# Patient Record
Sex: MALE | Race: WHITE | URBAN - METROPOLITAN AREA
[De-identification: names, ages, dates, MRNs, and addresses within clinical notes are randomized per-mention and may not be internally consistent; named-entity substitution may affect disease eponyms.]

---

## 2017-01-01 ENCOUNTER — RESULTS ONLY (OUTPATIENT)
Dept: OTHER | Facility: CLINIC | Age: 77
End: 2017-01-01

## 2017-01-01 ENCOUNTER — APPOINTMENT (OUTPATIENT)
Dept: GENERAL RADIOLOGY | Facility: CLINIC | Age: 77
DRG: 270 | End: 2017-01-01
Attending: INTERNAL MEDICINE
Payer: COMMERCIAL

## 2017-01-01 ENCOUNTER — ALLIED HEALTH/NURSE VISIT (OUTPATIENT)
Dept: CARDIOLOGY | Facility: CLINIC | Age: 77
End: 2017-01-01
Payer: COMMERCIAL

## 2017-01-01 ENCOUNTER — TELEPHONE (OUTPATIENT)
Dept: CARDIOLOGY | Facility: CLINIC | Age: 77
End: 2017-01-01

## 2017-01-01 ENCOUNTER — MEDICAL CORRESPONDENCE (OUTPATIENT)
Dept: HEALTH INFORMATION MANAGEMENT | Facility: CLINIC | Age: 77
End: 2017-01-01

## 2017-01-01 ENCOUNTER — APPOINTMENT (OUTPATIENT)
Dept: GENERAL RADIOLOGY | Facility: CLINIC | Age: 77
DRG: 286 | End: 2017-01-01
Attending: INTERNAL MEDICINE
Payer: COMMERCIAL

## 2017-01-01 ENCOUNTER — DOCUMENTATION ONLY (OUTPATIENT)
Dept: CARDIOLOGY | Facility: CLINIC | Age: 77
End: 2017-01-01

## 2017-01-01 ENCOUNTER — APPOINTMENT (OUTPATIENT)
Dept: OCCUPATIONAL THERAPY | Facility: CLINIC | Age: 77
DRG: 286 | End: 2017-01-01
Attending: INTERNAL MEDICINE
Payer: COMMERCIAL

## 2017-01-01 ENCOUNTER — APPOINTMENT (OUTPATIENT)
Dept: CT IMAGING | Facility: CLINIC | Age: 77
DRG: 286 | End: 2017-01-01
Payer: COMMERCIAL

## 2017-01-01 ENCOUNTER — HOSPITAL ENCOUNTER (OUTPATIENT)
Dept: CARDIAC REHAB | Facility: CLINIC | Age: 77
End: 2017-01-09
Attending: NURSE PRACTITIONER
Payer: COMMERCIAL

## 2017-01-01 ENCOUNTER — PRE VISIT (OUTPATIENT)
Dept: CARDIOLOGY | Facility: CLINIC | Age: 77
End: 2017-01-01

## 2017-01-01 ENCOUNTER — OFFICE VISIT (OUTPATIENT)
Dept: CARDIOLOGY | Facility: CLINIC | Age: 77
End: 2017-01-01
Attending: NURSE PRACTITIONER
Payer: COMMERCIAL

## 2017-01-01 ENCOUNTER — OFFICE VISIT (OUTPATIENT)
Dept: SLEEP MEDICINE | Facility: CLINIC | Age: 77
End: 2017-01-01
Payer: COMMERCIAL

## 2017-01-01 ENCOUNTER — CARE COORDINATION (OUTPATIENT)
Dept: CARDIOLOGY | Facility: CLINIC | Age: 77
End: 2017-01-01

## 2017-01-01 ENCOUNTER — HOSPITAL ENCOUNTER (OUTPATIENT)
Facility: CLINIC | Age: 77
Discharge: HOME | End: 2017-04-03
Attending: INTERNAL MEDICINE | Admitting: INTERNAL MEDICINE
Payer: MEDICARE

## 2017-01-01 ENCOUNTER — HOSPITAL ENCOUNTER (OUTPATIENT)
Dept: CARDIAC REHAB | Facility: CLINIC | Age: 77
End: 2017-03-08
Attending: NURSE PRACTITIONER
Payer: COMMERCIAL

## 2017-01-01 ENCOUNTER — HOSPITAL ENCOUNTER (INPATIENT)
Facility: CLINIC | Age: 77
LOS: 6 days | Discharge: HOME | DRG: 291 | End: 2017-06-24
Attending: EMERGENCY MEDICINE | Admitting: INTERNAL MEDICINE
Payer: COMMERCIAL

## 2017-01-01 ENCOUNTER — ANESTHESIA (OUTPATIENT)
Dept: INTENSIVE CARE | Facility: CLINIC | Age: 77
DRG: 270 | End: 2017-01-01
Payer: COMMERCIAL

## 2017-01-01 ENCOUNTER — APPOINTMENT (OUTPATIENT)
Dept: GENERAL RADIOLOGY | Facility: CLINIC | Age: 77
DRG: 286 | End: 2017-01-01
Payer: COMMERCIAL

## 2017-01-01 ENCOUNTER — HOSPITAL ENCOUNTER (EMERGENCY)
Facility: CLINIC | Age: 77
Discharge: HOME | End: 2017-03-04
Attending: EMERGENCY MEDICINE | Admitting: EMERGENCY MEDICINE
Payer: COMMERCIAL

## 2017-01-01 ENCOUNTER — APPOINTMENT (OUTPATIENT)
Dept: CARDIOLOGY | Facility: CLINIC | Age: 77
DRG: 286 | End: 2017-01-01
Attending: INTERNAL MEDICINE
Payer: COMMERCIAL

## 2017-01-01 ENCOUNTER — TRANSFERRED RECORDS (OUTPATIENT)
Dept: CARDIOLOGY | Facility: CLINIC | Age: 77
End: 2017-01-01

## 2017-01-01 ENCOUNTER — HOSPITAL ENCOUNTER (OUTPATIENT)
Dept: CARDIAC REHAB | Facility: CLINIC | Age: 77
End: 2017-01-23
Attending: NURSE PRACTITIONER
Payer: COMMERCIAL

## 2017-01-01 ENCOUNTER — CARE COORDINATION (OUTPATIENT)
Dept: CARE COORDINATION | Facility: CLINIC | Age: 77
End: 2017-01-01

## 2017-01-01 ENCOUNTER — OFFICE VISIT (OUTPATIENT)
Dept: CARDIOLOGY | Facility: CLINIC | Age: 77
End: 2017-01-01
Payer: COMMERCIAL

## 2017-01-01 ENCOUNTER — HOSPITAL ENCOUNTER (INPATIENT)
Facility: CLINIC | Age: 77
LOS: 9 days | DRG: 270 | End: 2017-07-09
Attending: INTERNAL MEDICINE | Admitting: INTERNAL MEDICINE
Payer: COMMERCIAL

## 2017-01-01 ENCOUNTER — HOSPITAL ENCOUNTER (OUTPATIENT)
Facility: CLINIC | Age: 77
End: 2017-01-01
Admitting: INTERNAL MEDICINE

## 2017-01-01 ENCOUNTER — TELEPHONE (OUTPATIENT)
Dept: PHARMACY | Facility: OTHER | Age: 77
End: 2017-01-01

## 2017-01-01 ENCOUNTER — APPOINTMENT (OUTPATIENT)
Dept: CARDIOLOGY | Facility: CLINIC | Age: 77
DRG: 270 | End: 2017-01-01
Attending: INTERNAL MEDICINE
Payer: COMMERCIAL

## 2017-01-01 ENCOUNTER — HOME INFUSION (PRE-WILLOW HOME INFUSION) (OUTPATIENT)
Dept: PHARMACY | Facility: CLINIC | Age: 77
End: 2017-01-01

## 2017-01-01 ENCOUNTER — RADIANT APPOINTMENT (OUTPATIENT)
Dept: CARDIOLOGY | Facility: CLINIC | Age: 77
End: 2017-01-01
Attending: INTERNAL MEDICINE

## 2017-01-01 ENCOUNTER — HOSPITAL ENCOUNTER (OUTPATIENT)
Dept: CARDIAC REHAB | Facility: CLINIC | Age: 77
End: 2017-01-05
Attending: NURSE PRACTITIONER
Payer: COMMERCIAL

## 2017-01-01 ENCOUNTER — INFUSION THERAPY VISIT (OUTPATIENT)
Dept: INFUSION THERAPY | Facility: CLINIC | Age: 77
End: 2017-01-01
Attending: INTERNAL MEDICINE
Payer: COMMERCIAL

## 2017-01-01 ENCOUNTER — DOCUMENTATION ONLY (OUTPATIENT)
Dept: SLEEP MEDICINE | Facility: CLINIC | Age: 77
End: 2017-01-01

## 2017-01-01 ENCOUNTER — OFFICE VISIT (OUTPATIENT)
Dept: CARDIOLOGY | Facility: CLINIC | Age: 77
End: 2017-01-01
Attending: INTERNAL MEDICINE
Payer: COMMERCIAL

## 2017-01-01 ENCOUNTER — HOSPITAL ENCOUNTER (OUTPATIENT)
Facility: CLINIC | Age: 77
Setting detail: OBSERVATION
Discharge: HOME | End: 2017-03-30
Attending: EMERGENCY MEDICINE | Admitting: INTERNAL MEDICINE
Payer: COMMERCIAL

## 2017-01-01 ENCOUNTER — APPOINTMENT (OUTPATIENT)
Dept: OCCUPATIONAL THERAPY | Facility: CLINIC | Age: 77
DRG: 270 | End: 2017-01-01
Attending: INTERNAL MEDICINE
Payer: COMMERCIAL

## 2017-01-01 ENCOUNTER — HOSPITAL ENCOUNTER (OUTPATIENT)
Dept: CARDIAC REHAB | Facility: CLINIC | Age: 77
End: 2017-03-16
Attending: NURSE PRACTITIONER
Payer: COMMERCIAL

## 2017-01-01 ENCOUNTER — HOSPITAL ENCOUNTER (OUTPATIENT)
Dept: CARDIAC REHAB | Facility: CLINIC | Age: 77
End: 2017-03-23
Attending: NURSE PRACTITIONER
Payer: COMMERCIAL

## 2017-01-01 ENCOUNTER — APPOINTMENT (OUTPATIENT)
Dept: CT IMAGING | Facility: CLINIC | Age: 77
DRG: 286 | End: 2017-01-01
Attending: INTERNAL MEDICINE
Payer: COMMERCIAL

## 2017-01-01 ENCOUNTER — APPOINTMENT (OUTPATIENT)
Dept: ULTRASOUND IMAGING | Facility: CLINIC | Age: 77
DRG: 286 | End: 2017-01-01
Attending: STUDENT IN AN ORGANIZED HEALTH CARE EDUCATION/TRAINING PROGRAM
Payer: COMMERCIAL

## 2017-01-01 ENCOUNTER — APPOINTMENT (OUTPATIENT)
Dept: GENERAL RADIOLOGY | Facility: CLINIC | Age: 77
DRG: 291 | End: 2017-01-01
Payer: COMMERCIAL

## 2017-01-01 ENCOUNTER — APPOINTMENT (OUTPATIENT)
Dept: CARDIOLOGY | Facility: CLINIC | Age: 77
End: 2017-01-01
Attending: NURSE PRACTITIONER
Payer: MEDICARE

## 2017-01-01 ENCOUNTER — HOSPITAL ENCOUNTER (OUTPATIENT)
Dept: CARDIAC REHAB | Facility: CLINIC | Age: 77
End: 2017-03-27
Attending: NURSE PRACTITIONER
Payer: COMMERCIAL

## 2017-01-01 ENCOUNTER — INFUSION THERAPY VISIT (OUTPATIENT)
Dept: INFUSION THERAPY | Facility: CLINIC | Age: 77
End: 2017-01-01
Payer: COMMERCIAL

## 2017-01-01 ENCOUNTER — APPOINTMENT (OUTPATIENT)
Dept: ULTRASOUND IMAGING | Facility: CLINIC | Age: 77
DRG: 286 | End: 2017-01-01
Payer: COMMERCIAL

## 2017-01-01 ENCOUNTER — APPOINTMENT (OUTPATIENT)
Dept: GENERAL RADIOLOGY | Facility: CLINIC | Age: 77
DRG: 291 | End: 2017-01-01
Attending: EMERGENCY MEDICINE
Payer: COMMERCIAL

## 2017-01-01 ENCOUNTER — APPOINTMENT (OUTPATIENT)
Dept: OCCUPATIONAL THERAPY | Facility: CLINIC | Age: 77
DRG: 291 | End: 2017-01-01
Payer: COMMERCIAL

## 2017-01-01 ENCOUNTER — HOSPITAL ENCOUNTER (INPATIENT)
Facility: CLINIC | Age: 77
LOS: 9 days | Discharge: HOME HEALTH | DRG: 286 | End: 2017-04-15
Attending: INTERNAL MEDICINE | Admitting: INTERNAL MEDICINE
Payer: COMMERCIAL

## 2017-01-01 ENCOUNTER — APPOINTMENT (OUTPATIENT)
Dept: GENERAL RADIOLOGY | Facility: CLINIC | Age: 77
DRG: 270 | End: 2017-01-01
Attending: STUDENT IN AN ORGANIZED HEALTH CARE EDUCATION/TRAINING PROGRAM
Payer: COMMERCIAL

## 2017-01-01 ENCOUNTER — HOSPITAL ENCOUNTER (OUTPATIENT)
Dept: CARDIAC REHAB | Facility: CLINIC | Age: 77
End: 2017-01-12
Attending: NURSE PRACTITIONER
Payer: COMMERCIAL

## 2017-01-01 ENCOUNTER — HOSPITAL ENCOUNTER (INPATIENT)
Facility: CLINIC | Age: 77
LOS: 15 days | Discharge: HOME HEALTH | DRG: 286 | End: 2017-06-16
Attending: INTERNAL MEDICINE | Admitting: INTERNAL MEDICINE
Payer: COMMERCIAL

## 2017-01-01 ENCOUNTER — HOSPITAL ENCOUNTER (OUTPATIENT)
Dept: CARDIAC REHAB | Facility: CLINIC | Age: 77
End: 2017-03-13
Attending: NURSE PRACTITIONER
Payer: COMMERCIAL

## 2017-01-01 ENCOUNTER — HOSPITAL ENCOUNTER (OUTPATIENT)
Facility: CLINIC | Age: 77
End: 2017-01-01
Attending: INTERNAL MEDICINE | Admitting: INTERNAL MEDICINE

## 2017-01-01 ENCOUNTER — OFFICE VISIT (OUTPATIENT)
Dept: CARDIOLOGY | Facility: CLINIC | Age: 77
DRG: 270 | End: 2017-01-01
Attending: NURSE PRACTITIONER
Payer: COMMERCIAL

## 2017-01-01 ENCOUNTER — HOSPITAL ENCOUNTER (INPATIENT)
Facility: CLINIC | Age: 77
LOS: 4 days | Discharge: HOME | DRG: 641 | End: 2017-01-20
Attending: EMERGENCY MEDICINE | Admitting: INTERNAL MEDICINE
Payer: COMMERCIAL

## 2017-01-01 ENCOUNTER — HOSPITAL ENCOUNTER (OUTPATIENT)
Dept: CARDIAC REHAB | Facility: CLINIC | Age: 77
End: 2017-03-10
Attending: NURSE PRACTITIONER
Payer: COMMERCIAL

## 2017-01-01 ENCOUNTER — APPOINTMENT (OUTPATIENT)
Dept: CT IMAGING | Facility: CLINIC | Age: 77
DRG: 270 | End: 2017-01-01
Attending: INTERNAL MEDICINE
Payer: COMMERCIAL

## 2017-01-01 ENCOUNTER — APPOINTMENT (OUTPATIENT)
Dept: GENERAL RADIOLOGY | Facility: CLINIC | Age: 77
End: 2017-01-01
Attending: EMERGENCY MEDICINE
Payer: COMMERCIAL

## 2017-01-01 ENCOUNTER — APPOINTMENT (OUTPATIENT)
Dept: OCCUPATIONAL THERAPY | Facility: CLINIC | Age: 77
End: 2017-01-01
Attending: INTERNAL MEDICINE
Payer: COMMERCIAL

## 2017-01-01 ENCOUNTER — APPOINTMENT (OUTPATIENT)
Dept: ULTRASOUND IMAGING | Facility: CLINIC | Age: 77
DRG: 270 | End: 2017-01-01
Attending: INTERNAL MEDICINE
Payer: COMMERCIAL

## 2017-01-01 ENCOUNTER — HOSPITAL ENCOUNTER (OUTPATIENT)
Dept: CARDIAC REHAB | Facility: CLINIC | Age: 77
End: 2017-01-20
Attending: NURSE PRACTITIONER
Payer: COMMERCIAL

## 2017-01-01 ENCOUNTER — INFUSION THERAPY VISIT (OUTPATIENT)
Dept: INFUSION THERAPY | Facility: CLINIC | Age: 77
End: 2017-01-01
Attending: NURSE PRACTITIONER
Payer: COMMERCIAL

## 2017-01-01 ENCOUNTER — HOSPITAL ENCOUNTER (OUTPATIENT)
Dept: CARDIAC REHAB | Facility: CLINIC | Age: 77
End: 2017-01-11
Attending: NURSE PRACTITIONER
Payer: COMMERCIAL

## 2017-01-01 ENCOUNTER — HOSPITAL ENCOUNTER (INPATIENT)
Facility: CLINIC | Age: 77
LOS: 7 days | Discharge: HOME HEALTH | DRG: 291 | End: 2017-05-01
Attending: EMERGENCY MEDICINE | Admitting: INTERNAL MEDICINE
Payer: COMMERCIAL

## 2017-01-01 ENCOUNTER — TELEPHONE (OUTPATIENT)
Dept: PHARMACY | Facility: CLINIC | Age: 77
End: 2017-01-01

## 2017-01-01 ENCOUNTER — ALLIED HEALTH/NURSE VISIT (OUTPATIENT)
Dept: PHARMACY | Facility: OTHER | Age: 77
End: 2017-01-01
Payer: COMMERCIAL

## 2017-01-01 ENCOUNTER — APPOINTMENT (OUTPATIENT)
Dept: ULTRASOUND IMAGING | Facility: CLINIC | Age: 77
DRG: 286 | End: 2017-01-01
Attending: INTERNAL MEDICINE
Payer: COMMERCIAL

## 2017-01-01 ENCOUNTER — ANESTHESIA EVENT (OUTPATIENT)
Dept: INTENSIVE CARE | Facility: CLINIC | Age: 77
DRG: 270 | End: 2017-01-01
Payer: COMMERCIAL

## 2017-01-01 ENCOUNTER — HOSPITAL ENCOUNTER (OUTPATIENT)
Dept: LAB | Facility: CLINIC | Age: 77
Discharge: HOME | End: 2017-04-24
Attending: NURSE PRACTITIONER
Payer: COMMERCIAL

## 2017-01-01 ENCOUNTER — APPOINTMENT (OUTPATIENT)
Dept: CT IMAGING | Facility: CLINIC | Age: 77
End: 2017-01-01
Attending: EMERGENCY MEDICINE
Payer: COMMERCIAL

## 2017-01-01 VITALS
TEMPERATURE: 97.9 F | WEIGHT: 152.78 LBS | OXYGEN SATURATION: 98 % | HEIGHT: 68 IN | BODY MASS INDEX: 23.15 KG/M2 | DIASTOLIC BLOOD PRESSURE: 81 MMHG | SYSTOLIC BLOOD PRESSURE: 139 MMHG | RESPIRATION RATE: 18 BRPM | HEART RATE: 88 BPM

## 2017-01-01 VITALS
HEART RATE: 60 BPM | SYSTOLIC BLOOD PRESSURE: 136 MMHG | RESPIRATION RATE: 16 BRPM | DIASTOLIC BLOOD PRESSURE: 68 MMHG | TEMPERATURE: 95.7 F

## 2017-01-01 VITALS
OXYGEN SATURATION: 98 % | HEART RATE: 77 BPM | WEIGHT: 145 LBS | DIASTOLIC BLOOD PRESSURE: 56 MMHG | HEIGHT: 68 IN | SYSTOLIC BLOOD PRESSURE: 114 MMHG | BODY MASS INDEX: 21.98 KG/M2

## 2017-01-01 VITALS
BODY MASS INDEX: 21.82 KG/M2 | DIASTOLIC BLOOD PRESSURE: 63 MMHG | HEART RATE: 106 BPM | WEIGHT: 144 LBS | OXYGEN SATURATION: 96 % | SYSTOLIC BLOOD PRESSURE: 120 MMHG | HEIGHT: 68 IN

## 2017-01-01 VITALS
RESPIRATION RATE: 16 BRPM | SYSTOLIC BLOOD PRESSURE: 132 MMHG | DIASTOLIC BLOOD PRESSURE: 76 MMHG | TEMPERATURE: 97.7 F | HEART RATE: 74 BPM | OXYGEN SATURATION: 96 %

## 2017-01-01 VITALS — HEIGHT: 69 IN | BODY MASS INDEX: 21.92 KG/M2 | WEIGHT: 148 LBS

## 2017-01-01 VITALS
SYSTOLIC BLOOD PRESSURE: 110 MMHG | TEMPERATURE: 97.6 F | RESPIRATION RATE: 18 BRPM | WEIGHT: 144.62 LBS | DIASTOLIC BLOOD PRESSURE: 61 MMHG | OXYGEN SATURATION: 99 % | BODY MASS INDEX: 21.67 KG/M2

## 2017-01-01 VITALS
OXYGEN SATURATION: 94 % | HEART RATE: 89 BPM | WEIGHT: 147.8 LBS | SYSTOLIC BLOOD PRESSURE: 118 MMHG | RESPIRATION RATE: 18 BRPM | TEMPERATURE: 98.1 F | BODY MASS INDEX: 22.4 KG/M2 | DIASTOLIC BLOOD PRESSURE: 65 MMHG | HEIGHT: 68 IN

## 2017-01-01 VITALS
HEART RATE: 67 BPM | DIASTOLIC BLOOD PRESSURE: 66 MMHG | WEIGHT: 146 LBS | RESPIRATION RATE: 12 BRPM | HEIGHT: 69 IN | BODY MASS INDEX: 21.62 KG/M2 | OXYGEN SATURATION: 99 % | SYSTOLIC BLOOD PRESSURE: 107 MMHG

## 2017-01-01 VITALS
DIASTOLIC BLOOD PRESSURE: 68 MMHG | SYSTOLIC BLOOD PRESSURE: 136 MMHG | WEIGHT: 154.32 LBS | BODY MASS INDEX: 23.39 KG/M2 | HEIGHT: 68 IN | HEART RATE: 68 BPM | OXYGEN SATURATION: 99 %

## 2017-01-01 VITALS
HEART RATE: 86 BPM | OXYGEN SATURATION: 99 % | DIASTOLIC BLOOD PRESSURE: 71 MMHG | HEIGHT: 68 IN | BODY MASS INDEX: 21.75 KG/M2 | WEIGHT: 143.5 LBS | SYSTOLIC BLOOD PRESSURE: 128 MMHG

## 2017-01-01 VITALS
SYSTOLIC BLOOD PRESSURE: 118 MMHG | WEIGHT: 151 LBS | HEART RATE: 72 BPM | BODY MASS INDEX: 22.36 KG/M2 | DIASTOLIC BLOOD PRESSURE: 62 MMHG | HEIGHT: 69 IN

## 2017-01-01 VITALS
BODY MASS INDEX: 23.95 KG/M2 | OXYGEN SATURATION: 98 % | HEART RATE: 68 BPM | HEIGHT: 68 IN | WEIGHT: 158 LBS | SYSTOLIC BLOOD PRESSURE: 126 MMHG | DIASTOLIC BLOOD PRESSURE: 60 MMHG | RESPIRATION RATE: 14 BRPM

## 2017-01-01 VITALS
TEMPERATURE: 97.9 F | HEIGHT: 68 IN | OXYGEN SATURATION: 97 % | BODY MASS INDEX: 22.61 KG/M2 | RESPIRATION RATE: 18 BRPM | DIASTOLIC BLOOD PRESSURE: 65 MMHG | SYSTOLIC BLOOD PRESSURE: 117 MMHG | HEART RATE: 64 BPM | WEIGHT: 149.2 LBS

## 2017-01-01 VITALS
SYSTOLIC BLOOD PRESSURE: 117 MMHG | HEIGHT: 68 IN | BODY MASS INDEX: 22.32 KG/M2 | HEART RATE: 78 BPM | WEIGHT: 147.3 LBS | DIASTOLIC BLOOD PRESSURE: 67 MMHG | OXYGEN SATURATION: 99 %

## 2017-01-01 VITALS
DIASTOLIC BLOOD PRESSURE: 71 MMHG | HEIGHT: 69 IN | BODY MASS INDEX: 22.87 KG/M2 | SYSTOLIC BLOOD PRESSURE: 130 MMHG | SYSTOLIC BLOOD PRESSURE: 128 MMHG | WEIGHT: 154.4 LBS | OXYGEN SATURATION: 100 % | HEART RATE: 65 BPM | DIASTOLIC BLOOD PRESSURE: 70 MMHG | HEART RATE: 65 BPM

## 2017-01-01 VITALS
WEIGHT: 157.3 LBS | HEART RATE: 66 BPM | OXYGEN SATURATION: 100 % | SYSTOLIC BLOOD PRESSURE: 122 MMHG | BODY MASS INDEX: 23.3 KG/M2 | DIASTOLIC BLOOD PRESSURE: 60 MMHG | HEIGHT: 69 IN

## 2017-01-01 VITALS
TEMPERATURE: 99.3 F | HEIGHT: 68 IN | SYSTOLIC BLOOD PRESSURE: 95 MMHG | RESPIRATION RATE: 17 BRPM | OXYGEN SATURATION: 100 % | WEIGHT: 126.76 LBS | DIASTOLIC BLOOD PRESSURE: 69 MMHG | BODY MASS INDEX: 19.21 KG/M2

## 2017-01-01 VITALS
HEART RATE: 66 BPM | SYSTOLIC BLOOD PRESSURE: 128 MMHG | WEIGHT: 148.8 LBS | OXYGEN SATURATION: 99 % | DIASTOLIC BLOOD PRESSURE: 70 MMHG | BODY MASS INDEX: 22.04 KG/M2 | HEIGHT: 69 IN

## 2017-01-01 VITALS
OXYGEN SATURATION: 98 % | SYSTOLIC BLOOD PRESSURE: 130 MMHG | TEMPERATURE: 97.3 F | DIASTOLIC BLOOD PRESSURE: 71 MMHG | HEIGHT: 68 IN | WEIGHT: 154.4 LBS | RESPIRATION RATE: 16 BRPM | BODY MASS INDEX: 23.4 KG/M2 | HEART RATE: 64 BPM

## 2017-01-01 VITALS
HEART RATE: 64 BPM | DIASTOLIC BLOOD PRESSURE: 60 MMHG | WEIGHT: 153.4 LBS | SYSTOLIC BLOOD PRESSURE: 112 MMHG | BODY MASS INDEX: 22.72 KG/M2 | HEIGHT: 69 IN

## 2017-01-01 VITALS
WEIGHT: 143.2 LBS | HEIGHT: 68 IN | OXYGEN SATURATION: 96 % | DIASTOLIC BLOOD PRESSURE: 76 MMHG | SYSTOLIC BLOOD PRESSURE: 128 MMHG | RESPIRATION RATE: 18 BRPM | HEART RATE: 86 BPM | BODY MASS INDEX: 21.7 KG/M2 | TEMPERATURE: 97.9 F

## 2017-01-01 VITALS — BODY MASS INDEX: 23.19 KG/M2 | HEIGHT: 69 IN | WEIGHT: 156.6 LBS

## 2017-01-01 VITALS
OXYGEN SATURATION: 96 % | WEIGHT: 143.2 LBS | DIASTOLIC BLOOD PRESSURE: 72 MMHG | TEMPERATURE: 97.8 F | SYSTOLIC BLOOD PRESSURE: 125 MMHG | BODY MASS INDEX: 21.7 KG/M2 | HEIGHT: 68 IN | RESPIRATION RATE: 18 BRPM | HEART RATE: 88 BPM

## 2017-01-01 VITALS
BODY MASS INDEX: 22.63 KG/M2 | HEART RATE: 62 BPM | OXYGEN SATURATION: 100 % | TEMPERATURE: 97.4 F | DIASTOLIC BLOOD PRESSURE: 70 MMHG | HEIGHT: 69 IN | SYSTOLIC BLOOD PRESSURE: 128 MMHG | WEIGHT: 152.8 LBS

## 2017-01-01 VITALS — WEIGHT: 151.1 LBS | HEIGHT: 69 IN | BODY MASS INDEX: 22.38 KG/M2

## 2017-01-01 VITALS — HEIGHT: 67 IN | BODY MASS INDEX: 25.39 KG/M2 | WEIGHT: 161.8 LBS

## 2017-01-01 VITALS
SYSTOLIC BLOOD PRESSURE: 134 MMHG | OXYGEN SATURATION: 98 % | HEIGHT: 68 IN | DIASTOLIC BLOOD PRESSURE: 76 MMHG | HEART RATE: 83 BPM | BODY MASS INDEX: 21.95 KG/M2 | WEIGHT: 144.8 LBS

## 2017-01-01 VITALS
BODY MASS INDEX: 23.49 KG/M2 | HEIGHT: 68 IN | SYSTOLIC BLOOD PRESSURE: 128 MMHG | OXYGEN SATURATION: 98 % | WEIGHT: 155 LBS | DIASTOLIC BLOOD PRESSURE: 62 MMHG | HEART RATE: 74 BPM

## 2017-01-01 VITALS
BODY MASS INDEX: 23.76 KG/M2 | WEIGHT: 158.6 LBS | SYSTOLIC BLOOD PRESSURE: 120 MMHG | HEART RATE: 72 BPM | RESPIRATION RATE: 16 BRPM | DIASTOLIC BLOOD PRESSURE: 54 MMHG

## 2017-01-01 VITALS
HEART RATE: 77 BPM | SYSTOLIC BLOOD PRESSURE: 129 MMHG | WEIGHT: 146.6 LBS | HEIGHT: 68 IN | DIASTOLIC BLOOD PRESSURE: 73 MMHG | OXYGEN SATURATION: 99 % | BODY MASS INDEX: 22.22 KG/M2

## 2017-01-01 DIAGNOSIS — I50.23 ACUTE ON CHRONIC SYSTOLIC CONGESTIVE HEART FAILURE (H): ICD-10-CM

## 2017-01-01 DIAGNOSIS — I50.23 ACUTE ON CHRONIC SYSTOLIC CONGESTIVE HEART FAILURE (H): Primary | ICD-10-CM

## 2017-01-01 DIAGNOSIS — I50.22 CHRONIC SYSTOLIC CONGESTIVE HEART FAILURE (H): ICD-10-CM

## 2017-01-01 DIAGNOSIS — I50.9 CHF (CONGESTIVE HEART FAILURE) (H): Primary | ICD-10-CM

## 2017-01-01 DIAGNOSIS — I50.22 CHRONIC SYSTOLIC HEART FAILURE (H): ICD-10-CM

## 2017-01-01 DIAGNOSIS — I25.5 ISCHEMIC CARDIOMYOPATHY: Primary | ICD-10-CM

## 2017-01-01 DIAGNOSIS — N18.30 CKD (CHRONIC KIDNEY DISEASE) STAGE 3, GFR 30-59 ML/MIN (H): ICD-10-CM

## 2017-01-01 DIAGNOSIS — R09.02 HYPOXEMIA: Primary | ICD-10-CM

## 2017-01-01 DIAGNOSIS — N18.9 ANEMIA OF CHRONIC RENAL FAILURE: ICD-10-CM

## 2017-01-01 DIAGNOSIS — Z95.810 ICD (IMPLANTABLE CARDIOVERTER-DEFIBRILLATOR) IN PLACE: Primary | ICD-10-CM

## 2017-01-01 DIAGNOSIS — I24.9 ACS (ACUTE CORONARY SYNDROME) (H): Primary | ICD-10-CM

## 2017-01-01 DIAGNOSIS — I50.42 CHRONIC COMBINED SYSTOLIC AND DIASTOLIC CONGESTIVE HEART FAILURE (H): ICD-10-CM

## 2017-01-01 DIAGNOSIS — I25.5 ISCHEMIC CARDIOMYOPATHY: ICD-10-CM

## 2017-01-01 DIAGNOSIS — I50.22 CHRONIC SYSTOLIC HEART FAILURE (H): Primary | ICD-10-CM

## 2017-01-01 DIAGNOSIS — D63.1 ANEMIA OF CHRONIC RENAL FAILURE: ICD-10-CM

## 2017-01-01 DIAGNOSIS — I50.22 CHRONIC SYSTOLIC CONGESTIVE HEART FAILURE (H): Primary | ICD-10-CM

## 2017-01-01 DIAGNOSIS — G47.33 OSA (OBSTRUCTIVE SLEEP APNEA): ICD-10-CM

## 2017-01-01 DIAGNOSIS — R06.00 DYSPNEA, UNSPECIFIED TYPE: ICD-10-CM

## 2017-01-01 DIAGNOSIS — S50.01XA CONTUSION OF RIGHT ELBOW, INITIAL ENCOUNTER: ICD-10-CM

## 2017-01-01 DIAGNOSIS — I50.9 CHRONIC CONGESTIVE HEART FAILURE, UNSPECIFIED CONGESTIVE HEART FAILURE TYPE: ICD-10-CM

## 2017-01-01 DIAGNOSIS — D50.9 IRON DEFICIENCY ANEMIA, UNSPECIFIED: ICD-10-CM

## 2017-01-01 DIAGNOSIS — E03.9 HYPOTHYROIDISM: ICD-10-CM

## 2017-01-01 DIAGNOSIS — I50.23 ACUTE ON CHRONIC SYSTOLIC HEART FAILURE (H): ICD-10-CM

## 2017-01-01 DIAGNOSIS — E03.9 SEVERE HYPOTHYROIDISM: ICD-10-CM

## 2017-01-01 DIAGNOSIS — I10 ESSENTIAL HYPERTENSION WITH GOAL BLOOD PRESSURE LESS THAN 130/80: ICD-10-CM

## 2017-01-01 DIAGNOSIS — D50.9 IRON DEFICIENCY ANEMIA, UNSPECIFIED: Primary | ICD-10-CM

## 2017-01-01 DIAGNOSIS — D64.9 ANEMIA: ICD-10-CM

## 2017-01-01 DIAGNOSIS — R35.0 BENIGN PROSTATIC HYPERPLASIA WITH URINARY FREQUENCY: ICD-10-CM

## 2017-01-01 DIAGNOSIS — I21.4 NSTEMI (NON-ST ELEVATED MYOCARDIAL INFARCTION) (H): ICD-10-CM

## 2017-01-01 DIAGNOSIS — E87.6 HYPOKALEMIA: ICD-10-CM

## 2017-01-01 DIAGNOSIS — H25.9 AGE-RELATED CATARACT, UNSPECIFIED AGE-RELATED CATARACT TYPE, UNSPECIFIED LATERALITY: ICD-10-CM

## 2017-01-01 DIAGNOSIS — N18.30 CHRONIC KIDNEY DISEASE, STAGE III (MODERATE) (H): ICD-10-CM

## 2017-01-01 DIAGNOSIS — E87.1 HYPONATREMIA: ICD-10-CM

## 2017-01-01 DIAGNOSIS — T14.8XXA PUNCTURE WOUND: ICD-10-CM

## 2017-01-01 DIAGNOSIS — I25.10 CORONARY ARTERY DISEASE INVOLVING NATIVE CORONARY ARTERY OF NATIVE HEART WITHOUT ANGINA PECTORIS: ICD-10-CM

## 2017-01-01 DIAGNOSIS — G47.39 COMPLEX SLEEP APNEA SYNDROME: Primary | ICD-10-CM

## 2017-01-01 DIAGNOSIS — T14.8XXA ABRASION: ICD-10-CM

## 2017-01-01 DIAGNOSIS — N19 RENAL FAILURE: ICD-10-CM

## 2017-01-01 DIAGNOSIS — E53.8 VITAMIN B12 DEFICIENCY WITHOUT ANEMIA: ICD-10-CM

## 2017-01-01 DIAGNOSIS — N17.9 ACUTE RENAL FAILURE, UNSPECIFIED ACUTE RENAL FAILURE TYPE (H): ICD-10-CM

## 2017-01-01 DIAGNOSIS — N18.30 CKD (CHRONIC KIDNEY DISEASE), STAGE III (H): Primary | ICD-10-CM

## 2017-01-01 DIAGNOSIS — N18.30 CKD (CHRONIC KIDNEY DISEASE), STAGE III (H): ICD-10-CM

## 2017-01-01 DIAGNOSIS — I10 BENIGN ESSENTIAL HTN: ICD-10-CM

## 2017-01-01 DIAGNOSIS — G47.33 OBSTRUCTIVE SLEEP APNEA (ADULT) (PEDIATRIC): Primary | ICD-10-CM

## 2017-01-01 DIAGNOSIS — I50.82 BIVENTRICULAR HEART FAILURE (H): ICD-10-CM

## 2017-01-01 DIAGNOSIS — I50.23 ACUTE ON CHRONIC SYSTOLIC (CONGESTIVE) HEART FAILURE (H): Primary | ICD-10-CM

## 2017-01-01 DIAGNOSIS — N17.9 ACUTE KIDNEY INJURY (H): ICD-10-CM

## 2017-01-01 DIAGNOSIS — E87.70 HYPERVOLEMIA, UNSPECIFIED HYPERVOLEMIA TYPE: ICD-10-CM

## 2017-01-01 DIAGNOSIS — R57.0 CARDIOGENIC SHOCK (H): Primary | ICD-10-CM

## 2017-01-01 DIAGNOSIS — I13.0 CARDIORENAL SYNDROME, STAGE 1-4 OR UNSPECIFIED CHRONIC KIDNEY DISEASE, WITH HEART FAILURE (H): ICD-10-CM

## 2017-01-01 DIAGNOSIS — E87.6 HYPOKALEMIA: Primary | ICD-10-CM

## 2017-01-01 DIAGNOSIS — E87.5 HYPERKALEMIA: ICD-10-CM

## 2017-01-01 DIAGNOSIS — I50.23 ACUTE ON CHRONIC SYSTOLIC HEART FAILURE (H): Primary | ICD-10-CM

## 2017-01-01 DIAGNOSIS — N40.1 BENIGN PROSTATIC HYPERPLASIA WITH LOWER URINARY TRACT SYMPTOMS, UNSPECIFIED MORPHOLOGY: ICD-10-CM

## 2017-01-01 DIAGNOSIS — N40.1 BENIGN PROSTATIC HYPERPLASIA WITH URINARY FREQUENCY: ICD-10-CM

## 2017-01-01 DIAGNOSIS — N18.30 CKD (CHRONIC KIDNEY DISEASE) STAGE 3, GFR 30-59 ML/MIN (H): Primary | ICD-10-CM

## 2017-01-01 DIAGNOSIS — G47.00 INSOMNIA, UNSPECIFIED TYPE: ICD-10-CM

## 2017-01-01 DIAGNOSIS — R06.02 SOB (SHORTNESS OF BREATH): ICD-10-CM

## 2017-01-01 DIAGNOSIS — R57.0 CARDIOGENIC SHOCK (H): ICD-10-CM

## 2017-01-01 DIAGNOSIS — I50.9 LOW OUTPUT HEART FAILURE (H): ICD-10-CM

## 2017-01-01 LAB
% SATURATION - QUEST: 28
A* LOCUS: NORMAL
A*: NORMAL
ABO + RH BLD: NORMAL
ABTEST METHOD: NORMAL
ALBUMIN SERPL-MCNC: 2 G/DL (ref 3.4–5)
ALBUMIN SERPL-MCNC: 2 G/DL (ref 3.4–5)
ALBUMIN SERPL-MCNC: 2.2 G/DL (ref 3.4–5)
ALBUMIN SERPL-MCNC: 3 G/DL (ref 3.4–5)
ALBUMIN SERPL-MCNC: 3.1 G/DL (ref 3.4–5)
ALBUMIN SERPL-MCNC: 3.2 G/DL (ref 3.4–5)
ALBUMIN SERPL-MCNC: 3.3 G/DL (ref 3.4–5)
ALBUMIN SERPL-MCNC: 3.4 G/DL (ref 3.4–5)
ALBUMIN SERPL-MCNC: 3.5 G/DL (ref 3.4–5)
ALBUMIN SERPL-MCNC: 3.7 G/DL (ref 3.4–5)
ALBUMIN UR-MCNC: 10 MG/DL
ALBUMIN UR-MCNC: NEGATIVE MG/DL
ALP SERPL-CCNC: 102 U/L (ref 40–150)
ALP SERPL-CCNC: 102 U/L (ref 40–150)
ALP SERPL-CCNC: 106 U/L (ref 40–150)
ALP SERPL-CCNC: 108 U/L (ref 40–150)
ALP SERPL-CCNC: 115 U/L (ref 40–150)
ALP SERPL-CCNC: 115 U/L (ref 40–150)
ALP SERPL-CCNC: 120 U/L (ref 40–150)
ALP SERPL-CCNC: 124 U/L (ref 40–150)
ALP SERPL-CCNC: 125 U/L (ref 40–150)
ALP SERPL-CCNC: 126 U/L (ref 40–150)
ALP SERPL-CCNC: 128 U/L (ref 40–150)
ALP SERPL-CCNC: 140 U/L (ref 40–150)
ALP SERPL-CCNC: 143 U/L (ref 40–150)
ALP SERPL-CCNC: 72 U/L (ref 40–150)
ALP SERPL-CCNC: 80 U/L (ref 40–150)
ALP SERPL-CCNC: 80 U/L (ref 40–150)
ALP SERPL-CCNC: 81 U/L (ref 40–150)
ALP SERPL-CCNC: 82 U/L (ref 40–150)
ALP SERPL-CCNC: 84 U/L (ref 40–150)
ALP SERPL-CCNC: 85 U/L (ref 40–150)
ALP SERPL-CCNC: 86 U/L (ref 40–150)
ALP SERPL-CCNC: 87 U/L (ref 40–150)
ALP SERPL-CCNC: 90 U/L (ref 40–150)
ALP SERPL-CCNC: 90 U/L (ref 40–150)
ALP SERPL-CCNC: 95 U/L (ref 40–150)
ALP SERPL-CCNC: 96 U/L (ref 40–150)
ALP SERPL-CCNC: 97 U/L (ref 40–150)
ALT SERPL W P-5'-P-CCNC: 106 U/L (ref 0–70)
ALT SERPL W P-5'-P-CCNC: 123 U/L (ref 0–70)
ALT SERPL W P-5'-P-CCNC: 13 U/L (ref 0–70)
ALT SERPL W P-5'-P-CCNC: 13 U/L (ref 0–70)
ALT SERPL W P-5'-P-CCNC: 14 U/L (ref 0–70)
ALT SERPL W P-5'-P-CCNC: 144 U/L (ref 0–70)
ALT SERPL W P-5'-P-CCNC: 15 U/L (ref 0–70)
ALT SERPL W P-5'-P-CCNC: 166 U/L (ref 0–70)
ALT SERPL W P-5'-P-CCNC: 168 U/L (ref 0–70)
ALT SERPL W P-5'-P-CCNC: 19 U/L (ref 0–70)
ALT SERPL W P-5'-P-CCNC: 211 U/L (ref 0–70)
ALT SERPL W P-5'-P-CCNC: 22 U/L (ref 0–70)
ALT SERPL W P-5'-P-CCNC: 221 U/L (ref 0–70)
ALT SERPL W P-5'-P-CCNC: 235 U/L (ref 0–70)
ALT SERPL W P-5'-P-CCNC: 241 U/L (ref 0–70)
ALT SERPL W P-5'-P-CCNC: 244 U/L (ref 0–70)
ALT SERPL W P-5'-P-CCNC: 25 U/L (ref 0–70)
ALT SERPL W P-5'-P-CCNC: 34 U/L (ref 0–70)
ALT SERPL W P-5'-P-CCNC: 35 U/L (ref 0–70)
ALT SERPL W P-5'-P-CCNC: 35 U/L (ref 0–70)
ALT SERPL W P-5'-P-CCNC: 49 U/L (ref 0–70)
ALT SERPL W P-5'-P-CCNC: 54 U/L (ref 0–70)
ALT SERPL W P-5'-P-CCNC: 63 U/L (ref 0–70)
ALT SERPL W P-5'-P-CCNC: 67 U/L (ref 0–70)
ALT SERPL W P-5'-P-CCNC: 77 U/L (ref 0–70)
ALT SERPL W P-5'-P-CCNC: 87 U/L (ref 0–70)
ALT SERPL W P-5'-P-CCNC: 96 U/L (ref 0–70)
AMORPH CRY #/AREA URNS HPF: ABNORMAL /HPF
ANION GAP SERPL CALCULATED.3IONS-SCNC: 10 MMOL/L (ref 3–14)
ANION GAP SERPL CALCULATED.3IONS-SCNC: 11 MMOL/L (ref 3–14)
ANION GAP SERPL CALCULATED.3IONS-SCNC: 12 MMOL/L (ref 3–14)
ANION GAP SERPL CALCULATED.3IONS-SCNC: 12 MMOL/L (ref 6–17)
ANION GAP SERPL CALCULATED.3IONS-SCNC: 13 MMOL/L (ref 3–14)
ANION GAP SERPL CALCULATED.3IONS-SCNC: 14 MMOL/L (ref 3–14)
ANION GAP SERPL CALCULATED.3IONS-SCNC: 14.6 MMOL/L (ref 6–17)
ANION GAP SERPL CALCULATED.3IONS-SCNC: 15 MMOL/L (ref 3–14)
ANION GAP SERPL CALCULATED.3IONS-SCNC: 15 MMOL/L (ref 3–14)
ANION GAP SERPL CALCULATED.3IONS-SCNC: 17 MMOL/L (ref 3–14)
ANION GAP SERPL CALCULATED.3IONS-SCNC: 17 MMOL/L (ref 3–14)
ANION GAP SERPL CALCULATED.3IONS-SCNC: 3 MMOL/L (ref 3–14)
ANION GAP SERPL CALCULATED.3IONS-SCNC: 4 MMOL/L (ref 3–14)
ANION GAP SERPL CALCULATED.3IONS-SCNC: 5 MMOL/L (ref 3–14)
ANION GAP SERPL CALCULATED.3IONS-SCNC: 6 MMOL/L (ref 3–14)
ANION GAP SERPL CALCULATED.3IONS-SCNC: 7 MMOL/L (ref 3–14)
ANION GAP SERPL CALCULATED.3IONS-SCNC: 8 MMOL/L (ref 3–14)
ANION GAP SERPL CALCULATED.3IONS-SCNC: 9 MMOL/L (ref 3–14)
ANION GAP SERPL CALCULATED.3IONS-SCNC: ABNORMAL MMOL/L
ANION GAP SERPL CALCULATED.3IONS-SCNC: ABNORMAL MMOL/L (ref 6–17)
ANION GAP SERPL CALCULATED.3IONS-SCNC: NORMAL MMOL/L (ref 6–17)
ANISOCYTOSIS BLD QL SMEAR: SLIGHT
APPEARANCE UR: CLEAR
APTT PPP: 27 SEC (ref 22–37)
APTT PPP: 27 SEC (ref 22–37)
APTT PPP: 33 SEC (ref 22–37)
AST SERPL W P-5'-P-CCNC: 131 U/L (ref 0–45)
AST SERPL W P-5'-P-CCNC: 15 U/L (ref 0–45)
AST SERPL W P-5'-P-CCNC: 17 U/L (ref 0–45)
AST SERPL W P-5'-P-CCNC: 189 U/L (ref 0–45)
AST SERPL W P-5'-P-CCNC: 19 U/L (ref 0–45)
AST SERPL W P-5'-P-CCNC: 20 U/L (ref 0–45)
AST SERPL W P-5'-P-CCNC: 23 U/L (ref 0–45)
AST SERPL W P-5'-P-CCNC: 235 U/L (ref 0–45)
AST SERPL W P-5'-P-CCNC: 24 U/L (ref 0–45)
AST SERPL W P-5'-P-CCNC: 25 U/L (ref 0–45)
AST SERPL W P-5'-P-CCNC: 25 U/L (ref 0–45)
AST SERPL W P-5'-P-CCNC: 27 U/L (ref 0–45)
AST SERPL W P-5'-P-CCNC: 278 U/L (ref 0–45)
AST SERPL W P-5'-P-CCNC: 294 U/L (ref 0–45)
AST SERPL W P-5'-P-CCNC: 30 U/L (ref 0–45)
AST SERPL W P-5'-P-CCNC: 30 U/L (ref 0–45)
AST SERPL W P-5'-P-CCNC: 33 U/L (ref 0–45)
AST SERPL W P-5'-P-CCNC: 359 U/L (ref 0–45)
AST SERPL W P-5'-P-CCNC: 37 U/L (ref 0–45)
AST SERPL W P-5'-P-CCNC: 397 U/L (ref 0–45)
AST SERPL W P-5'-P-CCNC: 42 U/L (ref 0–45)
AST SERPL W P-5'-P-CCNC: 44 U/L (ref 0–45)
AST SERPL W P-5'-P-CCNC: 45 U/L (ref 0–45)
AST SERPL W P-5'-P-CCNC: 51 U/L (ref 0–45)
AST SERPL W P-5'-P-CCNC: 60 U/L (ref 0–45)
AST SERPL W P-5'-P-CCNC: 70 U/L (ref 0–45)
AST SERPL W P-5'-P-CCNC: 94 U/L (ref 0–45)
B* LOCUS: NORMAL
B*: NORMAL
BACTERIA #/AREA URNS HPF: ABNORMAL /HPF
BACTERIA SPEC CULT: ABNORMAL
BASE DEFICIT BLDV-SCNC: 0.1 MMOL/L
BASE DEFICIT BLDV-SCNC: 0.6 MMOL/L
BASE DEFICIT BLDV-SCNC: 0.7 MMOL/L
BASE DEFICIT BLDV-SCNC: 1.5 MMOL/L
BASE DEFICIT BLDV-SCNC: NORMAL MMOL/L
BASE EXCESS BLDA CALC-SCNC: 11.4 MMOL/L
BASE EXCESS BLDA CALC-SCNC: 12.8 MMOL/L
BASE EXCESS BLDA CALC-SCNC: 4.7 MMOL/L
BASE EXCESS BLDA CALC-SCNC: 8.8 MMOL/L
BASE EXCESS BLDV CALC-SCNC: 0.1 MMOL/L
BASE EXCESS BLDV CALC-SCNC: 0.2 MMOL/L
BASE EXCESS BLDV CALC-SCNC: 1.1 MMOL/L
BASE EXCESS BLDV CALC-SCNC: 1.3 MMOL/L
BASE EXCESS BLDV CALC-SCNC: 1.4 MMOL/L
BASE EXCESS BLDV CALC-SCNC: 1.4 MMOL/L
BASE EXCESS BLDV CALC-SCNC: 1.6 MMOL/L
BASE EXCESS BLDV CALC-SCNC: 1.8 MMOL/L
BASE EXCESS BLDV CALC-SCNC: 10.1 MMOL/L
BASE EXCESS BLDV CALC-SCNC: 10.1 MMOL/L
BASE EXCESS BLDV CALC-SCNC: 10.2 MMOL/L
BASE EXCESS BLDV CALC-SCNC: 10.4 MMOL/L
BASE EXCESS BLDV CALC-SCNC: 10.6 MMOL/L
BASE EXCESS BLDV CALC-SCNC: 10.6 MMOL/L
BASE EXCESS BLDV CALC-SCNC: 10.7 MMOL/L
BASE EXCESS BLDV CALC-SCNC: 10.7 MMOL/L
BASE EXCESS BLDV CALC-SCNC: 11.3 MMOL/L
BASE EXCESS BLDV CALC-SCNC: 11.7 MMOL/L
BASE EXCESS BLDV CALC-SCNC: 11.7 MMOL/L
BASE EXCESS BLDV CALC-SCNC: 11.8 MMOL/L
BASE EXCESS BLDV CALC-SCNC: 12.2 MMOL/L
BASE EXCESS BLDV CALC-SCNC: 12.4 MMOL/L
BASE EXCESS BLDV CALC-SCNC: 12.8 MMOL/L
BASE EXCESS BLDV CALC-SCNC: 12.9 MMOL/L
BASE EXCESS BLDV CALC-SCNC: 13 MMOL/L
BASE EXCESS BLDV CALC-SCNC: 13.4 MMOL/L
BASE EXCESS BLDV CALC-SCNC: 13.6 MMOL/L
BASE EXCESS BLDV CALC-SCNC: 13.7 MMOL/L
BASE EXCESS BLDV CALC-SCNC: 13.7 MMOL/L
BASE EXCESS BLDV CALC-SCNC: 13.9 MMOL/L
BASE EXCESS BLDV CALC-SCNC: 13.9 MMOL/L
BASE EXCESS BLDV CALC-SCNC: 14.4 MMOL/L
BASE EXCESS BLDV CALC-SCNC: 14.9 MMOL/L
BASE EXCESS BLDV CALC-SCNC: 15.2 MMOL/L
BASE EXCESS BLDV CALC-SCNC: 15.3 MMOL/L
BASE EXCESS BLDV CALC-SCNC: 15.5 MMOL/L
BASE EXCESS BLDV CALC-SCNC: 15.7 MMOL/L
BASE EXCESS BLDV CALC-SCNC: 15.9 MMOL/L
BASE EXCESS BLDV CALC-SCNC: 16.2 MMOL/L
BASE EXCESS BLDV CALC-SCNC: 16.2 MMOL/L
BASE EXCESS BLDV CALC-SCNC: 2.2 MMOL/L
BASE EXCESS BLDV CALC-SCNC: 3.4 MMOL/L
BASE EXCESS BLDV CALC-SCNC: 3.7 MMOL/L
BASE EXCESS BLDV CALC-SCNC: 4.1 MMOL/L
BASE EXCESS BLDV CALC-SCNC: 4.5 MMOL/L
BASE EXCESS BLDV CALC-SCNC: 4.6 MMOL/L
BASE EXCESS BLDV CALC-SCNC: 4.9 MMOL/L
BASE EXCESS BLDV CALC-SCNC: 4.9 MMOL/L
BASE EXCESS BLDV CALC-SCNC: 5.1 MMOL/L
BASE EXCESS BLDV CALC-SCNC: 5.2 MMOL/L
BASE EXCESS BLDV CALC-SCNC: 5.3 MMOL/L
BASE EXCESS BLDV CALC-SCNC: 5.5 MMOL/L
BASE EXCESS BLDV CALC-SCNC: 5.5 MMOL/L
BASE EXCESS BLDV CALC-SCNC: 5.6 MMOL/L
BASE EXCESS BLDV CALC-SCNC: 5.9 MMOL/L
BASE EXCESS BLDV CALC-SCNC: 6 MMOL/L
BASE EXCESS BLDV CALC-SCNC: 6.1 MMOL/L
BASE EXCESS BLDV CALC-SCNC: 6.2 MMOL/L
BASE EXCESS BLDV CALC-SCNC: 6.2 MMOL/L
BASE EXCESS BLDV CALC-SCNC: 6.3 MMOL/L
BASE EXCESS BLDV CALC-SCNC: 6.4 MMOL/L
BASE EXCESS BLDV CALC-SCNC: 6.5 MMOL/L
BASE EXCESS BLDV CALC-SCNC: 6.6 MMOL/L
BASE EXCESS BLDV CALC-SCNC: 6.6 MMOL/L
BASE EXCESS BLDV CALC-SCNC: 6.7 MMOL/L
BASE EXCESS BLDV CALC-SCNC: 6.9 MMOL/L
BASE EXCESS BLDV CALC-SCNC: 7.1 MMOL/L
BASE EXCESS BLDV CALC-SCNC: 7.1 MMOL/L
BASE EXCESS BLDV CALC-SCNC: 7.2 MMOL/L
BASE EXCESS BLDV CALC-SCNC: 7.4 MMOL/L
BASE EXCESS BLDV CALC-SCNC: 7.5 MMOL/L
BASE EXCESS BLDV CALC-SCNC: 7.5 MMOL/L
BASE EXCESS BLDV CALC-SCNC: 7.6 MMOL/L
BASE EXCESS BLDV CALC-SCNC: 7.7 MMOL/L
BASE EXCESS BLDV CALC-SCNC: 7.7 MMOL/L
BASE EXCESS BLDV CALC-SCNC: 7.8 MMOL/L
BASE EXCESS BLDV CALC-SCNC: 7.9 MMOL/L
BASE EXCESS BLDV CALC-SCNC: 8.1 MMOL/L
BASE EXCESS BLDV CALC-SCNC: 8.3 MMOL/L
BASE EXCESS BLDV CALC-SCNC: 8.4 MMOL/L
BASE EXCESS BLDV CALC-SCNC: 8.4 MMOL/L
BASE EXCESS BLDV CALC-SCNC: 8.6 MMOL/L
BASE EXCESS BLDV CALC-SCNC: 8.6 MMOL/L
BASE EXCESS BLDV CALC-SCNC: 8.7 MMOL/L
BASE EXCESS BLDV CALC-SCNC: 8.7 MMOL/L
BASE EXCESS BLDV CALC-SCNC: 8.9 MMOL/L
BASE EXCESS BLDV CALC-SCNC: 9 MMOL/L
BASE EXCESS BLDV CALC-SCNC: 9 MMOL/L
BASE EXCESS BLDV CALC-SCNC: 9.1 MMOL/L
BASE EXCESS BLDV CALC-SCNC: 9.1 MMOL/L
BASE EXCESS BLDV CALC-SCNC: 9.2 MMOL/L
BASE EXCESS BLDV CALC-SCNC: 9.2 MMOL/L
BASE EXCESS BLDV CALC-SCNC: 9.3 MMOL/L
BASE EXCESS BLDV CALC-SCNC: 9.3 MMOL/L
BASE EXCESS BLDV CALC-SCNC: 9.4 MMOL/L
BASE EXCESS BLDV CALC-SCNC: 9.4 MMOL/L
BASE EXCESS BLDV CALC-SCNC: 9.5 MMOL/L
BASE EXCESS BLDV CALC-SCNC: 9.7 MMOL/L
BASE EXCESS BLDV CALC-SCNC: 9.9 MMOL/L
BASE EXCESS BLDV CALC-SCNC: 9.9 MMOL/L
BASE EXCESS BLDV CALC-SCNC: NORMAL MMOL/L
BASOPHILS # BLD AUTO: 0 10E9/L (ref 0–0.2)
BASOPHILS # BLD AUTO: 0.1 10E9/L (ref 0–0.2)
BASOPHILS NFR BLD AUTO: 0.1 %
BASOPHILS NFR BLD AUTO: 0.2 %
BASOPHILS NFR BLD AUTO: 0.3 %
BASOPHILS NFR BLD AUTO: 0.4 %
BASOPHILS NFR BLD AUTO: 0.4 %
BASOPHILS NFR BLD AUTO: 0.5 %
BASOPHILS NFR BLD AUTO: 0.8 %
BASOPHILS NFR BLD AUTO: 1 %
BASOPHILS NFR BLD AUTO: 1 %
BASOPHILS NFR BLD AUTO: 1.2 %
BILIRUB DIRECT SERPL-MCNC: 1.2 MG/DL (ref 0–0.2)
BILIRUB SERPL-MCNC: 1 MG/DL (ref 0.2–1.3)
BILIRUB SERPL-MCNC: 1.1 MG/DL (ref 0.2–1.3)
BILIRUB SERPL-MCNC: 1.1 MG/DL (ref 0.2–1.3)
BILIRUB SERPL-MCNC: 1.2 MG/DL (ref 0.2–1.3)
BILIRUB SERPL-MCNC: 1.2 MG/DL (ref 0.2–1.3)
BILIRUB SERPL-MCNC: 1.3 MG/DL (ref 0.2–1.3)
BILIRUB SERPL-MCNC: 1.5 MG/DL (ref 0.2–1.3)
BILIRUB SERPL-MCNC: 1.6 MG/DL (ref 0.2–1.3)
BILIRUB SERPL-MCNC: 1.7 MG/DL (ref 0.2–1.3)
BILIRUB SERPL-MCNC: 1.8 MG/DL (ref 0.2–1.3)
BILIRUB SERPL-MCNC: 1.9 MG/DL (ref 0.2–1.3)
BILIRUB SERPL-MCNC: 2 MG/DL (ref 0.2–1.3)
BILIRUB SERPL-MCNC: 2 MG/DL (ref 0.2–1.3)
BILIRUB SERPL-MCNC: 2.1 MG/DL (ref 0.2–1.3)
BILIRUB SERPL-MCNC: 2.2 MG/DL (ref 0.2–1.3)
BILIRUB SERPL-MCNC: 2.2 MG/DL (ref 0.2–1.3)
BILIRUB UR QL STRIP: NEGATIVE
BLD GP AB SCN SERPL QL: NORMAL
BLOOD BANK CMNT PATIENT-IMP: NORMAL
BUN SERPL-MCNC: 100 MG/DL (ref 7–30)
BUN SERPL-MCNC: 101 MG/DL (ref 7–30)
BUN SERPL-MCNC: 102 MG/DL (ref 7–30)
BUN SERPL-MCNC: 103 MG/DL (ref 7–30)
BUN SERPL-MCNC: 103 MG/DL (ref 7–30)
BUN SERPL-MCNC: 104 MG/DL (ref 7–30)
BUN SERPL-MCNC: 105 MG/DL (ref 7–30)
BUN SERPL-MCNC: 106 MG/DL (ref 7–30)
BUN SERPL-MCNC: 106 MG/DL (ref 7–30)
BUN SERPL-MCNC: 107 MG/DL (ref 7–30)
BUN SERPL-MCNC: 110 MG/DL (ref 7–30)
BUN SERPL-MCNC: 112 MG/DL (ref 7–30)
BUN SERPL-MCNC: 112 MG/DL (ref 7–30)
BUN SERPL-MCNC: 113 MG/DL (ref 7–30)
BUN SERPL-MCNC: 41 MG/DL (ref 7–30)
BUN SERPL-MCNC: 42 MG/DL (ref 7–30)
BUN SERPL-MCNC: 47 MG/DL (ref 7–30)
BUN SERPL-MCNC: 49 MG/DL (ref 7–30)
BUN SERPL-MCNC: 50 MG/DL (ref 7–30)
BUN SERPL-MCNC: 51 MG/DL (ref 7–30)
BUN SERPL-MCNC: 52 MG/DL (ref 7–30)
BUN SERPL-MCNC: 54 MG/DL
BUN SERPL-MCNC: 55 MG/DL (ref 7–30)
BUN SERPL-MCNC: 56 MG/DL (ref 7–30)
BUN SERPL-MCNC: 58 MG/DL (ref 7–30)
BUN SERPL-MCNC: 58 MG/DL (ref 7–30)
BUN SERPL-MCNC: 59 MG/DL (ref 7–30)
BUN SERPL-MCNC: 59 MG/DL (ref 7–30)
BUN SERPL-MCNC: 60 MG/DL (ref 7–30)
BUN SERPL-MCNC: 60 MG/DL (ref 7–30)
BUN SERPL-MCNC: 61 MG/DL (ref 7–30)
BUN SERPL-MCNC: 62 MG/DL (ref 7–30)
BUN SERPL-MCNC: 63 MG/DL (ref 7–30)
BUN SERPL-MCNC: 64 MG/DL (ref 7–30)
BUN SERPL-MCNC: 65 MG/DL (ref 7–30)
BUN SERPL-MCNC: 66 MG/DL (ref 7–30)
BUN SERPL-MCNC: 67 MG/DL (ref 7–30)
BUN SERPL-MCNC: 68 MG/DL (ref 7–30)
BUN SERPL-MCNC: 69 MG/DL (ref 7–30)
BUN SERPL-MCNC: 70 MG/DL (ref 7–30)
BUN SERPL-MCNC: 70 MG/DL (ref 7–30)
BUN SERPL-MCNC: 71 MG/DL (ref 7–30)
BUN SERPL-MCNC: 72 MG/DL (ref 7–30)
BUN SERPL-MCNC: 73 MG/DL (ref 7–30)
BUN SERPL-MCNC: 74 MG/DL (ref 7–30)
BUN SERPL-MCNC: 75 MG/DL (ref 7–30)
BUN SERPL-MCNC: 76 MG/DL (ref 7–30)
BUN SERPL-MCNC: 78 MG/DL (ref 7–30)
BUN SERPL-MCNC: 78 MG/DL (ref 7–30)
BUN SERPL-MCNC: 79 MG/DL (ref 7–30)
BUN SERPL-MCNC: 79 MG/DL (ref 7–30)
BUN SERPL-MCNC: 80 MG/DL (ref 7–30)
BUN SERPL-MCNC: 81 MG/DL (ref 7–30)
BUN SERPL-MCNC: 81 MG/DL (ref 7–30)
BUN SERPL-MCNC: 82 MG/DL (ref 7–30)
BUN SERPL-MCNC: 82 MG/DL (ref 7–30)
BUN SERPL-MCNC: 83 MG/DL (ref 7–30)
BUN SERPL-MCNC: 84 MG/DL (ref 7–30)
BUN SERPL-MCNC: 85 MG/DL (ref 7–30)
BUN SERPL-MCNC: 86 MG/DL (ref 7–30)
BUN SERPL-MCNC: 86 MG/DL (ref 7–30)
BUN SERPL-MCNC: 87 MG/DL (ref 7–30)
BUN SERPL-MCNC: 88 MG/DL (ref 7–30)
BUN SERPL-MCNC: 89 MG/DL (ref 7–30)
BUN SERPL-MCNC: 89 MG/DL (ref 7–30)
BUN SERPL-MCNC: 90 MG/DL (ref 7–30)
BUN SERPL-MCNC: 91 MG/DL (ref 7–30)
BUN SERPL-MCNC: 92 MG/DL (ref 7–30)
BUN SERPL-MCNC: 92 MG/DL (ref 7–30)
BUN SERPL-MCNC: 93 MG/DL (ref 7–30)
BUN SERPL-MCNC: 93 MG/DL (ref 7–30)
BUN SERPL-MCNC: 94 MG/DL (ref 7–30)
BUN SERPL-MCNC: 96 MG/DL (ref 7–30)
BUN SERPL-MCNC: 97 MG/DL (ref 7–30)
BUN SERPL-MCNC: 97 MG/DL (ref 7–30)
BUN SERPL-MCNC: 98 MG/DL (ref 7–30)
BUN SERPL-MCNC: 99 MG/DL (ref 7–30)
BUN SERPL-MCNC: NORMAL MG/DL (ref 7–30)
BW-1: NORMAL
BW-2: NORMAL
C* LOCUS: NORMAL
C*: NORMAL
CALCIUM SERPL-MCNC: 5.9 MG/DL (ref 8.5–10.1)
CALCIUM SERPL-MCNC: 7.7 MG/DL (ref 8.5–10.1)
CALCIUM SERPL-MCNC: 8.1 MG/DL (ref 8.5–10.1)
CALCIUM SERPL-MCNC: 8.2 MG/DL (ref 8.5–10.1)
CALCIUM SERPL-MCNC: 8.3 MG/DL (ref 8.5–10.1)
CALCIUM SERPL-MCNC: 8.4 MG/DL (ref 8.5–10.1)
CALCIUM SERPL-MCNC: 8.5 MG/DL (ref 8.5–10.1)
CALCIUM SERPL-MCNC: 8.6 MG/DL (ref 8.5–10.1)
CALCIUM SERPL-MCNC: 8.7 MG/DL (ref 8.5–10.1)
CALCIUM SERPL-MCNC: 8.8 MG/DL (ref 8.5–10.1)
CALCIUM SERPL-MCNC: 8.9 MG/DL (ref 8.5–10.1)
CALCIUM SERPL-MCNC: 9 MG/DL (ref 8.5–10.1)
CALCIUM SERPL-MCNC: 9.1 MG/DL
CALCIUM SERPL-MCNC: 9.1 MG/DL (ref 8.5–10.1)
CALCIUM SERPL-MCNC: 9.2 MG/DL (ref 8.5–10.1)
CALCIUM SERPL-MCNC: 9.2 MG/DL (ref 8.5–10.5)
CALCIUM SERPL-MCNC: 9.3 MG/DL (ref 8.5–10.1)
CALCIUM SERPL-MCNC: 9.3 MG/DL (ref 8.5–10.1)
CALCIUM SERPL-MCNC: 9.4 MG/DL (ref 8.5–10.1)
CALCIUM SERPL-MCNC: 9.5 MG/DL (ref 8.5–10.1)
CALCIUM SERPL-MCNC: 9.6 MG/DL (ref 8.5–10.5)
CALCIUM SERPL-MCNC: 9.6 MG/DL (ref 8.5–10.5)
CALCIUM SERPL-MCNC: NORMAL MG/DL (ref 8.5–10.1)
CARDIOLIPIN ANTIBODY IGG: NORMAL GPL-U/ML (ref 0–19.9)
CARDIOLIPIN ANTIBODY IGM: NORMAL MPL-U/ML (ref 0–19.9)
CHLORIDE SERPL-SCNC: 101 MMOL/L (ref 94–109)
CHLORIDE SERPL-SCNC: 101 MMOL/L (ref 94–109)
CHLORIDE SERPL-SCNC: 102 MMOL/L (ref 94–109)
CHLORIDE SERPL-SCNC: 102 MMOL/L (ref 94–109)
CHLORIDE SERPL-SCNC: 105 MMOL/L (ref 94–109)
CHLORIDE SERPL-SCNC: 71 MMOL/L (ref 94–109)
CHLORIDE SERPL-SCNC: 72 MMOL/L (ref 94–109)
CHLORIDE SERPL-SCNC: 77 MMOL/L (ref 94–109)
CHLORIDE SERPL-SCNC: 78 MMOL/L (ref 94–109)
CHLORIDE SERPL-SCNC: 79 MMOL/L (ref 98–107)
CHLORIDE SERPL-SCNC: 81 MMOL/L (ref 94–109)
CHLORIDE SERPL-SCNC: 82 MMOL/L (ref 94–109)
CHLORIDE SERPL-SCNC: 83 MMOL/L (ref 94–109)
CHLORIDE SERPL-SCNC: 83 MMOL/L (ref 94–109)
CHLORIDE SERPL-SCNC: 84 MMOL/L (ref 94–109)
CHLORIDE SERPL-SCNC: 85 MMOL/L (ref 94–109)
CHLORIDE SERPL-SCNC: 85 MMOL/L (ref 94–109)
CHLORIDE SERPL-SCNC: 86 MMOL/L (ref 94–109)
CHLORIDE SERPL-SCNC: 87 MMOL/L (ref 94–109)
CHLORIDE SERPL-SCNC: 87 MMOL/L (ref 98–107)
CHLORIDE SERPL-SCNC: 88 MMOL/L (ref 94–109)
CHLORIDE SERPL-SCNC: 89 MMOL/L (ref 94–109)
CHLORIDE SERPL-SCNC: 90 MMOL/L (ref 94–109)
CHLORIDE SERPL-SCNC: 91 MMOL/L (ref 94–109)
CHLORIDE SERPL-SCNC: 92 MMOL/L (ref 94–109)
CHLORIDE SERPL-SCNC: 93 MMOL/L (ref 94–109)
CHLORIDE SERPL-SCNC: 93 MMOL/L (ref 94–109)
CHLORIDE SERPL-SCNC: 94 MMOL/L (ref 94–109)
CHLORIDE SERPL-SCNC: 95 MMOL/L (ref 94–109)
CHLORIDE SERPL-SCNC: 95 MMOL/L (ref 98–107)
CHLORIDE SERPL-SCNC: 96 MMOL/L (ref 94–109)
CHLORIDE SERPL-SCNC: 97 MMOL/L (ref 94–109)
CHLORIDE SERPL-SCNC: 98 MMOL/L (ref 94–109)
CHLORIDE SERPL-SCNC: 99 MMOL/L (ref 94–109)
CHLORIDE SERPL-SCNC: NORMAL MMOL/L (ref 94–109)
CHLORIDE SERPLBLD-SCNC: 90 MMOL/L
CO2 SERPL-SCNC: 23 MMOL/L (ref 20–32)
CO2 SERPL-SCNC: 24 MMOL/L (ref 20–32)
CO2 SERPL-SCNC: 25 MMOL/L (ref 20–32)
CO2 SERPL-SCNC: 26 MMOL/L (ref 20–32)
CO2 SERPL-SCNC: 27 MMOL/L (ref 20–32)
CO2 SERPL-SCNC: 28 MMOL/L (ref 20–32)
CO2 SERPL-SCNC: 29 MMOL/L (ref 20–32)
CO2 SERPL-SCNC: 30 MMOL/L (ref 20–32)
CO2 SERPL-SCNC: 30 MMOL/L (ref 23–29)
CO2 SERPL-SCNC: 31 MMOL/L
CO2 SERPL-SCNC: 31 MMOL/L (ref 20–32)
CO2 SERPL-SCNC: 32 MMOL/L (ref 20–32)
CO2 SERPL-SCNC: 33 MMOL/L (ref 20–32)
CO2 SERPL-SCNC: 33 MMOL/L (ref 23–29)
CO2 SERPL-SCNC: 34 MMOL/L (ref 20–32)
CO2 SERPL-SCNC: 35 MMOL/L (ref 20–32)
CO2 SERPL-SCNC: 36 MMOL/L (ref 20–32)
CO2 SERPL-SCNC: 37 MMOL/L (ref 20–32)
CO2 SERPL-SCNC: 37 MMOL/L (ref 23–29)
CO2 SERPL-SCNC: 38 MMOL/L (ref 20–32)
CO2 SERPL-SCNC: NORMAL MMOL/L (ref 20–32)
COLOR UR AUTO: ABNORMAL
COLOR UR AUTO: YELLOW
CORTIS SERPL-MCNC: 37.8 UG/DL (ref 4–22)
CREAT SERPL-MCNC: 1.32 MG/DL (ref 0.66–1.25)
CREAT SERPL-MCNC: 1.34 MG/DL (ref 0.66–1.25)
CREAT SERPL-MCNC: 1.38 MG/DL (ref 0.66–1.25)
CREAT SERPL-MCNC: 1.39 MG/DL (ref 0.66–1.25)
CREAT SERPL-MCNC: 1.41 MG/DL (ref 0.66–1.25)
CREAT SERPL-MCNC: 1.41 MG/DL (ref 0.66–1.25)
CREAT SERPL-MCNC: 1.44 MG/DL (ref 0.66–1.25)
CREAT SERPL-MCNC: 1.47 MG/DL (ref 0.66–1.25)
CREAT SERPL-MCNC: 1.48 MG/DL (ref 0.66–1.25)
CREAT SERPL-MCNC: 1.48 MG/DL (ref 0.66–1.25)
CREAT SERPL-MCNC: 1.51 MG/DL (ref 0.66–1.25)
CREAT SERPL-MCNC: 1.53 MG/DL (ref 0.66–1.25)
CREAT SERPL-MCNC: 1.55 MG/DL (ref 0.66–1.25)
CREAT SERPL-MCNC: 1.56 MG/DL (ref 0.66–1.25)
CREAT SERPL-MCNC: 1.58 MG/DL (ref 0.66–1.25)
CREAT SERPL-MCNC: 1.6 MG/DL (ref 0.66–1.25)
CREAT SERPL-MCNC: 1.62 MG/DL (ref 0.66–1.25)
CREAT SERPL-MCNC: 1.63 MG/DL (ref 0.66–1.25)
CREAT SERPL-MCNC: 1.64 MG/DL (ref 0.66–1.25)
CREAT SERPL-MCNC: 1.64 MG/DL (ref 0.66–1.25)
CREAT SERPL-MCNC: 1.65 MG/DL (ref 0.66–1.25)
CREAT SERPL-MCNC: 1.66 MG/DL (ref 0.66–1.25)
CREAT SERPL-MCNC: 1.68 MG/DL (ref 0.66–1.25)
CREAT SERPL-MCNC: 1.69 MG/DL (ref 0.66–1.25)
CREAT SERPL-MCNC: 1.69 MG/DL (ref 0.66–1.25)
CREAT SERPL-MCNC: 1.71 MG/DL (ref 0.66–1.25)
CREAT SERPL-MCNC: 1.71 MG/DL (ref 0.66–1.25)
CREAT SERPL-MCNC: 1.73 MG/DL (ref 0.66–1.25)
CREAT SERPL-MCNC: 1.75 MG/DL (ref 0.66–1.25)
CREAT SERPL-MCNC: 1.76 MG/DL (ref 0.66–1.25)
CREAT SERPL-MCNC: 1.76 MG/DL (ref 0.66–1.25)
CREAT SERPL-MCNC: 1.78 MG/DL (ref 0.66–1.25)
CREAT SERPL-MCNC: 1.79 MG/DL (ref 0.66–1.25)
CREAT SERPL-MCNC: 1.8 MG/DL (ref 0.66–1.25)
CREAT SERPL-MCNC: 1.82 MG/DL (ref 0.66–1.25)
CREAT SERPL-MCNC: 1.84 MG/DL (ref 0.66–1.25)
CREAT SERPL-MCNC: 1.86 MG/DL (ref 0.66–1.25)
CREAT SERPL-MCNC: 1.87 MG/DL (ref 0.66–1.25)
CREAT SERPL-MCNC: 1.89 MG/DL (ref 0.66–1.25)
CREAT SERPL-MCNC: 1.9 MG/DL (ref 0.66–1.25)
CREAT SERPL-MCNC: 1.9 MG/DL (ref 0.66–1.25)
CREAT SERPL-MCNC: 1.91 MG/DL (ref 0.7–1.3)
CREAT SERPL-MCNC: 1.92 MG/DL (ref 0.66–1.25)
CREAT SERPL-MCNC: 1.93 MG/DL (ref 0.66–1.25)
CREAT SERPL-MCNC: 1.93 MG/DL (ref 0.66–1.25)
CREAT SERPL-MCNC: 1.94 MG/DL (ref 0.66–1.25)
CREAT SERPL-MCNC: 1.95 MG/DL (ref 0.66–1.25)
CREAT SERPL-MCNC: 1.95 MG/DL (ref 0.66–1.25)
CREAT SERPL-MCNC: 1.96 MG/DL (ref 0.66–1.25)
CREAT SERPL-MCNC: 1.97 MG/DL (ref 0.66–1.25)
CREAT SERPL-MCNC: 1.97 MG/DL (ref 0.66–1.25)
CREAT SERPL-MCNC: 1.98 MG/DL (ref 0.66–1.25)
CREAT SERPL-MCNC: 1.99 MG/DL
CREAT SERPL-MCNC: 1.99 MG/DL (ref 0.66–1.25)
CREAT SERPL-MCNC: 2.01 MG/DL (ref 0.66–1.25)
CREAT SERPL-MCNC: 2.03 MG/DL (ref 0.66–1.25)
CREAT SERPL-MCNC: 2.04 MG/DL (ref 0.66–1.25)
CREAT SERPL-MCNC: 2.06 MG/DL (ref 0.66–1.25)
CREAT SERPL-MCNC: 2.08 MG/DL (ref 0.66–1.25)
CREAT SERPL-MCNC: 2.08 MG/DL (ref 0.66–1.25)
CREAT SERPL-MCNC: 2.09 MG/DL (ref 0.66–1.25)
CREAT SERPL-MCNC: 2.1 MG/DL (ref 0.66–1.25)
CREAT SERPL-MCNC: 2.1 MG/DL (ref 0.66–1.25)
CREAT SERPL-MCNC: 2.13 MG/DL (ref 0.66–1.25)
CREAT SERPL-MCNC: 2.14 MG/DL (ref 0.66–1.25)
CREAT SERPL-MCNC: 2.14 MG/DL (ref 0.66–1.25)
CREAT SERPL-MCNC: 2.18 MG/DL (ref 0.66–1.25)
CREAT SERPL-MCNC: 2.23 MG/DL (ref 0.66–1.25)
CREAT SERPL-MCNC: 2.23 MG/DL (ref 0.66–1.25)
CREAT SERPL-MCNC: 2.24 MG/DL (ref 0.7–1.3)
CREAT SERPL-MCNC: 2.25 MG/DL (ref 0.66–1.25)
CREAT SERPL-MCNC: 2.25 MG/DL (ref 0.66–1.25)
CREAT SERPL-MCNC: 2.28 MG/DL (ref 0.66–1.25)
CREAT SERPL-MCNC: 2.29 MG/DL (ref 0.66–1.25)
CREAT SERPL-MCNC: 2.3 MG/DL (ref 0.66–1.25)
CREAT SERPL-MCNC: 2.31 MG/DL (ref 0.66–1.25)
CREAT SERPL-MCNC: 2.32 MG/DL (ref 0.66–1.25)
CREAT SERPL-MCNC: 2.32 MG/DL (ref 0.66–1.25)
CREAT SERPL-MCNC: 2.33 MG/DL (ref 0.66–1.25)
CREAT SERPL-MCNC: 2.34 MG/DL (ref 0.66–1.25)
CREAT SERPL-MCNC: 2.38 MG/DL (ref 0.66–1.25)
CREAT SERPL-MCNC: 2.38 MG/DL (ref 0.66–1.25)
CREAT SERPL-MCNC: 2.39 MG/DL (ref 0.66–1.25)
CREAT SERPL-MCNC: 2.41 MG/DL (ref 0.66–1.25)
CREAT SERPL-MCNC: 2.42 MG/DL (ref 0.66–1.25)
CREAT SERPL-MCNC: 2.45 MG/DL (ref 0.7–1.3)
CREAT SERPL-MCNC: 2.46 MG/DL (ref 0.66–1.25)
CREAT SERPL-MCNC: 2.48 MG/DL (ref 0.66–1.25)
CREAT SERPL-MCNC: 2.53 MG/DL (ref 0.66–1.25)
CREAT SERPL-MCNC: 2.55 MG/DL (ref 0.66–1.25)
CREAT SERPL-MCNC: 2.57 MG/DL (ref 0.66–1.25)
CREAT SERPL-MCNC: 2.58 MG/DL (ref 0.66–1.25)
CREAT SERPL-MCNC: 2.62 MG/DL (ref 0.66–1.25)
CREAT SERPL-MCNC: 2.62 MG/DL (ref 0.66–1.25)
CREAT SERPL-MCNC: 2.68 MG/DL (ref 0.66–1.25)
CREAT SERPL-MCNC: 2.69 MG/DL (ref 0.66–1.25)
CREAT SERPL-MCNC: 2.77 MG/DL (ref 0.66–1.25)
CREAT SERPL-MCNC: 2.77 MG/DL (ref 0.66–1.25)
CREAT SERPL-MCNC: 2.8 MG/DL (ref 0.66–1.25)
CREAT SERPL-MCNC: 2.89 MG/DL (ref 0.66–1.25)
CREAT SERPL-MCNC: 2.92 MG/DL (ref 0.66–1.25)
CREAT SERPL-MCNC: 3.06 MG/DL (ref 0.66–1.25)
CREAT SERPL-MCNC: 3.07 MG/DL (ref 0.66–1.25)
CREAT SERPL-MCNC: 3.09 MG/DL (ref 0.66–1.25)
CREAT SERPL-MCNC: 3.13 MG/DL (ref 0.66–1.25)
CREAT SERPL-MCNC: 3.14 MG/DL (ref 0.66–1.25)
CREAT SERPL-MCNC: 3.23 MG/DL (ref 0.66–1.25)
CREAT SERPL-MCNC: 3.25 MG/DL (ref 0.66–1.25)
CREAT SERPL-MCNC: 3.3 MG/DL (ref 0.66–1.25)
CREAT SERPL-MCNC: 3.33 MG/DL (ref 0.66–1.25)
CREAT SERPL-MCNC: 3.38 MG/DL (ref 0.66–1.25)
CREAT SERPL-MCNC: 3.39 MG/DL (ref 0.66–1.25)
CREAT SERPL-MCNC: 3.4 MG/DL (ref 0.66–1.25)
CREAT SERPL-MCNC: 3.4 MG/DL (ref 0.66–1.25)
CREAT SERPL-MCNC: 3.43 MG/DL (ref 0.66–1.25)
CREAT SERPL-MCNC: 3.45 MG/DL (ref 0.66–1.25)
CREAT SERPL-MCNC: 3.5 MG/DL (ref 0.66–1.25)
CREAT SERPL-MCNC: 3.51 MG/DL (ref 0.66–1.25)
CREAT SERPL-MCNC: ABNORMAL MG/DL (ref 0.66–1.25)
CREAT SERPL-MCNC: ABNORMAL MG/DL (ref 0.66–1.25)
CREAT SERPL-MCNC: NORMAL MG/DL (ref 0.66–1.25)
CREAT UR-MCNC: 34 MG/DL
CRP SERPL-MCNC: 20 MG/L (ref 0–8)
CRP SERPL-MCNC: 86 MG/L (ref 0–8)
DIFFERENTIAL METHOD BLD: ABNORMAL
DPA1*: NORMAL
DPB1*: NORMAL
DPB1*LOCUS: NORMAL
DQA1*: NORMAL
DQA1*LOCUS: NORMAL
DQB1* LOCUS: NORMAL
DQB1*: NORMAL
DRB1* LOCUS: NORMAL
DRB1*: NORMAL
DRB4* LOCUS: NORMAL
DRSSO TEST METHOD: NORMAL
EOSINOPHIL # BLD AUTO: 0 10E9/L (ref 0–0.7)
EOSINOPHIL # BLD AUTO: 0.1 10E9/L (ref 0–0.7)
EOSINOPHIL # BLD AUTO: 0.2 10E9/L (ref 0–0.7)
EOSINOPHIL NFR BLD AUTO: 0.1 %
EOSINOPHIL NFR BLD AUTO: 0.1 %
EOSINOPHIL NFR BLD AUTO: 0.5 %
EOSINOPHIL NFR BLD AUTO: 0.6 %
EOSINOPHIL NFR BLD AUTO: 0.7 %
EOSINOPHIL NFR BLD AUTO: 1.1 %
EOSINOPHIL NFR BLD AUTO: 1.2 %
EOSINOPHIL NFR BLD AUTO: 1.3 %
EOSINOPHIL NFR BLD AUTO: 2.1 %
EOSINOPHIL NFR BLD AUTO: 2.2 %
EOSINOPHIL NFR BLD AUTO: 2.9 %
EOSINOPHIL NFR BLD AUTO: 3 %
ERYTHROCYTE [DISTWIDTH] IN BLOOD BY AUTOMATED COUNT: 14.8 % (ref 10–15)
ERYTHROCYTE [DISTWIDTH] IN BLOOD BY AUTOMATED COUNT: 15.1 % (ref 10–15)
ERYTHROCYTE [DISTWIDTH] IN BLOOD BY AUTOMATED COUNT: 15.1 % (ref 10–15)
ERYTHROCYTE [DISTWIDTH] IN BLOOD BY AUTOMATED COUNT: 15.5 % (ref 10–15)
ERYTHROCYTE [DISTWIDTH] IN BLOOD BY AUTOMATED COUNT: 15.5 % (ref 10–15)
ERYTHROCYTE [DISTWIDTH] IN BLOOD BY AUTOMATED COUNT: 15.6 % (ref 10–15)
ERYTHROCYTE [DISTWIDTH] IN BLOOD BY AUTOMATED COUNT: 15.6 % (ref 10–15)
ERYTHROCYTE [DISTWIDTH] IN BLOOD BY AUTOMATED COUNT: 15.7 % (ref 10–15)
ERYTHROCYTE [DISTWIDTH] IN BLOOD BY AUTOMATED COUNT: 15.8 % (ref 10–15)
ERYTHROCYTE [DISTWIDTH] IN BLOOD BY AUTOMATED COUNT: 15.9 % (ref 10–15)
ERYTHROCYTE [DISTWIDTH] IN BLOOD BY AUTOMATED COUNT: 16 % (ref 10–15)
ERYTHROCYTE [DISTWIDTH] IN BLOOD BY AUTOMATED COUNT: 16.1 % (ref 10–15)
ERYTHROCYTE [DISTWIDTH] IN BLOOD BY AUTOMATED COUNT: 16.1 % (ref 10–15)
ERYTHROCYTE [DISTWIDTH] IN BLOOD BY AUTOMATED COUNT: 16.2 % (ref 10–15)
ERYTHROCYTE [DISTWIDTH] IN BLOOD BY AUTOMATED COUNT: 16.2 % (ref 10–15)
ERYTHROCYTE [DISTWIDTH] IN BLOOD BY AUTOMATED COUNT: 16.3 % (ref 10–15)
ERYTHROCYTE [DISTWIDTH] IN BLOOD BY AUTOMATED COUNT: 16.3 % (ref 10–15)
ERYTHROCYTE [DISTWIDTH] IN BLOOD BY AUTOMATED COUNT: 16.5 % (ref 10–15)
ERYTHROCYTE [DISTWIDTH] IN BLOOD BY AUTOMATED COUNT: 16.9 % (ref 10–15)
ERYTHROCYTE [DISTWIDTH] IN BLOOD BY AUTOMATED COUNT: 17 %
ERYTHROCYTE [DISTWIDTH] IN BLOOD BY AUTOMATED COUNT: 17.4 % (ref 10–15)
ERYTHROCYTE [DISTWIDTH] IN BLOOD BY AUTOMATED COUNT: 17.5 % (ref 10–15)
ERYTHROCYTE [DISTWIDTH] IN BLOOD BY AUTOMATED COUNT: 17.5 % (ref 10–15)
ERYTHROCYTE [DISTWIDTH] IN BLOOD BY AUTOMATED COUNT: 17.7 % (ref 10–15)
ERYTHROCYTE [DISTWIDTH] IN BLOOD BY AUTOMATED COUNT: 18 % (ref 10–15)
ERYTHROCYTE [DISTWIDTH] IN BLOOD BY AUTOMATED COUNT: 19.3 %
ERYTHROCYTE [DISTWIDTH] IN BLOOD BY AUTOMATED COUNT: NORMAL %
ETHANOL SERPL-MCNC: <0.01 G/DL
ETHYL GLUCURONIDE UR QL: NORMAL
ETHYL GLUCURONIDE UR QL: NORMAL
FERRITIN SERPL-MCNC: 149 NG/ML (ref 26–388)
FERRITIN SERPL-MCNC: 206 NG/ML
FERRITIN SERPL-MCNC: 93 NG/ML (ref 26–388)
GFR SERPL CREATININE-BSD FRML MDRD: 17 ML/MIN/1.7M2
GFR SERPL CREATININE-BSD FRML MDRD: 18 ML/MIN/1.7M2
GFR SERPL CREATININE-BSD FRML MDRD: 19 ML/MIN/1.7M2
GFR SERPL CREATININE-BSD FRML MDRD: 20 ML/MIN/1.7M2
GFR SERPL CREATININE-BSD FRML MDRD: 21 ML/MIN/1.7M2
GFR SERPL CREATININE-BSD FRML MDRD: 21 ML/MIN/1.7M2
GFR SERPL CREATININE-BSD FRML MDRD: 22 ML/MIN/1.7M2
GFR SERPL CREATININE-BSD FRML MDRD: 23 ML/MIN/1.7M2
GFR SERPL CREATININE-BSD FRML MDRD: 23 ML/MIN/1.7M2
GFR SERPL CREATININE-BSD FRML MDRD: 24 ML/MIN/1.7M2
GFR SERPL CREATININE-BSD FRML MDRD: 25 ML/MIN/1.7M2
GFR SERPL CREATININE-BSD FRML MDRD: 26 ML/MIN/1.7M2
GFR SERPL CREATININE-BSD FRML MDRD: 27 ML/MIN/1.7M2
GFR SERPL CREATININE-BSD FRML MDRD: 28 ML/MIN/1.7M2
GFR SERPL CREATININE-BSD FRML MDRD: 29 ML/MIN/1.7M2
GFR SERPL CREATININE-BSD FRML MDRD: 30 ML/MIN/1.7M2
GFR SERPL CREATININE-BSD FRML MDRD: 31 ML/MIN/1.73M2
GFR SERPL CREATININE-BSD FRML MDRD: 31 ML/MIN/1.7M2
GFR SERPL CREATININE-BSD FRML MDRD: 32 ML/MIN/1.7M2
GFR SERPL CREATININE-BSD FRML MDRD: 33 ML/MIN/1.7M2
GFR SERPL CREATININE-BSD FRML MDRD: 34 ML/MIN/1.7M2
GFR SERPL CREATININE-BSD FRML MDRD: 35 ML/MIN/1.7M2
GFR SERPL CREATININE-BSD FRML MDRD: 36 ML/MIN/1.7M2
GFR SERPL CREATININE-BSD FRML MDRD: 37 ML/MIN/1.7M2
GFR SERPL CREATININE-BSD FRML MDRD: 38 ML/MIN/1.7M2
GFR SERPL CREATININE-BSD FRML MDRD: 39 ML/MIN/1.7M2
GFR SERPL CREATININE-BSD FRML MDRD: 40 ML/MIN/1.7M2
GFR SERPL CREATININE-BSD FRML MDRD: 41 ML/MIN/1.7M2
GFR SERPL CREATININE-BSD FRML MDRD: 42 ML/MIN/1.7M2
GFR SERPL CREATININE-BSD FRML MDRD: 43 ML/MIN/1.7M2
GFR SERPL CREATININE-BSD FRML MDRD: 43 ML/MIN/1.7M2
GFR SERPL CREATININE-BSD FRML MDRD: 44 ML/MIN/1.7M2
GFR SERPL CREATININE-BSD FRML MDRD: 44 ML/MIN/1.7M2
GFR SERPL CREATININE-BSD FRML MDRD: 45 ML/MIN/1.7M2
GFR SERPL CREATININE-BSD FRML MDRD: 46 ML/MIN/1.7M2
GFR SERPL CREATININE-BSD FRML MDRD: 48 ML/MIN/1.7M2
GFR SERPL CREATININE-BSD FRML MDRD: 49 ML/MIN/1.7M2
GFR SERPL CREATININE-BSD FRML MDRD: 49 ML/MIN/1.7M2
GFR SERPL CREATININE-BSD FRML MDRD: 50 ML/MIN/1.7M2
GFR SERPL CREATININE-BSD FRML MDRD: 52 ML/MIN/1.7M2
GFR SERPL CREATININE-BSD FRML MDRD: 53 ML/MIN/1.7M2
GFR SERPL CREATININE-BSD FRML MDRD: ABNORMAL ML/MIN/1.7M2
GFR SERPL CREATININE-BSD FRML MDRD: ABNORMAL ML/MIN/1.7M2
GFR SERPL CREATININE-BSD FRML MDRD: NORMAL ML/MIN/1.7M2
GLUCOSE BLDC GLUCOMTR-MCNC: 104 MG/DL (ref 70–99)
GLUCOSE BLDC GLUCOMTR-MCNC: 105 MG/DL (ref 70–99)
GLUCOSE BLDC GLUCOMTR-MCNC: 114 MG/DL (ref 70–99)
GLUCOSE BLDC GLUCOMTR-MCNC: 114 MG/DL (ref 70–99)
GLUCOSE BLDC GLUCOMTR-MCNC: 119 MG/DL (ref 70–99)
GLUCOSE BLDC GLUCOMTR-MCNC: 122 MG/DL (ref 70–99)
GLUCOSE BLDC GLUCOMTR-MCNC: 123 MG/DL (ref 70–99)
GLUCOSE BLDC GLUCOMTR-MCNC: 123 MG/DL (ref 70–99)
GLUCOSE BLDC GLUCOMTR-MCNC: 127 MG/DL (ref 70–99)
GLUCOSE BLDC GLUCOMTR-MCNC: 131 MG/DL (ref 70–99)
GLUCOSE BLDC GLUCOMTR-MCNC: 177 MG/DL (ref 70–99)
GLUCOSE BLDC GLUCOMTR-MCNC: 62 MG/DL (ref 70–99)
GLUCOSE BLDC GLUCOMTR-MCNC: 69 MG/DL (ref 70–99)
GLUCOSE SERPL-MCNC: 100 MG/DL (ref 70–99)
GLUCOSE SERPL-MCNC: 101 MG/DL (ref 70–99)
GLUCOSE SERPL-MCNC: 102 MG/DL (ref 70–99)
GLUCOSE SERPL-MCNC: 103 MG/DL (ref 70–99)
GLUCOSE SERPL-MCNC: 104 MG/DL (ref 70–99)
GLUCOSE SERPL-MCNC: 105 MG/DL (ref 70–99)
GLUCOSE SERPL-MCNC: 106 MG/DL (ref 70–99)
GLUCOSE SERPL-MCNC: 107 MG/DL (ref 70–99)
GLUCOSE SERPL-MCNC: 108 MG/DL (ref 70–99)
GLUCOSE SERPL-MCNC: 108 MG/DL (ref 70–99)
GLUCOSE SERPL-MCNC: 109 MG/DL (ref 70–99)
GLUCOSE SERPL-MCNC: 110 MG/DL (ref 70–99)
GLUCOSE SERPL-MCNC: 110 MG/DL (ref 70–99)
GLUCOSE SERPL-MCNC: 111 MG/DL (ref 70–99)
GLUCOSE SERPL-MCNC: 111 MG/DL (ref 70–99)
GLUCOSE SERPL-MCNC: 112 MG/DL (ref 70–99)
GLUCOSE SERPL-MCNC: 113 MG/DL (ref 70–99)
GLUCOSE SERPL-MCNC: 114 MG/DL (ref 70–99)
GLUCOSE SERPL-MCNC: 115 MG/DL (ref 70–99)
GLUCOSE SERPL-MCNC: 116 MG/DL (ref 70–99)
GLUCOSE SERPL-MCNC: 116 MG/DL (ref 70–99)
GLUCOSE SERPL-MCNC: 117 MG/DL (ref 70–99)
GLUCOSE SERPL-MCNC: 118 MG/DL (ref 70–99)
GLUCOSE SERPL-MCNC: 119 MG/DL (ref 70–99)
GLUCOSE SERPL-MCNC: 119 MG/DL (ref 70–99)
GLUCOSE SERPL-MCNC: 120 MG/DL (ref 70–99)
GLUCOSE SERPL-MCNC: 121 MG/DL (ref 70–99)
GLUCOSE SERPL-MCNC: 121 MG/DL (ref 70–99)
GLUCOSE SERPL-MCNC: 122 MG/DL (ref 70–99)
GLUCOSE SERPL-MCNC: 123 MG/DL (ref 70–99)
GLUCOSE SERPL-MCNC: 123 MG/DL (ref 70–99)
GLUCOSE SERPL-MCNC: 124 MG/DL (ref 70–99)
GLUCOSE SERPL-MCNC: 125 MG/DL (ref 70–99)
GLUCOSE SERPL-MCNC: 126 MG/DL (ref 70–99)
GLUCOSE SERPL-MCNC: 126 MG/DL (ref 70–99)
GLUCOSE SERPL-MCNC: 127 MG/DL (ref 70–99)
GLUCOSE SERPL-MCNC: 127 MG/DL (ref 70–99)
GLUCOSE SERPL-MCNC: 128 MG/DL (ref 70–99)
GLUCOSE SERPL-MCNC: 129 MG/DL (ref 70–99)
GLUCOSE SERPL-MCNC: 129 MG/DL (ref 70–99)
GLUCOSE SERPL-MCNC: 130 MG/DL (ref 70–99)
GLUCOSE SERPL-MCNC: 130 MG/DL (ref 70–99)
GLUCOSE SERPL-MCNC: 132 MG/DL (ref 70–99)
GLUCOSE SERPL-MCNC: 133 MG/DL (ref 70–99)
GLUCOSE SERPL-MCNC: 133 MG/DL (ref 70–99)
GLUCOSE SERPL-MCNC: 134 MG/DL (ref 70–105)
GLUCOSE SERPL-MCNC: 134 MG/DL (ref 70–99)
GLUCOSE SERPL-MCNC: 135 MG/DL (ref 70–99)
GLUCOSE SERPL-MCNC: 136 MG/DL (ref 70–99)
GLUCOSE SERPL-MCNC: 139 MG/DL (ref 70–99)
GLUCOSE SERPL-MCNC: 139 MG/DL (ref 70–99)
GLUCOSE SERPL-MCNC: 140 MG/DL (ref 70–99)
GLUCOSE SERPL-MCNC: 141 MG/DL (ref 70–99)
GLUCOSE SERPL-MCNC: 141 MG/DL (ref 70–99)
GLUCOSE SERPL-MCNC: 142 MG/DL (ref 70–99)
GLUCOSE SERPL-MCNC: 144 MG/DL (ref 70–99)
GLUCOSE SERPL-MCNC: 145 MG/DL (ref 70–99)
GLUCOSE SERPL-MCNC: 146 MG/DL (ref 70–99)
GLUCOSE SERPL-MCNC: 146 MG/DL (ref 70–99)
GLUCOSE SERPL-MCNC: 148 MG/DL (ref 70–99)
GLUCOSE SERPL-MCNC: 154 MG/DL (ref 70–99)
GLUCOSE SERPL-MCNC: 156 MG/DL (ref 70–99)
GLUCOSE SERPL-MCNC: 158 MG/DL (ref 70–99)
GLUCOSE SERPL-MCNC: 159 MG/DL (ref 70–99)
GLUCOSE SERPL-MCNC: 160 MG/DL (ref 70–99)
GLUCOSE SERPL-MCNC: 162 MG/DL (ref 70–105)
GLUCOSE SERPL-MCNC: 162 MG/DL (ref 70–99)
GLUCOSE SERPL-MCNC: 163 MG/DL (ref 70–105)
GLUCOSE SERPL-MCNC: 163 MG/DL (ref 70–99)
GLUCOSE SERPL-MCNC: 167 MG/DL (ref 70–99)
GLUCOSE SERPL-MCNC: 169 MG/DL (ref 70–99)
GLUCOSE SERPL-MCNC: 170 MG/DL (ref 70–99)
GLUCOSE SERPL-MCNC: 176 MG/DL (ref 70–99)
GLUCOSE SERPL-MCNC: 181 MG/DL (ref 70–99)
GLUCOSE SERPL-MCNC: 185 MG/DL (ref 70–99)
GLUCOSE SERPL-MCNC: 185 MG/DL (ref 70–99)
GLUCOSE SERPL-MCNC: 190 MG/DL (ref 70–99)
GLUCOSE SERPL-MCNC: 192 MG/DL (ref 70–99)
GLUCOSE SERPL-MCNC: 196 MG/DL (ref 70–99)
GLUCOSE SERPL-MCNC: 202 MG/DL (ref 70–99)
GLUCOSE SERPL-MCNC: 228 MG/DL (ref 70–99)
GLUCOSE SERPL-MCNC: 55 MG/DL (ref 70–99)
GLUCOSE SERPL-MCNC: 87 MG/DL (ref 70–99)
GLUCOSE SERPL-MCNC: 88 MG/DL (ref 70–99)
GLUCOSE SERPL-MCNC: 89 MG/DL (ref 70–99)
GLUCOSE SERPL-MCNC: 90 MG/DL (ref 70–99)
GLUCOSE SERPL-MCNC: 91 MG/DL (ref 70–99)
GLUCOSE SERPL-MCNC: 94 MG/DL (ref 70–99)
GLUCOSE SERPL-MCNC: 95 MG/DL (ref 70–99)
GLUCOSE SERPL-MCNC: 96 MG/DL (ref 70–99)
GLUCOSE SERPL-MCNC: 96 MG/DL (ref 70–99)
GLUCOSE SERPL-MCNC: 98 MG/DL (ref 70–99)
GLUCOSE SERPL-MCNC: 99 MG/DL (ref 70–99)
GLUCOSE SERPL-MCNC: 99 MG/DL (ref 70–99)
GLUCOSE SERPL-MCNC: NORMAL MG/DL (ref 70–99)
GLUCOSE UR STRIP-MCNC: NEGATIVE MG/DL
GRAM STN SPEC: NORMAL
HBA1C MFR BLD: 5.7 % (ref 4.3–6)
HCO3 BLD-SCNC: 28 MMOL/L (ref 21–28)
HCO3 BLD-SCNC: 32 MMOL/L (ref 21–28)
HCO3 BLD-SCNC: 35 MMOL/L (ref 21–28)
HCO3 BLD-SCNC: 36 MMOL/L (ref 21–28)
HCO3 BLDV-SCNC: 23 MMOL/L (ref 21–28)
HCO3 BLDV-SCNC: 23 MMOL/L (ref 21–28)
HCO3 BLDV-SCNC: 24 MMOL/L (ref 21–28)
HCO3 BLDV-SCNC: 24 MMOL/L (ref 21–28)
HCO3 BLDV-SCNC: 25 MMOL/L (ref 21–28)
HCO3 BLDV-SCNC: 26 MMOL/L (ref 21–28)
HCO3 BLDV-SCNC: 26 MMOL/L (ref 21–28)
HCO3 BLDV-SCNC: 27 MMOL/L (ref 21–28)
HCO3 BLDV-SCNC: 28 MMOL/L (ref 21–28)
HCO3 BLDV-SCNC: 29 MMOL/L (ref 21–28)
HCO3 BLDV-SCNC: 30 MMOL/L (ref 21–28)
HCO3 BLDV-SCNC: 31 MMOL/L (ref 21–28)
HCO3 BLDV-SCNC: 32 MMOL/L (ref 21–28)
HCO3 BLDV-SCNC: 33 MMOL/L (ref 21–28)
HCO3 BLDV-SCNC: 34 MMOL/L (ref 21–28)
HCO3 BLDV-SCNC: 35 MMOL/L (ref 21–28)
HCO3 BLDV-SCNC: 36 MMOL/L (ref 21–28)
HCO3 BLDV-SCNC: 37 MMOL/L (ref 21–28)
HCO3 BLDV-SCNC: 38 MMOL/L (ref 21–28)
HCO3 BLDV-SCNC: 39 MMOL/L (ref 21–28)
HCO3 BLDV-SCNC: 40 MMOL/L (ref 21–28)
HCO3 BLDV-SCNC: 41 MMOL/L (ref 21–28)
HCO3 BLDV-SCNC: NORMAL MMOL/L (ref 21–28)
HCT VFR BLD AUTO: 25.9 % (ref 40–53)
HCT VFR BLD AUTO: 27.2 % (ref 40–53)
HCT VFR BLD AUTO: 27.9 % (ref 40–53)
HCT VFR BLD AUTO: 27.9 % (ref 40–53)
HCT VFR BLD AUTO: 28 % (ref 40–53)
HCT VFR BLD AUTO: 28.2 % (ref 40–53)
HCT VFR BLD AUTO: 28.2 % (ref 40–53)
HCT VFR BLD AUTO: 28.3 % (ref 40–53)
HCT VFR BLD AUTO: 28.6 % (ref 40–53)
HCT VFR BLD AUTO: 28.7 % (ref 40–53)
HCT VFR BLD AUTO: 29 % (ref 40–53)
HCT VFR BLD AUTO: 29.1 % (ref 40–53)
HCT VFR BLD AUTO: 29.2 % (ref 40–53)
HCT VFR BLD AUTO: 29.2 % (ref 40–53)
HCT VFR BLD AUTO: 29.4 % (ref 40–53)
HCT VFR BLD AUTO: 29.6 % (ref 40–53)
HCT VFR BLD AUTO: 29.7 % (ref 40–53)
HCT VFR BLD AUTO: 29.7 % (ref 40–53)
HCT VFR BLD AUTO: 29.9 % (ref 40–53)
HCT VFR BLD AUTO: 29.9 % (ref 40–53)
HCT VFR BLD AUTO: 30 % (ref 40–53)
HCT VFR BLD AUTO: 30.5 % (ref 40–53)
HCT VFR BLD AUTO: 31 % (ref 40–53)
HCT VFR BLD AUTO: 31.6 % (ref 40–53)
HCT VFR BLD AUTO: 31.8 % (ref 40–53)
HCT VFR BLD AUTO: 31.9 % (ref 40–53)
HCT VFR BLD AUTO: 32.3 % (ref 40–53)
HCT VFR BLD AUTO: 32.5 % (ref 40–53)
HCT VFR BLD AUTO: 32.7 % (ref 40–53)
HCT VFR BLD AUTO: 32.8 % (ref 40–53)
HCT VFR BLD AUTO: 33.7 % (ref 40–53)
HCT VFR BLD AUTO: 33.7 % (ref 40–53)
HCT VFR BLD AUTO: 33.9 % (ref 40–53)
HCT VFR BLD AUTO: 34.5 % (ref 40–53)
HCT VFR BLD AUTO: 34.5 % (ref 40–53)
HCT VFR BLD AUTO: 34.9 % (ref 40–53)
HCT VFR BLD AUTO: 35 %
HCT VFR BLD AUTO: 36.3 % (ref 40–53)
HCT VFR BLD AUTO: 37 %
HCT VFR BLD AUTO: 37 %
HCT VFR BLD AUTO: 37.4 % (ref 40–53)
HCT VFR BLD AUTO: 37.6 % (ref 40–53)
HCT VFR BLD AUTO: 38.2 % (ref 40–53)
HCT VFR BLD AUTO: 39.5 % (ref 40–53)
HEMOGLOBIN: 11.4 G/DL (ref 13.3–17.7)
HEMOGLOBIN: 12.5 G/DL
HEMOGLOBIN: 12.5 G/DL
HGB BLD-MCNC: 10.1 G/DL (ref 13.3–17.7)
HGB BLD-MCNC: 10.2 G/DL (ref 13.3–17.7)
HGB BLD-MCNC: 10.2 G/DL (ref 13.3–17.7)
HGB BLD-MCNC: 10.4 G/DL (ref 13.3–17.7)
HGB BLD-MCNC: 10.6 G/DL (ref 13.3–17.7)
HGB BLD-MCNC: 10.8 G/DL (ref 13.3–17.7)
HGB BLD-MCNC: 10.9 G/DL (ref 13.3–17.7)
HGB BLD-MCNC: 11 G/DL (ref 13.3–17.7)
HGB BLD-MCNC: 11 G/DL (ref 13.3–17.7)
HGB BLD-MCNC: 11.3 G/DL (ref 13.3–17.7)
HGB BLD-MCNC: 11.6 G/DL (ref 13.3–17.7)
HGB BLD-MCNC: 11.9 G/DL (ref 13.3–17.7)
HGB BLD-MCNC: 11.9 G/DL (ref 13.3–17.7)
HGB BLD-MCNC: 12.3 G/DL (ref 13.3–17.7)
HGB BLD-MCNC: 12.8 G/DL (ref 13.3–17.7)
HGB BLD-MCNC: 12.9 G/DL (ref 13.3–17.7)
HGB BLD-MCNC: 13.1 G/DL (ref 13.3–17.7)
HGB BLD-MCNC: 8.9 G/DL (ref 13.3–17.7)
HGB BLD-MCNC: 9 G/DL (ref 13.3–17.7)
HGB BLD-MCNC: 9.2 G/DL (ref 13.3–17.7)
HGB BLD-MCNC: 9.3 G/DL (ref 13.3–17.7)
HGB BLD-MCNC: 9.3 G/DL (ref 13.3–17.7)
HGB BLD-MCNC: 9.4 G/DL (ref 13.3–17.7)
HGB BLD-MCNC: 9.5 G/DL (ref 13.3–17.7)
HGB BLD-MCNC: 9.6 G/DL (ref 13.3–17.7)
HGB BLD-MCNC: 9.6 G/DL (ref 13.3–17.7)
HGB BLD-MCNC: 9.7 G/DL (ref 13.3–17.7)
HGB BLD-MCNC: 9.7 G/DL (ref 13.3–17.7)
HGB BLD-MCNC: 9.8 G/DL (ref 13.3–17.7)
HGB BLD-MCNC: 9.8 G/DL (ref 13.3–17.7)
HGB BLD-MCNC: 9.9 G/DL (ref 13.3–17.7)
HGB FREE PLAS-MCNC: 50 MG/DL
HGB UR QL STRIP: NEGATIVE
HLA TYPING COMPLETE SOT RECIPIENT: NORMAL
HYALINE CASTS #/AREA URNS LPF: 5 /LPF (ref 0–2)
HYALINE CASTS #/AREA URNS LPF: 7 /LPF (ref 0–2)
IMM GRANULOCYTES # BLD: 0 10E9/L (ref 0–0.4)
IMM GRANULOCYTES # BLD: 0.1 10E9/L (ref 0–0.4)
IMM GRANULOCYTES NFR BLD: 0 %
IMM GRANULOCYTES NFR BLD: 0.1 %
IMM GRANULOCYTES NFR BLD: 0.2 %
IMM GRANULOCYTES NFR BLD: 0.3 %
IMM GRANULOCYTES NFR BLD: 0.3 %
IMM GRANULOCYTES NFR BLD: 0.4 %
IMM GRANULOCYTES NFR BLD: 0.6 %
IMM GRANULOCYTES NFR BLD: 1.4 %
INR PPP: 0.97 (ref 0.86–1.14)
INR PPP: 1.03 (ref 0.86–1.14)
INR PPP: 1.06 (ref 0.86–1.14)
INR PPP: 1.06 (ref 0.86–1.14)
INR PPP: 1.07 (ref 0.86–1.14)
INR PPP: 1.08 (ref 0.86–1.14)
INR PPP: 1.09 (ref 0.86–1.14)
INR PPP: 1.1 (ref 0.86–1.14)
INR PPP: 1.1 (ref 0.86–1.14)
INR PPP: 1.11 (ref 0.86–1.14)
INR PPP: 1.11 (ref 0.86–1.14)
INR PPP: 1.12 (ref 0.86–1.14)
INR PPP: 1.13 (ref 0.86–1.14)
INR PPP: 1.14 (ref 0.86–1.14)
INR PPP: 1.14 (ref 0.86–1.14)
INR PPP: 1.16 (ref 0.86–1.14)
INR PPP: 1.16 (ref 0.86–1.14)
INR PPP: 1.17 (ref 0.86–1.14)
INR PPP: 1.18 (ref 0.86–1.14)
INR PPP: 1.19 (ref 0.86–1.14)
INR PPP: 1.19 (ref 0.86–1.14)
INR PPP: 1.38 (ref 0.86–1.14)
INR PPP: 1.39 (ref 0.86–1.14)
INR PPP: 1.43 (ref 0.86–1.14)
INR PPP: 1.52 (ref 0.86–1.14)
INTERPRETATION ECG - MUSE: NORMAL
IRON BINDING CAP: 373
IRON SATN MFR SERPL: 11 % (ref 15–46)
IRON SATN MFR SERPL: 12 % (ref 15–46)
IRON SERPL-MCNC: 41 UG/DL (ref 35–180)
IRON SERPL-MCNC: 41 UG/DL (ref 35–180)
IRON: 103 UG/DL
KETONES UR STRIP-MCNC: NEGATIVE MG/DL
LA PPP-IMP: NORMAL
LAB SCANNED RESULT: NORMAL
LACTATE BLD-SCNC: 0.8 MMOL/L (ref 0.7–2.1)
LACTATE BLD-SCNC: 0.8 MMOL/L (ref 0.7–2.1)
LACTATE BLD-SCNC: 1.1 MMOL/L (ref 0.7–2.1)
LACTATE BLD-SCNC: 1.8 MMOL/L (ref 0.7–2.1)
LACTATE BLD-SCNC: 1.8 MMOL/L (ref 0.7–2.1)
LACTATE BLD-SCNC: 2.2 MMOL/L (ref 0.7–2.1)
LACTATE BLD-SCNC: 3 MMOL/L (ref 0.7–2.1)
LACTATE BLD-SCNC: 6.3 MMOL/L (ref 0.7–2.1)
LACTATE BLD-SCNC: 6.8 MMOL/L (ref 0.7–2.1)
LACTATE BLD-SCNC: 7.2 MMOL/L (ref 0.7–2.1)
LACTATE BLD-SCNC: 7.4 MMOL/L (ref 0.7–2.1)
LDH SERPL L TO P-CCNC: 242 U/L (ref 85–227)
LEUKOCYTE ESTERASE UR QL STRIP: ABNORMAL
LEUKOCYTE ESTERASE UR QL STRIP: NEGATIVE
LMWH PPP CHRO-ACNC: 0.17 IU/ML
LMWH PPP CHRO-ACNC: 0.22 IU/ML
LMWH PPP CHRO-ACNC: 0.23 IU/ML
LMWH PPP CHRO-ACNC: 0.29 IU/ML
LMWH PPP CHRO-ACNC: 0.32 IU/ML
LMWH PPP CHRO-ACNC: NORMAL IU/ML
LYMPHOCYTES # BLD AUTO: 0.4 10E9/L (ref 0.8–5.3)
LYMPHOCYTES # BLD AUTO: 0.5 10E9/L (ref 0.8–5.3)
LYMPHOCYTES # BLD AUTO: 0.5 10E9/L (ref 0.8–5.3)
LYMPHOCYTES # BLD AUTO: 0.6 10E9/L (ref 0.8–5.3)
LYMPHOCYTES # BLD AUTO: 0.6 10E9/L (ref 0.8–5.3)
LYMPHOCYTES # BLD AUTO: 0.8 10E9/L (ref 0.8–5.3)
LYMPHOCYTES # BLD AUTO: 0.9 10E9/L (ref 0.8–5.3)
LYMPHOCYTES # BLD AUTO: 0.9 10E9/L (ref 0.8–5.3)
LYMPHOCYTES # BLD AUTO: 1 10E9/L (ref 0.8–5.3)
LYMPHOCYTES # BLD AUTO: 1 10E9/L (ref 0.8–5.3)
LYMPHOCYTES # BLD AUTO: 1.2 10E9/L (ref 0.8–5.3)
LYMPHOCYTES # BLD AUTO: 1.4 10E9/L (ref 0.8–5.3)
LYMPHOCYTES NFR BLD AUTO: 13.7 %
LYMPHOCYTES NFR BLD AUTO: 13.9 %
LYMPHOCYTES NFR BLD AUTO: 14.5 %
LYMPHOCYTES NFR BLD AUTO: 14.6 %
LYMPHOCYTES NFR BLD AUTO: 15.3 %
LYMPHOCYTES NFR BLD AUTO: 16.4 %
LYMPHOCYTES NFR BLD AUTO: 18.3 %
LYMPHOCYTES NFR BLD AUTO: 21.1 %
LYMPHOCYTES NFR BLD AUTO: 3.5 %
LYMPHOCYTES NFR BLD AUTO: 6.5 %
LYMPHOCYTES NFR BLD AUTO: 7.7 %
LYMPHOCYTES NFR BLD AUTO: 8.4 %
LYMPHOCYTES NFR BLD AUTO: 8.5 %
LYMPHOCYTES NFR BLD AUTO: 9.7 %
Lab: ABNORMAL
Lab: NORMAL
Lab: NORMAL
MAGNESIUM SERPL-MCNC: 2 MG/DL (ref 1.6–2.3)
MAGNESIUM SERPL-MCNC: 2.1 MG/DL (ref 1.6–2.3)
MAGNESIUM SERPL-MCNC: 2.1 MG/DL (ref 1.6–2.3)
MAGNESIUM SERPL-MCNC: 2.2 MG/DL (ref 1.6–2.3)
MAGNESIUM SERPL-MCNC: 2.3 MG/DL (ref 1.6–2.3)
MAGNESIUM SERPL-MCNC: 2.4 MG/DL (ref 1.6–2.3)
MAGNESIUM SERPL-MCNC: 2.5 MG/DL (ref 1.6–2.3)
MAGNESIUM SERPL-MCNC: 2.6 MG/DL (ref 1.6–2.3)
MAGNESIUM SERPL-MCNC: 2.7 MG/DL (ref 1.6–2.3)
MAGNESIUM SERPL-MCNC: 2.8 MG/DL (ref 1.6–2.3)
MAGNESIUM SERPL-MCNC: 4.9 MG/DL (ref 1.6–2.3)
MAGNESIUM SERPL-MCNC: 6.9 MG/DL (ref 1.6–2.3)
MAGNESIUM SERPL-MCNC: 7 MG/DL (ref 1.6–2.3)
MAGNESIUM SERPL-MCNC: 7.2 MG/DL (ref 1.6–2.3)
MAGNESIUM SERPL-MCNC: 7.5 MG/DL (ref 1.6–2.3)
MAGNESIUM SERPL-MCNC: NORMAL MG/DL (ref 1.6–2.3)
MCH RBC QN AUTO: 28.6 PG (ref 26.5–33)
MCH RBC QN AUTO: 28.6 PG (ref 26.5–33)
MCH RBC QN AUTO: 28.7 PG (ref 26.5–33)
MCH RBC QN AUTO: 28.9 PG (ref 26.5–33)
MCH RBC QN AUTO: 29.1 PG (ref 26.5–33)
MCH RBC QN AUTO: 29.1 PG (ref 26.5–33)
MCH RBC QN AUTO: 29.2 PG (ref 26.5–33)
MCH RBC QN AUTO: 29.4 PG (ref 26.5–33)
MCH RBC QN AUTO: 29.7 PG (ref 26.5–33)
MCH RBC QN AUTO: 29.7 PG (ref 26.5–33)
MCH RBC QN AUTO: 29.8 PG (ref 26.5–33)
MCH RBC QN AUTO: 29.9 PG (ref 26.5–33)
MCH RBC QN AUTO: 30.2 PG (ref 26.5–33)
MCH RBC QN AUTO: 30.4 PG (ref 26.5–33)
MCH RBC QN AUTO: 30.9 PG (ref 26.5–33)
MCH RBC QN AUTO: 31.2 PG (ref 26.5–33)
MCH RBC QN AUTO: 31.2 PG (ref 26.5–33)
MCH RBC QN AUTO: 31.4 PG (ref 26.5–33)
MCH RBC QN AUTO: 31.5 PG (ref 26.5–33)
MCH RBC QN AUTO: 31.5 PG (ref 26.5–33)
MCH RBC QN AUTO: 31.6 PG (ref 26.5–33)
MCH RBC QN AUTO: 31.7 PG (ref 26.5–33)
MCH RBC QN AUTO: 31.8 PG (ref 26.5–33)
MCH RBC QN AUTO: 32 PG (ref 26.5–33)
MCH RBC QN AUTO: 32.2 PG (ref 26.5–33)
MCH RBC QN AUTO: 32.3 PG
MCH RBC QN AUTO: 32.5 PG (ref 26.5–33)
MCH RBC QN AUTO: 32.8 PG (ref 26.5–33)
MCH RBC QN AUTO: 32.8 PG (ref 26.5–33)
MCH RBC QN AUTO: 32.9 PG (ref 26.5–33)
MCH RBC QN AUTO: 32.9 PG (ref 26.5–33)
MCH RBC QN AUTO: 33 PG
MCH RBC QN AUTO: 33 PG
MCH RBC QN AUTO: 33 PG (ref 26.5–33)
MCH RBC QN AUTO: 33.1 PG (ref 26.5–33)
MCH RBC QN AUTO: 33.2 PG (ref 26.5–33)
MCH RBC QN AUTO: 33.5 PG (ref 26.5–33)
MCH RBC QN AUTO: 33.5 PG (ref 26.5–33)
MCHC RBC AUTO-ENTMCNC: 30.7 G/DL (ref 31.5–36.5)
MCHC RBC AUTO-ENTMCNC: 30.9 G/DL (ref 31.5–36.5)
MCHC RBC AUTO-ENTMCNC: 31.4 G/DL (ref 31.5–36.5)
MCHC RBC AUTO-ENTMCNC: 31.4 G/DL (ref 31.5–36.5)
MCHC RBC AUTO-ENTMCNC: 31.5 G/DL (ref 31.5–36.5)
MCHC RBC AUTO-ENTMCNC: 31.5 G/DL (ref 31.5–36.5)
MCHC RBC AUTO-ENTMCNC: 32 G/DL (ref 31.5–36.5)
MCHC RBC AUTO-ENTMCNC: 32.3 G/DL (ref 31.5–36.5)
MCHC RBC AUTO-ENTMCNC: 32.3 G/DL (ref 31.5–36.5)
MCHC RBC AUTO-ENTMCNC: 32.4 G/DL (ref 31.5–36.5)
MCHC RBC AUTO-ENTMCNC: 32.4 G/DL (ref 31.5–36.5)
MCHC RBC AUTO-ENTMCNC: 32.6 G/DL
MCHC RBC AUTO-ENTMCNC: 32.6 G/DL (ref 31.5–36.5)
MCHC RBC AUTO-ENTMCNC: 32.8 G/DL (ref 31.5–36.5)
MCHC RBC AUTO-ENTMCNC: 32.9 G/DL (ref 31.5–36.5)
MCHC RBC AUTO-ENTMCNC: 33 G/DL (ref 31.5–36.5)
MCHC RBC AUTO-ENTMCNC: 33.1 G/DL (ref 31.5–36.5)
MCHC RBC AUTO-ENTMCNC: 33.1 G/DL (ref 31.5–36.5)
MCHC RBC AUTO-ENTMCNC: 33.2 G/DL (ref 31.5–36.5)
MCHC RBC AUTO-ENTMCNC: 33.3 G/DL (ref 31.5–36.5)
MCHC RBC AUTO-ENTMCNC: 33.3 G/DL (ref 31.5–36.5)
MCHC RBC AUTO-ENTMCNC: 33.4 G/DL (ref 31.5–36.5)
MCHC RBC AUTO-ENTMCNC: 33.4 G/DL (ref 31.5–36.5)
MCHC RBC AUTO-ENTMCNC: 33.6 G/DL (ref 31.5–36.5)
MCHC RBC AUTO-ENTMCNC: 33.7 G/DL
MCHC RBC AUTO-ENTMCNC: 33.7 G/DL
MCHC RBC AUTO-ENTMCNC: 33.7 G/DL (ref 31.5–36.5)
MCHC RBC AUTO-ENTMCNC: 33.8 G/DL (ref 31.5–36.5)
MCHC RBC AUTO-ENTMCNC: 34 G/DL (ref 31.5–36.5)
MCHC RBC AUTO-ENTMCNC: 34 G/DL (ref 31.5–36.5)
MCHC RBC AUTO-ENTMCNC: 34.1 G/DL (ref 31.5–36.5)
MCHC RBC AUTO-ENTMCNC: 34.1 G/DL (ref 31.5–36.5)
MCHC RBC AUTO-ENTMCNC: 34.5 G/DL (ref 31.5–36.5)
MCHC RBC AUTO-ENTMCNC: 34.7 G/DL (ref 31.5–36.5)
MCV RBC AUTO: 100 FL (ref 78–100)
MCV RBC AUTO: 101 FL (ref 78–100)
MCV RBC AUTO: 102 FL (ref 78–100)
MCV RBC AUTO: 102 FL (ref 78–100)
MCV RBC AUTO: 89 FL (ref 78–100)
MCV RBC AUTO: 90 FL (ref 78–100)
MCV RBC AUTO: 91 FL (ref 78–100)
MCV RBC AUTO: 92 FL (ref 78–100)
MCV RBC AUTO: 93 FL (ref 78–100)
MCV RBC AUTO: 94 FL (ref 78–100)
MCV RBC AUTO: 96 FL (ref 78–100)
MCV RBC AUTO: 97 FL (ref 78–100)
MCV RBC AUTO: 97 FL (ref 78–100)
MCV RBC AUTO: 97.9 FL
MCV RBC AUTO: 97.9 FL
MCV RBC AUTO: 98 FL (ref 78–100)
MCV RBC AUTO: 99 FL (ref 78–100)
MCV RBC AUTO: 99.2 FL
MICRO REPORT STATUS: ABNORMAL
MICRO REPORT STATUS: NORMAL
MICROORGANISM SPEC CULT: ABNORMAL
MONOCYTES # BLD AUTO: 0.1 10E9/L (ref 0–1.3)
MONOCYTES # BLD AUTO: 0.2 10E9/L (ref 0–1.3)
MONOCYTES # BLD AUTO: 0.5 10E9/L (ref 0–1.3)
MONOCYTES # BLD AUTO: 0.6 10E9/L (ref 0–1.3)
MONOCYTES # BLD AUTO: 0.6 10E9/L (ref 0–1.3)
MONOCYTES # BLD AUTO: 0.7 10E9/L (ref 0–1.3)
MONOCYTES # BLD AUTO: 0.8 10E9/L (ref 0–1.3)
MONOCYTES # BLD AUTO: 0.9 10E9/L (ref 0–1.3)
MONOCYTES # BLD AUTO: 0.9 10E9/L (ref 0–1.3)
MONOCYTES # BLD AUTO: 1 10E9/L (ref 0–1.3)
MONOCYTES # BLD AUTO: 1.1 10E9/L (ref 0–1.3)
MONOCYTES # BLD AUTO: 1.2 10E9/L (ref 0–1.3)
MONOCYTES NFR BLD AUTO: 1.3 %
MONOCYTES NFR BLD AUTO: 12.1 %
MONOCYTES NFR BLD AUTO: 13 %
MONOCYTES NFR BLD AUTO: 14.6 %
MONOCYTES NFR BLD AUTO: 15.1 %
MONOCYTES NFR BLD AUTO: 16.4 %
MONOCYTES NFR BLD AUTO: 17.6 %
MONOCYTES NFR BLD AUTO: 17.6 %
MONOCYTES NFR BLD AUTO: 3.2 %
MONOCYTES NFR BLD AUTO: 5.3 %
MONOCYTES NFR BLD AUTO: 6.6 %
MONOCYTES NFR BLD AUTO: 6.7 %
MONOCYTES NFR BLD AUTO: 7 %
MONOCYTES NFR BLD AUTO: 8.4 %
MRSA DNA SPEC QL NAA+PROBE: NORMAL
MRSA DNA SPEC QL NAA+PROBE: NORMAL
MUCOUS THREADS #/AREA URNS LPF: PRESENT /LPF
NEUTROPHILS # BLD AUTO: 3.1 10E9/L (ref 1.6–8.3)
NEUTROPHILS # BLD AUTO: 3.5 10E9/L (ref 1.6–8.3)
NEUTROPHILS # BLD AUTO: 3.7 10E9/L (ref 1.6–8.3)
NEUTROPHILS # BLD AUTO: 4 10E9/L (ref 1.6–8.3)
NEUTROPHILS # BLD AUTO: 4.4 10E9/L (ref 1.6–8.3)
NEUTROPHILS # BLD AUTO: 4.6 10E9/L (ref 1.6–8.3)
NEUTROPHILS # BLD AUTO: 4.7 10E9/L (ref 1.6–8.3)
NEUTROPHILS # BLD AUTO: 4.9 10E9/L (ref 1.6–8.3)
NEUTROPHILS # BLD AUTO: 5.1 10E9/L (ref 1.6–8.3)
NEUTROPHILS # BLD AUTO: 5.6 10E9/L (ref 1.6–8.3)
NEUTROPHILS # BLD AUTO: 6.6 10E9/L (ref 1.6–8.3)
NEUTROPHILS # BLD AUTO: 7.4 10E9/L (ref 1.6–8.3)
NEUTROPHILS # BLD AUTO: 8.1 10E9/L (ref 1.6–8.3)
NEUTROPHILS # BLD AUTO: 8.8 10E9/L (ref 1.6–8.3)
NEUTROPHILS NFR BLD AUTO: 64 %
NEUTROPHILS NFR BLD AUTO: 66.4 %
NEUTROPHILS NFR BLD AUTO: 66.6 %
NEUTROPHILS NFR BLD AUTO: 70.4 %
NEUTROPHILS NFR BLD AUTO: 70.6 %
NEUTROPHILS NFR BLD AUTO: 70.7 %
NEUTROPHILS NFR BLD AUTO: 72.8 %
NEUTROPHILS NFR BLD AUTO: 73.6 %
NEUTROPHILS NFR BLD AUTO: 77.8 %
NEUTROPHILS NFR BLD AUTO: 78.4 %
NEUTROPHILS NFR BLD AUTO: 81 %
NEUTROPHILS NFR BLD AUTO: 85.4 %
NEUTROPHILS NFR BLD AUTO: 87.2 %
NEUTROPHILS NFR BLD AUTO: 88.1 %
NITRATE UR QL: NEGATIVE
NRBC # BLD AUTO: 0 10*3/UL
NRBC BLD AUTO-RTO: 0 /100
NT-PROBNP SERPL-MCNC: ABNORMAL PG/ML (ref 0–1800)
NT-PROBNP SERPL-MCNC: ABNORMAL PG/ML (ref 0–450)
O2/TOTAL GAS SETTING VFR VENT: 21 %
O2/TOTAL GAS SETTING VFR VENT: 40 %
O2/TOTAL GAS SETTING VFR VENT: 60 %
O2/TOTAL GAS SETTING VFR VENT: ABNORMAL %
O2/TOTAL GAS SETTING VFR VENT: NORMAL %
OSMOLALITY SERPL: 272 MMOL/KG (ref 280–301)
OSMOLALITY SERPL: 303 MMOL/KG (ref 280–301)
OSMOLALITY UR: 326 MMOL/KG (ref 100–1200)
OXYHGB MFR BLD: 92 % (ref 92–100)
OXYHGB MFR BLD: 95 % (ref 92–100)
OXYHGB MFR BLDV: 28 %
OXYHGB MFR BLDV: 29 %
OXYHGB MFR BLDV: 29 %
OXYHGB MFR BLDV: 31 %
OXYHGB MFR BLDV: 36 %
OXYHGB MFR BLDV: 39 %
OXYHGB MFR BLDV: 40 %
OXYHGB MFR BLDV: 42 %
OXYHGB MFR BLDV: 43 %
OXYHGB MFR BLDV: 44 %
OXYHGB MFR BLDV: 45 %
OXYHGB MFR BLDV: 46 %
OXYHGB MFR BLDV: 47 %
OXYHGB MFR BLDV: 47 %
OXYHGB MFR BLDV: 48 %
OXYHGB MFR BLDV: 49 %
OXYHGB MFR BLDV: 50 %
OXYHGB MFR BLDV: 51 %
OXYHGB MFR BLDV: 52 %
OXYHGB MFR BLDV: 53 %
OXYHGB MFR BLDV: 54 %
OXYHGB MFR BLDV: 55 %
OXYHGB MFR BLDV: 55 %
OXYHGB MFR BLDV: 56 %
OXYHGB MFR BLDV: 57 %
OXYHGB MFR BLDV: 58 %
OXYHGB MFR BLDV: 59 %
OXYHGB MFR BLDV: 60 %
OXYHGB MFR BLDV: 61 %
OXYHGB MFR BLDV: 62 %
OXYHGB MFR BLDV: 63 %
OXYHGB MFR BLDV: 64 %
OXYHGB MFR BLDV: 65 %
OXYHGB MFR BLDV: 66 %
OXYHGB MFR BLDV: 67 %
OXYHGB MFR BLDV: 68 %
OXYHGB MFR BLDV: 69 %
OXYHGB MFR BLDV: 69 %
OXYHGB MFR BLDV: 70 %
OXYHGB MFR BLDV: 72 %
PCO2 BLD: 35 MM HG (ref 35–45)
PCO2 BLD: 37 MM HG (ref 35–45)
PCO2 BLD: 39 MM HG (ref 35–45)
PCO2 BLD: 43 MM HG (ref 35–45)
PCO2 BLDV: 33 MM HG (ref 40–50)
PCO2 BLDV: 34 MM HG (ref 40–50)
PCO2 BLDV: 36 MM HG (ref 40–50)
PCO2 BLDV: 36 MM HG (ref 40–50)
PCO2 BLDV: 37 MM HG (ref 40–50)
PCO2 BLDV: 39 MM HG (ref 40–50)
PCO2 BLDV: 40 MM HG (ref 40–50)
PCO2 BLDV: 41 MM HG (ref 40–50)
PCO2 BLDV: 42 MM HG (ref 40–50)
PCO2 BLDV: 43 MM HG (ref 40–50)
PCO2 BLDV: 44 MM HG (ref 40–50)
PCO2 BLDV: 45 MM HG (ref 40–50)
PCO2 BLDV: 46 MM HG (ref 40–50)
PCO2 BLDV: 47 MM HG (ref 40–50)
PCO2 BLDV: 48 MM HG (ref 40–50)
PCO2 BLDV: 49 MM HG (ref 40–50)
PCO2 BLDV: 50 MM HG (ref 40–50)
PCO2 BLDV: 51 MM HG (ref 40–50)
PCO2 BLDV: 54 MM HG (ref 40–50)
PCO2 BLDV: 54 MM HG (ref 40–50)
PCO2 BLDV: NORMAL MM HG (ref 40–50)
PH BLD: 7.5 PH (ref 7.35–7.45)
PH BLD: 7.52 PH (ref 7.35–7.45)
PH BLD: 7.56 PH (ref 7.35–7.45)
PH BLD: 7.57 PH (ref 7.35–7.45)
PH BLDV: 7.38 PH (ref 7.32–7.43)
PH BLDV: 7.4 PH (ref 7.32–7.43)
PH BLDV: 7.41 PH (ref 7.32–7.43)
PH BLDV: 7.41 PH (ref 7.32–7.43)
PH BLDV: 7.42 PH (ref 7.32–7.43)
PH BLDV: 7.43 PH (ref 7.32–7.43)
PH BLDV: 7.44 PH (ref 7.32–7.43)
PH BLDV: 7.45 PH (ref 7.32–7.43)
PH BLDV: 7.46 PH (ref 7.32–7.43)
PH BLDV: 7.47 PH (ref 7.32–7.43)
PH BLDV: 7.48 PH (ref 7.32–7.43)
PH BLDV: 7.49 PH (ref 7.32–7.43)
PH BLDV: 7.5 PH (ref 7.32–7.43)
PH BLDV: 7.51 PH (ref 7.32–7.43)
PH BLDV: 7.52 PH (ref 7.32–7.43)
PH BLDV: 7.53 PH (ref 7.32–7.43)
PH BLDV: 7.54 PH (ref 7.32–7.43)
PH BLDV: NORMAL PH (ref 7.32–7.43)
PH UR STRIP: 5 PH (ref 5–7)
PH UR STRIP: 6.5 PH (ref 5–7)
PH UR STRIP: 7 PH (ref 5–7)
PH UR STRIP: 7 PH (ref 5–7)
PH UR STRIP: 7.5 PH (ref 5–7)
PHOSPHATE SERPL-MCNC: 2.4 MG/DL (ref 2.5–4.5)
PHOSPHATE SERPL-MCNC: 2.7 MG/DL (ref 2.5–4.5)
PHOSPHATE SERPL-MCNC: 2.7 MG/DL (ref 2.5–4.5)
PHOSPHATE SERPL-MCNC: 3.1 MG/DL (ref 2.5–4.5)
PHOSPHATE SERPL-MCNC: 3.3 MG/DL (ref 2.5–4.5)
PHOSPHATE SERPL-MCNC: 3.4 MG/DL (ref 2.5–4.5)
PHOSPHATE SERPL-MCNC: 3.5 MG/DL (ref 2.5–4.5)
PHOSPHATE SERPL-MCNC: 3.6 MG/DL (ref 2.5–4.5)
PHOSPHATE SERPL-MCNC: 5.4 MG/DL (ref 2.5–4.5)
PHOSPHATE SERPL-MCNC: 5.4 MG/DL (ref 2.5–4.5)
PLATELET # BLD AUTO: 106 10E9/L (ref 150–450)
PLATELET # BLD AUTO: 108 10E9/L (ref 150–450)
PLATELET # BLD AUTO: 125 10E9/L (ref 150–450)
PLATELET # BLD AUTO: 126 10E9/L (ref 150–450)
PLATELET # BLD AUTO: 132 10E9/L (ref 150–450)
PLATELET # BLD AUTO: 135 10E9/L (ref 150–450)
PLATELET # BLD AUTO: 136 10E9/L (ref 150–450)
PLATELET # BLD AUTO: 140 10E9/L (ref 150–450)
PLATELET # BLD AUTO: 141 10E9/L (ref 150–450)
PLATELET # BLD AUTO: 145 10E9/L (ref 150–450)
PLATELET # BLD AUTO: 146 10E9/L (ref 150–450)
PLATELET # BLD AUTO: 149 10E9/L (ref 150–450)
PLATELET # BLD AUTO: 149 10E9/L (ref 150–450)
PLATELET # BLD AUTO: 150 10E9/L (ref 150–450)
PLATELET # BLD AUTO: 152 10E9/L (ref 150–450)
PLATELET # BLD AUTO: 153 10E9/L (ref 150–450)
PLATELET # BLD AUTO: 155 10E9/L (ref 150–450)
PLATELET # BLD AUTO: 158 10E9/L (ref 150–450)
PLATELET # BLD AUTO: 159 10E9/L (ref 150–450)
PLATELET # BLD AUTO: 160 10E9/L (ref 150–450)
PLATELET # BLD AUTO: 165 10E9/L (ref 150–450)
PLATELET # BLD AUTO: 169 10E9/L (ref 150–450)
PLATELET # BLD AUTO: 175 10E9/L (ref 150–450)
PLATELET # BLD AUTO: 177 10E9/L (ref 150–450)
PLATELET # BLD AUTO: 178 10E9/L (ref 150–450)
PLATELET # BLD AUTO: 181 10E9/L (ref 150–450)
PLATELET # BLD AUTO: 184 10E9/L (ref 150–450)
PLATELET # BLD AUTO: 187 10E9/L (ref 150–450)
PLATELET # BLD AUTO: 188 10E9/L (ref 150–450)
PLATELET # BLD AUTO: 188 10E9/L (ref 150–450)
PLATELET # BLD AUTO: 190 10^9/L
PLATELET # BLD AUTO: 190 10^9/L
PLATELET # BLD AUTO: 191 10E9/L (ref 150–450)
PLATELET # BLD AUTO: 196 10E9/L (ref 150–450)
PLATELET # BLD AUTO: 198 10E9/L (ref 150–450)
PLATELET # BLD AUTO: 201 10E9/L (ref 150–450)
PLATELET # BLD AUTO: 202 10E9/L (ref 150–450)
PLATELET # BLD AUTO: 212 10E9/L (ref 150–450)
PLATELET # BLD AUTO: 218 10E9/L (ref 150–450)
PLATELET # BLD AUTO: 218 10E9/L (ref 150–450)
PLATELET # BLD AUTO: 219 10E9/L (ref 150–450)
PLATELET # BLD AUTO: 228 10E9/L (ref 150–450)
PLATELET # BLD AUTO: 229 10^9/L
PLATELET # BLD AUTO: 230 10E9/L (ref 150–450)
PLATELET # BLD AUTO: 232 10E9/L (ref 150–450)
PLATELET # BLD AUTO: 234 10E9/L (ref 150–450)
PLATELET # BLD AUTO: 234 10E9/L (ref 150–450)
PLATELET # BLD AUTO: 247 10E9/L (ref 150–450)
PLATELET # BLD EST: ABNORMAL 10*3/UL
PO2 BLD: 150 MM HG (ref 80–105)
PO2 BLD: 64 MM HG (ref 80–105)
PO2 BLD: 67 MM HG (ref 80–105)
PO2 BLD: 91 MM HG (ref 80–105)
PO2 BLDV: 21 MM HG (ref 25–47)
PO2 BLDV: 21 MM HG (ref 25–47)
PO2 BLDV: 22 MM HG (ref 25–47)
PO2 BLDV: 23 MM HG (ref 25–47)
PO2 BLDV: 24 MM HG (ref 25–47)
PO2 BLDV: 24 MM HG (ref 25–47)
PO2 BLDV: 25 MM HG (ref 25–47)
PO2 BLDV: 26 MM HG (ref 25–47)
PO2 BLDV: 27 MM HG (ref 25–47)
PO2 BLDV: 28 MM HG (ref 25–47)
PO2 BLDV: 29 MM HG (ref 25–47)
PO2 BLDV: 30 MM HG (ref 25–47)
PO2 BLDV: 31 MM HG (ref 25–47)
PO2 BLDV: 32 MM HG (ref 25–47)
PO2 BLDV: 33 MM HG (ref 25–47)
PO2 BLDV: 34 MM HG (ref 25–47)
PO2 BLDV: 35 MM HG (ref 25–47)
PO2 BLDV: 36 MM HG (ref 25–47)
PO2 BLDV: 37 MM HG (ref 25–47)
PO2 BLDV: 37 MM HG (ref 25–47)
PO2 BLDV: 38 MM HG (ref 25–47)
PO2 BLDV: 39 MM HG (ref 25–47)
PO2 BLDV: 40 MM HG (ref 25–47)
PO2 BLDV: 43 MM HG (ref 25–47)
PO2 BLDV: NORMAL MM HG (ref 25–47)
POTASSIUM BLD-SCNC: 2.9 MMOL/L (ref 3.4–5.3)
POTASSIUM BLD-SCNC: 3 MMOL/L (ref 3.4–5.3)
POTASSIUM BLD-SCNC: 3.1 MMOL/L (ref 3.4–5.3)
POTASSIUM BLD-SCNC: 3.3 MMOL/L (ref 3.4–5.3)
POTASSIUM BLD-SCNC: 3.3 MMOL/L (ref 3.4–5.3)
POTASSIUM BLD-SCNC: 3.6 MMOL/L (ref 3.4–5.3)
POTASSIUM BLD-SCNC: 3.7 MMOL/L (ref 3.4–5.3)
POTASSIUM BLD-SCNC: 4 MMOL/L (ref 3.4–5.3)
POTASSIUM BLD-SCNC: 4 MMOL/L (ref 3.4–5.3)
POTASSIUM BLD-SCNC: 4.3 MMOL/L (ref 3.4–5.3)
POTASSIUM BLD-SCNC: 4.3 MMOL/L (ref 3.4–5.3)
POTASSIUM BLD-SCNC: 6.1 MMOL/L (ref 3.4–5.3)
POTASSIUM SERPL-SCNC: 10 MMOL/L (ref 3.4–5.3)
POTASSIUM SERPL-SCNC: 2.2 MMOL/L (ref 3.4–5.3)
POTASSIUM SERPL-SCNC: 2.6 MMOL/L (ref 3.4–5.3)
POTASSIUM SERPL-SCNC: 2.6 MMOL/L (ref 3.4–5.3)
POTASSIUM SERPL-SCNC: 2.7 MMOL/L (ref 3.4–5.3)
POTASSIUM SERPL-SCNC: 2.7 MMOL/L (ref 3.4–5.3)
POTASSIUM SERPL-SCNC: 2.8 MMOL/L (ref 3.4–5.3)
POTASSIUM SERPL-SCNC: 2.8 MMOL/L (ref 3.4–5.3)
POTASSIUM SERPL-SCNC: 2.9 MMOL/L (ref 3.4–5.3)
POTASSIUM SERPL-SCNC: 3 MMOL/L (ref 3.4–5.3)
POTASSIUM SERPL-SCNC: 3.1 MMOL/L (ref 3.4–5.3)
POTASSIUM SERPL-SCNC: 3.1 MMOL/L (ref 3.4–5.3)
POTASSIUM SERPL-SCNC: 3.2 MMOL/L (ref 3.4–5.3)
POTASSIUM SERPL-SCNC: 3.3 MMOL/L (ref 3.4–5.3)
POTASSIUM SERPL-SCNC: 3.4 MMOL/L (ref 3.4–5.3)
POTASSIUM SERPL-SCNC: 3.5 MMOL/L (ref 3.4–5.3)
POTASSIUM SERPL-SCNC: 3.6 MMOL/L (ref 3.4–5.3)
POTASSIUM SERPL-SCNC: 3.7 MMOL/L (ref 3.4–5.3)
POTASSIUM SERPL-SCNC: 3.8 MMOL/L (ref 3.4–5.3)
POTASSIUM SERPL-SCNC: 3.9 MMOL/L (ref 3.4–5.3)
POTASSIUM SERPL-SCNC: 4 MMOL/L
POTASSIUM SERPL-SCNC: 4 MMOL/L (ref 3.4–5.3)
POTASSIUM SERPL-SCNC: 4 MMOL/L (ref 3.5–5.1)
POTASSIUM SERPL-SCNC: 4.1 MMOL/L (ref 3.4–5.3)
POTASSIUM SERPL-SCNC: 4.2 MMOL/L (ref 3.4–5.3)
POTASSIUM SERPL-SCNC: 4.2 MMOL/L (ref 3.4–5.3)
POTASSIUM SERPL-SCNC: 4.2 MMOL/L (ref 3.5–5.1)
POTASSIUM SERPL-SCNC: 4.3 MMOL/L (ref 3.4–5.3)
POTASSIUM SERPL-SCNC: 4.4 MMOL/L (ref 3.4–5.3)
POTASSIUM SERPL-SCNC: 4.5 MMOL/L (ref 3.4–5.3)
POTASSIUM SERPL-SCNC: 4.6 MMOL/L (ref 3.4–5.3)
POTASSIUM SERPL-SCNC: 4.6 MMOL/L (ref 3.4–5.3)
POTASSIUM SERPL-SCNC: 4.7 MMOL/L (ref 3.4–5.3)
POTASSIUM SERPL-SCNC: 4.8 MMOL/L (ref 3.4–5.3)
POTASSIUM SERPL-SCNC: 4.9 MMOL/L (ref 3.4–5.3)
POTASSIUM SERPL-SCNC: 5 MMOL/L (ref 3.4–5.3)
POTASSIUM SERPL-SCNC: 5.1 MMOL/L (ref 3.4–5.3)
POTASSIUM SERPL-SCNC: 5.1 MMOL/L (ref 3.4–5.3)
POTASSIUM SERPL-SCNC: 5.5 MMOL/L (ref 3.4–5.3)
POTASSIUM SERPL-SCNC: 5.6 MMOL/L (ref 3.5–5.1)
POTASSIUM SERPL-SCNC: 5.7 MMOL/L (ref 3.4–5.3)
POTASSIUM SERPL-SCNC: 5.7 MMOL/L (ref 3.4–5.3)
POTASSIUM SERPL-SCNC: 5.9 MMOL/L (ref 3.4–5.3)
POTASSIUM SERPL-SCNC: ABNORMAL MMOL/L (ref 3.4–5.3)
POTASSIUM SERPL-SCNC: NORMAL MMOL/L (ref 3.4–5.3)
PRA SINGLE ANTIGEN IGG ANTIBODY: NORMAL
PREALB SERPL IA-MCNC: 20 MG/DL (ref 15–45)
PREALB SERPL IA-MCNC: 22 MG/DL (ref 15–45)
PREALB SERPL IA-MCNC: 23 MG/DL (ref 15–45)
PROCALCITONIN SERPL-MCNC: 0.57 NG/ML
PROCALCITONIN SERPL-MCNC: 0.65 NG/ML
PROCALCITONIN SERPL-MCNC: NORMAL NG/ML
PROT SERPL-MCNC: 5.8 G/DL (ref 6.8–8.8)
PROT SERPL-MCNC: 6.1 G/DL (ref 6.8–8.8)
PROT SERPL-MCNC: 6.1 G/DL (ref 6.8–8.8)
PROT SERPL-MCNC: 6.5 G/DL (ref 6.8–8.8)
PROT SERPL-MCNC: 6.6 G/DL (ref 6.8–8.8)
PROT SERPL-MCNC: 6.7 G/DL (ref 6.8–8.8)
PROT SERPL-MCNC: 6.8 G/DL (ref 6.8–8.8)
PROT SERPL-MCNC: 6.8 G/DL (ref 6.8–8.8)
PROT SERPL-MCNC: 6.9 G/DL (ref 6.8–8.8)
PROT SERPL-MCNC: 7 G/DL (ref 6.8–8.8)
PROT SERPL-MCNC: 7.1 G/DL (ref 6.8–8.8)
PROT SERPL-MCNC: 7.2 G/DL (ref 6.8–8.8)
PROT SERPL-MCNC: 7.3 G/DL (ref 6.8–8.8)
PROT SERPL-MCNC: 7.4 G/DL (ref 6.8–8.8)
PROT SERPL-MCNC: 7.5 G/DL (ref 6.8–8.8)
PROT SERPL-MCNC: 7.6 G/DL (ref 6.8–8.8)
PROT SERPL-MCNC: 7.6 G/DL (ref 6.8–8.8)
PROT SERPL-MCNC: 7.8 G/DL (ref 6.8–8.8)
PROT SERPL-MCNC: 8 G/DL (ref 6.8–8.8)
PROTOCOL CUTOFF: NORMAL
PSA SERPL-MCNC: 1.4 UG/L (ref 0–4)
PTH-INTACT SERPL-MCNC: 90 PG/ML (ref 12–72)
RADIOLOGIST FLAGS: ABNORMAL
RADIOLOGIST FLAGS: NORMAL
RBC # BLD AUTO: 2.83 10E12/L (ref 4.4–5.9)
RBC # BLD AUTO: 3.01 10E12/L (ref 4.4–5.9)
RBC # BLD AUTO: 3.03 10E12/L (ref 4.4–5.9)
RBC # BLD AUTO: 3.06 10E12/L (ref 4.4–5.9)
RBC # BLD AUTO: 3.08 10E12/L (ref 4.4–5.9)
RBC # BLD AUTO: 3.11 10E12/L (ref 4.4–5.9)
RBC # BLD AUTO: 3.12 10E12/L (ref 4.4–5.9)
RBC # BLD AUTO: 3.13 10E12/L (ref 4.4–5.9)
RBC # BLD AUTO: 3.13 10E12/L (ref 4.4–5.9)
RBC # BLD AUTO: 3.14 10E12/L (ref 4.4–5.9)
RBC # BLD AUTO: 3.15 10E12/L (ref 4.4–5.9)
RBC # BLD AUTO: 3.15 10E12/L (ref 4.4–5.9)
RBC # BLD AUTO: 3.16 10E12/L (ref 4.4–5.9)
RBC # BLD AUTO: 3.16 10E12/L (ref 4.4–5.9)
RBC # BLD AUTO: 3.17 10E12/L (ref 4.4–5.9)
RBC # BLD AUTO: 3.18 10E12/L (ref 4.4–5.9)
RBC # BLD AUTO: 3.22 10E12/L (ref 4.4–5.9)
RBC # BLD AUTO: 3.25 10E12/L (ref 4.4–5.9)
RBC # BLD AUTO: 3.25 10E12/L (ref 4.4–5.9)
RBC # BLD AUTO: 3.26 10E12/L (ref 4.4–5.9)
RBC # BLD AUTO: 3.28 10E12/L (ref 4.4–5.9)
RBC # BLD AUTO: 3.29 10E12/L (ref 4.4–5.9)
RBC # BLD AUTO: 3.3 10E12/L (ref 4.4–5.9)
RBC # BLD AUTO: 3.32 10E12/L (ref 4.4–5.9)
RBC # BLD AUTO: 3.35 10E12/L (ref 4.4–5.9)
RBC # BLD AUTO: 3.4 10E12/L (ref 4.4–5.9)
RBC # BLD AUTO: 3.47 10E12/L (ref 4.4–5.9)
RBC # BLD AUTO: 3.52 10E12/L (ref 4.4–5.9)
RBC # BLD AUTO: 3.53 10^12/L
RBC # BLD AUTO: 3.54 10E12/L (ref 4.4–5.9)
RBC # BLD AUTO: 3.55 10E12/L (ref 4.4–5.9)
RBC # BLD AUTO: 3.63 10E12/L (ref 4.4–5.9)
RBC # BLD AUTO: 3.69 10E12/L (ref 4.4–5.9)
RBC # BLD AUTO: 3.7 10E12/L (ref 4.4–5.9)
RBC # BLD AUTO: 3.72 10E12/L (ref 4.4–5.9)
RBC # BLD AUTO: 3.78 10^12/L
RBC # BLD AUTO: 3.78 10^12/L
RBC # BLD AUTO: 3.89 10E12/L (ref 4.4–5.9)
RBC # BLD AUTO: 3.91 10E12/L (ref 4.4–5.9)
RBC # BLD AUTO: 3.91 10E12/L (ref 4.4–5.9)
RBC #/AREA URNS AUTO: 0 /HPF (ref 0–2)
RBC #/AREA URNS AUTO: <1 /HPF (ref 0–2)
SA1 CELL: NORMAL
SA1 COMMENTS: NORMAL
SA1 HI RISK ABY: NORMAL
SA1 MOD RISK ABY: NORMAL
SA1 TEST METHOD: NORMAL
SA2 CELL: NORMAL
SA2 COMMENTS: NORMAL
SA2 HI RISK ABY UA: NORMAL
SA2 MOD RISK ABY: NORMAL
SA2 TEST METHOD: NORMAL
SODIUM SERPL-SCNC: 118 MMOL/L (ref 133–144)
SODIUM SERPL-SCNC: 118 MMOL/L (ref 133–144)
SODIUM SERPL-SCNC: 120 MMOL/L (ref 133–144)
SODIUM SERPL-SCNC: 120 MMOL/L (ref 133–144)
SODIUM SERPL-SCNC: 121 MMOL/L (ref 133–144)
SODIUM SERPL-SCNC: 121 MMOL/L (ref 133–144)
SODIUM SERPL-SCNC: 122 MMOL/L (ref 133–144)
SODIUM SERPL-SCNC: 123 MMOL/L (ref 133–144)
SODIUM SERPL-SCNC: 123 MMOL/L (ref 133–144)
SODIUM SERPL-SCNC: 123 MMOL/L (ref 136–145)
SODIUM SERPL-SCNC: 124 MMOL/L (ref 133–144)
SODIUM SERPL-SCNC: 125 MMOL/L (ref 133–144)
SODIUM SERPL-SCNC: 126 MMOL/L (ref 133–144)
SODIUM SERPL-SCNC: 127 MMOL/L (ref 133–144)
SODIUM SERPL-SCNC: 128 MMOL/L (ref 133–144)
SODIUM SERPL-SCNC: 128 MMOL/L (ref 136–145)
SODIUM SERPL-SCNC: 129 MMOL/L (ref 133–144)
SODIUM SERPL-SCNC: 130 MMOL/L (ref 133–144)
SODIUM SERPL-SCNC: 131 MMOL/L (ref 133–144)
SODIUM SERPL-SCNC: 132 MMOL/L (ref 133–144)
SODIUM SERPL-SCNC: 133 MMOL/L
SODIUM SERPL-SCNC: 133 MMOL/L (ref 133–144)
SODIUM SERPL-SCNC: 134 MMOL/L (ref 133–144)
SODIUM SERPL-SCNC: 134 MMOL/L (ref 136–145)
SODIUM SERPL-SCNC: 135 MMOL/L (ref 133–144)
SODIUM SERPL-SCNC: 136 MMOL/L (ref 133–144)
SODIUM SERPL-SCNC: 137 MMOL/L (ref 133–144)
SODIUM SERPL-SCNC: 138 MMOL/L (ref 133–144)
SODIUM SERPL-SCNC: 139 MMOL/L (ref 133–144)
SODIUM SERPL-SCNC: 141 MMOL/L (ref 133–144)
SODIUM SERPL-SCNC: 142 MMOL/L (ref 133–144)
SODIUM SERPL-SCNC: 142 MMOL/L (ref 133–144)
SODIUM SERPL-SCNC: 143 MMOL/L (ref 133–144)
SODIUM SERPL-SCNC: 144 MMOL/L (ref 133–144)
SODIUM SERPL-SCNC: 144 MMOL/L (ref 133–144)
SODIUM SERPL-SCNC: NORMAL MMOL/L (ref 133–144)
SODIUM UR-SCNC: 25 MMOL/L
SP GR UR STRIP: 1.01 (ref 1–1.03)
SPECIMEN EXP DATE BLD: NORMAL
SPECIMEN SOURCE: ABNORMAL
SPECIMEN SOURCE: NORMAL
SQUAMOUS #/AREA URNS AUTO: <1 /HPF (ref 0–1)
T4 FREE SERPL-MCNC: 1.38 NG/DL (ref 0.76–1.46)
TIBC SERPL-MCNC: 353 UG/DL (ref 240–430)
TIBC SERPL-MCNC: 367 UG/DL (ref 240–430)
TRANSFERRIN SERPL-MCNC: 293 MG/DL (ref 210–360)
TROPONIN I SERPL-MCNC: 0.01 UG/L (ref 0–0.04)
TROPONIN I SERPL-MCNC: 0.02 UG/L (ref 0–0.04)
TSH SERPL DL<=0.005 MIU/L-ACNC: 0.12 MU/L (ref 0.4–4)
TSH SERPL DL<=0.005 MIU/L-ACNC: 2.15 MU/L (ref 0.4–4)
TSH SERPL DL<=0.005 MIU/L-ACNC: 3.93 MU/L (ref 0.4–4)
TSH SERPL DL<=0.05 MIU/L-ACNC: 1.99 MU/L (ref 0.4–4)
UNACCEPTABLE ANTIGEN: NORMAL
UNOS CPRA: 0
URATE SERPL-MCNC: 10.2 MG/DL (ref 3.5–7.2)
URATE SERPL-MCNC: 11 MG/DL (ref 3.5–7.2)
URATE SERPL-MCNC: 11.8 MG/DL (ref 3.5–7.2)
URATE SERPL-MCNC: 7.9 MG/DL (ref 3.5–7.2)
URN SPEC COLLECT METH UR: ABNORMAL
URN SPEC COLLECT METH UR: NORMAL
UROBILINOGEN UR STRIP-MCNC: NORMAL MG/DL (ref 0–2)
UUN UR-MCNC: 315 MG/DL
UUN/CREAT 24H UR: 9 G/G CR
VANCOMYCIN SERPL-MCNC: 16 MG/L
VIT B12 SERPL-MCNC: 1630 PG/ML (ref 193–986)
WBC # BLD AUTO: 10.1 10E9/L (ref 4–11)
WBC # BLD AUTO: 4.4 10E9/L (ref 4–11)
WBC # BLD AUTO: 4.8 10E9/L (ref 4–11)
WBC # BLD AUTO: 5 10E9/L (ref 4–11)
WBC # BLD AUTO: 5.2 10E9/L (ref 4–11)
WBC # BLD AUTO: 5.3 10E9/L (ref 4–11)
WBC # BLD AUTO: 5.3 10E9/L (ref 4–11)
WBC # BLD AUTO: 5.4 10E9/L (ref 4–11)
WBC # BLD AUTO: 5.6 10E9/L (ref 4–11)
WBC # BLD AUTO: 5.9 10E9/L (ref 4–11)
WBC # BLD AUTO: 6 10E9/L (ref 4–11)
WBC # BLD AUTO: 6.1 10E9/L (ref 4–11)
WBC # BLD AUTO: 6.2 10E9/L (ref 4–11)
WBC # BLD AUTO: 6.2 10^9/L
WBC # BLD AUTO: 6.2 10^9/L
WBC # BLD AUTO: 6.3 10E9/L (ref 4–11)
WBC # BLD AUTO: 6.3 10E9/L (ref 4–11)
WBC # BLD AUTO: 6.4 10E9/L (ref 4–11)
WBC # BLD AUTO: 6.5 10E9/L (ref 4–11)
WBC # BLD AUTO: 6.7 10E9/L (ref 4–11)
WBC # BLD AUTO: 6.7 10E9/L (ref 4–11)
WBC # BLD AUTO: 6.9 10E9/L (ref 4–11)
WBC # BLD AUTO: 7.1 10E9/L (ref 4–11)
WBC # BLD AUTO: 7.1 10E9/L (ref 4–11)
WBC # BLD AUTO: 7.2 10E9/L (ref 4–11)
WBC # BLD AUTO: 7.2 10E9/L (ref 4–11)
WBC # BLD AUTO: 7.3 10E9/L (ref 4–11)
WBC # BLD AUTO: 7.4 10E9/L (ref 4–11)
WBC # BLD AUTO: 7.6 10E9/L (ref 4–11)
WBC # BLD AUTO: 7.7 10E9/L (ref 4–11)
WBC # BLD AUTO: 7.7 10^9/L
WBC # BLD AUTO: 7.8 10E9/L (ref 4–11)
WBC # BLD AUTO: 7.8 10E9/L (ref 4–11)
WBC # BLD AUTO: 8.1 10E9/L (ref 4–11)
WBC # BLD AUTO: 8.1 10E9/L (ref 4–11)
WBC # BLD AUTO: 8.5 10E9/L (ref 4–11)
WBC # BLD AUTO: 8.6 10E9/L (ref 4–11)
WBC # BLD AUTO: 8.6 10E9/L (ref 4–11)
WBC # BLD AUTO: 8.7 10E9/L (ref 4–11)
WBC # BLD AUTO: 9.2 10E9/L (ref 4–11)
WBC # BLD AUTO: 9.3 10E9/L (ref 4–11)
WBC #/AREA URNS AUTO: 0 /HPF (ref 0–2)
WBC #/AREA URNS AUTO: 1 /HPF (ref 0–2)
WBC #/AREA URNS AUTO: 5 /HPF (ref 0–2)
WBC #/AREA URNS AUTO: <1 /HPF (ref 0–2)

## 2017-01-01 PROCEDURE — 25000132 ZZH RX MED GY IP 250 OP 250 PS 637: Performed by: INTERNAL MEDICINE

## 2017-01-01 PROCEDURE — 80048 BASIC METABOLIC PNL TOTAL CA: CPT | Performed by: INTERNAL MEDICINE

## 2017-01-01 PROCEDURE — 82805 BLOOD GASES W/O2 SATURATION: CPT | Performed by: INTERNAL MEDICINE

## 2017-01-01 PROCEDURE — 21400006 ZZH R&B CCU INTERMEDIATE UMMC

## 2017-01-01 PROCEDURE — 99207 ZZC DROP WITH A PROCEDURE: CPT

## 2017-01-01 PROCEDURE — 40000196 ZZH STATISTIC RAPCV CVP MONITORING

## 2017-01-01 PROCEDURE — 97140 MANUAL THERAPY 1/> REGIONS: CPT | Mod: GO

## 2017-01-01 PROCEDURE — 40000133 ZZH STATISTIC OT WARD VISIT: Performed by: OCCUPATIONAL THERAPIST

## 2017-01-01 PROCEDURE — S0169 CALCITROL: HCPCS | Performed by: STUDENT IN AN ORGANIZED HEALTH CARE EDUCATION/TRAINING PROGRAM

## 2017-01-01 PROCEDURE — 87186 SC STD MICRODIL/AGAR DIL: CPT | Performed by: STUDENT IN AN ORGANIZED HEALTH CARE EDUCATION/TRAINING PROGRAM

## 2017-01-01 PROCEDURE — 83935 ASSAY OF URINE OSMOLALITY: CPT | Performed by: INTERNAL MEDICINE

## 2017-01-01 PROCEDURE — 71010 XR CHEST PORT 1 VW: CPT

## 2017-01-01 PROCEDURE — 84132 ASSAY OF SERUM POTASSIUM: CPT | Performed by: INTERNAL MEDICINE

## 2017-01-01 PROCEDURE — 20000004 ZZH R&B ICU UMMC

## 2017-01-01 PROCEDURE — 25000128 H RX IP 250 OP 636: Performed by: INTERNAL MEDICINE

## 2017-01-01 PROCEDURE — 25000125 ZZHC RX 250: Performed by: STUDENT IN AN ORGANIZED HEALTH CARE EDUCATION/TRAINING PROGRAM

## 2017-01-01 PROCEDURE — 82810 BLOOD GASES O2 SAT ONLY: CPT | Performed by: INTERNAL MEDICINE

## 2017-01-01 PROCEDURE — 83735 ASSAY OF MAGNESIUM: CPT | Performed by: INTERNAL MEDICINE

## 2017-01-01 PROCEDURE — 25000132 ZZH RX MED GY IP 250 OP 250 PS 637: Performed by: STUDENT IN AN ORGANIZED HEALTH CARE EDUCATION/TRAINING PROGRAM

## 2017-01-01 PROCEDURE — 25000128 H RX IP 250 OP 636: Performed by: STUDENT IN AN ORGANIZED HEALTH CARE EDUCATION/TRAINING PROGRAM

## 2017-01-01 PROCEDURE — 40000048 ZZH STATISTIC DAILY SWAN MONITORING

## 2017-01-01 PROCEDURE — 80053 COMPREHEN METABOLIC PANEL: CPT | Performed by: STUDENT IN AN ORGANIZED HEALTH CARE EDUCATION/TRAINING PROGRAM

## 2017-01-01 PROCEDURE — 87641 MR-STAPH DNA AMP PROBE: CPT | Performed by: INTERNAL MEDICINE

## 2017-01-01 PROCEDURE — 40000940 XR CHEST PORT 1 VW

## 2017-01-01 PROCEDURE — 86832 HLA CLASS I HIGH DEFIN QUAL: CPT | Performed by: INTERNAL MEDICINE

## 2017-01-01 PROCEDURE — 25000125 ZZHC RX 250: Performed by: EMERGENCY MEDICINE

## 2017-01-01 PROCEDURE — 93568 NJX CAR CTH NSLC P-ART ANGRP: CPT | Mod: GC | Performed by: INTERNAL MEDICINE

## 2017-01-01 PROCEDURE — 99238 HOSP IP/OBS DSCHRG MGMT 30/<: CPT | Mod: GC | Performed by: INTERNAL MEDICINE

## 2017-01-01 PROCEDURE — 36415 COLL VENOUS BLD VENIPUNCTURE: CPT | Performed by: INTERNAL MEDICINE

## 2017-01-01 PROCEDURE — 93451 RIGHT HEART CATH: CPT | Mod: 26 | Performed by: INTERNAL MEDICINE

## 2017-01-01 PROCEDURE — 93295 DEV INTERROG REMOTE 1/2/MLT: CPT | Performed by: INTERNAL MEDICINE

## 2017-01-01 PROCEDURE — 99214 OFFICE O/P EST MOD 30 MIN: CPT | Performed by: NURSE PRACTITIONER

## 2017-01-01 PROCEDURE — 40000275 ZZH STATISTIC RCP TIME EA 10 MIN

## 2017-01-01 PROCEDURE — 83735 ASSAY OF MAGNESIUM: CPT | Performed by: STUDENT IN AN ORGANIZED HEALTH CARE EDUCATION/TRAINING PROGRAM

## 2017-01-01 PROCEDURE — 83605 ASSAY OF LACTIC ACID: CPT | Performed by: INTERNAL MEDICINE

## 2017-01-01 PROCEDURE — 85610 PROTHROMBIN TIME: CPT | Performed by: INTERNAL MEDICINE

## 2017-01-01 PROCEDURE — 85520 HEPARIN ASSAY: CPT | Performed by: INTERNAL MEDICINE

## 2017-01-01 PROCEDURE — 99212 OFFICE O/P EST SF 10 MIN: CPT | Mod: ZF

## 2017-01-01 PROCEDURE — 99291 CRITICAL CARE FIRST HOUR: CPT | Mod: 25 | Performed by: INTERNAL MEDICINE

## 2017-01-01 PROCEDURE — 81001 URINALYSIS AUTO W/SCOPE: CPT | Performed by: STUDENT IN AN ORGANIZED HEALTH CARE EDUCATION/TRAINING PROGRAM

## 2017-01-01 PROCEDURE — 25500064 ZZH RX 255 OP 636: Performed by: INTERNAL MEDICINE

## 2017-01-01 PROCEDURE — 40000116 ZZH STATISTIC OP CR VISIT

## 2017-01-01 PROCEDURE — 27210195 ZZH KIT POWER PICC DOUBLE LUMEN

## 2017-01-01 PROCEDURE — 25000132 ZZH RX MED GY IP 250 OP 250 PS 637: Performed by: EMERGENCY MEDICINE

## 2017-01-01 PROCEDURE — 99214 OFFICE O/P EST MOD 30 MIN: CPT | Mod: 24 | Performed by: NURSE PRACTITIONER

## 2017-01-01 PROCEDURE — 93005 ELECTROCARDIOGRAM TRACING: CPT

## 2017-01-01 PROCEDURE — 25000125 ZZHC RX 250: Performed by: INTERNAL MEDICINE

## 2017-01-01 PROCEDURE — 27210795 ZZH PAD DEFIB QUICK CR4

## 2017-01-01 PROCEDURE — 85027 COMPLETE CBC AUTOMATED: CPT | Performed by: INTERNAL MEDICINE

## 2017-01-01 PROCEDURE — 40000116 ZZH STATISTIC OP CR VISIT: Performed by: OCCUPATIONAL THERAPIST

## 2017-01-01 PROCEDURE — 80048 BASIC METABOLIC PNL TOTAL CA: CPT | Performed by: STUDENT IN AN ORGANIZED HEALTH CARE EDUCATION/TRAINING PROGRAM

## 2017-01-01 PROCEDURE — 40000802 ZZH SITE CHECK

## 2017-01-01 PROCEDURE — C1894 INTRO/SHEATH, NON-LASER: HCPCS

## 2017-01-01 PROCEDURE — 85610 PROTHROMBIN TIME: CPT | Performed by: STUDENT IN AN ORGANIZED HEALTH CARE EDUCATION/TRAINING PROGRAM

## 2017-01-01 PROCEDURE — 93010 ELECTROCARDIOGRAM REPORT: CPT | Performed by: INTERNAL MEDICINE

## 2017-01-01 PROCEDURE — 97530 THERAPEUTIC ACTIVITIES: CPT | Mod: GO

## 2017-01-01 PROCEDURE — 40000269 ZZH STATISTIC NO CHARGE FACILITY FEE

## 2017-01-01 PROCEDURE — 84100 ASSAY OF PHOSPHORUS: CPT | Performed by: INTERNAL MEDICINE

## 2017-01-01 PROCEDURE — 97530 THERAPEUTIC ACTIVITIES: CPT | Mod: GO | Performed by: OCCUPATIONAL THERAPIST

## 2017-01-01 PROCEDURE — 84100 ASSAY OF PHOSPHORUS: CPT | Performed by: STUDENT IN AN ORGANIZED HEALTH CARE EDUCATION/TRAINING PROGRAM

## 2017-01-01 PROCEDURE — 97110 THERAPEUTIC EXERCISES: CPT | Mod: GO

## 2017-01-01 PROCEDURE — 40000141 ZZH STATISTIC PERIPHERAL IV START W/O US GUIDANCE

## 2017-01-01 PROCEDURE — S0169 CALCITROL: HCPCS | Performed by: INTERNAL MEDICINE

## 2017-01-01 PROCEDURE — 97165 OT EVAL LOW COMPLEX 30 MIN: CPT | Mod: GO | Performed by: OCCUPATIONAL THERAPIST

## 2017-01-01 PROCEDURE — 85025 COMPLETE CBC W/AUTO DIFF WBC: CPT | Performed by: INTERNAL MEDICINE

## 2017-01-01 PROCEDURE — 36415 COLL VENOUS BLD VENIPUNCTURE: CPT | Performed by: NURSE PRACTITIONER

## 2017-01-01 PROCEDURE — 99152 MOD SED SAME PHYS/QHP 5/>YRS: CPT | Mod: GC | Performed by: INTERNAL MEDICINE

## 2017-01-01 PROCEDURE — 12000007 ZZH R&B INTERMEDIATE

## 2017-01-01 PROCEDURE — 93451 RIGHT HEART CATH: CPT

## 2017-01-01 PROCEDURE — 82805 BLOOD GASES W/O2 SATURATION: CPT | Performed by: STUDENT IN AN ORGANIZED HEALTH CARE EDUCATION/TRAINING PROGRAM

## 2017-01-01 PROCEDURE — 81001 URINALYSIS AUTO W/SCOPE: CPT | Performed by: EMERGENCY MEDICINE

## 2017-01-01 PROCEDURE — 82610 CYSTATIN C: CPT

## 2017-01-01 PROCEDURE — 99204 OFFICE O/P NEW MOD 45 MIN: CPT | Performed by: INTERNAL MEDICINE

## 2017-01-01 PROCEDURE — 97110 THERAPEUTIC EXERCISES: CPT | Mod: GO | Performed by: OCCUPATIONAL THERAPIST

## 2017-01-01 PROCEDURE — 96360 HYDRATION IV INFUSION INIT: CPT

## 2017-01-01 PROCEDURE — 80048 BASIC METABOLIC PNL TOTAL CA: CPT | Performed by: NURSE PRACTITIONER

## 2017-01-01 PROCEDURE — 99233 SBSQ HOSP IP/OBS HIGH 50: CPT | Performed by: INTERNAL MEDICINE

## 2017-01-01 PROCEDURE — 36600 WITHDRAWAL OF ARTERIAL BLOOD: CPT

## 2017-01-01 PROCEDURE — 99233 SBSQ HOSP IP/OBS HIGH 50: CPT | Mod: GC | Performed by: INTERNAL MEDICINE

## 2017-01-01 PROCEDURE — 81370 HLA I & II TYPING LR: CPT | Performed by: INTERNAL MEDICINE

## 2017-01-01 PROCEDURE — 40000076 ZZH STATISTIC IABP MONITORING

## 2017-01-01 PROCEDURE — 99214 OFFICE O/P EST MOD 30 MIN: CPT | Mod: ZP | Performed by: NURSE PRACTITIONER

## 2017-01-01 PROCEDURE — 93325 DOPPLER ECHO COLOR FLOW MAPG: CPT | Mod: 26 | Performed by: INTERNAL MEDICINE

## 2017-01-01 PROCEDURE — 4A023N6 MEASUREMENT OF CARDIAC SAMPLING AND PRESSURE, RIGHT HEART, PERCUTANEOUS APPROACH: ICD-10-PCS | Performed by: INTERNAL MEDICINE

## 2017-01-01 PROCEDURE — 25000128 H RX IP 250 OP 636

## 2017-01-01 PROCEDURE — 83735 ASSAY OF MAGNESIUM: CPT

## 2017-01-01 PROCEDURE — 87070 CULTURE OTHR SPECIMN AEROBIC: CPT | Performed by: STUDENT IN AN ORGANIZED HEALTH CARE EDUCATION/TRAINING PROGRAM

## 2017-01-01 PROCEDURE — 83550 IRON BINDING TEST: CPT

## 2017-01-01 PROCEDURE — 27210982 ZZH KIT RT HC TOTES DISP CR7

## 2017-01-01 PROCEDURE — S0139 MINOXIDIL, 10 MG: HCPCS | Performed by: STUDENT IN AN ORGANIZED HEALTH CARE EDUCATION/TRAINING PROGRAM

## 2017-01-01 PROCEDURE — 93798 PHYS/QHP OP CAR RHAB W/ECG: CPT | Performed by: OCCUPATIONAL THERAPIST

## 2017-01-01 PROCEDURE — 83735 ASSAY OF MAGNESIUM: CPT | Performed by: EMERGENCY MEDICINE

## 2017-01-01 PROCEDURE — 93798 PHYS/QHP OP CAR RHAB W/ECG: CPT

## 2017-01-01 PROCEDURE — 85730 THROMBOPLASTIN TIME PARTIAL: CPT | Performed by: INTERNAL MEDICINE

## 2017-01-01 PROCEDURE — 25000128 H RX IP 250 OP 636: Performed by: EMERGENCY MEDICINE

## 2017-01-01 PROCEDURE — 93010 ELECTROCARDIOGRAM REPORT: CPT | Mod: Z6 | Performed by: EMERGENCY MEDICINE

## 2017-01-01 PROCEDURE — 71020 XR CHEST 2 VW: CPT

## 2017-01-01 PROCEDURE — 00000146 ZZHCL STATISTIC GLUCOSE BY METER IP

## 2017-01-01 PROCEDURE — 83880 ASSAY OF NATRIURETIC PEPTIDE: CPT

## 2017-01-01 PROCEDURE — 99211 OFF/OP EST MAY X REQ PHY/QHP: CPT

## 2017-01-01 PROCEDURE — 25000125 ZZHC RX 250: Performed by: NURSE PRACTITIONER

## 2017-01-01 PROCEDURE — 86850 RBC ANTIBODY SCREEN: CPT | Performed by: INTERNAL MEDICINE

## 2017-01-01 PROCEDURE — 40000575 ZZH STATISTIC OP CARDIAC VISIT #2: Performed by: OCCUPATIONAL THERAPIST

## 2017-01-01 PROCEDURE — 86901 BLOOD TYPING SEROLOGIC RH(D): CPT | Performed by: INTERNAL MEDICINE

## 2017-01-01 PROCEDURE — 99291 CRITICAL CARE FIRST HOUR: CPT | Mod: GC | Performed by: INTERNAL MEDICINE

## 2017-01-01 PROCEDURE — 80053 COMPREHEN METABOLIC PANEL: CPT | Performed by: INTERNAL MEDICINE

## 2017-01-01 PROCEDURE — 99285 EMERGENCY DEPT VISIT HI MDM: CPT | Mod: Z6 | Performed by: EMERGENCY MEDICINE

## 2017-01-01 PROCEDURE — 85049 AUTOMATED PLATELET COUNT: CPT | Performed by: INTERNAL MEDICINE

## 2017-01-01 PROCEDURE — 71010 XR CHEST 1 VW: CPT

## 2017-01-01 PROCEDURE — 96374 THER/PROPH/DIAG INJ IV PUSH: CPT

## 2017-01-01 PROCEDURE — 80320 DRUG SCREEN QUANTALCOHOLS: CPT | Performed by: NURSE PRACTITIONER

## 2017-01-01 PROCEDURE — 99232 SBSQ HOSP IP/OBS MODERATE 35: CPT | Mod: GC | Performed by: INTERNAL MEDICINE

## 2017-01-01 PROCEDURE — 40000065 ZZH STATISTIC EKG NON-CHARGEABLE

## 2017-01-01 PROCEDURE — 25000125 ZZHC RX 250: Performed by: HOSPITALIST

## 2017-01-01 PROCEDURE — 40000116 ZZH STATISTIC OP CR VISIT: Performed by: REHABILITATION PRACTITIONER

## 2017-01-01 PROCEDURE — 85025 COMPLETE CBC W/AUTO DIFF WBC: CPT

## 2017-01-01 PROCEDURE — 99285 EMERGENCY DEPT VISIT HI MDM: CPT | Mod: 25

## 2017-01-01 PROCEDURE — C9741 IMPL PRESSURE SENSOR W/ANGIO: HCPCS

## 2017-01-01 PROCEDURE — 81001 URINALYSIS AUTO W/SCOPE: CPT

## 2017-01-01 PROCEDURE — 25900017 H RX MED GY IP 259 OP 259 PS 637: Performed by: INTERNAL MEDICINE

## 2017-01-01 PROCEDURE — 40000133 ZZH STATISTIC OT WARD VISIT

## 2017-01-01 PROCEDURE — 76700 US EXAM ABDOM COMPLETE: CPT

## 2017-01-01 PROCEDURE — 40000558 ZZH STATISTIC CVC DRESSING CHANGE

## 2017-01-01 PROCEDURE — 83540 ASSAY OF IRON: CPT

## 2017-01-01 PROCEDURE — 99213 OFFICE O/P EST LOW 20 MIN: CPT | Mod: ZF

## 2017-01-01 PROCEDURE — 27211315 ZZ H KIT, BLADDER PRESSURE

## 2017-01-01 PROCEDURE — 40000852 ZZH STATISTIC HEART CATH LAB OR EP LAB

## 2017-01-01 PROCEDURE — 83880 ASSAY OF NATRIURETIC PEPTIDE: CPT | Performed by: NURSE PRACTITIONER

## 2017-01-01 PROCEDURE — 09CN8ZZ EXTIRPATION OF MATTER FROM NASOPHARYNX, VIA NATURAL OR ARTIFICIAL OPENING ENDOSCOPIC: ICD-10-PCS | Performed by: OTOLARYNGOLOGY

## 2017-01-01 PROCEDURE — 97535 SELF CARE MNGMENT TRAINING: CPT | Mod: GO

## 2017-01-01 PROCEDURE — 99605 MTMS BY PHARM NP 15 MIN: CPT | Performed by: PHARMACIST

## 2017-01-01 PROCEDURE — 85027 COMPLETE CBC AUTOMATED: CPT | Performed by: STUDENT IN AN ORGANIZED HEALTH CARE EDUCATION/TRAINING PROGRAM

## 2017-01-01 PROCEDURE — 93308 TTE F-UP OR LMTD: CPT | Mod: 26 | Performed by: INTERNAL MEDICINE

## 2017-01-01 PROCEDURE — 93308 TTE F-UP OR LMTD: CPT

## 2017-01-01 PROCEDURE — 93503 INSERT/PLACE HEART CATHETER: CPT

## 2017-01-01 PROCEDURE — 93320 DOPPLER ECHO COMPLETE: CPT | Mod: 26 | Performed by: INTERNAL MEDICINE

## 2017-01-01 PROCEDURE — 74000 XR ABDOMEN PORT F1 VW: CPT

## 2017-01-01 PROCEDURE — 76705 ECHO EXAM OF ABDOMEN: CPT

## 2017-01-01 PROCEDURE — 85025 COMPLETE CBC W/AUTO DIFF WBC: CPT | Performed by: EMERGENCY MEDICINE

## 2017-01-01 PROCEDURE — 93922 UPR/L XTREMITY ART 2 LEVELS: CPT

## 2017-01-01 PROCEDURE — 86900 BLOOD TYPING SEROLOGIC ABO: CPT | Performed by: INTERNAL MEDICINE

## 2017-01-01 PROCEDURE — 82533 TOTAL CORTISOL: CPT | Performed by: INTERNAL MEDICINE

## 2017-01-01 PROCEDURE — 00000401 ZZHCL STATISTIC THROMBIN TIME NC

## 2017-01-01 PROCEDURE — S0171 BUMETANIDE 0.5 MG: HCPCS | Performed by: STUDENT IN AN ORGANIZED HEALTH CARE EDUCATION/TRAINING PROGRAM

## 2017-01-01 PROCEDURE — 84439 ASSAY OF FREE THYROXINE: CPT | Performed by: INTERNAL MEDICINE

## 2017-01-01 PROCEDURE — 85018 HEMOGLOBIN: CPT | Performed by: STUDENT IN AN ORGANIZED HEALTH CARE EDUCATION/TRAINING PROGRAM

## 2017-01-01 PROCEDURE — 27211181 ZZH BALLOON TIP PRESSURE CR5

## 2017-01-01 PROCEDURE — 82728 ASSAY OF FERRITIN: CPT | Performed by: INTERNAL MEDICINE

## 2017-01-01 PROCEDURE — 94002 VENT MGMT INPAT INIT DAY: CPT

## 2017-01-01 PROCEDURE — 84134 ASSAY OF PREALBUMIN: CPT | Performed by: STUDENT IN AN ORGANIZED HEALTH CARE EDUCATION/TRAINING PROGRAM

## 2017-01-01 PROCEDURE — 82803 BLOOD GASES ANY COMBINATION: CPT | Performed by: INTERNAL MEDICINE

## 2017-01-01 PROCEDURE — 84466 ASSAY OF TRANSFERRIN: CPT

## 2017-01-01 PROCEDURE — 86850 RBC ANTIBODY SCREEN: CPT

## 2017-01-01 PROCEDURE — 99284 EMERGENCY DEPT VISIT MOD MDM: CPT | Mod: 25

## 2017-01-01 PROCEDURE — 87077 CULTURE AEROBIC IDENTIFY: CPT | Performed by: STUDENT IN AN ORGANIZED HEALTH CARE EDUCATION/TRAINING PROGRAM

## 2017-01-01 PROCEDURE — 40000986 XR CHEST PORT 1 VW

## 2017-01-01 PROCEDURE — 25000132 ZZH RX MED GY IP 250 OP 250 PS 637: Performed by: HOSPITALIST

## 2017-01-01 PROCEDURE — 99215 OFFICE O/P EST HI 40 MIN: CPT | Mod: 24 | Performed by: NURSE PRACTITIONER

## 2017-01-01 PROCEDURE — 84132 ASSAY OF SERUM POTASSIUM: CPT | Performed by: STUDENT IN AN ORGANIZED HEALTH CARE EDUCATION/TRAINING PROGRAM

## 2017-01-01 PROCEDURE — 71250 CT THORAX DX C-: CPT

## 2017-01-01 PROCEDURE — 87040 BLOOD CULTURE FOR BACTERIA: CPT | Performed by: INTERNAL MEDICINE

## 2017-01-01 PROCEDURE — 27210140 ZZH KIT CATH SWAN VIP SUPPLY

## 2017-01-01 PROCEDURE — 85610 PROTHROMBIN TIME: CPT | Performed by: NURSE PRACTITIONER

## 2017-01-01 PROCEDURE — 80202 ASSAY OF VANCOMYCIN: CPT | Performed by: INTERNAL MEDICINE

## 2017-01-01 PROCEDURE — 33967 INSERT I-AORT PERCUT DEVICE: CPT

## 2017-01-01 PROCEDURE — 87088 URINE BACTERIA CULTURE: CPT | Performed by: INTERNAL MEDICINE

## 2017-01-01 PROCEDURE — 36569 INSJ PICC 5 YR+ W/O IMAGING: CPT

## 2017-01-01 PROCEDURE — 27210788 ZZH MANIFOLD CR3

## 2017-01-01 PROCEDURE — C1769 GUIDE WIRE: HCPCS

## 2017-01-01 PROCEDURE — 27210305 ZZH CATH BALLOON IABP

## 2017-01-01 PROCEDURE — 84540 ASSAY OF URINE/UREA-N: CPT | Performed by: STUDENT IN AN ORGANIZED HEALTH CARE EDUCATION/TRAINING PROGRAM

## 2017-01-01 PROCEDURE — 85027 COMPLETE CBC AUTOMATED: CPT | Performed by: EMERGENCY MEDICINE

## 2017-01-01 PROCEDURE — 27210437 ZZH NUTRITION PRODUCT SEMIELEM INTERMED LITER

## 2017-01-01 PROCEDURE — 40000281 ZZH STATISTIC TRANSPORT TIME EA 15 MIN

## 2017-01-01 PROCEDURE — S0139 MINOXIDIL, 10 MG: HCPCS | Performed by: INTERNAL MEDICINE

## 2017-01-01 PROCEDURE — 84153 ASSAY OF PSA TOTAL: CPT | Performed by: STUDENT IN AN ORGANIZED HEALTH CARE EDUCATION/TRAINING PROGRAM

## 2017-01-01 PROCEDURE — 99232 SBSQ HOSP IP/OBS MODERATE 35: CPT | Performed by: INTERNAL MEDICINE

## 2017-01-01 PROCEDURE — 84484 ASSAY OF TROPONIN QUANT: CPT | Performed by: EMERGENCY MEDICINE

## 2017-01-01 PROCEDURE — 80053 COMPREHEN METABOLIC PANEL: CPT | Performed by: EMERGENCY MEDICINE

## 2017-01-01 PROCEDURE — 82607 VITAMIN B-12: CPT

## 2017-01-01 PROCEDURE — 84145 PROCALCITONIN (PCT): CPT | Performed by: INTERNAL MEDICINE

## 2017-01-01 PROCEDURE — 36415 COLL VENOUS BLD VENIPUNCTURE: CPT | Performed by: STUDENT IN AN ORGANIZED HEALTH CARE EDUCATION/TRAINING PROGRAM

## 2017-01-01 PROCEDURE — 96374 THER/PROPH/DIAG INJ IV PUSH: CPT | Performed by: EMERGENCY MEDICINE

## 2017-01-01 PROCEDURE — 99207 ZZC MOONLIGHTING INDICATOR: CPT | Performed by: INTERNAL MEDICINE

## 2017-01-01 PROCEDURE — 27210946 ZZH KIT HC TOTES DISP CR8

## 2017-01-01 PROCEDURE — 36592 COLLECT BLOOD FROM PICC: CPT

## 2017-01-01 PROCEDURE — 99221 1ST HOSP IP/OBS SF/LOW 40: CPT | Mod: GC | Performed by: INTERNAL MEDICINE

## 2017-01-01 PROCEDURE — 97166 OT EVAL MOD COMPLEX 45 MIN: CPT | Mod: GO | Performed by: OCCUPATIONAL THERAPIST

## 2017-01-01 PROCEDURE — 85025 COMPLETE CBC W/AUTO DIFF WBC: CPT | Performed by: STUDENT IN AN ORGANIZED HEALTH CARE EDUCATION/TRAINING PROGRAM

## 2017-01-01 PROCEDURE — 80076 HEPATIC FUNCTION PANEL: CPT | Performed by: INTERNAL MEDICINE

## 2017-01-01 PROCEDURE — 99153 MOD SED SAME PHYS/QHP EA: CPT | Mod: GC | Performed by: INTERNAL MEDICINE

## 2017-01-01 PROCEDURE — 85613 RUSSELL VIPER VENOM DILUTED: CPT

## 2017-01-01 PROCEDURE — 40000264 ECHO LIMITED WITH OPTISON

## 2017-01-01 PROCEDURE — 80320 DRUG SCREEN QUANTALCOHOLS: CPT | Performed by: INTERNAL MEDICINE

## 2017-01-01 PROCEDURE — 76882 US LMTD JT/FCL EVL NVASC XTR: CPT | Mod: RT

## 2017-01-01 PROCEDURE — 83605 ASSAY OF LACTIC ACID: CPT | Performed by: STUDENT IN AN ORGANIZED HEALTH CARE EDUCATION/TRAINING PROGRAM

## 2017-01-01 PROCEDURE — S0171 BUMETANIDE 0.5 MG: HCPCS | Performed by: INTERNAL MEDICINE

## 2017-01-01 PROCEDURE — 40000986 XR ABDOMEN PORT F1 VW

## 2017-01-01 PROCEDURE — 83970 ASSAY OF PARATHORMONE: CPT | Performed by: INTERNAL MEDICINE

## 2017-01-01 PROCEDURE — 99153 MOD SED SAME PHYS/QHP EA: CPT

## 2017-01-01 PROCEDURE — 85025 COMPLETE CBC W/AUTO DIFF WBC: CPT | Performed by: NURSE PRACTITIONER

## 2017-01-01 PROCEDURE — 87186 SC STD MICRODIL/AGAR DIL: CPT | Performed by: INTERNAL MEDICINE

## 2017-01-01 PROCEDURE — 99214 OFFICE O/P EST MOD 30 MIN: CPT | Mod: 25 | Performed by: INTERNAL MEDICINE

## 2017-01-01 PROCEDURE — 40000671 ZZH STATISTIC ANESTHESIA CASE

## 2017-01-01 PROCEDURE — 99223 1ST HOSP IP/OBS HIGH 75: CPT | Mod: AI | Performed by: INTERNAL MEDICINE

## 2017-01-01 PROCEDURE — 84295 ASSAY OF SERUM SODIUM: CPT | Performed by: INTERNAL MEDICINE

## 2017-01-01 PROCEDURE — 97535 SELF CARE MNGMENT TRAINING: CPT | Mod: GO | Performed by: OCCUPATIONAL THERAPIST

## 2017-01-01 PROCEDURE — 81001 URINALYSIS AUTO W/SCOPE: CPT | Performed by: INTERNAL MEDICINE

## 2017-01-01 PROCEDURE — 99607 MTMS BY PHARM ADDL 15 MIN: CPT | Performed by: PHARMACIST

## 2017-01-01 PROCEDURE — 99214 OFFICE O/P EST MOD 30 MIN: CPT | Performed by: INTERNAL MEDICINE

## 2017-01-01 PROCEDURE — 87205 SMEAR GRAM STAIN: CPT | Performed by: STUDENT IN AN ORGANIZED HEALTH CARE EDUCATION/TRAINING PROGRAM

## 2017-01-01 PROCEDURE — 86147 CARDIOLIPIN ANTIBODY EA IG: CPT

## 2017-01-01 PROCEDURE — 85049 AUTOMATED PLATELET COUNT: CPT | Performed by: STUDENT IN AN ORGANIZED HEALTH CARE EDUCATION/TRAINING PROGRAM

## 2017-01-01 PROCEDURE — 97166 OT EVAL MOD COMPLEX 45 MIN: CPT | Mod: GO

## 2017-01-01 PROCEDURE — 87086 URINE CULTURE/COLONY COUNT: CPT | Performed by: INTERNAL MEDICINE

## 2017-01-01 PROCEDURE — 84443 ASSAY THYROID STIM HORMONE: CPT | Performed by: EMERGENCY MEDICINE

## 2017-01-01 PROCEDURE — 84443 ASSAY THYROID STIM HORMONE: CPT

## 2017-01-01 PROCEDURE — 71010 XR CHEST PORT 1 VW: CPT | Mod: 77

## 2017-01-01 PROCEDURE — 80048 BASIC METABOLIC PNL TOTAL CA: CPT | Performed by: EMERGENCY MEDICINE

## 2017-01-01 PROCEDURE — 83036 HEMOGLOBIN GLYCOSYLATED A1C: CPT

## 2017-01-01 PROCEDURE — 12001 RPR S/N/AX/GEN/TRNK 2.5CM/<: CPT

## 2017-01-01 PROCEDURE — 85730 THROMBOPLASTIN TIME PARTIAL: CPT

## 2017-01-01 PROCEDURE — 93005 ELECTROCARDIOGRAM TRACING: CPT | Performed by: EMERGENCY MEDICINE

## 2017-01-01 PROCEDURE — 44500 INTRO GASTROINTESTINAL TUBE: CPT | Performed by: DIETITIAN, REGISTERED

## 2017-01-01 PROCEDURE — 83051 HEMOGLOBIN PLASMA: CPT | Performed by: STUDENT IN AN ORGANIZED HEALTH CARE EDUCATION/TRAINING PROGRAM

## 2017-01-01 PROCEDURE — 02HR30Z INSERTION OF PRESSURE SENSOR MONITORING DEVICE INTO LEFT PULMONARY ARTERY, PERCUTANEOUS APPROACH: ICD-10-PCS | Performed by: INTERNAL MEDICINE

## 2017-01-01 PROCEDURE — 27210787 ZZH MANIFOLD CR2

## 2017-01-01 PROCEDURE — 25000128 H RX IP 250 OP 636: Performed by: NURSE PRACTITIONER

## 2017-01-01 PROCEDURE — 99205 OFFICE O/P NEW HI 60 MIN: CPT | Mod: GC | Performed by: INTERNAL MEDICINE

## 2017-01-01 PROCEDURE — 86140 C-REACTIVE PROTEIN: CPT | Performed by: INTERNAL MEDICINE

## 2017-01-01 PROCEDURE — 83930 ASSAY OF BLOOD OSMOLALITY: CPT | Performed by: EMERGENCY MEDICINE

## 2017-01-01 PROCEDURE — 83880 ASSAY OF NATRIURETIC PEPTIDE: CPT | Performed by: STUDENT IN AN ORGANIZED HEALTH CARE EDUCATION/TRAINING PROGRAM

## 2017-01-01 PROCEDURE — G0378 HOSPITAL OBSERVATION PER HR: HCPCS

## 2017-01-01 PROCEDURE — 40000081 ZZH STATISTIC INSERT IABP

## 2017-01-01 PROCEDURE — 99222 1ST HOSP IP/OBS MODERATE 55: CPT | Performed by: CLINICAL NURSE SPECIALIST

## 2017-01-01 PROCEDURE — 93798 PHYS/QHP OP CAR RHAB W/ECG: CPT | Performed by: REHABILITATION PRACTITIONER

## 2017-01-01 PROCEDURE — 25000128 H RX IP 250 OP 636: Performed by: HOSPITALIST

## 2017-01-01 PROCEDURE — 81376 HLA II TYPING 1 LOCUS LR: CPT | Performed by: INTERNAL MEDICINE

## 2017-01-01 PROCEDURE — 12000008 ZZH R&B INTERMEDIATE UMMC

## 2017-01-01 PROCEDURE — 93799 UNLISTED CV SVC/PROCEDURE: CPT | Performed by: INTERNAL MEDICINE

## 2017-01-01 PROCEDURE — 02HP32Z INSERTION OF MONITORING DEVICE INTO PULMONARY TRUNK, PERCUTANEOUS APPROACH: ICD-10-PCS | Performed by: INTERNAL MEDICINE

## 2017-01-01 PROCEDURE — 70450 CT HEAD/BRAIN W/O DYE: CPT

## 2017-01-01 PROCEDURE — 99239 HOSP IP/OBS DSCHRG MGMT >30: CPT | Mod: GC | Performed by: INTERNAL MEDICINE

## 2017-01-01 PROCEDURE — 99232 SBSQ HOSP IP/OBS MODERATE 35: CPT | Mod: 25 | Performed by: INTERNAL MEDICINE

## 2017-01-01 PROCEDURE — 27210805 ZZH SHEATH CR4

## 2017-01-01 PROCEDURE — 84100 ASSAY OF PHOSPHORUS: CPT

## 2017-01-01 PROCEDURE — 84550 ASSAY OF BLOOD/URIC ACID: CPT | Performed by: INTERNAL MEDICINE

## 2017-01-01 PROCEDURE — 99217 ZZC OBSERVATION CARE DISCHARGE: CPT | Performed by: INTERNAL MEDICINE

## 2017-01-01 PROCEDURE — 87077 CULTURE AEROBIC IDENTIFY: CPT | Performed by: INTERNAL MEDICINE

## 2017-01-01 PROCEDURE — 3E033XZ INTRODUCTION OF VASOPRESSOR INTO PERIPHERAL VEIN, PERCUTANEOUS APPROACH: ICD-10-PCS | Performed by: INTERNAL MEDICINE

## 2017-01-01 PROCEDURE — 93306 TTE W/DOPPLER COMPLETE: CPT | Mod: 26 | Performed by: INTERNAL MEDICINE

## 2017-01-01 PROCEDURE — 40000264 ECHO COMPLETE WITH LUMASON

## 2017-01-01 PROCEDURE — 99285 EMERGENCY DEPT VISIT HI MDM: CPT | Mod: 25 | Performed by: EMERGENCY MEDICINE

## 2017-01-01 PROCEDURE — 27210577 ZZ H INTRODUCER MICRO SET

## 2017-01-01 PROCEDURE — 80069 RENAL FUNCTION PANEL: CPT | Performed by: INTERNAL MEDICINE

## 2017-01-01 PROCEDURE — 84550 ASSAY OF BLOOD/URIC ACID: CPT

## 2017-01-01 PROCEDURE — 85014 HEMATOCRIT: CPT | Performed by: INTERNAL MEDICINE

## 2017-01-01 PROCEDURE — 87640 STAPH A DNA AMP PROBE: CPT | Performed by: INTERNAL MEDICINE

## 2017-01-01 PROCEDURE — 87205 SMEAR GRAM STAIN: CPT | Performed by: INTERNAL MEDICINE

## 2017-01-01 PROCEDURE — C2624 WIRELESS PRESSURE SENSOR: HCPCS

## 2017-01-01 PROCEDURE — 40000978 ZZH STATISTIC COMPARTMENT STUDY

## 2017-01-01 PROCEDURE — 93797 PHYS/QHP OP CAR RHAB WO ECG: CPT | Performed by: OCCUPATIONAL THERAPIST

## 2017-01-01 PROCEDURE — 86900 BLOOD TYPING SEROLOGIC ABO: CPT

## 2017-01-01 PROCEDURE — 93320 DOPPLER ECHO COMPLETE: CPT

## 2017-01-01 PROCEDURE — 94660 CPAP INITIATION&MGMT: CPT

## 2017-01-01 PROCEDURE — 85610 PROTHROMBIN TIME: CPT | Performed by: EMERGENCY MEDICINE

## 2017-01-01 PROCEDURE — 82040 ASSAY OF SERUM ALBUMIN: CPT | Performed by: INTERNAL MEDICINE

## 2017-01-01 PROCEDURE — 83880 ASSAY OF NATRIURETIC PEPTIDE: CPT | Performed by: EMERGENCY MEDICINE

## 2017-01-01 PROCEDURE — 99222 1ST HOSP IP/OBS MODERATE 55: CPT | Mod: 25 | Performed by: INTERNAL MEDICINE

## 2017-01-01 PROCEDURE — 96375 TX/PRO/DX INJ NEW DRUG ADDON: CPT

## 2017-01-01 PROCEDURE — 76000 FLUOROSCOPY <1 HR PHYS/QHP: CPT | Mod: TC

## 2017-01-01 PROCEDURE — 96523 IRRIG DRUG DELIVERY DEVICE: CPT

## 2017-01-01 PROCEDURE — 85610 PROTHROMBIN TIME: CPT

## 2017-01-01 PROCEDURE — 71010 XR CHEST PORT 1 VW: CPT | Mod: 76

## 2017-01-01 PROCEDURE — 93321 DOPPLER ECHO F-UP/LMTD STD: CPT | Mod: 26 | Performed by: INTERNAL MEDICINE

## 2017-01-01 PROCEDURE — 82728 ASSAY OF FERRITIN: CPT

## 2017-01-01 PROCEDURE — 99152 MOD SED SAME PHYS/QHP 5/>YRS: CPT

## 2017-01-01 PROCEDURE — 21000001 ZZH R&B HEART CARE

## 2017-01-01 PROCEDURE — 99207 ZZC CDG-CODE CATEGORY CHANGED: CPT | Performed by: INTERNAL MEDICINE

## 2017-01-01 PROCEDURE — 83550 IRON BINDING TEST: CPT | Performed by: INTERNAL MEDICINE

## 2017-01-01 PROCEDURE — 36592 COLLECT BLOOD FROM PICC: CPT | Performed by: STUDENT IN AN ORGANIZED HEALTH CARE EDUCATION/TRAINING PROGRAM

## 2017-01-01 PROCEDURE — 36592 COLLECT BLOOD FROM PICC: CPT | Performed by: NURSE PRACTITIONER

## 2017-01-01 PROCEDURE — 86833 HLA CLASS II HIGH DEFIN QUAL: CPT | Performed by: INTERNAL MEDICINE

## 2017-01-01 PROCEDURE — 93312 ECHO TRANSESOPHAGEAL: CPT | Mod: 26 | Performed by: INTERNAL MEDICINE

## 2017-01-01 PROCEDURE — 25000132 ZZH RX MED GY IP 250 OP 250 PS 637

## 2017-01-01 PROCEDURE — 93925 LOWER EXTREMITY STUDY: CPT

## 2017-01-01 PROCEDURE — 97168 OT RE-EVAL EST PLAN CARE: CPT | Mod: GO

## 2017-01-01 PROCEDURE — 3E043XZ INTRODUCTION OF VASOPRESSOR INTO CENTRAL VEIN, PERCUTANEOUS APPROACH: ICD-10-PCS | Performed by: INTERNAL MEDICINE

## 2017-01-01 PROCEDURE — 96365 THER/PROPH/DIAG IV INF INIT: CPT

## 2017-01-01 PROCEDURE — 0DHA7UZ INSERTION OF FEEDING DEVICE INTO JEJUNUM, VIA NATURAL OR ARTIFICIAL OPENING: ICD-10-PCS | Performed by: INTERNAL MEDICINE

## 2017-01-01 PROCEDURE — 99215 OFFICE O/P EST HI 40 MIN: CPT | Performed by: INTERNAL MEDICINE

## 2017-01-01 PROCEDURE — 33967 INSERT I-AORT PERCUT DEVICE: CPT | Mod: GC | Performed by: INTERNAL MEDICINE

## 2017-01-01 PROCEDURE — 94003 VENT MGMT INPAT SUBQ DAY: CPT

## 2017-01-01 PROCEDURE — 83540 ASSAY OF IRON: CPT | Performed by: INTERNAL MEDICINE

## 2017-01-01 PROCEDURE — 40000115 ZZH STATISTIC NURSE TLC VISIT: Performed by: CLINICAL NURSE SPECIALIST

## 2017-01-01 PROCEDURE — 85018 HEMOGLOBIN: CPT | Performed by: INTERNAL MEDICINE

## 2017-01-01 PROCEDURE — 5A02210 ASSISTANCE WITH CARDIAC OUTPUT USING BALLOON PUMP, CONTINUOUS: ICD-10-PCS | Performed by: INTERNAL MEDICINE

## 2017-01-01 PROCEDURE — 73080 X-RAY EXAM OF ELBOW: CPT | Mod: RT

## 2017-01-01 PROCEDURE — 93971 EXTREMITY STUDY: CPT | Mod: RT

## 2017-01-01 PROCEDURE — 76770 US EXAM ABDO BACK WALL COMP: CPT

## 2017-01-01 PROCEDURE — 12000001 ZZH R&B MED SURG/OB UMMC

## 2017-01-01 PROCEDURE — 83615 LACTATE (LD) (LDH) ENZYME: CPT

## 2017-01-01 PROCEDURE — 99207 ZZC CDG-CODE CATEGORY CHANGED: CPT | Performed by: CLINICAL NURSE SPECIALIST

## 2017-01-01 PROCEDURE — 40000904 XR CHEST PORT 1 VW

## 2017-01-01 PROCEDURE — 25000125 ZZHC RX 250

## 2017-01-01 PROCEDURE — 36592 COLLECT BLOOD FROM PICC: CPT | Performed by: INTERNAL MEDICINE

## 2017-01-01 PROCEDURE — 84550 ASSAY OF BLOOD/URIC ACID: CPT | Performed by: STUDENT IN AN ORGANIZED HEALTH CARE EDUCATION/TRAINING PROGRAM

## 2017-01-01 PROCEDURE — 94762 N-INVAS EAR/PLS OXIMTRY CONT: CPT | Performed by: INTERNAL MEDICINE

## 2017-01-01 PROCEDURE — 74176 CT ABD & PELVIS W/O CONTRAST: CPT

## 2017-01-01 PROCEDURE — 84300 ASSAY OF URINE SODIUM: CPT | Performed by: INTERNAL MEDICINE

## 2017-01-01 PROCEDURE — 87070 CULTURE OTHR SPECIMN AEROBIC: CPT | Performed by: INTERNAL MEDICINE

## 2017-01-01 PROCEDURE — 99215 OFFICE O/P EST HI 40 MIN: CPT | Mod: ZP | Performed by: INTERNAL MEDICINE

## 2017-01-01 PROCEDURE — 93296 REM INTERROG EVL PM/IDS: CPT | Performed by: INTERNAL MEDICINE

## 2017-01-01 PROCEDURE — 80321 ALCOHOLS BIOMARKERS 1OR 2: CPT | Performed by: STUDENT IN AN ORGANIZED HEALTH CARE EDUCATION/TRAINING PROGRAM

## 2017-01-01 PROCEDURE — C1751 CATH, INF, PER/CENT/MIDLINE: HCPCS

## 2017-01-01 PROCEDURE — 76376 3D RENDER W/INTRP POSTPROCES: CPT

## 2017-01-01 PROCEDURE — 85730 THROMBOPLASTIN TIME PARTIAL: CPT | Performed by: NURSE PRACTITIONER

## 2017-01-01 PROCEDURE — 99239 HOSP IP/OBS DSCHRG MGMT >30: CPT | Performed by: INTERNAL MEDICINE

## 2017-01-01 PROCEDURE — 99223 1ST HOSP IP/OBS HIGH 75: CPT | Mod: 25 | Performed by: INTERNAL MEDICINE

## 2017-01-01 PROCEDURE — 27211089 ZZH KIT ACIST INJECTOR CR3

## 2017-01-01 PROCEDURE — 86901 BLOOD TYPING SEROLOGIC RH(D): CPT

## 2017-01-01 PROCEDURE — 25800025 ZZH RX 258: Performed by: INTERNAL MEDICINE

## 2017-01-01 PROCEDURE — 83930 ASSAY OF BLOOD OSMOLALITY: CPT | Performed by: INTERNAL MEDICINE

## 2017-01-01 PROCEDURE — 80053 COMPREHEN METABOLIC PANEL: CPT

## 2017-01-01 PROCEDURE — 83880 ASSAY OF NATRIURETIC PEPTIDE: CPT | Performed by: INTERNAL MEDICINE

## 2017-01-01 PROCEDURE — 86140 C-REACTIVE PROTEIN: CPT

## 2017-01-01 PROCEDURE — 84443 ASSAY THYROID STIM HORMONE: CPT | Performed by: INTERNAL MEDICINE

## 2017-01-01 PROCEDURE — 82805 BLOOD GASES W/O2 SATURATION: CPT

## 2017-01-01 RX ORDER — DEXTROSE MONOHYDRATE 25 G/50ML
50 INJECTION, SOLUTION INTRAVENOUS ONCE
Status: COMPLETED | OUTPATIENT
Start: 2017-01-01 | End: 2017-01-01

## 2017-01-01 RX ORDER — ISOSORBIDE DINITRATE 20 MG/1
20 TABLET ORAL 3 TIMES DAILY
Status: DISCONTINUED | OUTPATIENT
Start: 2017-01-01 | End: 2017-01-01

## 2017-01-01 RX ORDER — LORAZEPAM 2 MG/ML
.5-2 INJECTION INTRAMUSCULAR EVERY 4 HOURS PRN
Status: DISCONTINUED | OUTPATIENT
Start: 2017-01-01 | End: 2017-01-01 | Stop reason: HOSPADM

## 2017-01-01 RX ORDER — FENTANYL CITRATE 50 UG/ML
50-100 INJECTION, SOLUTION INTRAMUSCULAR; INTRAVENOUS EVERY 30 MIN PRN
Status: DISCONTINUED | OUTPATIENT
Start: 2017-01-01 | End: 2017-01-01 | Stop reason: HOSPADM

## 2017-01-01 RX ORDER — NITROGLYCERIN 5 MG/ML
100-200 VIAL (ML) INTRAVENOUS
Status: DISCONTINUED | OUTPATIENT
Start: 2017-01-01 | End: 2017-01-01 | Stop reason: HOSPADM

## 2017-01-01 RX ORDER — BENZONATATE 100 MG/1
100 CAPSULE ORAL 3 TIMES DAILY PRN
Status: DISCONTINUED | OUTPATIENT
Start: 2017-01-01 | End: 2017-01-01 | Stop reason: HOSPADM

## 2017-01-01 RX ORDER — DOPAMINE HYDROCHLORIDE 160 MG/100ML
2-20 INJECTION, SOLUTION INTRAVENOUS CONTINUOUS PRN
Status: DISCONTINUED | OUTPATIENT
Start: 2017-01-01 | End: 2017-01-01 | Stop reason: HOSPADM

## 2017-01-01 RX ORDER — EPTIFIBATIDE 2 MG/ML
180 INJECTION, SOLUTION INTRAVENOUS EVERY 10 MIN PRN
Status: DISCONTINUED | OUTPATIENT
Start: 2017-01-01 | End: 2017-01-01 | Stop reason: HOSPADM

## 2017-01-01 RX ORDER — HEPARIN SODIUM 1000 [USP'U]/ML
1000-10000 INJECTION, SOLUTION INTRAVENOUS; SUBCUTANEOUS EVERY 5 MIN PRN
Status: DISCONTINUED | OUTPATIENT
Start: 2017-01-01 | End: 2017-01-01 | Stop reason: HOSPADM

## 2017-01-01 RX ORDER — ISOSORBIDE DINITRATE 20 MG/1
20 TABLET ORAL ONCE
Status: COMPLETED | OUTPATIENT
Start: 2017-01-01 | End: 2017-01-01

## 2017-01-01 RX ORDER — SODIUM CHLORIDE 9 MG/ML
INJECTION, SOLUTION INTRAVENOUS
Status: DISPENSED
Start: 2017-01-01 | End: 2017-01-01

## 2017-01-01 RX ORDER — MORPHINE SULFATE 4 MG/ML
5-10 INJECTION, SOLUTION INTRAMUSCULAR; INTRAVENOUS EVERY 10 MIN PRN
Status: DISCONTINUED | OUTPATIENT
Start: 2017-01-01 | End: 2017-01-01 | Stop reason: HOSPADM

## 2017-01-01 RX ORDER — ISOSORBIDE MONONITRATE 60 MG/1
60 TABLET, EXTENDED RELEASE ORAL AT BEDTIME
Status: DISCONTINUED | OUTPATIENT
Start: 2017-01-01 | End: 2017-01-01 | Stop reason: HOSPADM

## 2017-01-01 RX ORDER — NICARDIPINE HYDROCHLORIDE 2.5 MG/ML
100 INJECTION INTRAVENOUS
Status: DISCONTINUED | OUTPATIENT
Start: 2017-01-01 | End: 2017-01-01 | Stop reason: HOSPADM

## 2017-01-01 RX ORDER — PIPERACILLIN SODIUM, TAZOBACTAM SODIUM 3; .375 G/15ML; G/15ML
3.38 INJECTION, POWDER, LYOPHILIZED, FOR SOLUTION INTRAVENOUS EVERY 6 HOURS
Status: DISCONTINUED | OUTPATIENT
Start: 2017-01-01 | End: 2017-01-01 | Stop reason: HOSPADM

## 2017-01-01 RX ORDER — NITROGLYCERIN 0.4 MG/1
0.4 TABLET SUBLINGUAL EVERY 5 MIN PRN
Status: DISCONTINUED | OUTPATIENT
Start: 2017-01-01 | End: 2017-01-01

## 2017-01-01 RX ORDER — SODIUM CHLORIDE 9 MG/ML
INJECTION, SOLUTION INTRAVENOUS
Status: COMPLETED
Start: 2017-01-01 | End: 2017-01-01

## 2017-01-01 RX ORDER — POTASSIUM CHLORIDE 29.8 MG/ML
20 INJECTION INTRAVENOUS ONCE
Status: COMPLETED | OUTPATIENT
Start: 2017-01-01 | End: 2017-01-01

## 2017-01-01 RX ORDER — MORPHINE SULFATE 2 MG/ML
1-2 INJECTION, SOLUTION INTRAMUSCULAR; INTRAVENOUS EVERY 5 MIN PRN
Status: DISCONTINUED | OUTPATIENT
Start: 2017-01-01 | End: 2017-01-01 | Stop reason: HOSPADM

## 2017-01-01 RX ORDER — LEVOTHYROXINE SODIUM 75 UG/1
150 TABLET ORAL EVERY MORNING
Status: DISCONTINUED | OUTPATIENT
Start: 2017-01-01 | End: 2017-01-01 | Stop reason: HOSPADM

## 2017-01-01 RX ORDER — ASPIRIN 81 MG/1
81 TABLET ORAL DAILY
Status: DISCONTINUED | OUTPATIENT
Start: 2017-01-01 | End: 2017-01-01 | Stop reason: HOSPADM

## 2017-01-01 RX ORDER — POTASSIUM CHLORIDE 1.5 G/1.58G
40 POWDER, FOR SOLUTION ORAL ONCE
Status: COMPLETED | OUTPATIENT
Start: 2017-01-01 | End: 2017-01-01

## 2017-01-01 RX ORDER — HEPARIN SODIUM,PORCINE 10 UNIT/ML
2-5 VIAL (ML) INTRAVENOUS
Status: DISCONTINUED | OUTPATIENT
Start: 2017-01-01 | End: 2017-01-01 | Stop reason: HOSPADM

## 2017-01-01 RX ORDER — CLOPIDOGREL BISULFATE 75 MG/1
75 TABLET ORAL DAILY
Status: DISCONTINUED | OUTPATIENT
Start: 2017-01-01 | End: 2017-01-01

## 2017-01-01 RX ORDER — CALCITRIOL 0.25 UG/1
0.25 CAPSULE, LIQUID FILLED ORAL DAILY
Status: DISCONTINUED | OUTPATIENT
Start: 2017-01-01 | End: 2017-01-01 | Stop reason: HOSPADM

## 2017-01-01 RX ORDER — METHYLPREDNISOLONE SODIUM SUCCINATE 125 MG/2ML
125 INJECTION, POWDER, LYOPHILIZED, FOR SOLUTION INTRAMUSCULAR; INTRAVENOUS
Status: DISCONTINUED | OUTPATIENT
Start: 2017-01-01 | End: 2017-01-01 | Stop reason: HOSPADM

## 2017-01-01 RX ORDER — CLOPIDOGREL BISULFATE 75 MG/1
75 TABLET ORAL DAILY
Status: DISCONTINUED | OUTPATIENT
Start: 2017-01-01 | End: 2017-01-01 | Stop reason: HOSPADM

## 2017-01-01 RX ORDER — LIDOCAINE 40 MG/G
CREAM TOPICAL
Status: DISCONTINUED | OUTPATIENT
Start: 2017-01-01 | End: 2017-01-01 | Stop reason: HOSPADM

## 2017-01-01 RX ORDER — MINOXIDIL 2.5 MG/1
5 TABLET ORAL 2 TIMES DAILY
Status: DISCONTINUED | OUTPATIENT
Start: 2017-01-01 | End: 2017-01-01

## 2017-01-01 RX ORDER — MAGNESIUM HYDROXIDE 1200 MG/15ML
1000 LIQUID ORAL CONTINUOUS PRN
Status: DISCONTINUED | OUTPATIENT
Start: 2017-01-01 | End: 2017-01-01 | Stop reason: HOSPADM

## 2017-01-01 RX ORDER — POTASSIUM CHLORIDE 7.45 MG/ML
10 INJECTION INTRAVENOUS
Status: DISCONTINUED | OUTPATIENT
Start: 2017-01-01 | End: 2017-01-01 | Stop reason: HOSPADM

## 2017-01-01 RX ORDER — POTASSIUM CHLORIDE 750 MG/1
40 TABLET, EXTENDED RELEASE ORAL ONCE
Status: COMPLETED | OUTPATIENT
Start: 2017-01-01 | End: 2017-01-01

## 2017-01-01 RX ORDER — POTASSIUM CHLORIDE 1.5 G/1.58G
60 POWDER, FOR SOLUTION ORAL ONCE
Status: COMPLETED | OUTPATIENT
Start: 2017-01-01 | End: 2017-01-01

## 2017-01-01 RX ORDER — HEPARIN SODIUM (PORCINE) LOCK FLUSH IV SOLN 100 UNIT/ML 100 UNIT/ML
SOLUTION INTRAVENOUS
Status: DISCONTINUED
Start: 2017-01-01 | End: 2017-01-01 | Stop reason: HOSPADM

## 2017-01-01 RX ORDER — ADENOSINE 3 MG/ML
12-12000 INJECTION, SOLUTION INTRAVENOUS
Status: DISCONTINUED | OUTPATIENT
Start: 2017-01-01 | End: 2017-01-01 | Stop reason: HOSPADM

## 2017-01-01 RX ORDER — CHLOROTHIAZIDE SODIUM 500 MG/1
250 INJECTION INTRAVENOUS ONCE
Status: COMPLETED | OUTPATIENT
Start: 2017-01-01 | End: 2017-01-01

## 2017-01-01 RX ORDER — TOLTERODINE 4 MG/1
4 CAPSULE, EXTENDED RELEASE ORAL DAILY
Status: DISCONTINUED | OUTPATIENT
Start: 2017-01-01 | End: 2017-01-01 | Stop reason: HOSPADM

## 2017-01-01 RX ORDER — BUMETANIDE 1 MG/1
TABLET ORAL
Qty: 90 TABLET | Refills: 3 | Status: SHIPPED | OUTPATIENT
Start: 2017-01-01 | End: 2017-01-01

## 2017-01-01 RX ORDER — LEVOTHYROXINE SODIUM 75 UG/1
150 TABLET ORAL DAILY
Status: DISCONTINUED | OUTPATIENT
Start: 2017-01-01 | End: 2017-01-01 | Stop reason: HOSPADM

## 2017-01-01 RX ORDER — MAGNESIUM CARB/ALUMINUM HYDROX 105-160MG
296 TABLET,CHEWABLE ORAL ONCE
Status: COMPLETED | OUTPATIENT
Start: 2017-01-01 | End: 2017-01-01

## 2017-01-01 RX ORDER — ONDANSETRON 4 MG/1
4 TABLET, ORALLY DISINTEGRATING ORAL EVERY 6 HOURS PRN
Status: DISCONTINUED | OUTPATIENT
Start: 2017-01-01 | End: 2017-01-01 | Stop reason: HOSPADM

## 2017-01-01 RX ORDER — FUROSEMIDE 10 MG/ML
40 INJECTION INTRAMUSCULAR; INTRAVENOUS ONCE
Status: DISCONTINUED | OUTPATIENT
Start: 2017-01-01 | End: 2017-01-01

## 2017-01-01 RX ORDER — POTASSIUM CHLORIDE 1.5 G/1.58G
20 POWDER, FOR SOLUTION ORAL DAILY
Qty: 540 PACKET | Refills: 3 | COMMUNITY
Start: 2017-01-01 | End: 2017-01-01

## 2017-01-01 RX ORDER — MULTIPLE VITAMINS W/ MINERALS TAB 9MG-400MCG
1 TAB ORAL DAILY
Status: DISCONTINUED | OUTPATIENT
Start: 2017-01-01 | End: 2017-01-01 | Stop reason: HOSPADM

## 2017-01-01 RX ORDER — POTASSIUM CHLORIDE 1.5 G/1.58G
80 POWDER, FOR SOLUTION ORAL ONCE
Status: COMPLETED | OUTPATIENT
Start: 2017-01-01 | End: 2017-01-01

## 2017-01-01 RX ORDER — ACETAMINOPHEN 650 MG/1
650 SUPPOSITORY RECTAL EVERY 4 HOURS PRN
Status: DISCONTINUED | OUTPATIENT
Start: 2017-01-01 | End: 2017-01-01 | Stop reason: HOSPADM

## 2017-01-01 RX ORDER — HYDRALAZINE HYDROCHLORIDE 25 MG/1
25 TABLET, FILM COATED ORAL ONCE
Status: COMPLETED | OUTPATIENT
Start: 2017-01-01 | End: 2017-01-01

## 2017-01-01 RX ORDER — HYDRALAZINE HYDROCHLORIDE 25 MG/1
75 TABLET, FILM COATED ORAL 4 TIMES DAILY
Qty: 1080 TABLET | Refills: 3 | Status: ON HOLD | OUTPATIENT
Start: 2017-01-01 | End: 2017-01-01

## 2017-01-01 RX ORDER — METOLAZONE 2.5 MG/1
5 TABLET ORAL ONCE
Status: COMPLETED | OUTPATIENT
Start: 2017-01-01 | End: 2017-01-01

## 2017-01-01 RX ORDER — FUROSEMIDE 10 MG/ML
80 INJECTION INTRAMUSCULAR; INTRAVENOUS ONCE
Status: CANCELLED
Start: 2017-01-01 | End: 2017-01-01

## 2017-01-01 RX ORDER — POTASSIUM CHLORIDE 1.5 G/1.58G
20 POWDER, FOR SOLUTION ORAL ONCE
Status: COMPLETED | OUTPATIENT
Start: 2017-01-01 | End: 2017-01-01

## 2017-01-01 RX ORDER — BUMETANIDE 1 MG/1
TABLET ORAL
Qty: 540 TABLET | Refills: 3 | Status: ON HOLD | OUTPATIENT
Start: 2017-01-01 | End: 2017-01-01

## 2017-01-01 RX ORDER — CALCITRIOL 0.25 UG/1
0.25 CAPSULE, LIQUID FILLED ORAL DAILY
COMMUNITY

## 2017-01-01 RX ORDER — FUROSEMIDE 10 MG/ML
80 INJECTION INTRAMUSCULAR; INTRAVENOUS ONCE
Status: COMPLETED | OUTPATIENT
Start: 2017-01-01 | End: 2017-01-01

## 2017-01-01 RX ORDER — MILRINONE LACTATE 0.2 MG/ML
0.38 INJECTION, SOLUTION INTRAVENOUS CONTINUOUS
Qty: 500 ML | Refills: 1 | Status: ON HOLD | OUTPATIENT
Start: 2017-01-01 | End: 2017-01-01

## 2017-01-01 RX ORDER — PROCHLORPERAZINE 25 MG
12.5 SUPPOSITORY, RECTAL RECTAL EVERY 12 HOURS PRN
Status: DISCONTINUED | OUTPATIENT
Start: 2017-01-01 | End: 2017-01-01 | Stop reason: HOSPADM

## 2017-01-01 RX ORDER — POTASSIUM CHLORIDE 1.5 G/1.58G
40 POWDER, FOR SOLUTION ORAL ONCE
Status: DISCONTINUED | OUTPATIENT
Start: 2017-01-01 | End: 2017-01-01

## 2017-01-01 RX ORDER — FENTANYL CITRATE 50 UG/ML
25-50 INJECTION, SOLUTION INTRAMUSCULAR; INTRAVENOUS
Status: DISCONTINUED | OUTPATIENT
Start: 2017-01-01 | End: 2017-01-01 | Stop reason: HOSPADM

## 2017-01-01 RX ORDER — HEPARIN SODIUM,PORCINE 10 UNIT/ML
3 VIAL (ML) INTRAVENOUS
Status: DISCONTINUED | OUTPATIENT
Start: 2017-01-01 | End: 2017-01-01 | Stop reason: HOSPADM

## 2017-01-01 RX ORDER — MINOXIDIL 2.5 MG/1
2.5 TABLET ORAL 2 TIMES DAILY
Status: DISCONTINUED | OUTPATIENT
Start: 2017-01-01 | End: 2017-01-01

## 2017-01-01 RX ORDER — HYDRALAZINE HYDROCHLORIDE 20 MG/ML
10-20 INJECTION INTRAMUSCULAR; INTRAVENOUS
Status: DISCONTINUED | OUTPATIENT
Start: 2017-01-01 | End: 2017-01-01 | Stop reason: HOSPADM

## 2017-01-01 RX ORDER — AMOXICILLIN 250 MG
1 CAPSULE ORAL AT BEDTIME
Status: DISCONTINUED | OUTPATIENT
Start: 2017-01-01 | End: 2017-01-01 | Stop reason: HOSPADM

## 2017-01-01 RX ORDER — LANOLIN ALCOHOL/MO/W.PET/CERES
1000 CREAM (GRAM) TOPICAL DAILY
Status: DISCONTINUED | OUTPATIENT
Start: 2017-01-01 | End: 2017-01-01 | Stop reason: HOSPADM

## 2017-01-01 RX ORDER — ONDANSETRON 2 MG/ML
4 INJECTION INTRAMUSCULAR; INTRAVENOUS ONCE
Status: COMPLETED | OUTPATIENT
Start: 2017-01-01 | End: 2017-01-01

## 2017-01-01 RX ORDER — ACETAMINOPHEN 325 MG/1
325-650 TABLET ORAL EVERY 4 HOURS PRN
Status: DISCONTINUED | OUTPATIENT
Start: 2017-01-01 | End: 2017-01-01 | Stop reason: HOSPADM

## 2017-01-01 RX ORDER — HYDRALAZINE HYDROCHLORIDE 25 MG/1
75 TABLET, FILM COATED ORAL 3 TIMES DAILY
Status: DISCONTINUED | OUTPATIENT
Start: 2017-01-01 | End: 2017-01-01

## 2017-01-01 RX ORDER — FUROSEMIDE 20 MG
80 TABLET ORAL 2 TIMES DAILY
Status: DISCONTINUED | OUTPATIENT
Start: 2017-01-01 | End: 2017-01-01 | Stop reason: HOSPADM

## 2017-01-01 RX ORDER — POTASSIUM CHLORIDE 29.8 MG/ML
20 INJECTION INTRAVENOUS
Status: DISCONTINUED | OUTPATIENT
Start: 2017-01-01 | End: 2017-01-01 | Stop reason: HOSPADM

## 2017-01-01 RX ORDER — POTASSIUM CHLORIDE 1.5 G/1.58G
40 POWDER, FOR SOLUTION ORAL 2 TIMES DAILY
Qty: 540 PACKET | Refills: 3 | Status: ON HOLD | OUTPATIENT
Start: 2017-01-01 | End: 2017-01-01

## 2017-01-01 RX ORDER — FLUMAZENIL 0.1 MG/ML
0.2 INJECTION, SOLUTION INTRAVENOUS
Status: DISCONTINUED | OUTPATIENT
Start: 2017-01-01 | End: 2017-01-01 | Stop reason: HOSPADM

## 2017-01-01 RX ORDER — POTASSIUM CHLORIDE 1500 MG/1
20 TABLET, EXTENDED RELEASE ORAL 2 TIMES DAILY
Qty: 60 TABLET | Refills: 3 | Status: SHIPPED | OUTPATIENT
Start: 2017-01-01 | End: 2017-01-01

## 2017-01-01 RX ORDER — FUROSEMIDE 10 MG/ML
60 INJECTION INTRAMUSCULAR; INTRAVENOUS ONCE
Status: COMPLETED | OUTPATIENT
Start: 2017-01-01 | End: 2017-01-01

## 2017-01-01 RX ORDER — POTASSIUM CHLORIDE 1.5 G/1.58G
20 POWDER, FOR SOLUTION ORAL 2 TIMES DAILY
Status: DISCONTINUED | OUTPATIENT
Start: 2017-01-01 | End: 2017-01-01 | Stop reason: HOSPADM

## 2017-01-01 RX ORDER — DOBUTAMINE HCL IN DEXTROSE 5 % 500MG/250
5 INTRAVENOUS SOLUTION INTRAVENOUS CONTINUOUS
Qty: 1000 ML | Refills: 0 | Status: SHIPPED | OUTPATIENT
Start: 2017-01-01 | End: 2017-01-01

## 2017-01-01 RX ORDER — SODIUM NITROPRUSSIDE 25 MG/ML
100-200 INJECTION INTRAVENOUS
Status: DISCONTINUED | OUTPATIENT
Start: 2017-01-01 | End: 2017-01-01 | Stop reason: HOSPADM

## 2017-01-01 RX ORDER — LANOLIN ALCOHOL/MO/W.PET/CERES
3 CREAM (GRAM) TOPICAL
Status: DISCONTINUED | OUTPATIENT
Start: 2017-01-01 | End: 2017-01-01 | Stop reason: HOSPADM

## 2017-01-01 RX ORDER — CLINDAMYCIN PHOSPHATE 900 MG/50ML
900 INJECTION, SOLUTION INTRAVENOUS
Status: CANCELLED | OUTPATIENT
Start: 2017-01-01

## 2017-01-01 RX ORDER — TORSEMIDE 20 MG/1
80 TABLET ORAL 2 TIMES DAILY
Status: DISCONTINUED | OUTPATIENT
Start: 2017-01-01 | End: 2017-01-01 | Stop reason: HOSPADM

## 2017-01-01 RX ORDER — HYDRALAZINE HYDROCHLORIDE 25 MG/1
100 TABLET, FILM COATED ORAL 3 TIMES DAILY
Status: DISCONTINUED | OUTPATIENT
Start: 2017-01-01 | End: 2017-01-01

## 2017-01-01 RX ORDER — ISOSORBIDE DINITRATE 20 MG/1
60 TABLET ORAL 3 TIMES DAILY
Status: DISCONTINUED | OUTPATIENT
Start: 2017-01-01 | End: 2017-01-01 | Stop reason: HOSPADM

## 2017-01-01 RX ORDER — FUROSEMIDE 10 MG/ML
40 INJECTION INTRAMUSCULAR; INTRAVENOUS ONCE
Status: COMPLETED | OUTPATIENT
Start: 2017-01-01 | End: 2017-01-01

## 2017-01-01 RX ORDER — HYDRALAZINE HYDROCHLORIDE 100 MG/1
100 TABLET, FILM COATED ORAL 4 TIMES DAILY
Qty: 60 TABLET | Refills: 3 | Status: SHIPPED | OUTPATIENT
Start: 2017-01-01

## 2017-01-01 RX ORDER — ACETAMINOPHEN 325 MG/1
650 TABLET ORAL EVERY 4 HOURS PRN
Status: DISCONTINUED | OUTPATIENT
Start: 2017-01-01 | End: 2017-01-01 | Stop reason: HOSPADM

## 2017-01-01 RX ORDER — OXYMETAZOLINE HYDROCHLORIDE 0.05 G/100ML
2 SPRAY NASAL 2 TIMES DAILY
Status: DISPENSED | OUTPATIENT
Start: 2017-01-01 | End: 2017-01-01

## 2017-01-01 RX ORDER — FERROUS SULFATE 325(65) MG
325 TABLET ORAL DAILY
Status: DISCONTINUED | OUTPATIENT
Start: 2017-01-01 | End: 2017-01-01

## 2017-01-01 RX ORDER — BUPIVACAINE HYDROCHLORIDE 2.5 MG/ML
1-10 INJECTION, SOLUTION EPIDURAL; INFILTRATION; INTRACAUDAL
Status: DISCONTINUED | OUTPATIENT
Start: 2017-01-01 | End: 2017-01-01 | Stop reason: HOSPADM

## 2017-01-01 RX ORDER — POTASSIUM CHLORIDE 1.5 G/1.58G
40 POWDER, FOR SOLUTION ORAL 2 TIMES DAILY
Status: DISCONTINUED | OUTPATIENT
Start: 2017-01-01 | End: 2017-01-01

## 2017-01-01 RX ORDER — IOPAMIDOL 755 MG/ML
2 INJECTION, SOLUTION INTRAVASCULAR ONCE
Status: COMPLETED | OUTPATIENT
Start: 2017-01-01 | End: 2017-01-01

## 2017-01-01 RX ORDER — FUROSEMIDE 10 MG/ML
20 INJECTION INTRAMUSCULAR; INTRAVENOUS ONCE
Status: DISCONTINUED | OUTPATIENT
Start: 2017-01-01 | End: 2017-01-01

## 2017-01-01 RX ORDER — HYDRALAZINE HYDROCHLORIDE 50 MG/1
50 TABLET, FILM COATED ORAL 4 TIMES DAILY
Status: DISCONTINUED | OUTPATIENT
Start: 2017-01-01 | End: 2017-01-01

## 2017-01-01 RX ORDER — POLYETHYLENE GLYCOL 3350 17 G/17G
17 POWDER, FOR SOLUTION ORAL 2 TIMES DAILY PRN
Status: DISCONTINUED | OUTPATIENT
Start: 2017-01-01 | End: 2017-01-01 | Stop reason: HOSPADM

## 2017-01-01 RX ORDER — PHYTONADIONE 1 MG/.5ML
5 INJECTION, EMULSION INTRAMUSCULAR; INTRAVENOUS; SUBCUTANEOUS ONCE
Status: DISCONTINUED | OUTPATIENT
Start: 2017-01-01 | End: 2017-01-01

## 2017-01-01 RX ORDER — POTASSIUM CL/LIDO/0.9 % NACL 10MEQ/0.1L
10 INTRAVENOUS SOLUTION, PIGGYBACK (ML) INTRAVENOUS
Status: DISCONTINUED | OUTPATIENT
Start: 2017-01-01 | End: 2017-01-01 | Stop reason: HOSPADM

## 2017-01-01 RX ORDER — PROTAMINE SULFATE 10 MG/ML
25-100 INJECTION, SOLUTION INTRAVENOUS EVERY 5 MIN PRN
Status: DISCONTINUED | OUTPATIENT
Start: 2017-01-01 | End: 2017-01-01 | Stop reason: HOSPADM

## 2017-01-01 RX ORDER — DOBUTAMINE HYDROCHLORIDE 200 MG/100ML
2-20 INJECTION INTRAVENOUS CONTINUOUS PRN
Status: DISCONTINUED | OUTPATIENT
Start: 2017-01-01 | End: 2017-01-01 | Stop reason: HOSPADM

## 2017-01-01 RX ORDER — POTASSIUM CHLORIDE 1.5 G/1.58G
20-40 POWDER, FOR SOLUTION ORAL
Status: DISCONTINUED | OUTPATIENT
Start: 2017-01-01 | End: 2017-01-01 | Stop reason: HOSPADM

## 2017-01-01 RX ORDER — POTASSIUM CHLORIDE 29.8 MG/ML
20 INJECTION INTRAVENOUS
Status: DISCONTINUED | OUTPATIENT
Start: 2017-01-01 | End: 2017-01-01

## 2017-01-01 RX ORDER — HEPARIN SODIUM 5000 [USP'U]/.5ML
5000 INJECTION, SOLUTION INTRAVENOUS; SUBCUTANEOUS EVERY 8 HOURS
Status: DISCONTINUED | OUTPATIENT
Start: 2017-01-01 | End: 2017-01-01 | Stop reason: ALTCHOICE

## 2017-01-01 RX ORDER — DOBUTAMINE HCL IN DEXTROSE 5 % 500MG/250
7.5 INTRAVENOUS SOLUTION INTRAVENOUS CONTINUOUS
Status: DISCONTINUED | OUTPATIENT
Start: 2017-01-01 | End: 2017-01-01 | Stop reason: HOSPADM

## 2017-01-01 RX ORDER — POTASSIUM CHLORIDE 750 MG/1
20-40 TABLET, EXTENDED RELEASE ORAL
Status: DISCONTINUED | OUTPATIENT
Start: 2017-01-01 | End: 2017-01-01 | Stop reason: HOSPADM

## 2017-01-01 RX ORDER — POTASSIUM CHLORIDE 750 MG/1
40 CAPSULE, EXTENDED RELEASE ORAL
Status: DISCONTINUED | OUTPATIENT
Start: 2017-01-01 | End: 2017-01-01

## 2017-01-01 RX ORDER — PROMETHAZINE HYDROCHLORIDE 25 MG/ML
6.25-25 INJECTION, SOLUTION INTRAMUSCULAR; INTRAVENOUS EVERY 4 HOURS PRN
Status: DISCONTINUED | OUTPATIENT
Start: 2017-01-01 | End: 2017-01-01 | Stop reason: HOSPADM

## 2017-01-01 RX ORDER — BUMETANIDE 1 MG/1
1 TABLET ORAL EVERY EVENING
Status: DISCONTINUED | OUTPATIENT
Start: 2017-01-01 | End: 2017-01-01 | Stop reason: HOSPADM

## 2017-01-01 RX ORDER — ACETAMINOPHEN 325 MG/1
325-650 TABLET ORAL EVERY 6 HOURS PRN
Status: DISCONTINUED | OUTPATIENT
Start: 2017-01-01 | End: 2017-01-01 | Stop reason: HOSPADM

## 2017-01-01 RX ORDER — POTASSIUM CHLORIDE 1500 MG/1
40 TABLET, EXTENDED RELEASE ORAL ONCE
Status: COMPLETED | OUTPATIENT
Start: 2017-01-01 | End: 2017-01-01

## 2017-01-01 RX ORDER — SODIUM CHLORIDE 9 MG/ML
INJECTION, SOLUTION INTRAVENOUS CONTINUOUS
Status: DISCONTINUED | OUTPATIENT
Start: 2017-01-01 | End: 2017-01-01 | Stop reason: HOSPADM

## 2017-01-01 RX ORDER — POTASSIUM CHLORIDE 1500 MG/1
20-40 TABLET, EXTENDED RELEASE ORAL
Status: DISCONTINUED | OUTPATIENT
Start: 2017-01-01 | End: 2017-01-01 | Stop reason: HOSPADM

## 2017-01-01 RX ORDER — LANOLIN ALCOHOL/MO/W.PET/CERES
3 CREAM (GRAM) TOPICAL ONCE
Status: COMPLETED | OUTPATIENT
Start: 2017-01-01 | End: 2017-01-01

## 2017-01-01 RX ORDER — EPTIFIBATIDE 2 MG/ML
1 INJECTION, SOLUTION INTRAVENOUS CONTINUOUS PRN
Status: DISCONTINUED | OUTPATIENT
Start: 2017-01-01 | End: 2017-01-01 | Stop reason: HOSPADM

## 2017-01-01 RX ORDER — NITROGLYCERIN 5 MG/ML
100-500 VIAL (ML) INTRAVENOUS
Status: DISCONTINUED | OUTPATIENT
Start: 2017-01-01 | End: 2017-01-01 | Stop reason: HOSPADM

## 2017-01-01 RX ORDER — DOBUTAMINE HCL IN DEXTROSE 5 % 500MG/250
5 INTRAVENOUS SOLUTION INTRAVENOUS CONTINUOUS
Status: DISCONTINUED | OUTPATIENT
Start: 2017-01-01 | End: 2017-01-01 | Stop reason: HOSPADM

## 2017-01-01 RX ORDER — FUROSEMIDE 10 MG/ML
60 INJECTION INTRAMUSCULAR; INTRAVENOUS
Status: DISCONTINUED | OUTPATIENT
Start: 2017-01-01 | End: 2017-01-01

## 2017-01-01 RX ORDER — DEXTROSE MONOHYDRATE 25 G/50ML
25-50 INJECTION, SOLUTION INTRAVENOUS
Status: DISCONTINUED | OUTPATIENT
Start: 2017-01-01 | End: 2017-01-01 | Stop reason: HOSPADM

## 2017-01-01 RX ORDER — NALOXONE HYDROCHLORIDE 0.4 MG/ML
.1-.4 INJECTION, SOLUTION INTRAMUSCULAR; INTRAVENOUS; SUBCUTANEOUS
Status: DISCONTINUED | OUTPATIENT
Start: 2017-01-01 | End: 2017-01-01 | Stop reason: HOSPADM

## 2017-01-01 RX ORDER — POTASSIUM CHLORIDE 29.8 MG/ML
20 INJECTION INTRAVENOUS
Status: COMPLETED | OUTPATIENT
Start: 2017-01-01 | End: 2017-01-01

## 2017-01-01 RX ORDER — MILRINONE LACTATE 0.2 MG/ML
0.25 INJECTION, SOLUTION INTRAVENOUS CONTINUOUS
Status: DISCONTINUED | OUTPATIENT
Start: 2017-01-01 | End: 2017-01-01

## 2017-01-01 RX ORDER — ALBUTEROL SULFATE 0.83 MG/ML
2.5 SOLUTION RESPIRATORY (INHALATION)
Status: DISCONTINUED | OUTPATIENT
Start: 2017-01-01 | End: 2017-01-01 | Stop reason: HOSPADM

## 2017-01-01 RX ORDER — MILRINONE LACTATE 0.2 MG/ML
0.38 INJECTION, SOLUTION INTRAVENOUS CONTINUOUS
Status: DISCONTINUED | OUTPATIENT
Start: 2017-01-01 | End: 2017-01-01 | Stop reason: HOSPADM

## 2017-01-01 RX ORDER — ISOSORBIDE DINITRATE 20 MG/1
60 TABLET ORAL 3 TIMES DAILY
Status: DISCONTINUED | OUTPATIENT
Start: 2017-01-01 | End: 2017-01-01

## 2017-01-01 RX ORDER — LIDOCAINE HYDROCHLORIDE 10 MG/ML
30 INJECTION, SOLUTION EPIDURAL; INFILTRATION; INTRACAUDAL; PERINEURAL
Status: COMPLETED | OUTPATIENT
Start: 2017-01-01 | End: 2017-01-01

## 2017-01-01 RX ORDER — CARVEDILOL 6.25 MG/1
6.25 TABLET ORAL 2 TIMES DAILY WITH MEALS
Qty: 240 TABLET | Refills: 3 | Status: ON HOLD | OUTPATIENT
Start: 2017-01-01 | End: 2017-01-01

## 2017-01-01 RX ORDER — POTASSIUM CHLORIDE 1500 MG/1
60 TABLET, EXTENDED RELEASE ORAL ONCE
Status: DISCONTINUED | OUTPATIENT
Start: 2017-01-01 | End: 2017-01-01

## 2017-01-01 RX ORDER — POTASSIUM CHLORIDE 750 MG/1
40 TABLET, EXTENDED RELEASE ORAL ONCE
Status: DISCONTINUED | OUTPATIENT
Start: 2017-01-01 | End: 2017-01-01

## 2017-01-01 RX ORDER — CALCITRIOL 0.25 UG/1
0.25 CAPSULE, LIQUID FILLED ORAL
Status: DISCONTINUED | OUTPATIENT
Start: 2017-01-01 | End: 2017-01-01 | Stop reason: HOSPADM

## 2017-01-01 RX ORDER — PRASUGREL 10 MG/1
10-60 TABLET, FILM COATED ORAL
Status: DISCONTINUED | OUTPATIENT
Start: 2017-01-01 | End: 2017-01-01 | Stop reason: HOSPADM

## 2017-01-01 RX ORDER — CLOPIDOGREL BISULFATE 75 MG/1
75 TABLET ORAL
Status: DISCONTINUED | OUTPATIENT
Start: 2017-01-01 | End: 2017-01-01 | Stop reason: HOSPADM

## 2017-01-01 RX ORDER — MIDAZOLAM (PF) 1 MG/ML IN 0.9 % SODIUM CHLORIDE INTRAVENOUS SOLUTION
1-8 CONTINUOUS
Status: DISCONTINUED | OUTPATIENT
Start: 2017-01-01 | End: 2017-01-01 | Stop reason: HOSPADM

## 2017-01-01 RX ORDER — POTASSIUM CHLORIDE 1500 MG/1
20 TABLET, EXTENDED RELEASE ORAL
Status: CANCELLED | OUTPATIENT
Start: 2017-01-01

## 2017-01-01 RX ORDER — DOBUTAMINE HCL IN DEXTROSE 5 % 500MG/250
5 INTRAVENOUS SOLUTION INTRAVENOUS CONTINUOUS
Qty: 1000 ML | Refills: 0 | Status: SHIPPED | OUTPATIENT
Start: 2017-01-01

## 2017-01-01 RX ORDER — TORSEMIDE 20 MG/1
80 TABLET ORAL ONCE
Status: COMPLETED | OUTPATIENT
Start: 2017-01-01 | End: 2017-01-01

## 2017-01-01 RX ORDER — POTASSIUM CHLORIDE 7.45 MG/ML
10 INJECTION INTRAVENOUS
Status: DISCONTINUED | OUTPATIENT
Start: 2017-01-01 | End: 2017-01-01

## 2017-01-01 RX ORDER — HYDROXYZINE HYDROCHLORIDE 10 MG/1
10 TABLET, FILM COATED ORAL 3 TIMES DAILY PRN
Status: DISCONTINUED | OUTPATIENT
Start: 2017-01-01 | End: 2017-01-01 | Stop reason: HOSPADM

## 2017-01-01 RX ORDER — MINERAL OIL 100 G/100G
1 OIL RECTAL ONCE
Status: COMPLETED | OUTPATIENT
Start: 2017-01-01 | End: 2017-01-01

## 2017-01-01 RX ORDER — POTASSIUM CHLORIDE 750 MG/1
20 TABLET, EXTENDED RELEASE ORAL ONCE
Status: COMPLETED | OUTPATIENT
Start: 2017-01-01 | End: 2017-01-01

## 2017-01-01 RX ORDER — AMOXICILLIN 250 MG
1-2 CAPSULE ORAL 2 TIMES DAILY PRN
Status: DISCONTINUED | OUTPATIENT
Start: 2017-01-01 | End: 2017-01-01 | Stop reason: HOSPADM

## 2017-01-01 RX ORDER — HYDRALAZINE HYDROCHLORIDE 100 MG/1
100 TABLET, FILM COATED ORAL 3 TIMES DAILY
Status: DISCONTINUED | OUTPATIENT
Start: 2017-01-01 | End: 2017-01-01

## 2017-01-01 RX ORDER — NITROGLYCERIN 20 MG/100ML
.07-2 INJECTION INTRAVENOUS CONTINUOUS PRN
Status: DISCONTINUED | OUTPATIENT
Start: 2017-01-01 | End: 2017-01-01 | Stop reason: HOSPADM

## 2017-01-01 RX ORDER — POTASSIUM CHLORIDE 1.5 G/1.58G
20 POWDER, FOR SOLUTION ORAL 2 TIMES DAILY
Status: DISCONTINUED | OUTPATIENT
Start: 2017-01-01 | End: 2017-01-01

## 2017-01-01 RX ORDER — PHENYLEPHRINE HCL IN 0.9% NACL 1 MG/10 ML
20-100 SYRINGE (ML) INTRAVENOUS
Status: DISCONTINUED | OUTPATIENT
Start: 2017-01-01 | End: 2017-01-01 | Stop reason: HOSPADM

## 2017-01-01 RX ORDER — POTASSIUM CHLORIDE 1.5 G/1.58G
60 POWDER, FOR SOLUTION ORAL ONCE
Status: DISCONTINUED | OUTPATIENT
Start: 2017-01-01 | End: 2017-01-01

## 2017-01-01 RX ORDER — BUMETANIDE 0.5 MG/1
1 TABLET ORAL 2 TIMES DAILY
Status: DISCONTINUED | OUTPATIENT
Start: 2017-01-01 | End: 2017-01-01

## 2017-01-01 RX ORDER — POLYETHYLENE GLYCOL 3350 17 G/17G
17 POWDER, FOR SOLUTION ORAL DAILY PRN
Status: DISCONTINUED | OUTPATIENT
Start: 2017-01-01 | End: 2017-01-01 | Stop reason: HOSPADM

## 2017-01-01 RX ORDER — ASPIRIN 81 MG/1
81-324 TABLET, CHEWABLE ORAL
Status: DISCONTINUED | OUTPATIENT
Start: 2017-01-01 | End: 2017-01-01 | Stop reason: HOSPADM

## 2017-01-01 RX ORDER — MINOXIDIL 2.5 MG/1
2.5 TABLET ORAL DAILY
Status: DISCONTINUED | OUTPATIENT
Start: 2017-01-01 | End: 2017-01-01

## 2017-01-01 RX ORDER — BUMETANIDE 0.25 MG/ML
2 INJECTION INTRAMUSCULAR; INTRAVENOUS ONCE
Status: COMPLETED | OUTPATIENT
Start: 2017-01-01 | End: 2017-01-01

## 2017-01-01 RX ORDER — DOBUTAMINE HYDROCHLORIDE 200 MG/100ML
7.5 INJECTION INTRAVENOUS CONTINUOUS
Status: DISCONTINUED | OUTPATIENT
Start: 2017-01-01 | End: 2017-01-01

## 2017-01-01 RX ORDER — METOLAZONE 2.5 MG/1
2.5 TABLET ORAL DAILY PRN
Qty: 30 TABLET | Refills: 1 | COMMUNITY
Start: 2017-01-01 | End: 2017-01-01

## 2017-01-01 RX ORDER — DOBUTAMINE HCL IN DEXTROSE 5 % 500MG/250
5 INTRAVENOUS SOLUTION INTRAVENOUS CONTINUOUS
Qty: 1000 ML | Refills: 3 | Status: ON HOLD | OUTPATIENT
Start: 2017-01-01 | End: 2017-01-01

## 2017-01-01 RX ORDER — HYDROMORPHONE HYDROCHLORIDE 1 MG/ML
.3-.5 INJECTION, SOLUTION INTRAMUSCULAR; INTRAVENOUS; SUBCUTANEOUS EVERY 4 HOURS PRN
Status: DISCONTINUED | OUTPATIENT
Start: 2017-01-01 | End: 2017-01-01 | Stop reason: HOSPADM

## 2017-01-01 RX ORDER — HYDRALAZINE HYDROCHLORIDE 25 MG/1
25 TABLET, FILM COATED ORAL 3 TIMES DAILY
Status: DISCONTINUED | OUTPATIENT
Start: 2017-01-01 | End: 2017-01-01

## 2017-01-01 RX ORDER — ISOSORBIDE DINITRATE 20 MG/1
40 TABLET ORAL 3 TIMES DAILY
Status: DISCONTINUED | OUTPATIENT
Start: 2017-01-01 | End: 2017-01-01

## 2017-01-01 RX ORDER — MORPHINE SULFATE 4 MG/ML
5-10 INJECTION, SOLUTION INTRAMUSCULAR; INTRAVENOUS EVERY 30 MIN PRN
Status: DISCONTINUED | OUTPATIENT
Start: 2017-01-01 | End: 2017-01-01 | Stop reason: HOSPADM

## 2017-01-01 RX ORDER — LIDOCAINE HYDROCHLORIDE 10 MG/ML
1-10 INJECTION, SOLUTION EPIDURAL; INFILTRATION; INTRACAUDAL; PERINEURAL
Status: DISCONTINUED | OUTPATIENT
Start: 2017-01-01 | End: 2017-01-01 | Stop reason: HOSPADM

## 2017-01-01 RX ORDER — HYDRALAZINE HYDROCHLORIDE 20 MG/ML
20 INJECTION INTRAMUSCULAR; INTRAVENOUS ONCE
Status: COMPLETED | OUTPATIENT
Start: 2017-01-01 | End: 2017-01-01

## 2017-01-01 RX ORDER — HEPARIN SODIUM (PORCINE) LOCK FLUSH IV SOLN 100 UNIT/ML 100 UNIT/ML
500 SOLUTION INTRAVENOUS ONCE
Status: COMPLETED | OUTPATIENT
Start: 2017-01-01 | End: 2017-01-01

## 2017-01-01 RX ORDER — POTASSIUM CHLORIDE 1.5 G/1.58G
40 POWDER, FOR SOLUTION ORAL
Status: DISCONTINUED | OUTPATIENT
Start: 2017-01-01 | End: 2017-01-01

## 2017-01-01 RX ORDER — NIFEDIPINE 10 MG/1
10 CAPSULE ORAL
Status: DISCONTINUED | OUTPATIENT
Start: 2017-01-01 | End: 2017-01-01 | Stop reason: HOSPADM

## 2017-01-01 RX ORDER — DEXTROSE MONOHYDRATE 25 G/50ML
12.5-5 INJECTION, SOLUTION INTRAVENOUS EVERY 30 MIN PRN
Status: DISCONTINUED | OUTPATIENT
Start: 2017-01-01 | End: 2017-01-01 | Stop reason: HOSPADM

## 2017-01-01 RX ORDER — HEPARIN SODIUM 10000 [USP'U]/100ML
0-3500 INJECTION, SOLUTION INTRAVENOUS CONTINUOUS
Status: DISCONTINUED | OUTPATIENT
Start: 2017-01-01 | End: 2017-01-01

## 2017-01-01 RX ORDER — ISOSORBIDE MONONITRATE 60 MG/1
60 TABLET, EXTENDED RELEASE ORAL AT BEDTIME
Qty: 30 TABLET | Refills: 11 | Status: ON HOLD | OUTPATIENT
Start: 2017-01-01 | End: 2017-01-01

## 2017-01-01 RX ORDER — DIPHENHYDRAMINE HYDROCHLORIDE 50 MG/ML
25-50 INJECTION INTRAMUSCULAR; INTRAVENOUS
Status: DISCONTINUED | OUTPATIENT
Start: 2017-01-01 | End: 2017-01-01 | Stop reason: HOSPADM

## 2017-01-01 RX ORDER — DEXTROSE MONOHYDRATE 25 G/50ML
INJECTION, SOLUTION INTRAVENOUS
Status: DISCONTINUED
Start: 2017-01-01 | End: 2017-01-01 | Stop reason: HOSPADM

## 2017-01-01 RX ORDER — HYDRALAZINE HYDROCHLORIDE 25 MG/1
75 TABLET, FILM COATED ORAL 4 TIMES DAILY
Qty: 60 TABLET | Refills: 1 | Status: SHIPPED | OUTPATIENT
Start: 2017-01-01 | End: 2017-01-01

## 2017-01-01 RX ORDER — BUMETANIDE 2 MG/1
4 TABLET ORAL DAILY
Status: DISCONTINUED | OUTPATIENT
Start: 2017-01-01 | End: 2017-01-01

## 2017-01-01 RX ORDER — HEPARIN SODIUM (PORCINE) LOCK FLUSH IV SOLN 100 UNIT/ML 100 UNIT/ML
SOLUTION INTRAVENOUS
Status: COMPLETED
Start: 2017-01-01 | End: 2017-01-01

## 2017-01-01 RX ORDER — VERAPAMIL HYDROCHLORIDE 2.5 MG/ML
1-2.5 INJECTION, SOLUTION INTRAVENOUS
Status: DISCONTINUED | OUTPATIENT
Start: 2017-01-01 | End: 2017-01-01 | Stop reason: HOSPADM

## 2017-01-01 RX ORDER — HYDRALAZINE HYDROCHLORIDE 50 MG/1
50 TABLET, FILM COATED ORAL 3 TIMES DAILY
Status: DISCONTINUED | OUTPATIENT
Start: 2017-01-01 | End: 2017-01-01 | Stop reason: HOSPADM

## 2017-01-01 RX ORDER — ISOSORBIDE MONONITRATE 30 MG/1
120 TABLET, EXTENDED RELEASE ORAL DAILY
Status: DISCONTINUED | OUTPATIENT
Start: 2017-01-01 | End: 2017-01-01

## 2017-01-01 RX ORDER — HEPARIN SODIUM,PORCINE 10 UNIT/ML
5-10 VIAL (ML) INTRAVENOUS
Status: DISCONTINUED | OUTPATIENT
Start: 2017-01-01 | End: 2017-01-01 | Stop reason: HOSPADM

## 2017-01-01 RX ORDER — CARVEDILOL 6.25 MG/1
6.25 TABLET ORAL 2 TIMES DAILY WITH MEALS
Status: DISCONTINUED | OUTPATIENT
Start: 2017-01-01 | End: 2017-01-01

## 2017-01-01 RX ORDER — HYDRALAZINE HYDROCHLORIDE 50 MG/1
TABLET, FILM COATED ORAL
Qty: 360 TABLET | Refills: 3 | Status: ON HOLD | COMMUNITY
Start: 2017-01-01 | End: 2017-01-01

## 2017-01-01 RX ORDER — POTASSIUM CHLORIDE 1.5 G/1.58G
20 POWDER, FOR SOLUTION ORAL 2 TIMES DAILY
Qty: 180 PACKET | Refills: 3 | Status: ON HOLD | OUTPATIENT
Start: 2017-01-01 | End: 2017-01-01

## 2017-01-01 RX ORDER — SIMETHICONE 80 MG
160 TABLET,CHEWABLE ORAL 2 TIMES DAILY
Status: DISCONTINUED | OUTPATIENT
Start: 2017-01-01 | End: 2017-01-01 | Stop reason: HOSPADM

## 2017-01-01 RX ORDER — ENALAPRILAT 1.25 MG/ML
1.25-2.5 INJECTION INTRAVENOUS
Status: DISCONTINUED | OUTPATIENT
Start: 2017-01-01 | End: 2017-01-01 | Stop reason: HOSPADM

## 2017-01-01 RX ORDER — SODIUM CHLORIDE 9 MG/ML
INJECTION, SOLUTION INTRAVENOUS
Status: DISCONTINUED
Start: 2017-01-01 | End: 2017-01-01 | Stop reason: HOSPADM

## 2017-01-01 RX ORDER — VERAPAMIL HYDROCHLORIDE 2.5 MG/ML
1-5 INJECTION, SOLUTION INTRAVENOUS
Status: DISCONTINUED | OUTPATIENT
Start: 2017-01-01 | End: 2017-01-01 | Stop reason: HOSPADM

## 2017-01-01 RX ORDER — MINOXIDIL 2.5 MG/1
2.5 TABLET ORAL ONCE
Status: COMPLETED | OUTPATIENT
Start: 2017-01-01 | End: 2017-01-01

## 2017-01-01 RX ORDER — DOBUTAMINE HYDROCHLORIDE 200 MG/100ML
2.5 INJECTION INTRAVENOUS CONTINUOUS
Status: DISCONTINUED | OUTPATIENT
Start: 2017-01-01 | End: 2017-01-01

## 2017-01-01 RX ORDER — HYDRALAZINE HYDROCHLORIDE 50 MG/1
100 TABLET, FILM COATED ORAL
Status: DISCONTINUED | OUTPATIENT
Start: 2017-01-01 | End: 2017-01-01

## 2017-01-01 RX ORDER — POTASSIUM CHLORIDE 750 MG/1
40 TABLET, EXTENDED RELEASE ORAL DAILY
Status: DISCONTINUED | OUTPATIENT
Start: 2017-01-01 | End: 2017-01-01

## 2017-01-01 RX ORDER — FUROSEMIDE 10 MG/ML
INJECTION INTRAMUSCULAR; INTRAVENOUS
Status: DISPENSED
Start: 2017-01-01 | End: 2017-01-01

## 2017-01-01 RX ORDER — PROPOFOL 10 MG/ML
INJECTION, EMULSION INTRAVENOUS
Status: COMPLETED
Start: 2017-01-01 | End: 2017-01-01

## 2017-01-01 RX ORDER — POTASSIUM CHLORIDE 1.5 G/1.58G
20 POWDER, FOR SOLUTION ORAL ONCE
Status: DISCONTINUED | OUTPATIENT
Start: 2017-01-01 | End: 2017-01-01

## 2017-01-01 RX ORDER — BUMETANIDE 2 MG/1
2 TABLET ORAL
Status: DISCONTINUED | OUTPATIENT
Start: 2017-01-01 | End: 2017-01-01 | Stop reason: HOSPADM

## 2017-01-01 RX ORDER — POTASSIUM CHLORIDE 1.5 G/1.58G
20 POWDER, FOR SOLUTION ORAL DAILY
Qty: 30 PACKET | Refills: 1 | Status: SHIPPED | OUTPATIENT
Start: 2017-01-01 | End: 2017-01-01

## 2017-01-01 RX ORDER — NITROGLYCERIN 0.4 MG/1
0.4 TABLET SUBLINGUAL EVERY 5 MIN PRN
Status: DISCONTINUED | OUTPATIENT
Start: 2017-01-01 | End: 2017-01-01 | Stop reason: HOSPADM

## 2017-01-01 RX ORDER — HEPARIN SODIUM 5000 [USP'U]/.5ML
5000 INJECTION, SOLUTION INTRAVENOUS; SUBCUTANEOUS EVERY 12 HOURS
Status: DISCONTINUED | OUTPATIENT
Start: 2017-01-01 | End: 2017-01-01

## 2017-01-01 RX ORDER — FLUTICASONE PROPIONATE 50 MCG
1 SPRAY, SUSPENSION (ML) NASAL DAILY
Status: DISCONTINUED | OUTPATIENT
Start: 2017-01-01 | End: 2017-01-01 | Stop reason: HOSPADM

## 2017-01-01 RX ORDER — PROTAMINE SULFATE 10 MG/ML
1-5 INJECTION, SOLUTION INTRAVENOUS
Status: DISCONTINUED | OUTPATIENT
Start: 2017-01-01 | End: 2017-01-01 | Stop reason: HOSPADM

## 2017-01-01 RX ORDER — ISOSORBIDE DINITRATE 30 MG/1
60 TABLET ORAL 3 TIMES DAILY
Qty: 90 TABLET | Refills: 3 | Status: SHIPPED | OUTPATIENT
Start: 2017-01-01

## 2017-01-01 RX ORDER — BUMETANIDE 1 MG/1
2 TABLET ORAL EVERY MORNING
Status: ON HOLD | COMMUNITY
Start: 2017-01-01 | End: 2017-01-01

## 2017-01-01 RX ORDER — POTASSIUM CHLORIDE 1500 MG/1
80 TABLET, EXTENDED RELEASE ORAL ONCE
Status: COMPLETED | OUTPATIENT
Start: 2017-01-01 | End: 2017-01-01

## 2017-01-01 RX ORDER — POTASSIUM CHLORIDE 1.5 G/1.58G
40 POWDER, FOR SOLUTION ORAL 2 TIMES DAILY
Qty: 180 PACKET | Refills: 3 | Status: SHIPPED | OUTPATIENT
Start: 2017-01-01 | End: 2017-01-01

## 2017-01-01 RX ORDER — ISOSORBIDE MONONITRATE 30 MG/1
60 TABLET, EXTENDED RELEASE ORAL AT BEDTIME
Status: DISCONTINUED | OUTPATIENT
Start: 2017-01-01 | End: 2017-01-01 | Stop reason: HOSPADM

## 2017-01-01 RX ORDER — BUMETANIDE 1 MG/1
2 TABLET ORAL
COMMUNITY
Start: 2017-01-01 | End: 2017-01-01

## 2017-01-01 RX ORDER — METOLAZONE 2.5 MG/1
2.5 TABLET ORAL ONCE
Status: COMPLETED | OUTPATIENT
Start: 2017-01-01 | End: 2017-01-01

## 2017-01-01 RX ORDER — POTASSIUM CHLORIDE 1.5 G/1.58G
40 POWDER, FOR SOLUTION ORAL 2 TIMES DAILY
Status: DISCONTINUED | OUTPATIENT
Start: 2017-01-01 | End: 2017-01-01 | Stop reason: HOSPADM

## 2017-01-01 RX ORDER — METOLAZONE 5 MG/1
5 TABLET ORAL ONCE
Status: DISCONTINUED | OUTPATIENT
Start: 2017-01-01 | End: 2017-01-01

## 2017-01-01 RX ORDER — POTASSIUM CHLORIDE 1500 MG/1
20 TABLET, EXTENDED RELEASE ORAL
Status: DISCONTINUED | OUTPATIENT
Start: 2017-01-01 | End: 2017-01-01 | Stop reason: HOSPADM

## 2017-01-01 RX ORDER — METHYLPREDNISOLONE SODIUM SUCCINATE 125 MG/2ML
125 INJECTION, POWDER, LYOPHILIZED, FOR SOLUTION INTRAMUSCULAR; INTRAVENOUS
Status: DISCONTINUED | OUTPATIENT
Start: 2017-01-01 | End: 2017-01-01 | Stop reason: CLARIF

## 2017-01-01 RX ORDER — NALOXONE HYDROCHLORIDE 0.4 MG/ML
.1-.4 INJECTION, SOLUTION INTRAMUSCULAR; INTRAVENOUS; SUBCUTANEOUS
Status: DISCONTINUED | OUTPATIENT
Start: 2017-01-01 | End: 2017-01-01

## 2017-01-01 RX ORDER — ONDANSETRON 2 MG/ML
4 INJECTION INTRAMUSCULAR; INTRAVENOUS EVERY 4 HOURS PRN
Status: DISCONTINUED | OUTPATIENT
Start: 2017-01-01 | End: 2017-01-01 | Stop reason: HOSPADM

## 2017-01-01 RX ORDER — OXYMETAZOLINE HYDROCHLORIDE 0.05 G/100ML
2 SPRAY NASAL 2 TIMES DAILY PRN
Status: DISPENSED | OUTPATIENT
Start: 2017-01-01 | End: 2017-01-01

## 2017-01-01 RX ORDER — LACTULOSE 10 G/15ML
20 SOLUTION ORAL
Status: DISCONTINUED | OUTPATIENT
Start: 2017-01-01 | End: 2017-01-01

## 2017-01-01 RX ORDER — HYDRALAZINE HYDROCHLORIDE 50 MG/1
50 TABLET, FILM COATED ORAL DAILY
Status: DISCONTINUED | OUTPATIENT
Start: 2017-01-01 | End: 2017-01-01 | Stop reason: HOSPADM

## 2017-01-01 RX ORDER — SODIUM CHLORIDE 9 MG/ML
INJECTION, SOLUTION INTRAVENOUS CONTINUOUS
Status: DISCONTINUED | OUTPATIENT
Start: 2017-01-01 | End: 2017-01-01

## 2017-01-01 RX ORDER — MAGNESIUM SULFATE HEPTAHYDRATE 40 MG/ML
4 INJECTION, SOLUTION INTRAVENOUS EVERY 4 HOURS PRN
Status: DISCONTINUED | OUTPATIENT
Start: 2017-01-01 | End: 2017-01-01 | Stop reason: HOSPADM

## 2017-01-01 RX ORDER — POTASSIUM CHLORIDE 1.5 G/1.58G
25 POWDER, FOR SOLUTION ORAL ONCE
Status: COMPLETED | OUTPATIENT
Start: 2017-01-01 | End: 2017-01-01

## 2017-01-01 RX ORDER — POTASSIUM CL/LIDO/0.9 % NACL 10MEQ/0.1L
10 INTRAVENOUS SOLUTION, PIGGYBACK (ML) INTRAVENOUS
Status: DISCONTINUED | OUTPATIENT
Start: 2017-01-01 | End: 2017-01-01

## 2017-01-01 RX ORDER — NICOTINE POLACRILEX 4 MG
15-30 LOZENGE BUCCAL
Status: DISCONTINUED | OUTPATIENT
Start: 2017-01-01 | End: 2017-01-01 | Stop reason: HOSPADM

## 2017-01-01 RX ORDER — NALOXONE HYDROCHLORIDE 0.4 MG/ML
0.4 INJECTION, SOLUTION INTRAMUSCULAR; INTRAVENOUS; SUBCUTANEOUS EVERY 5 MIN PRN
Status: DISCONTINUED | OUTPATIENT
Start: 2017-01-01 | End: 2017-01-01 | Stop reason: HOSPADM

## 2017-01-01 RX ORDER — LEVOTHYROXINE SODIUM 150 UG/1
150 TABLET ORAL DAILY
Status: DISCONTINUED | OUTPATIENT
Start: 2017-01-01 | End: 2017-01-01 | Stop reason: HOSPADM

## 2017-01-01 RX ORDER — FENTANYL CITRATE 50 UG/ML
50-100 INJECTION, SOLUTION INTRAMUSCULAR; INTRAVENOUS EVERY 10 MIN PRN
Status: DISCONTINUED | OUTPATIENT
Start: 2017-01-01 | End: 2017-01-01 | Stop reason: HOSPADM

## 2017-01-01 RX ORDER — BISACODYL 10 MG
10 SUPPOSITORY, RECTAL RECTAL DAILY PRN
Status: DISCONTINUED | OUTPATIENT
Start: 2017-01-01 | End: 2017-01-01 | Stop reason: HOSPADM

## 2017-01-01 RX ORDER — PROPOFOL 10 MG/ML
10-20 INJECTION, EMULSION INTRAVENOUS EVERY 30 MIN PRN
Status: DISCONTINUED | OUTPATIENT
Start: 2017-01-01 | End: 2017-01-01

## 2017-01-01 RX ORDER — HYDRALAZINE HYDROCHLORIDE 100 MG/1
100 TABLET, FILM COATED ORAL 4 TIMES DAILY
Status: DISCONTINUED | OUTPATIENT
Start: 2017-01-01 | End: 2017-01-01

## 2017-01-01 RX ORDER — SODIUM CHLORIDE 9 MG/ML
INJECTION, SOLUTION INTRAVENOUS CONTINUOUS
Status: CANCELLED | OUTPATIENT
Start: 2017-01-01

## 2017-01-01 RX ORDER — DIPHENHYDRAMINE HYDROCHLORIDE 50 MG/ML
50 INJECTION INTRAMUSCULAR; INTRAVENOUS
Status: DISCONTINUED | OUTPATIENT
Start: 2017-01-01 | End: 2017-01-01 | Stop reason: HOSPADM

## 2017-01-01 RX ORDER — CARVEDILOL 3.12 MG/1
6.25 TABLET ORAL 2 TIMES DAILY WITH MEALS
Qty: 540 TABLET | Refills: 3 | Status: SHIPPED | OUTPATIENT
Start: 2017-01-01 | End: 2017-01-01

## 2017-01-01 RX ORDER — LACTULOSE 10 G/15ML
200 SOLUTION ORAL EVERY 4 HOURS
Status: DISCONTINUED | OUTPATIENT
Start: 2017-01-01 | End: 2017-01-01

## 2017-01-01 RX ORDER — HEPARIN SODIUM (PORCINE) LOCK FLUSH IV SOLN 100 UNIT/ML 100 UNIT/ML
5 SOLUTION INTRAVENOUS
Status: DISCONTINUED | OUTPATIENT
Start: 2017-01-01 | End: 2017-01-01

## 2017-01-01 RX ORDER — ONDANSETRON 2 MG/ML
4 INJECTION INTRAMUSCULAR; INTRAVENOUS EVERY 6 HOURS PRN
Status: DISCONTINUED | OUTPATIENT
Start: 2017-01-01 | End: 2017-01-01 | Stop reason: HOSPADM

## 2017-01-01 RX ORDER — BUMETANIDE 1 MG/1
TABLET ORAL
Qty: 360 TABLET | Refills: 3 | COMMUNITY
Start: 2017-01-01 | End: 2017-01-01

## 2017-01-01 RX ORDER — TORSEMIDE 20 MG/1
80 TABLET ORAL 2 TIMES DAILY
Qty: 240 TABLET | Refills: 0 | Status: SHIPPED | OUTPATIENT
Start: 2017-01-01

## 2017-01-01 RX ORDER — POTASSIUM CHLORIDE 1.5 G/1.58G
40 POWDER, FOR SOLUTION ORAL EVERY 4 HOURS
Status: DISCONTINUED | OUTPATIENT
Start: 2017-01-01 | End: 2017-01-01

## 2017-01-01 RX ORDER — SODIUM POLYSTYRENE SULFONATE 15 G/60ML
15 SUSPENSION ORAL; RECTAL ONCE
Status: COMPLETED | OUTPATIENT
Start: 2017-01-01 | End: 2017-01-01

## 2017-01-01 RX ORDER — OXYCODONE HYDROCHLORIDE 5 MG/1
5 TABLET ORAL EVERY 4 HOURS PRN
Status: DISCONTINUED | OUTPATIENT
Start: 2017-01-01 | End: 2017-01-01

## 2017-01-01 RX ORDER — DOPAMINE HYDROCHLORIDE 160 MG/100ML
2-20 INJECTION, SOLUTION INTRAVENOUS CONTINUOUS
Status: DISCONTINUED | OUTPATIENT
Start: 2017-01-01 | End: 2017-01-01

## 2017-01-01 RX ORDER — ARGATROBAN 1 MG/ML
150 INJECTION, SOLUTION INTRAVENOUS
Status: DISCONTINUED | OUTPATIENT
Start: 2017-01-01 | End: 2017-01-01 | Stop reason: HOSPADM

## 2017-01-01 RX ORDER — POTASSIUM CHLORIDE 750 MG/1
20-40 TABLET, EXTENDED RELEASE ORAL
Status: DISCONTINUED | OUTPATIENT
Start: 2017-01-01 | End: 2017-01-01

## 2017-01-01 RX ORDER — ASPIRIN 325 MG
325 TABLET ORAL
Status: DISCONTINUED | OUTPATIENT
Start: 2017-01-01 | End: 2017-01-01 | Stop reason: HOSPADM

## 2017-01-01 RX ORDER — POTASSIUM CHLORIDE 1.5 G/1.58G
20 POWDER, FOR SOLUTION ORAL DAILY
Status: DISCONTINUED | OUTPATIENT
Start: 2017-01-01 | End: 2017-01-01

## 2017-01-01 RX ORDER — PROPOFOL 10 MG/ML
5-75 INJECTION, EMULSION INTRAVENOUS CONTINUOUS
Status: DISCONTINUED | OUTPATIENT
Start: 2017-01-01 | End: 2017-01-01

## 2017-01-01 RX ORDER — NALOXONE HYDROCHLORIDE 0.4 MG/ML
.2-.4 INJECTION, SOLUTION INTRAMUSCULAR; INTRAVENOUS; SUBCUTANEOUS
Status: DISCONTINUED | OUTPATIENT
Start: 2017-01-01 | End: 2017-01-01 | Stop reason: HOSPADM

## 2017-01-01 RX ORDER — HEPARIN SODIUM,PORCINE 10 UNIT/ML
5-10 VIAL (ML) INTRAVENOUS EVERY 24 HOURS
Status: DISCONTINUED | OUTPATIENT
Start: 2017-01-01 | End: 2017-01-01 | Stop reason: HOSPADM

## 2017-01-01 RX ORDER — LANOLIN ALCOHOL/MO/W.PET/CERES
100 CREAM (GRAM) TOPICAL DAILY
Status: DISCONTINUED | OUTPATIENT
Start: 2017-01-01 | End: 2017-01-01 | Stop reason: HOSPADM

## 2017-01-01 RX ORDER — DOBUTAMINE HYDROCHLORIDE 200 MG/100ML
5 INJECTION INTRAVENOUS CONTINUOUS
Status: DISCONTINUED | OUTPATIENT
Start: 2017-01-01 | End: 2017-01-01

## 2017-01-01 RX ORDER — POTASSIUM CHLORIDE 1.5 G/1.58G
40 POWDER, FOR SOLUTION ORAL 3 TIMES DAILY
Status: DISCONTINUED | OUTPATIENT
Start: 2017-01-01 | End: 2017-01-01

## 2017-01-01 RX ORDER — BUMETANIDE 0.25 MG/ML
1 INJECTION INTRAMUSCULAR; INTRAVENOUS ONCE
Status: COMPLETED | OUTPATIENT
Start: 2017-01-01 | End: 2017-01-01

## 2017-01-01 RX ORDER — TOLTERODINE 4 MG/1
4 CAPSULE, EXTENDED RELEASE ORAL DAILY
Status: DISCONTINUED | OUTPATIENT
Start: 2017-01-01 | End: 2017-01-01

## 2017-01-01 RX ORDER — POTASSIUM CHLORIDE 1500 MG/1
20 TABLET, EXTENDED RELEASE ORAL ONCE
Status: COMPLETED | OUTPATIENT
Start: 2017-01-01 | End: 2017-01-01

## 2017-01-01 RX ORDER — ASPIRIN 81 MG/1
81 TABLET ORAL DAILY
COMMUNITY

## 2017-01-01 RX ORDER — POTASSIUM CHLORIDE 1.5 G/1.58G
20-40 POWDER, FOR SOLUTION ORAL
Status: DISCONTINUED | OUTPATIENT
Start: 2017-01-01 | End: 2017-01-01

## 2017-01-01 RX ORDER — LIDOCAINE HYDROCHLORIDE 10 MG/ML
30 INJECTION, SOLUTION EPIDURAL; INFILTRATION; INTRACAUDAL; PERINEURAL
Status: DISCONTINUED | OUTPATIENT
Start: 2017-01-01 | End: 2017-01-01 | Stop reason: HOSPADM

## 2017-01-01 RX ORDER — BUMETANIDE 1 MG/1
2 TABLET ORAL 2 TIMES DAILY
Qty: 360 TABLET | Refills: 3 | Status: SHIPPED | OUTPATIENT
Start: 2017-01-01 | End: 2017-01-01

## 2017-01-01 RX ORDER — POTASSIUM CHLORIDE 1.5 G/1.58G
40 POWDER, FOR SOLUTION ORAL 3 TIMES DAILY
Qty: 540 PACKET | Refills: 3 | Status: ON HOLD | OUTPATIENT
Start: 2017-01-01 | End: 2017-01-01

## 2017-01-01 RX ORDER — OXYCODONE HYDROCHLORIDE 5 MG/1
5 TABLET ORAL EVERY 4 HOURS PRN
Status: DISCONTINUED | OUTPATIENT
Start: 2017-01-01 | End: 2017-01-01 | Stop reason: HOSPADM

## 2017-01-01 RX ORDER — FUROSEMIDE 10 MG/ML
20-100 INJECTION INTRAMUSCULAR; INTRAVENOUS
Status: DISCONTINUED | OUTPATIENT
Start: 2017-01-01 | End: 2017-01-01 | Stop reason: HOSPADM

## 2017-01-01 RX ORDER — CLOPIDOGREL BISULFATE 75 MG/1
300-600 TABLET ORAL
Status: DISCONTINUED | OUTPATIENT
Start: 2017-01-01 | End: 2017-01-01 | Stop reason: HOSPADM

## 2017-01-01 RX ORDER — HYDRALAZINE HYDROCHLORIDE 50 MG/1
100 TABLET, FILM COATED ORAL 4 TIMES DAILY
Status: DISCONTINUED | OUTPATIENT
Start: 2017-01-01 | End: 2017-01-01

## 2017-01-01 RX ORDER — HYDROMORPHONE HYDROCHLORIDE 1 MG/ML
.3-.5 INJECTION, SOLUTION INTRAMUSCULAR; INTRAVENOUS; SUBCUTANEOUS EVERY 30 MIN PRN
Status: DISCONTINUED | OUTPATIENT
Start: 2017-01-01 | End: 2017-01-01 | Stop reason: HOSPADM

## 2017-01-01 RX ORDER — ISOSORBIDE DINITRATE 20 MG/1
60 TABLET ORAL ONCE
Status: COMPLETED | OUTPATIENT
Start: 2017-01-01 | End: 2017-01-01

## 2017-01-01 RX ORDER — HYDRALAZINE HYDROCHLORIDE 20 MG/ML
10 INJECTION INTRAMUSCULAR; INTRAVENOUS ONCE
Status: COMPLETED | OUTPATIENT
Start: 2017-01-01 | End: 2017-01-01

## 2017-01-01 RX ORDER — HYDROCODONE BITARTRATE AND ACETAMINOPHEN 5; 325 MG/1; MG/1
1-2 TABLET ORAL EVERY 4 HOURS PRN
Status: DISCONTINUED | OUTPATIENT
Start: 2017-01-01 | End: 2017-01-01 | Stop reason: HOSPADM

## 2017-01-01 RX ORDER — ISOSORBIDE DINITRATE 30 MG/1
60 TABLET ORAL
Status: DISCONTINUED | OUTPATIENT
Start: 2017-01-01 | End: 2017-01-01 | Stop reason: HOSPADM

## 2017-01-01 RX ORDER — HEPARIN SODIUM 5000 [USP'U]/.5ML
5000 INJECTION, SOLUTION INTRAVENOUS; SUBCUTANEOUS EVERY 12 HOURS
Status: DISCONTINUED | OUTPATIENT
Start: 2017-01-01 | End: 2017-01-01 | Stop reason: HOSPADM

## 2017-01-01 RX ORDER — BUMETANIDE 1 MG/1
1 TABLET ORAL 2 TIMES DAILY
Qty: 30 TABLET | Refills: 1 | Status: SHIPPED | OUTPATIENT
Start: 2017-01-01 | End: 2017-01-01

## 2017-01-01 RX ORDER — CARVEDILOL 3.12 MG/1
3.12 TABLET ORAL 2 TIMES DAILY WITH MEALS
Status: DISCONTINUED | OUTPATIENT
Start: 2017-01-01 | End: 2017-01-01 | Stop reason: HOSPADM

## 2017-01-01 RX ORDER — MAGNESIUM SULFATE HEPTAHYDRATE 40 MG/ML
4 INJECTION, SOLUTION INTRAVENOUS EVERY 4 HOURS PRN
Status: DISCONTINUED | OUTPATIENT
Start: 2017-01-01 | End: 2017-01-01

## 2017-01-01 RX ORDER — LEVOTHYROXINE SODIUM 150 UG/1
150 TABLET ORAL DAILY
COMMUNITY

## 2017-01-01 RX ORDER — LIDOCAINE 40 MG/G
CREAM TOPICAL
Status: CANCELLED | OUTPATIENT
Start: 2017-01-01

## 2017-01-01 RX ORDER — METOLAZONE 2.5 MG/1
TABLET ORAL
Status: ON HOLD | COMMUNITY
Start: 2017-01-01 | End: 2017-01-01

## 2017-01-01 RX ORDER — POTASSIUM CHLORIDE 1500 MG/1
TABLET, EXTENDED RELEASE ORAL
Status: ON HOLD | COMMUNITY
Start: 2017-01-01 | End: 2017-01-01

## 2017-01-01 RX ORDER — POTASSIUM CHLORIDE 1500 MG/1
60 TABLET, EXTENDED RELEASE ORAL ONCE
Status: COMPLETED | OUTPATIENT
Start: 2017-01-01 | End: 2017-01-01

## 2017-01-01 RX ORDER — FUROSEMIDE 10 MG/ML
20 INJECTION INTRAMUSCULAR; INTRAVENOUS ONCE
Status: COMPLETED | OUTPATIENT
Start: 2017-01-01 | End: 2017-01-01

## 2017-01-01 RX ORDER — EPTIFIBATIDE 2 MG/ML
2 INJECTION, SOLUTION INTRAVENOUS CONTINUOUS PRN
Status: DISCONTINUED | OUTPATIENT
Start: 2017-01-01 | End: 2017-01-01 | Stop reason: HOSPADM

## 2017-01-01 RX ORDER — HYDRALAZINE HYDROCHLORIDE 25 MG/1
50 TABLET, FILM COATED ORAL 3 TIMES DAILY
Status: DISCONTINUED | OUTPATIENT
Start: 2017-01-01 | End: 2017-01-01

## 2017-01-01 RX ORDER — ACETAMINOPHEN 650 MG/1
650 SUPPOSITORY RECTAL EVERY 4 HOURS PRN
Status: DISCONTINUED | OUTPATIENT
Start: 2017-01-01 | End: 2017-01-01

## 2017-01-01 RX ORDER — SIMETHICONE 80 MG
160 TABLET,CHEWABLE ORAL 4 TIMES DAILY PRN
Status: DISCONTINUED | OUTPATIENT
Start: 2017-01-01 | End: 2017-01-01

## 2017-01-01 RX ORDER — PROCHLORPERAZINE MALEATE 5 MG
5 TABLET ORAL EVERY 6 HOURS PRN
Status: DISCONTINUED | OUTPATIENT
Start: 2017-01-01 | End: 2017-01-01 | Stop reason: HOSPADM

## 2017-01-01 RX ORDER — BUMETANIDE 1 MG/1
3 TABLET ORAL 2 TIMES DAILY
Qty: 540 TABLET | Refills: 3 | Status: SHIPPED | OUTPATIENT
Start: 2017-01-01 | End: 2017-01-01

## 2017-01-01 RX ORDER — BUMETANIDE 1 MG/1
2 TABLET ORAL 2 TIMES DAILY
Qty: 360 TABLET | Refills: 3 | Status: ON HOLD | OUTPATIENT
Start: 2017-01-01 | End: 2017-01-01

## 2017-01-01 RX ORDER — PROPOFOL 10 MG/ML
INJECTION, EMULSION INTRAVENOUS PRN
Status: DISCONTINUED | OUTPATIENT
Start: 2017-01-01 | End: 2017-01-01

## 2017-01-01 RX ORDER — CARVEDILOL 6.25 MG/1
6.25 TABLET ORAL 2 TIMES DAILY WITH MEALS
Status: DISCONTINUED | OUTPATIENT
Start: 2017-01-01 | End: 2017-01-01 | Stop reason: HOSPADM

## 2017-01-01 RX ORDER — ATROPINE SULFATE 0.1 MG/ML
.5-1 INJECTION INTRAVENOUS
Status: DISCONTINUED | OUTPATIENT
Start: 2017-01-01 | End: 2017-01-01 | Stop reason: HOSPADM

## 2017-01-01 RX ORDER — ASPIRIN 81 MG/1
81 TABLET, CHEWABLE ORAL DAILY
Status: DISCONTINUED | OUTPATIENT
Start: 2017-01-01 | End: 2017-01-01 | Stop reason: HOSPADM

## 2017-01-01 RX ORDER — HYDRALAZINE HYDROCHLORIDE 50 MG/1
50 TABLET, FILM COATED ORAL ONCE
Status: COMPLETED | OUTPATIENT
Start: 2017-01-01 | End: 2017-01-01

## 2017-01-01 RX ORDER — HYDRALAZINE HYDROCHLORIDE 100 MG/1
100 TABLET, FILM COATED ORAL 4 TIMES DAILY
Status: DISCONTINUED | OUTPATIENT
Start: 2017-01-01 | End: 2017-01-01 | Stop reason: HOSPADM

## 2017-01-01 RX ORDER — TORSEMIDE 20 MG/1
80 TABLET ORAL DAILY
Status: DISCONTINUED | OUTPATIENT
Start: 2017-01-01 | End: 2017-01-01

## 2017-01-01 RX ORDER — HYDRALAZINE HYDROCHLORIDE 25 MG/1
100 TABLET, FILM COATED ORAL 4 TIMES DAILY
Status: DISCONTINUED | OUTPATIENT
Start: 2017-01-01 | End: 2017-01-01 | Stop reason: HOSPADM

## 2017-01-01 RX ORDER — FENTANYL CITRATE 50 UG/ML
INJECTION, SOLUTION INTRAMUSCULAR; INTRAVENOUS
Status: DISCONTINUED
Start: 2017-01-01 | End: 2017-01-01 | Stop reason: HOSPADM

## 2017-01-01 RX ORDER — ACETAMINOPHEN 325 MG/1
325-650 TABLET ORAL EVERY 6 HOURS PRN
COMMUNITY

## 2017-01-01 RX ORDER — HEPARIN SODIUM 5000 [USP'U]/.5ML
5000 INJECTION, SOLUTION INTRAVENOUS; SUBCUTANEOUS EVERY 8 HOURS
Status: DISCONTINUED | OUTPATIENT
Start: 2017-01-01 | End: 2017-01-01 | Stop reason: HOSPADM

## 2017-01-01 RX ORDER — BUMETANIDE 2 MG/1
2 TABLET ORAL EVERY MORNING
Status: DISCONTINUED | OUTPATIENT
Start: 2017-01-01 | End: 2017-01-01 | Stop reason: HOSPADM

## 2017-01-01 RX ORDER — CODEINE PHOSPHATE AND GUAIFENESIN 10; 100 MG/5ML; MG/5ML
5 SOLUTION ORAL EVERY 6 HOURS PRN
Status: DISCONTINUED | OUTPATIENT
Start: 2017-01-01 | End: 2017-01-01 | Stop reason: HOSPADM

## 2017-01-01 RX ORDER — FUROSEMIDE 80 MG
80 TABLET ORAL 2 TIMES DAILY
Qty: 30 TABLET | Refills: 3 | Status: ON HOLD | OUTPATIENT
Start: 2017-01-01 | End: 2017-01-01

## 2017-01-01 RX ORDER — HYDRALAZINE HYDROCHLORIDE 25 MG/1
25 TABLET, FILM COATED ORAL
Status: DISCONTINUED | OUTPATIENT
Start: 2017-01-01 | End: 2017-01-01

## 2017-01-01 RX ORDER — FENTANYL CITRATE 50 UG/ML
100 INJECTION, SOLUTION INTRAMUSCULAR; INTRAVENOUS
Status: COMPLETED | OUTPATIENT
Start: 2017-01-01 | End: 2017-01-01

## 2017-01-01 RX ORDER — FERROUS SULFATE 325(65) MG
325 TABLET ORAL DAILY
Qty: 100 TABLET | Refills: 0 | Status: SHIPPED | OUTPATIENT
Start: 2017-01-01

## 2017-01-01 RX ORDER — POTASSIUM CHLORIDE 1500 MG/1
20 TABLET, EXTENDED RELEASE ORAL EVERY OTHER DAY
COMMUNITY
End: 2017-01-01

## 2017-01-01 RX ORDER — ALUMINA, MAGNESIA, AND SIMETHICONE 2400; 2400; 240 MG/30ML; MG/30ML; MG/30ML
15-30 SUSPENSION ORAL EVERY 4 HOURS PRN
Status: DISCONTINUED | OUTPATIENT
Start: 2017-01-01 | End: 2017-01-01 | Stop reason: HOSPADM

## 2017-01-01 RX ORDER — MAGNESIUM HYDROXIDE 1200 MG/15ML
LIQUID ORAL
Status: DISPENSED
Start: 2017-01-01 | End: 2017-01-01

## 2017-01-01 RX ORDER — POTASSIUM CHLORIDE 1.5 G/1.58G
POWDER, FOR SOLUTION ORAL
Qty: 270 PACKET | Refills: 3 | Status: SHIPPED | OUTPATIENT
Start: 2017-01-01

## 2017-01-01 RX ORDER — HYDRALAZINE HYDROCHLORIDE 50 MG/1
50 TABLET, FILM COATED ORAL
Status: DISCONTINUED | OUTPATIENT
Start: 2017-01-01 | End: 2017-01-01

## 2017-01-01 RX ORDER — FERROUS SULFATE 325(65) MG
325 TABLET ORAL DAILY
Status: DISCONTINUED | OUTPATIENT
Start: 2017-01-01 | End: 2017-01-01 | Stop reason: HOSPADM

## 2017-01-01 RX ORDER — ACETAMINOPHEN 325 MG/1
975 TABLET ORAL ONCE
Status: COMPLETED | OUTPATIENT
Start: 2017-01-01 | End: 2017-01-01

## 2017-01-01 RX ORDER — NALOXONE HYDROCHLORIDE 1 MG/ML
0.4 INJECTION INTRAMUSCULAR; INTRAVENOUS; SUBCUTANEOUS
Status: DISCONTINUED | OUTPATIENT
Start: 2017-01-01 | End: 2017-01-01

## 2017-01-01 RX ORDER — ARGATROBAN 1 MG/ML
350 INJECTION, SOLUTION INTRAVENOUS
Status: DISCONTINUED | OUTPATIENT
Start: 2017-01-01 | End: 2017-01-01 | Stop reason: HOSPADM

## 2017-01-01 RX ORDER — TOLTERODINE 4 MG/1
4 CAPSULE, EXTENDED RELEASE ORAL DAILY
COMMUNITY

## 2017-01-01 RX ORDER — BUMETANIDE 0.5 MG/1
1 TABLET ORAL DAILY
Status: DISCONTINUED | OUTPATIENT
Start: 2017-01-01 | End: 2017-01-01 | Stop reason: HOSPADM

## 2017-01-01 RX ORDER — METOPROLOL TARTRATE 1 MG/ML
5 INJECTION, SOLUTION INTRAVENOUS EVERY 5 MIN PRN
Status: DISCONTINUED | OUTPATIENT
Start: 2017-01-01 | End: 2017-01-01 | Stop reason: HOSPADM

## 2017-01-01 RX ORDER — HYDROMORPHONE HYDROCHLORIDE 1 MG/ML
.3-.5 INJECTION, SOLUTION INTRAMUSCULAR; INTRAVENOUS; SUBCUTANEOUS EVERY 10 MIN PRN
Status: DISCONTINUED | OUTPATIENT
Start: 2017-01-01 | End: 2017-01-01 | Stop reason: HOSPADM

## 2017-01-01 RX ADMIN — POTASSIUM CHLORIDE 20 MEQ: 29.8 INJECTION, SOLUTION INTRAVENOUS at 05:07

## 2017-01-01 RX ADMIN — POTASSIUM CHLORIDE 10 MEQ: 14.9 INJECTION, SOLUTION, CONCENTRATE PARENTERAL at 09:23

## 2017-01-01 RX ADMIN — CHLOROTHIAZIDE SODIUM 250 MG: 500 INJECTION, POWDER, LYOPHILIZED, FOR SOLUTION INTRAVENOUS at 17:59

## 2017-01-01 RX ADMIN — FUROSEMIDE 10 MG/HR: 10 INJECTION, SOLUTION INTRAVENOUS at 18:09

## 2017-01-01 RX ADMIN — ISOSORBIDE DINITRATE 60 MG: 20 TABLET ORAL at 20:55

## 2017-01-01 RX ADMIN — ISOSORBIDE DINITRATE 30 MG: 10 TABLET ORAL at 20:12

## 2017-01-01 RX ADMIN — MILRINONE LACTATE 0.3 MCG/KG/MIN: 200 INJECTION, SOLUTION INTRAVENOUS at 20:10

## 2017-01-01 RX ADMIN — Medication 20 G: at 20:11

## 2017-01-01 RX ADMIN — BUMETANIDE 2 MG: 0.25 INJECTION, SOLUTION INTRAMUSCULAR; INTRAVENOUS at 04:47

## 2017-01-01 RX ADMIN — MINOXIDIL 2.5 MG: 2.5 TABLET ORAL at 08:00

## 2017-01-01 RX ADMIN — ASPIRIN 81 MG: 81 TABLET, COATED ORAL at 08:52

## 2017-01-01 RX ADMIN — MINOXIDIL 2.5 MG: 2.5 TABLET ORAL at 07:58

## 2017-01-01 RX ADMIN — HYDRALAZINE HYDROCHLORIDE 100 MG: 25 TABLET ORAL at 21:07

## 2017-01-01 RX ADMIN — POTASSIUM CHLORIDE 20 MEQ: 29.8 INJECTION, SOLUTION INTRAVENOUS at 07:39

## 2017-01-01 RX ADMIN — HYDRALAZINE HYDROCHLORIDE 50 MG: 50 TABLET ORAL at 09:19

## 2017-01-01 RX ADMIN — MELATONIN TAB 3 MG 3 MG: 3 TAB at 22:44

## 2017-01-01 RX ADMIN — FUROSEMIDE 10 MG/HR: 10 INJECTION, SOLUTION INTRAMUSCULAR; INTRAVENOUS at 22:57

## 2017-01-01 RX ADMIN — MILRINONE LACTATE 0.38 MCG/KG/MIN: 200 INJECTION, SOLUTION INTRAVENOUS at 10:03

## 2017-01-01 RX ADMIN — ASPIRIN 81 MG: 81 TABLET, COATED ORAL at 08:56

## 2017-01-01 RX ADMIN — CYANOCOBALAMIN TAB 1000 MCG 1000 MCG: 1000 TAB at 09:19

## 2017-01-01 RX ADMIN — POTASSIUM CHLORIDE 20 MEQ: 29.8 INJECTION, SOLUTION INTRAVENOUS at 22:29

## 2017-01-01 RX ADMIN — ASPIRIN 81 MG: 81 TABLET, COATED ORAL at 07:58

## 2017-01-01 RX ADMIN — CALCITRIOL 0.25 MCG: 0.25 CAPSULE, LIQUID FILLED ORAL at 08:56

## 2017-01-01 RX ADMIN — LEVOTHYROXINE SODIUM 150 MCG: 75 TABLET ORAL at 06:19

## 2017-01-01 RX ADMIN — LIDOCAINE HYDROCHLORIDE 3 ML: 10 INJECTION, SOLUTION INFILTRATION; PERINEURAL at 15:08

## 2017-01-01 RX ADMIN — HYDRALAZINE HYDROCHLORIDE 25 MG: 25 TABLET ORAL at 00:07

## 2017-01-01 RX ADMIN — CLOPIDOGREL 75 MG: 75 TABLET, FILM COATED ORAL at 09:04

## 2017-01-01 RX ADMIN — FUROSEMIDE 80 MG: 10 INJECTION, SOLUTION INTRAVENOUS at 00:41

## 2017-01-01 RX ADMIN — HYDRALAZINE HYDROCHLORIDE 100 MG: 100 TABLET ORAL at 12:47

## 2017-01-01 RX ADMIN — FUROSEMIDE 10 MG/HR: 10 INJECTION, SOLUTION INTRAVENOUS at 12:01

## 2017-01-01 RX ADMIN — CHLOROTHIAZIDE SODIUM 250 MG: 500 INJECTION, POWDER, LYOPHILIZED, FOR SOLUTION INTRAVENOUS at 18:17

## 2017-01-01 RX ADMIN — SODIUM CHLORIDE 500 ML: 9 INJECTION, SOLUTION INTRAVENOUS at 09:49

## 2017-01-01 RX ADMIN — ISOSORBIDE DINITRATE 60 MG: 20 TABLET ORAL at 12:22

## 2017-01-01 RX ADMIN — FUROSEMIDE 5 MG/HR: 10 INJECTION, SOLUTION INTRAMUSCULAR; INTRAVENOUS at 17:24

## 2017-01-01 RX ADMIN — CHLOROTHIAZIDE SODIUM 500 MG: 500 INJECTION, POWDER, LYOPHILIZED, FOR SOLUTION INTRAVENOUS at 14:23

## 2017-01-01 RX ADMIN — MILRINONE LACTATE 0.38 MCG/KG/MIN: 200 INJECTION, SOLUTION INTRAVENOUS at 08:50

## 2017-01-01 RX ADMIN — HYDRALAZINE HYDROCHLORIDE 100 MG: 100 TABLET ORAL at 11:59

## 2017-01-01 RX ADMIN — CLOPIDOGREL 75 MG: 75 TABLET, FILM COATED ORAL at 08:51

## 2017-01-01 RX ADMIN — CALCIUM GLUCONATE 1 G: 94 INJECTION, SOLUTION INTRAVENOUS at 02:20

## 2017-01-01 RX ADMIN — Medication 8 MG/HR: at 09:49

## 2017-01-01 RX ADMIN — HYDRALAZINE HYDROCHLORIDE 50 MG: 50 TABLET ORAL at 20:08

## 2017-01-01 RX ADMIN — POTASSIUM CHLORIDE 20 MEQ: 1.5 POWDER, FOR SOLUTION ORAL at 00:29

## 2017-01-01 RX ADMIN — POTASSIUM CHLORIDE 20 MEQ: 29.8 INJECTION, SOLUTION INTRAVENOUS at 15:50

## 2017-01-01 RX ADMIN — SODIUM CHLORIDE 1000 ML: 9 INJECTION, SOLUTION INTRAVENOUS at 02:11

## 2017-01-01 RX ADMIN — DOBUTAMINE 7.5 MCG/KG/MIN: 12.5 INJECTION, SOLUTION, CONCENTRATE INTRAVENOUS at 04:10

## 2017-01-01 RX ADMIN — HYDRALAZINE HYDROCHLORIDE 100 MG: 100 TABLET ORAL at 19:50

## 2017-01-01 RX ADMIN — CALCITRIOL 0.25 MCG: 0.25 CAPSULE, LIQUID FILLED ORAL at 09:17

## 2017-01-01 RX ADMIN — POTASSIUM CHLORIDE 20 MEQ: 29.8 INJECTION, SOLUTION INTRAVENOUS at 00:27

## 2017-01-01 RX ADMIN — POTASSIUM CHLORIDE 40 MEQ: 1.5 POWDER, FOR SOLUTION ORAL at 08:50

## 2017-01-01 RX ADMIN — DOPAMINE HYDROCHLORIDE 5 MCG/KG/MIN: 40 INJECTION, SOLUTION, CONCENTRATE INTRAVENOUS at 00:43

## 2017-01-01 RX ADMIN — HYDRALAZINE HYDROCHLORIDE 100 MG: 100 TABLET ORAL at 08:19

## 2017-01-01 RX ADMIN — CALCITRIOL 0.25 MCG: 0.25 CAPSULE, LIQUID FILLED ORAL at 07:40

## 2017-01-01 RX ADMIN — CARVEDILOL 6.25 MG: 6.25 TABLET, FILM COATED ORAL at 18:36

## 2017-01-01 RX ADMIN — ISOSORBIDE MONONITRATE 60 MG: 30 TABLET, EXTENDED RELEASE ORAL at 20:38

## 2017-01-01 RX ADMIN — HYDRALAZINE HYDROCHLORIDE 100 MG: 25 TABLET ORAL at 12:50

## 2017-01-01 RX ADMIN — LEVOTHYROXINE SODIUM 150 MCG: 75 TABLET ORAL at 06:46

## 2017-01-01 RX ADMIN — IRON 325 MG: 65 TABLET ORAL at 07:45

## 2017-01-01 RX ADMIN — CARVEDILOL 9.38 MG: 6.25 TABLET, FILM COATED ORAL at 18:40

## 2017-01-01 RX ADMIN — POTASSIUM CHLORIDE 20 MEQ: 1.5 POWDER, FOR SOLUTION ORAL at 20:50

## 2017-01-01 RX ADMIN — METHYLNALTREXONE BROMIDE 12 MG: 12 INJECTION, SOLUTION SUBCUTANEOUS at 15:59

## 2017-01-01 RX ADMIN — POTASSIUM CHLORIDE 20 MEQ: 29.8 INJECTION, SOLUTION INTRAVENOUS at 22:23

## 2017-01-01 RX ADMIN — POTASSIUM CHLORIDE 40 MEQ: 1.5 POWDER, FOR SOLUTION ORAL at 08:30

## 2017-01-01 RX ADMIN — CLOPIDOGREL BISULFATE 75 MG: 75 TABLET, FILM COATED ORAL at 08:16

## 2017-01-01 RX ADMIN — OXYCODONE HYDROCHLORIDE 5 MG: 5 TABLET ORAL at 22:39

## 2017-01-01 RX ADMIN — POTASSIUM CHLORIDE 20 MEQ: 29.8 INJECTION, SOLUTION INTRAVENOUS at 18:10

## 2017-01-01 RX ADMIN — PIPERACILLIN AND TAZOBACTAM 3.38 G: 3; .375 INJECTION, POWDER, LYOPHILIZED, FOR SOLUTION INTRAVENOUS; PARENTERAL at 00:12

## 2017-01-01 RX ADMIN — LEVOTHYROXINE SODIUM 150 MCG: 75 TABLET ORAL at 06:30

## 2017-01-01 RX ADMIN — POTASSIUM CHLORIDE 40 MEQ: 1.5 POWDER, FOR SOLUTION ORAL at 19:52

## 2017-01-01 RX ADMIN — CALCITRIOL 0.25 MCG: 0.25 CAPSULE, LIQUID FILLED ORAL at 07:36

## 2017-01-01 RX ADMIN — CALCITRIOL 0.25 MCG: 0.25 CAPSULE, LIQUID FILLED ORAL at 08:16

## 2017-01-01 RX ADMIN — ACETAMINOPHEN 650 MG: 325 TABLET, FILM COATED ORAL at 04:00

## 2017-01-01 RX ADMIN — LEVOTHYROXINE SODIUM 150 MCG: 75 TABLET ORAL at 07:33

## 2017-01-01 RX ADMIN — HYDRALAZINE HYDROCHLORIDE 50 MG: 50 TABLET ORAL at 10:25

## 2017-01-01 RX ADMIN — MIDAZOLAM HYDROCHLORIDE 4 MG: 1 INJECTION, SOLUTION INTRAMUSCULAR; INTRAVENOUS at 17:23

## 2017-01-01 RX ADMIN — METOLAZONE 5 MG: 2.5 TABLET ORAL at 16:43

## 2017-01-01 RX ADMIN — FUROSEMIDE 20 MG/HR: 10 INJECTION, SOLUTION INTRAVENOUS at 23:40

## 2017-01-01 RX ADMIN — CALCITRIOL 0.25 MCG: 0.25 CAPSULE, LIQUID FILLED ORAL at 08:01

## 2017-01-01 RX ADMIN — TOLTERODINE TARTRATE 4 MG: 4 CAPSULE, EXTENDED RELEASE ORAL at 08:56

## 2017-01-01 RX ADMIN — POTASSIUM CHLORIDE 40 MEQ: 1.5 POWDER, FOR SOLUTION ORAL at 20:55

## 2017-01-01 RX ADMIN — POTASSIUM CHLORIDE 40 MEQ: 1500 TABLET, EXTENDED RELEASE ORAL at 11:10

## 2017-01-01 RX ADMIN — HYDRALAZINE HYDROCHLORIDE 100 MG: 50 TABLET ORAL at 11:57

## 2017-01-01 RX ADMIN — MILRINONE LACTATE 0.38 MCG/KG/MIN: 200 INJECTION, SOLUTION INTRAVENOUS at 21:57

## 2017-01-01 RX ADMIN — POTASSIUM CHLORIDE 20 MEQ: 1500 TABLET, EXTENDED RELEASE ORAL at 13:37

## 2017-01-01 RX ADMIN — FUROSEMIDE 40 MG: 10 INJECTION, SOLUTION INTRAVENOUS at 19:52

## 2017-01-01 RX ADMIN — POTASSIUM CHLORIDE 20 MEQ: 1.5 POWDER, FOR SOLUTION ORAL at 08:26

## 2017-01-01 RX ADMIN — FUROSEMIDE 15 MG/HR: 10 INJECTION, SOLUTION INTRAVENOUS at 01:11

## 2017-01-01 RX ADMIN — POTASSIUM CHLORIDE 20 MEQ: 29.8 INJECTION, SOLUTION INTRAVENOUS at 14:57

## 2017-01-01 RX ADMIN — CARVEDILOL 6.25 MG: 6.25 TABLET, FILM COATED ORAL at 08:00

## 2017-01-01 RX ADMIN — POTASSIUM CHLORIDE 20 MEQ: 1.5 POWDER, FOR SOLUTION ORAL at 09:23

## 2017-01-01 RX ADMIN — LEVOTHYROXINE SODIUM 150 MCG: 75 TABLET ORAL at 07:02

## 2017-01-01 RX ADMIN — POTASSIUM CHLORIDE 20 MEQ: 750 TABLET, EXTENDED RELEASE ORAL at 20:13

## 2017-01-01 RX ADMIN — MILRINONE LACTATE 0.38 MCG/KG/MIN: 200 INJECTION, SOLUTION INTRAVENOUS at 18:46

## 2017-01-01 RX ADMIN — HYDRALAZINE HYDROCHLORIDE 100 MG: 100 TABLET ORAL at 14:36

## 2017-01-01 RX ADMIN — MILRINONE LACTATE 0.38 MCG/KG/MIN: 200 INJECTION, SOLUTION INTRAVENOUS at 08:42

## 2017-01-01 RX ADMIN — Medication 1 MG: at 01:40

## 2017-01-01 RX ADMIN — MINOXIDIL 5 MG: 2.5 TABLET ORAL at 20:08

## 2017-01-01 RX ADMIN — ALTEPLASE 2 MG: 2.2 INJECTION, POWDER, LYOPHILIZED, FOR SOLUTION INTRAVENOUS at 16:31

## 2017-01-01 RX ADMIN — ISOSORBIDE DINITRATE 60 MG: 30 TABLET ORAL at 09:06

## 2017-01-01 RX ADMIN — MULTIPLE VITAMINS W/ MINERALS TAB 1 TABLET: TAB at 08:00

## 2017-01-01 RX ADMIN — ASPIRIN 81 MG: 81 TABLET, COATED ORAL at 09:04

## 2017-01-01 RX ADMIN — HEPARIN SODIUM 5000 UNITS: 5000 INJECTION, SOLUTION INTRAVENOUS; SUBCUTANEOUS at 18:37

## 2017-01-01 RX ADMIN — MELATONIN TAB 3 MG 3 MG: 3 TAB at 21:56

## 2017-01-01 RX ADMIN — MILRINONE LACTATE 0.38 MCG/KG/MIN: 200 INJECTION, SOLUTION INTRAVENOUS at 20:38

## 2017-01-01 RX ADMIN — HYDRALAZINE HYDROCHLORIDE 75 MG: 50 TABLET ORAL at 07:37

## 2017-01-01 RX ADMIN — ACETAMINOPHEN 650 MG: 325 TABLET, FILM COATED ORAL at 19:42

## 2017-01-01 RX ADMIN — BENZONATATE 100 MG: 100 CAPSULE, LIQUID FILLED ORAL at 06:21

## 2017-01-01 RX ADMIN — HEPARIN SODIUM 5000 UNITS: 5000 INJECTION, SOLUTION INTRAVENOUS; SUBCUTANEOUS at 22:12

## 2017-01-01 RX ADMIN — HYDRALAZINE HYDROCHLORIDE 100 MG: 100 TABLET ORAL at 18:38

## 2017-01-01 RX ADMIN — POTASSIUM CHLORIDE 40 MEQ: 1.5 POWDER, FOR SOLUTION ORAL at 09:00

## 2017-01-01 RX ADMIN — POTASSIUM CHLORIDE 60 MEQ: 1500 TABLET, EXTENDED RELEASE ORAL at 18:41

## 2017-01-01 RX ADMIN — LEVOTHYROXINE SODIUM 150 MCG: 75 TABLET ORAL at 06:21

## 2017-01-01 RX ADMIN — CLOPIDOGREL BISULFATE 75 MG: 75 TABLET, FILM COATED ORAL at 10:03

## 2017-01-01 RX ADMIN — Medication 15 MG/HR: at 10:27

## 2017-01-01 RX ADMIN — ASPIRIN 81 MG: 81 TABLET, COATED ORAL at 09:17

## 2017-01-01 RX ADMIN — LEVOTHYROXINE SODIUM 150 MCG: 75 TABLET ORAL at 08:52

## 2017-01-01 RX ADMIN — LEVOTHYROXINE SODIUM 150 MCG: 75 TABLET ORAL at 07:47

## 2017-01-01 RX ADMIN — LEVOTHYROXINE SODIUM 150 MCG: 75 TABLET ORAL at 13:34

## 2017-01-01 RX ADMIN — ASPIRIN 81 MG: 81 TABLET, COATED ORAL at 07:51

## 2017-01-01 RX ADMIN — HEPARIN SODIUM 5000 UNITS: 5000 INJECTION, SOLUTION INTRAVENOUS; SUBCUTANEOUS at 05:56

## 2017-01-01 RX ADMIN — Medication 100 MG: at 09:00

## 2017-01-01 RX ADMIN — HYDRALAZINE HYDROCHLORIDE 50 MG: 50 TABLET ORAL at 10:34

## 2017-01-01 RX ADMIN — SULFUR HEXAFLUORIDE 5 ML: KIT at 14:57

## 2017-01-01 RX ADMIN — CHLOROTHIAZIDE SODIUM 500 MG: 500 INJECTION, POWDER, LYOPHILIZED, FOR SOLUTION INTRAVENOUS at 19:55

## 2017-01-01 RX ADMIN — CYANOCOBALAMIN TAB 1000 MCG 1000 MCG: 1000 TAB at 12:22

## 2017-01-01 RX ADMIN — LIDOCAINE HYDROCHLORIDE 5 ML: 20 SOLUTION ORAL; TOPICAL at 11:03

## 2017-01-01 RX ADMIN — LEVOTHYROXINE SODIUM 150 MCG: 75 TABLET ORAL at 05:29

## 2017-01-01 RX ADMIN — LEVOTHYROXINE SODIUM 150 MCG: 75 TABLET ORAL at 07:57

## 2017-01-01 RX ADMIN — POTASSIUM CHLORIDE 60 MEQ: 1.5 POWDER, FOR SOLUTION ORAL at 07:34

## 2017-01-01 RX ADMIN — POTASSIUM CHLORIDE 20 MEQ: 29.8 INJECTION, SOLUTION INTRAVENOUS at 05:37

## 2017-01-01 RX ADMIN — CYANOCOBALAMIN TAB 1000 MCG 1000 MCG: 1000 TAB at 08:45

## 2017-01-01 RX ADMIN — TOLTERODINE TARTRATE 4 MG: 4 CAPSULE, EXTENDED RELEASE ORAL at 08:45

## 2017-01-01 RX ADMIN — SODIUM CHLORIDE, PRESERVATIVE FREE 5 ML: 5 INJECTION INTRAVENOUS at 08:45

## 2017-01-01 RX ADMIN — MILRINONE LACTATE 0.38 MCG/KG/MIN: 200 INJECTION, SOLUTION INTRAVENOUS at 15:06

## 2017-01-01 RX ADMIN — TOLTERODINE 4 MG: 4 CAPSULE, EXTENDED RELEASE ORAL at 10:05

## 2017-01-01 RX ADMIN — POTASSIUM CHLORIDE 20 MEQ: 1.5 POWDER, FOR SOLUTION ORAL at 01:02

## 2017-01-01 RX ADMIN — Medication 62.5 MG: at 11:53

## 2017-01-01 RX ADMIN — ALUMINUM HYDROXIDE, MAGNESIUM HYDROXIDE, AND DIMETHICONE 30 ML: 400; 400; 40 SUSPENSION ORAL at 14:41

## 2017-01-01 RX ADMIN — HYDRALAZINE HYDROCHLORIDE 75 MG: 50 TABLET ORAL at 20:09

## 2017-01-01 RX ADMIN — HYDRALAZINE HYDROCHLORIDE 100 MG: 25 TABLET ORAL at 11:52

## 2017-01-01 RX ADMIN — ACETAMINOPHEN 650 MG: 325 TABLET, FILM COATED ORAL at 02:09

## 2017-01-01 RX ADMIN — FUROSEMIDE 15 MG/HR: 10 INJECTION, SOLUTION INTRAVENOUS at 22:38

## 2017-01-01 RX ADMIN — HYDRALAZINE HYDROCHLORIDE 50 MG: 50 TABLET ORAL at 14:02

## 2017-01-01 RX ADMIN — HYDRALAZINE HYDROCHLORIDE 100 MG: 25 TABLET ORAL at 12:43

## 2017-01-01 RX ADMIN — HYDRALAZINE HYDROCHLORIDE 100 MG: 25 TABLET ORAL at 05:17

## 2017-01-01 RX ADMIN — SODIUM CHLORIDE 500 ML: 9 INJECTION, SOLUTION INTRAVENOUS at 08:29

## 2017-01-01 RX ADMIN — ISOSORBIDE MONONITRATE 60 MG: 60 TABLET, EXTENDED RELEASE ORAL at 22:40

## 2017-01-01 RX ADMIN — MILRINONE LACTATE 0.38 MCG/KG/MIN: 200 INJECTION, SOLUTION INTRAVENOUS at 11:46

## 2017-01-01 RX ADMIN — POTASSIUM CHLORIDE 20 MEQ: 29.8 INJECTION, SOLUTION INTRAVENOUS at 07:20

## 2017-01-01 RX ADMIN — POTASSIUM CHLORIDE 20 MEQ: 29.8 INJECTION, SOLUTION INTRAVENOUS at 11:02

## 2017-01-01 RX ADMIN — POTASSIUM CHLORIDE 20 MEQ: 1.5 POWDER, FOR SOLUTION ORAL at 20:10

## 2017-01-01 RX ADMIN — CHLOROTHIAZIDE SODIUM 500 MG: 500 INJECTION, POWDER, LYOPHILIZED, FOR SOLUTION INTRAVENOUS at 09:24

## 2017-01-01 RX ADMIN — FUROSEMIDE 80 MG: 10 INJECTION, SOLUTION INTRAVENOUS at 09:52

## 2017-01-01 RX ADMIN — LEVOTHYROXINE SODIUM 150 MCG: 150 TABLET ORAL at 08:29

## 2017-01-01 RX ADMIN — MILRINONE LACTATE 0.38 MCG/KG/MIN: 200 INJECTION, SOLUTION INTRAVENOUS at 09:25

## 2017-01-01 RX ADMIN — CARVEDILOL 6.25 MG: 6.25 TABLET, FILM COATED ORAL at 18:34

## 2017-01-01 RX ADMIN — Medication 1 MG/HR: at 17:15

## 2017-01-01 RX ADMIN — POTASSIUM CHLORIDE 20 MEQ: 1.5 POWDER, FOR SOLUTION ORAL at 23:21

## 2017-01-01 RX ADMIN — OXYMETAZOLINE HYDROCHLORIDE 2 SPRAY: 5 SPRAY NASAL at 22:38

## 2017-01-01 RX ADMIN — HYDRALAZINE HYDROCHLORIDE 75 MG: 50 TABLET ORAL at 20:30

## 2017-01-01 RX ADMIN — CARVEDILOL 3.12 MG: 3.12 TABLET, FILM COATED ORAL at 08:02

## 2017-01-01 RX ADMIN — HYDRALAZINE HYDROCHLORIDE 50 MG: 50 TABLET ORAL at 12:22

## 2017-01-01 RX ADMIN — FUROSEMIDE 40 MG: 10 INJECTION, SOLUTION INTRAVENOUS at 13:45

## 2017-01-01 RX ADMIN — CARVEDILOL 6.25 MG: 6.25 TABLET, FILM COATED ORAL at 07:37

## 2017-01-01 RX ADMIN — SODIUM CHLORIDE 1000 ML: 9 INJECTION, SOLUTION INTRAVENOUS at 13:10

## 2017-01-01 RX ADMIN — BUMETANIDE 1 MG: 0.25 INJECTION INTRAMUSCULAR; INTRAVENOUS at 16:43

## 2017-01-01 RX ADMIN — HYDRALAZINE HYDROCHLORIDE 100 MG: 50 TABLET ORAL at 11:23

## 2017-01-01 RX ADMIN — LEVOTHYROXINE SODIUM 150 MCG: 150 TABLET ORAL at 09:01

## 2017-01-01 RX ADMIN — Medication 15 MG/HR: at 03:19

## 2017-01-01 RX ADMIN — ASPIRIN 81 MG: 81 TABLET, COATED ORAL at 08:45

## 2017-01-01 RX ADMIN — SODIUM CHLORIDE 1000 ML: 9 INJECTION, SOLUTION INTRAVENOUS at 11:05

## 2017-01-01 RX ADMIN — FUROSEMIDE 5 MG/HR: 10 INJECTION, SOLUTION INTRAVENOUS at 06:19

## 2017-01-01 RX ADMIN — MULTIPLE VITAMINS W/ MINERALS TAB 1 TABLET: TAB at 08:09

## 2017-01-01 RX ADMIN — POTASSIUM CHLORIDE 40 MEQ: 1.5 POWDER, FOR SOLUTION ORAL at 10:29

## 2017-01-01 RX ADMIN — CYANOCOBALAMIN TAB 1000 MCG 1000 MCG: 1000 TAB at 08:55

## 2017-01-01 RX ADMIN — HYDRALAZINE HYDROCHLORIDE 100 MG: 25 TABLET ORAL at 09:08

## 2017-01-01 RX ADMIN — LEVOTHYROXINE SODIUM 150 MCG: 75 TABLET ORAL at 06:36

## 2017-01-01 RX ADMIN — LEVOTHYROXINE SODIUM 150 MCG: 75 TABLET ORAL at 08:19

## 2017-01-01 RX ADMIN — TOLTERODINE 4 MG: 4 CAPSULE, EXTENDED RELEASE ORAL at 08:10

## 2017-01-01 RX ADMIN — CLOPIDOGREL 75 MG: 75 TABLET, FILM COATED ORAL at 10:26

## 2017-01-01 RX ADMIN — POTASSIUM CHLORIDE 20 MEQ: 29.8 INJECTION, SOLUTION INTRAVENOUS at 22:58

## 2017-01-01 RX ADMIN — CYANOCOBALAMIN TAB 1000 MCG 1000 MCG: 1000 TAB at 09:17

## 2017-01-01 RX ADMIN — SODIUM CHLORIDE, PRESERVATIVE FREE 5 ML: 5 INJECTION INTRAVENOUS at 09:03

## 2017-01-01 RX ADMIN — POTASSIUM CHLORIDE 20 MEQ: 29.8 INJECTION, SOLUTION INTRAVENOUS at 08:53

## 2017-01-01 RX ADMIN — FERUMOXYTOL 510 MG: 510 INJECTION INTRAVENOUS at 14:48

## 2017-01-01 RX ADMIN — CALCITRIOL 0.25 MCG: 0.25 CAPSULE, LIQUID FILLED ORAL at 13:34

## 2017-01-01 RX ADMIN — MINOXIDIL 2.5 MG: 2.5 TABLET ORAL at 08:09

## 2017-01-01 RX ADMIN — FUROSEMIDE 15 MG/HR: 10 INJECTION, SOLUTION INTRAVENOUS at 18:37

## 2017-01-01 RX ADMIN — HYDRALAZINE HYDROCHLORIDE 25 MG: 25 TABLET ORAL at 14:45

## 2017-01-01 RX ADMIN — MAGNESIUM CITRATE 296 ML: 1.75 LIQUID ORAL at 12:29

## 2017-01-01 RX ADMIN — CLOPIDOGREL BISULFATE 75 MG: 75 TABLET, FILM COATED ORAL at 08:53

## 2017-01-01 RX ADMIN — POTASSIUM CHLORIDE 40 MEQ: 1.5 POWDER, FOR SOLUTION ORAL at 15:14

## 2017-01-01 RX ADMIN — MINOXIDIL 5 MG: 2.5 TABLET ORAL at 11:24

## 2017-01-01 RX ADMIN — CYANOCOBALAMIN TAB 1000 MCG 1000 MCG: 1000 TAB at 10:02

## 2017-01-01 RX ADMIN — FUROSEMIDE 40 MG: 10 INJECTION, SOLUTION INTRAVENOUS at 09:07

## 2017-01-01 RX ADMIN — CARVEDILOL 3.12 MG: 3.12 TABLET, FILM COATED ORAL at 07:56

## 2017-01-01 RX ADMIN — Medication 62.5 MG: at 11:56

## 2017-01-01 RX ADMIN — TOLTERODINE 4 MG: 4 CAPSULE, EXTENDED RELEASE ORAL at 08:24

## 2017-01-01 RX ADMIN — CYANOCOBALAMIN TAB 1000 MCG 1000 MCG: 1000 TAB at 09:07

## 2017-01-01 RX ADMIN — VANCOMYCIN HYDROCHLORIDE 1250 MG: 10 INJECTION, POWDER, LYOPHILIZED, FOR SOLUTION INTRAVENOUS at 20:04

## 2017-01-01 RX ADMIN — ISOSORBIDE DINITRATE 60 MG: 20 TABLET ORAL at 19:52

## 2017-01-01 RX ADMIN — IRON 325 MG: 65 TABLET ORAL at 08:50

## 2017-01-01 RX ADMIN — CLOPIDOGREL BISULFATE 75 MG: 75 TABLET, FILM COATED ORAL at 08:51

## 2017-01-01 RX ADMIN — FENTANYL CITRATE 25 MCG/HR: 50 INJECTION, SOLUTION INTRAMUSCULAR; INTRAVENOUS at 14:39

## 2017-01-01 RX ADMIN — POTASSIUM CHLORIDE 20 MEQ: 1.5 POWDER, FOR SOLUTION ORAL at 20:08

## 2017-01-01 RX ADMIN — POTASSIUM CHLORIDE 40 MEQ: 1.5 POWDER, FOR SOLUTION ORAL at 08:31

## 2017-01-01 RX ADMIN — MULTIPLE VITAMINS W/ MINERALS TAB 1 TABLET: TAB at 07:56

## 2017-01-01 RX ADMIN — POTASSIUM CHLORIDE 20 MEQ: 1.5 POWDER, FOR SOLUTION ORAL at 09:48

## 2017-01-01 RX ADMIN — CLOPIDOGREL BISULFATE 75 MG: 75 TABLET, FILM COATED ORAL at 08:45

## 2017-01-01 RX ADMIN — DOBUTAMINE 5 MCG/KG/MIN: 12.5 INJECTION, SOLUTION INTRAVENOUS at 15:27

## 2017-01-01 RX ADMIN — CALCITRIOL 0.25 MCG: 0.25 CAPSULE, LIQUID FILLED ORAL at 10:25

## 2017-01-01 RX ADMIN — POTASSIUM CHLORIDE 20 MEQ: 29.8 INJECTION, SOLUTION INTRAVENOUS at 14:30

## 2017-01-01 RX ADMIN — HYDRALAZINE HYDROCHLORIDE 100 MG: 25 TABLET ORAL at 12:30

## 2017-01-01 RX ADMIN — TOLTERODINE 4 MG: 4 CAPSULE, EXTENDED RELEASE ORAL at 08:28

## 2017-01-01 RX ADMIN — POTASSIUM CHLORIDE 20 MEQ: 29.8 INJECTION, SOLUTION INTRAVENOUS at 18:01

## 2017-01-01 RX ADMIN — POTASSIUM CHLORIDE 20 MEQ: 1.5 POWDER, FOR SOLUTION ORAL at 10:36

## 2017-01-01 RX ADMIN — ISOSORBIDE DINITRATE 30 MG: 10 TABLET ORAL at 08:19

## 2017-01-01 RX ADMIN — HYDRALAZINE HYDROCHLORIDE 50 MG: 50 TABLET ORAL at 18:37

## 2017-01-01 RX ADMIN — CALCITRIOL 0.25 MCG: 0.25 CAPSULE, LIQUID FILLED ORAL at 09:06

## 2017-01-01 RX ADMIN — CLOPIDOGREL 75 MG: 75 TABLET, FILM COATED ORAL at 08:18

## 2017-01-01 RX ADMIN — METOLAZONE 5 MG: 2.5 TABLET ORAL at 11:58

## 2017-01-01 RX ADMIN — Medication 160 MG: at 02:48

## 2017-01-01 RX ADMIN — POLYETHYLENE GLYCOL 3350, SODIUM SULFATE ANHYDROUS, SODIUM BICARBONATE, SODIUM CHLORIDE, POTASSIUM CHLORIDE 4000 ML: 236; 22.74; 6.74; 5.86; 2.97 POWDER, FOR SOLUTION ORAL at 12:23

## 2017-01-01 RX ADMIN — ACETAMINOPHEN 650 MG: 325 TABLET, FILM COATED ORAL at 20:44

## 2017-01-01 RX ADMIN — CALCITRIOL 0.25 MCG: 0.25 CAPSULE, LIQUID FILLED ORAL at 08:25

## 2017-01-01 RX ADMIN — CYANOCOBALAMIN TAB 1000 MCG 1000 MCG: 1000 TAB at 08:00

## 2017-01-01 RX ADMIN — ISOSORBIDE MONONITRATE 120 MG: 30 TABLET, EXTENDED RELEASE ORAL at 07:57

## 2017-01-01 RX ADMIN — Medication 1000 ML: at 16:01

## 2017-01-01 RX ADMIN — Medication 62.5 MG: at 08:17

## 2017-01-01 RX ADMIN — HEPARIN SODIUM 5000 UNITS: 5000 INJECTION, SOLUTION INTRAVENOUS; SUBCUTANEOUS at 07:02

## 2017-01-01 RX ADMIN — Medication 3 MG: at 16:11

## 2017-01-01 RX ADMIN — HYDRALAZINE HYDROCHLORIDE 100 MG: 100 TABLET ORAL at 07:51

## 2017-01-01 RX ADMIN — POTASSIUM CHLORIDE 40 MEQ: 1.5 POWDER, FOR SOLUTION ORAL at 14:37

## 2017-01-01 RX ADMIN — HYDRALAZINE HYDROCHLORIDE 75 MG: 50 TABLET ORAL at 09:04

## 2017-01-01 RX ADMIN — CALCITRIOL 0.25 MCG: 0.25 CAPSULE, LIQUID FILLED ORAL at 07:51

## 2017-01-01 RX ADMIN — HYDRALAZINE HYDROCHLORIDE 75 MG: 50 TABLET ORAL at 07:55

## 2017-01-01 RX ADMIN — ASPIRIN 81 MG: 81 TABLET, COATED ORAL at 08:01

## 2017-01-01 RX ADMIN — Medication 15 MG/HR: at 19:21

## 2017-01-01 RX ADMIN — MULTIPLE VITAMINS W/ MINERALS TAB 1 TABLET: TAB at 10:06

## 2017-01-01 RX ADMIN — FUROSEMIDE 80 MG: 10 INJECTION, SOLUTION INTRAVENOUS at 16:55

## 2017-01-01 RX ADMIN — CLOPIDOGREL 75 MG: 75 TABLET, FILM COATED ORAL at 07:51

## 2017-01-01 RX ADMIN — POTASSIUM CHLORIDE 20 MEQ: 29.8 INJECTION, SOLUTION INTRAVENOUS at 23:07

## 2017-01-01 RX ADMIN — HYDRALAZINE HYDROCHLORIDE 50 MG: 50 TABLET ORAL at 20:38

## 2017-01-01 RX ADMIN — IRON 325 MG: 65 TABLET ORAL at 12:22

## 2017-01-01 RX ADMIN — Medication 100 MG: at 10:06

## 2017-01-01 RX ADMIN — POTASSIUM CHLORIDE 40 MEQ: 1.5 POWDER, FOR SOLUTION ORAL at 06:58

## 2017-01-01 RX ADMIN — CALCITRIOL 0.25 MCG: 0.25 CAPSULE, LIQUID FILLED ORAL at 09:00

## 2017-01-01 RX ADMIN — Medication 15 MG/HR: at 09:20

## 2017-01-01 RX ADMIN — HYDRALAZINE HYDROCHLORIDE 100 MG: 25 TABLET ORAL at 22:14

## 2017-01-01 RX ADMIN — Medication 62.5 MG: at 08:03

## 2017-01-01 RX ADMIN — POTASSIUM CHLORIDE 40 MEQ: 1.5 POWDER, FOR SOLUTION ORAL at 08:16

## 2017-01-01 RX ADMIN — HYDRALAZINE HYDROCHLORIDE 75 MG: 50 TABLET ORAL at 08:39

## 2017-01-01 RX ADMIN — HEPARIN SODIUM 5000 UNITS: 10000 INJECTION, SOLUTION INTRAVENOUS; SUBCUTANEOUS at 08:14

## 2017-01-01 RX ADMIN — CYANOCOBALAMIN TAB 1000 MCG 1000 MCG: 1000 TAB at 07:35

## 2017-01-01 RX ADMIN — Medication 1 LOZENGE: at 10:19

## 2017-01-01 RX ADMIN — MULTIPLE VITAMINS W/ MINERALS TAB 1 TABLET: TAB at 08:15

## 2017-01-01 RX ADMIN — MINERAL OIL 1 ENEMA: 118 ENEMA RECTAL at 20:18

## 2017-01-01 RX ADMIN — LEVOTHYROXINE SODIUM 150 MCG: 75 TABLET ORAL at 08:47

## 2017-01-01 RX ADMIN — CARVEDILOL 9.38 MG: 6.25 TABLET, FILM COATED ORAL at 18:08

## 2017-01-01 RX ADMIN — CYANOCOBALAMIN TAB 1000 MCG 1000 MCG: 1000 TAB at 08:50

## 2017-01-01 RX ADMIN — MELATONIN TAB 3 MG 3 MG: 3 TAB at 02:24

## 2017-01-01 RX ADMIN — POTASSIUM CHLORIDE 20 MEQ: 29.8 INJECTION, SOLUTION INTRAVENOUS at 06:21

## 2017-01-01 RX ADMIN — CARVEDILOL 3.12 MG: 3.12 TABLET, FILM COATED ORAL at 20:42

## 2017-01-01 RX ADMIN — Medication 62.5 MG: at 20:06

## 2017-01-01 RX ADMIN — LEVOTHYROXINE SODIUM 150 MCG: 75 TABLET ORAL at 08:44

## 2017-01-01 RX ADMIN — MILRINONE LACTATE 0.38 MCG/KG/MIN: 200 INJECTION, SOLUTION INTRAVENOUS at 15:46

## 2017-01-01 RX ADMIN — ISOSORBIDE DINITRATE 60 MG: 30 TABLET ORAL at 08:55

## 2017-01-01 RX ADMIN — MULTIPLE VITAMINS W/ MINERALS TAB 1 TABLET: TAB at 08:52

## 2017-01-01 RX ADMIN — HYDRALAZINE HYDROCHLORIDE 50 MG: 50 TABLET ORAL at 15:56

## 2017-01-01 RX ADMIN — POTASSIUM CHLORIDE 40 MEQ: 1.5 POWDER, FOR SOLUTION ORAL at 10:52

## 2017-01-01 RX ADMIN — MILRINONE LACTATE 0.38 MCG/KG/MIN: 200 INJECTION, SOLUTION INTRAVENOUS at 01:47

## 2017-01-01 RX ADMIN — LEVOTHYROXINE SODIUM 150 MCG: 75 TABLET ORAL at 06:12

## 2017-01-01 RX ADMIN — HYDRALAZINE HYDROCHLORIDE 25 MG: 25 TABLET ORAL at 16:16

## 2017-01-01 RX ADMIN — FUROSEMIDE 80 MG: 20 TABLET ORAL at 08:53

## 2017-01-01 RX ADMIN — CALCITRIOL 0.25 MCG: 0.25 CAPSULE, LIQUID FILLED ORAL at 08:26

## 2017-01-01 RX ADMIN — HYDRALAZINE HYDROCHLORIDE 100 MG: 100 TABLET ORAL at 15:59

## 2017-01-01 RX ADMIN — POTASSIUM CHLORIDE 40 MEQ: 1.5 POWDER, FOR SOLUTION ORAL at 19:55

## 2017-01-01 RX ADMIN — CLOPIDOGREL BISULFATE 75 MG: 75 TABLET, FILM COATED ORAL at 08:01

## 2017-01-01 RX ADMIN — CARVEDILOL 6.25 MG: 6.25 TABLET, FILM COATED ORAL at 07:40

## 2017-01-01 RX ADMIN — ASPIRIN 81 MG: 81 TABLET, COATED ORAL at 07:55

## 2017-01-01 RX ADMIN — HYDRALAZINE HYDROCHLORIDE 100 MG: 100 TABLET ORAL at 20:20

## 2017-01-01 RX ADMIN — DOPAMINE HYDROCHLORIDE 2.5 MCG/KG/MIN: 160 INJECTION, SOLUTION INTRAVENOUS at 07:54

## 2017-01-01 RX ADMIN — POTASSIUM CHLORIDE 40 MEQ: 1.5 POWDER, FOR SOLUTION ORAL at 14:02

## 2017-01-01 RX ADMIN — TOLTERODINE 4 MG: 4 CAPSULE, EXTENDED RELEASE ORAL at 08:00

## 2017-01-01 RX ADMIN — HYDRALAZINE HYDROCHLORIDE 75 MG: 50 TABLET ORAL at 08:18

## 2017-01-01 RX ADMIN — ASPIRIN 81 MG: 81 TABLET, COATED ORAL at 08:14

## 2017-01-01 RX ADMIN — FUROSEMIDE 10 MG/HR: 10 INJECTION, SOLUTION INTRAVENOUS at 22:25

## 2017-01-01 RX ADMIN — MELATONIN TAB 3 MG 3 MG: 3 TAB at 23:40

## 2017-01-01 RX ADMIN — ISOSORBIDE DINITRATE 60 MG: 30 TABLET ORAL at 13:15

## 2017-01-01 RX ADMIN — POTASSIUM CHLORIDE 20 MEQ: 1.5 POWDER, FOR SOLUTION ORAL at 07:38

## 2017-01-01 RX ADMIN — FUROSEMIDE 40 MG: 10 INJECTION, SOLUTION INTRAVENOUS at 23:28

## 2017-01-01 RX ADMIN — HYDRALAZINE HYDROCHLORIDE 100 MG: 100 TABLET ORAL at 17:04

## 2017-01-01 RX ADMIN — MORPHINE SULFATE 10 MG: 4 INJECTION, SOLUTION INTRAMUSCULAR; INTRAVENOUS at 16:55

## 2017-01-01 RX ADMIN — HYDRALAZINE HYDROCHLORIDE 100 MG: 100 TABLET ORAL at 20:55

## 2017-01-01 RX ADMIN — OXYCODONE HYDROCHLORIDE 2.5 MG: 5 TABLET ORAL at 19:46

## 2017-01-01 RX ADMIN — ISOSORBIDE MONONITRATE 60 MG: 30 TABLET, EXTENDED RELEASE ORAL at 20:06

## 2017-01-01 RX ADMIN — OXYCODONE HYDROCHLORIDE 5 MG: 5 TABLET ORAL at 02:41

## 2017-01-01 RX ADMIN — CALCITRIOL 0.25 MCG: 0.25 CAPSULE, LIQUID FILLED ORAL at 08:45

## 2017-01-01 RX ADMIN — FUROSEMIDE 10 MG/HR: 10 INJECTION, SOLUTION INTRAVENOUS at 09:30

## 2017-01-01 RX ADMIN — POTASSIUM CHLORIDE 20 MEQ: 29.8 INJECTION, SOLUTION INTRAVENOUS at 22:04

## 2017-01-01 RX ADMIN — TOLTERODINE 4 MG: 4 CAPSULE, EXTENDED RELEASE ORAL at 08:49

## 2017-01-01 RX ADMIN — MILRINONE LACTATE 0.38 MCG/KG/MIN: 200 INJECTION, SOLUTION INTRAVENOUS at 20:50

## 2017-01-01 RX ADMIN — CARBAMIDE PEROXIDE 6.5% OTIC SOLN 5 DROP: 6.5 SOLUTION at 10:58

## 2017-01-01 RX ADMIN — SENNOSIDES AND DOCUSATE SODIUM 1 TABLET: 8.6; 5 TABLET ORAL at 22:29

## 2017-01-01 RX ADMIN — HYDRALAZINE HYDROCHLORIDE 75 MG: 25 TABLET ORAL at 21:27

## 2017-01-01 RX ADMIN — CARVEDILOL 3.12 MG: 3.12 TABLET, FILM COATED ORAL at 08:16

## 2017-01-01 RX ADMIN — TOLTERODINE 4 MG: 4 CAPSULE, EXTENDED RELEASE ORAL at 08:53

## 2017-01-01 RX ADMIN — Medication 12.5 MG: at 16:21

## 2017-01-01 RX ADMIN — FUROSEMIDE 40 MG: 10 INJECTION, SOLUTION INTRAVENOUS at 18:02

## 2017-01-01 RX ADMIN — Medication 1 MG/HR: at 16:43

## 2017-01-01 RX ADMIN — FUROSEMIDE 40 MG: 10 INJECTION, SOLUTION INTRAVENOUS at 06:53

## 2017-01-01 RX ADMIN — POTASSIUM CHLORIDE 40 MEQ: 1.5 POWDER, FOR SOLUTION ORAL at 06:24

## 2017-01-01 RX ADMIN — ISOSORBIDE MONONITRATE 60 MG: 30 TABLET, EXTENDED RELEASE ORAL at 20:48

## 2017-01-01 RX ADMIN — CYANOCOBALAMIN TAB 1000 MCG 1000 MCG: 1000 TAB at 08:41

## 2017-01-01 RX ADMIN — BUMETANIDE 2 MG: 2 TABLET ORAL at 13:43

## 2017-01-01 RX ADMIN — DOBUTAMINE 7.5 MCG/KG/MIN: 12.5 INJECTION, SOLUTION, CONCENTRATE INTRAVENOUS at 02:09

## 2017-01-01 RX ADMIN — POTASSIUM CHLORIDE 40 MEQ: 1.5 POWDER, FOR SOLUTION ORAL at 20:41

## 2017-01-01 RX ADMIN — CARVEDILOL 9.38 MG: 6.25 TABLET, FILM COATED ORAL at 09:19

## 2017-01-01 RX ADMIN — CALCITRIOL 0.25 MCG: 0.25 CAPSULE, LIQUID FILLED ORAL at 08:09

## 2017-01-01 RX ADMIN — ASPIRIN 81 MG: 81 TABLET, COATED ORAL at 08:50

## 2017-01-01 RX ADMIN — ONDANSETRON 4 MG: 2 INJECTION INTRAMUSCULAR; INTRAVENOUS at 04:28

## 2017-01-01 RX ADMIN — LEVOTHYROXINE SODIUM 150 MCG: 75 TABLET ORAL at 06:54

## 2017-01-01 RX ADMIN — VANCOMYCIN HYDROCHLORIDE 1250 MG: 10 INJECTION, POWDER, LYOPHILIZED, FOR SOLUTION INTRAVENOUS at 13:30

## 2017-01-01 RX ADMIN — POTASSIUM CHLORIDE 20 MEQ: 1.5 POWDER, FOR SOLUTION ORAL at 09:02

## 2017-01-01 RX ADMIN — HYDRALAZINE HYDROCHLORIDE 100 MG: 100 TABLET ORAL at 20:25

## 2017-01-01 RX ADMIN — POTASSIUM CHLORIDE 40 MEQ: 1.5 POWDER, FOR SOLUTION ORAL at 10:14

## 2017-01-01 RX ADMIN — TOLTERODINE 4 MG: 4 CAPSULE, EXTENDED RELEASE ORAL at 08:20

## 2017-01-01 RX ADMIN — LEVOTHYROXINE SODIUM 150 MCG: 75 TABLET ORAL at 06:27

## 2017-01-01 RX ADMIN — HYDRALAZINE HYDROCHLORIDE 50 MG: 50 TABLET ORAL at 15:20

## 2017-01-01 RX ADMIN — PIPERACILLIN AND TAZOBACTAM 3.38 G: 3; .375 INJECTION, POWDER, LYOPHILIZED, FOR SOLUTION INTRAVENOUS; PARENTERAL at 12:22

## 2017-01-01 RX ADMIN — TOLTERODINE TARTRATE 4 MG: 4 CAPSULE, EXTENDED RELEASE ORAL at 08:28

## 2017-01-01 RX ADMIN — FUROSEMIDE 10 MG/HR: 10 INJECTION, SOLUTION INTRAVENOUS at 09:17

## 2017-01-01 RX ADMIN — IRON 325 MG: 65 TABLET ORAL at 08:25

## 2017-01-01 RX ADMIN — BENZONATATE 100 MG: 100 CAPSULE, LIQUID FILLED ORAL at 12:02

## 2017-01-01 RX ADMIN — HYDRALAZINE HYDROCHLORIDE 50 MG: 50 TABLET ORAL at 16:11

## 2017-01-01 RX ADMIN — MULTIPLE VITAMINS W/ MINERALS TAB 1 TABLET: TAB at 08:48

## 2017-01-01 RX ADMIN — HYDRALAZINE HYDROCHLORIDE 100 MG: 50 TABLET ORAL at 15:17

## 2017-01-01 RX ADMIN — POTASSIUM CHLORIDE 40 MEQ: 750 TABLET, EXTENDED RELEASE ORAL at 06:08

## 2017-01-01 RX ADMIN — Medication 62.5 MG: at 15:55

## 2017-01-01 RX ADMIN — CARVEDILOL 6.25 MG: 6.25 TABLET, FILM COATED ORAL at 08:52

## 2017-01-01 RX ADMIN — FUROSEMIDE 40 MG: 10 INJECTION, SOLUTION INTRAVENOUS at 16:55

## 2017-01-01 RX ADMIN — Medication 100 MG: at 08:15

## 2017-01-01 RX ADMIN — IRON SUCROSE 300 MG: 20 INJECTION, SOLUTION INTRAVENOUS at 20:13

## 2017-01-01 RX ADMIN — CARVEDILOL 6.25 MG: 6.25 TABLET, FILM COATED ORAL at 17:22

## 2017-01-01 RX ADMIN — CYANOCOBALAMIN TAB 1000 MCG 1000 MCG: 1000 TAB at 08:51

## 2017-01-01 RX ADMIN — CLOPIDOGREL BISULFATE 75 MG: 75 TABLET, FILM COATED ORAL at 08:33

## 2017-01-01 RX ADMIN — POTASSIUM CHLORIDE 20 MEQ: 29.8 INJECTION, SOLUTION INTRAVENOUS at 04:42

## 2017-01-01 RX ADMIN — ASPIRIN 81 MG: 81 TABLET, COATED ORAL at 08:25

## 2017-01-01 RX ADMIN — FUROSEMIDE 60 MG: 10 INJECTION, SOLUTION INTRAVENOUS at 20:44

## 2017-01-01 RX ADMIN — ASPIRIN 81 MG: 81 TABLET, COATED ORAL at 08:51

## 2017-01-01 RX ADMIN — TOLTERODINE 4 MG: 4 CAPSULE, EXTENDED RELEASE ORAL at 07:57

## 2017-01-01 RX ADMIN — NALOXONE HYDROCHLORIDE 0.4 MG: 0.4 INJECTION, SOLUTION INTRAMUSCULAR; INTRAVENOUS; SUBCUTANEOUS at 08:53

## 2017-01-01 RX ADMIN — ASPIRIN 81 MG: 81 TABLET, COATED ORAL at 08:28

## 2017-01-01 RX ADMIN — CARVEDILOL 6.25 MG: 6.25 TABLET, FILM COATED ORAL at 17:54

## 2017-01-01 RX ADMIN — BENZONATATE 100 MG: 100 CAPSULE, LIQUID FILLED ORAL at 16:16

## 2017-01-01 RX ADMIN — DOBUTAMINE HYDROCHLORIDE 5 MCG/KG/MIN: 200 INJECTION INTRAVENOUS at 14:23

## 2017-01-01 RX ADMIN — MULTIPLE VITAMINS W/ MINERALS TAB 1 TABLET: TAB at 09:16

## 2017-01-01 RX ADMIN — HYDRALAZINE HYDROCHLORIDE 50 MG: 50 TABLET ORAL at 14:34

## 2017-01-01 RX ADMIN — MINOXIDIL 2.5 MG: 2.5 TABLET ORAL at 11:31

## 2017-01-01 RX ADMIN — Medication 1 LOZENGE: at 19:03

## 2017-01-01 RX ADMIN — POTASSIUM CHLORIDE 40 MEQ: 750 TABLET, EXTENDED RELEASE ORAL at 21:46

## 2017-01-01 RX ADMIN — ASPIRIN 81 MG: 81 TABLET, COATED ORAL at 08:00

## 2017-01-01 RX ADMIN — MELATONIN TAB 3 MG 3 MG: 3 TAB at 22:15

## 2017-01-01 RX ADMIN — Medication 20 G: at 16:00

## 2017-01-01 RX ADMIN — MIDAZOLAM HYDROCHLORIDE 1 MG: 1 INJECTION, SOLUTION INTRAMUSCULAR; INTRAVENOUS at 11:37

## 2017-01-01 RX ADMIN — ALTEPLASE 2 MG: 2.2 INJECTION, POWDER, LYOPHILIZED, FOR SOLUTION INTRAVENOUS at 18:39

## 2017-01-01 RX ADMIN — MILRINONE LACTATE 0.38 MCG/KG/MIN: 200 INJECTION, SOLUTION INTRAVENOUS at 23:13

## 2017-01-01 RX ADMIN — ASPIRIN 81 MG: 81 TABLET, COATED ORAL at 09:00

## 2017-01-01 RX ADMIN — DOBUTAMINE 5 MCG/KG/MIN: 12.5 INJECTION, SOLUTION, CONCENTRATE INTRAVENOUS at 09:55

## 2017-01-01 RX ADMIN — HYDRALAZINE HYDROCHLORIDE 75 MG: 50 TABLET ORAL at 07:36

## 2017-01-01 RX ADMIN — Medication 1 MG: at 22:52

## 2017-01-01 RX ADMIN — HYDRALAZINE HYDROCHLORIDE 75 MG: 50 TABLET ORAL at 22:25

## 2017-01-01 RX ADMIN — MILRINONE LACTATE 0.38 MCG/KG/MIN: 200 INJECTION, SOLUTION INTRAVENOUS at 23:01

## 2017-01-01 RX ADMIN — HYDRALAZINE HYDROCHLORIDE 100 MG: 50 TABLET ORAL at 07:40

## 2017-01-01 RX ADMIN — MULTIPLE VITAMINS W/ MINERALS TAB 1 TABLET: TAB at 08:45

## 2017-01-01 RX ADMIN — MULTIPLE VITAMINS W/ MINERALS TAB 1 TABLET: TAB at 08:26

## 2017-01-01 RX ADMIN — TORSEMIDE 80 MG: 20 TABLET ORAL at 08:55

## 2017-01-01 RX ADMIN — PIPERACILLIN AND TAZOBACTAM 3.38 G: 3; .375 INJECTION, POWDER, LYOPHILIZED, FOR SOLUTION INTRAVENOUS; PARENTERAL at 17:49

## 2017-01-01 RX ADMIN — MELATONIN TAB 3 MG 3 MG: 3 TAB at 20:44

## 2017-01-01 RX ADMIN — SODIUM CHLORIDE, PRESERVATIVE FREE 5 ML: 5 INJECTION INTRAVENOUS at 08:59

## 2017-01-01 RX ADMIN — HYDRALAZINE HYDROCHLORIDE 100 MG: 100 TABLET ORAL at 12:21

## 2017-01-01 RX ADMIN — ACETAMINOPHEN 650 MG: 325 TABLET, FILM COATED ORAL at 04:30

## 2017-01-01 RX ADMIN — IRON 325 MG: 65 TABLET ORAL at 08:19

## 2017-01-01 RX ADMIN — PIPERACILLIN AND TAZOBACTAM 3.38 G: 3; .375 INJECTION, POWDER, LYOPHILIZED, FOR SOLUTION INTRAVENOUS; PARENTERAL at 06:11

## 2017-01-01 RX ADMIN — IRON 325 MG: 65 TABLET ORAL at 08:56

## 2017-01-01 RX ADMIN — FUROSEMIDE 80 MG: 10 INJECTION, SOLUTION INTRAVENOUS at 14:22

## 2017-01-01 RX ADMIN — CLOPIDOGREL 75 MG: 75 TABLET, FILM COATED ORAL at 07:35

## 2017-01-01 RX ADMIN — HEPARIN SODIUM 5000 UNITS: 5000 INJECTION, SOLUTION INTRAVENOUS; SUBCUTANEOUS at 20:57

## 2017-01-01 RX ADMIN — POTASSIUM CHLORIDE 40 MEQ: 1.5 POWDER, FOR SOLUTION ORAL at 07:59

## 2017-01-01 RX ADMIN — LEVOTHYROXINE SODIUM 150 MCG: 150 TABLET ORAL at 08:16

## 2017-01-01 RX ADMIN — HYDRALAZINE HYDROCHLORIDE 100 MG: 100 TABLET ORAL at 20:39

## 2017-01-01 RX ADMIN — MELATONIN TAB 3 MG 3 MG: 3 TAB at 21:30

## 2017-01-01 RX ADMIN — TOLTERODINE 4 MG: 4 CAPSULE, EXTENDED RELEASE ORAL at 07:38

## 2017-01-01 RX ADMIN — POTASSIUM CHLORIDE 20 MEQ: 29.8 INJECTION, SOLUTION INTRAVENOUS at 02:15

## 2017-01-01 RX ADMIN — ASPIRIN 81 MG: 81 TABLET, COATED ORAL at 10:34

## 2017-01-01 RX ADMIN — CYANOCOBALAMIN TAB 1000 MCG 1000 MCG: 1000 TAB at 08:01

## 2017-01-01 RX ADMIN — MELATONIN TAB 3 MG 3 MG: 3 TAB at 23:58

## 2017-01-01 RX ADMIN — TOLTERODINE 4 MG: 4 CAPSULE, EXTENDED RELEASE ORAL at 08:04

## 2017-01-01 RX ADMIN — ISOSORBIDE DINITRATE 30 MG: 10 TABLET ORAL at 22:25

## 2017-01-01 RX ADMIN — SODIUM CHLORIDE, PRESERVATIVE FREE 5 ML: 5 INJECTION INTRAVENOUS at 14:14

## 2017-01-01 RX ADMIN — IRON 325 MG: 65 TABLET ORAL at 07:51

## 2017-01-01 RX ADMIN — DOBUTAMINE 5 MCG/KG/MIN: 12.5 INJECTION, SOLUTION INTRAVENOUS at 06:32

## 2017-01-01 RX ADMIN — LACTULOSE 100 G: 10 SOLUTION ORAL; RECTAL at 22:00

## 2017-01-01 RX ADMIN — HYDRALAZINE HYDROCHLORIDE 100 MG: 100 TABLET ORAL at 12:22

## 2017-01-01 RX ADMIN — FUROSEMIDE 5 MG/HR: 10 INJECTION, SOLUTION INTRAVENOUS at 14:11

## 2017-01-01 RX ADMIN — SODIUM CHLORIDE 250 ML: 900 INJECTION, SOLUTION INTRAVENOUS at 20:30

## 2017-01-01 RX ADMIN — MELATONIN TAB 3 MG 3 MG: 3 TAB at 22:07

## 2017-01-01 RX ADMIN — HYDRALAZINE HYDROCHLORIDE 100 MG: 100 TABLET ORAL at 19:40

## 2017-01-01 RX ADMIN — HYDRALAZINE HYDROCHLORIDE 50 MG: 50 TABLET ORAL at 14:45

## 2017-01-01 RX ADMIN — MULTIPLE VITAMINS W/ MINERALS TAB 1 TABLET: TAB at 09:19

## 2017-01-01 RX ADMIN — FUROSEMIDE 40 MG: 10 INJECTION, SOLUTION INTRAVENOUS at 21:36

## 2017-01-01 RX ADMIN — ISOSORBIDE DINITRATE 60 MG: 20 TABLET ORAL at 08:16

## 2017-01-01 RX ADMIN — FUROSEMIDE 5 MG/HR: 10 INJECTION, SOLUTION INTRAVENOUS at 14:38

## 2017-01-01 RX ADMIN — POTASSIUM CHLORIDE 40 MEQ: 1.5 POWDER, FOR SOLUTION ORAL at 01:55

## 2017-01-01 RX ADMIN — CARVEDILOL 6.25 MG: 6.25 TABLET, FILM COATED ORAL at 09:24

## 2017-01-01 RX ADMIN — CYANOCOBALAMIN TAB 1000 MCG 1000 MCG: 1000 TAB at 09:00

## 2017-01-01 RX ADMIN — Medication 62.5 MG: at 12:01

## 2017-01-01 RX ADMIN — MILRINONE LACTATE 0.38 MCG/KG/MIN: 200 INJECTION, SOLUTION INTRAVENOUS at 11:41

## 2017-01-01 RX ADMIN — HYDRALAZINE HYDROCHLORIDE 100 MG: 50 TABLET ORAL at 21:31

## 2017-01-01 RX ADMIN — MULTIPLE VITAMINS W/ MINERALS TAB 1 TABLET: TAB at 08:25

## 2017-01-01 RX ADMIN — HYDRALAZINE HYDROCHLORIDE 100 MG: 100 TABLET ORAL at 21:02

## 2017-01-01 RX ADMIN — MULTIPLE VITAMINS W/ MINERALS TAB 1 TABLET: TAB at 09:00

## 2017-01-01 RX ADMIN — HEPARIN SODIUM 5000 UNITS: 5000 INJECTION, SOLUTION INTRAVENOUS; SUBCUTANEOUS at 19:41

## 2017-01-01 RX ADMIN — CYANOCOBALAMIN TAB 1000 MCG 1000 MCG: 1000 TAB at 07:58

## 2017-01-01 RX ADMIN — ISOSORBIDE DINITRATE 60 MG: 20 TABLET ORAL at 19:40

## 2017-01-01 RX ADMIN — FUROSEMIDE 15 MG/HR: 10 INJECTION, SOLUTION INTRAVENOUS at 12:28

## 2017-01-01 RX ADMIN — IRON 325 MG: 65 TABLET ORAL at 09:04

## 2017-01-01 RX ADMIN — POTASSIUM CHLORIDE 10 MEQ: 14.9 INJECTION, SOLUTION, CONCENTRATE PARENTERAL at 07:48

## 2017-01-01 RX ADMIN — CLOPIDOGREL BISULFATE 75 MG: 75 TABLET, FILM COATED ORAL at 08:25

## 2017-01-01 RX ADMIN — HYDRALAZINE HYDROCHLORIDE 50 MG: 50 TABLET ORAL at 21:26

## 2017-01-01 RX ADMIN — Medication 3 MG: at 12:23

## 2017-01-01 RX ADMIN — ACETAMINOPHEN 650 MG: 325 TABLET, FILM COATED ORAL at 01:54

## 2017-01-01 RX ADMIN — BISACODYL 10 MG: 10 SUPPOSITORY RECTAL at 03:57

## 2017-01-01 RX ADMIN — MELATONIN TAB 3 MG 3 MG: 3 TAB at 01:58

## 2017-01-01 RX ADMIN — MILRINONE LACTATE 0.38 MCG/KG/MIN: 200 INJECTION, SOLUTION INTRAVENOUS at 06:21

## 2017-01-01 RX ADMIN — CALCITRIOL 0.25 MCG: 0.25 CAPSULE, LIQUID FILLED ORAL at 09:04

## 2017-01-01 RX ADMIN — TOLTERODINE 4 MG: 4 CAPSULE, EXTENDED RELEASE ORAL at 08:16

## 2017-01-01 RX ADMIN — TOLTERODINE 4 MG: 4 CAPSULE, EXTENDED RELEASE ORAL at 07:56

## 2017-01-01 RX ADMIN — CARVEDILOL 3.12 MG: 3.12 TABLET, FILM COATED ORAL at 08:03

## 2017-01-01 RX ADMIN — POTASSIUM CHLORIDE 40 MEQ: 1.5 POWDER, FOR SOLUTION ORAL at 09:06

## 2017-01-01 RX ADMIN — POTASSIUM CHLORIDE 40 MEQ: 750 TABLET, EXTENDED RELEASE ORAL at 14:08

## 2017-01-01 RX ADMIN — FUROSEMIDE 5 MG/HR: 10 INJECTION, SOLUTION INTRAMUSCULAR; INTRAVENOUS at 02:11

## 2017-01-01 RX ADMIN — ACETAMINOPHEN 650 MG: 325 TABLET, FILM COATED ORAL at 23:36

## 2017-01-01 RX ADMIN — LEVOTHYROXINE SODIUM 150 MCG: 75 TABLET ORAL at 07:51

## 2017-01-01 RX ADMIN — OXYCODONE HYDROCHLORIDE 5 MG: 5 TABLET ORAL at 04:56

## 2017-01-01 RX ADMIN — BENZONATATE 100 MG: 100 CAPSULE, LIQUID FILLED ORAL at 20:38

## 2017-01-01 RX ADMIN — ISOSORBIDE MONONITRATE 60 MG: 60 TABLET, EXTENDED RELEASE ORAL at 22:19

## 2017-01-01 RX ADMIN — SODIUM CHLORIDE 250 ML: 9 INJECTION, SOLUTION INTRAVENOUS at 14:51

## 2017-01-01 RX ADMIN — HEPARIN SODIUM 5000 UNITS: 10000 INJECTION, SOLUTION INTRAVENOUS; SUBCUTANEOUS at 20:39

## 2017-01-01 RX ADMIN — Medication 100 MG: at 07:57

## 2017-01-01 RX ADMIN — HYDRALAZINE HYDROCHLORIDE 100 MG: 100 TABLET ORAL at 13:15

## 2017-01-01 RX ADMIN — ISOSORBIDE DINITRATE 60 MG: 20 TABLET ORAL at 07:45

## 2017-01-01 RX ADMIN — ISOSORBIDE DINITRATE 60 MG: 20 TABLET ORAL at 19:46

## 2017-01-01 RX ADMIN — Medication 2 ML: at 12:00

## 2017-01-01 RX ADMIN — Medication 12.5 MG: at 21:24

## 2017-01-01 RX ADMIN — POTASSIUM CHLORIDE 40 MEQ: 1.5 POWDER, FOR SOLUTION ORAL at 16:35

## 2017-01-01 RX ADMIN — HYDRALAZINE HYDROCHLORIDE 100 MG: 100 TABLET ORAL at 14:38

## 2017-01-01 RX ADMIN — HYDRALAZINE HYDROCHLORIDE 100 MG: 100 TABLET ORAL at 16:41

## 2017-01-01 RX ADMIN — SODIUM CHLORIDE 250 ML: 9 INJECTION, SOLUTION INTRAVENOUS at 00:35

## 2017-01-01 RX ADMIN — TOLTERODINE 4 MG: 4 CAPSULE, EXTENDED RELEASE ORAL at 09:00

## 2017-01-01 RX ADMIN — ASPIRIN 81 MG: 81 TABLET, COATED ORAL at 08:16

## 2017-01-01 RX ADMIN — ASPIRIN 81 MG: 81 TABLET, COATED ORAL at 08:44

## 2017-01-01 RX ADMIN — POTASSIUM CHLORIDE 20 MEQ: 29.8 INJECTION, SOLUTION INTRAVENOUS at 05:29

## 2017-01-01 RX ADMIN — POTASSIUM CHLORIDE 20 MEQ: 1.5 POWDER, FOR SOLUTION ORAL at 12:25

## 2017-01-01 RX ADMIN — HYDRALAZINE HYDROCHLORIDE 75 MG: 50 TABLET ORAL at 02:17

## 2017-01-01 RX ADMIN — LIDOCAINE HYDROCHLORIDE 40 MG: 10 INJECTION, SOLUTION EPIDURAL; INFILTRATION; INTRACAUDAL; PERINEURAL at 11:40

## 2017-01-01 RX ADMIN — HEPARIN SODIUM 5000 UNITS: 5000 INJECTION, SOLUTION INTRAVENOUS; SUBCUTANEOUS at 19:50

## 2017-01-01 RX ADMIN — ISOSORBIDE MONONITRATE 60 MG: 60 TABLET, EXTENDED RELEASE ORAL at 21:54

## 2017-01-01 RX ADMIN — LEVOTHYROXINE SODIUM 150 MCG: 75 TABLET ORAL at 07:53

## 2017-01-01 RX ADMIN — HYDRALAZINE HYDROCHLORIDE 75 MG: 50 TABLET ORAL at 20:10

## 2017-01-01 RX ADMIN — CHLOROTHIAZIDE SODIUM 250 MG: 500 INJECTION, POWDER, LYOPHILIZED, FOR SOLUTION INTRAVENOUS at 08:53

## 2017-01-01 RX ADMIN — POTASSIUM CHLORIDE 20 MEQ: 29.8 INJECTION, SOLUTION INTRAVENOUS at 05:56

## 2017-01-01 RX ADMIN — CLOPIDOGREL BISULFATE 75 MG: 75 TABLET, FILM COATED ORAL at 09:00

## 2017-01-01 RX ADMIN — POTASSIUM CHLORIDE 40 MEQ: 750 TABLET, EXTENDED RELEASE ORAL at 06:22

## 2017-01-01 RX ADMIN — CLOPIDOGREL 75 MG: 75 TABLET, FILM COATED ORAL at 08:19

## 2017-01-01 RX ADMIN — HYDRALAZINE HYDROCHLORIDE 100 MG: 50 TABLET ORAL at 08:01

## 2017-01-01 RX ADMIN — CARVEDILOL 6.25 MG: 6.25 TABLET, FILM COATED ORAL at 08:27

## 2017-01-01 RX ADMIN — BUMETANIDE 2 MG: 2 TABLET ORAL at 08:34

## 2017-01-01 RX ADMIN — ASPIRIN 81 MG: 81 TABLET, COATED ORAL at 10:25

## 2017-01-01 RX ADMIN — ASPIRIN 81 MG: 81 TABLET, COATED ORAL at 10:03

## 2017-01-01 RX ADMIN — POTASSIUM CHLORIDE 40 MEQ: 1.5 POWDER, FOR SOLUTION ORAL at 20:05

## 2017-01-01 RX ADMIN — HYDRALAZINE HYDROCHLORIDE 100 MG: 100 TABLET ORAL at 08:51

## 2017-01-01 RX ADMIN — MILRINONE LACTATE 0.38 MCG/KG/MIN: 200 INJECTION, SOLUTION INTRAVENOUS at 08:07

## 2017-01-01 RX ADMIN — HYDRALAZINE HYDROCHLORIDE 100 MG: 100 TABLET ORAL at 19:52

## 2017-01-01 RX ADMIN — HYDRALAZINE HYDROCHLORIDE 100 MG: 25 TABLET ORAL at 22:56

## 2017-01-01 RX ADMIN — FUROSEMIDE 40 MG: 10 INJECTION, SOLUTION INTRAVENOUS at 18:37

## 2017-01-01 RX ADMIN — CLOPIDOGREL BISULFATE 75 MG: 75 TABLET, FILM COATED ORAL at 08:44

## 2017-01-01 RX ADMIN — ISOSORBIDE MONONITRATE 60 MG: 60 TABLET, EXTENDED RELEASE ORAL at 21:53

## 2017-01-01 RX ADMIN — CARVEDILOL 6.25 MG: 6.25 TABLET, FILM COATED ORAL at 20:09

## 2017-01-01 RX ADMIN — ISOSORBIDE DINITRATE 60 MG: 30 TABLET ORAL at 08:45

## 2017-01-01 RX ADMIN — HYDRALAZINE HYDROCHLORIDE 75 MG: 50 TABLET ORAL at 07:38

## 2017-01-01 RX ADMIN — BUMETANIDE 2 MG: 2 TABLET ORAL at 14:22

## 2017-01-01 RX ADMIN — BUMETANIDE 1 MG: 1 TABLET ORAL at 20:08

## 2017-01-01 RX ADMIN — CARVEDILOL 3.12 MG: 3.12 TABLET, FILM COATED ORAL at 08:47

## 2017-01-01 RX ADMIN — POTASSIUM CHLORIDE 40 MEQ: 1.5 POWDER, FOR SOLUTION ORAL at 05:33

## 2017-01-01 RX ADMIN — CYANOCOBALAMIN TAB 1000 MCG 1000 MCG: 1000 TAB at 08:03

## 2017-01-01 RX ADMIN — CLOPIDOGREL BISULFATE 75 MG: 75 TABLET, FILM COATED ORAL at 09:19

## 2017-01-01 RX ADMIN — HYDRALAZINE HYDROCHLORIDE 100 MG: 100 TABLET ORAL at 09:05

## 2017-01-01 RX ADMIN — HEPARIN SODIUM 5000 UNITS: 10000 INJECTION, SOLUTION INTRAVENOUS; SUBCUTANEOUS at 22:38

## 2017-01-01 RX ADMIN — POTASSIUM CHLORIDE 40 MEQ: 1.5 POWDER, FOR SOLUTION ORAL at 08:44

## 2017-01-01 RX ADMIN — SODIUM CHLORIDE, PRESERVATIVE FREE 500 UNITS: 5 INJECTION INTRAVENOUS at 14:14

## 2017-01-01 RX ADMIN — LEVOTHYROXINE SODIUM 150 MCG: 75 TABLET ORAL at 06:23

## 2017-01-01 RX ADMIN — POTASSIUM CHLORIDE 20 MEQ: 29.8 INJECTION, SOLUTION INTRAVENOUS at 10:42

## 2017-01-01 RX ADMIN — ISOSORBIDE MONONITRATE 120 MG: 30 TABLET, EXTENDED RELEASE ORAL at 08:49

## 2017-01-01 RX ADMIN — TOLTERODINE 4 MG: 4 CAPSULE, EXTENDED RELEASE ORAL at 07:51

## 2017-01-01 RX ADMIN — CARVEDILOL 9.38 MG: 6.25 TABLET, FILM COATED ORAL at 10:24

## 2017-01-01 RX ADMIN — ISOSORBIDE DINITRATE 60 MG: 20 TABLET ORAL at 08:43

## 2017-01-01 RX ADMIN — MELATONIN TAB 3 MG 3 MG: 3 TAB at 00:29

## 2017-01-01 RX ADMIN — HYDRALAZINE HYDROCHLORIDE 75 MG: 50 TABLET ORAL at 19:41

## 2017-01-01 RX ADMIN — CLOPIDOGREL BISULFATE 75 MG: 75 TABLET, FILM COATED ORAL at 08:49

## 2017-01-01 RX ADMIN — POTASSIUM CHLORIDE 40 MEQ: 1.5 POWDER, FOR SOLUTION ORAL at 08:52

## 2017-01-01 RX ADMIN — CARVEDILOL 3.12 MG: 3.12 TABLET, FILM COATED ORAL at 18:26

## 2017-01-01 RX ADMIN — ISOSORBIDE DINITRATE 30 MG: 10 TABLET ORAL at 07:44

## 2017-01-01 RX ADMIN — CHLOROTHIAZIDE SODIUM 500 MG: 500 INJECTION, POWDER, LYOPHILIZED, FOR SOLUTION INTRAVENOUS at 08:28

## 2017-01-01 RX ADMIN — CLOPIDOGREL 75 MG: 75 TABLET, FILM COATED ORAL at 07:38

## 2017-01-01 RX ADMIN — PROPOFOL 5 MCG/KG/MIN: 10 INJECTION, EMULSION INTRAVENOUS at 08:00

## 2017-01-01 RX ADMIN — CARVEDILOL 3.12 MG: 3.12 TABLET, FILM COATED ORAL at 17:43

## 2017-01-01 RX ADMIN — PIPERACILLIN AND TAZOBACTAM 3.38 G: 3; .375 INJECTION, POWDER, LYOPHILIZED, FOR SOLUTION INTRAVENOUS; PARENTERAL at 18:13

## 2017-01-01 RX ADMIN — HEPARIN SODIUM 5000 UNITS: 5000 INJECTION, SOLUTION INTRAVENOUS; SUBCUTANEOUS at 22:43

## 2017-01-01 RX ADMIN — SODIUM CHLORIDE, PRESERVATIVE FREE 3 ML: 5 INJECTION INTRAVENOUS at 23:05

## 2017-01-01 RX ADMIN — ASPIRIN 81 MG: 81 TABLET, COATED ORAL at 07:45

## 2017-01-01 RX ADMIN — FUROSEMIDE 15 MG/HR: 10 INJECTION, SOLUTION INTRAVENOUS at 01:17

## 2017-01-01 RX ADMIN — LEVOTHYROXINE SODIUM 150 MCG: 150 TABLET ORAL at 06:50

## 2017-01-01 RX ADMIN — TOLTERODINE 4 MG: 4 CAPSULE, EXTENDED RELEASE ORAL at 07:40

## 2017-01-01 RX ADMIN — Medication 75 MG: at 08:34

## 2017-01-01 RX ADMIN — MELATONIN TAB 3 MG 3 MG: 3 TAB at 21:39

## 2017-01-01 RX ADMIN — HYDRALAZINE HYDROCHLORIDE 75 MG: 50 TABLET ORAL at 07:35

## 2017-01-01 RX ADMIN — FUROSEMIDE 20 MG/HR: 10 INJECTION, SOLUTION INTRAMUSCULAR; INTRAVENOUS at 13:32

## 2017-01-01 RX ADMIN — CALCITRIOL 0.25 MCG: 0.25 CAPSULE, LIQUID FILLED ORAL at 07:56

## 2017-01-01 RX ADMIN — DOBUTAMINE 5 MCG/KG/MIN: 12.5 INJECTION, SOLUTION INTRAVENOUS at 12:40

## 2017-01-01 RX ADMIN — CYANOCOBALAMIN TAB 1000 MCG 1000 MCG: 1000 TAB at 10:34

## 2017-01-01 RX ADMIN — CARVEDILOL 6.25 MG: 6.25 TABLET, FILM COATED ORAL at 18:08

## 2017-01-01 RX ADMIN — IRON 325 MG: 65 TABLET ORAL at 09:07

## 2017-01-01 RX ADMIN — ISOSORBIDE DINITRATE 60 MG: 30 TABLET ORAL at 08:43

## 2017-01-01 RX ADMIN — LIDOCAINE HYDROCHLORIDE 10 ML: 20 JELLY TOPICAL at 09:30

## 2017-01-01 RX ADMIN — BUMETANIDE 2 MG: 2 TABLET ORAL at 14:02

## 2017-01-01 RX ADMIN — BUMETANIDE 1 MG: 0.5 TABLET ORAL at 08:28

## 2017-01-01 RX ADMIN — ISOSORBIDE DINITRATE 60 MG: 30 TABLET ORAL at 16:46

## 2017-01-01 RX ADMIN — POTASSIUM CHLORIDE 40 MEQ: 1.5 POWDER, FOR SOLUTION ORAL at 21:09

## 2017-01-01 RX ADMIN — HEPARIN SODIUM 5000 UNITS: 5000 INJECTION, SOLUTION INTRAVENOUS; SUBCUTANEOUS at 15:29

## 2017-01-01 RX ADMIN — Medication 1 LOZENGE: at 22:33

## 2017-01-01 RX ADMIN — Medication 5 MG/HR: at 14:46

## 2017-01-01 RX ADMIN — CYANOCOBALAMIN TAB 1000 MCG 1000 MCG: 1000 TAB at 07:39

## 2017-01-01 RX ADMIN — ASPIRIN 81 MG: 81 TABLET, COATED ORAL at 07:41

## 2017-01-01 RX ADMIN — FUROSEMIDE 10 MG/HR: 10 INJECTION, SOLUTION INTRAVENOUS at 18:47

## 2017-01-01 RX ADMIN — FUROSEMIDE 120 MG: 40 TABLET ORAL at 16:28

## 2017-01-01 RX ADMIN — CYANOCOBALAMIN TAB 1000 MCG 1000 MCG: 1000 TAB at 08:18

## 2017-01-01 RX ADMIN — CALCITRIOL 0.25 MCG: 0.25 CAPSULE, LIQUID FILLED ORAL at 07:59

## 2017-01-01 RX ADMIN — ASPIRIN 81 MG: 81 TABLET, COATED ORAL at 09:19

## 2017-01-01 RX ADMIN — SODIUM CHLORIDE 500 ML: 9 INJECTION, SOLUTION INTRAVENOUS at 05:00

## 2017-01-01 RX ADMIN — HEPARIN SODIUM 5000 UNITS: 10000 INJECTION, SOLUTION INTRAVENOUS; SUBCUTANEOUS at 08:33

## 2017-01-01 RX ADMIN — MILRINONE LACTATE 0.3 MCG/KG/MIN: 200 INJECTION, SOLUTION INTRAVENOUS at 02:51

## 2017-01-01 RX ADMIN — ISOSORBIDE DINITRATE 60 MG: 20 TABLET ORAL at 10:04

## 2017-01-01 RX ADMIN — CALCITRIOL 0.25 MCG: 0.25 CAPSULE, LIQUID FILLED ORAL at 08:15

## 2017-01-01 RX ADMIN — IRON 325 MG: 65 TABLET ORAL at 07:38

## 2017-01-01 RX ADMIN — CLOPIDOGREL 75 MG: 75 TABLET, FILM COATED ORAL at 07:45

## 2017-01-01 RX ADMIN — CARVEDILOL 9.38 MG: 6.25 TABLET, FILM COATED ORAL at 10:57

## 2017-01-01 RX ADMIN — FUROSEMIDE 20 MG: 10 INJECTION, SOLUTION INTRAVENOUS at 14:38

## 2017-01-01 RX ADMIN — MAGNESIUM HYDROXIDE 30 ML: 400 SUSPENSION ORAL at 20:15

## 2017-01-01 RX ADMIN — PIPERACILLIN AND TAZOBACTAM 3.38 G: 3; .375 INJECTION, POWDER, LYOPHILIZED, FOR SOLUTION INTRAVENOUS; PARENTERAL at 11:31

## 2017-01-01 RX ADMIN — CLOPIDOGREL 75 MG: 75 TABLET, FILM COATED ORAL at 08:29

## 2017-01-01 RX ADMIN — HEPARIN SODIUM 5000 UNITS: 5000 INJECTION, SOLUTION INTRAVENOUS; SUBCUTANEOUS at 22:26

## 2017-01-01 RX ADMIN — ASPIRIN 81 MG: 81 TABLET, COATED ORAL at 08:26

## 2017-01-01 RX ADMIN — HYDRALAZINE HYDROCHLORIDE 75 MG: 25 TABLET ORAL at 08:53

## 2017-01-01 RX ADMIN — MILRINONE LACTATE 0.38 MCG/KG/MIN: 200 INJECTION, SOLUTION INTRAVENOUS at 04:31

## 2017-01-01 RX ADMIN — POTASSIUM CHLORIDE 20 MEQ: 29.8 INJECTION, SOLUTION INTRAVENOUS at 19:46

## 2017-01-01 RX ADMIN — Medication 1 MG/HR: at 15:49

## 2017-01-01 RX ADMIN — ISOSORBIDE MONONITRATE 60 MG: 30 TABLET, EXTENDED RELEASE ORAL at 21:26

## 2017-01-01 RX ADMIN — LEVOTHYROXINE SODIUM 150 MCG: 75 TABLET ORAL at 07:45

## 2017-01-01 RX ADMIN — HYDRALAZINE HYDROCHLORIDE 100 MG: 100 TABLET ORAL at 15:02

## 2017-01-01 RX ADMIN — POTASSIUM CHLORIDE 40 MEQ: 1.5 POWDER, FOR SOLUTION ORAL at 08:53

## 2017-01-01 RX ADMIN — TORSEMIDE 80 MG: 20 TABLET ORAL at 15:51

## 2017-01-01 RX ADMIN — Medication 20 G: at 23:54

## 2017-01-01 RX ADMIN — HYDRALAZINE HYDROCHLORIDE 100 MG: 100 TABLET ORAL at 12:43

## 2017-01-01 RX ADMIN — POTASSIUM CHLORIDE 20 MEQ: 29.8 INJECTION, SOLUTION INTRAVENOUS at 18:33

## 2017-01-01 RX ADMIN — ISOSORBIDE DINITRATE 60 MG: 30 TABLET ORAL at 17:03

## 2017-01-01 RX ADMIN — ISOSORBIDE DINITRATE 60 MG: 30 TABLET ORAL at 12:21

## 2017-01-01 RX ADMIN — CYANOCOBALAMIN TAB 1000 MCG 1000 MCG: 1000 TAB at 09:05

## 2017-01-01 RX ADMIN — SODIUM CHLORIDE, PRESERVATIVE FREE 3 ML: 5 INJECTION INTRAVENOUS at 13:24

## 2017-01-01 RX ADMIN — MILRINONE LACTATE 0.38 MCG/KG/MIN: 200 INJECTION, SOLUTION INTRAVENOUS at 07:58

## 2017-01-01 RX ADMIN — HYDRALAZINE HYDROCHLORIDE 75 MG: 50 TABLET ORAL at 20:12

## 2017-01-01 RX ADMIN — LEVOTHYROXINE SODIUM 150 MCG: 75 TABLET ORAL at 09:04

## 2017-01-01 RX ADMIN — ASPIRIN 81 MG: 81 TABLET, COATED ORAL at 07:38

## 2017-01-01 RX ADMIN — ASPIRIN 81 MG: 81 TABLET, COATED ORAL at 08:33

## 2017-01-01 RX ADMIN — CYANOCOBALAMIN TAB 1000 MCG 1000 MCG: 1000 TAB at 07:23

## 2017-01-01 RX ADMIN — CYANOCOBALAMIN TAB 1000 MCG 1000 MCG: 1000 TAB at 07:40

## 2017-01-01 RX ADMIN — POTASSIUM CHLORIDE 20 MEQ: 1.5 POWDER, FOR SOLUTION ORAL at 21:54

## 2017-01-01 RX ADMIN — POTASSIUM CHLORIDE 20 MEQ: 1.5 POWDER, FOR SOLUTION ORAL at 12:11

## 2017-01-01 RX ADMIN — CYANOCOBALAMIN TAB 1000 MCG 1000 MCG: 1000 TAB at 08:16

## 2017-01-01 RX ADMIN — HYDRALAZINE HYDROCHLORIDE 100 MG: 50 TABLET ORAL at 21:10

## 2017-01-01 RX ADMIN — Medication 75 MG: at 20:05

## 2017-01-01 RX ADMIN — POTASSIUM CHLORIDE 40 MEQ: 1.5 POWDER, FOR SOLUTION ORAL at 16:09

## 2017-01-01 RX ADMIN — ISOSORBIDE MONONITRATE 60 MG: 60 TABLET, EXTENDED RELEASE ORAL at 21:39

## 2017-01-01 RX ADMIN — POTASSIUM CHLORIDE 40 MEQ: 1.5 POWDER, FOR SOLUTION ORAL at 06:53

## 2017-01-01 RX ADMIN — ISOSORBIDE DINITRATE 60 MG: 30 TABLET ORAL at 08:56

## 2017-01-01 RX ADMIN — POTASSIUM CHLORIDE 40 MEQ: 1.5 POWDER, FOR SOLUTION ORAL at 22:33

## 2017-01-01 RX ADMIN — ASPIRIN 81 MG: 81 TABLET, COATED ORAL at 08:03

## 2017-01-01 RX ADMIN — MINOXIDIL 2.5 MG: 2.5 TABLET ORAL at 20:18

## 2017-01-01 RX ADMIN — MULTIPLE VITAMINS W/ MINERALS TAB 1 TABLET: TAB at 07:36

## 2017-01-01 RX ADMIN — MULTIPLE VITAMINS W/ MINERALS TAB 1 TABLET: TAB at 08:33

## 2017-01-01 RX ADMIN — TOLTERODINE TARTRATE 4 MG: 4 CAPSULE, EXTENDED RELEASE ORAL at 09:06

## 2017-01-01 RX ADMIN — HYDRALAZINE HYDROCHLORIDE 100 MG: 25 TABLET ORAL at 06:27

## 2017-01-01 RX ADMIN — BENZONATATE 100 MG: 100 CAPSULE, LIQUID FILLED ORAL at 14:19

## 2017-01-01 RX ADMIN — MILRINONE LACTATE 0.38 MCG/KG/MIN: 200 INJECTION, SOLUTION INTRAVENOUS at 19:34

## 2017-01-01 RX ADMIN — MAGNESIUM CITRATE: 1.75 LIQUID ORAL at 20:15

## 2017-01-01 RX ADMIN — MULTIPLE VITAMINS W/ MINERALS TAB 1 TABLET: TAB at 07:40

## 2017-01-01 RX ADMIN — FUROSEMIDE 10 MG/HR: 10 INJECTION, SOLUTION INTRAVENOUS at 04:31

## 2017-01-01 RX ADMIN — ASPIRIN 81 MG: 81 TABLET, COATED ORAL at 08:53

## 2017-01-01 RX ADMIN — DOBUTAMINE 7.5 MCG/KG/MIN: 12.5 INJECTION, SOLUTION, CONCENTRATE INTRAVENOUS at 18:38

## 2017-01-01 RX ADMIN — ISOSORBIDE DINITRATE 60 MG: 20 TABLET ORAL at 20:44

## 2017-01-01 RX ADMIN — CALCITRIOL 0.25 MCG: 0.25 CAPSULE, LIQUID FILLED ORAL at 07:54

## 2017-01-01 RX ADMIN — DOPAMINE HYDROCHLORIDE 2.5 MCG/KG/MIN: 160 INJECTION, SOLUTION INTRAVENOUS at 22:58

## 2017-01-01 RX ADMIN — ISOSORBIDE MONONITRATE 120 MG: 30 TABLET, EXTENDED RELEASE ORAL at 07:38

## 2017-01-01 RX ADMIN — CLOPIDOGREL 75 MG: 75 TABLET, FILM COATED ORAL at 07:56

## 2017-01-01 RX ADMIN — ASPIRIN 81 MG: 81 TABLET, COATED ORAL at 07:40

## 2017-01-01 RX ADMIN — Medication 62.5 MG: at 16:09

## 2017-01-01 RX ADMIN — MILRINONE LACTATE 0.3 MCG/KG/MIN: 200 INJECTION, SOLUTION INTRAVENOUS at 17:36

## 2017-01-01 RX ADMIN — CALCITRIOL 0.25 MCG: 0.25 CAPSULE, LIQUID FILLED ORAL at 08:35

## 2017-01-01 RX ADMIN — POTASSIUM CHLORIDE 20 MEQ: 1.5 POWDER, FOR SOLUTION ORAL at 20:01

## 2017-01-01 RX ADMIN — POTASSIUM CHLORIDE 40 MEQ: 1.5 POWDER, FOR SOLUTION ORAL at 19:32

## 2017-01-01 RX ADMIN — CALCITRIOL 0.25 MCG: 0.25 CAPSULE, LIQUID FILLED ORAL at 08:44

## 2017-01-01 RX ADMIN — ASPIRIN 81 MG: 81 TABLET, COATED ORAL at 08:02

## 2017-01-01 RX ADMIN — POTASSIUM CHLORIDE 20 MEQ: 29.8 INJECTION, SOLUTION INTRAVENOUS at 15:20

## 2017-01-01 RX ADMIN — POTASSIUM CHLORIDE 20 MEQ: 1.5 POWDER, FOR SOLUTION ORAL at 19:41

## 2017-01-01 RX ADMIN — HYDRALAZINE HYDROCHLORIDE 75 MG: 50 TABLET ORAL at 20:42

## 2017-01-01 RX ADMIN — FUROSEMIDE 10 MG/HR: 10 INJECTION, SOLUTION INTRAVENOUS at 05:30

## 2017-01-01 RX ADMIN — MULTIVIT AND MINERALS-FERROUS GLUCONATE 9 MG IRON/15 ML ORAL LIQUID 15 ML: at 12:29

## 2017-01-01 RX ADMIN — POTASSIUM CHLORIDE 20 MEQ: 1.5 POWDER, FOR SOLUTION ORAL at 21:25

## 2017-01-01 RX ADMIN — Medication 1 LOZENGE: at 08:54

## 2017-01-01 RX ADMIN — POTASSIUM CHLORIDE 20 MEQ: 750 TABLET, EXTENDED RELEASE ORAL at 15:40

## 2017-01-01 RX ADMIN — CALCITRIOL 0.25 MCG: 0.25 CAPSULE, LIQUID FILLED ORAL at 08:55

## 2017-01-01 RX ADMIN — LEVOTHYROXINE SODIUM 150 MCG: 75 TABLET ORAL at 05:28

## 2017-01-01 RX ADMIN — CARVEDILOL 6.25 MG: 6.25 TABLET, FILM COATED ORAL at 18:37

## 2017-01-01 RX ADMIN — ISOSORBIDE MONONITRATE 60 MG: 60 TABLET, EXTENDED RELEASE ORAL at 22:07

## 2017-01-01 RX ADMIN — BENZONATATE 100 MG: 100 CAPSULE, LIQUID FILLED ORAL at 22:23

## 2017-01-01 RX ADMIN — MELATONIN TAB 3 MG 3 MG: 3 TAB at 20:55

## 2017-01-01 RX ADMIN — CARVEDILOL 6.25 MG: 6.25 TABLET, FILM COATED ORAL at 18:19

## 2017-01-01 RX ADMIN — POTASSIUM CHLORIDE 40 MEQ: 1.5 POWDER, FOR SOLUTION ORAL at 07:40

## 2017-01-01 RX ADMIN — DOBUTAMINE HYDROCHLORIDE 2.5 MCG/KG/MIN: 200 INJECTION INTRAVENOUS at 11:35

## 2017-01-01 RX ADMIN — HYDRALAZINE HYDROCHLORIDE 100 MG: 25 TABLET ORAL at 23:38

## 2017-01-01 RX ADMIN — HYDROMORPHONE HYDROCHLORIDE 0.5 MG: 1 INJECTION, SOLUTION INTRAMUSCULAR; INTRAVENOUS; SUBCUTANEOUS at 16:55

## 2017-01-01 RX ADMIN — ISOSORBIDE DINITRATE 60 MG: 20 TABLET ORAL at 14:31

## 2017-01-01 RX ADMIN — MULTIPLE VITAMINS W/ MINERALS TAB 1 TABLET: TAB at 08:03

## 2017-01-01 RX ADMIN — LEVOTHYROXINE SODIUM 150 MCG: 75 TABLET ORAL at 06:29

## 2017-01-01 RX ADMIN — CARVEDILOL 6.25 MG: 6.25 TABLET, FILM COATED ORAL at 08:10

## 2017-01-01 RX ADMIN — FUROSEMIDE 5 MG/HR: 10 INJECTION, SOLUTION INTRAMUSCULAR; INTRAVENOUS at 13:02

## 2017-01-01 RX ADMIN — CLOPIDOGREL 75 MG: 75 TABLET, FILM COATED ORAL at 08:03

## 2017-01-01 RX ADMIN — HYDRALAZINE HYDROCHLORIDE 100 MG: 25 TABLET ORAL at 08:16

## 2017-01-01 RX ADMIN — ISOSORBIDE DINITRATE 40 MG: 20 TABLET ORAL at 21:27

## 2017-01-01 RX ADMIN — CALCITRIOL 0.25 MCG: 0.25 CAPSULE, LIQUID FILLED ORAL at 07:23

## 2017-01-01 RX ADMIN — POTASSIUM CHLORIDE 40 MEQ: 1.5 POWDER, FOR SOLUTION ORAL at 08:17

## 2017-01-01 RX ADMIN — BUMETANIDE 1 MG: 0.5 TABLET ORAL at 20:15

## 2017-01-01 RX ADMIN — Medication 50 ML: at 17:18

## 2017-01-01 RX ADMIN — ISOSORBIDE DINITRATE 30 MG: 10 TABLET ORAL at 19:52

## 2017-01-01 RX ADMIN — POTASSIUM CHLORIDE 20 MEQ: 29.8 INJECTION, SOLUTION INTRAVENOUS at 06:56

## 2017-01-01 RX ADMIN — ACETAMINOPHEN 650 MG: 325 TABLET, FILM COATED ORAL at 22:06

## 2017-01-01 RX ADMIN — ISOSORBIDE MONONITRATE 60 MG: 60 TABLET, EXTENDED RELEASE ORAL at 22:23

## 2017-01-01 RX ADMIN — VASOPRESSIN 3 UNITS/HR: 20 INJECTION, SOLUTION INTRAMUSCULAR; SUBCUTANEOUS at 05:22

## 2017-01-01 RX ADMIN — CYANOCOBALAMIN TAB 1000 MCG 1000 MCG: 1000 TAB at 08:47

## 2017-01-01 RX ADMIN — HYDRALAZINE HYDROCHLORIDE 75 MG: 50 TABLET ORAL at 19:52

## 2017-01-01 RX ADMIN — Medication 37.5 MG: at 09:29

## 2017-01-01 RX ADMIN — Medication 20 G: at 14:10

## 2017-01-01 RX ADMIN — HYDRALAZINE HYDROCHLORIDE 75 MG: 50 TABLET ORAL at 20:52

## 2017-01-01 RX ADMIN — POTASSIUM CHLORIDE 20 MEQ: 29.8 INJECTION, SOLUTION INTRAVENOUS at 06:20

## 2017-01-01 RX ADMIN — PHENYLEPHRINE HYDROCHLORIDE 100 MCG: 10 INJECTION, SOLUTION INTRAMUSCULAR; INTRAVENOUS; SUBCUTANEOUS at 08:38

## 2017-01-01 RX ADMIN — TOLTERODINE TARTRATE 4 MG: 4 CAPSULE, EXTENDED RELEASE ORAL at 08:50

## 2017-01-01 RX ADMIN — CALCITRIOL 0.25 MCG: 0.25 CAPSULE, LIQUID FILLED ORAL at 08:33

## 2017-01-01 RX ADMIN — ISOSORBIDE MONONITRATE 120 MG: 30 TABLET, EXTENDED RELEASE ORAL at 08:25

## 2017-01-01 RX ADMIN — POTASSIUM CHLORIDE 40 MEQ: 1500 TABLET, EXTENDED RELEASE ORAL at 12:44

## 2017-01-01 RX ADMIN — ISOSORBIDE MONONITRATE 120 MG: 30 TABLET, EXTENDED RELEASE ORAL at 08:11

## 2017-01-01 RX ADMIN — CARBAMIDE PEROXIDE 6.5% OTIC SOLN 5 DROP: 6.5 SOLUTION at 20:39

## 2017-01-01 RX ADMIN — DOBUTAMINE 7.5 MCG/KG/MIN: 12.5 INJECTION, SOLUTION, CONCENTRATE INTRAVENOUS at 20:45

## 2017-01-01 RX ADMIN — HYDRALAZINE HYDROCHLORIDE 100 MG: 100 TABLET ORAL at 08:46

## 2017-01-01 RX ADMIN — Medication 15 MG/HR: at 13:32

## 2017-01-01 RX ADMIN — CALCITRIOL 0.25 MCG: 0.25 CAPSULE, LIQUID FILLED ORAL at 08:53

## 2017-01-01 RX ADMIN — FUROSEMIDE 15 MG/HR: 10 INJECTION, SOLUTION INTRAVENOUS at 14:23

## 2017-01-01 RX ADMIN — ACETAMINOPHEN 975 MG: 325 TABLET, FILM COATED ORAL at 15:46

## 2017-01-01 RX ADMIN — HYDRALAZINE HYDROCHLORIDE 100 MG: 50 TABLET ORAL at 11:51

## 2017-01-01 RX ADMIN — CLOPIDOGREL BISULFATE 75 MG: 75 TABLET, FILM COATED ORAL at 08:56

## 2017-01-01 RX ADMIN — FENTANYL CITRATE 100 MCG: 50 INJECTION, SOLUTION INTRAMUSCULAR; INTRAVENOUS at 17:20

## 2017-01-01 RX ADMIN — POTASSIUM CHLORIDE 20 MEQ: 1.5 POWDER, FOR SOLUTION ORAL at 08:51

## 2017-01-01 RX ADMIN — OXYCODONE HYDROCHLORIDE 2.5 MG: 5 TABLET ORAL at 01:54

## 2017-01-01 RX ADMIN — PIPERACILLIN AND TAZOBACTAM 3.38 G: 3; .375 INJECTION, POWDER, LYOPHILIZED, FOR SOLUTION INTRAVENOUS; PARENTERAL at 12:30

## 2017-01-01 RX ADMIN — TOLTERODINE 4 MG: 4 CAPSULE, EXTENDED RELEASE ORAL at 15:27

## 2017-01-01 RX ADMIN — MILRINONE LACTATE 0.38 MCG/KG/MIN: 200 INJECTION, SOLUTION INTRAVENOUS at 05:28

## 2017-01-01 RX ADMIN — Medication 100 MG: at 09:17

## 2017-01-01 RX ADMIN — CYANOCOBALAMIN TAB 1000 MCG 1000 MCG: 1000 TAB at 08:25

## 2017-01-01 RX ADMIN — CALCITRIOL 0.25 MCG: 0.25 CAPSULE, LIQUID FILLED ORAL at 07:57

## 2017-01-01 RX ADMIN — HYDRALAZINE HYDROCHLORIDE 50 MG: 50 TABLET ORAL at 20:49

## 2017-01-01 RX ADMIN — ISOSORBIDE DINITRATE 60 MG: 20 TABLET ORAL at 08:46

## 2017-01-01 RX ADMIN — CALCITRIOL 0.25 MCG: 0.25 CAPSULE, LIQUID FILLED ORAL at 12:22

## 2017-01-01 RX ADMIN — HYDRALAZINE HYDROCHLORIDE 100 MG: 100 TABLET ORAL at 12:28

## 2017-01-01 RX ADMIN — POTASSIUM CHLORIDE 40 MEQ: 1.5 POWDER, FOR SOLUTION ORAL at 12:38

## 2017-01-01 RX ADMIN — BENZONATATE 100 MG: 100 CAPSULE, LIQUID FILLED ORAL at 06:35

## 2017-01-01 RX ADMIN — POTASSIUM CHLORIDE 20 MEQ: 1500 TABLET, EXTENDED RELEASE ORAL at 14:34

## 2017-01-01 RX ADMIN — POTASSIUM CHLORIDE 10 MEQ: 14.9 INJECTION, SOLUTION, CONCENTRATE PARENTERAL at 06:47

## 2017-01-01 RX ADMIN — Medication 1 LOZENGE: at 02:01

## 2017-01-01 RX ADMIN — ISOSORBIDE MONONITRATE 60 MG: 60 TABLET, EXTENDED RELEASE ORAL at 22:37

## 2017-01-01 RX ADMIN — LEVOTHYROXINE SODIUM 150 MCG: 75 TABLET ORAL at 12:21

## 2017-01-01 RX ADMIN — FUROSEMIDE 15 MG/HR: 10 INJECTION, SOLUTION INTRAVENOUS at 05:21

## 2017-01-01 RX ADMIN — MILRINONE LACTATE 0.38 MCG/KG/MIN: 200 INJECTION, SOLUTION INTRAVENOUS at 00:31

## 2017-01-01 RX ADMIN — POTASSIUM CHLORIDE 20 MEQ: 29.8 INJECTION, SOLUTION INTRAVENOUS at 20:05

## 2017-01-01 RX ADMIN — MILRINONE LACTATE 0.38 MCG/KG/MIN: 200 INJECTION, SOLUTION INTRAVENOUS at 21:56

## 2017-01-01 RX ADMIN — CALCITRIOL 0.25 MCG: 0.25 CAPSULE, LIQUID FILLED ORAL at 07:45

## 2017-01-01 RX ADMIN — HYDRALAZINE HYDROCHLORIDE 100 MG: 100 TABLET ORAL at 12:24

## 2017-01-01 RX ADMIN — POTASSIUM CHLORIDE 40 MEQ: 1.5 POWDER, FOR SOLUTION ORAL at 08:35

## 2017-01-01 RX ADMIN — POTASSIUM CHLORIDE 20 MEQ: 29.8 INJECTION, SOLUTION INTRAVENOUS at 11:43

## 2017-01-01 RX ADMIN — FUROSEMIDE 15 MG/HR: 10 INJECTION, SOLUTION INTRAVENOUS at 17:44

## 2017-01-01 RX ADMIN — HYDRALAZINE HYDROCHLORIDE 50 MG: 50 TABLET ORAL at 23:40

## 2017-01-01 RX ADMIN — HYDRALAZINE HYDROCHLORIDE 50 MG: 50 TABLET ORAL at 14:47

## 2017-01-01 RX ADMIN — HYDRALAZINE HYDROCHLORIDE 50 MG: 50 TABLET ORAL at 17:01

## 2017-01-01 RX ADMIN — CHLOROTHIAZIDE 250 MG: 250 TABLET ORAL at 13:57

## 2017-01-01 RX ADMIN — FUROSEMIDE 15 MG/HR: 10 INJECTION, SOLUTION INTRAVENOUS at 10:40

## 2017-01-01 RX ADMIN — HYDRALAZINE HYDROCHLORIDE 100 MG: 50 TABLET ORAL at 16:20

## 2017-01-01 RX ADMIN — POTASSIUM CHLORIDE 40 MEQ: 1.5 POWDER, FOR SOLUTION ORAL at 20:02

## 2017-01-01 RX ADMIN — HYDRALAZINE HYDROCHLORIDE 100 MG: 100 TABLET ORAL at 08:45

## 2017-01-01 RX ADMIN — ISOSORBIDE DINITRATE 60 MG: 20 TABLET ORAL at 20:20

## 2017-01-01 RX ADMIN — CALCITRIOL 0.25 MCG: 0.25 CAPSULE, LIQUID FILLED ORAL at 08:49

## 2017-01-01 RX ADMIN — SODIUM CHLORIDE: 9 INJECTION, SOLUTION INTRAVENOUS at 20:20

## 2017-01-01 RX ADMIN — MILRINONE LACTATE 0.38 MCG/KG/MIN: 200 INJECTION, SOLUTION INTRAVENOUS at 00:53

## 2017-01-01 RX ADMIN — HYDRALAZINE HYDROCHLORIDE 100 MG: 50 TABLET ORAL at 21:48

## 2017-01-01 RX ADMIN — TOLTERODINE 4 MG: 4 CAPSULE, EXTENDED RELEASE ORAL at 12:21

## 2017-01-01 RX ADMIN — SODIUM CHLORIDE: 9 INJECTION, SOLUTION INTRAVENOUS at 10:41

## 2017-01-01 RX ADMIN — FUROSEMIDE 60 MG: 10 INJECTION, SOLUTION INTRAVENOUS at 04:25

## 2017-01-01 RX ADMIN — POTASSIUM CHLORIDE 40 MEQ: 1.5 POWDER, FOR SOLUTION ORAL at 09:29

## 2017-01-01 RX ADMIN — HYDRALAZINE HYDROCHLORIDE 100 MG: 100 TABLET ORAL at 20:11

## 2017-01-01 RX ADMIN — CARVEDILOL 6.25 MG: 6.25 TABLET, FILM COATED ORAL at 09:04

## 2017-01-01 RX ADMIN — PROCHLORPERAZINE EDISYLATE 10 MG: 5 INJECTION INTRAMUSCULAR; INTRAVENOUS at 22:43

## 2017-01-01 RX ADMIN — MILRINONE LACTATE 0.38 MCG/KG/MIN: 200 INJECTION, SOLUTION INTRAVENOUS at 15:27

## 2017-01-01 RX ADMIN — TOLTERODINE 4 MG: 4 CAPSULE, EXTENDED RELEASE ORAL at 07:46

## 2017-01-01 RX ADMIN — CARVEDILOL 9.38 MG: 6.25 TABLET, FILM COATED ORAL at 20:15

## 2017-01-01 RX ADMIN — POTASSIUM CHLORIDE 20 MEQ: 1.5 POWDER, FOR SOLUTION ORAL at 20:52

## 2017-01-01 RX ADMIN — HYDRALAZINE HYDROCHLORIDE 100 MG: 100 TABLET ORAL at 16:01

## 2017-01-01 RX ADMIN — DOCUSATE SODIUM 286 ML: 50 LIQUID ORAL at 12:40

## 2017-01-01 RX ADMIN — SODIUM CHLORIDE 200 ML: 9 INJECTION, SOLUTION INTRAVENOUS at 16:35

## 2017-01-01 RX ADMIN — HYDRALAZINE HYDROCHLORIDE 100 MG: 100 TABLET ORAL at 08:25

## 2017-01-01 RX ADMIN — SODIUM CHLORIDE 1000 ML: 9 INJECTION, SOLUTION INTRAVENOUS at 09:56

## 2017-01-01 RX ADMIN — LEVOTHYROXINE SODIUM 150 MCG: 75 TABLET ORAL at 06:38

## 2017-01-01 RX ADMIN — TOLTERODINE TARTRATE 4 MG: 4 CAPSULE, EXTENDED RELEASE ORAL at 08:44

## 2017-01-01 RX ADMIN — CLOPIDOGREL 75 MG: 75 TABLET, FILM COATED ORAL at 09:05

## 2017-01-01 RX ADMIN — HYDRALAZINE HYDROCHLORIDE 100 MG: 50 TABLET ORAL at 16:34

## 2017-01-01 RX ADMIN — CLOPIDOGREL 75 MG: 75 TABLET, FILM COATED ORAL at 12:21

## 2017-01-01 RX ADMIN — Medication 20 MG/HR: at 21:32

## 2017-01-01 RX ADMIN — POTASSIUM CHLORIDE 20 MEQ: 29.8 INJECTION, SOLUTION INTRAVENOUS at 04:52

## 2017-01-01 RX ADMIN — POTASSIUM CHLORIDE 20 MEQ: 29.8 INJECTION, SOLUTION INTRAVENOUS at 06:37

## 2017-01-01 RX ADMIN — FLUTICASONE PROPIONATE 1 SPRAY: 50 SPRAY, METERED NASAL at 20:13

## 2017-01-01 RX ADMIN — CARVEDILOL 6.25 MG: 6.25 TABLET, FILM COATED ORAL at 19:42

## 2017-01-01 RX ADMIN — CARVEDILOL 6.25 MG: 6.25 TABLET, FILM COATED ORAL at 07:36

## 2017-01-01 RX ADMIN — POTASSIUM CHLORIDE 20 MEQ: 29.8 INJECTION, SOLUTION INTRAVENOUS at 09:11

## 2017-01-01 RX ADMIN — Medication 15 MG/HR: at 15:08

## 2017-01-01 RX ADMIN — POTASSIUM CHLORIDE 40 MEQ: 1.5 POWDER, FOR SOLUTION ORAL at 20:39

## 2017-01-01 RX ADMIN — BUMETANIDE 1 MG: 0.5 TABLET ORAL at 10:25

## 2017-01-01 RX ADMIN — HYDRALAZINE HYDROCHLORIDE 10 MG: 20 INJECTION INTRAMUSCULAR; INTRAVENOUS at 21:36

## 2017-01-01 RX ADMIN — CYANOCOBALAMIN TAB 1000 MCG 1000 MCG: 1000 TAB at 07:36

## 2017-01-01 RX ADMIN — POTASSIUM CHLORIDE 20 MEQ: 1.5 POWDER, FOR SOLUTION ORAL at 10:50

## 2017-01-01 RX ADMIN — HEPARIN SODIUM (PORCINE) LOCK FLUSH IV SOLN 100 UNIT/ML 500 UNITS: 100 SOLUTION at 14:14

## 2017-01-01 RX ADMIN — TOLTERODINE 4 MG: 4 CAPSULE, EXTENDED RELEASE ORAL at 13:34

## 2017-01-01 RX ADMIN — HYDRALAZINE HYDROCHLORIDE 50 MG: 50 TABLET ORAL at 08:29

## 2017-01-01 RX ADMIN — POTASSIUM CHLORIDE 80 MEQ: 1500 TABLET, EXTENDED RELEASE ORAL at 06:52

## 2017-01-01 RX ADMIN — CALCITRIOL 0.25 MCG: 0.25 CAPSULE, LIQUID FILLED ORAL at 09:23

## 2017-01-01 RX ADMIN — HYDRALAZINE HYDROCHLORIDE 100 MG: 100 TABLET ORAL at 16:51

## 2017-01-01 RX ADMIN — CLOPIDOGREL BISULFATE 75 MG: 75 TABLET, FILM COATED ORAL at 09:17

## 2017-01-01 RX ADMIN — POTASSIUM CHLORIDE 20 MEQ: 1.5 POWDER, FOR SOLUTION ORAL at 18:52

## 2017-01-01 RX ADMIN — CYANOCOBALAMIN TAB 1000 MCG 1000 MCG: 1000 TAB at 09:23

## 2017-01-01 RX ADMIN — HYDRALAZINE HYDROCHLORIDE 100 MG: 25 TABLET ORAL at 18:42

## 2017-01-01 RX ADMIN — POTASSIUM CHLORIDE 20 MEQ: 1.5 POWDER, FOR SOLUTION ORAL at 07:54

## 2017-01-01 RX ADMIN — FUROSEMIDE 15 MG/HR: 10 INJECTION, SOLUTION INTRAVENOUS at 21:51

## 2017-01-01 RX ADMIN — HYDRALAZINE HYDROCHLORIDE 75 MG: 50 TABLET ORAL at 20:58

## 2017-01-01 RX ADMIN — ALTEPLASE 2 MG: 2.2 INJECTION, POWDER, LYOPHILIZED, FOR SOLUTION INTRAVENOUS at 02:54

## 2017-01-01 RX ADMIN — MULTIPLE VITAMINS W/ MINERALS TAB 1 TABLET: TAB at 09:03

## 2017-01-01 RX ADMIN — FENTANYL CITRATE 25 MCG: 50 INJECTION, SOLUTION INTRAMUSCULAR; INTRAVENOUS at 11:37

## 2017-01-01 RX ADMIN — HYDRALAZINE HYDROCHLORIDE 75 MG: 50 TABLET ORAL at 08:51

## 2017-01-01 RX ADMIN — PIPERACILLIN AND TAZOBACTAM 3.38 G: 3; .375 INJECTION, POWDER, LYOPHILIZED, FOR SOLUTION INTRAVENOUS; PARENTERAL at 06:56

## 2017-01-01 RX ADMIN — ISOSORBIDE DINITRATE 60 MG: 30 TABLET ORAL at 11:59

## 2017-01-01 RX ADMIN — TOLTERODINE 4 MG: 4 CAPSULE, EXTENDED RELEASE ORAL at 08:34

## 2017-01-01 RX ADMIN — FUROSEMIDE 15 MG/HR: 10 INJECTION, SOLUTION INTRAVENOUS at 21:36

## 2017-01-01 RX ADMIN — ACETAMINOPHEN 650 MG: 325 TABLET, FILM COATED ORAL at 22:45

## 2017-01-01 RX ADMIN — POTASSIUM CHLORIDE 40 MEQ: 750 TABLET, EXTENDED RELEASE ORAL at 22:02

## 2017-01-01 RX ADMIN — CYANOCOBALAMIN TAB 1000 MCG 1000 MCG: 1000 TAB at 08:28

## 2017-01-01 RX ADMIN — HYDRALAZINE HYDROCHLORIDE 100 MG: 100 TABLET ORAL at 08:56

## 2017-01-01 RX ADMIN — BUMETANIDE 1 MG: 1 TABLET ORAL at 15:19

## 2017-01-01 RX ADMIN — MILRINONE LACTATE 0.38 MCG/KG/MIN: 200 INJECTION, SOLUTION INTRAVENOUS at 13:46

## 2017-01-01 RX ADMIN — CARVEDILOL 9.38 MG: 6.25 TABLET, FILM COATED ORAL at 20:06

## 2017-01-01 RX ADMIN — POTASSIUM CHLORIDE 20 MEQ: 1.5 POWDER, FOR SOLUTION ORAL at 20:30

## 2017-01-01 RX ADMIN — FUROSEMIDE 15 MG/HR: 10 INJECTION, SOLUTION INTRAVENOUS at 17:47

## 2017-01-01 RX ADMIN — POTASSIUM CHLORIDE 20 MEQ: 29.8 INJECTION, SOLUTION INTRAVENOUS at 21:29

## 2017-01-01 RX ADMIN — ISOSORBIDE DINITRATE 60 MG: 30 TABLET ORAL at 17:18

## 2017-01-01 RX ADMIN — Medication 100 MG: at 08:00

## 2017-01-01 RX ADMIN — ACETAMINOPHEN 650 MG: 325 TABLET, FILM COATED ORAL at 21:37

## 2017-01-01 RX ADMIN — PROPOFOL 50 MG: 10 INJECTION, EMULSION INTRAVENOUS at 08:38

## 2017-01-01 RX ADMIN — TORSEMIDE 80 MG: 20 TABLET ORAL at 17:51

## 2017-01-01 RX ADMIN — LEVOTHYROXINE SODIUM 150 MCG: 75 TABLET ORAL at 07:38

## 2017-01-01 RX ADMIN — ISOSORBIDE DINITRATE 20 MG: 20 TABLET ORAL at 09:31

## 2017-01-01 RX ADMIN — POTASSIUM CHLORIDE 20 MEQ: 29.8 INJECTION, SOLUTION INTRAVENOUS at 18:31

## 2017-01-01 RX ADMIN — MILRINONE LACTATE 0.12 MCG/KG/MIN: 200 INJECTION, SOLUTION INTRAVENOUS at 18:52

## 2017-01-01 RX ADMIN — ISOSORBIDE DINITRATE 40 MG: 20 TABLET ORAL at 15:25

## 2017-01-01 RX ADMIN — HEPARIN SODIUM 5000 UNITS: 5000 INJECTION, SOLUTION INTRAVENOUS; SUBCUTANEOUS at 08:18

## 2017-01-01 RX ADMIN — CARVEDILOL 6.25 MG: 6.25 TABLET, FILM COATED ORAL at 08:47

## 2017-01-01 RX ADMIN — ACETAMINOPHEN 650 MG: 325 TABLET, FILM COATED ORAL at 19:43

## 2017-01-01 RX ADMIN — CYANOCOBALAMIN TAB 1000 MCG 1000 MCG: 1000 TAB at 08:56

## 2017-01-01 RX ADMIN — CLOPIDOGREL 75 MG: 75 TABLET, FILM COATED ORAL at 07:37

## 2017-01-01 RX ADMIN — MILRINONE LACTATE 0.38 MCG/KG/MIN: 200 INJECTION, SOLUTION INTRAVENOUS at 20:59

## 2017-01-01 RX ADMIN — MIDAZOLAM 2 MG: 1 INJECTION INTRAMUSCULAR; INTRAVENOUS at 15:15

## 2017-01-01 RX ADMIN — CALCITRIOL 0.25 MCG: 0.25 CAPSULE, LIQUID FILLED ORAL at 08:00

## 2017-01-01 RX ADMIN — CLOPIDOGREL BISULFATE 75 MG: 75 TABLET, FILM COATED ORAL at 08:10

## 2017-01-01 RX ADMIN — ISOSORBIDE DINITRATE 60 MG: 20 TABLET ORAL at 14:00

## 2017-01-01 RX ADMIN — FUROSEMIDE 15 MG/HR: 10 INJECTION, SOLUTION INTRAVENOUS at 14:22

## 2017-01-01 RX ADMIN — Medication 20 G: at 17:44

## 2017-01-01 RX ADMIN — HYDRALAZINE HYDROCHLORIDE 100 MG: 100 TABLET ORAL at 15:49

## 2017-01-01 RX ADMIN — DOBUTAMINE 7.5 MCG/KG/MIN: 12.5 INJECTION, SOLUTION, CONCENTRATE INTRAVENOUS at 10:33

## 2017-01-01 RX ADMIN — ACETAMINOPHEN 650 MG: 325 TABLET, FILM COATED ORAL at 23:22

## 2017-01-01 RX ADMIN — HYDRALAZINE HYDROCHLORIDE 100 MG: 100 TABLET ORAL at 13:33

## 2017-01-01 RX ADMIN — HYDRALAZINE HYDROCHLORIDE 100 MG: 100 TABLET ORAL at 16:28

## 2017-01-01 RX ADMIN — CALCITRIOL 0.25 MCG: 0.25 CAPSULE, LIQUID FILLED ORAL at 08:19

## 2017-01-01 RX ADMIN — NALOXONE HYDROCHLORIDE 0.4 MG: 0.4 INJECTION, SOLUTION INTRAMUSCULAR; INTRAVENOUS; SUBCUTANEOUS at 06:52

## 2017-01-01 RX ADMIN — ISOSORBIDE DINITRATE 60 MG: 20 TABLET ORAL at 14:33

## 2017-01-01 RX ADMIN — POTASSIUM CHLORIDE 20 MEQ: 29.8 INJECTION, SOLUTION INTRAVENOUS at 11:32

## 2017-01-01 RX ADMIN — HYDRALAZINE HYDROCHLORIDE 100 MG: 100 TABLET ORAL at 14:03

## 2017-01-01 RX ADMIN — HYDRALAZINE HYDROCHLORIDE 100 MG: 25 TABLET ORAL at 07:02

## 2017-01-01 RX ADMIN — LEVOTHYROXINE SODIUM 150 MCG: 75 TABLET ORAL at 08:22

## 2017-01-01 RX ADMIN — ISOSORBIDE DINITRATE 60 MG: 20 TABLET ORAL at 09:05

## 2017-01-01 RX ADMIN — POTASSIUM CHLORIDE 20 MEQ: 29.8 INJECTION, SOLUTION INTRAVENOUS at 10:45

## 2017-01-01 RX ADMIN — LEVOTHYROXINE SODIUM 150 MCG: 75 TABLET ORAL at 06:37

## 2017-01-01 RX ADMIN — SENNOSIDES AND DOCUSATE SODIUM 1 TABLET: 8.6; 5 TABLET ORAL at 22:25

## 2017-01-01 RX ADMIN — CLOPIDOGREL 75 MG: 75 TABLET, FILM COATED ORAL at 09:23

## 2017-01-01 RX ADMIN — FUROSEMIDE 40 MG: 10 INJECTION, SOLUTION INTRAVENOUS at 09:56

## 2017-01-01 RX ADMIN — HYDRALAZINE HYDROCHLORIDE 50 MG: 50 TABLET ORAL at 14:54

## 2017-01-01 RX ADMIN — HYDRALAZINE HYDROCHLORIDE 50 MG: 50 TABLET ORAL at 16:50

## 2017-01-01 RX ADMIN — FUROSEMIDE 60 MG: 10 INJECTION, SOLUTION INTRAVENOUS at 08:55

## 2017-01-01 RX ADMIN — CARVEDILOL 6.25 MG: 6.25 TABLET, FILM COATED ORAL at 17:12

## 2017-01-01 RX ADMIN — LEVOTHYROXINE SODIUM 150 MCG: 75 TABLET ORAL at 09:08

## 2017-01-01 RX ADMIN — HYDRALAZINE HYDROCHLORIDE 75 MG: 50 TABLET ORAL at 20:03

## 2017-01-01 RX ADMIN — ISOSORBIDE DINITRATE 60 MG: 30 TABLET ORAL at 15:02

## 2017-01-01 RX ADMIN — OXYCODONE HYDROCHLORIDE 5 MG: 5 TABLET ORAL at 00:09

## 2017-01-01 RX ADMIN — DOPAMINE HYDROCHLORIDE 5 MCG/KG/MIN: 40 INJECTION, SOLUTION, CONCENTRATE INTRAVENOUS at 16:56

## 2017-01-01 RX ADMIN — MULTIPLE VITAMINS W/ MINERALS TAB 1 TABLET: TAB at 07:23

## 2017-01-01 RX ADMIN — CARVEDILOL 6.25 MG: 6.25 TABLET, FILM COATED ORAL at 18:27

## 2017-01-01 RX ADMIN — Medication 100 MG: at 07:41

## 2017-01-01 RX ADMIN — ISOSORBIDE DINITRATE 60 MG: 20 TABLET ORAL at 14:44

## 2017-01-01 RX ADMIN — HYDRALAZINE HYDROCHLORIDE 50 MG: 50 TABLET ORAL at 18:40

## 2017-01-01 RX ADMIN — CARBAMIDE PEROXIDE 6.5% OTIC SOLN 5 DROP: 6.5 SOLUTION at 10:34

## 2017-01-01 RX ADMIN — DOBUTAMINE 5 MCG/KG/MIN: 12.5 INJECTION, SOLUTION INTRAVENOUS at 02:50

## 2017-01-01 RX ADMIN — TOLTERODINE 4 MG: 4 CAPSULE, EXTENDED RELEASE ORAL at 08:19

## 2017-01-01 RX ADMIN — SODIUM CHLORIDE 1000 ML: 9 INJECTION, SOLUTION INTRAVENOUS at 16:01

## 2017-01-01 RX ADMIN — HYDRALAZINE HYDROCHLORIDE 100 MG: 25 TABLET ORAL at 18:17

## 2017-01-01 RX ADMIN — Medication 100 MG: at 08:53

## 2017-01-01 RX ADMIN — ISOSORBIDE DINITRATE 60 MG: 30 TABLET ORAL at 16:41

## 2017-01-01 RX ADMIN — Medication 62.5 MG: at 16:22

## 2017-01-01 RX ADMIN — CALCITRIOL 0.25 MCG: 0.25 CAPSULE, LIQUID FILLED ORAL at 06:44

## 2017-01-01 RX ADMIN — ISOSORBIDE DINITRATE 60 MG: 20 TABLET ORAL at 07:51

## 2017-01-01 RX ADMIN — CALCITRIOL 0.25 MCG: 0.25 CAPSULE, LIQUID FILLED ORAL at 08:43

## 2017-01-01 RX ADMIN — POTASSIUM CHLORIDE 60 MEQ: 1.5 POWDER, FOR SOLUTION ORAL at 12:24

## 2017-01-01 RX ADMIN — MULTIPLE VITAMINS W/ MINERALS TAB 1 TABLET: TAB at 09:23

## 2017-01-01 RX ADMIN — Medication 62.5 MG: at 08:00

## 2017-01-01 RX ADMIN — ISOSORBIDE DINITRATE 60 MG: 30 TABLET ORAL at 16:55

## 2017-01-01 RX ADMIN — LEVOTHYROXINE SODIUM 150 MCG: 75 TABLET ORAL at 08:23

## 2017-01-01 RX ADMIN — CHLOROTHIAZIDE SODIUM 500 MG: 500 INJECTION, POWDER, LYOPHILIZED, FOR SOLUTION INTRAVENOUS at 09:59

## 2017-01-01 RX ADMIN — LEVOTHYROXINE SODIUM 150 MCG: 75 TABLET ORAL at 06:33

## 2017-01-01 RX ADMIN — POTASSIUM CHLORIDE 40 MEQ: 1.5 POWDER, FOR SOLUTION ORAL at 06:30

## 2017-01-01 RX ADMIN — CLOPIDOGREL 75 MG: 75 TABLET, FILM COATED ORAL at 08:00

## 2017-01-01 RX ADMIN — CLOPIDOGREL BISULFATE 75 MG: 75 TABLET, FILM COATED ORAL at 09:06

## 2017-01-01 RX ADMIN — HYDROMORPHONE HYDROCHLORIDE 0.5 MG: 1 INJECTION, SOLUTION INTRAMUSCULAR; INTRAVENOUS; SUBCUTANEOUS at 17:20

## 2017-01-01 RX ADMIN — HYDRALAZINE HYDROCHLORIDE 100 MG: 100 TABLET ORAL at 12:30

## 2017-01-01 RX ADMIN — POTASSIUM CHLORIDE 20 MEQ: 29.8 INJECTION, SOLUTION INTRAVENOUS at 21:40

## 2017-01-01 RX ADMIN — BENZONATATE 100 MG: 100 CAPSULE, LIQUID FILLED ORAL at 14:45

## 2017-01-01 RX ADMIN — ISOSORBIDE DINITRATE 60 MG: 20 TABLET ORAL at 13:33

## 2017-01-01 RX ADMIN — DOPAMINE HYDROCHLORIDE 2.5 MCG/KG/MIN: 40 INJECTION, SOLUTION, CONCENTRATE INTRAVENOUS at 17:27

## 2017-01-01 RX ADMIN — FUROSEMIDE 80 MG: 10 INJECTION, SOLUTION INTRAVENOUS at 17:22

## 2017-01-01 RX ADMIN — CYANOCOBALAMIN TAB 1000 MCG 1000 MCG: 1000 TAB at 07:45

## 2017-01-01 RX ADMIN — HYDRALAZINE HYDROCHLORIDE 100 MG: 25 TABLET ORAL at 12:11

## 2017-01-01 RX ADMIN — POTASSIUM CHLORIDE 20 MEQ: 29.8 INJECTION, SOLUTION INTRAVENOUS at 02:19

## 2017-01-01 RX ADMIN — POTASSIUM CHLORIDE 40 MEQ: 1.5 POWDER, FOR SOLUTION ORAL at 20:28

## 2017-01-01 RX ADMIN — CYANOCOBALAMIN TAB 1000 MCG 1000 MCG: 1000 TAB at 07:56

## 2017-01-01 RX ADMIN — FUROSEMIDE 120 MG: 40 TABLET ORAL at 08:25

## 2017-01-01 RX ADMIN — Medication 1000 ML: at 13:10

## 2017-01-01 RX ADMIN — TOLTERODINE TARTRATE 4 MG: 4 CAPSULE, EXTENDED RELEASE ORAL at 10:26

## 2017-01-01 RX ADMIN — CALCITRIOL 0.25 MCG: 0.25 CAPSULE, LIQUID FILLED ORAL at 07:41

## 2017-01-01 RX ADMIN — HEPARIN SODIUM 800 UNITS/HR: 10000 INJECTION, SOLUTION INTRAVENOUS at 02:17

## 2017-01-01 RX ADMIN — BENZONATATE 100 MG: 100 CAPSULE, LIQUID FILLED ORAL at 20:05

## 2017-01-01 RX ADMIN — HYDRALAZINE HYDROCHLORIDE 100 MG: 100 TABLET ORAL at 09:06

## 2017-01-01 RX ADMIN — FUROSEMIDE 15 MG/HR: 10 INJECTION, SOLUTION INTRAVENOUS at 04:22

## 2017-01-01 RX ADMIN — MILRINONE LACTATE 0.38 MCG/KG/MIN: 200 INJECTION, SOLUTION INTRAVENOUS at 21:22

## 2017-01-01 RX ADMIN — POTASSIUM CHLORIDE 40 MEQ: 750 TABLET, EXTENDED RELEASE ORAL at 19:52

## 2017-01-01 RX ADMIN — HEPARIN SODIUM 5000 UNITS: 5000 INJECTION, SOLUTION INTRAVENOUS; SUBCUTANEOUS at 07:36

## 2017-01-01 RX ADMIN — BUMETANIDE 1 MG: 0.5 TABLET ORAL at 10:34

## 2017-01-01 RX ADMIN — ASPIRIN 81 MG: 81 TABLET, COATED ORAL at 12:22

## 2017-01-01 RX ADMIN — CARVEDILOL 6.25 MG: 6.25 TABLET, FILM COATED ORAL at 07:58

## 2017-01-01 RX ADMIN — ASPIRIN 81 MG: 81 TABLET, COATED ORAL at 08:19

## 2017-01-01 RX ADMIN — ISOSORBIDE DINITRATE 30 MG: 10 TABLET ORAL at 14:07

## 2017-01-01 RX ADMIN — MULTIPLE VITAMINS W/ MINERALS TAB 1 TABLET: TAB at 08:34

## 2017-01-01 RX ADMIN — MINOXIDIL 2.5 MG: 2.5 TABLET ORAL at 01:52

## 2017-01-01 RX ADMIN — IRON 325 MG: 65 TABLET ORAL at 15:02

## 2017-01-01 RX ADMIN — FUROSEMIDE 15 MG/HR: 10 INJECTION, SOLUTION INTRAVENOUS at 07:54

## 2017-01-01 RX ADMIN — METOLAZONE 2.5 MG: 2.5 TABLET ORAL at 01:43

## 2017-01-01 RX ADMIN — CLOPIDOGREL 75 MG: 75 TABLET, FILM COATED ORAL at 08:01

## 2017-01-01 RX ADMIN — CALCITRIOL 0.25 MCG: 0.25 CAPSULE, LIQUID FILLED ORAL at 08:03

## 2017-01-01 RX ADMIN — HYDRALAZINE HYDROCHLORIDE 100 MG: 50 TABLET ORAL at 17:22

## 2017-01-01 RX ADMIN — BENZONATATE 100 MG: 100 CAPSULE, LIQUID FILLED ORAL at 08:03

## 2017-01-01 RX ADMIN — HYDRALAZINE HYDROCHLORIDE 100 MG: 100 TABLET ORAL at 12:29

## 2017-01-01 RX ADMIN — HYDRALAZINE HYDROCHLORIDE 75 MG: 50 TABLET ORAL at 14:07

## 2017-01-01 RX ADMIN — DOBUTAMINE 5 MCG/KG/MIN: 12.5 INJECTION, SOLUTION, CONCENTRATE INTRAVENOUS at 10:27

## 2017-01-01 RX ADMIN — TOLTERODINE TARTRATE 4 MG: 4 CAPSULE, EXTENDED RELEASE ORAL at 10:34

## 2017-01-01 RX ADMIN — MILRINONE LACTATE 0.38 MCG/KG/MIN: 200 INJECTION, SOLUTION INTRAVENOUS at 06:48

## 2017-01-01 RX ADMIN — POTASSIUM CHLORIDE 20 MEQ: 29.8 INJECTION, SOLUTION INTRAVENOUS at 19:40

## 2017-01-01 RX ADMIN — HYDRALAZINE HYDROCHLORIDE 100 MG: 50 TABLET ORAL at 08:24

## 2017-01-01 RX ADMIN — CARVEDILOL 9.38 MG: 6.25 TABLET, FILM COATED ORAL at 18:16

## 2017-01-01 RX ADMIN — POTASSIUM CHLORIDE 20 MEQ: 29.8 INJECTION, SOLUTION INTRAVENOUS at 09:17

## 2017-01-01 RX ADMIN — MULTIPLE VITAMINS W/ MINERALS TAB 1 TABLET: TAB at 07:34

## 2017-01-01 RX ADMIN — FUROSEMIDE 80 MG: 10 INJECTION, SOLUTION INTRAVENOUS at 14:39

## 2017-01-01 RX ADMIN — HYDRALAZINE HYDROCHLORIDE 100 MG: 100 TABLET ORAL at 18:31

## 2017-01-01 RX ADMIN — Medication 62.5 MG: at 19:28

## 2017-01-01 RX ADMIN — ISOSORBIDE DINITRATE 60 MG: 30 TABLET ORAL at 12:42

## 2017-01-01 RX ADMIN — PIPERACILLIN AND TAZOBACTAM 3.38 G: 3; .375 INJECTION, POWDER, LYOPHILIZED, FOR SOLUTION INTRAVENOUS; PARENTERAL at 12:25

## 2017-01-01 RX ADMIN — POTASSIUM CHLORIDE 20 MEQ: 1.5 POWDER, FOR SOLUTION ORAL at 09:05

## 2017-01-01 RX ADMIN — CARVEDILOL 6.25 MG: 6.25 TABLET, FILM COATED ORAL at 07:55

## 2017-01-01 RX ADMIN — HYDRALAZINE HYDROCHLORIDE 100 MG: 100 TABLET ORAL at 15:43

## 2017-01-01 RX ADMIN — Medication 62.5 MG: at 07:56

## 2017-01-01 RX ADMIN — ISOSORBIDE DINITRATE 60 MG: 20 TABLET ORAL at 14:10

## 2017-01-01 RX ADMIN — SODIUM CHLORIDE 500 ML: 9 INJECTION, SOLUTION INTRAVENOUS at 07:06

## 2017-01-01 RX ADMIN — HYDRALAZINE HYDROCHLORIDE 75 MG: 50 TABLET ORAL at 09:23

## 2017-01-01 RX ADMIN — FUROSEMIDE 60 MG: 10 INJECTION, SOLUTION INTRAVENOUS at 16:24

## 2017-01-01 RX ADMIN — POTASSIUM CHLORIDE 20 MEQ: 29.8 INJECTION, SOLUTION INTRAVENOUS at 04:11

## 2017-01-01 RX ADMIN — PIPERACILLIN AND TAZOBACTAM 3.38 G: 3; .375 INJECTION, POWDER, LYOPHILIZED, FOR SOLUTION INTRAVENOUS; PARENTERAL at 00:10

## 2017-01-01 RX ADMIN — CYANOCOBALAMIN TAB 1000 MCG 1000 MCG: 1000 TAB at 08:15

## 2017-01-01 RX ADMIN — HEPARIN SODIUM 5000 UNITS: 5000 INJECTION, SOLUTION INTRAVENOUS; SUBCUTANEOUS at 07:38

## 2017-01-01 RX ADMIN — ISOSORBIDE DINITRATE 60 MG: 20 TABLET ORAL at 09:00

## 2017-01-01 RX ADMIN — POTASSIUM CHLORIDE 20 MEQ: 1.5 POWDER, FOR SOLUTION ORAL at 07:35

## 2017-01-01 RX ADMIN — DOBUTAMINE 7.5 MCG/KG/MIN: 12.5 INJECTION, SOLUTION, CONCENTRATE INTRAVENOUS at 17:30

## 2017-01-01 RX ADMIN — CYANOCOBALAMIN TAB 1000 MCG 1000 MCG: 1000 TAB at 07:51

## 2017-01-01 RX ADMIN — HYDRALAZINE HYDROCHLORIDE 100 MG: 25 TABLET ORAL at 22:02

## 2017-01-01 RX ADMIN — Medication 62.5 MG: at 15:57

## 2017-01-01 RX ADMIN — SODIUM CHLORIDE, PRESERVATIVE FREE 3 ML: 5 INJECTION INTRAVENOUS at 15:08

## 2017-01-01 RX ADMIN — ISOSORBIDE MONONITRATE 60 MG: 60 TABLET, EXTENDED RELEASE ORAL at 21:30

## 2017-01-01 RX ADMIN — ASPIRIN 81 MG: 81 TABLET, COATED ORAL at 08:09

## 2017-01-01 RX ADMIN — ISOSORBIDE MONONITRATE 60 MG: 60 TABLET, EXTENDED RELEASE ORAL at 22:13

## 2017-01-01 RX ADMIN — POTASSIUM CHLORIDE 80 MEQ: 1.5 POWDER, FOR SOLUTION ORAL at 05:05

## 2017-01-01 RX ADMIN — SODIUM CHLORIDE: 9 INJECTION, SOLUTION INTRAVENOUS at 08:45

## 2017-01-01 RX ADMIN — LEVOTHYROXINE SODIUM 150 MCG: 75 TABLET ORAL at 05:17

## 2017-01-01 RX ADMIN — Medication 20 MG/HR: at 19:04

## 2017-01-01 RX ADMIN — HYDRALAZINE HYDROCHLORIDE 100 MG: 50 TABLET ORAL at 20:05

## 2017-01-01 RX ADMIN — HEPARIN SODIUM 5000 UNITS: 5000 INJECTION, SOLUTION INTRAVENOUS; SUBCUTANEOUS at 15:43

## 2017-01-01 RX ADMIN — CYANOCOBALAMIN TAB 1000 MCG 1000 MCG: 1000 TAB at 10:25

## 2017-01-01 RX ADMIN — POTASSIUM CHLORIDE 40 MEQ: 1.5 POWDER, FOR SOLUTION ORAL at 20:22

## 2017-01-01 RX ADMIN — PIPERACILLIN AND TAZOBACTAM 3.38 G: 3; .375 INJECTION, POWDER, LYOPHILIZED, FOR SOLUTION INTRAVENOUS; PARENTERAL at 23:55

## 2017-01-01 RX ADMIN — MULTIVIT AND MINERALS-FERROUS GLUCONATE 9 MG IRON/15 ML ORAL LIQUID 15 ML: at 12:22

## 2017-01-01 RX ADMIN — LEVOTHYROXINE SODIUM 150 MCG: 75 TABLET ORAL at 06:09

## 2017-01-01 RX ADMIN — SODIUM CHLORIDE, PRESERVATIVE FREE 5 ML: 5 INJECTION INTRAVENOUS at 01:14

## 2017-01-01 RX ADMIN — Medication 20 MG/HR: at 16:12

## 2017-01-01 RX ADMIN — ACETAMINOPHEN 650 MG: 325 TABLET, FILM COATED ORAL at 11:37

## 2017-01-01 RX ADMIN — FUROSEMIDE 40 MG: 10 INJECTION, SOLUTION INTRAVENOUS at 02:04

## 2017-01-01 RX ADMIN — CLOPIDOGREL 75 MG: 75 TABLET, FILM COATED ORAL at 10:34

## 2017-01-01 RX ADMIN — HYDRALAZINE HYDROCHLORIDE 100 MG: 100 TABLET ORAL at 15:51

## 2017-01-01 RX ADMIN — HYDRALAZINE HYDROCHLORIDE 100 MG: 25 TABLET ORAL at 18:04

## 2017-01-01 RX ADMIN — CARVEDILOL 6.25 MG: 6.25 TABLET, FILM COATED ORAL at 07:35

## 2017-01-01 RX ADMIN — ISOSORBIDE MONONITRATE 60 MG: 60 TABLET, EXTENDED RELEASE ORAL at 21:56

## 2017-01-01 RX ADMIN — HYDRALAZINE HYDROCHLORIDE 75 MG: 50 TABLET ORAL at 08:01

## 2017-01-01 RX ADMIN — POTASSIUM CHLORIDE 40 MEQ: 1.5 POWDER, FOR SOLUTION ORAL at 10:50

## 2017-01-01 RX ADMIN — LEVOTHYROXINE SODIUM 150 MCG: 75 TABLET ORAL at 08:17

## 2017-01-01 RX ADMIN — Medication 8 MG/HR: at 00:10

## 2017-01-01 RX ADMIN — Medication 75 MG: at 11:41

## 2017-01-01 RX ADMIN — FUROSEMIDE 40 MG: 10 INJECTION, SOLUTION INTRAVENOUS at 15:25

## 2017-01-01 RX ADMIN — ISOSORBIDE DINITRATE 60 MG: 30 TABLET ORAL at 12:24

## 2017-01-01 RX ADMIN — MELATONIN TAB 3 MG 3 MG: 3 TAB at 23:05

## 2017-01-01 RX ADMIN — ASPIRIN 81 MG: 81 TABLET, COATED ORAL at 07:56

## 2017-01-01 RX ADMIN — LEVOTHYROXINE SODIUM 150 MCG: 75 TABLET ORAL at 07:37

## 2017-01-01 RX ADMIN — CLOPIDOGREL 75 MG: 75 TABLET, FILM COATED ORAL at 08:15

## 2017-01-01 RX ADMIN — POTASSIUM CHLORIDE 40 MEQ: 1.5 POWDER, FOR SOLUTION ORAL at 10:06

## 2017-01-01 RX ADMIN — HEPARIN SODIUM 5000 UNITS: 5000 INJECTION, SOLUTION INTRAVENOUS; SUBCUTANEOUS at 08:52

## 2017-01-01 RX ADMIN — POTASSIUM CHLORIDE 40 MEQ: 1.5 POWDER, FOR SOLUTION ORAL at 21:36

## 2017-01-01 RX ADMIN — ISOSORBIDE DINITRATE 60 MG: 20 TABLET ORAL at 14:02

## 2017-01-01 RX ADMIN — HYDRALAZINE HYDROCHLORIDE 50 MG: 25 TABLET ORAL at 15:25

## 2017-01-01 RX ADMIN — ASPIRIN 81 MG: 81 TABLET, COATED ORAL at 09:03

## 2017-01-01 RX ADMIN — ISOSORBIDE DINITRATE 60 MG: 20 TABLET ORAL at 17:22

## 2017-01-01 RX ADMIN — ISOSORBIDE DINITRATE 20 MG: 20 TABLET ORAL at 00:06

## 2017-01-01 RX ADMIN — ISOSORBIDE DINITRATE 60 MG: 20 TABLET ORAL at 20:25

## 2017-01-01 RX ADMIN — Medication 20 MG/HR: at 12:01

## 2017-01-01 RX ADMIN — POTASSIUM CHLORIDE 20 MEQ: 29.8 INJECTION, SOLUTION INTRAVENOUS at 06:45

## 2017-01-01 RX ADMIN — CALCITRIOL 0.25 MCG: 0.25 CAPSULE, LIQUID FILLED ORAL at 08:51

## 2017-01-01 RX ADMIN — HYDRALAZINE HYDROCHLORIDE 100 MG: 100 TABLET ORAL at 15:40

## 2017-01-01 RX ADMIN — FLUTICASONE PROPIONATE 1 SPRAY: 50 SPRAY, METERED NASAL at 13:14

## 2017-01-01 RX ADMIN — MILRINONE LACTATE 0.38 MCG/KG/MIN: 200 INJECTION, SOLUTION INTRAVENOUS at 14:15

## 2017-01-01 RX ADMIN — HEPARIN SODIUM 1150 UNITS/HR: 10000 INJECTION, SOLUTION INTRAVENOUS at 22:19

## 2017-01-01 RX ADMIN — POTASSIUM CHLORIDE 20 MEQ: 750 TABLET, EXTENDED RELEASE ORAL at 21:08

## 2017-01-01 RX ADMIN — CYANOCOBALAMIN TAB 1000 MCG 1000 MCG: 1000 TAB at 08:44

## 2017-01-01 RX ADMIN — TOLTERODINE TARTRATE 4 MG: 4 CAPSULE, EXTENDED RELEASE ORAL at 08:15

## 2017-01-01 RX ADMIN — Medication 1000 ML: at 14:29

## 2017-01-01 RX ADMIN — ASPIRIN 81 MG: 81 TABLET, COATED ORAL at 08:43

## 2017-01-01 RX ADMIN — ISOSORBIDE DINITRATE 60 MG: 30 TABLET ORAL at 18:31

## 2017-01-01 RX ADMIN — ISOSORBIDE MONONITRATE 60 MG: 60 TABLET, EXTENDED RELEASE ORAL at 21:00

## 2017-01-01 RX ADMIN — CYANOCOBALAMIN TAB 1000 MCG 1000 MCG: 1000 TAB at 08:34

## 2017-01-01 RX ADMIN — Medication 75 MG: at 14:02

## 2017-01-01 RX ADMIN — Medication 75 MG: at 08:45

## 2017-01-01 RX ADMIN — TORSEMIDE 80 MG: 20 TABLET ORAL at 08:45

## 2017-01-01 RX ADMIN — POTASSIUM CHLORIDE 20 MEQ: 29.8 INJECTION, SOLUTION INTRAVENOUS at 17:28

## 2017-01-01 RX ADMIN — MELATONIN TAB 3 MG 3 MG: 3 TAB at 21:46

## 2017-01-01 RX ADMIN — POTASSIUM CHLORIDE 60 MEQ: 1.5 POWDER, FOR SOLUTION ORAL at 09:02

## 2017-01-01 RX ADMIN — CYANOCOBALAMIN TAB 1000 MCG 1000 MCG: 1000 TAB at 08:53

## 2017-01-01 RX ADMIN — ISOSORBIDE DINITRATE 40 MG: 20 TABLET ORAL at 14:22

## 2017-01-01 RX ADMIN — LIDOCAINE HYDROCHLORIDE 2 ML: 10 INJECTION, SOLUTION INFILTRATION; PERINEURAL at 09:53

## 2017-01-01 RX ADMIN — POTASSIUM CHLORIDE 20 MEQ: 29.8 INJECTION, SOLUTION INTRAVENOUS at 13:45

## 2017-01-01 RX ADMIN — HYDRALAZINE HYDROCHLORIDE 50 MG: 50 TABLET ORAL at 22:23

## 2017-01-01 RX ADMIN — BUMETANIDE 2 MG: 2 TABLET ORAL at 08:47

## 2017-01-01 RX ADMIN — ISOSORBIDE DINITRATE 60 MG: 30 TABLET ORAL at 08:25

## 2017-01-01 RX ADMIN — CALCITRIOL 0.25 MCG: 0.25 CAPSULE, LIQUID FILLED ORAL at 07:39

## 2017-01-01 RX ADMIN — HEPARIN SODIUM 5000 UNITS: 5000 INJECTION, SOLUTION INTRAVENOUS; SUBCUTANEOUS at 19:48

## 2017-01-01 RX ADMIN — HYDRALAZINE HYDROCHLORIDE 100 MG: 100 TABLET ORAL at 08:09

## 2017-01-01 RX ADMIN — MELATONIN TAB 3 MG 3 MG: 3 TAB at 22:26

## 2017-01-01 RX ADMIN — MILRINONE LACTATE 0.38 MCG/KG/MIN: 200 INJECTION, SOLUTION INTRAVENOUS at 11:56

## 2017-01-01 RX ADMIN — BUMETANIDE 1 MG: 0.5 TABLET ORAL at 08:15

## 2017-01-01 RX ADMIN — ISOSORBIDE DINITRATE 60 MG: 20 TABLET ORAL at 13:46

## 2017-01-01 RX ADMIN — ASPIRIN 81 MG: 81 TABLET, COATED ORAL at 07:36

## 2017-01-01 RX ADMIN — POTASSIUM CHLORIDE 20 MEQ: 29.8 INJECTION, SOLUTION INTRAVENOUS at 16:28

## 2017-01-01 RX ADMIN — Medication 20 G: at 01:54

## 2017-01-01 RX ADMIN — Medication 2 G: at 05:46

## 2017-01-01 RX ADMIN — FUROSEMIDE 20 MG/HR: 10 INJECTION, SOLUTION INTRAMUSCULAR; INTRAVENOUS at 18:55

## 2017-01-01 RX ADMIN — FUROSEMIDE 10 MG/HR: 10 INJECTION, SOLUTION INTRAVENOUS at 02:43

## 2017-01-01 RX ADMIN — HYDRALAZINE HYDROCHLORIDE 50 MG: 50 TABLET ORAL at 10:57

## 2017-01-01 RX ADMIN — ISOSORBIDE DINITRATE 30 MG: 10 TABLET ORAL at 15:43

## 2017-01-01 RX ADMIN — TOLTERODINE 4 MG: 4 CAPSULE, EXTENDED RELEASE ORAL at 08:43

## 2017-01-01 RX ADMIN — IRON 325 MG: 65 TABLET ORAL at 08:18

## 2017-01-01 RX ADMIN — Medication 75 MG: at 12:10

## 2017-01-01 RX ADMIN — POTASSIUM CHLORIDE 20 MEQ: 29.8 INJECTION, SOLUTION INTRAVENOUS at 01:21

## 2017-01-01 RX ADMIN — HYDROXYZINE HYDROCHLORIDE 10 MG: 10 TABLET ORAL at 14:31

## 2017-01-01 RX ADMIN — BUMETANIDE 2 MG: 2 TABLET ORAL at 10:35

## 2017-01-01 RX ADMIN — SENNOSIDES AND DOCUSATE SODIUM 1 TABLET: 8.6; 5 TABLET ORAL at 22:03

## 2017-01-01 RX ADMIN — ACETAMINOPHEN 650 MG: 325 TABLET, FILM COATED ORAL at 17:25

## 2017-01-01 RX ADMIN — FUROSEMIDE 80 MG: 10 INJECTION, SOLUTION INTRAVENOUS at 03:54

## 2017-01-01 RX ADMIN — ASPIRIN 81 MG: 81 TABLET, COATED ORAL at 08:41

## 2017-01-01 RX ADMIN — Medication 75 MG: at 16:16

## 2017-01-01 RX ADMIN — ISOSORBIDE MONONITRATE 120 MG: 30 TABLET, EXTENDED RELEASE ORAL at 08:00

## 2017-01-01 RX ADMIN — HEPARIN SODIUM 5000 UNITS: 5000 INJECTION, SOLUTION INTRAVENOUS; SUBCUTANEOUS at 20:51

## 2017-01-01 RX ADMIN — HEPARIN SODIUM 500 UNITS/HR: 10000 INJECTION, SOLUTION INTRAVENOUS at 11:38

## 2017-01-01 RX ADMIN — HYDRALAZINE HYDROCHLORIDE 100 MG: 100 TABLET ORAL at 07:45

## 2017-01-01 RX ADMIN — CLOPIDOGREL BISULFATE 75 MG: 75 TABLET, FILM COATED ORAL at 08:43

## 2017-01-01 RX ADMIN — POTASSIUM CHLORIDE 20 MEQ: 29.8 INJECTION, SOLUTION INTRAVENOUS at 05:23

## 2017-01-01 RX ADMIN — HYDRALAZINE HYDROCHLORIDE 100 MG: 100 TABLET ORAL at 19:43

## 2017-01-01 RX ADMIN — MULTIPLE VITAMINS W/ MINERALS TAB 1 TABLET: TAB at 07:59

## 2017-01-01 RX ADMIN — MULTIPLE VITAMINS W/ MINERALS TAB 1 TABLET: TAB at 08:16

## 2017-01-01 RX ADMIN — BENZONATATE 100 MG: 100 CAPSULE, LIQUID FILLED ORAL at 17:51

## 2017-01-01 RX ADMIN — POTASSIUM CHLORIDE 40 MEQ: 1.5 POWDER, FOR SOLUTION ORAL at 20:14

## 2017-01-01 RX ADMIN — CHLOROTHIAZIDE SODIUM 300 MG: 500 INJECTION, POWDER, LYOPHILIZED, FOR SOLUTION INTRAVENOUS at 16:22

## 2017-01-01 RX ADMIN — FUROSEMIDE 5 MG/HR: 10 INJECTION, SOLUTION INTRAMUSCULAR; INTRAVENOUS at 15:37

## 2017-01-01 RX ADMIN — CARVEDILOL 6.25 MG: 6.25 TABLET, FILM COATED ORAL at 08:24

## 2017-01-01 RX ADMIN — ISOSORBIDE DINITRATE 60 MG: 30 TABLET ORAL at 12:47

## 2017-01-01 RX ADMIN — CLOPIDOGREL 75 MG: 75 TABLET, FILM COATED ORAL at 08:35

## 2017-01-01 RX ADMIN — POTASSIUM CHLORIDE 20 MEQ: 29.8 INJECTION, SOLUTION INTRAVENOUS at 08:43

## 2017-01-01 RX ADMIN — DOPAMINE HYDROCHLORIDE 2.5 MCG/KG/MIN: 40 INJECTION, SOLUTION, CONCENTRATE INTRAVENOUS at 04:28

## 2017-01-01 RX ADMIN — FUROSEMIDE 15 MG/HR: 10 INJECTION, SOLUTION INTRAVENOUS at 05:25

## 2017-01-01 RX ADMIN — HYDRALAZINE HYDROCHLORIDE 75 MG: 25 TABLET ORAL at 13:32

## 2017-01-01 RX ADMIN — HYDRALAZINE HYDROCHLORIDE 100 MG: 100 TABLET ORAL at 20:53

## 2017-01-01 RX ADMIN — ISOSORBIDE DINITRATE 60 MG: 20 TABLET ORAL at 20:11

## 2017-01-01 RX ADMIN — HYDROMORPHONE HYDROCHLORIDE 0.3 MG: 10 INJECTION, SOLUTION INTRAMUSCULAR; INTRAVENOUS; SUBCUTANEOUS at 21:16

## 2017-01-01 RX ADMIN — ASPIRIN 81 MG: 81 TABLET, COATED ORAL at 08:47

## 2017-01-01 RX ADMIN — HYDRALAZINE HYDROCHLORIDE 50 MG: 50 TABLET ORAL at 15:19

## 2017-01-01 RX ADMIN — HYDRALAZINE HYDROCHLORIDE 50 MG: 50 TABLET ORAL at 14:29

## 2017-01-01 RX ADMIN — MULTIPLE VITAMINS W/ MINERALS TAB 1 TABLET: TAB at 07:37

## 2017-01-01 RX ADMIN — ISOSORBIDE DINITRATE 60 MG: 30 TABLET ORAL at 08:51

## 2017-01-01 RX ADMIN — POTASSIUM CHLORIDE 40 MEQ: 1.5 POWDER, FOR SOLUTION ORAL at 09:31

## 2017-01-01 RX ADMIN — HYDRALAZINE HYDROCHLORIDE 75 MG: 50 TABLET ORAL at 07:23

## 2017-01-01 RX ADMIN — MILRINONE LACTATE 0.38 MCG/KG/MIN: 200 INJECTION, SOLUTION INTRAVENOUS at 01:34

## 2017-01-01 RX ADMIN — MELATONIN TAB 3 MG 3 MG: 3 TAB at 23:11

## 2017-01-01 RX ADMIN — CARVEDILOL 9.38 MG: 6.25 TABLET, FILM COATED ORAL at 08:29

## 2017-01-01 RX ADMIN — CARVEDILOL 6.25 MG: 6.25 TABLET, FILM COATED ORAL at 07:23

## 2017-01-01 RX ADMIN — Medication 100 MG: at 08:10

## 2017-01-01 RX ADMIN — CARVEDILOL 3.12 MG: 3.12 TABLET, FILM COATED ORAL at 17:51

## 2017-01-01 RX ADMIN — MULTIPLE VITAMINS W/ MINERALS TAB 1 TABLET: TAB at 08:44

## 2017-01-01 RX ADMIN — Medication 62.5 MG: at 19:55

## 2017-01-01 RX ADMIN — PIPERACILLIN AND TAZOBACTAM 3.38 G: 3; .375 INJECTION, POWDER, LYOPHILIZED, FOR SOLUTION INTRAVENOUS; PARENTERAL at 18:51

## 2017-01-01 RX ADMIN — HYDRALAZINE HYDROCHLORIDE 100 MG: 100 TABLET ORAL at 07:58

## 2017-01-01 RX ADMIN — MULTIPLE VITAMINS W/ MINERALS TAB 1 TABLET: TAB at 07:54

## 2017-01-01 RX ADMIN — ISOSORBIDE DINITRATE 40 MG: 20 TABLET ORAL at 08:53

## 2017-01-01 RX ADMIN — LEVOTHYROXINE SODIUM 150 MCG: 75 TABLET ORAL at 08:18

## 2017-01-01 RX ADMIN — FUROSEMIDE 10 MG/HR: 10 INJECTION, SOLUTION INTRAVENOUS at 12:51

## 2017-01-01 RX ADMIN — ISOSORBIDE DINITRATE 60 MG: 20 TABLET ORAL at 20:22

## 2017-01-01 RX ADMIN — ISOSORBIDE MONONITRATE 60 MG: 60 TABLET, EXTENDED RELEASE ORAL at 21:46

## 2017-01-01 RX ADMIN — ASPIRIN 81 MG: 81 TABLET, COATED ORAL at 07:23

## 2017-01-01 RX ADMIN — ISOSORBIDE MONONITRATE 60 MG: 60 TABLET, EXTENDED RELEASE ORAL at 22:02

## 2017-01-01 RX ADMIN — CALCITRIOL 0.25 MCG: 0.25 CAPSULE, LIQUID FILLED ORAL at 10:03

## 2017-01-01 RX ADMIN — POTASSIUM CHLORIDE 40 MEQ: 1.5 POWDER, FOR SOLUTION ORAL at 21:07

## 2017-01-01 RX ADMIN — FUROSEMIDE 15 MG/HR: 10 INJECTION, SOLUTION INTRAVENOUS at 07:59

## 2017-01-01 RX ADMIN — PIPERACILLIN AND TAZOBACTAM 3.38 G: 3; .375 INJECTION, POWDER, LYOPHILIZED, FOR SOLUTION INTRAVENOUS; PARENTERAL at 05:44

## 2017-01-01 RX ADMIN — FENTANYL CITRATE 100 MCG: 50 INJECTION INTRAMUSCULAR; INTRAVENOUS at 15:14

## 2017-01-01 RX ADMIN — LEVOTHYROXINE SODIUM 150 MCG: 75 TABLET ORAL at 05:49

## 2017-01-01 RX ADMIN — HUMAN INSULIN 10 UNITS: 100 INJECTION, SOLUTION SUBCUTANEOUS at 17:18

## 2017-01-01 RX ADMIN — ISOSORBIDE DINITRATE 60 MG: 20 TABLET ORAL at 21:07

## 2017-01-01 RX ADMIN — CLOPIDOGREL BISULFATE 75 MG: 75 TABLET, FILM COATED ORAL at 07:57

## 2017-01-01 RX ADMIN — CARVEDILOL 3.12 MG: 3.12 TABLET, FILM COATED ORAL at 08:35

## 2017-01-01 RX ADMIN — CARVEDILOL 6.25 MG: 6.25 TABLET, FILM COATED ORAL at 17:44

## 2017-01-01 RX ADMIN — BENZONATATE 100 MG: 100 CAPSULE, LIQUID FILLED ORAL at 08:14

## 2017-01-01 RX ADMIN — POTASSIUM CHLORIDE 40 MEQ: 1.5 POWDER, FOR SOLUTION ORAL at 13:33

## 2017-01-01 RX ADMIN — Medication 75 MG: at 20:27

## 2017-01-01 RX ADMIN — CLOPIDOGREL BISULFATE 75 MG: 75 TABLET, FILM COATED ORAL at 07:40

## 2017-01-01 RX ADMIN — Medication 75 MG: at 16:07

## 2017-01-01 RX ADMIN — CYANOCOBALAMIN TAB 1000 MCG 1000 MCG: 1000 TAB at 07:54

## 2017-01-01 RX ADMIN — ROCURONIUM BROMIDE 70 MG: 10 INJECTION INTRAVENOUS at 08:39

## 2017-01-01 RX ADMIN — VANCOMYCIN HYDROCHLORIDE 1250 MG: 10 INJECTION, POWDER, LYOPHILIZED, FOR SOLUTION INTRAVENOUS at 20:23

## 2017-01-01 RX ADMIN — MILRINONE LACTATE 0.38 MCG/KG/MIN: 200 INJECTION, SOLUTION INTRAVENOUS at 15:13

## 2017-01-01 RX ADMIN — CARVEDILOL 6.25 MG: 6.25 TABLET, FILM COATED ORAL at 17:11

## 2017-01-01 RX ADMIN — CYANOCOBALAMIN TAB 1000 MCG 1000 MCG: 1000 TAB at 09:04

## 2017-01-01 RX ADMIN — CALCITRIOL 0.25 MCG: 0.25 CAPSULE, LIQUID FILLED ORAL at 08:18

## 2017-01-01 RX ADMIN — ASPIRIN 81 MG: 81 TABLET, COATED ORAL at 09:06

## 2017-01-01 RX ADMIN — CYANOCOBALAMIN TAB 1000 MCG 1000 MCG: 1000 TAB at 08:09

## 2017-01-01 RX ADMIN — HEPARIN SODIUM 5000 UNITS: 10000 INJECTION, SOLUTION INTRAVENOUS; SUBCUTANEOUS at 10:26

## 2017-01-01 RX ADMIN — SODIUM CHLORIDE, PRESERVATIVE FREE 5 ML: 5 INJECTION INTRAVENOUS at 08:26

## 2017-01-01 RX ADMIN — ASPIRIN 81 MG: 81 TABLET, COATED ORAL at 07:59

## 2017-01-01 RX ADMIN — LEVOTHYROXINE SODIUM 150 MCG: 75 TABLET ORAL at 07:55

## 2017-01-01 RX ADMIN — MILRINONE LACTATE 0.25 MCG/KG/MIN: 200 INJECTION, SOLUTION INTRAVENOUS at 21:05

## 2017-01-01 RX ADMIN — FUROSEMIDE 10 MG/HR: 10 INJECTION, SOLUTION INTRAVENOUS at 02:36

## 2017-01-01 RX ADMIN — METOLAZONE 2.5 MG: 2.5 TABLET ORAL at 14:55

## 2017-01-01 RX ADMIN — CARVEDILOL 9.38 MG: 6.25 TABLET, FILM COATED ORAL at 10:33

## 2017-01-01 RX ADMIN — ACETAMINOPHEN 650 MG: 325 TABLET, FILM COATED ORAL at 09:43

## 2017-01-01 RX ADMIN — TOLTERODINE 4 MG: 4 CAPSULE, EXTENDED RELEASE ORAL at 09:17

## 2017-01-01 RX ADMIN — HEPARIN SODIUM 5000 UNITS: 5000 INJECTION, SOLUTION INTRAVENOUS; SUBCUTANEOUS at 06:25

## 2017-01-01 RX ADMIN — TOLTERODINE 4 MG: 4 CAPSULE, EXTENDED RELEASE ORAL at 07:59

## 2017-01-01 RX ADMIN — CHLOROTHIAZIDE SODIUM 250 MG: 500 INJECTION, POWDER, LYOPHILIZED, FOR SOLUTION INTRAVENOUS at 11:30

## 2017-01-01 RX ADMIN — ALTEPLASE 2 MG: 2.2 INJECTION, POWDER, LYOPHILIZED, FOR SOLUTION INTRAVENOUS at 18:10

## 2017-01-01 RX ADMIN — CHLOROTHIAZIDE 250 MG: 250 TABLET ORAL at 18:44

## 2017-01-01 RX ADMIN — HYDRALAZINE HYDROCHLORIDE 100 MG: 100 TABLET ORAL at 19:48

## 2017-01-01 RX ADMIN — IOPAMIDOL 2 ML: 755 INJECTION, SOLUTION INTRAVASCULAR at 12:45

## 2017-01-01 RX ADMIN — POTASSIUM CHLORIDE 20 MEQ: 29.8 INJECTION, SOLUTION INTRAVENOUS at 20:46

## 2017-01-01 RX ADMIN — CALCITRIOL 0.25 MCG: 0.25 CAPSULE, LIQUID FILLED ORAL at 08:50

## 2017-01-01 RX ADMIN — CYANOCOBALAMIN TAB 1000 MCG 1000 MCG: 1000 TAB at 07:59

## 2017-01-01 RX ADMIN — Medication 100 MG: at 08:33

## 2017-01-01 RX ADMIN — CARBAMIDE PEROXIDE 6.5% OTIC SOLN 5 DROP: 6.5 SOLUTION at 22:38

## 2017-01-01 RX ADMIN — MILRINONE LACTATE 0.38 MCG/KG/MIN: 200 INJECTION, SOLUTION INTRAVENOUS at 12:09

## 2017-01-01 RX ADMIN — CYANOCOBALAMIN TAB 1000 MCG 1000 MCG: 1000 TAB at 08:52

## 2017-01-01 RX ADMIN — BENZONATATE 100 MG: 100 CAPSULE, LIQUID FILLED ORAL at 18:56

## 2017-01-01 RX ADMIN — CARVEDILOL 3.12 MG: 3.12 TABLET, FILM COATED ORAL at 18:21

## 2017-01-01 RX ADMIN — TOLTERODINE 4 MG: 4 CAPSULE, EXTENDED RELEASE ORAL at 09:06

## 2017-01-01 RX ADMIN — HYDRALAZINE HYDROCHLORIDE 100 MG: 50 TABLET ORAL at 11:30

## 2017-01-01 RX ADMIN — POTASSIUM CHLORIDE 20 MEQ: 29.8 INJECTION, SOLUTION INTRAVENOUS at 01:47

## 2017-01-01 RX ADMIN — ISOSORBIDE DINITRATE 60 MG: 20 TABLET ORAL at 14:03

## 2017-01-01 RX ADMIN — MILRINONE LACTATE 0.3 MCG/KG/MIN: 200 INJECTION, SOLUTION INTRAVENOUS at 09:23

## 2017-01-01 RX ADMIN — CYANOCOBALAMIN TAB 1000 MCG 1000 MCG: 1000 TAB at 08:19

## 2017-01-01 RX ADMIN — TOLTERODINE TARTRATE 4 MG: 4 CAPSULE, EXTENDED RELEASE ORAL at 08:25

## 2017-01-01 RX ADMIN — POTASSIUM CHLORIDE 20 MEQ: 1.5 POWDER, FOR SOLUTION ORAL at 07:20

## 2017-01-01 RX ADMIN — BUMETANIDE 2 MG: 0.25 INJECTION, SOLUTION INTRAMUSCULAR; INTRAVENOUS at 06:51

## 2017-01-01 RX ADMIN — CARVEDILOL 3.12 MG: 3.12 TABLET, FILM COATED ORAL at 18:38

## 2017-01-01 RX ADMIN — ISOSORBIDE DINITRATE 60 MG: 20 TABLET ORAL at 14:49

## 2017-01-01 RX ADMIN — CYANOCOBALAMIN TAB 1000 MCG 1000 MCG: 1000 TAB at 08:26

## 2017-01-01 RX ADMIN — CARVEDILOL 3.12 MG: 3.12 TABLET, FILM COATED ORAL at 08:00

## 2017-01-01 RX ADMIN — LEVOTHYROXINE SODIUM 150 MCG: 75 TABLET ORAL at 06:47

## 2017-01-01 RX ADMIN — IRON 325 MG: 65 TABLET ORAL at 08:45

## 2017-01-01 RX ADMIN — HEPARIN SODIUM 5000 UNITS: 5000 INJECTION, SOLUTION INTRAVENOUS; SUBCUTANEOUS at 14:07

## 2017-01-01 RX ADMIN — LEVOTHYROXINE SODIUM 150 MCG: 75 TABLET ORAL at 07:09

## 2017-01-01 RX ADMIN — SODIUM CHLORIDE 1000 ML: 9 INJECTION, SOLUTION INTRAVENOUS at 14:29

## 2017-01-01 RX ADMIN — CALCITRIOL 0.25 MCG: 0.25 CAPSULE, LIQUID FILLED ORAL at 09:07

## 2017-01-01 RX ADMIN — HUMAN ALBUMIN MICROSPHERES AND PERFLUTREN 4 ML: 10; .22 INJECTION, SOLUTION INTRAVENOUS at 14:30

## 2017-01-01 RX ADMIN — Medication 1000 ML: at 09:56

## 2017-01-01 RX ADMIN — POTASSIUM CHLORIDE 20 MEQ: 29.8 INJECTION, SOLUTION INTRAVENOUS at 05:08

## 2017-01-01 RX ADMIN — CLOPIDOGREL 75 MG: 75 TABLET, FILM COATED ORAL at 08:46

## 2017-01-01 RX ADMIN — CLOPIDOGREL 75 MG: 75 TABLET, FILM COATED ORAL at 08:16

## 2017-01-01 RX ADMIN — ISOSORBIDE DINITRATE 30 MG: 10 TABLET ORAL at 08:18

## 2017-01-01 RX ADMIN — FUROSEMIDE 60 MG: 10 INJECTION, SOLUTION INTRAVENOUS at 11:09

## 2017-01-01 RX ADMIN — Medication 100 MG: at 08:49

## 2017-01-01 RX ADMIN — CYANOCOBALAMIN TAB 1000 MCG 1000 MCG: 1000 TAB at 08:43

## 2017-01-01 RX ADMIN — BUMETANIDE 2 MG: 2 TABLET ORAL at 08:26

## 2017-01-01 RX ADMIN — Medication 1 MG/HR: at 14:09

## 2017-01-01 RX ADMIN — Medication 100 MG: at 08:43

## 2017-01-01 RX ADMIN — CHLOROTHIAZIDE SODIUM 500 MG: 500 INJECTION, POWDER, LYOPHILIZED, FOR SOLUTION INTRAVENOUS at 08:01

## 2017-01-01 RX ADMIN — ACETAMINOPHEN 650 MG: 325 TABLET, FILM COATED ORAL at 02:41

## 2017-01-01 RX ADMIN — FUROSEMIDE 5 MG/HR: 10 INJECTION, SOLUTION INTRAVENOUS at 14:22

## 2017-01-01 RX ADMIN — TOLTERODINE 4 MG: 4 CAPSULE, EXTENDED RELEASE ORAL at 08:01

## 2017-01-01 RX ADMIN — HYDRALAZINE HYDROCHLORIDE 20 MG: 20 INJECTION INTRAMUSCULAR; INTRAVENOUS at 01:59

## 2017-01-01 RX ADMIN — HYDRALAZINE HYDROCHLORIDE 100 MG: 25 TABLET ORAL at 17:57

## 2017-01-01 RX ADMIN — CARVEDILOL 9.38 MG: 6.25 TABLET, FILM COATED ORAL at 17:28

## 2017-01-01 RX ADMIN — MULTIPLE VITAMINS W/ MINERALS TAB 1 TABLET: TAB at 07:58

## 2017-01-01 RX ADMIN — TOLTERODINE 4 MG: 4 CAPSULE, EXTENDED RELEASE ORAL at 08:46

## 2017-01-01 RX ADMIN — POTASSIUM CHLORIDE 20 MEQ: 1.5 POWDER, FOR SOLUTION ORAL at 23:58

## 2017-01-01 RX ADMIN — HYDRALAZINE HYDROCHLORIDE 100 MG: 100 TABLET ORAL at 08:44

## 2017-01-01 RX ADMIN — TORSEMIDE 80 MG: 20 TABLET ORAL at 23:05

## 2017-01-01 RX ADMIN — LEVOTHYROXINE SODIUM 150 MCG: 75 TABLET ORAL at 07:36

## 2017-01-01 RX ADMIN — SODIUM POLYSTYRENE SULFONATE 15 G: 15 SUSPENSION ORAL; RECTAL at 02:19

## 2017-01-01 RX ADMIN — PHYTONADIONE 5 MG: 10 INJECTION, EMULSION INTRAMUSCULAR; INTRAVENOUS; SUBCUTANEOUS at 09:57

## 2017-01-01 RX ADMIN — ASPIRIN 81 MG: 81 TABLET, COATED ORAL at 08:18

## 2017-01-01 RX ADMIN — FUROSEMIDE 60 MG: 10 INJECTION, SOLUTION INTRAVENOUS at 15:29

## 2017-01-01 RX ADMIN — Medication 62.5 MG: at 19:32

## 2017-01-01 RX ADMIN — DOBUTAMINE HYDROCHLORIDE 7.5 MCG/KG/MIN: 200 INJECTION INTRAVENOUS at 07:53

## 2017-01-01 RX ADMIN — POTASSIUM CHLORIDE 20 MEQ: 29.8 INJECTION, SOLUTION INTRAVENOUS at 06:07

## 2017-01-01 ASSESSMENT — ACTIVITIES OF DAILY LIVING (ADL)
DRESS: 2-->ASSISTIVE PERSON
FALL_HISTORY_WITHIN_LAST_SIX_MONTHS: YES
FALL_HISTORY_WITHIN_LAST_SIX_MONTHS: YES
AMBULATION: 1-->ASSISTIVE EQUIPMENT
COGNITION: 1 - ATTENTION OR MEMORY DEFICITS
TOILETING: 0-->INDEPENDENT
NUMBER_OF_TIMES_PATIENT_HAS_FALLEN_WITHIN_LAST_SIX_MONTHS: 1
RETIRED_COMMUNICATION: 0-->UNDERSTANDS/COMMUNICATES WITHOUT DIFFICULTY
AMBULATION: 0-->INDEPENDENT
TOILETING: 2-->ASSISTIVE PERSON
COGNITION: 0 - NO COGNITION ISSUES REPORTED
TOILETING: 0-->INDEPENDENT
TRANSFERRING: 0-->INDEPENDENT
COGNITION: 0 - NO COGNITION ISSUES REPORTED
BATHING: 2-->ASSISTIVE PERSON
WHICH_OF_THE_ABOVE_FUNCTIONAL_RISKS_HAD_A_RECENT_ONSET_OR_CHANGE?: AMBULATION
BATHING: 2-->ASSISTIVE PERSON
DRESS: 2-->ASSISTIVE PERSON
AMBULATION: 0-->INDEPENDENT
TRANSFERRING: 0-->INDEPENDENT
RETIRED_EATING: 0-->INDEPENDENT
SWALLOWING: 0-->SWALLOWS FOODS/LIQUIDS WITHOUT DIFFICULTY
DRESS: 1-->ASSISTIVE EQUIPMENT
FALL_HISTORY_WITHIN_LAST_SIX_MONTHS: YES
TRANSFERRING: 2-->ASSISTIVE PERSON
SWALLOWING: 2-->DIFFICULTY SWALLOWING FOODS
NUMBER_OF_TIMES_PATIENT_HAS_FALLEN_WITHIN_LAST_SIX_MONTHS: 2
RETIRED_COMMUNICATION: 0-->UNDERSTANDS/COMMUNICATES WITHOUT DIFFICULTY
SWALLOWING: 0-->SWALLOWS FOODS/LIQUIDS WITHOUT DIFFICULTY
RETIRED_COMMUNICATION: 0-->UNDERSTANDS/COMMUNICATES WITHOUT DIFFICULTY
RETIRED_EATING: 0-->INDEPENDENT
RETIRED_EATING: 0-->INDEPENDENT
BATHING: 0-->INDEPENDENT

## 2017-01-01 ASSESSMENT — PAIN DESCRIPTION - DESCRIPTORS
DESCRIPTORS: CRAMPING
DESCRIPTORS: ACHING
DESCRIPTORS: CRAMPING
DESCRIPTORS: DISCOMFORT
DESCRIPTORS: ACHING
DESCRIPTORS: ACHING
DESCRIPTORS: SORE
DESCRIPTORS: DISCOMFORT
DESCRIPTORS: ACHING
DESCRIPTORS: CRAMPING
DESCRIPTORS: ACHING
DESCRIPTORS: SORE
DESCRIPTORS: CRAMPING
DESCRIPTORS: HEADACHE
DESCRIPTORS: CRAMPING
DESCRIPTORS: CRAMPING
DESCRIPTORS: CONSTANT;CRAMPING
DESCRIPTORS: ACHING
DESCRIPTORS: SORE
DESCRIPTORS: ACHING
DESCRIPTORS: CRAMPING
DESCRIPTORS: SORE
DESCRIPTORS: DISCOMFORT
DESCRIPTORS: DISCOMFORT
DESCRIPTORS: ACHING
DESCRIPTORS: CRAMPING

## 2017-01-01 ASSESSMENT — ENCOUNTER SYMPTOMS
ADENOPATHY: 0
FATIGUE: 1
NECK STIFFNESS: 0
FEVER: 0
BRUISES/BLEEDS EASILY: 0
WOUND: 1
CHILLS: 0
NAUSEA: 0
AGITATION: 0
NECK PAIN: 0
POLYDIPSIA: 0
SHORTNESS OF BREATH: 0
WEAKNESS: 1
VOMITING: 0
FATIGUE: 1
ABDOMINAL PAIN: 0
BACK PAIN: 0
COLOR CHANGE: 0
LIGHT-HEADEDNESS: 0
NAUSEA: 0
VOMITING: 0
ABDOMINAL PAIN: 0

## 2017-01-01 ASSESSMENT — 6 MINUTE WALK TEST (6MWT)
GENDER SELECTION: MALE
FEMALE CALC: 1426.88
TOTAL DISTANCE WALKED (FT): 746
GENDER SELECTION: MALE
FEMALE CALC: 1397.52
TOTAL DISTANCE WALKED (FT): 746
MALE CALC: 1520.07
GENDER SELECTION: MALE
MALE CALC: 1650.63
PREDICTED: 1660.7
PREDICTED: 1652.51
MALE CALC: 1642.5
PREDICTED: 1529.34
GENDER SELECTION: MALE
PREDICTED: 1637.99
FEMALE CALC: 1416.3
TOTAL DISTANCE WALKED (FT): 746
MALE CALC: 1628.06
TOTAL DISTANCE WALKED (FT): 746
FEMALE CALC: 1353.47

## 2017-01-01 ASSESSMENT — PAIN SCALES - GENERAL
PAINLEVEL: NO PAIN (0)

## 2017-01-05 NOTE — PROGRESS NOTES
Perez Villalobos 76 year old 1940 01/05/17 1400   Session   Session Initial Evaluation and Exercise Prescription   Certified through this date 02/03/17   I have established, reviewed and made necessary changes to the individualized treatment plan and exercise prescription for this patient.    Physician Name (printed): ________________________   Date: _______  Time: ______    Physician Signature: ___________________________________________   Cardiac Rehab Assessment   Cardiac Rehab Assessment 1/5/2017 Evaluation completed. PT has an extensive heart history. In 2/2016 he was diagnosed with CHF. PT was admitted to the hospital with bradycardia and had a V.fib arrest. The following day he had a pacemaker and ICD placed. PT is unable to tolerate statins due to rhabdomyolysis. Since then he complains of leg weakness, but is also limited with walking due to hip pain. He is limited some days by fatigue and SOB. He sleeps with the HOB elevated. He is going to [place] 1- and would like to continue cardiac rehab there. The patient's history and clinical status including hemodynamics and ECG were evaluated.  The patient was assessed to be stable and appropriate to begin exercise.   The patient's functional capacity and exercise prescription were determined by the completion of the 6 minute walk test.  See results below.  The patient was oriented to the program.  Risk factor profile was completed. Goals and objectives were discussed. BP dropped with exercise-symptomatic. Resting BP mildly elevated. Hip pain with walking-stopped several times.. Fair prognosis for reaching above goals. Skilled therapy is necessary in order to monitor CV response to exercise, to provide education on risk factors and behavior change counseling needed to achieve patient's goals.  Plan to progress to 30 minutes of exercise prior to discharge from cardiac rehab. Will start with 3-6 minute bouts.  Initial THR of 20-30 beats above RHR;  Effort rating of 4-6.  Initiate muscle conditioning as appropriate.  Provide risk factor education and behavior change counseling.     General Information   Treatment Diagnosis Systolic Heart Failure with Lowered EF   Date of Treatment Diagnosis 11/15/16  (Diagnosed with heart failure in 2/2016)   Secondary Treatment Diagnosis (ICD/pacemaker 11/16/2016)   Significant Past CV History Previous MI;Previous PCI   Comorbidities Renal Disease   Other Medical History Numerous hospitalizations the past year for acute renal failure and respiratory failure due to rhabdomyolysis. Symptomatic bradycardia with a V.fib arrest.   Lead up symptoms Slow heart rate in the 40's with near syncope   Hospital Location [facility] Discharge Date 11/18/16   Signs and Symptoms Post Hospital Discharge Fatigue;SOB   Comments Sleeping with the head of the bed elevated   Outpatient Cardiac Rehab Start Date 01/05/17   Primary Physician Hector Jc   Primary Physician Follow Up Completed   Cardiologist Marc/Elle   Cardiologist Follow Up Completed   Ejection Fraction 30-35%   Risk Stratification High   Summary of Cath Report   Summary of Cath Report Not Applicable   Living and Work Status    Living Arrangements and Social Status house;spouse   Return to Employment Retired   Preventative Medications   CMS recommended medications Antiplatelets;Beta Blocker;Influenza vaccination;Pneumonia vaccination   Falls Screen   Have you fallen two or more times in the past year? Yes   Have you fallen and had an injury in the past year? No   Pain   Patient Currently in Pain Yes   Pain Location hips   Pain Rating 5   Pain Description Ache   Pain Description Comment discomfort occurs with walking   Physical Assessments   Incisions Not applicable   Edema +3 Moderate   Right Lung Sounds normal   Left Lung Sounds normal   Limitations Orthopedic   Comments Hip pain bilaterally and leg weakness   Individualized Treatment Plan   Monitored Sessions  "Scheduled 7   Monitored Sessions Attended 1   Oxygen   Supplemental Oxygen needed No   Nutrition Management - Weight Management   Assessment Initial Assessment   Age 76   Weight 73.392 kg (161 lb 12.8 oz)   Height 1.702 m (5' 7.01\")   BMI (Calculated) 25.39   Initial Rate Your Plate Score. Dietary tool to assess eating patterns. Scores range from 24 to 72. The higher the score the healthier the eating pattern. 64   Nutrition Management - Lipids   Lipids Labs Available   Date 09/16/16   Total Cholesterol 232   Triglycerides 126   HDL 87      Prescribed Lipid Medication No;Intolerant   Lipid Comments PT had rhabdomyolisis secondary to Crestor.    Nutrition Management - Diabetes   Diabetes No   Nutrition Management Summary   Dietary Recommendations Low Fat;Low Cholesterol;Low Sodium   Stages of Change for Diet Compliance Action   Interventions Planned Attend Nutrition Education Class(es);Refer for Individual Diet Consultation   Patient Goals Goal #1   Goal #1 Description Learn ways to reduce the sodium in cooking but still make tasty dishes by attending the nutrition classes and meet possibly with the dietitian.   Goal #1 Target Date 02/01/17   Nutrition Summary Comments Trying to watch the sodium levels more carefully the past couple months.   Nutrition Target Outcome Total Chol < 150, HDL > 40 (M), HDL > 50 (W), LDL < 70, Trig < 150   Psychosocial Management   Psychosocial Assessment Initial   Is there history of clinical depression or increased risk of depression? No previous history   Current Level of Stress per Patient Report Mild   Current Coping Skills Uses Stress Management/Relaxation Techniques  (exercise, hobby-works on cars)   Initial Patient Health Questionnaire -9 Score (PHQ-9) for depression. 5-9 Minimal symptoms, 10-14 Minor depression, 15-19 Major depression, moderately severe, > 20 Major depression, severe  6   Initial [facility] Survey score.  Quality of Life:   If total score > 25 review " individual areas where patient rated a 4 or 5.  Consider patients current medical condition and what role that plays on the score.   Adjust treatment protocol to improve areas of concern.  Consider the following:  PHQ9 score, DASI, and re-assessment within the next 30 days to assist with developing treatments.  22   Stages of Change Maintenance   Interventions Planned Patient denies need for intervention at this time.   Patient Goal No   Psychosocial Comments Sometimes PT does not sleep well at night due to being restless.   Psychosocial Target Outcome Identify absence or presence of depression using valid screening tool   Other Core Components - Hypertension   History of or Diagnosis of Hypertension Yes   Currently taking Anti-Hypertensives Yes;Beta blocker   Hypertension Comments Resting BP borderline-will continue to monitor.   Other Core Components - Tobacco   History of Tobacco Use Yes   Quit Date or Planned Quit Date (1985)   Tobacco Habit Cigarettes;Pipe   Stages of Change Maintenance   Other Core Components Summary   Interventions Planned Instruct patient on the DASH diet;Provide information on home blood pressure monitoring;Educate on the use of 'Stop Light' tool;Educate on importance of monitoring daily weight;Educate on signs/symptoms and when to call the doctor (i.e. increased edema, dyspnea, fatigue, dizziness/lightheadedness, change in sleep patterns, chest discomfort);Instruct and educate to self manage Heart Failure symptoms;Attend education class(es) on Nutrition   Patient Goals (See nutrition goal)   Other Core Components Comments PT has a home BP monitor, but does not use it as often as he should.   Activity/Exercise History   Activity/Exercise Assessment Initial   Activity/Exercise Status prior to event? Participated in an Exercise Program   Number of Days Currently participating in Moderate Physical Activity? 0   Number of Days Currently performing  Aerobic Exercise (including rehab)? 4-5    Number of Minutes per Session Currently of Aerobic Exercise (average)? 2 minute bouts x 2-3 times   Current Stage of Change (Physical Activity) Preparation   Current Stage of Change (Aerobic Exercise) Preparation   Patient Goals Goal #1;Goal #2   Goal #1 Description PT would like to build his leg strength to be able to walk 3/4 mile by attending cardiac rehab and exercising at home.   Goal #1 Target Date 03/01/17   Goal #2 Description PT would like to improve his upper body strength by participating in cardiac rehab 3 times per week and adding light weights.   Goal #2 Target Date 03/01/17   Activity/Exercise Comments PT has a treadmill and belongs to [employer].   Activity/Exercise Target Outcome An Accumulation of 150  Minutes of Aerobic Activity per Week   Exercise Assessment   6 Minute Walk Predicted - Gender Selection Male   6 Minute Walk Predicted (Male) 1520.07   6 Minute Walk Predicted (Female) 1353.47   Initial 6 Minute Walk Distance (Feet) 746 ft   Resting HR 74 bpm   Exercise HR 80 bpm   Post Exercise HR 66 bpm   Resting /68 mmHg   Exercise /60 mmHg   Post Exercise /68 mmHg   Effort Rating 5   Current MET Level 2.1   MET Level Goal 3.0-4.0   ECG Rhythm Paced rhythm  (ectopic beats)   Ectopy None   Current Symptoms Joint pain   Limitations/Restrictions Other (see comments);Orthopedic (see comments)  (hip pain and weakness in legs)   Exercise Prescription   Mode Recumbant bike;Nustep;Treadmill;Weights   Duration/Time Intermittent bouts   Frequency 3 daysweek   THR (85% of age predicted max HR) 121.55   OMNI Effort Rating (0-10 Scale) 4-6/10   Progression Intermittent bouts;Total exercise time of 20-30 minutes;Progress peak intensity by 1/4 MET per week;Aerobic exercise to OMNI rating of 6 or below and at or below THR   Recommended Home Exercise   Type of Exercise Walking;Treadmill   Frequency (days per week) 3   Duration (minutes per session) Intermittent   Effort Rating Recommended  4-6/10   30 Day Exercise Plan PT to continue to walk 2 minutes bouts on the TM 2-3 times per day. May eventually go to the fitness club to exercise.   Current Home Exercise   Type of Exercise Treadmill   Frequency (days per week) 6   Duration (minutes per session) 2 minuters x 3   Follow-up/On-going Support   Provider follow-up needed on the following No follow-up needed   Learning Assessment   Learner Patient   Primary Language English   Preferred Learning Style Reading;Pictures/Video   Barriers to Learning Cognitive   Patient Education   Education recommended Anatomy and Physiology of the Heart;Heart Failure;Muscle Conditioning;Nutrition;Medication Overview   Physician cosignature/electronic signature indicates approval of this ITP document. I have established, reviewed and made necessary changes to the individualized treatment plan and exercise prescription for this patient.

## 2017-01-06 NOTE — PROGRESS NOTES
[facility]  Outpatient Sleep Medicine Consultation  January 6, 2017      Name: Perez Villalobos MRN# [id number]   Age: 76 year old YOB: 1940     Date of Consultation: January 6, 2017  Consultation is requested by: No referring provider defined for this encounter.  Primary care provider: Hector Jc         Reason for Sleep Consult:     Perez Villalobos is a 76 year old male with prior history of obstructive sleep apnea with sleep disruption ~2006 and switched from CPAP to biPAP for comfort first year.         Assessment and Plan:     Summary Sleep Diagnoses:    Probable complex sleep apnea in the setting of chronic congestive heart failure- patient reluctant to have repeat sleep study. Baseline sleep study 2007 not adequate to evaluate current status.       Summary Recommendations:      Oximetry for 2 days off bilevel     Consider repeat sleep study if evidence of sustained or episodic desaturation with plan to provide oxygen alone if patient does not have obstructive sleep apnea.    Bilevel PAP may worsen Cheyne Grijalva and ASV not indicated in heart failure with EF < 45%               History of Present Illness:     Perez Villalobos is a 76 year old male with prior history of obstructive sleep apnea with sleep disruption ~2006 and switched from CPAP to biPAP for comfort first year.    PREVIOUS IN- LAB SLEEP STUDIES at [facility] 6/20/2007 show substantial cental components not differentiated in details with AHI 58 and arousal index 71 during a monitoring periods with substantial montioring discomfort.      SLEEP-WAKE SCHEDULE:     Perez Villalobos     -Describes themself as neither a morning or night person;      -Naps 5-6 times per week for 30-60 minutes, feels refreshed after naps; t     -ON ALLDAYS, goes to sleep at 9:00 PM during the week; awakens  7:30 AM without an alarm; falls asleep in 10 minutes; denies difficulty falling asleep.       -Awakens 1-2 times a night for 30 minutes  before falling back to sleep; awakens to go to the bathroom.      BEDTIME ACTIVITIES AND SHIFT WORK:    Perez Villalobos    -does not use electronics in bed.     -does not do shift work.       SCALES         SLEEPINESS: [place] sleepiness scale (ESS):  9      SLEEP COMPLAINTS:  Cardio-respiratory    Snoring- 0/week  Dyspnea- supine dyspnea when awake improved by elevating head of bed  Morning headaches or confusion-no  Coexisting Lung disease: no       Does patient have a bed partner: yes  Has bed partner been sleeping separately because of snoring:  no            RLS Screen: When you try to relax in the evening or sleep at  night, do you ever have unpleasant, restless feelings in your  legs that can be relieved by walking or movement? no    Periodic limb movement: no    Narcolepsy:       denies sudden urges of sleep attacks     denies cataplexy     denies sleep paralysis      denies hallucinations     Sleep Behaviors:     denies leg symptoms/movements     endorses need to ambulate at night without sensory symptoms 1-2x/week     denies night terrors     denies bruxism     denies automatic behaviors    Other subjective complaints:     denies anxiety or rumination      denies pain and discomfort at  night     denies waking up with heart pounding or racing     denies GERD or aspiration         Parasomnia:   NREM - denies recurrent persistent confusional arousal, night eating, sleep walking or sleep terrors   REM  - denies dream enactment; injuries              Medications:     Current Outpatient Prescriptions   Medication Sig     metolazone (ZAROXOLYN) 2.5 MG tablet 1 tab today, 30min later 1 mg of bumex. ONLY 1/3/17 or as directed.     carvedilol (COREG) 3.125 MG tablet Take 3 tablets (9.375 mg) by mouth 2 times daily (with meals)     spironolactone (ALDACTONE) 25 MG tablet Take 1 tablet (25 mg) by mouth daily     isosorbide mononitrate (IMDUR) 60 MG 24 hr tablet Take 1 tablet (60 mg) by mouth daily At      bumetanide  "(BUMEX) 1 MG tablet Take 1 tablet (1 mg) by mouth 2 times daily     levothyroxine (SYNTHROID, LEVOTHROID) 125 MCG tablet Take 150 mcg by mouth every morning (before breakfast)      calcitRIOL (ROCALTROL) 0.25 MCG capsule Take 0.25 mcg by mouth three times a week Monday, Wednesday and Friday     Saline (SODIUM CHLORIDE) 0.65 % SOLN Spray in nostril as needed     hydrALAZINE (APRESOLINE) 50 MG tablet Take 1 tablet (50 mg) by mouth 3 times daily     KRILL OIL PO Take 1 capsule by mouth daily      Cyanocobalamin (B-12) 1000 MCG TBCR Take 1,000 mcg by mouth daily     multivitamin, therapeutic with minerals (MULTI-VITAMIN) TABS Take 1 tablet by mouth daily     clopidogrel (PLAVIX) 75 MG tablet Take 75 mg by mouth daily     aspirin 81 MG tablet Take 81 mg by mouth daily     tolterodine (DETROL LA) 4 MG 24 hr capsule Take 4 mg by mouth daily     No current facility-administered medications for this visit.        Allergies   Allergen Reactions     Lisinopril Other (See Comments)     Angioedema     Augmentin Other (See Comments)     Per pt, \"froze him, affected his legs\"     Crestor [Rosuvastatin] Other (See Comments)     rhabdomyolysis            Past Medical History:       Past Medical History   Diagnosis Date     Mixed hyperlipidemia      Benign essential HTN      CAD (coronary artery disease)      AWMI, stents to Cx, RCA and LAD     PAD (peripheral artery disease) (H)      NSTEMI (non-ST elevated myocardial infarction) (H) 10/23/2015     Ischemic cardiomyopathy 10/23/2015     EF 30%     Acute renal failure (H)      10/2015 ARF secondary to rhabdomyolysis     Rhabdomyolysis due to statin therapy      crestor     Chronic systolic heart failure (H)      Anemia      BPH (benign prostatic hypertrophy)      Alcoholism (H)      Severe hypothyroidism 9/20/2016     CKD (chronic kidney disease)      Complete AV block (H)      VF (ventricular fibrillation) (H) 11/16/16             Past Surgical History:      Past Surgical History "   Procedure Laterality Date     Stent, coronary, s660 15/18  Nov 2000     PTCA with stent placement of CFX     Stent, coronary, s660 15/18  Feb 2001     PTCA with stent placement of CFX RCA      Stent, coronary, s660 15/18  April 2007     stent placed in LAD     Endarterectomy carotid Left 6/11/10     Appendectomy       Tonsillectomy and adenoidectomy       Cholecystectomy       Implant automatic implantable cardioverter defibrillator  11/16/16            Social History:     Social History   Substance Use Topics     Smoking status: Former Smoker -- 1.50 packs/day for 20 years     Types: Cigarettes     Quit date: 01/01/1980     Smokeless tobacco: Never Used     Alcohol Use: 0.0 oz/week     0 Standard drinks or equivalent per week      Comment: 1-2 glasses of wine per week                  Family History:     Family History   Problem Relation Age of Onset     Unknown/Adopted Mother      Unknown/Adopted Father                Review of Systems:     A complete 10 point review of systems was negative other than HPI or as commented below:   PULMONARY: dyspnea and cough supine and with exertion.  MUSCULOSKELETAL: dependent edema      CONSTITUTIONAL: NEGATIVE for weight gain/loss, fever, chills, sweats or night sweats, drug allergies.  EYES: NEGATIVE for changes in vision, blind spots, double vision.  ENT: NEGATIVE for ear pain, sore throat, sinus pain, post-nasal drip, runny nose, bloody nose  CARDIAC: NEGATIVE for fast heartbeats or fluttering in chest, chest pain or pressure, breathlessness when lying flat, swollen legs or swollen feet.  NEUROLOGIC: NEGATIVE headaches, weakness or numbness in the arms or legs.  DERMATOLOGIC: NEGATIVE for rashes, new moles or change in mole(s)  GASTROINTESTINAL: NEGATIVE for nausea or vomitting, loose or watery stools, fat or grease in stools, constipation, abdominal pain, bowel movements black in color or blood noted.  GENITOURINARY: NEGATIVE for pain during urination, blood in urine,  "urinating more frequently than usual  ENDOCRINE: NEGATIVE for increased thirst or urination, diabetes.  LYMPHATIC: NEGATIVE for swollen lymph nodes,         Physical Examination:   /62 mmHg  Pulse 74  Ht 1.727 m (5' 8\")  Wt 70.308 kg (155 lb)  BMI 23.57 kg/m2  SpO2 98%     Constitutional: . Awake, alert, cooperative, dressed casually, good eye contact, comfortably sitting in a chair, in no apparent distress  Mood: euthymic; affect congruent with full range and intensity.  Attention/Concentration:  Normal   Eyes: No icterus.  ENT: Mallampati Class: I.   Tonsillar Stage: 1  hidden by pillars    Cardiovascular: Regular S1 and S2, no gallops or murmurs. No carotid bruits  Neck: Supple, no thyroid enlargement.   Pulmonary:  Chest symmetric, lungs clear bilaterally and no crackles, wheezes or rales  Extremities:  2+pedal edema.  Muscle/joint: Strength and tone normal   Skin:  No rash or significant lesions.   Gait Normal.  Neurologic: Alert, oriented x3, no focal neurological deficit, cranial nerves grossly normal            Data: All pertinent previous laboratory data reviewed     Lab Results   Component Value Date    PH 7.56* 10/02/2016    PH 7.57* 02/10/2016    PO2 67* 10/02/2016    PO2 85 02/10/2016    SAT 35 06/23/2016    PCO2 27* 10/02/2016    PCO2 33* 02/10/2016    HCO3 24 10/02/2016    HCO3 30* 02/10/2016    HERMINIA 1.6 10/02/2016    HERMINIA 7.0 02/10/2016     Lab Results   Component Value Date    TSH 20.82* 11/21/2016    TSH 20.82 11/21/2016     Lab Results   Component Value Date    * 12/28/2016    * 12/21/2016     Lab Results   Component Value Date    HGB 10.3* 11/17/2016    HGB 11.8* 11/16/2016     Lab Results   Component Value Date    BUN 55* 12/28/2016    BUN 51* 12/21/2016    CR 1.96* 12/28/2016    CR 1.92* 12/21/2016         Echocardiology: Study Date: 10/02/2016 10:10 AM  Age: 75 yrs  Gender: Male  Patient Location: [facility]  Reason For Study: CHF  Ordering Physician: SWATI CARRIZALES  Referring " Physician: Mary Ann Jc  Performed By: Norma Jc RDCS     BSA: 1.9 m2  Height: 68 in  Weight: 177 lb  HR: 90  BP: 154/77 mmHg  ______________________________________________________________________________        Procedure  Complete Portable Echo Adult. Contrast Lumason.  ______________________________________________________________________________     Interpretation Summary     Left ventricular systolic function is moderately reduced.The visual ejection  fraction is estimated at 30-35%. The left ventricle is mildly dilated.Apical  and distal anetroseptal akinesia, severe hypokinesia of mid to distal lateral  and distal anterior walls.  The right ventricular systolic function is normal.  There is mild (1+) mitral regurgitation.  There is mild (1+) aortic regurgitation.       Copy to: Hector Jc MD 1/6/2017   [facility]  [address], [place] [address]    [phone]    Total time spent with patient: 45min >50% counseling            HPI      ROS      Physical Exam

## 2017-01-06 NOTE — NURSING NOTE
"Chief Complaint   Patient presents with     Consult     Sleep apnea, bi-pap Cardiologist wi fax reading, self referred.  Adjust or new equipment.       Initial /62 mmHg  Pulse 74  Ht 1.727 m (5' 8\")  Wt 70.308 kg (155 lb)  BMI 23.57 kg/m2  SpO2 98% Estimated body mass index is 23.57 kg/(m^2) as calculated from the following:    Height as of this encounter: 1.727 m (5' 8\").    Weight as of this encounter: 70.308 kg (155 lb).  BP completed using cuff size: regular      Neck 39  15 1/3  Ess 9    Courtney Helm LPN  "

## 2017-01-06 NOTE — PATIENT INSTRUCTIONS
Your BMI is Body mass index is 23.57 kg/(m^2).  Weight management is a personal decision.  If you are interested in exploring weight loss strategies, the following discussion covers the approaches that may be successful. Body mass index (BMI) is one way to tell whether you are at a healthy weight, overweight, or obese. It measures your weight in relation to your height.  A BMI of 18.5 to 24.9 is in the healthy range. A person with a BMI of 25 to 29.9 is considered overweight, and someone with a BMI of 30 or greater is considered obese. More than two-thirds of American adults are considered overweight or obese.  Being overweight or obese increases the risk for further weight gain. Excess weight may lead to heart disease and diabetes.  Creating and following plans for healthy eating and physical activity may help you improve your health.  Weight control is part of healthy lifestyle and includes exercise, emotional health, and healthy eating habits. Careful eating habits lifelong are the mainstay of weight control. Though there are significant health benefits from weight loss, long-term weight loss with diet alone may be very difficult to achieve- studies show long-term success with dietary management in less than 10% of people. Attaining a healthy weight may be especially difficult to achieve in those with severe obesity. In some cases, medications, devices and surgical management might be considered.  What can you do?  If you are overweight or obese and are interested in methods for weight loss, you should discuss this with your provider.     Consider reducing daily calorie intake by 500 calories.     Keep a food journal.     Avoiding skipping meals, consider cutting portions instead.    Diet combined with exercise helps maintain muscle while optimizing fat loss. Strength training is particularly important for building and maintaining muscle mass. Exercise helps reduce stress, increase energy, and improves fitness.  Increasing exercise without diet control, however, may not burn enough calories to loose weight.       Start walking three days a week 10-20 minutes at a time    Work towards walking thirty minutes five days a week     Eventually, increase the speed of your walking for 1-2 minutes at time    In addition, we recommend that you review healthy lifestyles and methods for weight loss available through the National Institutes of Health patient information sites:  http://win.niddk.nih.gov/publications/index.htm    And look into health and wellness programs that may be available through your health insurance provider, employer, local community center, or debbie club.      Your blood pressure was checked while you were in clinic today.  Please read the guidelines below about what these numbers mean and what you should do about them.  Your systolic blood pressure is the top number.  This is the pressure when the heart is pumping.  Your diastolic blood pressure is the bottom number.  This is the pressure in between beats.  If your systolic blood pressure is less than 120 and your diastolic blood pressure is less than 80, then your blood pressure is normal. There is nothing more that you need to do about it  If your systolic blood pressure is 120-139 or your diastolic blood pressure is 80-89, your blood pressure may be higher than it should be.  You should have your blood pressure re-checked within a year by a primary care provider.  If your systolic blood pressure is 140 or greater or your diastolic blood pressure is 90 or greater, you may have high blood pressure.  High blood pressure is treatable, but if left untreated over time it can put you at risk for heart attack, stroke, or kidney failure.  You should have your blood pressure re-checked by a primary care provider within the next four weeks.

## 2017-01-09 NOTE — PROGRESS NOTES
Telemanagment    Alert for: wt below parameter  Current diuretic: Bumex 1 mg BID, spironolactone 25 mg QD, metolazone 2.5 mg twice weekly if wt>156# (per patient/Dr. Valles?)  Heart Failure sx: SOB, pillows, dizzy  Called patient and ankles are improved, but still edema. Felt like couldn't get his breath last night, but was OK at cardiac rehab today. Has not taken more metolazone since weight is<156# (as of Friday Dr. Valles's dictation was not complete).  Plan: CORE f/u 1/16.  Vijaya GILL

## 2017-01-10 NOTE — PROGRESS NOTES
Patient picked up oximeter and was instructed on use. They showed understanding by demonstrating it back. Perez to use for 2 nights on room air only, without his bipap and oxygen.  Will follow up with Dr Salguero for results on 1/13/17.

## 2017-01-11 NOTE — PROGRESS NOTES
Please read Dr. Almeida's note. Pt had episode of atrial fib. Chadsvasc score 4. Dr. Almeida is leaving it up to you re: antiocoagulation. Thanks Girish

## 2017-01-11 NOTE — PROGRESS NOTES
"Telemanagment    Alert for: using extra pillows and dizzy. Wt up 3# from yesterday, but in parameter  Current diuretic: Bumex 1 mg BID, spironolactone 25 mg QD, metolazone twice weekly if wt>156# (per Dr. Valles)  Heart Failure sx: Called patient and only spoke with his wife. She said pt was \"restless\" last night supine and even when upright in a chair. Did not admit to higher salt intake.  Plan: His weight this morning was above 156# so he took metolazone and is urinating quite a bit already. He still plans to try to attend cardiac rehab this morning if the slippery roads permit. Next CORE visit 1/16. Reviewed with Shani Patrick CNP and recommends patient f/u with the sleep study recommended by Dr. Salguero. Vijaya RN    "

## 2017-01-12 PROBLEM — N18.30 CHRONIC KIDNEY DISEASE, STAGE III (MODERATE) (H): Status: ACTIVE | Noted: 2017-01-01

## 2017-01-12 PROBLEM — D63.1 ANEMIA OF CHRONIC RENAL FAILURE: Status: ACTIVE | Noted: 2017-01-01

## 2017-01-12 PROBLEM — D50.9 IRON DEFICIENCY ANEMIA, UNSPECIFIED: Status: ACTIVE | Noted: 2017-01-01

## 2017-01-12 PROBLEM — N18.9 ANEMIA OF CHRONIC RENAL FAILURE: Status: ACTIVE | Noted: 2017-01-01

## 2017-01-12 NOTE — PROGRESS NOTES
Patient has not scheduled sleep study but will schedule when he gets back from [place] in March. Slept well after diuresis from metolazone yesterday. Norris.

## 2017-01-12 NOTE — PROGRESS NOTES
Weight and sx improved after metolazone yesterday. Left a voicemail for patient to call to reiterate sleep study recommended by Dr. Salguero. Vijaya GILL

## 2017-01-13 NOTE — NURSING NOTE
"Chief Complaint   Patient presents with     RECHECK     F/u oximeter 1/6, 1/7       Initial /68 mmHg  Pulse 68  Ht 1.727 m (5' 7.99\")  Wt 70 kg (154 lb 5.2 oz)  BMI 23.47 kg/m2  SpO2 99% Estimated body mass index is 23.47 kg/(m^2) as calculated from the following:    Height as of this encounter: 1.727 m (5' 7.99\").    Weight as of this encounter: 70 kg (154 lb 5.2 oz).  BP completed using cuff size: libia Helm LPN  "

## 2017-01-13 NOTE — PATIENT INSTRUCTIONS
Your BMI is Body mass index is 23.47 kg/(m^2).  Weight management is a personal decision.  If you are interested in exploring weight loss strategies, the following discussion covers the approaches that may be successful. Body mass index (BMI) is one way to tell whether you are at a healthy weight, overweight, or obese. It measures your weight in relation to your height.  A BMI of 18.5 to 24.9 is in the healthy range. A person with a BMI of 25 to 29.9 is considered overweight, and someone with a BMI of 30 or greater is considered obese. More than two-thirds of American adults are considered overweight or obese.  Being overweight or obese increases the risk for further weight gain. Excess weight may lead to heart disease and diabetes.  Creating and following plans for healthy eating and physical activity may help you improve your health.  Weight control is part of healthy lifestyle and includes exercise, emotional health, and healthy eating habits. Careful eating habits lifelong are the mainstay of weight control. Though there are significant health benefits from weight loss, long-term weight loss with diet alone may be very difficult to achieve- studies show long-term success with dietary management in less than 10% of people. Attaining a healthy weight may be especially difficult to achieve in those with severe obesity. In some cases, medications, devices and surgical management might be considered.  What can you do?  If you are overweight or obese and are interested in methods for weight loss, you should discuss this with your provider.     Consider reducing daily calorie intake by 500 calories.     Keep a food journal.     Avoiding skipping meals, consider cutting portions instead.    Diet combined with exercise helps maintain muscle while optimizing fat loss. Strength training is particularly important for building and maintaining muscle mass. Exercise helps reduce stress, increase energy, and improves fitness.  Increasing exercise without diet control, however, may not burn enough calories to loose weight.       Start walking three days a week 10-20 minutes at a time    Work towards walking thirty minutes five days a week     Eventually, increase the speed of your walking for 1-2 minutes at time    In addition, we recommend that you review healthy lifestyles and methods for weight loss available through the National Institutes of Health patient information sites:  http://win.niddk.nih.gov/publications/index.htm    And look into health and wellness programs that may be available through your health insurance provider, employer, local community center, or dbebie club.      Your blood pressure was checked while you were in clinic today.  Please read the guidelines below about what these numbers mean and what you should do about them.  Your systolic blood pressure is the top number.  This is the pressure when the heart is pumping.  Your diastolic blood pressure is the bottom number.  This is the pressure in between beats.  If your systolic blood pressure is less than 120 and your diastolic blood pressure is less than 80, then your blood pressure is normal. There is nothing more that you need to do about it  If your systolic blood pressure is 120-139 or your diastolic blood pressure is 80-89, your blood pressure may be higher than it should be.  You should have your blood pressure re-checked within a year by a primary care provider.  If your systolic blood pressure is 140 or greater or your diastolic blood pressure is 90 or greater, you may have high blood pressure.  High blood pressure is treatable, but if left untreated over time it can put you at risk for heart attack, stroke, or kidney failure.  You should have your blood pressure re-checked by a primary care provider within the next four weeks.

## 2017-01-13 NOTE — PROGRESS NOTES
Infusion Nursing Note:  Perez Villalobos presents today for [facility].    Patient seen by provider today: No    Note: N/A.    Intravenous Access:  Peripheral IV placed.    Treatment Conditions:  Not Applicable.      Post Infusion Assessment:  Patient tolerated infusion without incident.  Site patent and intact, free from redness, edema or discomfort.  No evidence of extravasations.  Access discontinued per protocol.    Discharge Plan:   Discharge instructions reviewed with: Patient and Family.  Copy of AVS reviewed with patient and/or family.  Patient will return 1/17 for next appointment.  Patient discharged in stable condition accompanied by: self and wife.  Departure Mode: Ambulatory.    Gemma Diane RN

## 2017-01-13 NOTE — PROGRESS NOTES
Patient picked up oximeter 1/13/17.  Demonstrated back for knowledge of use. Will return oximeter 1/16/17.

## 2017-01-13 NOTE — PROGRESS NOTES
76-year-old male with severe cardiomyopathy (ejection fraction 35%) and prior history of obstructive sleep apnea currently on bilevel positive airway pressure support.   This gentleman does not have clinical improvement with the use of his bilevel device and is interested in reevaluation short of a sleep study to determine current therapy.  His previous sleep studies have demonstrated severe sleep apnea [RUST 6/20/2007 AHI 58 and arousal index 71] however the study was limited by significant monitoring discomfort and sleep disruption and occurs in the setting of heart failure raising the possibility that many of the events may have been central/Cheyne-Grijalva breathing.   He has been one week off in one week on bilevel positive airway pressure support without any subjective difference.    Oximetry performed on 2 days without the use of his bilevel positive airway pressure show some day-to-day variation but with significant sleep apnea and hypoxemia: Time at less than or equal to 88% saturation varied between 13 and 34 minutes and desaturation index varied between 19 and 26 events per hour.      ASSESSMENT:    Probable complex sleep apnea with hypoxemia.   The degree of obstructive sleep apnea and response to positive airway pressure, he determined by  Currently available studies.  Patient is unwilling to undergo standard sleep testing.      PLAN:  Repeat oximetry on bilevel positive airway pressure support.  If there is persistent sleep-related breathing disorder and hypoxemia we will recommend patient proceed with baseline sleep testing to determine appropriate therapeutic approaches: Oxygen alone for Cheyne Grijalva with hypoxemia and positive airway pressure if obstructive sleep apnea is the dominant finding.      total time 25 minutes greater than 50% counseling regarding nature of sleep-related breathing disorder and treatment options.  HPI      ROS      Physical Exam

## 2017-01-16 PROBLEM — E87.1 HYPONATREMIA: Status: ACTIVE | Noted: 2017-01-01

## 2017-01-16 PROBLEM — E87.6 HYPOKALEMIA: Status: ACTIVE | Noted: 2017-01-01

## 2017-01-16 NOTE — ED NOTES
Sent from clinic with abnormal labs. K 3.0 and Na 118. Patient reports has been sleepy, weak and had decreased appetite.

## 2017-01-16 NOTE — PROGRESS NOTES
Dr. Tran-per Dixon Mannchen CNP, please review Dr. Almeida's note 12/30/16. Pt had episode AF, Chadsvasc score 4. Dr. Almeida is leaving it up to you re: anticoagulation. Thanks!

## 2017-01-16 NOTE — IP AVS SNAPSHOT
[place] [id number] [facility]    [address]    [facility] [address]    Phone:  [phone]    Fax:  [phone]                                       After Visit Summary   1/16/2017    Perez Villalobos    MRN: [id number]           After Visit Summary Signature Page     I have received my discharge instructions, and my questions have been answered. I have discussed any challenges I see with this plan with the nurse or doctor.    ..........................................................................................................................................  Patient/Patient Representative Signature      ..........................................................................................................................................  Patient Representative Print Name and Relationship to Patient    ..................................................               ................................................  Date                                            Time    ..........................................................................................................................................  Reviewed by Signature/Title    ...................................................              ..............................................  Date                                                            Time

## 2017-01-16 NOTE — Clinical Note
1/16/2017    Hector Jc MD  [facility]   [address]  [facility] [address]    RE: Perez Villalobos       Dear Colleague,    PRIMARY CARE PROVIDER:  Hector Jc MD       CARDIOLOGIST:  Krishna Tran MD      [facility] NP: PARKER Sims, CNP       I had the pleasure of seeing Mr. Villalobos, is a pleasant 76-year-old gentleman who has a history of chronic systolic congestive heart failure.  He has had multiple hospitalizations since last year.  He has a history of ischemic cardiomyopathy.  He has had extensive stenting of all 3 coronary arteries.  He has a history of anterior STEMI treated with additional stenting.  Ejection fraction most recently was 30%-35%.  He had done well until about 2015 when he had an acute hypoxic respiratory failure related to acute renal failure from rhabdomyolysis felt related to his statin therapy.        He was hospitalized again multiple times in 2016 including acute systolic heart failure exacerbation in 02/2016, pneumonia and in 06/2016, for severe hypothyroidism with symptomatic bradycardia and syncope in September, acute congestive heart failure exacerbation with a small non-STEMI in early October when he underwent about a 20-pound diuresis and actually became quite prerenal and orthostatic, therefore not discharged on diuretics.      A stress echo at that time did not show significant residual ischemia and no angiogram was done.      In November, he presented with severe bradycardia and then had bradycardia turned into ventricular fibrillation for which he was resuscitated.  He underwent implantation of a biventricular ICD at the time.      Since that his admission for non-STEMI, particularly he has had difficulties maintaining euvolemic status.  He has been experiencing more shortness of breath, peripheral edema and abdominal distention.  On 12/02/2016, he underwent thoracentesis on the left lung for left lung of 900 mL of nora fluid.  The patient and  his wife stated that the patient had mild improvement in his breathing for 3-4 days but was not sustained.  His weight has fluctuated home between 153 pounds and 160 pounds.  Recent interrogation of his ICD showed 88% biventricular pacing, the rest frequent PVCs.  He has had 2 brief episodes of atrial flutter which were asymptomatic.      His Bumex dose was recently increased, but had no further diuresis and no weight change or change in his symptoms.  He had never been on spironolactone.  His creatinine baseline is 1.6 to 1.9.  He was seen by Dr. Tran 12/21 on an urgent basis.  He started spironolactone 25 mg a day.  Dr. Tran suggested if poor response, to proceed with right heart catheterization or additional diuretic challenges such as metolazone.      He then was seen by Dr. Valles, his nephrologist and he suggested to Perez that he take metolazone twice a week if his weight is above 156 pounds.  He has taken metolazone once on 01/06.      Today, he returns in followup from the 12/21 visit with Dr. Tran.  He states he had a very bad night last night with severe pain in both legs and feeling very lethargic.  He denies increased shortness of breath, orthopnea, PND, syncope or near-syncope.  He denies any defibrillator shocks.      PAST MEDICAL HISTORY:   1.  Chronic systolic heart failure, ejection fraction 35% range and more recently 30%-35% range; however, his TSH was very high at 148.   2.  Coronary artery disease, status post multiple percutaneous coronary interventions.  A nuclear stress test on 10/04/2016 showed ejection fraction of 37% with scarring, no significant ischemia.   3.  Chronic kidney disease.   4.  Atherosclerotic peripheral vascular disease, status post left carotid endarterectomy.   5.  Heart block, history of ventricular fibrillation arrest in the setting of bradycardia, status post biventricular ICD implantation 11/2016   6.  History of rhabdomyolysis felt secondary to rosuvastatin.    7.  History of angioedema with ACE inhibitors.      PHYSICAL EXAMINATION:   GENERAL:  Pleasant, cooperative, thin individual.   VITAL SIGNS:  Blood pressure 126/60.  Heart rate 68.  Weight 158 pounds in the clinic and 152 pounds at home.   LUNGS:  Clear to auscultation bilaterally.   CARDIOVASCULAR:  Shows normal S1 and S2.  There is no murmur, rub or click.   EXTREMITIES:  No peripheral edema.      LABORATORY DATA:    Sodium 118, potassium 3.0, BUN 74, creatinine 1.89, GFR 35.     No facility-administered encounter medications on file as of 1/16/2017.     Outpatient Encounter Prescriptions as of 1/16/2017   Medication Sig Dispense Refill     carvedilol (COREG) 3.125 MG tablet Take 3 tablets (9.375 mg) by mouth 2 times daily (with meals) 540 tablet 3     spironolactone (ALDACTONE) 25 MG tablet Take 1 tablet (25 mg) by mouth daily 30 tablet 3     isosorbide mononitrate (IMDUR) 60 MG 24 hr tablet Take 60 mg by mouth At Bedtime At hs 30 tablet 3     bumetanide (BUMEX) 1 MG tablet Take 1 tablet (1 mg) by mouth 2 times daily       [DISCONTINUED] levothyroxine (SYNTHROID, LEVOTHROID) 125 MCG tablet Take 150 mcg by mouth every morning (before breakfast)        calcitRIOL (ROCALTROL) 0.25 MCG capsule Take 0.25 mcg by mouth three times a week Monday, Wednesday and Friday       hydrALAZINE (APRESOLINE) 50 MG tablet Take 1 tablet (50 mg) by mouth 3 times daily 270 tablet 3     KRILL OIL PO Take 1 capsule by mouth daily        Cyanocobalamin (B-12) 1000 MCG TBCR Take 1,000 mcg by mouth daily 100 tablet 0     multivitamin, therapeutic with minerals (MULTI-VITAMIN) TABS Take 1 tablet by mouth daily       clopidogrel (PLAVIX) 75 MG tablet Take 75 mg by mouth daily       tolterodine (DETROL LA) 4 MG 24 hr capsule Take 4 mg by mouth daily       [DISCONTINUED] aspirin 81 MG tablet Take 81 mg by mouth daily       metolazone (ZAROXOLYN) 2.5 MG tablet 1 tablet (2.5 mg) twice weekly. Hold for wt<156# per Dr. Valles       Saline (SODIUM  CHLORIDE) 0.65 % SOLN Spray in nostril as needed        IMPRESSION AND PLAN:   1.  Hyponatremia.  The patient has chronic systolic congestive heart failure, biventricular.  Complex set of issues.  He has had difficult to manage CHF.  Recently seen by one of our C.O.R.E. MDs, Dr. Krishna Tran.  Spironolactone was started at 25 mg a day.  His serum sodium at the time of starting spironolactone was 135.  His basic metabolic panel was checked on 12/28 with sodium of 134, checked again on 01/04/2017 which was 133 and checked again today showing a serum sodium of 118.     I spoke with Dr. Tran.  He recommended emergency room evaluation and treatment.  Spironolactone will be discontinued.  His weight at home is 152 pounds.  He is close to euvolemia.  Once his electrolytes are corrected, we will continue with tele-management where he calls in his weights on a daily basis and answers 5 symptom questions.  His potassium is low, which will need to be corrected.  He is not on potassium, but clearly will need replacement.   2.  Recent ICD placement, biventricular/ICD.  He is pacing 85% at that time, somewhat limited due to PVCs.   3.  Ischemic cardiomyopathy.  Recent non-STEMI.  Stress test was not high risk.  Continue medical therapy, but in any further acute coronary syndromes or inability to control his heart failure, Dr. Tran suggested coronary angiography to assess for progression of his disease as he has not had an angiogram assessment for over 9 years.   4.  Congestive heart failure, close to euvolemia.  His spironolactone will be discontinued, which was probably helping manage his CHF.  If he becomes difficult to control CHF again, my suggestion is to proceed with right heart catheterization and possible CardioMEMS implantation.  CardioMEMS implantation criteria can only be implanted 3 months after CRT-D implant which would be the beginning of March.   5.  Follow up with myself, Shain Patrick CNP, as directed.              Again, thank you for allowing me to participate in the care of your patient.      Sincerely,    PARKER [facility]

## 2017-01-16 NOTE — PROGRESS NOTES
"HPI and Plan:   See [id number]    No orders of the defined types were placed in this encounter.       No orders of the defined types were placed in this encounter.       There are no discontinued medications.      Encounter Diagnosis   Name Primary?     Chronic systolic heart failure (H)        CURRENT MEDICATIONS:  Current Outpatient Prescriptions   Medication Sig Dispense Refill     carvedilol (COREG) 3.125 MG tablet Take 3 tablets (9.375 mg) by mouth 2 times daily (with meals) 540 tablet 3     spironolactone (ALDACTONE) 25 MG tablet Take 1 tablet (25 mg) by mouth daily 30 tablet 3     isosorbide mononitrate (IMDUR) 60 MG 24 hr tablet Take 1 tablet (60 mg) by mouth daily At hs 30 tablet 3     bumetanide (BUMEX) 1 MG tablet Take 1 tablet (1 mg) by mouth 2 times daily       levothyroxine (SYNTHROID, LEVOTHROID) 125 MCG tablet Take 150 mcg by mouth every morning (before breakfast)        calcitRIOL (ROCALTROL) 0.25 MCG capsule Take 0.25 mcg by mouth three times a week Monday, Wednesday and Friday       hydrALAZINE (APRESOLINE) 50 MG tablet Take 1 tablet (50 mg) by mouth 3 times daily 270 tablet 3     KRILL OIL PO Take 1 capsule by mouth daily        Cyanocobalamin (B-12) 1000 MCG TBCR Take 1,000 mcg by mouth daily 100 tablet 0     multivitamin, therapeutic with minerals (MULTI-VITAMIN) TABS Take 1 tablet by mouth daily       clopidogrel (PLAVIX) 75 MG tablet Take 75 mg by mouth daily       aspirin 81 MG tablet Take 81 mg by mouth daily       tolterodine (DETROL LA) 4 MG 24 hr capsule Take 4 mg by mouth daily       metolazone (ZAROXOLYN) 2.5 MG tablet 1 tablet (2.5 mg) twice weekly. Hold for wt<156# per Dr. Valles       Saline (SODIUM CHLORIDE) 0.65 % SOLN Spray in nostril as needed         ALLERGIES     Allergies   Allergen Reactions     Lisinopril Other (See Comments)     Angioedema     Augmentin Other (See Comments)     Per pt, \"froze him, affected his legs\"     Crestor [Rosuvastatin] Other (See Comments)     " rhabdomyolysis       PAST MEDICAL HISTORY:  Past Medical History   Diagnosis Date     Mixed hyperlipidemia      Benign essential HTN      CAD (coronary artery disease)      AWMI, stents to Cx, RCA and LAD     PAD (peripheral artery disease) (H)      NSTEMI (non-ST elevated myocardial infarction) (H) 10/23/2015     Ischemic cardiomyopathy 10/23/2015     EF 30%     Acute renal failure (H)      10/2015 ARF secondary to rhabdomyolysis     Rhabdomyolysis due to statin therapy      crestor     Chronic systolic heart failure (H)      Anemia      BPH (benign prostatic hypertrophy)      Alcoholism (H)      Severe hypothyroidism 9/20/2016     CKD (chronic kidney disease)      Complete AV block (H)      VF (ventricular fibrillation) (H) 11/16/16       PAST SURGICAL HISTORY:  Past Surgical History   Procedure Laterality Date     Stent, coronary, s660 15/18  Nov 2000     PTCA with stent placement of CFX     Stent, coronary, s660 15/18  Feb 2001     PTCA with stent placement of CFX RCA      Stent, coronary, s660 15/18  April 2007     stent placed in LAD     Endarterectomy carotid Left 6/11/10     Appendectomy       Tonsillectomy and adenoidectomy       Cholecystectomy       Implant automatic implantable cardioverter defibrillator  11/16/16       FAMILY HISTORY:  Family History   Problem Relation Age of Onset     Unknown/Adopted Mother      Unknown/Adopted Father        SOCIAL HISTORY:  Social History     Social History     Marital Status:      Spouse Name: N/A     Number of Children: N/A     Years of Education: N/A     Social History Main Topics     Smoking status: Former Smoker -- 1.50 packs/day for 20 years     Types: Cigarettes     Quit date: 01/01/1980     Smokeless tobacco: Never Used     Alcohol Use: 0.0 oz/week     0 Standard drinks or equivalent per week      Comment: 1-2 glasses of wine per week     Drug Use: None     Sexual Activity: Not Asked     Other Topics Concern     Caffeine Concern No     decaf coffee      "Sleep Concern No     Stress Concern No     Weight Concern No     Special Diet Yes     low fat, low sodium     Exercise Yes     everyday     Social History Narrative       Review of Systems:  Skin:  Negative       Eyes:  Positive for glasses    ENT:  Negative      Respiratory:  Positive for sleep apnea;dyspnea on exertion Bipap   Cardiovascular:  Negative for;palpitations;chest pain;lightheadedness Positive for;edema;fatigue    Gastroenterology: Negative      Genitourinary:  Positive for urinary frequency;urgency    Musculoskeletal:  Positive for   bilateral leg pain  Neurologic:  Positive for numbness or tingling of hands;numbness or tingling of feet    Psychiatric:  Positive for sleep disturbances    Heme/Lymph/Imm:  Negative      Endocrine:  Positive for thyroid disorder      Physical Exam:  Vitals: /60 mmHg  Pulse 68  Resp 14  Ht 1.727 m (5' 8\")  Wt 71.668 kg (158 lb)  BMI 24.03 kg/m2  SpO2 98%    Constitutional:  cooperative, alert and oriented, well developed, well nourished, in no acute distress        Skin:  warm and dry to the touch, no apparent skin lesions or masses noted        Head:  normocephalic, no masses or lesions        Eyes:  pupils equal and round, conjunctivae and lids unremarkable, sclera white, no xanthalasma, EOMS intact, no nystagmus        ENT:  no pallor or cyanosis, dentition good        Neck:  carotid pulses are full and equal bilaterally JVP 10-12      Chest:  normal breath sounds, clear to auscultation, normal A-P diameter, normal symmetry, normal respiratory excursion, no use of accessory muscles   pacemaker incision in the left infraclavicular area was well-healed      Cardiac: regular rhythm, normal S1/S2, no S3 or S4, apical impulse not displaced, no murmurs, gallops or rubs                  Abdomen:  abdomen soft, non-tender, BS normoactive, no mass, no HSM, no bruits        Vascular: pulses full and equal, no bruits auscultated                                    "     Extremities and Back:  no deformities, clubbing, cyanosis, erythema observed;no edema              Neurological:  affect appropriate, oriented to time, person and place              CC  Shani Patrick, APRN CNP   [facility]  [address]  [address]  [place] [id number]

## 2017-01-16 NOTE — ED PROVIDER NOTES
History     Chief Complaint:  Abnormal Labs      HPI   Perez Villalobos is a 76 year old male referred from [facility] cardiology clinic as the patient is Dr. Galvez's, he has a long standing history of CAD stent x6, CHF with an EF of 30%, PVD, hypertension, and history of ventricular fibrillation. He has had multiple hospitalizations for pneumonia and congestive heart failure. He was initiated on Lasix and transitioned to Torsemide and eventually Bumex. In December 2016, spironolactone was added, in which he had improvement but then symptoms plateaued. He was seen by Dr. Valles of nephrology and was given dose of metolazone. He presented to Dr. Galvez's clinic in which he saw the nurse practitioner. He was noted to have a sodium of 118. They felt he was overall euvolemic and recommended cautious IV fluids to raise the sodium. They did not think that volume overload was present that would not improve with IV diuretics and he was sent to the emergency department.    The patient presents today from clinic with abnormal labs. The patient has a depressed sodium (118). The patient states that he had a gradual onset of general weakness Saturday night and that his legs feel weak and he has pain in his lower legs. The patient states that he feels that he has tingling in his feet and that he has no energy. The patient reports that his weight has been steady and that he is at his goal weight. The patient denies any changes in his medications.    Allergies:  Lisinopril - angioedema  Augmentin  Crestor - rhabdomyolysis     Medications:    metolazone (ZAROXOLYN) 2.5 MG tablet  carvedilol (COREG) 3.125 MG tablet  spironolactone (ALDACTONE) 25 MG tablet  isosorbide mononitrate (IMDUR) 60 MG 24 hr tablet  bumetanide (BUMEX) 1 MG tablet  levothyroxine (SYNTHROID, LEVOTHROID) 125 MCG tablet  calcitRIOL (ROCALTROL) 0.25 MCG capsule  Saline (SODIUM CHLORIDE) 0.65 % SOLN  hydrALAZINE (APRESOLINE) 50 MG tablet  KRILL OIL PO  Cyanocobalamin (B-12)  1000 MCG TBCR  multivitamin, therapeutic with minerals (MULTI-VITAMIN) TABS  clopidogrel (PLAVIX) 75 MG tablet  aspirin 81 MG tablet  tolterodine (DETROL LA) 4 MG 24 hr capsule     Past Medical History:    Mixed hyperlipidemia   Benign essential HTN   CAD (coronary artery disease)   PAD (peripheral artery disease)  NSTEMI (non-ST elevated myocardial infarction)  Ischemic cardiomyopathy   Acute renal failure  Rhabdomyolysis due to statin therapy   Chronic systolic heart failure  Anemia   BPH (benign prostatic hypertrophy)   Alcoholism  Severe hypothyroidism   CKD (chronic kidney disease)   Complete AV block  VF (ventricular fibrillation)    Past Surgical History:    Stent, coronary, s660 15/18 - PTCA with stent placement of CFX  Stent, coronary, s660 15/18 - PTCA with stent placement of CFX RCA   Stent, coronary, s660 15/18 - stent placed in LAD  Endarterectomy carotid   Appendectomy   Tonsillectomy and adenoidectomy   Cholecystectomy  Implant automatic implantable cardioverter defibrillator    Family History:    History reviewed. No pertinent family history.    Social History:  Marital Status:   Presents to the ED with wife  Tobacco Use: Former smoker  Alcohol Use: 1-2 drinks weekly  PCP: Hector Jc     Review of Systems   Constitutional: Positive for fatigue.   Musculoskeletal:        Positive for bilateral leg pain.   Neurological: Positive for weakness (both legs).   All other systems reviewed and are negative.    Physical Exam   First Vitals:  BP: 124/88 mmHg  Pulse: 64  Temp: 98.1  F (36.7  C)  Resp: 18  SpO2: 100 %    Physical Exam    General:   Pleasant, age appropriate.  HEENT:    Oropharynx is moist, without lesions or trismus.  Eyes:    Conjunctiva normal, PERRL  Neck:    Supple, no meningismus.     CV:     Regular rate and rhythm.      No rubs or gallops.       No unilateral leg swelling.       2+ radial pulses bilateral.       1+ bilateral lower extremity edema.  PULM:    Clear to  auscultation bilateral.       No respiratory distress.      Good air exchange.     No rales or wheezing.     No stridor.  ABD:    Soft, non-tender, non-distended.       No pulsatile masses.       No rebound, guarding or rigidity.  MSK:     No gross deformity to all four extremities.   LYMPH:   No cervical lymphadenopathy.  NEURO:   Alert and oriented x 3.      CN II-XII intact, speech is clear with no aphasia.       No pronator drift.       Strength is 5/5 in all 4 extremities.  Sensation is intact.       Normal muscular tone, no tremor.  Skin:    Warm, dry and intact.    Psych:    Mood is good and affect is appropriate.        Emergency Department Course   ECG:  @ 1633  Indication: Abnormal labs  Vent. Rate 66 bpm. HI interval 146 ms. QRS duration 172 ms. QT/QTc 540/566 ms. P-R-T axis 20 268 63.   Paced rhythm. Cannot rule out Anterior infarct, age undetermined. Abnormal ECG.  No significant change when compared to previous ECG from 12/20/2016   Read @ 1637 by Dr. Montgomery.    Laboratory:  CBC: WBC 8.6, HGB 11.9 (L),   CMP:  (LL), K 3.0 (L), Cl 72 (L), CO2 34 (H), Glc 145 (H), BUN 73 (H), Creatinine 1.90 (H), GFR 35 (L), Bilirubin 1.6 (H), Rest WNL  Magnesium: 2.5 (H)  Osmolaity: pending  TSH with free T4 reflex: 3.93    UA: Clear yellow urine, Albumin 10, otherwise WNL    Interventions:  (3597) Normal Saline, 1 liter, IV bolus    ED Course:  Nursing notes and past medical history reviewed.   I performed a physical examination of the patient as documented above.  I explained the plan with the patient who consents to this.   Blood was drawn from the patient. This was sent for laboratory testing, findings above.   Urine sample was obtained and sent for laboratory analysis, findings above.  I personally reviewed the laboratory results with the patient and answered all related questions prior to admission.  Findings and plan explained to the patient who consents to admission.   (0254) I discussed the  patient with Dr. Garcia of the hospitalist service, who will admit the patient to a telemetry bed for further monitoring, evaluation, and treatment.       Impression & Plan    Medical Decision Making:  Perez Villalobos is a 76 year old male with an extensive medical history including coronary artery disease, CHF,  who was referred to the ED marked hyponatremia of 118. Overall, the patient is euvolemic for him. Despite his history of CHF, I do not feel that diuretics are appropriate at this time. It is uncertain whether this is medication driven hyponatremia. Patient was very cautiously given fluids, he was given only 200 cc of IV fluids to raise the sodium given his tenuous CHF.  3% saline is not indicated at this time as there is no neurological deficit or reported history of seizure. Patient will be transferred to telemetry bed for further work up.    Diagnosis:    ICD-10-CM    1. Hyponatremia E87.1 Magnesium     Magnesium     Osmolality     Osmolality     TSH with free T4 reflex     TSH with free T4 reflex     CANCELED: Magnesium     CANCELED: Osmolality     CANCELED: TSH with free T4 reflex   2. Chronic congestive heart failure, unspecified congestive heart failure type (H) I50.9        Disposition:   Admit to telemetry.     I, Maurice Frank, am serving as a scribe on 1/16/2017 at 4:35 PM to personally document services performed by Joe Montgomery MD, based on my observations and the provider's statements to me.      Joe Montgomery MD  01/19/17 2046

## 2017-01-16 NOTE — MR AVS SNAPSHOT
After Visit Summary   1/16/2017    Perez Villalobos    MRN: [id number]           Patient Information     Date Of Birth          1940        Visit Information        Provider Department      1/16/2017 3:10 PM Shani Patrick APRN CNP [facility]        Today's Diagnoses     Chronic systolic heart failure (H)           Care Instructions    Call the [facility].R[facility] nurse for any questions or concerns:  [phone]    1. Medication changes from today:    2. Weigh yourself daily and write it down.    3. Call [facility] nurse if your weight is up more than 2 pounds in one day or 5 pounds in one week.    4. Call [facility] nurse if you feel more short of breath, have more abdominal bloating, or leg swelling.    5. Continue low sodium diet (less than 2000 mg daily). If you eat less salt, you will retain less fluid.    6. Do NOT take Aleve or ibuprofen without talking to your doctor first.     7. Lab Results:     Component      Latest Ref Rng 1/16/2017   Sodium      133 - 144 mmol/L 118 (LL)   Potassium      3.4 - 5.3 mmol/L 3.0 (L)   Chloride      94 - 109 mmol/L 71 (L)   Carbon Dioxide      20 - 32 mmol/L 34 (H)   Anion Gap      3 - 14 mmol/L 13   Glucose      70 - 99 mg/dL 130 (H)   Urea Nitrogen      7 - 30 mg/dL 74 (H)   Creatinine      0.66 - 1.25 mg/dL 1.89 (H)   GFR Estimate      >60 mL/min/1.7m2 35 (L)   GFR Estimate If Black      >60 mL/min/1.7m2 42 (L)   Calcium      8.5 - 10.1 mg/dL 8.8   N-Terminal Pro Bnp      0 - 450 pg/mL 15514 (H)     [facility]: Cardiomyopathy, Optimization, Rehabilitation, Education  The [facility] is a heart failure specialty clinic within the [facility] where you will work with specialized nurse practitioners, physician assistants, doctors, and registered nurses. They are dedicated to helping patients with heart failure to carefully adjust medications, receive education, and learn who and when to call if symptoms develop. They  specialize in helping you better understand your condition, slow the progression of your disease, improve the length and quality of your life, help you detect future heart problems before they become life threatening, and avoid hospitalizations.              Follow-ups after your visit        Your next 10 appointments already scheduled     Jan 16, 2017  4:15 PM   Oximetry Drop Off with [facility]   [facility]    [address]  [facility] [address]   [phone]            Jan 17, 2017  1:00 PM   Treatment 60 with Rh Cardiac Rehab 1   [facility] ([facility])    [address]  [facility] [address]   [phone]            Jan 17, 2017  2:30 PM   Level 1 with RH INFUSION CHAIR 6   [facility] ([facility])    [facility]  [address] [place] [address] [address]  [facility] [address]   [phone]            Jan 18, 2017 10:00 AM   Treatment 60 with Rh Cardiac Rehab 1   [facility] ([facility])    [address]  [facility] [address]   [phone]            Jan 20, 2017  1:00 PM   Treatment 60 with Rh Cardiac Rehab 1   [facility] ([facility])    [address]  [facility] [address]   [phone]            Jan 20, 2017  2:30 PM   Return Sleep Patient with Ethna Salguero MD   [facility] [place]    [address]  [facility] [address]   [phone]            Jan 23, 2017  8:00 AM   Treatment 60 with Rh Cardiac Rehab 1   [facility] ([facility])    [address]  [facility] [address]   [phone]            Mar 06, 2017 11:50 AM   LAB with [facility]   [facility] AT  [place] ([facility])    [address] [place] [address] [address]  [facility] [address] [phone]           Patient must bring picture ID.  Patient should be prepared to give a urine specimen  Please do not eat 10-12 hours before your appointment if you are coming in fasting for labs on lipids, cholesterol, or glucose (sugar).  Pregnant women should follow their Care Team instructions. Water with medications is okay. Do not drink coffee or other fluids.   If you have concerns about taking  your medications, please ask at office or if scheduling via [employer], send a message by clicking on Secure Messaging, Message Your Care Team.            Mar 06, 2017 12:45 PM   Core MD Return with Krishna Tran MD   [facility] [place] ([facility])    [address] [place] [address] [phone]            Apr 04, 2017  4:30 PM   Remote ICD Check with MCLEAN DCR2   [facility] ([facility])    [address] [place] [address] [phone]           This appointment is for a remote check of your debrillator.  This is not an appointment at the office.              Who to contact     If you have questions or need follow up information about today's clinic visit or your schedule please contact [facility] directly at [phone].  Normal or non-critical lab and imaging results will be communicated to you by [employer], letter or phone within 4 business days after the clinic has received the results. If you do not hear from us within 7 days, please contact the clinic through [employer] or phone. If you have a critical or abnormal lab result, we will notify you by phone as soon as possible.  Submit refill requests through [employer] or call your pharmacy and they will forward the refill request to us. Please allow 3 business days for your refill to be completed.  "         Additional Information About Your Visit        MyChart Information     [employer] lets you send messages to your doctor, view your test results, renew your prescriptions, schedule appointments and more. To sign up, go to [email or web address] . Click on \"Log in\" on the left side of the screen, which will take you to the Welcome page. Then click on \"Sign up Now\" on the right side of the page.     You will be asked to enter the access code listed below, as well as some personal information. Please follow the directions to create your username and password.     Your access code is: [email or web address]  Expires: 2017  2:21 PM     Your access code will  in 90 days. If you need help or a new code, please call your [facility] clinic or [phone].        Care EveryWhere ID     This is your Care EveryWhere ID. This could be used by other organizations to access your [facility] medical records  [id number]        Your Vitals Were     Pulse Respirations Height BMI (Body Mass Index) Pulse Oximetry       68 14 1.727 m (5' 8\") 24.03 kg/m2 98%        Blood Pressure from Last 3 Encounters:   17 124/88   17 126/60   17 136/68    Weight from Last 3 Encounters:   17 71.668 kg (158 lb)   17 70 kg (154 lb 5.2 oz)   17 70.308 kg (155 lb)              We Performed the Following     Follow-Up with [facility]        Primary Care Provider Office Phone # Fax #    Hector Jc [phone]       [facility] [address]  [facility] [address]        Thank you!     Thank you for choosing [facility] [place]  for your care. Our goal is always to provide you with excellent care. Hearing back from our patients is one way we can continue to improve our services. Please take a few minutes to complete the written survey that you may receive in the mail after your visit with us. Thank you!             Your Updated Medication List - Protect others " around you: Learn how to safely use, store and throw away your medicines at www.disposemymeds.org.          This list is accurate as of: 1/16/17  4:00 PM.  Always use your most recent med list.                   Brand Name Dispense Instructions for use    aspirin 81 MG tablet      Take 81 mg by mouth daily       B-12 1000 MCG Tbcr     100 tablet    Take 1,000 mcg by mouth daily       bumetanide 1 MG tablet    BUMEX     Take 1 tablet (1 mg) by mouth 2 times daily       calcitRIOL 0.25 MCG capsule    ROCALTROL     Take 0.25 mcg by mouth three times a week Monday, Wednesday and Friday       carvedilol 3.125 MG tablet    COREG    540 tablet    Take 3 tablets (9.375 mg) by mouth 2 times daily (with meals)       DETROL LA 4 MG 24 hr capsule   Generic drug:  tolterodine      Take 4 mg by mouth daily       hydrALAZINE 50 MG tablet    APRESOLINE    270 tablet    Take 1 tablet (50 mg) by mouth 3 times daily       isosorbide mononitrate 60 MG 24 hr tablet    IMDUR    30 tablet    Take 1 tablet (60 mg) by mouth daily At        [facility] PO      Take 1 capsule by mouth daily       levothyroxine 125 MCG tablet    SYNTHROID/LEVOTHROID     Take 150 mcg by mouth every morning (before breakfast)       metolazone 2.5 MG tablet    ZAROXOLYN     1 tablet (2.5 mg) twice weekly. Hold for wt<156# per Dr. Valles       Multi-vitamin Tabs tablet      Take 1 tablet by mouth daily       PLAVIX 75 MG tablet   Generic drug:  clopidogrel      Take 75 mg by mouth daily       Sodium Chloride 0.65 % Soln      Spray in nostril as needed       spironolactone 25 MG tablet    ALDACTONE    30 tablet    Take 1 tablet (25 mg) by mouth daily

## 2017-01-16 NOTE — IP AVS SNAPSHOT
MRN:[id number]                      After Visit Summary   1/16/2017    Perez Villalobos    MRN: [id number]           Thank you!     Thank you for choosing [facility] for your care. Our goal is always to provide you with excellent care. Hearing back from our patients is one way we can continue to improve our services. Please take a few minutes to complete the written survey that you may receive in the mail after you visit. If you would like to speak to someone directly about your visit please contact Patient Relations at [phone]. Thank you!          Patient Information     Date Of Birth          1940        About your hospital stay     You were admitted on:  January 16, 2017 You last received care in the:  [address] [facility]    You were discharged on:  January 20, 2017        Reason for your hospital stay       Admitted for symptomatic hyponatremia                  Who to Call     For medical emergencies, please call 911.  For non-urgent questions about your medical care, please call your primary care provider or clinic, [phone]          Attending Provider     Provider    Joe Montgomery MD Sebring, Daniel L, MD       Primary Care Provider Office Phone # Fax #    Hector Jc [phone]       [place] [address]  [facility] [address]        After Care Instructions     Activity       Your activity upon discharge: activity as tolerated            Diet       Follow this diet upon discharge: -2 gm Na diet with fluid restriction of 1.5 L                  Follow-up Appointments     Follow-up and recommended labs and tests        Follow up with primary care provider (can be done in [place] as well), within 7-14 days for hospital follow- up.  The following labs/tests are recommended: repeat metabolic panel.                  Your next 10 appointments already scheduled     Jan 20, 2017  1:00 PM   Treatment 60 with Rh Cardiac  Rehab 1   [facility] ([facility])    [address]  [facility] [address]   [phone]            Jan 20, 2017  2:30 PM   Return Sleep Patient with Ethan Salguero MD   [facility] ([facility])    [address]  [facility] [address]   [phone]            Jan 23, 2017  7:30 AM   Core Return with PARKER Wilde CNP   [facility] ([facility])    [address]  [facility] [address]   [phone]            Jan 23, 2017  8:00 AM   Treatment 60 with Rh Cardiac Rehab 1   [facility] ([facility])    [address]  [facility] [address]   [phone]            Mar 06, 2017 11:50 AM   LAB with [facility]   [facility] ([facility])    [address] [place] [address] [address]  [facility] [address]   [phone]           Patient must bring picture ID.  Patient should be prepared to give a urine specimen  Please do not eat 10-12 hours before your appointment if you are coming in fasting for labs on lipids, cholesterol, or glucose (sugar).  Pregnant women should follow their Care Team instructions. Water with medications is okay. Do not drink coffee or other fluids.   If you have concerns about taking  your medications, please ask at office or if scheduling via [email or web address], send a message by clicking on Secure Messaging, Message Your Care Team.            Mar 06, 2017 12:45 PM   Core MD Return with Krishna Tran MD   [facility] ([facility])    [facility] [facility] [address]  [place] [address]   [phone]            Apr 04, 2017  4:30 PM   Remote ICD Check with MCLEAN DCR2   [facility] ([facility])    [address] [place] [address]  [address]  [place] [address]   [phone]           This appointment is for a remote check of your debrillator.  This is not an appointment at the office.              Pending Results     No orders found from 1/15/2017 to 2017.            Statement of Approval     Ordered          17 1042  I have reviewed and agree with all the recommendations and orders detailed in this document.   EFFECTIVE NOW     Approved and electronically signed by:  Magdaleno Rangel MD             Admission Information        Provider Department Dept Phone    2017 Anton Garcia MD  3 Medical Surgical [phone]      Your Vitals Were     Blood Pressure Pulse Temperature    130/71 mmHg 64 97.3  F (36.3  C) (Axillary)    Respirations Height Weight    16 1.727 m (5' 8\") 70.035 kg (154 lb 6.4 oz)    BMI (Body Mass Index) Pulse Oximetry       23.48 kg/m2 98%       MyChart Information     [employer] lets you send messages to your doctor, view your test results, renew your prescriptions, schedule appointments and more. To sign up, go to [email or web address] . Click on \"Log in\" on the left side of the screen, which will take you to the Welcome page. Then click on \"Sign up Now\" on the right side of the page.     You will be asked to enter the access code listed below, as well as some personal information. Please follow the directions to create your username and password.     Your access code is: [email or web address]  Expires: 2017  2:21 PM     Your access code will  in 90 days. If you need help or a new code, please call your [facility] clinic or [phone].        Care EveryWhere ID     This is your Care EveryWhere ID. This could be used by other organizations to access your [facility] medical records  [id number]           Review of your medicines      CONTINUE these medicines which have NOT CHANGED        Dose / Directions    aspirin EC 81 MG EC tablet        Dose:  81 mg   Take 81 mg by mouth daily   Refills:  0       " B-12 1000 MCG Tbcr   Used for:  Vitamin B 12 deficiency        Dose:  1000 mcg   Take 1,000 mcg by mouth daily   Quantity:  100 tablet   Refills:  0       bumetanide 1 MG tablet   Commonly known as:  BUMEX   Used for:  Chronic systolic heart failure (H)        Dose:  1 mg   Take 1 tablet (1 mg) by mouth 2 times daily   Refills:  0       calcitRIOL 0.25 MCG capsule   Commonly known as:  ROCALTROL        Dose:  0.25 mcg   Take 0.25 mcg by mouth three times a week Monday, Wednesday and Friday   Refills:  0       carvedilol 3.125 MG tablet   Commonly known as:  COREG   Used for:  Chronic systolic heart failure (H)        Dose:  9.375 mg   Take 3 tablets (9.375 mg) by mouth 2 times daily (with meals)   Quantity:  540 tablet   Refills:  3       DETROL LA 4 MG 24 hr capsule   Generic drug:  tolterodine        Dose:  4 mg   Take 4 mg by mouth daily   Refills:  0       hydrALAZINE 50 MG tablet   Commonly known as:  APRESOLINE   Used for:  Essential hypertension with goal blood pressure less than 130/80        Dose:  50 mg   Take 1 tablet (50 mg) by mouth 3 times daily   Quantity:  270 tablet   Refills:  3       isosorbide mononitrate 60 MG 24 hr tablet   Commonly known as:  IMDUR   Used for:  Chronic systolic heart failure (H), NSTEMI (non-ST elevated myocardial infarction) (H), Ischemic cardiomyopathy        Dose:  60 mg   Take 60 mg by mouth At Bedtime At hs   Quantity:  30 tablet   Refills:  3       KRILL OIL PO        Dose:  1 capsule   Take 1 capsule by mouth daily   Refills:  0       levothyroxine 150 MCG tablet   Commonly known as:  SYNTHROID/LEVOTHROID        Dose:  150 mcg   Take 150 mcg by mouth daily   Refills:  0       Multi-vitamin Tabs tablet        Dose:  1 tablet   Take 1 tablet by mouth daily   Refills:  0       PLAVIX 75 MG tablet   Generic drug:  clopidogrel        Dose:  75 mg   Take 75 mg by mouth daily   Refills:  0       Sodium Chloride 0.65 % Soln        Spray in nostril as needed   Refills:  0          STOP taking     metolazone 2.5 MG tablet   Commonly known as:  ZAROXOLYN           spironolactone 25 MG tablet   Commonly known as:  ALDACTONE                    Protect others around you: Learn how to safely use, store and throw away your medicines at www.disposemymeds.org.             Medication List: This is a list of all your medications and when to take them. Check marks below indicate your daily home schedule. Keep this list as a reference.      Medications           Morning Afternoon Evening Bedtime As Needed    aspirin EC 81 MG EC tablet   Take 81 mg by mouth daily   Last time this was given:  81 mg on 1/20/2017  8:16 AM                                B-12 1000 MCG Tbcr   Take 1,000 mcg by mouth daily                                bumetanide 1 MG tablet   Commonly known as:  BUMEX   Take 1 tablet (1 mg) by mouth 2 times daily   Last time this was given:  1 mg on 1/20/2017  8:15 AM                                calcitRIOL 0.25 MCG capsule   Commonly known as:  ROCALTROL   Take 0.25 mcg by mouth three times a week Monday, Wednesday and Friday   Last time this was given:  0.25 mcg on 1/20/2017  6:44 AM                                carvedilol 3.125 MG tablet   Commonly known as:  COREG   Take 3 tablets (9.375 mg) by mouth 2 times daily (with meals)   Last time this was given:  9.375 mg on 1/20/2017 10:57 AM                                DETROL LA 4 MG 24 hr capsule   Take 4 mg by mouth daily   Last time this was given:  4 mg on 1/20/2017  8:15 AM   Generic drug:  tolterodine                                hydrALAZINE 50 MG tablet   Commonly known as:  APRESOLINE   Take 1 tablet (50 mg) by mouth 3 times daily   Last time this was given:  50 mg on 1/20/2017 10:57 AM                                isosorbide mononitrate 60 MG 24 hr tablet   Commonly known as:  IMDUR   Take 60 mg by mouth At Bedtime At    Last time this was given:  60 mg on 1/19/2017  8:38 PM                                KRILL OIL PO    Take 1 capsule by mouth daily                                levothyroxine 150 MCG tablet   Commonly known as:  SYNTHROID/LEVOTHROID   Take 150 mcg by mouth daily   Last time this was given:  150 mcg on 1/20/2017  8:16 AM                                Multi-vitamin Tabs tablet   Take 1 tablet by mouth daily                                PLAVIX 75 MG tablet   Take 75 mg by mouth daily   Last time this was given:  75 mg on 1/20/2017  8:15 AM   Generic drug:  clopidogrel                                Sodium Chloride 0.65 % Soln   Spray in nostril as needed

## 2017-01-16 NOTE — PROGRESS NOTES
PRIMARY CARE PROVIDER:  Hector Jc MD       CARDIOLOGIST:  Krishna Tran MD      [facility] NP: PARKER Sims CNP       HISTORY OF PRESENT ILLNESS:   I had the pleasure of seeing Mr. Villalobos, is a pleasant 76-year-old gentleman who has a history of chronic systolic congestive heart failure.  He has had multiple hospitalizations since last year.  He has a history of ischemic cardiomyopathy.  He has had extensive stenting of all 3 coronary arteries.  He has a history of anterior STEMI treated with additional stenting.  Ejection fraction most recently was 30%-35%.  He had done well until about 2015 when he had an acute hypoxic respiratory failure related to acute renal failure from rhabdomyolysis felt related to his statin therapy.        He was hospitalized again multiple times in 2016 including acute systolic heart failure exacerbation in 02/2016, pneumonia and in 06/2016, for severe hypothyroidism with symptomatic bradycardia and syncope in September, acute congestive heart failure exacerbation with a small non-STEMI in early October when he underwent about a 20-pound diuresis and actually became quite prerenal and orthostatic, therefore not discharged on diuretics.      A stress echo at that time did not show significant residual ischemia and no angiogram was done.      In November, he presented with severe bradycardia and then had bradycardia turned into ventricular fibrillation for which he was resuscitated.  He underwent implantation of a biventricular ICD at the time.      Since that his admission for non-STEMI, particularly he has had difficulties maintaining euvolemic status.  He has been experiencing more shortness of breath, peripheral edema and abdominal distention.  On 12/02/2016, he underwent thoracentesis on the left lung for left lung of 900 mL of nora fluid.  The patient and his wife stated that the patient had mild improvement in his breathing for 3-4 days but was not sustained.   His weight has fluctuated home between 153 pounds and 160 pounds.  Recent interrogation of his ICD showed 88% biventricular pacing, the rest frequent PVCs.  He has had 2 brief episodes of atrial flutter which were asymptomatic.      His Bumex dose was recently increased, but had no further diuresis and no weight change or change in his symptoms.  He had never been on spironolactone.  His creatinine baseline is 1.6 to 1.9.  He was seen by Dr. Tran 12/21 on an urgent basis.  He started spironolactone 25 mg a day.  Dr. Tran suggested if poor response, to proceed with right heart catheterization or additional diuretic challenges such as metolazone.      He then was seen by Dr. Valles, his nephrologist and he suggested to Perez that he take metolazone twice a week if his weight is above 156 pounds.  He has taken metolazone once on 01/06.      Today, he returns in followup from the 12/21 visit with Dr. Tran.  He states he had a very bad night last night with severe pain in both legs and feeling very lethargic.  He denies increased shortness of breath, orthopnea, PND, syncope or near-syncope.  He denies any defibrillator shocks.      PAST MEDICAL HISTORY:   1.  Chronic systolic heart failure, ejection fraction 35% range and more recently 30%-35% range; however, his TSH was very high at 148.   2.  Coronary artery disease, status post multiple percutaneous coronary interventions.  A nuclear stress test on 10/04/2016 showed ejection fraction of 37% with scarring, no significant ischemia.   3.  Chronic kidney disease.   4.  Atherosclerotic peripheral vascular disease, status post left carotid endarterectomy.   5.  Heart block, history of ventricular fibrillation arrest in the setting of bradycardia, status post biventricular ICD implantation 11/2016   6.  History of rhabdomyolysis felt secondary to rosuvastatin.   7.  History of angioedema with ACE inhibitors.      PHYSICAL EXAMINATION:   GENERAL:  Pleasant, cooperative,  thin individual.   VITAL SIGNS:  Blood pressure 126/60.  Heart rate 68.  Weight 158 pounds in the clinic and 152 pounds at home.   LUNGS:  Clear to auscultation bilaterally.   CARDIOVASCULAR:  Shows normal S1 and S2.  There is no murmur, rub or click.   EXTREMITIES:  No peripheral edema.      LABORATORY DATA:    Sodium 118, potassium 3.0, BUN 74, creatinine 1.89, GFR 35.      IMPRESSION AND PLAN:   1.  Hyponatremia.  The patient has chronic systolic congestive heart failure, biventricular.  Complex set of issues.  He has had difficult to manage CHF.  Recently seen by one of our C.O.REVELIA. MDs, Dr. Krishna Tran.  Spironolactone was started at 25 mg a day.  His serum sodium at the time of starting spironolactone was 135.  His basic metabolic panel was checked on 12/28 with sodium of 134, checked again on 01/04/2017 which was 133 and checked again today showing a serum sodium of 118.     I spoke with Dr. Tran.  He recommended emergency room evaluation and treatment.  Spironolactone will be discontinued.  His weight at home is 152 pounds.  He is close to euvolemia.  Once his electrolytes are corrected, we will continue with tele-management where he calls in his weights on a daily basis and answers 5 symptom questions.  His potassium is low, which will need to be corrected.  He is not on potassium, but clearly will need replacement.   2.  Recent ICD placement, biventricular/ICD.  He is pacing 85% at that time, somewhat limited due to PVCs.   3.  Ischemic cardiomyopathy.  Recent non-STEMI.  Stress test was not high risk.  Continue medical therapy, but in any further acute coronary syndromes or inability to control his heart failure, Dr. Tran suggested coronary angiography to assess for progression of his disease as he has not had an angiogram assessment for over 9 years.   4.  Congestive heart failure, close to euvolemia.  His spironolactone will be discontinued, which was probably helping manage his CHF.  If he becomes  difficult to control CHF again, my suggestion is to proceed with right heart catheterization and possible CardioMEMS implantation.  CardioMEMS implantation criteria can only be implanted 3 months after CRT-D implant which would be the beginning of March.   5.  Follow up with myself, Shani Patrick CNP, as directed.         PARKER VILLA, CNP             D: 2017 16:09   T: 2017 17:17   MT: RHIANNON      Name:     RON LUCERO   MRN:      [id number]        Account:      [id number]   :      1940           Service Date: 2017      Document: [id number]

## 2017-01-16 NOTE — PATIENT INSTRUCTIONS
Call the C.O.R.E. nurse for any questions or concerns:  [phone]    1. Medication changes from today:    2. Weigh yourself daily and write it down.    3. Call [facility] nurse if your weight is up more than 2 pounds in one day or 5 pounds in one week.    4. Call [facility] nurse if you feel more short of breath, have more abdominal bloating, or leg swelling.    5. Continue low sodium diet (less than 2000 mg daily). If you eat less salt, you will retain less fluid.    6. Do NOT take Aleve or ibuprofen without talking to your doctor first.     7. Lab Results:     Component      Latest Ref Rng 1/16/2017   Sodium      133 - 144 mmol/L 118 (LL)   Potassium      3.4 - 5.3 mmol/L 3.0 (L)   Chloride      94 - 109 mmol/L 71 (L)   Carbon Dioxide      20 - 32 mmol/L 34 (H)   Anion Gap      3 - 14 mmol/L 13   Glucose      70 - 99 mg/dL 130 (H)   Urea Nitrogen      7 - 30 mg/dL 74 (H)   Creatinine      0.66 - 1.25 mg/dL 1.89 (H)   GFR Estimate      >60 mL/min/1.7m2 35 (L)   GFR Estimate If Black      >60 mL/min/1.7m2 42 (L)   Calcium      8.5 - 10.1 mg/dL 8.8   N-Terminal Pro Bnp      0 - 450 pg/mL 22581 (H)     [facility]: Cardiomyopathy, Optimization, Rehabilitation, Education  The [facility] is a heart failure specialty clinic within the [facility] where you will work with specialized nurse practitioners, physician assistants, doctors, and registered nurses. They are dedicated to helping patients with heart failure to carefully adjust medications, receive education, and learn who and when to call if symptoms develop. They specialize in helping you better understand your condition, slow the progression of your disease, improve the length and quality of your life, help you detect future heart problems before they become life threatening, and avoid hospitalizations.

## 2017-01-17 NOTE — H&P
[facility]  History and Physical   Hospitalist Service    Anton Garcia MD    Perez Villalobos MRN# [id number]   YOB: 1940 Age: 76 year old      Date of Admission:  1/16/2017           Assessment and Plan:   Patient is a 76-year-old male with history of coronary artery disease with previous myocardial infarction, ischemic cardiomyopathy with ejection fraction of 30%, chronic systolic congestive heart failure, chronic kidney disease, hypercholesterolemia, hypertension, peripheral arterial disease,benign prostatic hypertrophy, hypothyroidism, complete AV block, ventricular fibrillation, placement of AICD,  He follows with Dr. Tran of [facility] at [place] for his cardiac issues.  He follows with Dr. Daniels of [facility] for his kidney issues.  He has been on metolazone twice weekly for some time and Bumex twice daily for some time.  Aldactone was added one or 2 weeks ago.  He was referred to the emergency department from cardiology clinic because of abnormal labs.  Sodium was low at 118 and potassium was low at 3.    Problem list:    1.  Hyponatremia. This is most likely due to medications.  Of his medications metolazone which is a thiazide-like diuretic is most likely to cause hyponatremia.  The Aldactone was started most recently, however.  I will hold metolazone and Aldactone.  I will continue Bumex.  I will hydrate gently with normal saline at 50 cc per hour.  I will repeat a basic metabolic panel in the morning.  I will request consultation from nephrology as this patient does follow with [facility] (Dr. Daniels).  i will check urine sodium, urine osmolality, and serum osmolality.  I will check TSH reflex and cortisol level.    2.  Hypokalemia.  This is also likely due to diuretic therapy with Bumex and metolazone.  Potassium will be replaced per replacement protocol.    3.  Chronic kidney disease, stage III. This seems to be near baseline.  Repeat basic  metabolic panel in the morning.    4.  Coronary artery disease with known ischemic cardiomyopathy and chronic systolic congestive heart failure.  This seems clinically compensated.    Full code  Mechanical DVT prophylaxis  Disposition.  Admit as inpatient as I suspect it will take a couple of days for this to correct.       Code Status:   Full Code         Primary Care Physician:   Hector Jc [phone]         Chief Complaint:   Abnormal labs    History is obtained from Perez and Dr. Tapia.         History of Present Illness:   Perez Villalobos is a 76-year-old male with history of coronary artery disease with previous myocardial infarction, ischemic cardiomyopathy with ejection fraction of 30%, chronic systolic congestive heart failure, chronic kidney disease, hypercholesterolemia, hypertension, peripheral arterial disease,benign prostatic hypertrophy, hypothyroidism, complete AV block, ventricular fibrillation, placement of AICD,  He follows with Dr. Tran of [facility] at [place] for his cardiac issues.  He follows with Dr. Daniels of [facility] for his kidney issues.  He has been on metolazone twice weekly for some time and Bumex twice daily for some time.  Aldactone was added 1 or 2 weeks ago.  He was referred to the emergency department from cardiology clinic because of abnormal labs.  Sodium was low at 118 and potassium was low at 3.  Preez did not have signs or symptoms suggestive of hypokalemia or hyponatremia. He was given a small normal saline bolus in the emergency department and I was asked to admit him for further evaluation and treatment.           Past Medical History:     Patient Active Problem List   Diagnosis     Pain in joint, shoulder region     Coronary artery disease involving native coronary artery of native heart without angina pectoris     Muscle weakness (generalized)     Idiopathic progressive polyneuropathy     Vitamin B12 deficiency without  anemia     NSTEMI (non-ST elevated myocardial infarction) (H)     Ischemic cardiomyopathy     Rhabdomyolysis due to statin therapy     Anemia     BPH (benign prostatic hypertrophy)     PAD (peripheral artery disease) (H)     Biventricular heart failure (H)     Leg weakness, bilateral     Mixed hyperlipidemia     Benign essential HTN     Chronic systolic heart failure (H)     Bradycardia     Severe hypothyroidism     ACS (acute coronary syndrome) (H)     DELONTE (obstructive sleep apnea)     CKD (chronic kidney disease)     S/P ICD (internal cardiac defibrillator) procedure     Atrial flutter (H)     CKD (chronic kidney disease) stage 3, GFR 30-59 ml/min     Cardiorenal syndrome     Iron deficiency anemia, unspecified     Anemia of chronic renal failure     Chronic kidney disease, stage III (moderate)     Hyponatremia     Hypokalemia      Past Medical History   Diagnosis Date     Mixed hyperlipidemia      Benign essential HTN      CAD (coronary artery disease)      AWMI, stents to Cx, RCA and LAD     PAD (peripheral artery disease) (H)      NSTEMI (non-ST elevated myocardial infarction) (H) 10/23/2015     Ischemic cardiomyopathy 10/23/2015     EF 30%     Acute renal failure (H)      10/2015 ARF secondary to rhabdomyolysis     Rhabdomyolysis due to statin therapy      crestor     Chronic systolic heart failure (H)      Anemia      BPH (benign prostatic hypertrophy)      Alcoholism (H)      Severe hypothyroidism 9/20/2016     CKD (chronic kidney disease)      Complete AV block (H)      VF (ventricular fibrillation) (H) 11/16/16             Past Surgical History:     Past Surgical History   Procedure Laterality Date     Stent, coronary, s660 15/18  Nov 2000     PTCA with stent placement of CFX     Stent, coronary, s660 15/18  Feb 2001     PTCA with stent placement of CFX RCA      Stent, coronary, s660 15/18  April 2007     stent placed in LAD     Endarterectomy carotid Left 6/11/10     Appendectomy       Tonsillectomy and  adenoidectomy       Cholecystectomy       Implant automatic implantable cardioverter defibrillator  11/16/16            Home Medications:     Prior to Admission medications    Medication Sig Last Dose Taking? Auth Provider   aspirin EC 81 MG EC tablet Take 81 mg by mouth daily 1/16/2017 at Unknown time Yes Unknown, Entered By History   levothyroxine (SYNTHROID/LEVOTHROID) 150 MCG tablet Take 150 mcg by mouth daily 1/16/2017 at am Yes Unknown, Entered By History   carvedilol (COREG) 3.125 MG tablet Take 3 tablets (9.375 mg) by mouth 2 times daily (with meals) 1/16/2017 at am Yes Arturo Almeida MD   isosorbide mononitrate (IMDUR) 60 MG 24 hr tablet Take 60 mg by mouth At Bedtime At hs 1/15/2017 at Unknown time Yes Shani Patrick APRN CNP   bumetanide (BUMEX) 1 MG tablet Take 1 tablet (1 mg) by mouth 2 times daily 1/16/2017 at am Yes Shani Patrick APRN CNP   Saline (SODIUM CHLORIDE) 0.65 % SOLN Spray in nostril as needed  Yes Unknown, Entered By History   hydrALAZINE (APRESOLINE) 50 MG tablet Take 1 tablet (50 mg) by mouth 3 times daily pt not sure, need to clarify with wife at = Yes Shani Patrick APRN CNP   Cyanocobalamin (B-12) 1000 MCG TBCR Take 1,000 mcg by mouth daily 1/16/2017 at Unknown time Yes Hodan Guerrero MD   multivitamin, therapeutic with minerals (MULTI-VITAMIN) TABS Take 1 tablet by mouth daily 1/16/2017 at Unknown time Yes Reported, Patient   clopidogrel (PLAVIX) 75 MG tablet Take 75 mg by mouth daily 1/16/2017 at Unknown time Yes Reported, Patient   tolterodine (DETROL LA) 4 MG 24 hr capsule Take 4 mg by mouth daily 1/16/2017 at Unknown time Yes Reported, Patient   metolazone (ZAROXOLYN) 2.5 MG tablet 1 tablet (2.5 mg) twice weekly. Hold for wt<156# per Dr. Valles pt not sure, need to clarify with wife  Abstract, Provider   spironolactone (ALDACTONE) 25 MG tablet Take 1 tablet (25 mg) by mouth daily pt not sure, need to clarify with wife  Krishna Tarn, MD  "  calcitRIOL (ROCALTROL) 0.25 MCG capsule Take 0.25 mcg by mouth three times a week Monday, Wednesday and Friday pt not sure, need to clarify with wife  Reported, Patient   KRILL OIL PO Take 1 capsule by mouth daily    Reported, Patient            Allergies:     Allergies   Allergen Reactions     Lisinopril Other (See Comments)     Angioedema     Augmentin Other (See Comments)     Per pt, \"froze him, affected his legs\"     Crestor [Rosuvastatin] Other (See Comments)     rhabdomyolysis            Social History:     Social History   Substance Use Topics     Smoking status: Former Smoker -- 1.50 packs/day for 20 years     Types: Cigarettes     Quit date: 01/01/1980     Smokeless tobacco: Never Used     Alcohol Use: 0.0 oz/week     0 Standard drinks or equivalent per week      Comment: 1-2 glasses of wine per week             Family History:     Family History   Problem Relation Age of Onset     Unknown/Adopted Mother      Unknown/Adopted Father               Review of Systems:   The 10 point Review of Systems is negative other than as noted in the HPI.           Physical Exam:   Blood pressure 121/72, pulse 64, temperature 98.1  F (36.7  C), temperature source Oral, resp. rate 18, height 1.727 m (5' 8\"), weight 69.31 kg (152 lb 12.8 oz), SpO2 98 %.  152 lbs 12.8 oz      GENERAL: Pleasant and cooperative. No acute distress.  EYES: Pupils equal and round. No scleral erythema or icterus.  ENT: External ears are normal without deformity. Posterior oropharynx is without erythem, swelling, or exudate.  NECK: Supple. No masses or swelling. No tenderness. Thyroid is normal without mass or tenderness.  CHEST: Clear to auscultation. Normal breath sounds. No retractions.   CV: Regular rate and rhythm. No JVD. Pulses normal.  ABDOMEN: Bowel sounds present. No tenderness. No masses or hernia.  EXTREMETIES: No clubbing, cyanosis, or ischemia.  SKIN: Warm and dry to touch. No wounds or rashes.  NEUROLOGIC: Strength and sensation are " normal. Deep tendon reflexes are normal. Cranial nerves are normal.             Data:   All new lab and imaging data was reviewed.     Results for orders placed or performed during the hospital encounter of 01/16/17 (from the past 24 hour(s))   UA with Microscopic   Result Value Ref Range    Color Urine Yellow     Appearance Urine Clear     Glucose Urine Negative NEG mg/dL    Bilirubin Urine Negative NEG    Ketones Urine Negative NEG mg/dL    Specific Gravity Urine 1.008 1.003 - 1.035    Blood Urine Negative NEG    pH Urine 6.5 5.0 - 7.0 pH    Protein Albumin Urine 10 (A) NEG mg/dL    Urobilinogen mg/dL Normal 0.0 - 2.0 mg/dL    Nitrite Urine Negative NEG    Leukocyte Esterase Urine Negative NEG    Source Midstream Urine     WBC Urine <1 0 - 2 /HPF    RBC Urine <1 0 - 2 /HPF   CBC (platelets, no diff)   Result Value Ref Range    WBC 8.6 4.0 - 11.0 10e9/L    RBC Count 3.69 (L) 4.4 - 5.9 10e12/L    Hemoglobin 11.9 (L) 13.3 - 17.7 g/dL    Hematocrit 34.5 (L) 40.0 - 53.0 %    MCV 94 78 - 100 fl    MCH 32.2 26.5 - 33.0 pg    MCHC 34.5 31.5 - 36.5 g/dL    RDW 14.8 10.0 - 15.0 %    Platelet Count 187 150 - 450 10e9/L   Comprehensive metabolic panel   Result Value Ref Range    Sodium 118 (LL) 133 - 144 mmol/L    Potassium 3.0 (L) 3.4 - 5.3 mmol/L    Chloride 72 (L) 94 - 109 mmol/L    Carbon Dioxide 34 (H) 20 - 32 mmol/L    Anion Gap 12 3 - 14 mmol/L    Glucose 145 (H) 70 - 99 mg/dL    Urea Nitrogen 73 (H) 7 - 30 mg/dL    Creatinine 1.90 (H) 0.66 - 1.25 mg/dL    GFR Estimate 35 (L) >60 mL/min/1.7m2    GFR Estimate If Black 42 (L) >60 mL/min/1.7m2    Calcium 9.2 8.5 - 10.1 mg/dL    Bilirubin Total 1.6 (H) 0.2 - 1.3 mg/dL    Albumin 3.7 3.4 - 5.0 g/dL    Protein Total 7.8 6.8 - 8.8 g/dL    Alkaline Phosphatase 81 40 - 150 U/L    ALT 25 0 - 70 U/L    AST 33 0 - 45 U/L   Magnesium   Result Value Ref Range    Magnesium 2.5 (H) 1.6 - 2.3 mg/dL   TSH with free T4 reflex   Result Value Ref Range    TSH 3.93 0.40 - 4.00 mU/L   Sodium  random urine   Result Value Ref Range    Sodium Urine mmol/L 25 mmol/L

## 2017-01-17 NOTE — PLAN OF CARE
"Problem: Goal Outcome Summary  Goal: Goal Outcome Summary  Outcome: No Change  /71 mmHg  Pulse 64  Temp(Src) 97.5  F (36.4  C) (Oral)  Resp 18  Ht 1.727 m (5' 8\")  Wt 69.31 kg (152 lb 12.8 oz)  BMI 23.24 kg/m2  SpO2 97%    VSS et afebrile on RA. Oriented. Up with 1/walker d/t c/o weakness. No c/o SOB. BNP in clinic today was 11,676. On BID PO bumex. Nephrology consulted. Potassium low this AM. Replacing with PO packets d/t difficulty swallowing more than one small pill at a time. Recheck at 0400. Tele reads 100% Vpaced with BL first AVB, BBB, Long QT, and elevated ST segment. No BM in 2+ days. Pt c/o feeling constipated. Took mirilax at home yesterday. PRN MOM given tonight. Pt has poor/fair appetite and only had 2 apple sauces for dinner. NS at 50 for Sodium level of 118. Awaiting urine sample.        "

## 2017-01-17 NOTE — CONSULTS
RENAL CONSULTATION NOTE    REFERRING MD:  Anton Garcia MD    REASON FOR CONSULTATION:  hyponatremia    HPI:  75 yo M who was admitted for treatment of hyponatremia.    I know Mr. Villalobos from previous consultation in hospital as well as follow up in our office for stage 3 CKD.    I last saw him on 1/4/17.  At that time he was on bumetanide 1 mg BID.  He was still having problems with fluid retention, so we made a plan for him to take metolazone 2.5 mg if his weight were to be > 156 #. Since then he has taken only one single dose approximately a week ago.    He felt very poorly over the last 1-2 days with severe pain in abdomen from constipation.  He saw Dixon Patrick CNP at [facility] on 1/16/17. He was found to have an Na of 118 and was sent to ED for evaluation and was admitted.  He normally has an Na in 130s.      His Cr was 1.89 which is his baseline. BUN was 73 - slightly high than usual ~50.    He has NS 50 mL/hr overnight.  His bumetanide was continued.  Spironolactone and metolazone were held.    His Na increased to 121.    He feels better.  Less pain and abdominal distress as he did move his bowels.    He denies as SOB, orthopnea, PND, syncope or near-syncope.  He has had no chest pain.    ROS:  A complete review of systems was performed and is negative except as noted above.    PMH:    Past Medical History   Diagnosis Date     Mixed hyperlipidemia      Benign essential HTN      CAD (coronary artery disease)      AWMI, stents to Cx, RCA and LAD     PAD (peripheral artery disease) (H)      NSTEMI (non-ST elevated myocardial infarction) (H) 10/23/2015     Ischemic cardiomyopathy 10/23/2015     EF 30%     Acute renal failure (H)      10/2015 ARF secondary to rhabdomyolysis     Rhabdomyolysis due to statin therapy      crestor     Chronic systolic heart failure (H)      Anemia      BPH (benign prostatic hypertrophy)      Alcoholism (H)      Severe hypothyroidism 9/20/2016     CKD (chronic kidney disease)       Complete AV block (H)      VF (ventricular fibrillation) (H) 11/16/16       PSH:    Past Surgical History   Procedure Laterality Date     Stent, coronary, s660 15/18  Nov 2000     PTCA with stent placement of CFX     Stent, coronary, s660 15/18  Feb 2001     PTCA with stent placement of CFX RCA      Stent, coronary, s660 15/18  April 2007     stent placed in LAD     Endarterectomy carotid Left 6/11/10     Appendectomy       Tonsillectomy and adenoidectomy       Cholecystectomy       Implant automatic implantable cardioverter defibrillator  11/16/16       MEDICATIONS:      aspirin EC  81 mg Oral Daily     bumetanide  1 mg Oral BID     [START ON 1/18/2017] calcitRIOL  0.25 mcg Oral Once per day on Mon Wed Fri     carvedilol  9.375 mg Oral BID w/meals     clopidogrel  75 mg Oral Daily     cyanocobalamin  1,000 mcg Oral Daily     hydrALAZINE  50 mg Oral TID     isosorbide mononitrate  60 mg Oral At Bedtime     levothyroxine  150 mcg Oral Daily     tolterodine  4 mg Oral Daily       ALLERGIES:    Allergies as of 01/16/2017 - reviewed 01/16/2017   Allergen Reaction Noted     Lisinopril Other (See Comments) 03/10/2016     Augmentin Other (See Comments) 02/17/2016     Crestor [rosuvastatin] Other (See Comments) 11/11/2015       FH:    Family History   Problem Relation Age of Onset     Unknown/Adopted Mother      Unknown/Adopted Father        SH:    Social History     Social History     Marital Status:      Spouse Name: N/A     Number of Children: N/A     Years of Education: N/A     Occupational History     Not on file.     Social History Main Topics     Smoking status: Former Smoker -- 1.50 packs/day for 20 years     Types: Cigarettes     Quit date: 01/01/1980     Smokeless tobacco: Never Used     Alcohol Use: 0.0 oz/week     0 Standard drinks or equivalent per week      Comment: 1-2 glasses of wine per week     Drug Use: Not on file     Sexual Activity: Not on file     Other Topics Concern     Caffeine Concern No     " decaf coffee     Sleep Concern No     Stress Concern No     Weight Concern No     Special Diet Yes     low fat, low sodium     Exercise Yes     everyday     Social History Narrative       PHYSICAL EXAM:    /62 mmHg  Pulse 64  Temp(Src) 97.4  F (36.3  C) (Oral)  Resp 16  Ht 1.727 m (5' 8\")  Wt 69.219 kg (152 lb 9.6 oz)  BMI 23.21 kg/m2  SpO2 96%  GENERAL: healthy, alert, NAD  HEENT:  Normocephalic. No gross abnormalities.  Pupils equal.  MMM.  Dentition is ok.  CV: RRR, no murmurs, no clicks, gallops, or rubs, 1+_ LE  Edema to mid shin, no carotid bruits  RESP: Few insp crackles.    GI: Abdomen soft/nt/nd, BS normal. No masses, organomegaly  MUSCULOSKELETAL: extremities nl - no gross deformities noted  SKIN: no suspicious lesions or rashes, dry to touch  NEURO:  Strength normal and symmetric.   PSYCH: mood good, affect appropriate  LYMPH: No palpable ant/post cervical and supraclavicular adenopathy    LABS:      CBC RESULTS:     Recent Labs  Lab 01/16/17  1620   WBC 8.6   RBC 3.69*   HGB 11.9*   HCT 34.5*          BMP RESULTS:    Recent Labs  Lab 01/17/17  0400 01/16/17  1620 01/16/17  1400   * 118* 118*   POTASSIUM 4.0 3.0* 3.0*   CHLORIDE 78* 72* 71*   CO2 31 34* 34*   BUN 74* 73* 74*   CR 1.82* 1.90* 1.89*   * 145* 130*   LEIDY 8.8 9.2 8.8       INRNo lab results found in last 7 days.     TSH 3.93  U na 25  U osm 326   S osm 272  Serum cortisol 37.8      DIAGNOSTICS:  Reviewed    A/P:      1) Hyponatremia:  Cause is not entirely clear. One would be tempted to implicate metolazone, but he has had only one dose since 1/4/17 and that was about a week ago.  Previously he had a sodium stable in the 130s with the bumetanide 1 mg BID.  The new development was his abdominal distress and pain.  Pain and nausea are potent stimulants for release of ADH which can lead to hyponatremia.  His urine chemistries cannot enlighten us while he is still on diuretic.  There was some improvement with NS " at 50 mL/hr for 17 hours.      2) Stage 3 CKD: On basis of prior JACINTO due to rhabdo + cardiorenal syndrome.    3) Chronic Systolic Heart Failure : LVEF 30-35%    4) CAD with history of multiple PCI    5) Heart Block + VFIB :S/P pacer/AICD    Suggest:    Cont same  Fluid Restriction to 1200 mL    Krishna Valles MD  [facility] - Nephrology  [phone]

## 2017-01-17 NOTE — PROGRESS NOTES
[facility]  Hospitalist Progress Note  Brittaney Loco MD 01/17/2017    Reason for Stay (Diagnosis): Hyponatremia         Assessment and Plan:      Summary of Stay: Perez Villalobos is a 76 year old male with a history of systolic CHF 2/2 ischemic cardiomyopathy with most recent EF 30-35 %, underlying CAD s/p stenting of all 3 coronary arteries, most recent hx notable for NSTEMI in 10/16 with stress echo at that time without e/o acute ischemia (angio not done), to further complicate his situation, he was hospitalized in 11/16 with bradycardia that degenerated into VF arrest now s/p biventricular pacer and AICD placement.  His CHF has been complicated by difficulty managing SOB/abdominal distension/le edema/as well as a pleural effusion (requiring thoracentesis)  necessitating frequent adjustment of his diuretics.   He has chronic renal insufficiency, with hx of ARF 2/2 rhabdo thought due to statin therapy.  Most recently he has had adjustjment in his diuretic with increasing doses of bumetanide in the setting of prn metolazone, and addition of spironolactone on 12/22. He was admitted on 1/16/2017 with minimally symptomatic hyponatremia from cardiology clinic.     He has received slow IVF to total about 1 L and his sodium level has improved form 111-121.  He is relatively asymptomatic except for some fatigue/daytime sleepiness for the past week.     His Chronic RF remains stable with creat in the 1.9 range.     Problem List:   1. Hyponatremia:  Mild symptoms of lethargy/fatigue, no n/v, seizure, or mental status changes-therefore suggestive of more subacute/chronic nature..  Slightly improved with gentle IVF, holding metolazone and spironolactone.  He remains on bumetanide at 1 mg bid which is his baseline dosing.  For now will hold on further IVF, and I will decrease his bumetanide to 1 mg qd.  Karuna 25, Uosm 270-326-all suggest diuretic induced hyponatremia.  TSH wnl, cortisol is relatively high.  SL IVF, cont  "to hold spironolactone/metoloazone, decrease bumetanide.  Follow BMPs  2. Hypokalemia:  Also related to diuresis-mag on slightly high side, replaced.   3. Ischemic CM:  Most recent echo EF 30-35 % in 10/16, most recent stress test same time without e/o reversible ischemia.  CHF meds:  Carvedilol 9.375 mg bid/hydrlaazine 50 mg bid/isosorbide 60 mg qhs/asa 81 mg/ clopidogrel 75 mg all back on board.  Diuretics bumetanide 1 mg bid/metolazone 2.5 mg twice weekly (most recently 1/11) prn/spironolactone 25 mg (started 12/22).  Resumed on only bumetanide 1 mg bid which I will decrease to qd based on hyponatremia-has e/o peripheral edema today, no e/o respiratory compromise  4. CRI:  Remains at baseline 1.9, nephrology consulted to assist with diuretic management based on kidney function/hyponatremia  5. Hypothyroidism:  Previously quite symptomatic with TSH > 100- currently on replacement and TSH is wnl  6. Hx of VF arrest:  BiV pacer and AICD in place  DVT Prophylaxis: Heparin SQ and Pneumatic Compression Devices  Code Status: Full Code  Discharge Dispo: home  Estimated Disch Date / # of Days until Disch: 1-2 days        Interval History (Subjective):      Feels ok although notes increasing le edema (although not uncommon) denies any sob/cp.  No n/v, no confusion, although both he and his wife note that he has been much more sleepy over past approx one week, frequently falling asleep in his chair and being less active.  Compliance with medications.                   Physical Exam:      Last Vital Signs:  /62 mmHg  Pulse 64  Temp(Src) 97.4  F (36.3  C) (Oral)  Resp 16  Ht 1.727 m (5' 8\")  Wt 69.219 kg (152 lb 9.6 oz)  BMI 23.21 kg/m2  SpO2 96%    I/O:  Even    Pleasant, drifts off during interview and exam but then is easily arousable, head nc/at sclera clear nad, looks stated age, lungs diminished in the bases without crackles, RRR distant, no m/r/g, 2 + b le edema skin with some venous stasis changes, w/d no " c/c abd s/nt/nd alert and oriented affect appropriate page.  Minimally ambulating                Medications:      All current medications were reviewed with changes reflected in problem list.         Data:      All new lab and imaging data was reviewed.   Labs:    Recent Labs  Lab 01/17/17  0400   *   POTASSIUM 4.0   CHLORIDE 78*   CO2 31   ANIONGAP 12   *   BUN 74*   CR 1.82*   GFRESTIMATED 36*   GFRESTBLACK 44*   LEIDY 8.8       Recent Labs  Lab 01/16/17  1620   WBC 8.6   HGB 11.9*   HCT 34.5*   MCV 94         Imaging:

## 2017-01-17 NOTE — PROGRESS NOTES
Patient was admitted to [facility] for hyponatremia. Per Shani Patrick CNP, reviewed medication changes.    12/21/16--spironolactone 25 mg daily started per Dr. Tran  12/28/16--repeat BMP  1/3/17--2.5 mg metolazone x 1 for weight 159# per Shani Patrick CNP  1/4/17--Intermed office visit with repeat BMP  1/11/17--metolazone 2.5 mg x 1 (per nephrology instruction for metolazone 2.5 mg twice weekly if wt>156#)  1/16/17--office visit with Shani Patrick CNP--referred to ED. Sodium=118 and K=3    Roxanne Pineda RN  [facility]R.E[facility]

## 2017-01-17 NOTE — PLAN OF CARE
Problem: Goal Outcome Summary  Goal: Goal Outcome Summary  Outcome: No Change  Vss, no co pain/cp/sob.   Tele 100%AV paced BBB ST elevation prolonged QT segment.  Alarms intact for safety, up with SBA+walker, ambulating in halls with staff.  T noted to feet per pt, +BM, gen wkns noted, dry skin with cracking noted along fingers.  INC at times, IVF dc'd, Na 121, cr 1.82, K+ 4.0.  Possible dc in 1-2 days.  Continue poc and monitoring.

## 2017-01-17 NOTE — PROVIDER NOTIFICATION
"Dr. Garcia paged at 1930: \"Pt wondering about diet orders. [place] [address] at clinic. Potassium of 3.0. And Sodium of 118. Thx.\"    Dr. Garcia paged at 2224: \"Pt c/o 8/10 neck pain. Only has prn tylenol. Can he get anything stronger? Thx.\"  "

## 2017-01-17 NOTE — PHARMACY-ADMISSION MEDICATION HISTORY
Admission medication history interview status for this patient is complete. See [facility] admission navigator for allergy information, prior to admission medications and immunization status.     Medication history interview source(s):Patient and Family (wife)  Medication history resources (including written lists, pill bottles, clinic record): home med paper list  Primary pharmacy:[facility] pharmacy in [place] on [address]    Changes made to PTA medication list:  Added: none  Deleted: none  Changed: none    Actions taken by pharmacist (provider contacted, etc):None     Additional medication history information: Takes Imdur at HS. Per wife: probably should not be on spironolactone since it might be the reason pt is in the hospital.    Medication reconciliation/reorder completed by provider prior to medication history? Yes    For patients on insulin therapy: NO (Yes/No)    Prior to Admission medications    Medication Sig Last Dose Taking? Auth Provider   aspirin EC 81 MG EC tablet Take 81 mg by mouth daily 1/16/2017 at Unknown time Yes Unknown, Entered By History   levothyroxine (SYNTHROID/LEVOTHROID) 150 MCG tablet Take 150 mcg by mouth daily 1/16/2017 at am Yes Unknown, Entered By History   carvedilol (COREG) 3.125 MG tablet Take 3 tablets (9.375 mg) by mouth 2 times daily (with meals) 1/16/2017 at am Yes Arturo Almeida MD   spironolactone (ALDACTONE) 25 MG tablet Take 1 tablet (25 mg) by mouth daily 1/16/2017 at Unknown time Yes Krishna Tran MD   isosorbide mononitrate (IMDUR) 60 MG 24 hr tablet Take 60 mg by mouth At Bedtime At hs 1/16/2017 at HS Yes Shani Patrick APRN CNP   bumetanide (BUMEX) 1 MG tablet Take 1 tablet (1 mg) by mouth 2 times daily 1/16/2017 at am Yes Shani Patrick APRN CNP   calcitRIOL (ROCALTROL) 0.25 MCG capsule Take 0.25 mcg by mouth three times a week Monday, Wednesday and Friday 1/16/2017 at Unknown time Yes Reported, Patient   Saline (SODIUM CHLORIDE) 0.65 % SOLN Spray  in nostril as needed prn Yes Unknown, Entered By History   hydrALAZINE (APRESOLINE) 50 MG tablet Take 1 tablet (50 mg) by mouth 3 times daily 1/16/2017 at = Yes Shani Patrick APRN CNP   KRILL OIL PO Take 1 capsule by mouth daily  1/16/2017 at Unknown time Yes Reported, Patient   Cyanocobalamin (B-12) 1000 MCG TBCR Take 1,000 mcg by mouth daily 1/16/2017 at Unknown time Yes Hodan Guerrero MD   multivitamin, therapeutic with minerals (MULTI-VITAMIN) TABS Take 1 tablet by mouth daily 1/16/2017 at Unknown time Yes Reported, Patient   clopidogrel (PLAVIX) 75 MG tablet Take 75 mg by mouth daily 1/16/2017 at Unknown time Yes Reported, Patient   tolterodine (DETROL LA) 4 MG 24 hr capsule Take 4 mg by mouth daily 1/16/2017 at Unknown time Yes Reported, Patient   metolazone (ZAROXOLYN) 2.5 MG tablet 1 tablet (2.5 mg) twice weekly. Hold for wt<156# per Dr. Valles 1/11/2017  Abstract, Provider

## 2017-01-17 NOTE — PLAN OF CARE
Problem: Goal Outcome Summary  Goal: Goal Outcome Summary  Outcome: No Change  A&Ox4, VSS. Tele 100% AV paced with BBB and long QT. Suppository given for constipation as requested by pt. - large hard result this morning after additional prune juice and ambulation.  A1 with walker to ambulate. IVF 50 mL/hr. K recheck 4.0. Tylenol for generalized discomfort. Did not sleep well overnight.

## 2017-01-18 NOTE — PLAN OF CARE
"Problem: Goal Outcome Summary  Goal: Goal Outcome Summary  Outcome: No Change  /72 mmHg  Pulse 64  Temp(Src) 97.5  F (36.4  C) (Oral)  Resp 18  Ht 1.727 m (5' 8\")  Wt 69.219 kg (152 lb 9.6 oz)  BMI 23.21 kg/m2  SpO2 98%    VSS et afebrile on RA. Oriented. Up with 1/walker d/t c/o weakness. Walked in [place] 5+ times today. No c/o SOB. Tele reads 100% Vpaced with BBB, Long QT, and elevated ST segment. Pt had BM on NOC shift after MOM, supp, and prune juice. Bruised t/o. Dry skin. BLLE edema 2+. Sodium 121 and 120 today. Recheck in AM. SL'd IV. FR started. Potassium 4.0 this AM. Refused SQ hep. On Plavix and ASA and walking. PRN tylenol given for neck pains.    Nephrology consulted.  "

## 2017-01-18 NOTE — PROGRESS NOTES
Renal Medicine Progress Note            Assessment/Plan:       1) Hyponatremia:  Cause is not entirely clear. One would be tempted to implicate metolazone, but he has had only one dose since 1/4/17 and that was about a week ago.  Previously he had a sodium stable in the 130s with the bumetanide 1 mg BID.  The new development was his abdominal distress and pain.  Pain and nausea are potent stimulants for release of ADH which can lead to hyponatremia.  His urine chemistries cannot enlighten us while he is still on diuretic.  There was some improvement with NS at 50 mL/hr for 17 hours.      Na up to 124.  He still looks close to euvolemic.      2) Stage 3 CKD: On basis of prior JACINTO due to rhabdo + cardiorenal syndrome.    3) Chronic Systolic Heart Failure : LVEF 30-35%    4) CAD with history of multiple PCI    5) Heart Block + VFIB :S/P pacer/AICD    6) Iron deficiency anemia:  He had 1/2 doses Feraheme as outpt.  I will finish the course of IV iron in hospital with a dose of Venofer.      Suggest:    More normal saline - 50 mL per hour for 1 liter.  Continue fluid restriction of 1200 mL per day (exclusive of IV saline)  Venofer 300 mg IV X1    Hope to get him home in time for he and his wife to travel to [place] for their planned vacation on Monday          Interval History:     Feeling better.  More alert.  Not SOB.  No CP.  He had first of two doses of IV iron as outpt.    Wonders if he can get second here.          Medications and Allergies:       sodium chloride (PF)  3 mL Intracatheter Q8H     iron sucrose (VENOFER) intermittent infusion  300 mg Intravenous Once     bumetanide  1 mg Oral Daily     heparin  5,000 Units Subcutaneous Q12H     aspirin EC  81 mg Oral Daily     calcitRIOL  0.25 mcg Oral Once per day on Mon Wed Fri     carvedilol  9.375 mg Oral BID w/meals     clopidogrel  75 mg Oral Daily     cyanocobalamin  1,000 mcg Oral Daily     hydrALAZINE  50 mg Oral TID     isosorbide mononitrate  60 mg Oral At  "Bedtime     levothyroxine  150 mcg Oral Daily     tolterodine  4 mg Oral Daily        Allergies   Allergen Reactions     Lisinopril Other (See Comments)     Angioedema     Augmentin Other (See Comments)     Per pt, \"froze him, affected his legs\"     Crestor [Rosuvastatin] Other (See Comments)     rhabdomyolysis            Physical Exam:   Vitals were reviewed  /64 mmHg  Pulse 64  Temp(Src) 97.5  F (36.4  C) (Oral)  Resp 18  Ht 1.727 m (5' 8\")  Wt 69.128 kg (152 lb 6.4 oz)  BMI 23.18 kg/m2  SpO2 99%    Wt Readings from Last 3 Encounters:   01/18/17 69.128 kg (152 lb 6.4 oz)   01/16/17 71.668 kg (158 lb)   01/13/17 70 kg (154 lb 5.2 oz)       Intake/Output Summary (Last 24 hours) at 01/18/17 1215  Last data filed at 01/18/17 0956   Gross per 24 hour   Intake   1432 ml   Output   1100 ml   Net    332 ml       GENERAL APPEARANCE: pleasant, NAD, a & o  HEENT:  Eyes/ears/nose/neck grossly normal  RESP: lungs cta b c good efforts, no crackles, rhonchi or wheezes  CV: RRR, nl S1/S2, no m/r/g   ABDOMEN: o/s/nt/nd, bs present  EXTREMITIES/SKIN: no rashes/lesions; 1+ BLE edema to mid shin            Data:     BMP  Recent Labs  Lab 01/18/17  0755 01/17/17  1843 01/17/17  0400 01/16/17  1620 01/16/17  1400   * 120* 121* 118* 118*   POTASSIUM 3.3*  --  4.0 3.0* 3.0*   CHLORIDE 82*  --  78* 72* 71*   LEIDY 8.9  --  8.8 9.2 8.8   CO2 31  --  31 34* 34*   BUN 73*  --  74* 73* 74*   CR 1.78*  --  1.82* 1.90* 1.89*   *  --  115* 145* 130*     CBC  Recent Labs  Lab 01/16/17  1620   WBC 8.6   HGB 11.9*   HCT 34.5*   MCV 94        Lab Results   Component Value Date    AST 33 01/16/2017    ALT 25 01/16/2017    ALKPHOS 81 01/16/2017    BILITOTAL 1.6* 01/16/2017    HONEY 24 02/11/2016     Lab Results   Component Value Date    INR 1.01 11/16/2016     No results found for: D2VIT, D3VIT, DTOT  Attestation:   I have reviewed today's relevant vital signs, notes, medications, labs and imaging.        Krishna LAZARO" MD Reena  [facility]phrology  [phone]

## 2017-01-18 NOTE — PROGRESS NOTES
[facility]  Hospitalist Progress Note  Brittaney Loco MD 01/18/2017    Reason for Stay (Diagnosis): Hyponatremia         Assessment and Plan:      Summary of Stay: Perez Villalobos is a 76 year old male with a history of systolic CHF 2/2 ischemic cardiomyopathy with most recent EF 30-35 %, underlying CAD s/p stenting of all 3 coronary arteries, most recent hx notable for NSTEMI in 10/16 with stress echo at that time without e/o acute ischemia (angio not done), to further complicate his situation, he was hospitalized in 11/16 with bradycardia that degenerated into VF arrest now s/p biventricular pacer and AICD placement.  His CHF has been complicated by difficulty managing SOB/abdominal distension/le edema/as well as a pleural effusion (requiring thoracentesis)  necessitating frequent adjustment of his diuretics.   He has chronic renal insufficiency, with hx of ARF 2/2 rhabdo thought due to statin therapy.  Most recently he has had adjustjment in his diuretic with increasing doses of bumetanide in the setting of prn metolazone, and addition of spironolactone on 12/22. He was admitted on 1/16/2017 with minimally symptomatic hyponatremia from cardiology clinic.     He has received slow IVF to total about 1.5 L and his sodium level has improved form 118-124.  He is relatively asymptomatic except for some fatigue/daytime sleepiness for the past week. That is improved today     His Chronic RF remains stable with creat in the 1.8 range.     Problem List:   1. Hyponatremia:  Mild symptoms of lethargy/fatigue, no n/v, seizure, or mental status changes-therefore suggestive of more subacute/chronic nature..  Slightly improved with gentle IVF, holding metolazone and spironolactone.  He remained on bumetanide at 1 mg bid which is his baseline dosing.  Which was decreased to just 1mg bid.   TSH wnl, cortisol is relatively high.  SL IVF, cont to hold spironolactone/metoloazone, decrease bumetanide.  Follow BMPs.   Challenging to understand true etiology, suspected at least in part related to diuretics.  Appreciate Dr. Valles's input, additional IVF today, along with FR restriction 1200 (exclusive of NS)  2. Hypokalemia:  Also related to diuresis-mag on slightly high side, replaced.   3. Ischemic CM:  Most recent echo EF 30-35 % in 10/16, most recent stress test same time without e/o reversible ischemia.  CHF meds:  Carvedilol 9.375 mg bid/hydrlaazine 50 mg bid/isosorbide 60 mg qhs/asa 81 mg/ clopidogrel 75 mg all back on board.  Diuretics bumetanide 1 mg bid/metolazone 2.5 mg twice weekly (most recently 1/11) prn/spironolactone 25 mg (started 12/22).  Resumed on only bumetanide 1 mg bid which I decreased to qd based on hyponatremia-has e/o ongoing 1-2 + peripheral edema today, no e/o respiratory compromise  4. Lung exam concerning for right sided pleural effusion without respiratory compromise.  Monitor-has hx of left sided pleural effusion requiring thoracentesis in early 12/2016  5. CRI 2/2 CRS and hx of JACINTO in the setting of rhabdo:  Remains at baseline 1.8,   6. Hypothyroidism:  Previously quite symptomatic with TSH > 100- currently on replacement and TSH is wnl  7. Hx of VF arrest:  BiV pacer and AICD in place  8. Nasal congestion:  Some mild swelling in left nare-normal on right, seems allergic/irritant rather than infectious, trial 2 doses of afrin and re-assess in am-discussed risk of nasal dependence on afrin if used continuously   DVT Prophylaxis: Heparin SQ and Pneumatic Compression Devices  Code Status: Full Code  Discharge Dispo: home  Estimated Disch Date / # of Days until Disch: 1-2 days        Interval History (Subjective):      Feels better today, still has le edema, he and his wife think it's about the same.  Denies any sob, has chronic cough that is unchanged.  Note nasal congestion without sinus pressure/ea/or significant sore throat.  Not sure if there is any post nasal gtt.  Po intake is fair, no n/v      "             Physical Exam:      Last Vital Signs:  /64 mmHg  Pulse 64  Temp(Src) 97.9  F (36.6  C) (Oral)  Resp 16  Ht 1.727 m (5' 8\")  Wt 69.128 kg (152 lb 6.4 oz)  BMI 23.18 kg/m2  SpO2 98%    I/O:  Even    Pleasant, much more alert and interactive during interview and exam , head nc/at sclera clear nad, looks stated age, right nares mildly swollen and erythematous, Left eardrum obscured by wax, right ear drum clear but loss of light reflex, no sign posterior pharyngeal erythema or exudate lungs cta throughout left lung, quite diminished in right base, no rales or wheezeRRR distant, no m/r/g, 2 + b le edema skin with some venous stasis changes, w/d no c/c abd s/nt/nd alert and oriented affect appropriate page.  Minimally ambulating                Medications:      All current medications were reviewed with changes reflected in problem list.         Data:      All new lab and imaging data was reviewed.   Labs:    Recent Labs  Lab 01/18/17  0755   *   POTASSIUM 3.3*   CHLORIDE 82*   CO2 31   ANIONGAP 11   *   BUN 73*   CR 1.78*   GFRESTIMATED 37*   GFRESTBLACK 45*   LEIDY 8.9       Recent Labs  Lab 01/16/17  1620   WBC 8.6   HGB 11.9*   HCT 34.5*   MCV 94         Imaging:         "

## 2017-01-18 NOTE — PLAN OF CARE
Problem: Goal Outcome Summary  Goal: Goal Outcome Summary  Outcome: No Change  A&Ox4, VSS. Tele 100% paced, Long QT, BBB. SBA with cares. Denied pain, SOB. Slept well overnight.

## 2017-01-18 NOTE — PLAN OF CARE
Problem: Goal Outcome Summary  Goal: Goal Outcome Summary  Outcome: No Change  Vss, no co pain/cp/sob.   Tele 100% AV paced BBB ST elevation prolonged QT segment.  Alarms intact for safety, up with SBA+walker, ambulating in halls with family. Denies T to feet today, gen wkns noted, dry skin with cracking noted along fingers.  Strict I and O, 1200ml FR continues (not including IVF), Na 124, 1L NS ordered, SL when completed, cr 1.78, K+ 3.3, replaced with recheck ordered for am and for 1740, mag 2.5.  Possible dc in 1-2 days (planned vacation on Monday).  Continue poc and monitoring.

## 2017-01-19 NOTE — PROGRESS NOTES
Renal Medicine Progress Note            Assessment/Plan:     1) Hyponatremia:  Cause is not entirely clear. One would be tempted to implicate metolazone, but he has had only one dose since 1/4/17 and that was about a week ago.  Previously he had a sodium stable in the 130s with the bumetanide 1 mg BID.  The new development was his abdominal distress and pain.  Pain and nausea are potent stimulants for release of ADH which can lead to hyponatremia.  His urine chemistries cannot enlighten us while he is still on diuretic.  There was some improvement with NS at 50 mL/hr for 17 hours.      Na up to 128 after more NS 50 mL per hour for ~11 hours.      2) Stage 3 CKD: On basis of prior JACINTO due to rhabdo + cardiorenal syndrome.    3) Chronic Systolic Heart Failure : LVEF 30-35%    4) CAD with history of multiple PCI    5) Heart Block + VFIB :S/P pacer/AICD    6) Iron deficiency anemia:  He had 1/2 doses Feraheme as outpt.  I will finish the course of IV iron in hospital with a dose of Venofer.  (Given last night).    Suggest:    Continue FR + bumet.  Keep one more day.  Prefer sodium a little higher before he goes out of town.    Reluctant to load with more NS due to his CHF.          Interval History:   Feels well.  No more bowel movement yet.  Not SOB.  Really wants to go home.       Medications and Allergies:       sodium chloride (PF)  3 mL Intracatheter Q8H     oxymetazoline  2 spray Both Nostrils BID     carbamide peroxide  5 drop Left Ear BID     bumetanide  1 mg Oral Daily     heparin  5,000 Units Subcutaneous Q12H     aspirin EC  81 mg Oral Daily     calcitRIOL  0.25 mcg Oral Once per day on Mon Wed Fri     carvedilol  9.375 mg Oral BID w/meals     clopidogrel  75 mg Oral Daily     cyanocobalamin  1,000 mcg Oral Daily     hydrALAZINE  50 mg Oral TID     isosorbide mononitrate  60 mg Oral At Bedtime     levothyroxine  150 mcg Oral Daily     tolterodine  4 mg Oral Daily        Allergies   Allergen Reactions      "Lisinopril Other (See Comments)     Angioedema     Augmentin Other (See Comments)     Per pt, \"froze him, affected his legs\"     Crestor [Rosuvastatin] Other (See Comments)     rhabdomyolysis            Physical Exam:   Vitals were reviewed  /71 mmHg  Pulse 64  Temp(Src) 97.7  F (36.5  C) (Oral)  Resp 16  Ht 1.727 m (5' 8\")  Wt 69.945 kg (154 lb 3.2 oz)  BMI 23.45 kg/m2  SpO2 99%    Wt Readings from Last 3 Encounters:   01/19/17 69.945 kg (154 lb 3.2 oz)   01/16/17 71.668 kg (158 lb)   01/13/17 70 kg (154 lb 5.2 oz)       Intake/Output Summary (Last 24 hours) at 01/19/17 1415  Last data filed at 01/19/17 1300   Gross per 24 hour   Intake   1062 ml   Output   1475 ml   Net   -413 ml       GENERAL APPEARANCE: pleasant, NAD, a & o  HEENT:  Eyes/ears/nose/neck grossly normal  RESP: lungs cta b c good efforts, no crackles, rhonchi or wheezes  CV: RRR, nl S1/S2, no m/r/g   ABDOMEN: o/s/nt/nd, bs present  EXTREMITIES/SKIN: no rashes/lesions; 1+ BLE edema to mid shin            Data:     BMP  Recent Labs  Lab 01/19/17  0530 01/18/17  1735 01/18/17  0755 01/17/17  1843 01/17/17  0400 01/16/17  1620   *  --  124* 120* 121* 118*   POTASSIUM 3.4 4.0 3.3*  --  4.0 3.0*   CHLORIDE 88*  --  82*  --  78* 72*   LEIDY 8.6  --  8.9  --  8.8 9.2   CO2 31  --  31  --  31 34*   BUN 71*  --  73*  --  74* 73*   CR 1.75*  --  1.78*  --  1.82* 1.90*   *  --  113*  --  115* 145*     CBC  Recent Labs  Lab 01/16/17  1620   WBC 8.6   HGB 11.9*   HCT 34.5*   MCV 94        Lab Results   Component Value Date    AST 33 01/16/2017    ALT 25 01/16/2017    ALKPHOS 81 01/16/2017    BILITOTAL 1.6* 01/16/2017    HONEY 24 02/11/2016     Lab Results   Component Value Date    INR 1.01 11/16/2016     No results found for: D2VIT, D3VIT, DTOT  Attestation:   I have reviewed today's relevant vital signs, notes, medications, labs and imaging.        Krishna Valles MD  [facility]  [phone]      "

## 2017-01-19 NOTE — PROGRESS NOTES
[facility]  Hospitalist Progress Note  Magdaleno Rangel MD, MD 01/19/2017  (Text Page)  Reason for Stay (Diagnosis): Hyponatremia         Assessment and Plan:      Summary of Stay: Perez Villalobos is a 76 year old male with a history of systolic CHF 2/2 ischemic cardiomyopathy with most recent EF 30-35 %, underlying CAD s/p stenting of all 3 coronary arteries, most recent hx notable for NSTEMI in 10/16 with stress echo at that time without e/o acute ischemia (angio not done), to further complicate his situation, he was hospitalized in 11/16 with bradycardia that degenerated into VF arrest now s/p biventricular pacer and AICD placement.  His CHF has been complicated by difficulty managing SOB/abdominal distension/le edema/as well as a pleural effusion (requiring thoracentesis)  necessitating frequent adjustment of his diuretics.   He has chronic renal insufficiency, with hx of ARF 2/2 rhabdo thought due to statin therapy.  Most recently he has had adjustjment in his diuretic with increasing doses of bumetanide in the setting of prn metolazone, and addition of spironolactone on 12/22. He was admitted on 1/16/2017 with minimally symptomatic hyponatremia from cardiology clinic.     He has received slow IVF of NS and his sodium level has improved from 118 now at 128  He is relatively asymptomatic except for some fatigue/daytime sleepiness for the past week. This has resolved already    His Chronic RF remains stable with creat in the 1.8 range.     Problem List:    1. Hyponatremia:  Mild symptoms of lethargy/fatigue, no n/v, seizure, or mental status changes-therefore suggestive of more subacute/chronic nature..  Slightly improved with gentle IVF, holding metolazone and spironolactone.  He remained on bumetanide at 1 mg bid which is his baseline dosing.  Which was decreased to just 1mg bid earlier.   TSH wnl, cortisol is relatively high.  SL IVF, cont to hold spironolactone/metoloazone, decrease  bumetanide.  Follow BMPs.  Challenging to understand true etiology, suspected at least in part related to diuretics.  Appreciate Dr. Valles's input and keeping him one more day for monitoring. No further IVF today.  2. Hypokalemia:  Also related to diuresis-mag on slightly high side, replaced.    3. Ischemic CM:  Most recent echo EF 30-35 % in 10/16, most recent stress test same time without e/o reversible ischemia.  CHF meds:  Carvedilol 9.375 mg bid/hydrlaazine 50 mg bid/isosorbide 60 mg qhs/asa 81 mg/ clopidogrel 75 mg all back on board.  Diuretics bumetanide 1 mg bid/metolazone 2.5 mg twice weekly (most recently 1/11) prn/spironolactone 25 mg (started 12/22).  Resumed on only bumetanide 1 mg bid which was decreased to qd based on hyponatremia-has e/o ongoing 1-2 + peripheral edema today, no ongoing  respiratory complaints  4. Lung exam concerning for right sided pleural effusion without respiratory compromise.  Monitor-has hx of left sided pleural effusion requiring thoracentesis in early 12/2016  5. CRI 2/2 CRS and hx of JACINTO in the setting of rhabdo:  Remains at baseline   6. Hypothyroidism:  Previously quite symptomatic with TSH > 100- currently on replacement and TSH is wnl  7. Hx of VF arrest:  BiV pacer and AICD in place  8. Nasal congestion:  Some mild swelling in left nare-normal on right, seems allergic/irritant rather than infectious, trial 2 doses of afrin and re-assess in am-discussed risk of nasal dependence on afrin if used continuously  9.   DVT Prophylaxis: Heparin SQ and Pneumatic Compression Devices  Code Status: Full Code  Discharge Dispo: home  Estimated Disch Date / # of Days until Disch: 24-36 hours          Interval History (Subjective):      Assumed hospitalist care today.  Seen and examined with wife at bedside.  No ongoing complaints. Tolerating oral diet.  No SOB and not needing O2 supplementation.  Still voiding freely. Feels stronger and no periods of AMS                  Physical  "Exam:      Last Vital Signs:  /66 mmHg  Pulse 64  Temp(Src) 97.7  F (36.5  C) (Oral)  Resp 16  Ht 1.727 m (5' 8\")  Wt 69.945 kg (154 lb 3.2 oz)  BMI 23.45 kg/m2  SpO2 99%    I/O last 3 completed shifts:  In: 866 [P.O.:720; I.V.:146]  Out: 1075 [Urine:1075]  Wt Readings from Last 1 Encounters:   01/19/17 69.945 kg (154 lb 3.2 oz)     Filed Vitals:    01/16/17 1817 01/17/17 0617 01/18/17 0450 01/19/17 0409   Weight: 69.31 kg (152 lb 12.8 oz) 69.219 kg (152 lb 9.6 oz) 69.128 kg (152 lb 6.4 oz) 69.945 kg (154 lb 3.2 oz)       Constitutional: Awake, alert, cooperative, no apparent distress   Respiratory: Fair air entry bilaterally, no crackles or wheezing   Cardiovascular: Regular rate and rhythm, normal S1 and S2, and no murmur noted   Abdomen: Normal bowel sounds, soft, non-distended, non-tender   Skin: No rashes, no cyanosis, dry to touch   Neuro: Alert and oriented x3, no weakness, spontaneous and coherent speech   Extremities: +1 pitting  Edema on both LE, normal range of motion   Other(s): Euthymic mood, not agitated       All other systems: Negative          Medications:      All current medications were reviewed with changes reflected in problem list.         Data:      All new lab and imaging data was reviewed.   Labs:  No results for input(s): PH, PHARTERIAL, PO2, [id number], SAT, PCO2, HCO3, BASEEXCESS, HERMINIA, BEB in the last 168 hours.    Invalid input(s): [id number]    Recent Labs  Lab 01/19/17  0530 01/18/17  1735 01/18/17  0755 01/17/17  1843 01/17/17  0400   *  --  124* 120* 121*   POTASSIUM 3.4 4.0 3.3*  --  4.0   CHLORIDE 88*  --  82*  --  78*   CO2 31  --  31  --  31   ANIONGAP 9  --  11  --  12   *  --  113*  --  115*   BUN 71*  --  73*  --  74*   CR 1.75*  --  1.78*  --  1.82*   GFRESTIMATED 38*  --  37*  --  36*   GFRESTBLACK 46*  --  45*  --  44*   LEIDY 8.6  --  8.9  --  8.8       Recent Labs  Lab 01/16/17  1620   WBC 8.6   HGB 11.9*   HCT 34.5*   MCV 94    "       Recent Labs  Lab 01/19/17  0530 01/18/17  1735 01/18/17  0755 01/17/17  1843 01/17/17  0400 01/16/17  1620   *  --  124* 120* 121* 118*   POTASSIUM 3.4 4.0 3.3*  --  4.0 3.0*   CHLORIDE 88*  --  82*  --  78* 72*   CO2 31  --  31  --  31 34*   ANIONGAP 9  --  11  --  12 12   *  --  113*  --  115* 145*   BUN 71*  --  73*  --  74* 73*   CR 1.75*  --  1.78*  --  1.82* 1.90*   GFRESTIMATED 38*  --  37*  --  36* 35*   GFRESTBLACK 46*  --  45*  --  44* 42*   LEIDY 8.6  --  8.9  --  8.8 9.2   MAG  --   --  2.5*  --   --  2.5*   PROTTOTAL  --   --   --   --   --  7.8   ALBUMIN  --   --   --   --   --  3.7   BILITOTAL  --   --   --   --   --  1.6*   ALKPHOS  --   --   --   --   --  81   AST  --   --   --   --   --  33   ALT  --   --   --   --   --  25       Recent Labs  Lab 01/19/17  0530 01/18/17  0755 01/17/17  0400 01/16/17  1620 01/16/17  1400   * 113* 115* 145* 130*      Imaging:   Results for orders placed or performed during the hospital encounter of 12/20/16   X-ray Chest 2 vws*    Narrative    XR CHEST 2 VW 12/20/2016 3:47 PM    HISTORY: shortness of breath, Heart failure, unspecified      Impression    IMPRESSION: Cardiac device. Mild bibasilar atelectasis or infiltrates  with small bibasilar pleural effusions. Findings are minimally more  prominent compared to 12/2/2016.    JEVON MALIK MD

## 2017-01-19 NOTE — PLAN OF CARE
Problem: Goal Outcome Summary  Goal: Goal Outcome Summary  Outcome: No Change  VSS, AAOX4, forgetful.  SBA with walker.  LS-dim.  NS running @ 50ml/h until 1L is done.  Pt had iron infusion this evening.  Discharge 1-2 days.

## 2017-01-19 NOTE — PLAN OF CARE
Problem: Cardiac Output Decreased (Adult)  Goal: Identify Related Risk Factors and Signs and Symptoms  Related risk factors and signs and symptoms are identified upon initiation of Human Response Clinical Practice Guideline (CPG)   Outcome: Improving  VSS on RA. Pt rested in bed this shift, A1 with walker to ambulate to bathroom. Voiding well, no BM this shift. On reg diet, denies n/v. MIV infusing at 50 mL/hr, due to be disconnected after bag is completed. Tele 100% AV paced with prolonged QT. Alert and oriented, able to make needs known. Continue to monitor.

## 2017-01-19 NOTE — PLAN OF CARE
A/Ox4 but forgetful at times. VSS, 99% on RA. LS dim. Tele: 100% AV paced SR with BBB and prolonged QT. Up to bathroom with SBA and walker. Total of 660 intake and 950 out today. Trace edema in LE.

## 2017-01-20 NOTE — PLAN OF CARE
Problem: Goal Outcome Summary  Goal: Goal Outcome Summary  Outcome: Improving  Pt A&Ox4, forgetful at times. Up with assist of 1 and WW. IV is saline locked. On tele, 100% AV paced. Ambulated 160 feet in [place]gs this shift. Plan for possible d/c to home tomorrow.

## 2017-01-20 NOTE — PROCEDURES
PHYSICIAN INTERPRETATION   HOME OXIMETRY   Patient: Perez Villalobos  MRN: [id number]  YOB: 1940  Study Date: 1/13/16  Referring Physician: Hector Jc  Ordering Physician: МАРИЯ DANIELS MD   Conditions:  Room air/biPAP   Quality: artifact free  .                                                                                       Measure     [threshold]                           Time less than or equal to SpO2 88% [5 min] :      0 min            . Duration monitoring [> 2 hours artifact free]:          9.5 hours                     4% O2 desat index [ > 15/ hour]:           5.9/ hour                     Pattern [normal vs episodic/sustained]:                     normal                                                                                                                        _  Assessment:   Normal study  Hypoxemia absent   Recommendations:  Continue use of bilevel PAP  Diagnosis Code(s):  Hypoxemia G47.36, Sleep apnea G47.33       МАРИЯ DANIELS, January 20, 2017

## 2017-01-20 NOTE — PROGRESS NOTES
IV removed. Belongings with pt and spouse in room. Discharge instructions reviewed. Pt taken downstairs in wheelchair by volunteer transport.

## 2017-01-20 NOTE — DISCHARGE SUMMARY
"[facility]  Discharge Summary  Name: Perez Villalobos    MRN: [id number]  YOB: 1940    Age: 76 year old  Date of Discharge:  1/20/2017  Date of Admission: 1/16/2017  Primary Care Provider: Hector Jc  Discharge Physician:  Magdaleno Rangel MD  Discharging Service:  Hospitalist      Discharge Diagnosis:  Hyponatremia- resolving  Stage 3 CKD  Chronic systolic CHF not in exacerbation  CAD with previous PCI's  Hx of Vfib arrest s/p AICD and BiV pacer placement     Other Diagnosis:  Past Medical History   Diagnosis Date     Mixed hyperlipidemia      Benign essential HTN      CAD (coronary artery disease)      AWMI, stents to Cx, RCA and LAD     PAD (peripheral artery disease) (H)      NSTEMI (non-ST elevated myocardial infarction) (H) 10/23/2015     Ischemic cardiomyopathy 10/23/2015     EF 30%     Acute renal failure (H)      10/2015 ARF secondary to rhabdomyolysis     Rhabdomyolysis due to statin therapy      crestor     Chronic systolic heart failure (H)      Anemia      BPH (benign prostatic hypertrophy)      Alcoholism (H)      Severe hypothyroidism 9/20/2016     CKD (chronic kidney disease)      Complete AV block (H)      VF (ventricular fibrillation) (H) 11/16/16          Discharge Disposition:  Discharged to home     Allergies:  Allergies   Allergen Reactions     Lisinopril Other (See Comments)     Angioedema     Augmentin Other (See Comments)     Per pt, \"froze him, affected his legs\"     Crestor [Rosuvastatin] Other (See Comments)     rhabdomyolysis        Discharge Medications:   Current Discharge Medication List      CONTINUE these medications which have NOT CHANGED    Details   aspirin EC 81 MG EC tablet Take 81 mg by mouth daily      levothyroxine (SYNTHROID/LEVOTHROID) 150 MCG tablet Take 150 mcg by mouth daily      carvedilol (COREG) 3.125 MG tablet Take 3 tablets (9.375 mg) by mouth 2 times daily (with meals)  Qty: 540 tablet, Refills: 3    Associated Diagnoses: Chronic " "systolic heart failure (H)      isosorbide mononitrate (IMDUR) 60 MG 24 hr tablet Take 60 mg by mouth At Bedtime At hs  Qty: 30 tablet, Refills: 3    Associated Diagnoses: Chronic systolic heart failure (H); NSTEMI (non-ST elevated myocardial infarction) (H); Ischemic cardiomyopathy      bumetanide (BUMEX) 1 MG tablet Take 1 tablet (1 mg) by mouth 2 times daily    Associated Diagnoses: Chronic systolic heart failure (H)      calcitRIOL (ROCALTROL) 0.25 MCG capsule Take 0.25 mcg by mouth three times a week Monday, Wednesday and Friday      Saline (SODIUM CHLORIDE) 0.65 % SOLN Spray in nostril as needed      hydrALAZINE (APRESOLINE) 50 MG tablet Take 1 tablet (50 mg) by mouth 3 times daily  Qty: 270 tablet, Refills: 3    Associated Diagnoses: Essential hypertension with goal blood pressure less than 130/80      KRILL OIL PO Take 1 capsule by mouth daily       Cyanocobalamin (B-12) 1000 MCG TBCR Take 1,000 mcg by mouth daily  Qty: 100 tablet, Refills: 0    Associated Diagnoses: Vitamin B 12 deficiency      multivitamin, therapeutic with minerals (MULTI-VITAMIN) TABS Take 1 tablet by mouth daily      clopidogrel (PLAVIX) 75 MG tablet Take 75 mg by mouth daily      tolterodine (DETROL LA) 4 MG 24 hr capsule Take 4 mg by mouth daily         STOP taking these medications       metolazone (ZAROXOLYN) 2.5 MG tablet Comments:   Reason for Stopping:         spironolactone (ALDACTONE) 25 MG tablet Comments:   Reason for Stopping:                Condition on Discharge:  Discharge condition: Stable   Discharge vitals: Blood pressure 130/71, pulse 64, temperature 97.3  F (36.3  C), temperature source Axillary, resp. rate 16, height 1.727 m (5' 8\"), weight 70.035 kg (154 lb 6.4 oz), SpO2 98 %.   Code status on discharge: Full Code     History of Present Illness:  See detailed admission note for full details.        Significant Physical Exam Findings Day of Discharge:  HEENT; Atraumatic, normocephalic, pinkish conjuctiva, pupils " bilateral reactive   Skin: warm and moist, no rashes  Lungs: equal chest expansion, clear to auscultation, no wheezes, no stridor, no crackles,   Heart: normal rate, normal rhythm, no rubs or gallops.   Abdomen: normal bowel sounds, no tenderness, no peritoneal signs, no guarding  Extremities: no deformities, + pitting edema on both LE  Neuro; follow commands, alert and oriented x3, spontaneous speech, coherent, moves all extremities spontaneously  Psych; no hallucination, euthymic mood, not agitated    Procedures other than Imaging:  none     Imaging:  Results for orders placed or performed during the hospital encounter of 12/20/16   X-ray Chest 2 vws*    Narrative    XR CHEST 2 VW 12/20/2016 3:47 PM    HISTORY: shortness of breath, Heart failure, unspecified      Impression    IMPRESSION: Cardiac device. Mild bibasilar atelectasis or infiltrates  with small bibasilar pleural effusions. Findings are minimally more  prominent compared to 12/2/2016.    JEVON MALIK MD        Consultations:  Consultation during this admission received from nephrology.     Recent Lab Results:    Recent Labs  Lab 01/20/17  0645 01/16/17  1620   WBC  --  8.6   HGB  --  11.9*   HCT  --  34.5*   MCV  --  94    187     No results for input(s): CULT in the last 168 hours.    Recent Labs  Lab 01/20/17  0645 01/19/17  0530 01/18/17  1735 01/18/17  0755  01/16/17  1620   * 128*  --  124*  < > 118*   POTASSIUM 3.8 3.4 4.0 3.3*  < > 3.0*   CHLORIDE 90* 88*  --  82*  < > 72*   CO2 32 31  --  31  < > 34*   ANIONGAP 8 9  --  11  < > 12   * 110*  --  113*  < > 145*   BUN 63* 71*  --  73*  < > 73*   CR 1.73* 1.75*  --  1.78*  < > 1.90*   GFRESTIMATED 38* 38*  --  37*  < > 35*   GFRESTBLACK 47* 46*  --  45*  < > 42*   LEIDY 9.0 8.6  --  8.9  < > 9.2   MAG 2.4*  --   --  2.5*  --  2.5*   PROTTOTAL  --   --   --   --   --  7.8   ALBUMIN  --   --   --   --   --  3.7   BILITOTAL  --   --   --   --   --  1.6*   ALKPHOS  --   --   --   --    --  81   AST  --   --   --   --   --  33   ALT  --   --   --   --   --  25   < > = values in this interval not displayed.    Recent Labs  Lab 01/20/17  0645 01/19/17  0530 01/18/17  0755 01/17/17  0400 01/16/17  1620   * 110* 113* 115* 145*     No results for input(s): LACT in the last 168 hours.  No results for input(s): TROPONIN, TROPI, TROPR in the last 168 hours.    Invalid input(s): TROP, TROPONINIES    Recent Labs  Lab 01/16/17  1605   COLOR Yellow   APPEARANCE Clear   URINEGLC Negative   URINEBILI Negative   URINEKETONE Negative   SG 1.008   UBLD Negative   URINEPH 6.5   PROTEIN 10*   NITRITE Negative   LEUKEST Negative   RBCU <1   WBCU <1          Pending Results:    Unresulted Labs Ordered in the Past 30 Days of this Admission     No orders found from 11/18/2016 to 1/17/2017.           Discharge Instructions and Follow-Up:   Discharge diet:   Active Diet Order  Combination Diet Regular Diet Adult; 2 gm NA Diet  Diet   Discharge activity: Activity as tolerated   Discharge follow-up: 1-2 weeks with PCP, with cardiac rehab as scheduled   Outpatient therapy: None    Other instructions: None      Hospital Course:  Currently doing well today with no respiratory symptoms, not needing O2 supplementation and feeling much better with earlier symptoms of generalized weakness. Serum Na at 130 today.    Summary of Stay: Perez Villalobos is a 76 year old male with a history of systolic CHF 2/2 ischemic cardiomyopathy with most recent EF 30-35 %, underlying CAD s/p stenting of all 3 coronary arteries, most recent hx notable for NSTEMI in 10/16 with stress echo at that time without e/o acute ischemia (angio not done), to further complicate his situation, he was hospitalized in 11/16 with bradycardia that degenerated into VF arrest now s/p biventricular pacer and AICD placement.  His CHF has been complicated by difficulty managing SOB/abdominal distension/le edema/as well as a pleural effusion (requiring  thoracentesis)  necessitating frequent adjustment of his diuretics.   He has chronic renal insufficiency, with hx of ARF 2/2 rhabdo thought due to statin therapy.  Most recently he has had adjustjment in his diuretic with increasing doses of bumetanide in the setting of prn metolazone, and addition of spironolactone on 12/22. He was admitted on 1/16/2017 with minimally symptomatic hyponatremia from cardiology clinic.     He has received slow IVF of NS and his sodium level has improved from 118 now at 130  He is relatively asymptomatic except for some fatigue/daytime sleepiness for the past week. This has resolved already.  He did well with fluid restriction, decreased amount of diuretics of bumex to 1 mg daily while inpatient. His metolazone and aldactone were held.  Upon discharge resumption of bid dosing of his bumex with continuation to hold aldactone and metolazone.    His Chronic RF remains stable with creat in the 1.8 range.     Problem List:    1. Hyponatremia:  Mild symptoms of lethargy/fatigue, no n/v, seizure, or mental status changes-therefore suggestive of more subacute/chronic nature..  Slightly improved with gentle IVF, holding metolazone and spironolactone.  He remained on bumetanide at 1 mg bid which is his baseline dosing.  Which was decreased to just 1mg bid earlier.   TSH wnl, cortisol is relatively high.  SL IVF, cont to hold spironolactone/metoloazone, decrease bumetanide.  Follow BMPs.  Challenging to understand true etiology, suspected at least in part related to diuretics.    2. Hypokalemia:  Also related to diuresis-mag on slightly high side, replaced.    3. Ischemic CM:  Most recent echo EF 30-35 % in 10/16, most recent stress test same time without e/o reversible ischemia.  CHF meds:  Carvedilol 9.375 mg bid/hydrlaazine 50 mg bid/isosorbide 60 mg qhs/asa 81 mg/ clopidogrel 75 mg all back on board.  Diuretics bumetanide 1 mg bid/metolazone 2.5 mg twice weekly (most recently 1/11)  prn/spironolactone 25 mg (started 12/22).  Resumed on only bumetanide 1 mg bid which was decreased to qd based on hyponatremia-has e/o ongoing 1-2 + peripheral edema today, no ongoing  respiratory complaints  4. Lung exam concerning for right sided pleural effusion without respiratory compromise.  Monitor-has hx of left sided pleural effusion requiring thoracentesis in early 12/2016  5. CRI 2/2 CRS and hx of JACINTO in the setting of rhabdo:  Remains at baseline  6. Hypothyroidism:  Previously quite symptomatic with TSH > 100- currently on replacement and TSH is wnl  7. Hx of VF arrest:  BiV pacer and AICD in place  8. Nasal congestion:  Some mild swelling in left nare-normal on right, seems allergic/irritant rather than infectious, trial 2 doses of afrin and re-assess in am-discussed risk of nasal dependence on afrin if used continuously     Total time spent in face to face contact with the patient and coordinating discharge was:  > 30 Minutes.

## 2017-01-20 NOTE — PATIENT INSTRUCTIONS
Your BMI is Body mass index is 23.13 kg/(m^2).  Weight management is a personal decision.  If you are interested in exploring weight loss strategies, the following discussion covers the approaches that may be successful. Body mass index (BMI) is one way to tell whether you are at a healthy weight, overweight, or obese. It measures your weight in relation to your height.  A BMI of 18.5 to 24.9 is in the healthy range. A person with a BMI of 25 to 29.9 is considered overweight, and someone with a BMI of 30 or greater is considered obese. More than two-thirds of American adults are considered overweight or obese.  Being overweight or obese increases the risk for further weight gain. Excess weight may lead to heart disease and diabetes.  Creating and following plans for healthy eating and physical activity may help you improve your health.  Weight control is part of healthy lifestyle and includes exercise, emotional health, and healthy eating habits. Careful eating habits lifelong are the mainstay of weight control. Though there are significant health benefits from weight loss, long-term weight loss with diet alone may be very difficult to achieve- studies show long-term success with dietary management in less than 10% of people. Attaining a healthy weight may be especially difficult to achieve in those with severe obesity. In some cases, medications, devices and surgical management might be considered.  What can you do?  If you are overweight or obese and are interested in methods for weight loss, you should discuss this with your provider.     Consider reducing daily calorie intake by 500 calories.     Keep a food journal.     Avoiding skipping meals, consider cutting portions instead.    Diet combined with exercise helps maintain muscle while optimizing fat loss. Strength training is particularly important for building and maintaining muscle mass. Exercise helps reduce stress, increase energy, and improves fitness.  Increasing exercise without diet control, however, may not burn enough calories to loose weight.       Start walking three days a week 10-20 minutes at a time    Work towards walking thirty minutes five days a week     Eventually, increase the speed of your walking for 1-2 minutes at time    In addition, we recommend that you review healthy lifestyles and methods for weight loss available through the National Institutes of Health patient information sites:  http://win.niddk.nih.gov/publications/index.htm    And look into health and wellness programs that may be available through your health insurance provider, employer, local community center, or debbie club.         Your blood pressure was checked while you were in clinic today.  Please read the guidelines below about what these numbers mean and what you should do about them.  Your systolic blood pressure is the top number.  This is the pressure when the heart is pumping.  Your diastolic blood pressure is the bottom number.  This is the pressure in between beats.  If your systolic blood pressure is less than 120 and your diastolic blood pressure is less than 80, then your blood pressure is normal. There is nothing more that you need to do about it  If your systolic blood pressure is 120-139 or your diastolic blood pressure is 80-89, your blood pressure may be higher than it should be.  You should have your blood pressure re-checked within a year by a primary care provider.  If your systolic blood pressure is 140 or greater or your diastolic blood pressure is 90 or greater, you may have high blood pressure.  High blood pressure is treatable, but if left untreated over time it can put you at risk for heart attack, stroke, or kidney failure.  You should have your blood pressure re-checked by a primary care provider within the next four weeks.

## 2017-01-20 NOTE — PROGRESS NOTES
" Renal Medicine Progress Note            Assessment/Plan:     1) Hyponatremia:  Improved again to 130 mEq/L.     2) Stage 3 CKD: On basis of prior JACINTO due to rhabdo + cardiorenal syndrome.    3) Chronic Systolic Heart Failure : LVEF 30-35%    4) CAD with history of multiple PCI    5) Heart Block + VFIB :S/P pacer/AICD    6) Iron deficiency anemia:  He had 1/2 doses Feraheme as outpt.  I will finish the course of IV iron in hospital with a dose of Venofer - given.    Suggest:    OK for discharge  Keep on bumetanide 1 mg BID  Fluid Restriction 1200 mL per day.  Stay off metolazone and spironolactone.    He is motivated to get to appts this afternoon:  Cardiac rehab + Dr. Salguero for DELONTE.          Interval History:     He is feeling well.  Anxious for discahrge.  Na up to 130.              Medications and Allergies:       sodium chloride (PF)  3 mL Intracatheter Q8H     carbamide peroxide  5 drop Left Ear BID     bumetanide  1 mg Oral Daily     aspirin EC  81 mg Oral Daily     calcitRIOL  0.25 mcg Oral Once per day on Mon Wed Fri     carvedilol  9.375 mg Oral BID w/meals     clopidogrel  75 mg Oral Daily     cyanocobalamin  1,000 mcg Oral Daily     hydrALAZINE  50 mg Oral TID     isosorbide mononitrate  60 mg Oral At Bedtime     levothyroxine  150 mcg Oral Daily     tolterodine  4 mg Oral Daily        Allergies   Allergen Reactions     Lisinopril Other (See Comments)     Angioedema     Augmentin Other (See Comments)     Per pt, \"froze him, affected his legs\"     Crestor [Rosuvastatin] Other (See Comments)     rhabdomyolysis            Physical Exam:   Vitals were reviewed  /57 mmHg  Pulse 64  Temp(Src) 97.3  F (36.3  C) (Axillary)  Resp 16  Ht 1.727 m (5' 8\")  Wt 70.035 kg (154 lb 6.4 oz)  BMI 23.48 kg/m2  SpO2 98%    Wt Readings from Last 3 Encounters:   01/20/17 70.035 kg (154 lb 6.4 oz)   01/16/17 71.668 kg (158 lb)   01/13/17 70 kg (154 lb 5.2 oz)       Intake/Output Summary (Last 24 hours) at 01/20/17 " 1040  Last data filed at 01/20/17 0800   Gross per 24 hour   Intake   1105 ml   Output   1275 ml   Net   -170 ml       GENERAL APPEARANCE: pleasant, NAD, a & o  HEENT:  Eyes/ears/nose/neck grossly normal  RESP: lungs cta b c good efforts, no crackles, rhonchi or wheezes  CV: RRR, nl S1/S2, no m/r/g   ABDOMEN: o/s/nt/nd, bs present  EXTREMITIES/SKIN: no rashes/lesions; 1+ BLE edema to mid shin            Data:     BMP  Recent Labs  Lab 01/20/17  0645 01/19/17  0530 01/18/17  1735 01/18/17  0755 01/17/17  1843 01/17/17  0400   * 128*  --  124* 120* 121*   POTASSIUM 3.8 3.4 4.0 3.3*  --  4.0   CHLORIDE 90* 88*  --  82*  --  78*   LEIDY 9.0 8.6  --  8.9  --  8.8   CO2 32 31  --  31  --  31   BUN 63* 71*  --  73*  --  74*   CR 1.73* 1.75*  --  1.78*  --  1.82*   * 110*  --  113*  --  115*     CBC  Recent Labs  Lab 01/20/17  0645 01/16/17  1620   WBC  --  8.6   HGB  --  11.9*   HCT  --  34.5*   MCV  --  94    187     Lab Results   Component Value Date    AST 33 01/16/2017    ALT 25 01/16/2017    ALKPHOS 81 01/16/2017    BILITOTAL 1.6* 01/16/2017    HONEY 24 02/11/2016     Lab Results   Component Value Date    INR 1.01 11/16/2016     No results found for: D2VIT, D3VIT, DTOT  Attestation:   I have reviewed today's relevant vital signs, notes, medications, labs and imaging.        Krishna Valles MD  [facility] - Nephrology  [phone]

## 2017-01-20 NOTE — PLAN OF CARE
Problem: Goal Outcome Summary  Goal: Goal Outcome Summary  Outcome: Improving  Temp: 97.7  F (36.5  C) Temp src: Oral BP: 121/72 mmHg   Heart Rate: 62 Resp: 16 SpO2: 98 % O2 Device: None (Room air)      VSS, A&O x4, up with SBA and walker. IV saline locked. Possible DC home today. Tele is 100% AV paced with BBB.

## 2017-01-20 NOTE — PROGRESS NOTES
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 He has good sleep consolidation on PAP.  On 8 days off bilevel PAP, patient had increased sleep apnea [26/min] and hypoxemia [13-34 min SpO2<88%]. In this setting will continue bilevel PAP 12/8 and return to clinic 6 mos.    Abilio Salguero MD    Total time 25 min > 50% counseling and coordination of care

## 2017-01-23 NOTE — MR AVS SNAPSHOT
After Visit Summary   1/23/2017    Perez Villalobos    MRN: [id number]           Patient Information     Date Of Birth          1940        Visit Information        Provider Department      1/23/2017 7:30 AM Shani Patrick APRN CNP [facility]        Today's Diagnoses     Ischemic cardiomyopathy    -  1        Follow-ups after your visit        Your next 10 appointments already scheduled     Mar 06, 2017 11:50 AM   LAB with [facility]   [facility] ([facility])    [address] [place], [place] [id number]  [facility] [id number] [phone]           Patient must bring picture ID.  Patient should be prepared to give a urine specimen  Please do not eat 10-12 hours before your appointment if you are coming in fasting for labs on lipids, cholesterol, or glucose (sugar).  Pregnant women should follow their Care Team instructions. Water with medications is okay. Do not drink coffee or other fluids.   If you have concerns about taking  your medications, please ask at office or if scheduling via powervault, send a message by clicking on Secure Messaging, Message Your Care Team.            Mar 06, 2017 12:45 PM   Core MD Return with Krishna Tran MD   [facility] ([facility])    [address] [place], [place] [id number]  [facility] [address] [phone]            Apr 04, 2017  4:30 PM   Remote ICD Check with [facility] DCR2   [facility] ([facility]    [address] [place], [place] [id number]  [facility] [address] [phone]           This appointment is for a remote check of your debrillator.  This is not an appointment at the office.              Who to contact     If you have questions or need follow up information about today's clinic visit or your schedule please contact [facility] directly at  "[phone].  Normal or non-critical lab and imaging results will be communicated to you by MyChart, letter or phone within 4 business days after the clinic has received the results. If you do not hear from us within 7 days, please contact the clinic through [email or web address] or phone. If you have a critical or abnormal lab result, we will notify you by phone as soon as possible.  Submit refill requests through Linki or call your pharmacy and they will forward the refill request to us. Please allow 3 business days for your refill to be completed.          Additional Information About Your Visit        [email or web address] Information     Linki lets you send messages to your doctor, view your test results, renew your prescriptions, schedule appointments and more. To sign up, go to [email or web address] . Click on \"Log in\" on the left side of the screen, which will take you to the Welcome page. Then click on \"Sign up Now\" on the right side of the page.     You will be asked to enter the access code listed below, as well as some personal information. Please follow the directions to create your username and password.     Your access code is: [email or web address]  Expires: 2017  2:21 PM     Your access code will  in 90 days. If you need help or a new code, please call your [place] clinic or [phone].        Care EveryWhere ID     This is your Care EveryWhere ID. This could be used by other organizations to access your [place] medical records  [id number]        Your Vitals Were     Pulse Respirations                72 16           Blood Pressure from Last 3 Encounters:   17 120/54   17 130/71   17 130/71    Weight from Last 3 Encounters:   17 71.94 kg (158 lb 9.6 oz)   17 70.035 kg (154 lb 6.4 oz)   17 70.035 kg (154 lb 6.4 oz)              Today, you had the following     No orders found for display       Primary Care Provider Office Phone # Fax #    Hector Jc [phone] " [phone]       [address]  [facility] [address]        Thank you!     Thank you for choosing [facility] [place]  for your care. Our goal is always to provide you with excellent care. Hearing back from our patients is one way we can continue to improve our services. Please take a few minutes to complete the written survey that you may receive in the mail after your visit with us. Thank you!             Your Updated Medication List - Protect others around you: Learn how to safely use, store and throw away your medicines at www.disposemymeds.org.          This list is accurate as of: 1/23/17  8:13 AM.  Always use your most recent med list.                   Brand Name Dispense Instructions for use    aspirin EC 81 MG EC tablet      Take 81 mg by mouth daily       B-12 1000 MCG Tbcr     100 tablet    Take 1,000 mcg by mouth daily       bumetanide 1 MG tablet    BUMEX     Take 1 tablet (1 mg) by mouth 2 times daily       calcitRIOL 0.25 MCG capsule    ROCALTROL     Take 0.25 mcg by mouth three times a week Monday, Wednesday and Friday       carvedilol 3.125 MG tablet    COREG    540 tablet    Take 3 tablets (9.375 mg) by mouth 2 times daily (with meals)       DETROL LA 4 MG 24 hr capsule   Generic drug:  tolterodine      Take 4 mg by mouth daily       hydrALAZINE 50 MG tablet    APRESOLINE    270 tablet    Take 1 tablet (50 mg) by mouth 3 times daily       isosorbide mononitrate 60 MG 24 hr tablet    IMDUR    30 tablet    Take 60 mg by mouth At Bedtime At hs       KRILL OIL PO      Take 1 capsule by mouth daily       levothyroxine 150 MCG tablet    SYNTHROID/LEVOTHROID     Take 150 mcg by mouth daily       Multi-vitamin Tabs tablet      Take 1 tablet by mouth daily       PLAVIX 75 MG tablet   Generic drug:  clopidogrel      Take 75 mg by mouth daily       Sodium Chloride 0.65 % Soln      Spray in nostril as needed

## 2017-01-23 NOTE — PROGRESS NOTES
HISTORY OF PRESENT ILLNESS:  Perez Villalobos is a 76-year-old male with a history of systolic congestive heart failure due to ischemic cardiomyopathy with an ejection fraction of 30%-35%, underlying CAD, status post stenting of all 3 coronary arteries, most recent history notable for non-STEMI in 10/2016 with stress echo at that time without evidence of acute ischemia (coronary angiogram not done).  To further complicate his situation he was hospitalized 11/2016 with bradycardia that degenerated into ventricular fibrillation arrest.  He then proceeded with biventricular pacemaker and AICD placement.  His CHF has been complicated by difficult to manage CHF including pleural effusions (requiring thoracentesis, adjustment of his diuretics and coordination of care with Dr. Valles, his nephrologist).  He has chronic renal insufficiency with a history of ARF due to rhabdo thought due to statin therapy.  He has had an addition of spironolactone on 12/21.  His baseline serum sodium was normal; however, on 01/16/2017.  He had severe hyponatremia with serum sodium of 118 with minimal symptoms.      He received IV infusion of normal saline and his serum sodium improved from 118-130.  He was relatively asymptomatic except for fatigue and sleepiness.  He was in the hospital for close to a week.  His weight went up a couple pounds from baseline during his hospitalization.  His spironolactone and metolazone were held during the hospitalization and Bumex was decreased to 1 mg a day; however, when he was discharged it was increased back to 1 mg twice a day.      Today, he returns to see me, Shani Patrick CNP, in [facility].  His blood pressure is stable.  His weight is up a few pounds.  He denies increased shortness of breath, orthopnea, PND, syncope or near-syncope.      PHYSICAL EXAMINATION:     VITAL SIGNS:  Blood pressure 120/54, pulse is 72 and regular.  Weight is 158 pounds in the clinic and 151 pounds at home.    LUNGS:  Clear to auscultation bilaterally.   NECK:  JVP about 12 cm of water to 45 degree angle.   CARDIOVASCULAR:  Regular rhythm with frequent PVCs.  Distant heart tones.  No murmurs or gallops.   CHEST:  ICD site is intact.   ABDOMEN:  A bit distended, but nontender.  Negative for hepatomegaly.  No bruit.   EXTREMITIES:  No edema.      IMPRESSION AND PLAN:  Perez Villalobos is a pleasant 76-year-old patient who was admitted on 01/16/2017 and discharged on 01/20/2017.  He has a history of systolic CHF, ischemic cardiomyopathy, CAD status post stenting of all 3 coronary arteries, most recent history notable for a non-STEMI in 10/2016 with stress echo at that time without evidence of acute ischemia.  He was hospitalized 11/2016 with bradycardia that degenerated in VF arrest, now status post CRT-D.  He had difficult to manage CHF.  He has been on Lasix, torsemide and more recently Bumex.  Dr. Valles added metolazone if his weight was greater than 156 pounds.  He only had taken 2 doses of metolazone in the last month.  On 12/21, spironolactone was started by Dr. Tran at 25 mg a day.  Baseline serum sodium was normal.  On 01/16, his serum sodium was 118 and the patient was complaining of lethargy and fatigue.  He was directed to the emergency room for evaluation and treatment.  He received IV fluids and was in the hospital for 5 days.  Discharge serum sodium was 130.  He is currently on Bumex 1 mg twice a day.   1.  Hyponatremia.  Resolved with treatment in the hospital.  Serum sodium 130.   2.  Hypokalemia.  He was given potassium in the hospital.  He was not discharged on potassium replacement.  His discharge potassium was 3.8.   3.  Ischemic cardiomyopathy.  Most recent echocardiogram shows an EF of 30%-35%.  Stress test at the same time showed no evidence of reversible ischemia.  He is currently on carvedilol 9.375 mg twice a day, hydralazine 50 mg twice a day, isosorbide mononitrate 60 mg at bedtime, aspirin 81 mg  a day, clopidogrel 75 mg a day.  Diuretics are currently Bumex 1 mg twice a day.  Spironolactone was stopped in the hospital.   4.  Lung exam in the hospital showed a right-sided pleural effusion without respiratory compromise.  He has a history of left-sided pleural effusion requiring thoracentesis 2016.   5.  Chronic renal insufficiency.  Remains at baseline.   6.  Hypothyroidism.  He was previously quite symptomatic with TSH of 148.  Over the past months his TSH has finally entered the normal range.   7.  History of VF arrest.  CRT-D.  Denies any defibrillator shocks.   8.  Travel.  The patient and his wife have decided to travel to [place] where they will receive care through a physician in [place], [place].      9. The patient will follow up with Dr. Tran on their return in March.         PARKER VILLA, CNP             D: 2017 08:13   T: 2017 08:37   MT: NILESH      Name:     RON LUCERO   MRN:      [id number]        Account:      [id number]   :      1940           Service Date: 2017      Document: [id number]

## 2017-01-23 NOTE — PROGRESS NOTES
01/23/17 0900   Session  Perez Villalobos  76 year old   Session Discharge Note   Certified through this date 02/03/17   Cardiac Rehab Assessment   Cardiac Rehab Assessment 1/5/2017 Evaluation completed. PT has an extensive heart history. In 2016 he was diagnosed with CHF. 1/23/2017. PT will be transferring to a cardiac rehab program in [place]. PT signed a release of information and records were sent with PT. PT has denied any symptoms or complaints with exercise. PT was given handouts and verbal information in regards to low sodium choices. PT continues to benefit from monitored exercise and CHF education by participating in a cardiac rehab program in [place].    General Information   Treatment Diagnosis Systolic Heart Failure with Lowered EF   Date of Treatment Diagnosis 11/15/16  (Diagnosed with heart failure in 2/2016)   Secondary Treatment Diagnosis (ICD/pacemaker 11/16/2016)   Significant Past CV History Previous MI;Previous PCI   Comorbidities Renal Disease   Other Medical History Numerous hospitalizations the past year for acute renal failure and respiratory failure due to rhabdomyolysis. Symptomatic bradycardia with a V.fib arrest.   Lead up symptoms Slow heart rate in the 40's with near syncope   Hospital Location [facility] Discharge Date 11/18/16   Signs and Symptoms Post Hospital Discharge Fatigue;SOB   Outpatient Cardiac Rehab Start Date 01/05/17   Primary Physician Hector Jc   Primary Physician Follow Up Completed   Cardiologist Marc/Elle   Cardiologist Follow Up Completed   Ejection Fraction 30-35%   Risk Stratification High   Summary of Cath Report   Summary of Cath Report Not Applicable   Living and Work Status    Living Arrangements and Social Status house;spouse   Return to Employment Retired   Preventative Medications   CMS recommended medications Antiplatelets;Beta Blocker;Influenza vaccination;Pneumonia vaccination   Falls Screen   Have you fallen two or more times in  "the past year? Yes   Have you fallen and had an injury in the past year? No   Pain   Patient Currently in Pain Yes   Pain Location hips   Pain Rating 5   Pain Description Ache   Pain Description Comment discomfort occurs with walking   Physical Assessments   Incisions Not applicable   Edema +3 Moderate   Right Lung Sounds normal   Left Lung Sounds normal   Limitations Orthopedic   Comments Hip pain bilaterally and leg weakness   Individualized Treatment Plan   Monitored Sessions Scheduled 7   Monitored Sessions Attended 6   Oxygen   Supplemental Oxygen needed No   Nutrition Management - Weight Management   Assessment Initial Assessment   Age 76   Weight 71.033 kg (156 lb 9.6 oz)   Height 1.74 m (5' 8.5\")   BMI (Calculated) 23.51   Goal Weight (at goal weight. )   Initial Rate Your Plate Score. Dietary tool to assess eating patterns. Scores range from 24 to 72. The higher the score the healthier the eating pattern. 64   Nutrition Management - Lipids   Lipids Labs Available   Date 09/16/16   Total Cholesterol 232   Triglycerides 126   HDL 87      Prescribed Lipid Medication No;Intolerant   Lipid Comments PT had rhabdomyolisis secondary to Crestor.    Nutrition Management - Diabetes   Diabetes No   Nutrition Management Summary   Dietary Recommendations Low Fat;Low Cholesterol;Low Sodium   Stages of Change for Diet Compliance Action   Interventions Planned Attend Nutrition Education Class(es);Refer for Individual Diet Consultation   Patient Goals Goal #1   Goal #1 Description Learn ways to reduce the sodium in cooking but still make tasty dishes by attending the nutrition classes and meet possibly with the dietitian.   Goal #1 Target Date 02/01/17   Goal #1 Progress Towards Goal 1/23/2017. PT was given handouts and verbal instruction on low sodium choices and ways to flavor food without using salt. PT reports he has made a drastic change in this area. PT pays close attention to sodium content and checks weight " daily.    Nutrition Summary Comments Trying to watch the sodium levels more carefully the past couple months.   Nutrition Target Outcome Total Chol < 150, HDL > 40 (M), HDL > 50 (W), LDL < 70, Trig < 150   Psychosocial Management   Psychosocial Assessment Initial   Is there history of clinical depression or increased risk of depression? No previous history   Current Level of Stress per Patient Report Mild   Current Coping Skills Uses Stress Management/Relaxation Techniques  (exercise, hobby-works on cars)   Initial Patient Health Questionnaire -9 Score (PHQ-9) for depression. 5-9 Minimal symptoms, 10-14 Minor depression, 15-19 Major depression, moderately severe, > 20 Major depression, severe  6   Initial [facility] Survey score.  Quality of Life:   If total score > 25 review individual areas where patient rated a 4 or 5.  Consider patients current medical condition and what role that plays on the score.   Adjust treatment protocol to improve areas of concern.  Consider the following:  PHQ9 score, DASI, and re-assessment within the next 30 days to assist with developing treatments.  22   Stages of Change Maintenance   Interventions Planned Patient denies need for intervention at this time.   Interventions In Progress or Completed Patient verbalizes understanding of behavioral assessment scores;Patient verbalizes understanding of negative impact of stress to personal health   Patient Goal No   Psychosocial Comments Sometimes PT does not sleep well at night due to being restless.   Psychosocial Target Outcome Identify absence or presence of depression using valid screening tool   Other Core Components - Hypertension   History of or Diagnosis of Hypertension Yes   Currently taking Anti-Hypertensives Yes;Beta blocker   Hypertension Comments Resting BP borderline-will continue to monitor.   Other Core Components - Tobacco   History of Tobacco Use Yes   Quit Date or Planned Quit Date (1985)   Tobacco Habit  Cigarettes;Pipe   Stages of Change Maintenance   Other Core Components Summary   Interventions Planned Instruct patient on the DASH diet;Provide information on home blood pressure monitoring;Educate on the use of 'Stop Light' tool;Educate on importance of monitoring daily weight;Educate on signs/symptoms and when to call the doctor (i.e. increased edema, dyspnea, fatigue, dizziness/lightheadedness, change in sleep patterns, chest discomfort);Instruct and educate to self manage Heart Failure symptoms;Attend education class(es) on Nutrition   Interventions In Progress or Completed Instructed on DASH diet;Listed benefits of weight management;Educated on importance of maintaining low sodium diet;Educated on importance of monitoring daily weight;Educated on signs/symptoms and when to call the doctor;Educated on the importance of using resources to self manage Heart Failure symptoms   Patient Goals (See nutrition goal)   Other Core Components Comments PT has a home BP monitor, but does not use it as often as he should.   Other Core Components Target Outcome BP < 140/90 or < 130/80 with DM or CKD;Compliance with Diet, Medications and Symptom Management to allow for stable Heart Failure   Activity/Exercise History   Activity/Exercise Assessment Initial   Activity/Exercise Status prior to event? Participated in an Exercise Program   Number of Days Currently participating in Moderate Physical Activity? 0   Number of Days Currently performing  Aerobic Exercise (including rehab)? 4-5   Number of Minutes per Session Currently of Aerobic Exercise (average)? 2 minute bouts x 2-3 times   Current Stage of Change (Physical Activity) Preparation   Current Stage of Change (Aerobic Exercise) Preparation   Patient Goals Goal #1;Goal #2   Goal #1 Description PT would like to build his leg strength to be able to walk 3/4 mile by attending cardiac rehab and exercising at home.   Goal #1 Target Date 03/01/17   Goal #1 Progress Towards Goal  1/23/2017. PT reports and improvement in strength, but does feel he has met this goal as of yet. PT will continue rehab in [place] and work towards this goal.    Goal #2 Description PT would like to improve his upper body strength by participating in cardiac rehab 3 times per week and adding light weights.   Goal #2 Target Date 03/01/17   Goal #2 Progress Towards Goal 1/23/2017. PT continue rehab in [place] to work towards this goal.    Activity/Exercise Comments PT has a treadmill and belongs to [employer].   Activity/Exercise Target Outcome An Accumulation of 150  Minutes of Aerobic Activity per Week   Exercise Assessment   6 Minute Walk Predicted - Gender Selection Male   6 Minute Walk Predicted (Male) 1628.06   6 Minute Walk Predicted (Female) 1397.52   Initial 6 Minute Walk Distance (Feet) 746 ft   Resting HR 74 bpm   Exercise  bpm   Post Exercise HR 60 bpm   Resting /50 mmHg   Exercise /78 mmHg   Post Exercise /62 mmHg   Effort Rating 5   Current MET Level 1.6   MET Level Goal 3.0-4.0   ECG Rhythm Paced rhythm  (ectopic beats)   Ectopy None   Current Symptoms Joint pain   Limitations/Restrictions Other (see comments);Orthopedic (see comments)  (hip pain and weakness in legs)   Exercise Prescription   Mode Recumbant bike;Nustep;Treadmill;Weights   Duration/Time Intermittent bouts   Frequency 3 daysweek   THR (85% of age predicted max HR) 121.55   OMNI Effort Rating (0-10 Scale) 4-6/10   Progression Intermittent bouts;Total exercise time of 20-30 minutes;Progress peak intensity by 1/4 MET per week;Aerobic exercise to OMNI rating of 6 or below and at or below THR   Recommended Home Exercise   Type of Exercise Walking;Treadmill   Frequency (days per week) 3   Duration (minutes per session) Intermittent   Effort Rating Recommended 4-6/10   30 Day Exercise Plan PT to continue to walk 2 minutes bouts on the TM 2-3 times per day. May eventually go to the fitness club to exercise.   Current Home  Exercise   Type of Exercise Treadmill   Frequency (days per week) 6   Duration (minutes per session) 2 minuters x 3   Follow-up/On-going Support   Provider follow-up needed on the following No follow-up needed   Learning Assessment   Learner Patient   Primary Language English   Preferred Learning Style Reading;Pictures/Video   Barriers to Learning Cognitive   Patient Education   Education recommended Anatomy and Physiology of the Heart;Heart Failure;Muscle Conditioning;Nutrition;Medication Overview   Physician cosignature/electronic signature indicates approval of this ITP document. I have established, reviewed and made necessary changes to the individualized treatment plan and exercise prescription for this patient.

## 2017-01-23 NOTE — PROGRESS NOTES
JACOB faxed to medical records for cardiology recent records to be sent to Dr. Alba's office in [place], [place] where patient will be on vacation. Fax: [phone] hand carried [facility] discharge summaries. Vijaya GILL

## 2017-01-23 NOTE — Clinical Note
1/23/2017    Hector Jc MD  [place]   [address]  [facility] [address]    RE: Perez Villalobos       Dear Colleague,    I had the pleasure of seeing Perez Villalobos in the [facility].    Perez Villalobos is a 76-year-old male with a history of systolic congestive heart failure due to ischemic cardiomyopathy with an ejection fraction of 30%-35%, underlying CAD, status post stenting of all 3 coronary arteries, most recent history notable for non-STEMI in 10/2016 with stress echo at that time without evidence of acute ischemia (coronary angiogram not done).  To further complicate his situation he was hospitalized 11/2016 with bradycardia that degenerated into ventricular fibrillation arrest.  He then proceeded with biventricular pacemaker and AICD placement.  His CHF has been complicated by difficult to manage CHF including pleural effusions (requiring thoracentesis, adjustment of his diuretics and coordination of care with Dr. Valles, his nephrologist).  He has chronic renal insufficiency with a history of ARF due to rhabdo thought due to statin therapy.  He has had an addition of spironolactone on 12/21.  His baseline serum sodium was normal; however, on 01/16/2017.  He had severe hyponatremia with serum sodium of 118 with minimal symptoms.      He received IV infusion of normal saline and his serum sodium improved from 118-130.  He was relatively asymptomatic except for fatigue and sleepiness.  He was in the hospital for close to a week.  His weight went up a couple pounds from baseline during his hospitalization.  His spironolactone and metolazone were held during the hospitalization and Bumex was decreased to 1 mg a day; however, when he was discharged it was increased back to 1 mg twice a day.      Today, he returns to see me, Shani Patrick CNP, in [facility].  His blood pressure is stable.  His weight is up a few pounds.  He denies increased shortness of  breath, orthopnea, PND, syncope or near-syncope.      PHYSICAL EXAMINATION:     VITAL SIGNS:  Blood pressure 120/54, pulse is 72 and regular.  Weight is 158 pounds in the clinic and 151 pounds at home.   LUNGS:  Clear to auscultation bilaterally.   NECK:  JVP about 12 cm of water to 45 degree angle.   CARDIOVASCULAR:  Regular rhythm with frequent PVCs.  Distant heart tones.  No murmurs or gallops.   CHEST:  ICD site is intact.   ABDOMEN:  A bit distended, but nontender.  Negative for hepatomegaly.  No bruit.   EXTREMITIES:  No edema.     Outpatient Encounter Prescriptions as of 1/23/2017   Medication Sig Dispense Refill     aspirin EC 81 MG EC tablet Take 81 mg by mouth daily       levothyroxine (SYNTHROID/LEVOTHROID) 150 MCG tablet Take 150 mcg by mouth daily       carvedilol (COREG) 3.125 MG tablet Take 3 tablets (9.375 mg) by mouth 2 times daily (with meals) 540 tablet 3     isosorbide mononitrate (IMDUR) 60 MG 24 hr tablet Take 60 mg by mouth At Bedtime At hs 30 tablet 3     bumetanide (BUMEX) 1 MG tablet Take 1 tablet (1 mg) by mouth 2 times daily       calcitRIOL (ROCALTROL) 0.25 MCG capsule Take 0.25 mcg by mouth three times a week Monday, Wednesday and Friday       hydrALAZINE (APRESOLINE) 50 MG tablet Take 1 tablet (50 mg) by mouth 3 times daily 270 tablet 3     KRILL OIL PO Take 1 capsule by mouth daily        Cyanocobalamin (B-12) 1000 MCG TBCR Take 1,000 mcg by mouth daily 100 tablet 0     multivitamin, therapeutic with minerals (MULTI-VITAMIN) TABS Take 1 tablet by mouth daily       clopidogrel (PLAVIX) 75 MG tablet Take 75 mg by mouth daily       tolterodine (DETROL LA) 4 MG 24 hr capsule Take 4 mg by mouth daily       Saline (SODIUM CHLORIDE) 0.65 % SOLN Spray in nostril as needed       No facility-administered encounter medications on file as of 1/23/2017.      IMPRESSION AND PLAN:  Perez Villalobos is a pleasant 76-year-old patient who was admitted on 01/16/2017 and discharged on 01/20/2017.  He has a  history of systolic CHF, ischemic cardiomyopathy, CAD status post stenting of all 3 coronary arteries, most recent history notable for a non-STEMI in 10/2016 with stress echo at that time without evidence of acute ischemia.  He was hospitalized 11/2016 with bradycardia that degenerated in VF arrest, now status post CRT-D.  He had difficult to manage CHF.  He has been on Lasix, torsemide and more recently Bumex.  Dr. Valles added metolazone if his weight was greater than 156 pounds.  He only had taken 2 doses of metolazone in the last month.  On 12/21, spironolactone was started by Dr. Tran at 25 mg a day.  Baseline serum sodium was normal.  On 01/16, his serum sodium was 118 and the patient was complaining of lethargy and fatigue.  He was directed to the emergency room for evaluation and treatment.  He received IV fluids and was in the hospital for 5 days.  Discharge serum sodium was 130.  He is currently on Bumex 1 mg twice a day.   1.  Hyponatremia.  Resolved with treatment in the hospital.  Serum sodium 130.   2.  Hypokalemia.  He was given potassium in the hospital.  He was not discharged on potassium replacement.  His discharge potassium was 3.8.   3.  Ischemic cardiomyopathy.  Most recent echocardiogram shows an EF of 30%-35%.  Stress test at the same time showed no evidence of reversible ischemia.  He is currently on carvedilol 9.375 mg twice a day, hydralazine 50 mg twice a day, isosorbide mononitrate 60 mg at bedtime, aspirin 81 mg a day, clopidogrel 75 mg a day.  Diuretics are currently Bumex 1 mg twice a day.  Spironolactone was stopped in the hospital.   4.  Lung exam in the hospital showed a right-sided pleural effusion without respiratory compromise.  He has a history of left-sided pleural effusion requiring thoracentesis 12/2016.   5.  Chronic renal insufficiency.  Remains at baseline.   6.  Hypothyroidism.  He was previously quite symptomatic with TSH of 148.  Over the past months his TSH has  finally entered the normal range.   7.  History of VF arrest.  CRT-D.  Denies any defibrillator shocks.   8.  Travel.  The patient and his wife have decided to travel to [place] where they will receive care through a physician in [place], [place].      9. The patient will follow up with Dr. Tran on their return in March.     Again, thank you for allowing me to participate in the care of your patient.      Sincerely,    PARKER Benoit [facility]

## 2017-01-23 NOTE — PROGRESS NOTES
"HPI and Plan:   See [id number]    No orders of the defined types were placed in this encounter.       No orders of the defined types were placed in this encounter.       There are no discontinued medications.      Encounter Diagnosis   Name Primary?     Ischemic cardiomyopathy Yes       CURRENT MEDICATIONS:  Current Outpatient Prescriptions   Medication Sig Dispense Refill     aspirin EC 81 MG EC tablet Take 81 mg by mouth daily       levothyroxine (SYNTHROID/LEVOTHROID) 150 MCG tablet Take 150 mcg by mouth daily       carvedilol (COREG) 3.125 MG tablet Take 3 tablets (9.375 mg) by mouth 2 times daily (with meals) 540 tablet 3     isosorbide mononitrate (IMDUR) 60 MG 24 hr tablet Take 60 mg by mouth At Bedtime At hs 30 tablet 3     bumetanide (BUMEX) 1 MG tablet Take 1 tablet (1 mg) by mouth 2 times daily       calcitRIOL (ROCALTROL) 0.25 MCG capsule Take 0.25 mcg by mouth three times a week Monday, Wednesday and Friday       hydrALAZINE (APRESOLINE) 50 MG tablet Take 1 tablet (50 mg) by mouth 3 times daily 270 tablet 3     KRILL OIL PO Take 1 capsule by mouth daily        Cyanocobalamin (B-12) 1000 MCG TBCR Take 1,000 mcg by mouth daily 100 tablet 0     multivitamin, therapeutic with minerals (MULTI-VITAMIN) TABS Take 1 tablet by mouth daily       clopidogrel (PLAVIX) 75 MG tablet Take 75 mg by mouth daily       tolterodine (DETROL LA) 4 MG 24 hr capsule Take 4 mg by mouth daily       Saline (SODIUM CHLORIDE) 0.65 % SOLN Spray in nostril as needed         ALLERGIES     Allergies   Allergen Reactions     Lisinopril Other (See Comments)     Angioedema     Augmentin Other (See Comments)     Per pt, \"froze him, affected his legs\"     Crestor [Rosuvastatin] Other (See Comments)     rhabdomyolysis       PAST MEDICAL HISTORY:  Past Medical History   Diagnosis Date     Mixed hyperlipidemia      Benign essential HTN      CAD (coronary artery disease)      AWMI, stents to Cx, RCA and LAD     PAD (peripheral artery " disease) (H)      NSTEMI (non-ST elevated myocardial infarction) (H) 10/23/2015     Ischemic cardiomyopathy 10/23/2015     EF 30%     Acute renal failure (H)      10/2015 ARF secondary to rhabdomyolysis     Rhabdomyolysis due to statin therapy      crestor     Chronic systolic heart failure (H)      Anemia      BPH (benign prostatic hypertrophy)      Alcoholism (H)      Severe hypothyroidism 9/20/2016     CKD (chronic kidney disease)      Complete AV block (H)      VF (ventricular fibrillation) (H) 11/16/16       PAST SURGICAL HISTORY:  Past Surgical History   Procedure Laterality Date     Stent, coronary, s660 15/18  Nov 2000     PTCA with stent placement of CFX     Stent, coronary, s660 15/18  Feb 2001     PTCA with stent placement of CFX RCA      Stent, coronary, s660 15/18  April 2007     stent placed in LAD     Endarterectomy carotid Left 6/11/10     Appendectomy       Tonsillectomy and adenoidectomy       Cholecystectomy       Implant automatic implantable cardioverter defibrillator  11/16/16       FAMILY HISTORY:  Family History   Problem Relation Age of Onset     Unknown/Adopted Mother      Unknown/Adopted Father        SOCIAL HISTORY:  Social History     Social History     Marital Status:      Spouse Name: N/A     Number of Children: N/A     Years of Education: N/A     Social History Main Topics     Smoking status: Former Smoker -- 1.50 packs/day for 20 years     Types: Cigarettes     Quit date: 01/01/1980     Smokeless tobacco: Never Used     Alcohol Use: 0.0 oz/week     0 Standard drinks or equivalent per week      Comment: 1-2 glasses of wine per week     Drug Use: None     Sexual Activity: Not Asked     Other Topics Concern     Caffeine Concern No     decaf coffee     Sleep Concern No     Stress Concern No     Weight Concern No     Special Diet Yes     low fat, low sodium     Exercise Yes     everyday     Social History Narrative       Review of Systems:  Skin:  Negative       Eyes:  Positive for  glasses    ENT:  Negative      Respiratory:  Positive for sleep apnea Bipap   Cardiovascular:  Negative for;palpitations;chest pain Positive for;edema;fatigue;exercise intolerance;lightheadedness    Gastroenterology: Negative      Genitourinary:  Positive for urinary frequency;urgency    Musculoskeletal:  Positive for back pain    Neurologic:  Positive for numbness or tingling of hands;numbness or tingling of feet    Psychiatric:  Positive for sleep disturbances    Heme/Lymph/Imm:  Negative      Endocrine:  Positive for thyroid disorder      Physical Exam:  Vitals: /54 mmHg  Pulse 72  Resp 16  Wt 71.94 kg (158 lb 9.6 oz)    Constitutional:  cooperative, alert and oriented, well developed, well nourished, in no acute distress chronically ill;frail      Skin:  warm and dry to the touch        Head:  normocephalic, no masses or lesions        Eyes:  pupils equal and round, conjunctivae and lids unremarkable, sclera white, no xanthalasma, EOMS intact, no nystagmus        ENT:  no pallor or cyanosis, dentition good        Neck:  carotid pulses are full and equal bilaterally JVP >12      Chest:  clear to auscultation   pacemaker incision in the left infraclavicular area was well-healed      Cardiac: regular rhythm, normal S1/S2, no S3 or S4, apical impulse not displaced, no murmurs, gallops or rubs                  Abdomen:  abdomen soft        Vascular: pulses full and equal, no bruits auscultated                                        Extremities and Back:  no deformities, clubbing, cyanosis, erythema observed;no edema              Neurological:  affect appropriate, oriented to time, person and place              CC  No referring provider defined for this encounter.

## 2017-01-26 NOTE — TELEPHONE ENCOUNTER
I left message with device team to let them know  would like for pt to have defibrillator interrogation in one month to reassess for afib burden. Pt has OV with  on 3/6. Sav GILL

## 2017-01-30 NOTE — PROGRESS NOTES
Faxed patient's daily weight graph to PCP in [place] for his appointment scheduled tomorrow with Dr. Alba. Vijaya GILL

## 2017-02-03 NOTE — PROGRESS NOTES
Telemanagment    Alert for: shortness of breath, night SOB, pillows  Review: both patient and wife have had colds with cough. Pt has tolerated walking more and stairs. He has first cardiac rehab this afternoon. BMP did get drawn on Monday but patient has not received results. Will continue to monitor.    Roxanne Pineda RN BSN   [facility]

## 2017-02-07 NOTE — PROGRESS NOTES
Telemanagment    Alert for: swelling, extra pillows  Current diuretic: Bumex 1 mg BID  Spoke with patient's wife Annia. Legs have been puffy, but worse swelling in legs this morning. Friday night he nearly went into the hospital, but has been propping up on extra pillows and since has slept fine. Attending cardiac rehab 3 times a week which he is enjoying. He did have labs drawn on 1/31 per the PCP in [place]. His wife called clinic this morning because they have not heard lab results. She was told lab results usually took 7-10 days to get back before the provider would be able to review the results. No results yet. Will update [facility] CNP.      Roxanne Pineda RN BSN   C.O.R.E. [facility]

## 2017-02-10 NOTE — PROGRESS NOTES
Telemanagment    Alert for: swelling, 7# wt increase since going to [place]  Plan:  Continues to be propped up on pillows at night. Advised patient try to make an appointment with a PCP he saw 2 weeks ago. Wife called back and said she received the lab results from 10 days ago. They requested he make a f/u appointment on Monday at 11AM. Encouraged pt to keep this appointment. Faxed weight graph to Dr. Alba's office. Vijaya Pineda RN BSN   C.O.R.E. [facility]

## 2017-02-16 NOTE — PROGRESS NOTES
Telemanagment    Alert for: 10# wt gain while on vacation in [place].   Current diuretic: Bumex 1 mg BID. Has metolazone with him but has not taken.  Feeling okay, but more edema/belly bloating. Said PCP was not really concerned about his edema.  Most recent lab draw on 2/13/17. Per wife K=4.2  Per wife, MD hears fluid in lungs, cough, started on clarithromyocin  CXR done  appt on 2/20 with MD in [place]  Will review with Bandar WATTS per patient request. Patient does not want to go to the ED at this time.

## 2017-02-16 NOTE — PROGRESS NOTES
Spoke with patient's wife about Shani Patrick CNP's recommendation. Recommended she contact patient's insurance company and patient's physician in [place] to see if patient can get an appointment with a cardiologist. Vijaya GILL

## 2017-02-17 NOTE — PROGRESS NOTES
Annia returned call and LVM. Pt is actually feeling a little better today, but still has puffiness in legs. They were unable to get into Cardiology in [place] this week. They were able to get in for an appointment on Monday. Wife states they will plan on keeping that. She also states they are not afraid to go to ER if needed this weekend. JAIRO Crespo

## 2017-02-17 NOTE — PROGRESS NOTES
TELEMANAGEMENT: Wt 160# today, same as yesterday and at max wt parameter today.  SOB and pillows reported on survey today.  Pt takes Bumex 1mg BID.  Per notes below, pt is on vacation in [place] and recommendation, per Bandar, is for pt to see cardiology in [place], as wt is up 10# since 2/1/17 and having sypmtoms.  Called pt's wife, Annia, no answer, LVM requesting return call to review pt's sx and to see if they have found a cardiologist in [place] for pt to see.

## 2017-02-20 NOTE — PROGRESS NOTES
Patient's wife called reporting that patient did get admitted to the hospital in [place] after 10# weight gain. Telemonitoring placed on hold. Vijaya GILL

## 2017-02-23 NOTE — PROGRESS NOTES
Patient's wife called. Patient is discharged from [facility] in [place], [place]. They are hoping to be home 3/3/17.  Appointment with Dr. Tran 3/6/17.  Still has LE swelling, but scrotal swelling improved    Medication Changes on discharge:  Bumex 2 mg BID  Metolazone 2.5 mg daily  KCl every other day    Request sent to [facility] HIM for discharge summary.    Vijaya GILL

## 2017-02-27 NOTE — PROGRESS NOTES
TELEMANAGEMENT: Wt 141#, same as yesterday, 9# below min wt parameters with no symptoms reported. Per notes, pt currently in [place]. Pt was admitted to hospital for CHF in [place], and discharged on 2/23. Pt discharged on Bumex 2mg BID, Metolazone 2.5mg daily, and KCL every other day (prior to admission, pt was only on Bumex 1mg BID). Pt supposed to be coming back to [place] by 3/3, and scheduled to see Dr. Tran on 3/6 with a BMP. Attempted to call pt/wife, no answer and mailbox full. Unable to leave message. No other phone numbers in chart. Will route to [facility]. Will try calling again later. JAIRO Crespo

## 2017-03-01 NOTE — PROGRESS NOTES
Patient was hospitalized in [place] and his diuretic was increased at discharge. Metolazone was added daily and Bumex was increased. Weight dropped nearly 20#, 11# of which was since discharge. Patient's wife called today saying that she had stopped the metolazone and cut back on Bumex to once per day. Weight has been stable the last 4 days. They should be arriving back in [place] within a few hours and wondered about getting blood drawn today. Pt does have an appointment with Dr. Tran on Monday. Scheduled pt for office visit with Bandar WATTS tomorrow and labs prior. BMP order placed.   Not dizzy or lightheaded. No leg cramps.   Vijaya GILL

## 2017-03-02 NOTE — PATIENT INSTRUCTIONS
Call the C.O.R.E. nurse for any questions or concerns:  [phone]    1. Medication changes from today: 2 tabs Potassium at dinner meal , 1 tab night  , then tomorrow start 1 tab twice a day.     2. Weigh yourself daily and write it down. Weight window for now , 140- 148#    3. Call [facility] nurse if your weight is up more than 2 pounds in one day or 5 pounds in one week.    4. Call [facility] nurse if you feel more short of breath, have more abdominal bloating, or leg swelling.    5. Continue low sodium diet (less than 2000 mg daily). If you eat less salt, you will retain less fluid. One ensure per day     6. Do NOT take Aleve or ibuprofen without talking to your doctor first.     7. Lab Results:  Component      Latest Ref Rng & Units 1/20/2017 3/2/2017   Sodium      133 - 144 mmol/L 130 (L) 139   Potassium      3.4 - 5.3 mmol/L 3.8 3.1 (L)   Chloride      94 - 109 mmol/L 90 (L) 95   Carbon Dioxide      20 - 32 mmol/L 32 35 (H)   Anion Gap      3 - 14 mmol/L 8 9   Glucose      70 - 99 mg/dL 101 (H) 181 (H)   Urea Nitrogen      7 - 30 mg/dL 63 (H) 56 (H)   Creatinine      0.66 - 1.25 mg/dL 1.73 (H) 1.41 (H)   GFR Estimate      >60 mL/min/1.7m2 38 (L) 49 (L)   GFR Estimate If Black      >60 mL/min/1.7m2 47 (L) 59 (L)   Calcium      8.5 - 10.1 mg/dL 9.0 8.7       [facility]: Cardiomyopathy, Optimization, Rehabilitation, Education  The [facility] is a heart failure specialty clinic within the [facility] where you will work with specialized nurse practitioners, physician assistants, doctors, and registered nurses. They are dedicated to helping patients with heart failure to carefully adjust medications, receive education, and learn who and when to call if symptoms develop. They specialize in helping you better understand your condition, slow the progression of your disease, improve the length and quality of your life, help you detect future heart problems before they become life threatening, and avoid  hospitalizations.

## 2017-03-02 NOTE — PROGRESS NOTES
HPI and Plan:   See [id number]    Orders Placed This Encounter   Procedures     Basic metabolic panel     Follow-Up with [facility]       Orders Placed This Encounter   Medications     DISCONTD: Potassium Chloride ER 20 MEQ TBCR     Sig: Take 20 mEq by mouth every other day     potassium chloride SA (K-DUR/KLOR-CON M) 20 MEQ CR tablet     Sig: Take 1 tablet (20 mEq) by mouth 2 times daily     Dispense:  60 tablet     Refill:  3       Medications Discontinued During This Encounter   Medication Reason     tolterodine (DETROL LA) 4 MG 24 hr capsule Stopped by Patient     Potassium Chloride ER 20 MEQ TBCR Not Effective         Encounter Diagnosis   Name Primary?     Chronic systolic heart failure (H) Yes       CURRENT MEDICATIONS:  Current Outpatient Prescriptions   Medication Sig Dispense Refill     potassium chloride SA (K-DUR/KLOR-CON M) 20 MEQ CR tablet Take 1 tablet (20 mEq) by mouth 2 times daily 60 tablet 3     aspirin EC 81 MG EC tablet Take 81 mg by mouth daily       levothyroxine (SYNTHROID/LEVOTHROID) 150 MCG tablet Take 150 mcg by mouth daily       carvedilol (COREG) 3.125 MG tablet Take 3 tablets (9.375 mg) by mouth 2 times daily (with meals) 540 tablet 3     isosorbide mononitrate (IMDUR) 60 MG 24 hr tablet Take 60 mg by mouth At Bedtime At hs 30 tablet 3     bumetanide (BUMEX) 1 MG tablet Take 1 mg by mouth daily        calcitRIOL (ROCALTROL) 0.25 MCG capsule Take 0.25 mcg by mouth three times a week Monday, Wednesday and Friday       Saline (SODIUM CHLORIDE) 0.65 % SOLN Spray in nostril as needed       hydrALAZINE (APRESOLINE) 50 MG tablet Take 1 tablet (50 mg) by mouth 3 times daily 270 tablet 3     KRILL OIL PO Take 1 capsule by mouth daily        Cyanocobalamin (B-12) 1000 MCG TBCR Take 1,000 mcg by mouth daily 100 tablet 0     multivitamin, therapeutic with minerals (MULTI-VITAMIN) TABS Take 1 tablet by mouth daily       clopidogrel (PLAVIX) 75 MG tablet Take 75 mg by mouth daily    "      ALLERGIES     Allergies   Allergen Reactions     Lisinopril Other (See Comments)     Angioedema     Augmentin Other (See Comments)     Per pt, \"froze him, affected his legs\"     Crestor [Rosuvastatin] Other (See Comments)     rhabdomyolysis       PAST MEDICAL HISTORY:  Past Medical History   Diagnosis Date     Acute renal failure (H)      10/2015 ARF secondary to rhabdomyolysis     Alcoholism (H)      Anemia      Benign essential HTN      BPH (benign prostatic hypertrophy)      CAD (coronary artery disease)      AWMI, stents to Cx, RCA and LAD     Chronic systolic heart failure (H)      CKD (chronic kidney disease)      Complete AV block (H)      Ischemic cardiomyopathy 10/23/2015     EF 30%     Mixed hyperlipidemia      NSTEMI (non-ST elevated myocardial infarction) (H) 10/23/2015     PAD (peripheral artery disease) (H)      Rhabdomyolysis due to statin therapy      crestor     Severe hypothyroidism 9/20/2016     VF (ventricular fibrillation) (H) 11/16/16       PAST SURGICAL HISTORY:  Past Surgical History   Procedure Laterality Date     Stent, coronary, s660 15/18  Nov 2000     PTCA with stent placement of CFX     Stent, coronary, s660 15/18  Feb 2001     PTCA with stent placement of CFX RCA      Stent, coronary, s660 15/18  April 2007     stent placed in LAD     Endarterectomy carotid Left 6/11/10     Appendectomy       Tonsillectomy and adenoidectomy       Cholecystectomy       Implant automatic implantable cardioverter defibrillator  11/16/16       FAMILY HISTORY:  Family History   Problem Relation Age of Onset     Unknown/Adopted Mother      Unknown/Adopted Father        SOCIAL HISTORY:  Social History     Social History     Marital status:      Spouse name: N/A     Number of children: N/A     Years of education: N/A     Social History Main Topics     Smoking status: Former Smoker     Packs/day: 1.50     Years: 20.00     Types: Cigarettes     Quit date: 1/1/1980     Smokeless tobacco: Never Used " "    Alcohol use 0.0 oz/week     0 Standard drinks or equivalent per week      Comment: 1-2 glasses of wine per week     Drug use: Not on file     Sexual activity: Not on file     Other Topics Concern     Caffeine Concern No     decaf coffee     Sleep Concern No     Stress Concern No     Weight Concern No     Special Diet Yes     low fat, low sodium     Exercise Yes     everyday     Social History Narrative       Review of Systems:  Skin:  Negative       Eyes:  Negative      ENT:  Negative      Respiratory:  Negative       Cardiovascular:  Negative      Gastroenterology: Negative      Genitourinary:  Positive for urinary frequency    Musculoskeletal:  Negative      Neurologic:  Positive for numbness or tingling of feet this is not new for patient- tingling in feet   Psychiatric:  Negative      Heme/Lymph/Imm:  Negative      Endocrine:  Positive for thyroid disorder      Physical Exam:  Vitals: /70 (BP Location: Right arm, Patient Position: Chair, Cuff Size: Adult Small)  Pulse 66  Ht 1.74 m (5' 8.5\")  Wt 67.5 kg (148 lb 12.8 oz)  SpO2 99%  BMI 22.3 kg/m2    Constitutional:  cooperative;alert and oriented chronically ill;frail      Skin:  warm and dry to the touch        Head:  normocephalic, no masses or lesions        Eyes:  pupils equal and round, conjunctivae and lids unremarkable, sclera white, no xanthalasma, EOMS intact, no nystagmus        ENT:  no pallor or cyanosis, dentition good        Neck:  carotid pulses are full and equal bilaterally JVP 10-12      Chest:  clear to auscultation basal rales pacemaker incision in the left infraclavicular area was well-healed      Cardiac: regular rhythm, normal S1/S2, no S3 or S4, apical impulse not displaced, no murmurs, gallops or rubs                  Abdomen:  abdomen soft        Vascular: pulses full and equal, no bruits auscultated                                        Extremities and Back:  no deformities, clubbing, cyanosis, erythema observed;no edema   " bilateral LE edema;trace          Neurological:  affect appropriate, oriented to time, person and place              CC  No referring provider defined for this encounter.

## 2017-03-02 NOTE — LETTER
3/2/2017    Hector Jc MD  [employer]   [id number]  [facility] [address]    RE: Perez Villalobos       Dear Colleague,    I had the pleasure of seeing Perez Villalobos in the [facility].    Perez Villalobos is a pleasant 76-year-old male with a history of systolic congestive heart failure due to ischemic cardiomyopathy with an ejection fraction of 30%-35%, underlying CAD, status post stenting of all 3 coronary arteries, most recently history notable for non-STEMI in 10/2016 with stress echocardiogram at that time without evidence of acute ischemia (coronary angiography not done).  To further complicate the situation, he was hospitalized 11/2016 with bradycardia that degenerated into ventricular fibrillation arrest.  He then proceeded with CRT-D placement.  CHF has been complicated by difficult to manage CHF including pleural effusions (requiring thoracentesis), adjustment of diuretics and coordination of care with Dr. Valles, his nephrologist.  He has chronic renal insufficiency with a history of ARF due to rhabdo thought due to statin therapy.  He had been on spironolactone, which was discontinued due to hyponatremia.      He recently vacationed in [place].  He was hospitalized for CHF.  Repeat echocardiogram was done, which showed LVEF of 35%-40%.  Right ventricle was normal in size and function.  No mention of IVC.  Evidence of pulmonary hypertension.  Right ventricular systolic pressure estimated at 55-60 mmHg, suggestive of significant pulmonary hypertension.  Chest x-rays were done which showed right and left pleural effusions.  He was diagnosed with bibasilar pneumonitis and/or pulmonary edema.  He was placed on daily metolazone, and his Bumex 1 mg twice a day was increased to 2 mg twice a day and he was discharged.  He diuresed 15 pounds over the next couple of days.  The patient's wife stopped the daily metolazone and decreased Bumex to 1 mg a day.  They drove home over  a couple of days from [place] to [place].  His weight had been about 150-157 pounds, now it is 142 pounds.      The patient continued with potassium 1 tablet every other day.      He [facility] today, thankful to be back receiving care in [place].  He feels very weak.  He slept over 12 hours last night.  He denies orthopnea or PND.      His blood pressure is 128/70, pulse is 66, and weight is 148 pounds in the clinic and 142 pounds at home.      His basic metabolic panel shows a sodium 139, potassium 3.1, BUN 56, creatinine 1.41.      Outpatient Encounter Prescriptions as of 3/2/2017   Medication Sig Dispense Refill     [DISCONTINUED] potassium chloride SA (K-DUR/KLOR-CON M) 20 MEQ CR tablet Take 1 tablet (20 mEq) by mouth 2 times daily 60 tablet 3     aspirin EC 81 MG EC tablet Take 81 mg by mouth daily       levothyroxine (SYNTHROID/LEVOTHROID) 150 MCG tablet Take 150 mcg by mouth daily       [DISCONTINUED] carvedilol (COREG) 3.125 MG tablet Take 3 tablets (9.375 mg) by mouth 2 times daily (with meals) 540 tablet 3     [DISCONTINUED] isosorbide mononitrate (IMDUR) 60 MG 24 hr tablet Take 60 mg by mouth At Bedtime At hs 30 tablet 3     [DISCONTINUED] bumetanide (BUMEX) 1 MG tablet Take 1 mg by mouth 2 times daily       [DISCONTINUED] calcitRIOL (ROCALTROL) 0.25 MCG capsule Take 0.25 mcg by mouth daily Monday, Wednesday and Friday       Saline (SODIUM CHLORIDE) 0.65 % SOLN Spray in nostril as needed       [DISCONTINUED] hydrALAZINE (APRESOLINE) 50 MG tablet Take 1 tablet (50 mg) by mouth 3 times daily 270 tablet 3     KRILL OIL PO Take 1 capsule by mouth daily        Cyanocobalamin (B-12) 1000 MCG TBCR Take 1,000 mcg by mouth daily 100 tablet 0     multivitamin, therapeutic with minerals (MULTI-VITAMIN) TABS Take 1 tablet by mouth daily       clopidogrel (PLAVIX) 75 MG tablet Take 75 mg by mouth daily       [DISCONTINUED] Potassium Chloride ER 20 MEQ TBCR Take 20 mEq by mouth every other day        [DISCONTINUED] tolterodine (DETROL LA) 4 MG 24 hr capsule Take 4 mg by mouth daily Reported on 3/2/2017       No facility-administered encounter medications on file as of 3/2/2017.      IMPRESSION AND PLAN:     Perez Villalobos is a pleasant 76-year-old patient who has had multiple admissions since last year.  He has a history of severe CAD.  His history is notable for non-STEMI 10/2016 with stress echo at the time showing no acute ischemia.  He was hospitalized 11/2016 with bradycardia that degenerated to VF arrest, now status post CRT-D.  He has had difficult to manage CHF.  He had been on Lasix, then torsemide, more recently on Bumex.  He was only taking metolazone if his weight increased over a weight parameter.  He recently vacationed in [place].  He was admitted for increased edema and abdominal bloating and significant peripheral edema.  They placed him on daily metolazone and increased his Bumex from 1 mg twice a day to 2 mg twice a day.  He lost about 15 pounds of fluid, the patient's wife discontinued daily metolazone because she was so afraid of his electrolytes.  She also decreased the Bumex.      RECOMMENDATIONS:   1.  Bumex 1 mg daily.   2.  Potassium chloride, 40 mEq of potassium chloride at the dinner meal today and 20 mEq at bedtime.   Starting 03/03 potassium chloride 20 mEq twice a day until Monday when it is reassessed with a BMP.   3.  Therapeutic weight window for right now 140 pounds to 148 pounds.  If weight is greater than 148 pounds, consider 1 dose of metolazone x1 day if electrolytes are balanced.   4.  Reassessment by Dr. Tran.  Echocardiogram was done in [place].  The report is available for Dr. Tran to review.   5.  Cachexia.  I suggested 1 Ensure can a day.   6.  History of hypothyroidism.  He was previously quite symptomatic with TSH of 148.  It has normalized now over time with treatment.   7.  History of VF arrest.  CRT in place.  He denies any defibrillator shocks.   8.   Return to see me, Shani Patrick CNP, 2 weeks after Dr. Tran's visit or sooner if needed.     Again, thank you for allowing me to participate in the care of your patient.      Sincerely,    PARKER Benoit CNP     [facility]

## 2017-03-02 NOTE — MR AVS SNAPSHOT
After Visit Summary   3/2/2017    Perez Villalobos    MRN: [id number]           Patient Information     Date Of Birth          1940        Visit Information        Provider Department      3/2/2017 1:50 PM Shani Patrick APRN CNP [facility] HEART AT [place]        Today's Diagnoses     Chronic systolic heart failure (H)    -  1      Care Instructions    Call the C.O.R.E. nurse for any questions or concerns:  [phone]    1. Medication changes from today: 2 tabs Potassium at dinner meal , 1 tab night  , then tomorrow start 1 tab twice a day.     2. Weigh yourself daily and write it down. Weight window for now , 140- 148#    3. Call CORE nurse if your weight is up more than 2 pounds in one day or 5 pounds in one week.    4. Call CORE nurse if you feel more short of breath, have more abdominal bloating, or leg swelling.    5. Continue low sodium diet (less than 2000 mg daily). If you eat less salt, you will retain less fluid. One ensure per day     6. Do NOT take Aleve or ibuprofen without talking to your doctor first.     7. Lab Results:  Component      Latest Ref Rng & Units 1/20/2017 3/2/2017   Sodium      133 - 144 mmol/L 130 (L) 139   Potassium      3.4 - 5.3 mmol/L 3.8 3.1 (L)   Chloride      94 - 109 mmol/L 90 (L) 95   Carbon Dioxide      20 - 32 mmol/L 32 35 (H)   Anion Gap      3 - 14 mmol/L 8 9   Glucose      70 - 99 mg/dL 101 (H) 181 (H)   Urea Nitrogen      7 - 30 mg/dL 63 (H) 56 (H)   Creatinine      0.66 - 1.25 mg/dL 1.73 (H) 1.41 (H)   GFR Estimate      >60 mL/min/1.7m2 38 (L) 49 (L)   GFR Estimate If Black      >60 mL/min/1.7m2 47 (L) 59 (L)   Calcium      8.5 - 10.1 mg/dL 9.0 8.7       [facility]: Cardiomyopathy, Optimization, Rehabilitation, Education  The [facility] is a heart failure specialty clinic within the [facility] where you will work with specialized nurse practitioners, physician assistants, doctors, and registered nurses.  They are dedicated to helping patients with heart failure to carefully adjust medications, receive education, and learn who and when to call if symptoms develop. They specialize in helping you better understand your condition, slow the progression of your disease, improve the length and quality of your life, help you detect future heart problems before they become life threatening, and avoid hospitalizations.              Follow-ups after your visit        Additional Services     Follow-Up with [facility] Clinic                 Your next 10 appointments already scheduled     Mar 06, 2017 11:50 AM CST   LAB with [facility] LAB   [facility] ([facility])    [address] [place] [address]   [phone]           Patient must bring picture ID.  Patient should be prepared to give a urine specimen  Please do not eat 10-12 hours before your appointment if you are coming in fasting for labs on lipids, cholesterol, or glucose (sugar).  Pregnant women should follow their Care Team instructions. Water with medications is okay. Do not drink coffee or other fluids.   If you have concerns about taking  your medications, please ask at office or if scheduling via [employer], send a message by clicking on Secure Messaging, Message Your Care Team.            Mar 06, 2017 12:45 PM CST   Core MD Return with Krishna Tran MD   [facility] ([facility])    [address] [place] [id number] [phone]            Mar 20, 2017 12:15 PM CDT   LAB with RU LAB   [facility])    [address]  [facility] [address] [phone]           Patient must bring picture ID.  Patient should be prepared to give a urine specimen  Please do not eat 10-12 hours before your appointment if you are coming in fasting for labs on lipids,  cholesterol, or glucose (sugar).  Pregnant women should follow their Care Team instructions. Water with medications is okay. Do not drink coffee or other fluids.   If you have concerns about taking  your medications, please ask at office or if scheduling via [employer], send a message by clicking on Secure Messaging, Message Your Care Team.            Mar 20, 2017  1:10 PM CDT   Core Return with PARKER Wilde CNP   [facility] PHYSICIANS HEART AT [place] ([facility])    [address]  [facility] [id number] [phone]            Apr 04, 2017  4:30 PM CDT   Remote ICD Check with MCLEAN DCR2   [facility] ([facility])    [address]  [facility] [address] [phone]           This appointment is for a remote check of your debrillator.  This is not an appointment at the office.              Future tests that were ordered for you today     Open Future Orders        Priority Expected Expires Ordered    Basic metabolic panel Routine 3/20/2017 3/2/2018 3/2/2017    Follow-Up with [facility] Clinic Routine 3/6/2017 3/2/2018 3/2/2017            Who to contact     If you have questions or need follow up information about today's clinic visit or your schedule please contact [facility] directly at [phone].  Normal or non-critical lab and imaging results will be communicated to you by MyChart, letter or phone within 4 business days after the clinic has received the results. If you do not hear from us within 7 days, please contact the clinic through MyChart or phone. If you have a critical or abnormal lab result, we will notify you by phone as soon as possible.  Submit refill requests through [employer] or call your pharmacy and they will forward the refill request to us. Please allow 3 business days for your refill to be completed.          Additional Information About Your  "Visit        [employer] Information     [employer] lets you send messages to your doctor, view your test results, renew your prescriptions, schedule appointments and more. To sign up, go to [email or web address] . Click on \"Log in\" on the left side of the screen, which will take you to the Welcome page. Then click on \"Sign up Now\" on the right side of the page.     You will be asked to enter the access code listed below, as well as some personal information. Please follow the directions to create your username and password.     Your access code is: [email or web address]  Expires: 2017  2:26 PM     Your access code will  in 90 days. If you need help or a new code, please call your [facility] clinic or [phone].        Care EveryWhere ID     This is your Care EveryWhere ID. This could be used by other organizations to access your [facility] medical records  [id number]        Your Vitals Were     Pulse Height Pulse Oximetry BMI (Body Mass Index)          66 1.74 m (5' 8.5\") 99% 22.3 kg/m2         Blood Pressure from Last 3 Encounters:   17 128/70   17 120/54   17 130/71    Weight from Last 3 Encounters:   17 67.5 kg (148 lb 12.8 oz)   17 71 kg (156 lb 9.6 oz)   17 71.9 kg (158 lb 9.6 oz)                 Today's Medication Changes          These changes are accurate as of: 3/2/17  2:26 PM.  If you have any questions, ask your nurse or doctor.               Start taking these medicines.        Dose/Directions    potassium chloride SA 20 MEQ CR tablet   Commonly known as:  K-DUR/KLOR-CON M   Used for:  Chronic systolic heart failure (H)   Started by:  Shani Patrick APRN CNP        Dose:  20 mEq   Take 1 tablet (20 mEq) by mouth 2 times daily   Quantity:  60 tablet   Refills:  3         Stop taking these medicines if you haven't already. Please contact your care team if you have questions.     Potassium Chloride ER 20 MEQ Tbcr   Stopped by:  Shani Patrick APRN CNP        "         Where to get your medicines      These medications were sent to [id number] - [place], [place] - [address] [facility]  [address] [facility] [address]     Phone:  [phone]     potassium chloride SA 20 MEQ CR tablet                Primary Care Provider Office Phone # Fax #    Hector Jc [phone]       [employer] [address]  [facility] [address]        Thank you!     Thank you for choosing [facility]YS[facility] HE[facility] AT [place]  for your care. Our goal is always to provide you with excellent care. Hearing back from our patients is one way we can continue to improve our services. Please take a few minutes to complete the written survey that you may receive in the mail after your visit with us. Thank you!             Your Updated Medication List - Protect others around you: Learn how to safely use, store and throw away your medicines at www.disposemymeds.org.          This list is accurate as of: 3/2/17  2:26 PM.  Always use your most recent med list.                   Brand Name Dispense Instructions for use    aspirin EC 81 MG EC tablet      Take 81 mg by mouth daily       B-12 1000 MCG Tbcr     100 tablet    Take 1,000 mcg by mouth daily       bumetanide 1 MG tablet    BUMEX     Take 1 mg by mouth daily       calcitRIOL 0.25 MCG capsule    ROCALTROL     Take 0.25 mcg by mouth three times a week Monday, Wednesday and Friday       carvedilol 3.125 MG tablet    COREG    540 tablet    Take 3 tablets (9.375 mg) by mouth 2 times daily (with meals)       hydrALAZINE 50 MG tablet    APRESOLINE    270 tablet    Take 1 tablet (50 mg) by mouth 3 times daily       isosorbide mononitrate 60 MG 24 hr tablet    IMDUR    30 tablet    Take 60 mg by mouth At Bedtime At hs       KRILL OIL PO      Take 1 capsule by mouth daily       levothyroxine 150 MCG tablet    SYNTHROID/LEVOTHROID     Take 150 mcg by mouth daily       Multi-vitamin Tabs tablet       Take 1 tablet by mouth daily       PLAVIX 75 MG tablet   Generic drug:  clopidogrel      Take 75 mg by mouth daily       potassium chloride SA 20 MEQ CR tablet    K-DUR/KLOR-CON M    60 tablet    Take 1 tablet (20 mEq) by mouth 2 times daily       Sodium Chloride 0.65 % Soln      Spray in nostril as needed

## 2017-03-03 NOTE — PROGRESS NOTES
HISTORY OF PRESENT ILLNESS:     Perez Villalobos is a pleasant 76-year-old male with a history of systolic congestive heart failure due to ischemic cardiomyopathy with an ejection fraction of 30%-35%, underlying CAD, status post stenting of all 3 coronary arteries, most recently history notable for non-STEMI in 10/2016 with stress echocardiogram at that time without evidence of acute ischemia (coronary angiography not done).  To further complicate the situation, he was hospitalized 11/2016 with bradycardia that degenerated into ventricular fibrillation arrest.  He then proceeded with CRT-D placement.  CHF has been complicated by difficult to manage CHF including pleural effusions (requiring thoracentesis), adjustment of diuretics and coordination of care with Dr. Valles, his nephrologist.  He has chronic renal insufficiency with a history of ARF due to rhabdo thought due to statin therapy.  He had been on spironolactone, which was discontinued due to hyponatremia.      He recently vacationed in [place].  He was hospitalized for CHF.  Repeat echocardiogram was done, which showed LVEF of 35%-40%.  Right ventricle was normal in size and function.  No mention of IVC.  Evidence of pulmonary hypertension.  Right ventricular systolic pressure estimated at 55-60 mmHg, suggestive of significant pulmonary hypertension.  Chest x-rays were done which showed right and left pleural effusions.  He was diagnosed with bibasilar pneumonitis and/or pulmonary edema.  He was placed on daily metolazone, and his Bumex 1 mg twice a day was increased to 2 mg twice a day and he was discharged.  He diuresed 15 pounds over the next couple of days.  The patient's wife stopped the daily metolazone and decreased Bumex to 1 mg a day.  They drove home over a couple of days from [place] to [place].  His weight had been about 150-157 pounds, now it is 142 pounds.      The patient continued with potassium 1 tablet every other day.      He [facility] to the  [facility] today, thankful to be back receiving care in [place].  He feels very weak.  He slept over 12 hours last night.  He denies orthopnea or PND.      His blood pressure is 128/70, pulse is 66, and weight is 148 pounds in the clinic and 142 pounds at home.      His basic metabolic panel shows a sodium 139, potassium 3.1, BUN 56, creatinine 1.41.      IMPRESSION AND PLAN:     Perez Villalobos is a pleasant 76-year-old patient who has had multiple admissions since last year.  He has a history of severe CAD.  His history is notable for non-STEMI 10/2016 with stress echo at the time showing no acute ischemia.  He was hospitalized 11/2016 with bradycardia that degenerated to VF arrest, now status post CRT-D.  He has had difficult to manage CHF.  He had been on Lasix, then torsemide, more recently on Bumex.  He was only taking metolazone if his weight increased over a weight parameter.  He recently vacationed in [place].  He was admitted for increased edema and abdominal bloating and significant peripheral edema.  They placed him on daily metolazone and increased his Bumex from 1 mg twice a day to 2 mg twice a day.  He lost about 15 pounds of fluid, the patient's wife discontinued daily metolazone because she was so afraid of his electrolytes.  She also decreased the Bumex.      RECOMMENDATIONS:   1.  Bumex 1 mg daily.   2.  Potassium chloride, 40 mEq of potassium chloride at the dinner meal today and 20 mEq at bedtime.   Starting 03/03 potassium chloride 20 mEq twice a day until Monday when it is reassessed with a BMP.   3.  Therapeutic weight window for right now 140 pounds to 148 pounds.  If weight is greater than 148 pounds, consider 1 dose of metolazone x1 day if electrolytes are balanced.   4.  Reassessment by Dr. Tran.  Echocardiogram was done in [place].  The report is available for Dr. Tran to review.   5.  Cachexia.  I suggested 1 Ensure can a day.   6.  History of hypothyroidism.  He was previously  quite symptomatic with TSH of 148.  It has normalized now over time with treatment.   7.  History of VF arrest.  CRT in place.  He denies any defibrillator shocks.   8.  Return to see me, Shani Patrick CNP, 2 weeks after Dr. Tran's visit or sooner if needed.         PARKER VILLA, CNP             D: 2017 14:34   T: 2017 03:35   MT: LETICIA      Name:     RON LUCERO   MRN:      [id number]        Account:      [id number]   :      1940           Service Date: 2017      Document: [id number]

## 2017-03-04 NOTE — ED AVS SNAPSHOT
Emergency Department    [address] [place] [address]    Phone:  [phone]    Fax:  [phone]                                       Perez Villalobos   MRN: [id number]    Department:   Emergency Department   Date of Visit:  3/4/2017           After Visit Summary Signature Page     I have received my discharge instructions, and my questions have been answered. I have discussed any challenges I see with this plan with the nurse or doctor.    ..........................................................................................................................................  Patient/Patient Representative Signature      ..........................................................................................................................................  Patient Representative Print Name and Relationship to Patient    ..................................................               ................................................  Date                                            Time    ..........................................................................................................................................  Reviewed by Signature/Title    ...................................................              ..............................................  Date                                                            Time

## 2017-03-04 NOTE — ED PROVIDER NOTES
"  History     Chief Complaint:  Wound    HPI   Perez Villalobos is a 76 year old male who presents with a wound. The patient states that he was stepping on and breaking sticks earlier this morning when a stick \"snapped back\" and punctured his lower right calf. Other than constant bleeding, Perez denies any significant pain or discomfort with his wound. At the time of injury, the patient was wearing long pants and socks that were not punctured by the snapped stick. The wound appears to be actively bleeding. Perez has a history of anticoagulation usage.     Allergies:  Lisinopril  Augmentin  Crestor     Medications:    K-dur  Aspirin  Levothyroxine  Coreg  Imdur  Dumex  Rocaltrol   Apresoline  Cyanocobalamin  Plavix    Past Medical History:    Alcoholism  Complete AV block  Hyponatremia  Hypokalemia  Iron deficiency anemia  Anemia of chronic renal failure  CKD stage III  Cardiorenal syndrome  ACS  Hypothyroidism  Bradycardia  CHF  Hyperlipidemia  HTN  Leg weakness  Biventricular heart failure  PAD  Rhabdomyolysis due to statin therapy  BPH  NSTEMI  Ischemic cardiomyopathy  Idiopathic progressive polyneuropathy  Vitamin B12 deficiency  Muscle weakness  Atrial flutter  DELONTE  CAD    Past Surgical History:    PTCA with stent placement of CFX  Stent placement in LAD  Endarterectomy  Tonsillectomy and adenoidectomy  Cholecystectomy  Implant of ICD    Family History:    History reviewed.  No significant family history.     Social History:  Relationship status:   Tobacco use: pos (quit 01/01/1980)  Alcohol use: pos  The patient presents with his wife.     Review of Systems   Skin: Positive for wound.        Negative for pain with his wound.    All other systems reviewed and are negative.    Physical Exam   First Vitals:  BP: 132/76  Pulse: 74  Temp: 97.7  F (36.5  C)  Resp: 16  SpO2: 99 %      Physical Exam  General/Appearance: appears stated age, well-groomed, appears comfortable  MSK: PARK, good tone, no bony " abnormality  Skin: 1.5 cm distal evulsion laceration to the right lower extremity.   Neuro: normal sensation to the distal lower extremity.   Heme: active oozing from laceration.     Emergency Department Course   Procedure:     Laceration Repair      LACERATION:  A simple and superficial clean 1.5 cm laceration.    LOCATION:  Subcutaneous but clean.     FUNCTION:  Distally sensation and circulation are intact.    ANESTHESIA:  Local using 0.25 % Marcaine with Epi total of 2 cc's.     PREPARATION:  Irrigation and Scrubbing with Normal Saline and Shur Clens.    DEBRIDEMENT:  no debridement.    CLOSURE:  Wound was closed with One Layer.  Skin closed with 6 x 5.0 Prolene using interrupted sutures. The bleeding was controlled     Imaging:  Soft Tissue US, bedside:  No foreign body appreciated.     Emergency Department Course:  Nursing notes and vitals reviewed.  I performed an exam of the patient as documented above.  The above workup was undertaken.  1430: I rechecked the patient and applied anesthesia to his wound for preparation of wound repair.   1505: I performed a bedside ultrasound of the patient's wound to check for any contamination.  1619: As the patient was being discharged, his wound started actively bleeding again so I will perform a laceration repair.   1640: I performed the laceration repair as noted above.    Findings and plan explained to the Patient and spouse. Patient discharged home with instructions regarding supportive care, medications, and reasons to return. The importance of close follow-up was reviewed.    Impression & Plan      Medical Decision Making:  Perez Villalobos is a 76 year old male who lacerated his skin earlier today by a branch that he was in the process of breaking. Of note, he was wearing socks and pants and there was no evidence of disruption of that material. However, due to the pain a bedside ultrasound was performed which demonstrated no foreign bodies. Despite epinephrine  injection as well as Surgicel, he continued to have oozing from the wound. Therefore the laceration was repaired as noted above. A time of observation was had with complete cessation of bleeding. He then felt comfortable going home. He is muscularly and neurovascularly intact. His tetanus was up to date. He knows to return in 10-14 days to have his stitches removed.     Diagnosis:    ICD-10-CM   1. Abrasion T14.8   2. Puncture wound T14.8     Disposition:  Discharge to home with primary care follow up.  I, Juan Carlos Wilson, am serving as a scribe on 3/4/2017 at 2:09 PM to personally document services performed by Noreen Myers, based on my observations and the provider's statements to me.     EMERGENCY DEPARTMENT       Noreen Myers MD  03/04/17 2235       Noreen Myers MD  03/04/17 2237

## 2017-03-04 NOTE — ED AVS SNAPSHOT
Emergency Department    [address] [address]    Phone:  [phone]    Fax:  [phone]                                       Perez Villalobos   MRN: [id number]    Department:   Emergency Department   Date of Visit:  3/4/2017           Patient Information     Date Of Birth          1940        Your diagnoses for this visit were:     Abrasion     Puncture wound        You were seen by Noreen Myers MD.      Follow-up Information     Follow up with Hector Jc MD.    Specialty:  Family Practice    Why:  As needed, If symptoms worsen    Contact information:    [employer]  [address]  [facility] [id number] [phone]          Follow up with  Emergency Department.    Specialty:  EMERGENCY MEDICINE    Why:  As needed, If symptoms worsen    Contact information:    [address] [facility] [address] [phone]      Discharge References/Attachments     PUNCTURE WOUND (GENERAL) (ENGLISH)      Future Appointments        Provider Department Dept Phone Center    3/6/2017 11:50 AM FV Becky [facility] Heart Lab [facility] [place] [phone] [facility]    3/6/2017 12:45 PM Krishna Tran MD [facility] [place] [address] [phone] UM PSA CLIN    3/8/2017 10:00 AM [facility] Card[facility] Re[facility] [place] [phone] [facility]    3/10/2017 10:00 AM [facility] [place] [phone] [facility]    3/13/2017 1:00 PM [facility] [place] [phone] [facility]    3/16/2017 1:00 PM [facility] Card[facility] [place] [phone] [facility]    3/20/2017 12:15 PM FV Ridges [facility] Heart Lab [facility] [place] [phone] [facility] PSA CLIN    3/20/2017 1:10 PM PARKER Benoit CNP [facility] [place] [phone] UM PSA  CLIN    3/20/2017 3:00 PM [facility] Cardiac Rehab [facility] [place] [phone] [facility]    3/23/2017 1:00 PM [facility] Cardiac Rehab [facility] [place] [phone] [facility]    3/27/2017 1:00 PM [facility] Card[facility] Rehab [facility] [place] [phone] [facility]    3/30/2017 1:00 PM [facility] Card[facility] Re[facility] [place] [phone] [facility]    4/3/2017 1:00 PM [facility] Card[facility] [place] [phone] [facility]    4/4/2017 4:30 PM MCLEAN [facility] Heart Device RN [facility] [place] [phone] U[facility] PSA CLIN    4/6/2017 1:00 PM [facility] Cardiac Rehab [facility] [place] [phone] [facility]      24 Hour Appointment Hotline       To make an appointment at any [place] clinic, call [phone] ([phone]). If you don't have a family doctor or clinic, we will help you find one. [place] clinics are conveniently located to serve the needs of you and your family.             Review of your medicines      Our records show that you are taking the medicines listed below. If these are incorrect, please call your family doctor or clinic.        Dose / Directions Last dose taken    aspirin EC 81 MG EC tablet   Dose:  81 mg        Take 81 mg by mouth daily   Refills:  0        B-12 1000 MCG Tbcr   Dose:  1000 mcg   Quantity:  100 tablet        Take 1,000 mcg by mouth daily   Refills:  0        bumetanide 1 MG tablet   Commonly known as:  BUMEX   Dose:  1 mg        Take 1 mg by mouth daily   Refills:  0        calcitRIOL 0.25 MCG capsule   Commonly known as:  ROCALTROL   Dose:  0.25 mcg        Take 0.25 mcg by mouth three times a week Monday, Wednesday and Friday   Refills:  0        carvedilol 3.125 MG tablet   Commonly known as:  COREG   Dose:  9.375 mg   Quantity:  540 tablet        Take 3 tablets (9.375 mg) by mouth 2 times daily (with meals)   Refills:  3        hydrALAZINE 50 MG tablet   Commonly known as:   APRESOLINE   Dose:  50 mg   Quantity:  270 tablet        Take 1 tablet (50 mg) by mouth 3 times daily   Refills:  3        isosorbide mononitrate 60 MG 24 hr tablet   Commonly known as:  IMDUR   Dose:  60 mg   Quantity:  30 tablet        Take 60 mg by mouth At Bedtime At hs   Refills:  3        KRILL OIL PO   Dose:  1 capsule        Take 1 capsule by mouth daily   Refills:  0        levothyroxine 150 MCG tablet   Commonly known as:  SYNTHROID/LEVOTHROID   Dose:  150 mcg        Take 150 mcg by mouth daily   Refills:  0        Multi-vitamin Tabs tablet   Dose:  1 tablet        Take 1 tablet by mouth daily   Refills:  0        PLAVIX 75 MG tablet   Dose:  75 mg   Generic drug:  clopidogrel        Take 75 mg by mouth daily   Refills:  0        potassium chloride SA 20 MEQ CR tablet   Commonly known as:  K-DUR/KLOR-CON M   Dose:  20 mEq   Quantity:  60 tablet        Take 1 tablet (20 mEq) by mouth 2 times daily   Refills:  3        Sodium Chloride 0.65 % Soln        Spray in nostril as needed   Refills:  0                Orders Needing Specimen Collection     None      Pending Results     No orders found from 3/2/2017 to 3/5/2017.            Pending Culture Results     No orders found from 3/2/2017 to 3/5/2017.             Test Results from your hospital stay            Clinical Quality Measure: Blood Pressure Screening     Your blood pressure was checked while you were in the emergency department today. The last reading we obtained was  BP: 132/76 . Please read the guidelines below about what these numbers mean and what you should do about them.  If your systolic blood pressure (the top number) is less than 120 and your diastolic blood pressure (the bottom number) is less than 80, then your blood pressure is normal. There is nothing more that you need to do about it.  If your systolic blood pressure (the top number) is 120-139 or your diastolic blood pressure (the bottom number) is 80-89, your blood pressure may be  "higher than it should be. You should have your blood pressure rechecked within a year by a primary care provider.  If your systolic blood pressure (the top number) is 140 or greater or your diastolic blood pressure (the bottom number) is 90 or greater, you may have high blood pressure. High blood pressure is treatable, but if left untreated over time it can put you at risk for heart attack, stroke, or kidney failure. You should have your blood pressure rechecked by a primary care provider within the next 4 weeks.  If your provider in the emergency department today gave you specific instructions to follow-up with your doctor or provider even sooner than that, you should follow that instruction and not wait for up to 4 weeks for your follow-up visit.        Thank you for choosing [facility]       Thank you for choosing [facility] for your care. Our goal is always to provide you with excellent care. Hearing back from our patients is one way we can continue to improve our services. Please take a few minutes to complete the written survey that you may receive in the mail after you visit with us. Thank you!        [employer] Information     [employer] lets you send messages to your doctor, view your test results, renew your prescriptions, schedule appointments and more. To sign up, go to [email or web address] . Click on \"Log in\" on the left side of the screen, which will take you to the Welcome page. Then click on \"Sign up Now\" on the right side of the page.     You will be asked to enter the access code listed below, as well as some personal information. Please follow the directions to create your username and password.     Your access code is: [email or web address]  Expires: 2017  2:26 PM     Your access code will  in 90 days. If you need help or a new code, please call your [facility] clinic or [phone].        Care EveryWhere ID     This is your Care EveryWhere ID. This could be used by other organizations to access your " [place] medical records  [id number]        After Visit Summary       This is your record. Keep this with you and show to your community pharmacist(s) and doctor(s) at your next visit.

## 2017-03-06 NOTE — PATIENT INSTRUCTIONS
Today - Take 2.5 mg metolazone today, and 1 mg bumex about 45 minutes after that.   Tomorrow (March 7) increase bumex to 1 mg twice daily.  Stop potassium pill.  Goal weight at home over next 1-2 weeks is to get weight around 142  Make appointment with Dr. Salguero.  Can try benadryl 25 or 50 mg for sleep, or melatonin.  Check potassium level on Monday March 13

## 2017-03-06 NOTE — LETTER
3/6/2017    Hector Jc MD  [employer]   [address]  [facility] [address]    RE: Perez CHILDRESS Wilfredo       Dear Colleague,    I had the pleasure of seeing Perez Villalobos in the [facility].    HPI and Plan:   This very nice 76-year-old gentleman with a complex history of ischemic cardiomyopathy and recurrent acute on chronic systolic congestive heart failure is seen after recent heart failure exacerbation well traveling in [place].  He was eating out fairly often there and was noticing weight gain worsening edema to above the knees and abdominal distention.  Percussion admission are reviewed.  Initial diuresis was slow but then he was started on daily metolazone and twice daily bumetanide and eventually had a rather vigorous diuresis.  He had been having a goal weight in the low 150 pound range.  However it has become apparent after his diuresis that he has lost some lean mass and his wife does comment that he is losing some muscle mass so I think his new home weight is considerably less.  BX was around 141- 142 pounds at home after he got back from [place] and he cut back on his diuretics.  He has now been slowly but steadily drifting up to his current 147-48 pounds at home this morning.  His wife notes his edema is conservatively less than in [place] it appears to be worse than last week.  He is not having dyspnea on exertion.  He uses BiPAP at nights was not grossly orthopnea.  Does not feel like his abdominal distention has come back to the degree it was before his diuresis in [place].  He has had no chest pain, orthostasis, syncope, ICD discharge, palpitations.  His potassium is very low last week he was begun on potassium supplementation.  Follow-up basic metabolic panel today shows potassium slightly high at 5.6.  His renal function was actually better than usual last week after the more vigorous diuresis.   Records from [place] included a follow-up echo which again  noted ejection fraction around 35%,, mild-to-moderate aortic insufficiency and mitral regurgitation, normal right ventricular function.     His other complaint today is of insomnia.  He has difficult time falling asleep and this is been going on for over a year.  He often paces at night tries to read.  Once he gets to sleep which can take several hours he says he sleeps well, using his BiPAP.  However he is noticing falls asleep easily during the day when he is just sitting around and trying to read.  It has reached the point that his wife will not let him drive.  I reviewed the most recent sleep Center note indicating that bilevel Pap has improved his respiratory disturbance index and hypoxemia as of December 2016.    He has a history of ischemic cardiomyopathy, and he has had extensive stenting of all 3 coronary arteries. In 2007 had an anterior STEMI treated with additional stenting. Ejection fraction most recently has been around 35%. He had done fairly well after that until 2015 when he had an acute hypoxic respiratory failure related to acute renal failure from rhabdomyolysis felt related to his statin therapy. He was hospitalized again multiple times in 2016 including acute systolic heart failure exacerbation in February, pneumonia in June for severe hypothyroidism with symptomatic bradycardia, and syncope in September, acute congestive heart failure exacerbation with small non-STEMI in early October-he underwent about a 20 pound diuresis at that time and actually became very quite prerenal and orthostatic so was not discharged on much in the way of diuretics. A stress echo at that time did not show significant residual ischemia and in no angiogram was done. In November he presented with severe bradycardia and then had bradycardia turned a ventricular fibrillation for which he was resuscitated. He underwent implantation of a biventricular ICD at that time.  At one time he was having frequent ventricular  ectopy and his biventricular pacing was down around 88%.  More recently it has been at 91% and higher, and his wife thinks he is having fewer PVCs..     Exam-full exam below.  Briefly:  Clinic weight today 151, up 3 pounds from a few days ago.  Rhythm is very regular, no premature beats noted during over a minute of palpation and auscultation.  Lungs-no crackles.  No dullness at the bases.  No increased effort.  Cardiac-distant heart sounds, regular rhythm, no murmur .  JVD is 10 cm with HJR.  Abdomen-appears somewhat distended but is nontender with normal bowel sounds and no hepatomegaly felt.   [id number]-vw-6+ pitting edema two thirds of the way to the knees bilaterally.  No erythema, ulceration, cellulitis    Impression/plan  1-acute on chronic systolic congestive heart failure.  I think he is retaining fluid significantly since he returned and cut back on his diuretics, even since last Thursday.  Exam today suggests that he likely has 6-8 pounds of extra volume based on the amount of edema and JVD he has.  I think he has a better dry weight around 142 pounds at home given he has had some lean mass loss.  I reviewed this with them and agreed with Dixon Patrick's urging of some ensure or other supplements.  Following weights may be problematic is he either tries to gain more lean mass or continues to lose some.  We will try further diuresis at this time but I have a low threshold for proceeding with a Cardiomems.    2-ischemic cardiomyopathy.  Ejection fraction stable.  On appropriate medications.  He does not tolerate Ace/ARB, is on beta blocker, long-acting nitrates, hydralazine.  Did not tolerate spironolactone with hyponatremia    3-coronary artery disease.  No ischemic symptoms.  Recent stress study without stress-induced ischemia    4-mild hyperkalemia.  I will have him stop his potassium supplementation at this time.  I will have him increase his diuretics so I'll recheck a basic metabolic panel in one  week    5-insomnia-this is actually one of his biggest concerns right now.  He is having some daytime somnolence.  I recommended he follow up with Dr. Salguero in the sleep Center, and in the meantime I suggested he try Benadryl or melatonin     Plan-  -He'll take 2.5 Zaroxolyn today followed by 1 mg of Bumex  -Tomorrow he'll increase Bumex to 1 mg b.i.d.  -If he is not starting to diurese with this he'll call us so we can increase his Bumex and use metolazone further-continue telemanagement  -Recommend follow-up with Dr. Salguero  -Stop potassium supplementation, check basic metabolic panel in one week    Orders Placed This Encounter   Procedures     Basic metabolic panel       Orders Placed This Encounter   Medications     metolazone (ZAROXOLYN) 2.5 MG tablet     Sig: Take 1 tablet (2.5 mg) by mouth daily as needed     Dispense:  30 tablet     Refill:  1       There are no discontinued medications.      Encounter Diagnoses   Name Primary?     Acute on chronic systolic congestive heart failure (H) Yes     Ischemic cardiomyopathy      Insomnia, unspecified type      Hyperkalemia        CURRENT MEDICATIONS:  Current Outpatient Prescriptions   Medication Sig Dispense Refill     metolazone (ZAROXOLYN) 2.5 MG tablet Take 1 tablet (2.5 mg) by mouth daily as needed 30 tablet 1     potassium chloride SA (K-DUR/KLOR-CON M) 20 MEQ CR tablet Take 1 tablet (20 mEq) by mouth 2 times daily 60 tablet 3     aspirin EC 81 MG EC tablet Take 81 mg by mouth daily       levothyroxine (SYNTHROID/LEVOTHROID) 150 MCG tablet Take 150 mcg by mouth daily       carvedilol (COREG) 3.125 MG tablet Take 3 tablets (9.375 mg) by mouth 2 times daily (with meals) 540 tablet 3     isosorbide mononitrate (IMDUR) 60 MG 24 hr tablet Take 60 mg by mouth At Bedtime At hs 30 tablet 3     bumetanide (BUMEX) 1 MG tablet Take 1 mg by mouth daily        calcitRIOL (ROCALTROL) 0.25 MCG capsule Take 0.25 mcg by mouth three times a week Monday, Wednesday and Friday        "Saline (SODIUM CHLORIDE) 0.65 % SOLN Spray in nostril as needed       hydrALAZINE (APRESOLINE) 50 MG tablet Take 1 tablet (50 mg) by mouth 3 times daily 270 tablet 3     KRILL OIL PO Take 1 capsule by mouth daily        Cyanocobalamin (B-12) 1000 MCG TBCR Take 1,000 mcg by mouth daily 100 tablet 0     multivitamin, therapeutic with minerals (MULTI-VITAMIN) TABS Take 1 tablet by mouth daily       clopidogrel (PLAVIX) 75 MG tablet Take 75 mg by mouth daily         ALLERGIES     Allergies   Allergen Reactions     Lisinopril Other (See Comments)     Angioedema     Augmentin Other (See Comments)     Per pt, \"froze him, affected his legs\"     Crestor [Rosuvastatin] Other (See Comments)     rhabdomyolysis       PAST MEDICAL HISTORY:  Past Medical History   Diagnosis Date     Acute renal failure (H)      10/2015 ARF secondary to rhabdomyolysis     Alcoholism (H)      Anemia      Benign essential HTN      BPH (benign prostatic hypertrophy)      CAD (coronary artery disease)      AWMI, stents to Cx, RCA and LAD     Chronic systolic heart failure (H)      CKD (chronic kidney disease)      Complete AV block (H)      Ischemic cardiomyopathy 10/23/2015     EF 30%     Mixed hyperlipidemia      NSTEMI (non-ST elevated myocardial infarction) (H) 10/23/2015     PAD (peripheral artery disease) (H)      Rhabdomyolysis due to statin therapy      crestor     Severe hypothyroidism 9/20/2016     VF (ventricular fibrillation) (H) 11/16/16       PAST SURGICAL HISTORY:  Past Surgical History   Procedure Laterality Date     Stent, coronary, s660 15/18  Nov 2000     PTCA with stent placement of CFX     Stent, coronary, s660 15/18  Feb 2001     PTCA with stent placement of CFX RCA      Stent, coronary, s660 15/18  April 2007     stent placed in LAD     Endarterectomy carotid Left 6/11/10     Appendectomy       Tonsillectomy and adenoidectomy       Cholecystectomy       Implant automatic implantable cardioverter defibrillator  11/16/16 " "      FAMILY HISTORY:  Family History   Problem Relation Age of Onset     Unknown/Adopted Mother      Unknown/Adopted Father        SOCIAL HISTORY:  Social History     Social History     Marital status:      Spouse name: N/A     Number of children: N/A     Years of education: N/A     Social History Main Topics     Smoking status: Former Smoker     Packs/day: 1.50     Years: 20.00     Types: Cigarettes     Quit date: 1/1/1980     Smokeless tobacco: Never Used     Alcohol use No     Drug use: None     Sexual activity: Not Asked     Other Topics Concern     Caffeine Concern No     decaf coffee     Sleep Concern No     Stress Concern No     Weight Concern No     Special Diet Yes     low fat, low sodium     Exercise Yes     everyday     Social History Narrative       Review of Systems:  Skin:  Positive for   wound right ankle from a stick in the yard-spent 5 hours in the ED trying to get the bleeding to stop-ended up getting stitches   Eyes:  Positive for glasses    ENT:  Positive for nasal congestion productive cough  Respiratory:  Positive for sleep apnea uses BIPAP   Cardiovascular:  Negative      Gastroenterology: Negative      Genitourinary:  not assessed      Musculoskeletal:  Negative      Neurologic:  Positive for   weak all over  Psychiatric:  Negative      Heme/Lymph/Imm:  Negative      Endocrine:  Positive for thyroid disorder      Physical Exam:  Vitals: /62  Pulse 72  Ht 1.74 m (5' 8.5\")  Wt 68.5 kg (151 lb)  BMI 22.63 kg/m2    Constitutional:  cooperative;alert and oriented chronically ill;frail      Skin:  warm and dry to the touch        Head:  normocephalic, no masses or lesions        Eyes:  pupils equal and round;sclera white;EOMS intact        ENT:  no pallor or cyanosis        Neck:  carotid pulses are full and equal bilaterally JVP 10-12;hepatojugular reflux  (healed left CEA incision)    Chest:  clear to auscultation (no dullness to percussion)   pacemaker incision in the left " infraclavicular area was well-healed      Cardiac: regular rhythm, normal S1/S2, no S3 or S4, apical impulse not displaced, no murmurs, gallops or rubs   distant heart sounds              Abdomen:  abdomen soft;no bruits;non-tender (liver not palpable)    (slightly dsitended abdomen)    Vascular:                                          Extremities and Back:  no deformities, clubbing, cyanosis, erythema observed   bilateral LE edema;2+ (2/3 of the way to the knees)          Neurological:  affect appropriate, oriented to time, person and place        Thank you for allowing me to participate in the care of your patient.    Sincerely,     Krishna Tran MD     [facility]

## 2017-03-06 NOTE — PROGRESS NOTES
HPI and Plan:   This very nice 76-year-old gentleman with a complex history of ischemic cardiomyopathy and recurrent acute on chronic systolic congestive heart failure is seen after recent heart failure exacerbation well traveling in [place].  He was eating out fairly often there and was noticing weight gain worsening edema to above the knees and abdominal distention.  Percussion admission are reviewed.  Initial diuresis was slow but then he was started on daily metolazone and twice daily bumetanide and eventually had a rather vigorous diuresis.  He had been having a goal weight in the low 150 pound range.  However it has become apparent after his diuresis that he has lost some lean mass and his wife does comment that he is losing some muscle mass so I think his new home weight is considerably less.  BX was around 141- 142 pounds at home after he got back from [place] and he cut back on his diuretics.  He has now been slowly but steadily drifting up to his current 147-48 pounds at home this morning.  His wife notes his edema is conservatively less than in [place] it appears to be worse than last week.  He is not having dyspnea on exertion.  He uses BiPAP at nights was not grossly orthopnea.  Does not feel like his abdominal distention has come back to the degree it was before his diuresis in [place].  He has had no chest pain, orthostasis, syncope, ICD discharge, palpitations.  His potassium is very low last week he was begun on potassium supplementation.  Follow-up basic metabolic panel today shows potassium slightly high at 5.6.  His renal function was actually better than usual last week after the more vigorous diuresis.   Records from [place] included a follow-up echo which again noted ejection fraction around 35%,, mild-to-moderate aortic insufficiency and mitral regurgitation, normal right ventricular function.     His other complaint today is of insomnia.  He has difficult time falling asleep and this is been  going on for over a year.  He often paces at night tries to read.  Once he gets to sleep which can take several hours he says he sleeps well, using his BiPAP.  However he is noticing falls asleep easily during the day when he is just sitting around and trying to read.  It has reached the point that his wife will not let him drive.  I reviewed the most recent sleep Center note indicating that bilevel Pap has improved his respiratory disturbance index and hypoxemia as of December 2016.    He has a history of ischemic cardiomyopathy, and he has had extensive stenting of all 3 coronary arteries. In 2007 had an anterior STEMI treated with additional stenting. Ejection fraction most recently has been around 35%. He had done fairly well after that until 2015 when he had an acute hypoxic respiratory failure related to acute renal failure from rhabdomyolysis felt related to his statin therapy. He was hospitalized again multiple times in 2016 including acute systolic heart failure exacerbation in February, pneumonia in June for severe hypothyroidism with symptomatic bradycardia, and syncope in September, acute congestive heart failure exacerbation with small non-STEMI in early October-he underwent about a 20 pound diuresis at that time and actually became very quite prerenal and orthostatic so was not discharged on much in the way of diuretics. A stress echo at that time did not show significant residual ischemia and in no angiogram was done. In November he presented with severe bradycardia and then had bradycardia turned a ventricular fibrillation for which he was resuscitated. He underwent implantation of a biventricular ICD at that time.  At one time he was having frequent ventricular ectopy and his biventricular pacing was down around 88%.  More recently it has been at 91% and higher, and his wife thinks he is having fewer PVCs..     Exam-full exam below.  Briefly:  Clinic weight today 151, up 3 pounds from a few days  ago.  Rhythm is very regular, no premature beats noted during over a minute of palpation and auscultation.  Lungs-no crackles.  No dullness at the bases.  No increased effort.  Cardiac-distant heart sounds, regular rhythm, no murmur .  JVD is 10 cm with HJR.  Abdomen-appears somewhat distended but is nontender with normal bowel sounds and no hepatomegaly felt.   [id number]+ pitting edema two thirds of the way to the knees bilaterally.  No erythema, ulceration, cellulitis    Impression/plan  1-acute on chronic systolic congestive heart failure.  I think he is retaining fluid significantly since he returned and cut back on his diuretics, even since last Thursday.  Exam today suggests that he likely has 6-8 pounds of extra volume based on the amount of edema and JVD he has.  I think he has a better dry weight around 142 pounds at home given he has had some lean mass loss.  I reviewed this with them and agreed with Dixon Patrick's urging of some ensure or other supplements.  Following weights may be problematic is he either tries to gain more lean mass or continues to lose some.  We will try further diuresis at this time but I have a low threshold for proceeding with a Cardiomems.    2-ischemic cardiomyopathy.  Ejection fraction stable.  On appropriate medications.  He does not tolerate Ace/ARB, is on beta blocker, long-acting nitrates, hydralazine.  Did not tolerate spironolactone with hyponatremia    3-coronary artery disease.  No ischemic symptoms.  Recent stress study without stress-induced ischemia    4-mild hyperkalemia.  I will have him stop his potassium supplementation at this time.  I will have him increase his diuretics so I'll recheck a basic metabolic panel in one week    5-insomnia-this is actually one of his biggest concerns right now.  He is having some daytime somnolence.  I recommended he follow up with Dr. Salguero in the sleep Center, and in the meantime I suggested he try Benadryl or melatonin      Plan-  -He'll take 2.5 Zaroxolyn today followed by 1 mg of Bumex  -Tomorrow he'll increase Bumex to 1 mg b.i.d.  -If he is not starting to diurese with this he'll call us so we can increase his Bumex and use metolazone further-continue telemanagement  -Recommend follow-up with Dr. Salguero  -Stop potassium supplementation, check basic metabolic panel in one week    Orders Placed This Encounter   Procedures     Basic metabolic panel       Orders Placed This Encounter   Medications     metolazone (ZAROXOLYN) 2.5 MG tablet     Sig: Take 1 tablet (2.5 mg) by mouth daily as needed     Dispense:  30 tablet     Refill:  1       There are no discontinued medications.      Encounter Diagnoses   Name Primary?     Acute on chronic systolic congestive heart failure (H) Yes     Ischemic cardiomyopathy      Insomnia, unspecified type      Hyperkalemia        CURRENT MEDICATIONS:  Current Outpatient Prescriptions   Medication Sig Dispense Refill     metolazone (ZAROXOLYN) 2.5 MG tablet Take 1 tablet (2.5 mg) by mouth daily as needed 30 tablet 1     potassium chloride SA (K-DUR/KLOR-CON M) 20 MEQ CR tablet Take 1 tablet (20 mEq) by mouth 2 times daily 60 tablet 3     aspirin EC 81 MG EC tablet Take 81 mg by mouth daily       levothyroxine (SYNTHROID/LEVOTHROID) 150 MCG tablet Take 150 mcg by mouth daily       carvedilol (COREG) 3.125 MG tablet Take 3 tablets (9.375 mg) by mouth 2 times daily (with meals) 540 tablet 3     isosorbide mononitrate (IMDUR) 60 MG 24 hr tablet Take 60 mg by mouth At Bedtime At hs 30 tablet 3     bumetanide (BUMEX) 1 MG tablet Take 1 mg by mouth daily        calcitRIOL (ROCALTROL) 0.25 MCG capsule Take 0.25 mcg by mouth three times a week Monday, Wednesday and Friday       Saline (SODIUM CHLORIDE) 0.65 % SOLN Spray in nostril as needed       hydrALAZINE (APRESOLINE) 50 MG tablet Take 1 tablet (50 mg) by mouth 3 times daily 270 tablet 3     KRILL OIL PO Take 1 capsule by mouth daily        Cyanocobalamin  "(B-12) 1000 MCG TBCR Take 1,000 mcg by mouth daily 100 tablet 0     multivitamin, therapeutic with minerals (MULTI-VITAMIN) TABS Take 1 tablet by mouth daily       clopidogrel (PLAVIX) 75 MG tablet Take 75 mg by mouth daily         ALLERGIES     Allergies   Allergen Reactions     Lisinopril Other (See Comments)     Angioedema     Augmentin Other (See Comments)     Per pt, \"froze him, affected his legs\"     Crestor [Rosuvastatin] Other (See Comments)     rhabdomyolysis       PAST MEDICAL HISTORY:  Past Medical History   Diagnosis Date     Acute renal failure (H)      10/2015 ARF secondary to rhabdomyolysis     Alcoholism (H)      Anemia      Benign essential HTN      BPH (benign prostatic hypertrophy)      CAD (coronary artery disease)      AWMI, stents to Cx, RCA and LAD     Chronic systolic heart failure (H)      CKD (chronic kidney disease)      Complete AV block (H)      Ischemic cardiomyopathy 10/23/2015     EF 30%     Mixed hyperlipidemia      NSTEMI (non-ST elevated myocardial infarction) (H) 10/23/2015     PAD (peripheral artery disease) (H)      Rhabdomyolysis due to statin therapy      crestor     Severe hypothyroidism 9/20/2016     VF (ventricular fibrillation) (H) 11/16/16       PAST SURGICAL HISTORY:  Past Surgical History   Procedure Laterality Date     Stent, coronary, s660 15/18  Nov 2000     PTCA with stent placement of CFX     Stent, coronary, s660 15/18  Feb 2001     PTCA with stent placement of CFX RCA      Stent, coronary, s660 15/18  April 2007     stent placed in LAD     Endarterectomy carotid Left 6/11/10     Appendectomy       Tonsillectomy and adenoidectomy       Cholecystectomy       Implant automatic implantable cardioverter defibrillator  11/16/16       FAMILY HISTORY:  Family History   Problem Relation Age of Onset     Unknown/Adopted Mother      Unknown/Adopted Father        SOCIAL HISTORY:  Social History     Social History     Marital status:      Spouse name: N/A     Number of " "children: N/A     Years of education: N/A     Social History Main Topics     Smoking status: Former Smoker     Packs/day: 1.50     Years: 20.00     Types: Cigarettes     Quit date: 1/1/1980     Smokeless tobacco: Never Used     Alcohol use No     Drug use: None     Sexual activity: Not Asked     Other Topics Concern     Caffeine Concern No     decaf coffee     Sleep Concern No     Stress Concern No     Weight Concern No     Special Diet Yes     low fat, low sodium     Exercise Yes     everyday     Social History Narrative       Review of Systems:  Skin:  Positive for   wound right ankle from a stick in the yard-spent 5 hours in the ED trying to get the bleeding to stop-ended up getting stitches   Eyes:  Positive for glasses    ENT:  Positive for nasal congestion productive cough  Respiratory:  Positive for sleep apnea uses BIPAP   Cardiovascular:  Negative      Gastroenterology: Negative      Genitourinary:  not assessed      Musculoskeletal:  Negative      Neurologic:  Positive for   weak all over  Psychiatric:  Negative      Heme/Lymph/Imm:  Negative      Endocrine:  Positive for thyroid disorder      Physical Exam:  Vitals: /62  Pulse 72  Ht 1.74 m (5' 8.5\")  Wt 68.5 kg (151 lb)  BMI 22.63 kg/m2    Constitutional:  cooperative;alert and oriented chronically ill;frail      Skin:  warm and dry to the touch        Head:  normocephalic, no masses or lesions        Eyes:  pupils equal and round;sclera white;EOMS intact        ENT:  no pallor or cyanosis        Neck:  carotid pulses are full and equal bilaterally JVP 10-12;hepatojugular reflux  (healed left CEA incision)    Chest:  clear to auscultation (no dullness to percussion)   pacemaker incision in the left infraclavicular area was well-healed      Cardiac: regular rhythm, normal S1/S2, no S3 or S4, apical impulse not displaced, no murmurs, gallops or rubs   distant heart sounds              Abdomen:  abdomen soft;no bruits;non-tender (liver not palpable)   "  (slightly dsitended abdomen)    Vascular:                                          Extremities and Back:  no deformities, clubbing, cyanosis, erythema observed   bilateral LE edema;2+ (2/3 of the way to the knees)          Neurological:  affect appropriate, oriented to time, person and place              CC  Shani Patrick, APRN CNP   [facility]  [address]  [address]  [place] [id number]

## 2017-03-06 NOTE — MR AVS SNAPSHOT
After Visit Summary   3/6/2017    Perez Villalobos    MRN: [id number]           Patient Information     Date Of Birth          1940        Visit Information        Provider Department      3/6/2017 12:45 PM Krishna Tran MD [facility] [place]        Today's Diagnoses     Acute on chronic systolic congestive heart failure (H)    -  1    Ischemic cardiomyopathy        Insomnia, unspecified type        Hyperkalemia          Care Instructions    Today - Take 2.5 mg metolazone today, and 1 mg bumex about 45 minutes after that.   Tomorrow (March 7) increase bumex to 1 mg twice daily.  Stop potassium pill.  Goal weight at home over next 1-2 weeks is to get weight around 142  Make appointment with Dr. Salguero.  Can try benadryl 25 or 50 mg for sleep, or melatonin.  Check potassium level on Monday March 13        Follow-ups after your visit        Your next 10 appointments already scheduled     Mar 08, 2017 10:00 AM CST   Treatment 60 with Rh Cardiac Rehab 1   [facility] ([facility])    [id number] [place] [address], [address]  [facility] [address]   [phone]            Mar 10, 2017 10:00 AM CST   Treatment 60 with Rh Cardiac Rehab 1   [facility] ([facility])    [address] [place] [address], [address]  [facility] [address]   [phone]            Mar 13, 2017  1:00 PM CDT   Treatment 60 with Rh Cardiac Rehab 1   [facility] ([facility])    [address] [place] [address], [address]  [facility] [address]   [phone]            Mar 16, 2017  1:00 PM CDT   Treatment 60 with Rh Cardiac Rehab 3   [facility] ([facility])    [address] [place] [address], [address]  [facility] [address]   [phone]            Mar 20, 2017 12:15 PM CDT   LAB with RU LAB   [facility] [place] ([facility])    [address] [place] [address]  [address]  [facility] [address]   [phone]           Patient must bring picture ID.  Patient should be prepared to give a urine specimen  Please do not eat 10-12 hours before your appointment if you are coming in fasting for labs on lipids, cholesterol, or glucose (sugar).  Pregnant women should follow their Care Team instructions. Water with medications is okay. Do not drink coffee or other fluids.   If you have concerns about taking  your medications, please ask at office or if scheduling via [employer], send a message by clicking on Secure Messaging, Message Your Care Team.            Mar 20, 2017  1:10 PM CDT   Core Return with PARKER Wilde CNP   [facility] ([facility])    [address]  [facility] [address]   [phone]            Mar 20, 2017  3:00 PM CDT   Treatment 60 with Rh Cardiac Rehab 1   [facility] ([facility])    [address], [address]  [facility] [address]   [phone]            Mar 23, 2017  1:00 PM CDT   Treatment 60 with Rh Cardiac Rehab 3   [facility] ([facility])    [address], [address]  [facility] [address]   [phone]            Mar 27, 2017  1:00 PM CDT   Treatment 60 with Rh Cardiac Rehab 1   [facility] ([facility])    [address], [address]  [facility] [address]   [phone]            Mar 30, 2017  1:00 PM CDT   Treatment 60 with Rh Cardiac Rehab 3   [facility] ([facility])    [address], [address]  [facility] [address]   [phone]              Future tests that were ordered for you today     Open Future Orders        Priority Expected Expires Ordered    Basic metabolic panel Routine 3/13/2017 3/6/2018 3/6/2017            Who to contact     If you have questions or need follow up information about today's clinic visit  "or your schedule please contact [facility] [place] directly at [phone].  Normal or non-critical lab and imaging results will be communicated to you by MyChart, letter or phone within 4 business days after the clinic has received the results. If you do not hear from us within 7 days, please contact the clinic through MyChart or phone. If you have a critical or abnormal lab result, we will notify you by phone as soon as possible.  Submit refill requests through [employer]ival or call your pharmacy and they will forward the refill request to us. Please allow 3 business days for your refill to be completed.          Additional Information About Your Visit        [employer] Information     Nival lets you send messages to your doctor, view your test results, renew your prescriptions, schedule appointments and more. To sign up, go to [email or web address] . Click on \"Log in\" on the left side of the screen, which will take you to the Welcome page. Then click on \"Sign up Now\" on the right side of the page.     You will be asked to enter the access code listed below, as well as some personal information. Please follow the directions to create your username and password.     Your access code is: [email or web address]  Expires: 2017  2:26 PM     Your access code will  in 90 days. If you need help or a new code, please call your [place] clinic or [phone].        Care EveryWhere ID     This is your Care EveryWhere ID. This could be used by other organizations to access your [place] medical records  [id number]        Your Vitals Were     Pulse Height BMI (Body Mass Index)             72 1.74 m (5' 8.5\") 22.63 kg/m2          Blood Pressure from Last 3 Encounters:   17 118/62   17 132/76   17 128/70    Weight from Last 3 Encounters:   17 68.5 kg (151 lb)   17 67.5 kg (148 lb 12.8 oz)   17 71 kg (156 lb 9.6 oz)              We Performed the Following     " Follow-Up with [facility] - Return MD visit        Primary Care Provider Office Phone # Fax #    Hector Jc [phone]       [address]  [facility] [address]        Thank you!     Thank you for choosing [facility] [place]  for your care. Our goal is always to provide you with excellent care. Hearing back from our patients is one way we can continue to improve our services. Please take a few minutes to complete the written survey that you may receive in the mail after your visit with us. Thank you!             Your Updated Medication List - Protect others around you: Learn how to safely use, store and throw away your medicines at www.disposemymeds.org.          This list is accurate as of: 3/6/17  1:33 PM.  Always use your most recent med list.                   Brand Name Dispense Instructions for use    aspirin EC 81 MG EC tablet      Take 81 mg by mouth daily       B-12 1000 MCG Tbcr     100 tablet    Take 1,000 mcg by mouth daily       bumetanide 1 MG tablet    BUMEX     Take 1 mg by mouth daily       calcitRIOL 0.25 MCG capsule    ROCALTROL     Take 0.25 mcg by mouth three times a week Monday, Wednesday and Friday       carvedilol 3.125 MG tablet    COREG    540 tablet    Take 3 tablets (9.375 mg) by mouth 2 times daily (with meals)       hydrALAZINE 50 MG tablet    APRESOLINE    270 tablet    Take 1 tablet (50 mg) by mouth 3 times daily       isosorbide mononitrate 60 MG 24 hr tablet    IMDUR    30 tablet    Take 60 mg by mouth At Bedtime At hs       KRILL OIL PO      Take 1 capsule by mouth daily       levothyroxine 150 MCG tablet    SYNTHROID/LEVOTHROID     Take 150 mcg by mouth daily       metolazone 2.5 MG tablet    ZAROXOLYN    30 tablet    Take 1 tablet (2.5 mg) by mouth daily as needed       Multi-vitamin Tabs tablet      Take 1 tablet by mouth daily       PLAVIX 75 MG tablet   Generic drug:  clopidogrel      Take 75 mg by  mouth daily       potassium chloride SA 20 MEQ CR tablet    K-DUR/KLOR-CON M    60 tablet    Take 1 tablet (20 mEq) by mouth 2 times daily       Sodium Chloride 0.65 % Soln      Spray in nostril as needed

## 2017-03-07 NOTE — TELEPHONE ENCOUNTER
Telemanagment    Alert for: 3# weight loss in 1 day. No sx today on survey.  Patient was seen by Dr. Tran in clinic yesterday 3/6/17. He estimated patient's dry weight to be around 142#. Patient was to take 2.5 mg metolazone and an additional 1 mg Bumex yesterday. Daily Bumex increased to 1 mg BID. KCl stopped. BMP due in 1 week has not been scheduled.  F/u with [facility] 3/20.  Left a message with patient for his wife to call back to review medication changes and f/u.   She confirmed the medications above per the dictation. Will adjust the medication list. Scheduled BMP on 3/13 per Dr. Tran.     Roxanne DUTTAN RN  [facility]

## 2017-03-08 NOTE — PROGRESS NOTES
Perez Villalobos 76 year old  03/08/17 1100   Session   Session 30 Day Individualized Treatment Plan   Certified through this date 04/06/17   I have established, reviewed and made necessary changes to the individualized treatment plan and exercise prescription for this patient.    Physician Name (printed): ________________________   Date: _______  Time: ______    Physician Signature: ___________________________________________   Cardiac Rehab Assessment   Cardiac Rehab Assessment 1/5/2017 Evaluation completed. PT has an extensive heart history. In 2016 he was diagnosed with CHF. 1/23/2017. PT will be transferring to a cardiac rehab program in [place]. PT signed a release of information and records were sent with PT. PT has denied any symptoms or complaints with exercise. PT was given handouts and verbal information in regards to low sodium choices. PT continues to benefit from monitored exercise and CHF education by participating in a cardiac rehab program in [place]. 3/8/2017 PT returned today from [place] as he wanted to continue the cardiac rehab program. While in [place] he was hospitalized for 5 days with an exacerbation of CHF. He received order to return to cardiac rehab and was seen in [place] before returning home. He followed up with the cardiologist this week. PT is still having problems with fluid retention. He is calling in his weight daily to the [facility]. His energy level varies daily, but is limited by weakness and fatigue. Due to CHF and recent hospitalization, PT would benefit from close monitoring of symptoms, CV response with education for management of CHF.   General Information   Treatment Diagnosis Systolic Heart Failure with Lowered EF   Date of Treatment Diagnosis 11/15/16  (Diagnosed with heart failure in 2/2016)   Secondary Treatment Diagnosis (ICD/pacemaker 11/16/2016)   Significant Past CV History Previous MI;Previous PCI   Comorbidities Renal Disease   Other Medical History Numerous  "hospitalizations the past year for acute renal failure and respiratory failure due to rhabdomyolysis. Symptomatic bradycardia with a V.fib arrest.   Lead up symptoms Slow heart rate in the 40's with near syncope   Hospital Location [facility]   Hospital Discharge Date 11/18/16   Signs and Symptoms Post Hospital Discharge Fatigue;SOB   Outpatient Cardiac Rehab Start Date 01/05/17   Primary Physician Hector Jc   Primary Physician Follow Up Completed   Cardiologist Marc/Elle   Cardiologist Follow Up Completed   Ejection Fraction 30-35%   Risk Stratification High   Summary of Cath Report   Summary of Cath Report Not Applicable   Living and Work Status    Living Arrangements and Social Status house;spouse   Return to Employment Retired   Preventative Medications   CMS recommended medications Antiplatelets;Beta Blocker;Influenza vaccination;Pneumonia vaccination   Falls Screen   Have you fallen two or more times in the past year? Yes   Have you fallen and had an injury in the past year? No   Pain   Patient Currently in Pain No   Pain Description Comment hip discomfort occurs with walking. 3/8/2017 No complaints of hip pain today.   Physical Assessments   Incisions Not applicable   Edema +3 Moderate   Right Lung Sounds not assessed   Left Lung Sounds not assessed   Limitations Orthopedic   Comments Hip pain bilaterally and leg weakness. 3/6/2017 PT still has lower leg swelling. Wearing compression socks. His wife reports it is better than it was in [place]. Explained to PT the importance of keeping his legs elevated when sitting.   Individualized Treatment Plan   Monitored Sessions Scheduled 18   Monitored Sessions Attended 7  (PT had 6 sessions in [place].)   Oxygen   Supplemental Oxygen needed No   Nutrition Management - Weight Management   Assessment Re-assessment   Age 76   Weight 67.1 kg (148 lb)   Height 1.74 m (5' 8.5\")   BMI (Calculated) 22.22   Goal Weight (at goal weight. )   Initial Rate Your Plate " Score. Dietary tool to assess eating patterns. Scores range from 24 to 72. The higher the score the healthier the eating pattern. 64   Weight Management Comments 3/8/2017 Weight is back to his baseline weight when he started the program.   Nutrition Management - Lipids   Lipids Labs Available   Date 09/16/16   Total Cholesterol 232   Triglycerides 126   HDL 87      Prescribed Lipid Medication No;Intolerant   Lipid Comments PT had rhabdomyolisis secondary to Crestor. 3/8/2017 Reviewed cholesterol levels.   Nutrition Management - Diabetes   Diabetes No   Nutrition Management Summary   Dietary Recommendations Low Fat;Low Cholesterol;Low Sodium   Stages of Change for Diet Compliance Action   Interventions Planned Attend Nutrition Education Class(es);Refer for Individual Diet Consultation   Patient Goals Goal #1   Goal #1 Description Learn ways to reduce the sodium in cooking but still make tasty dishes by attending the nutrition classes and meet possibly with the dietitian.   Goal #1 Target Date 02/01/17   Goal #1 Progress Towards Goal 1/23/2017. PT was given handouts and verbal instruction on low sodium choices and ways to flavor food without using salt. PT reports he has made a drastic change in this area. PT pays close attention to sodium content and checks weight daily. 3/8/2017 PT has not been in Rehab since 1/23/17. He is still retaining fluids. He is watching his salt intake and his wife is cooking from scratch.He feels he is eating only about 1500 mg of sodium per day. Plan to do a food log for sodium at next update.   Nutrition Summary Comments Trying to watch the sodium levels more carefully the past couple months.   Nutrition Target Outcome Total Chol < 150, HDL > 40 (M), HDL > 50 (W), LDL < 70, Trig < 150   Psychosocial Management   Psychosocial Assessment Re-assessment   Is there history of clinical depression or increased risk of depression? No previous history   Current Level of Stress per Patient  Report Mild   Current Coping Skills Uses Stress Management/Relaxation Techniques  (exercise, hobby-works on cars)   Initial Patient Health Questionnaire -9 Score (PHQ-9) for depression. 5-9 Minimal symptoms, 10-14 Minor depression, 15-19 Major depression, moderately severe, > 20 Major depression, severe  6   Initial [facility] Survey score.  Quality of Life:   If total score > 25 review individual areas where patient rated a 4 or 5.  Consider patients current medical condition and what role that plays on the score.   Adjust treatment protocol to improve areas of concern.  Consider the following:  PHQ9 score, DASI, and re-assessment within the next 30 days to assist with developing treatments.  22   Stages of Change Maintenance   Interventions Planned Patient denies need for intervention at this time.   Interventions In Progress or Completed Patient verbalizes understanding of behavioral assessment scores;Patient verbalizes understanding of negative impact of stress to personal health   Patient Goal No   Psychosocial Comments Sometimes PT does not sleep well at night due to being restless. 3/8/2017 PT is feeling fustrated with how poorly he feels some days. He denies any depression. Hopefully he will start to make progress, but will need continued support to prevent depression.   Psychosocial Target Outcome Identify absence or presence of depression using valid screening tool   Other Core Components - Hypertension   History of or Diagnosis of Hypertension Yes   Currently taking Anti-Hypertensives Yes;Beta blocker   Hypertension Comments Resting BP borderline-will continue to monitor. 3/8/2017 Resting BP wnl.   Other Core Components - Tobacco   History of Tobacco Use Yes   Quit Date or Planned Quit Date (1985)   Tobacco Habit Cigarettes;Pipe   Stages of Change Maintenance   Other Core Components Summary   Interventions Planned Instruct patient on the DASH diet;Provide information on home blood pressure  monitoring;Educate on the use of 'Stop Light' tool;Educate on importance of monitoring daily weight;Educate on signs/symptoms and when to call the doctor (i.e. increased edema, dyspnea, fatigue, dizziness/lightheadedness, change in sleep patterns, chest discomfort);Instruct and educate to self manage Heart Failure symptoms;Attend education class(es) on Nutrition   Interventions In Progress or Completed Instructed on DASH diet;Listed benefits of weight management;Educated on importance of maintaining low sodium diet;Educated on importance of monitoring daily weight;Educated on signs/symptoms and when to call the doctor;Educated on the importance of using resources to self manage Heart Failure symptoms;Educated on the use of the 'Stop Light' Tool   Patient Goals (See nutrition goal)   Other Core Components Comments PT has a home BP monitor, but does not use it as often as he should. 3/8/2017 PT is watching his sodium intake and weighing daily. Reviewed the Stop Light Tool with PT and given handout. PT is currently calling his weight in daily to the [facility],.   Other Core Components Target Outcome BP < 140/90 or < 130/80 with DM or CKD;Compliance with Diet, Medications and Symptom Management to allow for stable Heart Failure   Activity/Exercise History   Activity/Exercise Assessment Re-assessment   Activity/Exercise Status prior to event? Participated in an Exercise Program   Number of Days Currently participating in Moderate Physical Activity? 0   Number of Days Currently performing  Aerobic Exercise (including rehab)? 1  (only today's session)   Number of Minutes per Session Currently of Aerobic Exercise (average)? 5-10 bouts   Current Stage of Change (Physical Activity) Preparation   Current Stage of Change (Aerobic Exercise) Preparation   Patient Goals Goal #1;Goal #2   Goal #1 Description PT would like to build his leg strength to be able to walk 3/4 mile by attending cardiac rehab and exercising at home.    Goal #1 Target Date 03/01/17   Goal #1 Progress Towards Goal 1/23/2017. PT reports and improvement in strength, but does feel he has met this goal as of yet. PT will continue rehab in [place] and work towards this goal. 3/8/2017 PT has not been exercising at home. He does have a TM and encouraged him to walk up to 10 minutes x 2 for 1-2 days outside of rehab at the same level as here. Also encouraged him to do stand to sit, to stand exercises daily using a chair to build his quad strength as he has difficulty getting out of a chair without using his arms. May add additional LE cals once PT is stronger.   Goal #2 Description PT would like to improve his upper body strength by participating in cardiac rehab 3 times per week and adding light weights.   Goal #2 Target Date 03/01/17   Goal #2 Progress Towards Goal 1/23/2017. PT continue rehab in [place] to work towards this goal. 3/8/2017 Plan to add weights next week.    Activity/Exercise Comments PT has a treadmill and belongs to [employer].   Activity/Exercise Target Outcome An Accumulation of 150  Minutes of Aerobic Activity per Week   Exercise Assessment   6 Minute Walk Predicted - Gender Selection Male   6 Minute Walk Predicted (Male) 1650.63   6 Minute Walk Predicted (Female) 1426.88   Initial 6 Minute Walk Distance (Feet) 746 ft   Resting HR 60 bpm   Exercise HR 87 bpm   Post Exercise HR 60 bpm   Resting /54   Post Exercise /56   Effort Rating 5   Current MET Level 1.9   MET Level Goal 3.0-4.0   ECG Rhythm Paced rhythm  (ectopic beats)   Ectopy None   Current Symptoms Fatigue;Weakness   Limitations/Restrictions Other (see comments);Orthopedic (see comments)  (hip pain and weakness in legs)   Exercise Prescription   Mode Recumbant bike;Nustep;Treadmill;Weights   Duration/Time Intermittent bouts   Frequency 3 daysweek   THR (85% of age predicted max HR) 121.55   OMNI Effort Rating (0-10 Scale) 4-6/10   Progression Intermittent bouts;Total exercise  time of 20-30 minutes;Progress peak intensity by 1/4 MET per week;Aerobic exercise to OMNI rating of 6 or below and at or below THR   Recommended Home Exercise   Type of Exercise Walking;Treadmill   Frequency (days per week) 2   Duration (minutes per session) Intermittent   Effort Rating Recommended 4-6/10   30 Day Exercise Plan PT to continue to walk 2 minutes bouts on the TM 2-3 times per day. May eventually go to the fitness club to exercise. 3/8/2017 PT to start with 10 minute bouts of walking x2 1-2 days per week and increase as tolerates.   Current Home Exercise   Type of Exercise Treadmill   Frequency (days per week) 6   Duration (minutes per session) 2 minuters x 3   Follow-up/On-going Support   Provider follow-up needed on the following No follow-up needed   Learning Assessment   Learner Patient   Primary Language English   Preferred Learning Style Reading;Pictures/Video   Barriers to Learning Cognitive   Patient Education   Education recommended Anatomy and Physiology of the Heart;Heart Failure;Muscle Conditioning;Nutrition;Medication Overview   Education Comments 3/8/2017 PT has been given handouts on sodium, CHF management.

## 2017-03-13 NOTE — TELEPHONE ENCOUNTER
Hold bumex this afternoon. Restart KCL 1 tab, M,W,F. Ask him to lay in bed for 30min, with feet up on 2 pillows. Please let me know sx and weight in am. Girish

## 2017-03-13 NOTE — TELEPHONE ENCOUNTER
Telemanagment    Alert for: swelling  Current diuretic: 3/6 metolazone was ordered x 1 and Bumex increased to 1 mg BID by Dr. Tran.  Patient's wife decreased the bumex to 1mg a day on her own because she said that Dr. Tran wanted his weight to go down more slowly after the metolazone. She has given patient Bumex 1 mg BID again for the last 3 days. Asked her to please call [facility] nurse if she feels like patient needs a different diuretic dose.  BMP was ordered per Dr. Tran and drawn today due to stopping potassium supplement. Called [facility] as it has been about 2 hours since the lab was sent.    [facility] f/u 3/20

## 2017-03-13 NOTE — TELEPHONE ENCOUNTER
Patient has already taken Bumex dose this afternoon. Asked his wife to have him hold the AM dose tomorrow. She wrote down instruction to restart KCl 20 meq on Monday, Wednesday, and Fridays. Will call patient tomorrow. Vijaya GILL

## 2017-03-15 NOTE — TELEPHONE ENCOUNTER
Telemanagment    Alert for: weight 146#, up 2# and above goal parameter  Current diuretic: Bumex 1 mg BID (AM dose was held yesterday)  Patient feels like the swelling is improved compared to a week ago. He has been having an Ensure daily.   [facility] f/u on 3/20/17    Roxanne PAZ RN  [facility]

## 2017-03-16 NOTE — ED NOTES
Sutures removed, wound healing well. No signs or symptoms of infection. Patient advised to follow up with PCP for further concerns.Dr oconnell came to see pt sutures out

## 2017-03-16 NOTE — TELEPHONE ENCOUNTER
Left a voicemail for patient to call back. Will review if cardioMEMs has been discussed and will contact a research nurse to be present at the next office visit if needed. Left a voicemail for Adrianne research RN, about appointment time on Monday. Will discuss further at the office visit, but wife says patient is interested in cardioMEMS implant. Vijaya GILL

## 2017-03-17 NOTE — TELEPHONE ENCOUNTER
Patient is scheduled for [facility] office visit on 3/20/17.  Daily weight graph below is current as of 3/17.    Recommendations: Review current parameters. Need updated plan for when outside of parameters.        Roxanne PAZ RN  [facility]

## 2017-03-20 NOTE — PROGRESS NOTES
"HPI and Plan:   See dictation [id number]    Orders Placed This Encounter   Procedures     Basic metabolic panel     Basic metabolic panel     Follow-Up with [facility]       No orders of the defined types were placed in this encounter.      There are no discontinued medications.      Encounter Diagnosis   Name Primary?     Chronic systolic heart failure (H)        CURRENT MEDICATIONS:  Current Outpatient Prescriptions   Medication Sig Dispense Refill     potassium chloride SA (K-DUR/KLOR-CON M) 20 MEQ CR tablet Take 1 tablet (20 meq) every Monday, Wednesday, Friday       aspirin EC 81 MG EC tablet Take 81 mg by mouth daily       levothyroxine (SYNTHROID/LEVOTHROID) 150 MCG tablet Take 150 mcg by mouth daily       carvedilol (COREG) 3.125 MG tablet Take 3 tablets (9.375 mg) by mouth 2 times daily (with meals) 540 tablet 3     isosorbide mononitrate (IMDUR) 60 MG 24 hr tablet Take 60 mg by mouth At Bedtime At hs 30 tablet 3     bumetanide (BUMEX) 1 MG tablet Take 1 mg by mouth 2 times daily       calcitRIOL (ROCALTROL) 0.25 MCG capsule Take 0.25 mcg by mouth three times a week Monday, Wednesday and Friday       hydrALAZINE (APRESOLINE) 50 MG tablet Take 1 tablet (50 mg) by mouth 3 times daily 270 tablet 3     KRILL OIL PO Take 1 capsule by mouth daily        Cyanocobalamin (B-12) 1000 MCG TBCR Take 1,000 mcg by mouth daily 100 tablet 0     multivitamin, therapeutic with minerals (MULTI-VITAMIN) TABS Take 1 tablet by mouth daily       clopidogrel (PLAVIX) 75 MG tablet Take 75 mg by mouth daily       metolazone (ZAROXOLYN) 2.5 MG tablet Take 1 tablet (2.5 mg) by mouth daily as needed 30 tablet 1     Saline (SODIUM CHLORIDE) 0.65 % SOLN Spray in nostril as needed         ALLERGIES     Allergies   Allergen Reactions     Lisinopril Other (See Comments)     Angioedema     Augmentin Other (See Comments)     Per pt, \"froze him, affected his legs\"     Crestor [Rosuvastatin] Other (See Comments)     rhabdomyolysis       PAST MEDICAL " HISTORY:  Past Medical History   Diagnosis Date     Acute renal failure (H)      10/2015 ARF secondary to rhabdomyolysis     Alcoholism (H)      Anemia      Benign essential HTN      BPH (benign prostatic hypertrophy)      CAD (coronary artery disease)      AWMI, stents to Cx, RCA and LAD     Chronic systolic heart failure (H)      CKD (chronic kidney disease)      Complete AV block (H)      Ischemic cardiomyopathy 10/23/2015     EF 30%     Mixed hyperlipidemia      NSTEMI (non-ST elevated myocardial infarction) (H) 10/23/2015     PAD (peripheral artery disease) (H)      Rhabdomyolysis due to statin therapy      crestor     Severe hypothyroidism 9/20/2016     VF (ventricular fibrillation) (H) 11/16/16       PAST SURGICAL HISTORY:  Past Surgical History   Procedure Laterality Date     Stent, coronary, s660 15/18  Nov 2000     PTCA with stent placement of CFX     Stent, coronary, s660 15/18  Feb 2001     PTCA with stent placement of CFX RCA      Stent, coronary, s660 15/18  April 2007     stent placed in LAD     Endarterectomy carotid Left 6/11/10     Appendectomy       Tonsillectomy and adenoidectomy       Cholecystectomy       Implant automatic implantable cardioverter defibrillator  11/16/16       FAMILY HISTORY:  Family History   Problem Relation Age of Onset     Unknown/Adopted Mother      Unknown/Adopted Father        SOCIAL HISTORY:  Social History     Social History     Marital status:      Spouse name: N/A     Number of children: N/A     Years of education: N/A     Social History Main Topics     Smoking status: Former Smoker     Packs/day: 1.50     Years: 20.00     Types: Cigarettes     Quit date: 1/1/1980     Smokeless tobacco: Never Used     Alcohol use No     Drug use: None     Sexual activity: Not Asked     Other Topics Concern     Caffeine Concern No     decaf coffee     Sleep Concern No     Stress Concern No     Weight Concern No     Special Diet Yes     low fat, low sodium     Exercise Yes      "everyday     Social History Narrative       Review of Systems:  Skin:  Positive for bruising     Eyes:  Positive for glasses    ENT:  Negative      Respiratory:  Positive for sleep apnea;dyspnea on exertion uses BIPAP   Cardiovascular:  Negative for;lightheadedness;chest pain edema;Positive for;fatigue    Gastroenterology: Negative      Genitourinary:  Positive for urinary frequency    Musculoskeletal:  Negative      Neurologic:  Positive for numbness or tingling of feet    Psychiatric:  Negative      Heme/Lymph/Imm:  Negative      Endocrine:  Positive for thyroid disorder      Physical Exam:  Vitals: /60 (BP Location: Right arm, Patient Position: Chair, Cuff Size: Adult Small)  Pulse 66  Ht 1.74 m (5' 8.5\")  Wt 71.4 kg (157 lb 4.8 oz)  SpO2 100%  BMI 23.57 kg/m2    Constitutional:  cooperative;alert and oriented chronically ill;frail      Skin:  warm and dry to the touch        Head:  normocephalic, no masses or lesions        Eyes:  pupils equal and round;sclera white;EOMS intact        ENT:  no pallor or cyanosis        Neck:  carotid pulses are full and equal bilaterally JVP >12      Chest:  clear to auscultation   pacemaker incision in the left infraclavicular area was well-healed      Cardiac: regular rhythm, normal S1/S2, no S3 or S4, apical impulse not displaced, no murmurs, gallops or rubs frequent premature beats distant heart sounds              Abdomen:  abdomen soft;no bruits;non-tender        Vascular: pulses full and equal, no bruits auscultated                                        Extremities and Back:  no deformities, clubbing, cyanosis, erythema observed   bilateral LE edema;2+          Neurological:  affect appropriate, oriented to time, person and place              CC  Shani MELLO Mannchen, APRN CNP   [facility]  [id number]  [id number]  [place] [id number]              "

## 2017-03-20 NOTE — MR AVS SNAPSHOT
After Visit Summary   3/20/2017    Perez Villalobos    MRN: [id number]           Patient Information     Date Of Birth          1940        Visit Information        Provider Department      3/20/2017 1:10 PM Shani Patrick APRN CNP [facility] PHYSICIANS HEART AT [place]        Today's Diagnoses     Chronic systolic heart failure (H)          Care Instructions    Call the C.O.R.E. nurse for any questions or concerns:  [phone]    1. Medication changes from today: IV lasix     2. Weigh yourself daily and write it down.    3. Call CORE nurse if your weight is up more than 2 pounds in one day or 5 pounds in one week.    4. Call CORE nurse if you feel more short of breath, have more abdominal bloating, or leg swelling.    5. Continue low sodium diet (less than 2000 mg daily). If you eat less salt, you will retain less fluid.    6. Do NOT take Aleve or ibuprofen without talking to your doctor first.     7. Lab Results:  Component      Latest Ref Rng & Units 3/20/2017   Sodium      133 - 144 mmol/L 128 (L)   Potassium      3.4 - 5.3 mmol/L 4.5   Chloride      94 - 109 mmol/L 87 (L)   Carbon Dioxide      20 - 32 mmol/L 29   Anion Gap      3 - 14 mmol/L 12   Glucose      70 - 99 mg/dL 119 (H)   Urea Nitrogen      7 - 30 mg/dL 85 (H)   Creatinine      0.66 - 1.25 mg/dL 1.95 (H)   GFR Estimate      >60 mL/min/1.7m2 34 (L)   GFR Estimate If Black      >60 mL/min/1.7m2 41 (L)   Calcium      8.5 - 10.1 mg/dL 8.5     CORE Clinic: Cardiomyopathy, Optimization, Rehabilitation, Education  The CORE Clinic is a heart failure specialty clinic within the [facility] where you will work with specialized nurse practitioners, physician assistants, doctors, and registered nurses. They are dedicated to helping patients with heart failure to carefully adjust medications, receive education, and learn who and when to call if symptoms develop. They specialize in helping you better understand  your condition, slow the progression of your disease, improve the length and quality of your life, help you detect future heart problems before they become life threatening, and avoid hospitalizations.              Follow-ups after your visit        Additional Services     Follow-Up with CORE Clinic                 Your next 10 appointments already scheduled     Mar 20, 2017  3:00 PM CDT   Treatment 60 with Rh Cardiac Rehab 1   [facility] ([facility])    [address]  [place] [address]   [phone]            Mar 21, 2017 11:20 AM CDT   LAB with MCLEAN LAB   [facility] ([facility])    [address]  [place] [address] [phone]           Patient must bring picture ID.  Patient should be prepared to give a urine specimen  Please do not eat 10-12 hours before your appointment if you are coming in fasting for labs on lipids, cholesterol, or glucose (sugar).  Pregnant women should follow their Care Team instructions. Water with medications is okay. Do not drink coffee or other fluids.   If you have concerns about taking  your medications, please ask at office or if scheduling via [employer], send a message by clicking on Secure Messaging, Message Your Care Team.            Mar 23, 2017  1:00 PM CDT   Treatment 60 with Rh Cardiac Rehab 3   [facility] ([facility])    [address]  [place] [address]   [phone]            Mar 27, 2017  1:00 PM CDT   Treatment 60 with Rh Cardiac Rehab 1   [facility] ([facility])    [address]  [place] [address]   [phone]            Mar 29, 2017 12:10 PM CDT   LAB with MCLEAN LAB   [facility] ([facility])    [address]  [place] [address] [phone]           Patient  must bring picture ID.  Patient should be prepared to give a urine specimen  Please do not eat 10-12 hours before your appointment if you are coming in fasting for labs on lipids, cholesterol, or glucose (sugar).  Pregnant women should follow their Care Team instructions. Water with medications is okay. Do not drink coffee or other fluids.   If you have concerns about taking  your medications, please ask at office or if scheduling via [employer], send a message by clicking on Secure Messaging, Message Your Care Team.            Mar 29, 2017  1:10 PM CDT   Core Return with PARKER Wilde CNP   [facility] PHYSICIANS HEART AT [place] ([facility])    [address]  [place] [address]   [phone]            Mar 30, 2017  1:00 PM CDT   Treatment 60 with Rh Cardiac Rehab 3   [facility] ([facility])    [address]  [place] [address]   [phone]            Mar 31, 2017 10:30 AM CDT   Return Sleep Patient with Ethan Salguero MD   [facility] ([facility])    [address]  [place] [address]   [phone]            Apr 03, 2017 10:00 AM CDT   Heart Cath Right Heart Cath with SHCVR1   [facility] ([facility])    [address]  [place] [address]   [phone]            Apr 03, 2017  1:00 PM CDT   Treatment 60 with Rh Cardiac Rehab 1   [facility] ([facility])    [address]  [place] [address]   [phone]              Future tests that were ordered for you today     Open Future Orders        Priority Expected Expires Ordered    Basic metabolic panel Routine 3/21/2017 3/20/2018 3/20/2017    Heart Cath Right heart cath Routine 4/3/2017 3/20/2018 3/20/2017    Basic metabolic panel Routine 3/27/2017 3/20/2018 3/20/2017    Follow-Up  "with CORE Clinic Routine 3/27/2017 3/20/2018 3/20/2017            Who to contact     If you have questions or need follow up information about today's clinic visit or your schedule please contact [facility] PHYSIC[facility] HE[facility] AT [place] directly at [phone].  Normal or non-critical lab and imaging results will be communicated to you by MyChart, letter or phone within 4 business days after the clinic has received the results. If you do not hear from us within 7 days, please contact the clinic through MyChart or phone. If you have a critical or abnormal lab result, we will notify you by phone as soon as possible.  Submit refill requests through [employer] or call your pharmacy and they will forward the refill request to us. Please allow 3 business days for your refill to be completed.          Additional Information About Your Visit        [facility] Information     [employer] lets you send messages to your doctor, view your test results, renew your prescriptions, schedule appointments and more. To sign up, go to [email or web address] . Click on \"Log in\" on the left side of the screen, which will take you to the Welcome page. Then click on \"Sign up Now\" on the right side of the page.     You will be asked to enter the access code listed below, as well as some personal information. Please follow the directions to create your username and password.     Your access code is: [email or web address]  Expires: 2017  3:26 PM     Your access code will  in 90 days. If you need help or a new code, please call your [place] clinic or [phone].        Care EveryWhere ID     This is your Care EveryWhere ID. This could be used by other organizations to access your [place] medical records  [id number]        Your Vitals Were     Pulse Height Pulse Oximetry BMI (Body Mass Index)          66 1.74 m (5' 8.5\") 100% 23.57 kg/m2         Blood Pressure from Last 3 Encounters:   17 122/60   17 118/62   17 " 132/76    Weight from Last 3 Encounters:   03/20/17 71.4 kg (157 lb 4.8 oz)   03/08/17 67.1 kg (148 lb)   03/06/17 68.5 kg (151 lb)              We Performed the Following     Follow-Up with CORE Clinic        Primary Care Provider Office Phone # Fax #    Hector Jc [phone]       [employer] [address]  [facility] [address]        Thank you!     Thank you for choosing [facility] PHYSIC[facility]ANS HEART AT [place]  for your care. Our goal is always to provide you with excellent care. Hearing back from our patients is one way we can continue to improve our services. Please take a few minutes to complete the written survey that you may receive in the mail after your visit with us. Thank you!             Your Updated Medication List - Protect others around you: Learn how to safely use, store and throw away your medicines at www.disposemymeds.org.          This list is accurate as of: 3/20/17  2:31 PM.  Always use your most recent med list.                   Brand Name Dispense Instructions for use    aspirin EC 81 MG EC tablet      Take 81 mg by mouth daily       B-12 1000 MCG Tbcr     100 tablet    Take 1,000 mcg by mouth daily       bumetanide 1 MG tablet    BUMEX     Take 1 mg by mouth 2 times daily       calcitRIOL 0.25 MCG capsule    ROCALTROL     Take 0.25 mcg by mouth three times a week Monday, Wednesday and Friday       carvedilol 3.125 MG tablet    COREG    540 tablet    Take 3 tablets (9.375 mg) by mouth 2 times daily (with meals)       hydrALAZINE 50 MG tablet    APRESOLINE    270 tablet    Take 1 tablet (50 mg) by mouth 3 times daily       isosorbide mononitrate 60 MG 24 hr tablet    IMDUR    30 tablet    Take 60 mg by mouth At Bedtime At        KRILL OIL PO      Take 1 capsule by mouth daily       levothyroxine 150 MCG tablet    SYNTHROID/LEVOTHROID     Take 150 mcg by mouth daily       metolazone 2.5 MG tablet    ZAROXOLYN    30 tablet    Take 1 tablet (2.5 mg)  by mouth daily as needed       Multi-vitamin Tabs tablet      Take 1 tablet by mouth daily       PLAVIX 75 MG tablet   Generic drug:  clopidogrel      Take 75 mg by mouth daily       potassium chloride SA 20 MEQ CR tablet    K-DUR/KLOR-CON M     Take 1 tablet (20 meq) every Monday, Wednesday, Friday       Sodium Chloride 0.65 % Soln      Spray in nostril as needed

## 2017-03-20 NOTE — PROGRESS NOTES
HISTORY OF PRESENT ILLNESS:     I had the pleasure of seeing Mr. Villalobos, a 76-year-old gentleman with a complex history of ischemic cardiomyopathy and recurrent acute-on-chronic systolic congestive heart failure.  He was recently seen by Dr. Tran after a recent heart failure exacerbation while traveling in [place].  He was eating out fairly often and noticed weight gain, worsening edema and shortness of breath as well as abdominal distention.  He presented to the emergency room.  Initial diuresis with slow but then he was started on daily metolazone and twice a day Bumex and eventually had a rather vigorous diuresis.  He had been having a goal weight of about 150 pounds.  However, it became apparent after his diuresis that he lost some lean mass and his weight went down to 141-142 pounds.        He had been on heparin in the hospital.  He is currently on Plavix and aspirin.  He then went home and was working in the garden and he jabbed himself with stick and broke the skin.  He did not stop bleeding and they ended up presenting to the emergency room for stitches.  Over the next week his weight drifted back up to 147-148 pounds.  He received a dose of metolazone and his weight came down.  Then over this past weekend and his weight went back up and his wife increased the 1 mg of Bumex to 2 mg in the a.m. and 1 mg in the p.m.  His weight remains elevated.  He is complaining of scrotal edema today as well as increased peripheral edema as well as shortness of breath.  He was short of breath with lying down last night.  He has class III to class IV heart failure.      His other complaints are insomnia.  He has a difficult time falling sleep.  He uses his BiPAP.  However, he does not sleep very long.      His other history is of ischemic cardiomyopathy with extensive stenting to all 3 coronary arteries.  Please see Dr. Tran's note for complete detail.      PHYSICAL EXAMINATION:   VITAL SIGNS:  Blood pressure 122/60,  pulse is 66 and weight is 157 pounds, up from 151 pounds.  His weight at home is 151 pounds.   LUNGS:  Clear to auscultation.  No dullness at the bases, no crackles.   CARDIOVASCULAR:  Distant heart tones, regular rhythm.  No murmur.   NECK:  JVD is greater than 12 cm of water at a 45 degree angle.   CARDIAC:  Rhythm is regular, no premature beats noted.   ABDOMEN:  Somewhat distended, normal bowel sounds.   EXTREMITIES:  Show 2+ peripheral edema two-thirds of the way up to his knees bilaterally.      BASIC METABOLIC PANEL:     Sodium 128, potassium 4.5, BUN 85, creatinine 1.95.      ASSESSMENT AND PLAN:     1.  Acute-on-chronic systolic heart failure.  He is again in congestive heart failure.  Unfortunately, his serum sodium is low.  I have ordered 80 mg of IV Lasix.  With IV Lasix, the sodium can go back into the serum, improving electrolytes.  I have ordered a basic metabolic panel in the morning to readdress his electrolytes as well as address his symptoms.  I also had a long discussion with him about CardioMEMS.  He is willing to proceed.  He is New York Heart Association functional class III-IV.  He recently was hospitalized in [place].  He was on a heparin drip.  He was discharged on his usual medications of Plavix and aspirin.  When he returned home, he jabbed himself with static while gardening.  The bleeding would not stop and he proceeded to the emergency room where he received stitches.  The dressing was removed 2 weeks later and the site continued to bleed.  The patient and his wife are very concerned about proceeding with this CardioMEMS implant and with this bleeding issue.  I told the patient and his wife that I would discuss with Dr. Arnold who would be the implanting physician.   2.  Ischemic cardiomyopathy.  Ejection fraction stable.  On appropriate medications.  He does not tolerate ACE or ARB.  He is on a beta blocker, long-acting nitrates, hydralazine.  He did not tolerate spironolactone was  hyponatremia.   3.  Coronary artery disease.  No ischemic symptoms.  Recent stress study without stress-induced ischemia.   4.  Insomnia.  Follow up with Dr. Salguero.      When he was seen by Dr. Tran , his Bumex was increased to 1 mg twice a day.  Over this past weekend, his wife increased the Bumex to 2 mg in the morning and 1 in the afternoon.  His weight did not go down, unfortunately.  He now is in CHF.  I have given a dose of IV Lasix today and will reassess in the a.m.         PARKER VILLA, CNP             D: 2017 14:40   T: 2017 15:33   MT:       Name:     RON LUCERO   MRN:      [id number]        Account:      [id number]   :      1940           Service Date: 2017      Document: [id number]

## 2017-03-20 NOTE — TELEPHONE ENCOUNTER
Patient's weight was up 9# at office visit today. Verbal order from Shani Patrick CNP for 80 mg IV lasix in the infusion center today. Scheduled for BMP tomorrow at [facility]. Vijaya GILL

## 2017-03-20 NOTE — PATIENT INSTRUCTIONS
Call the C.O.R.E. nurse for any questions or concerns:  [phone]    1. Medication changes from today: IV lasix     2. Weigh yourself daily and write it down.    3. Call CORE nurse if your weight is up more than 2 pounds in one day or 5 pounds in one week.    4. Call CORE nurse if you feel more short of breath, have more abdominal bloating, or leg swelling.    5. Continue low sodium diet (less than 2000 mg daily). If you eat less salt, you will retain less fluid.    6. Do NOT take Aleve or ibuprofen without talking to your doctor first.     7. Lab Results:  Component      Latest Ref Rng & Units 3/20/2017   Sodium      133 - 144 mmol/L 128 (L)   Potassium      3.4 - 5.3 mmol/L 4.5   Chloride      94 - 109 mmol/L 87 (L)   Carbon Dioxide      20 - 32 mmol/L 29   Anion Gap      3 - 14 mmol/L 12   Glucose      70 - 99 mg/dL 119 (H)   Urea Nitrogen      7 - 30 mg/dL 85 (H)   Creatinine      0.66 - 1.25 mg/dL 1.95 (H)   GFR Estimate      >60 mL/min/1.7m2 34 (L)   GFR Estimate If Black      >60 mL/min/1.7m2 41 (L)   Calcium      8.5 - 10.1 mg/dL 8.5     CORE Clinic: Cardiomyopathy, Optimization, Rehabilitation, Education  The CORE Clinic is a heart failure specialty clinic within the [facility] where you will work with specialized nurse practitioners, physician assistants, doctors, and registered nurses. They are dedicated to helping patients with heart failure to carefully adjust medications, receive education, and learn who and when to call if symptoms develop. They specialize in helping you better understand your condition, slow the progression of your disease, improve the length and quality of your life, help you detect future heart problems before they become life threatening, and avoid hospitalizations.

## 2017-03-20 NOTE — LETTER
3/20/2017    Hector Jc MD  [employer]   [address]  [facility] [address]    RE: Perez Villalobos       Dear Colleague,    I had the pleasure of seeing Mr. Villalobos, a 76-year-old gentleman with a complex history of ischemic cardiomyopathy and recurrent acute-on-chronic systolic congestive heart failure.  He was recently seen by Dr. Tran after a recent heart failure exacerbation while traveling in [place].  He was eating out fairly often and noticed weight gain, worsening edema and shortness of breath as well as abdominal distention.  He presented to the emergency room.  Initial diuresis with slow but then he was started on daily metolazone and twice a day Bumex and eventually had a rather vigorous diuresis.  He had been having a goal weight of about 150 pounds.  However, it became apparent after his diuresis that he lost some lean mass and his weight went down to 141-142 pounds.        He had been on heparin in the hospital.  He is currently on Plavix and aspirin.  He then went home and was working in the garden and he jabbed himself with stick and broke the skin.  He did not stop bleeding and they ended up presenting to the emergency room for stitches.  Over the next week his weight drifted back up to 147-148 pounds.  He received a dose of metolazone and his weight came down.  Then over this past weekend and his weight went back up and his wife increased the 1 mg of Bumex to 2 mg in the a.m. and 1 mg in the p.m.  His weight remains elevated.  He is complaining of scrotal edema today as well as increased peripheral edema as well as shortness of breath.  He was short of breath with lying down last night.  He has class III to class IV heart failure.      His other complaints are insomnia.  He has a difficult time falling sleep.  He uses his BiPAP.  However, he does not sleep very long.      His other history is of ischemic cardiomyopathy with extensive stenting to all 3 coronary arteries.  Please see   Marc's note for complete detail.      PHYSICAL EXAMINATION:   VITAL SIGNS:  Blood pressure 122/60, pulse is 66 and weight is 157 pounds, up from 151 pounds.  His weight at home is 151 pounds.   LUNGS:  Clear to auscultation.  No dullness at the bases, no crackles.   CARDIOVASCULAR:  Distant heart tones, regular rhythm.  No murmur.   NECK:  JVD is greater than 12 cm of water at a 45 degree angle.   CARDIAC:  Rhythm is regular, no premature beats noted.   ABDOMEN:  Somewhat distended, normal bowel sounds.   EXTREMITIES:  Show 2+ peripheral edema two-thirds of the way up to his knees bilaterally.      BASIC METABOLIC PANEL:     Sodium 128, potassium 4.5, BUN 85, creatinine 1.95.     Outpatient Encounter Prescriptions as of 3/20/2017   Medication Sig Dispense Refill     [DISCONTINUED] potassium chloride SA (K-DUR/KLOR-CON M) 20 MEQ CR tablet Take 1 tablet (20 meq) every Monday, Wednesday, Friday       aspirin EC 81 MG EC tablet Take 81 mg by mouth daily       levothyroxine (SYNTHROID/LEVOTHROID) 150 MCG tablet Take 150 mcg by mouth daily       [DISCONTINUED] carvedilol (COREG) 3.125 MG tablet Take 3 tablets (9.375 mg) by mouth 2 times daily (with meals) 540 tablet 3     [DISCONTINUED] isosorbide mononitrate (IMDUR) 60 MG 24 hr tablet Take 60 mg by mouth At Bedtime At hs 30 tablet 3     [DISCONTINUED] bumetanide (BUMEX) 1 MG tablet Take 1 mg by mouth 2 times daily       [DISCONTINUED] calcitRIOL (ROCALTROL) 0.25 MCG capsule Take 0.25 mcg by mouth daily Monday, Wednesday and Friday       [DISCONTINUED] hydrALAZINE (APRESOLINE) 50 MG tablet Take 1 tablet (50 mg) by mouth 3 times daily 270 tablet 3     KRILL OIL PO Take 1 capsule by mouth daily        Cyanocobalamin (B-12) 1000 MCG TBCR Take 1,000 mcg by mouth daily 100 tablet 0     multivitamin, therapeutic with minerals (MULTI-VITAMIN) TABS Take 1 tablet by mouth daily       clopidogrel (PLAVIX) 75 MG tablet Take 75 mg by mouth daily       [DISCONTINUED] metolazone  (ZAROXOLYN) 2.5 MG tablet Take 1 tablet (2.5 mg) by mouth daily as needed 30 tablet 1     Saline (SODIUM CHLORIDE) 0.65 % SOLN Spray in nostril as needed       No facility-administered encounter medications on file as of 3/20/2017.       ASSESSMENT AND PLAN:     1.  Acute-on-chronic systolic heart failure.  He is again in congestive heart failure.  Unfortunately, his serum sodium is low.  I have ordered 80 mg of IV Lasix.  With IV Lasix, the sodium can go back into the serum, improving electrolytes.  I have ordered a basic metabolic panel in the morning to readdress his electrolytes as well as address his symptoms.  I also had a long discussion with him about CardioMEMS.  He is willing to proceed.  He is New York Heart Association functional class III-IV.  He recently was hospitalized in [place].  He was on a heparin drip.  He was discharged on his usual medications of Plavix and aspirin.  When he returned home, he jabbed himself with static while gardening.  The bleeding would not stop and he proceeded to the emergency room where he received stitches.  The dressing was removed 2 weeks later and the site continued to bleed.  The patient and his wife are very concerned about proceeding with this CardioMEMS implant and with this bleeding issue.  I told the patient and his wife that I would discuss with Dr. Arnold who would be the implanting physician.   2.  Ischemic cardiomyopathy.  Ejection fraction stable.  On appropriate medications.  He does not tolerate ACE or ARB.  He is on a beta blocker, long-acting nitrates, hydralazine.  He did not tolerate spironolactone was hyponatremia.   3.  Coronary artery disease.  No ischemic symptoms.  Recent stress study without stress-induced ischemia.   4.  Insomnia.  Follow up with Dr. Salguero.      When he was seen by Dr. Tran 03/06, his Bumex was increased to 1 mg twice a day.  Over this past weekend, his wife increased the Bumex to 2 mg in the morning and 1 in the afternoon.  His  weight did not go down, unfortunately.  He now is in CHF.  I have given a dose of IV Lasix today and will reassess in the a.m.     Again, thank you for allowing me to participate in the care of your patient.      Sincerely,    PARKER [facility]

## 2017-03-20 NOTE — PROGRESS NOTES
Infusion Nursing Note:  Perez Villalobos presents today for IV Lasix.    Patient seen by provider today: No   present during visit today: Not Applicable.    Note: N/A.    Intravenous Access:  Peripheral IV placed.    Treatment Conditions:  Not Applicable.      Post Infusion Assessment:  Blood return noted pre and post infusion.  No evidence of extravasations.  Access discontinued per protocol.    Discharge Plan:   Discharge instructions reviewed with: Patient.  Patient and/or family verbalized understanding of discharge instructions and all questions answered.  Patient discharged in stable condition accompanied by: self.  Departure Mode: Ambulatory.    Uzma Fairchild RN

## 2017-03-20 NOTE — MR AVS SNAPSHOT
After Visit Summary   3/20/2017    Perez Villalobos    MRN: [id number]           Patient Information     Date Of Birth          1940        Visit Information        Provider Department      3/20/2017 2:00 PM [facility] INFUSION CHAIR 2 [facility] Infusion [facility]        Today's Diagnoses     Chronic systolic heart failure (H)    -  1       Follow-ups after your visit        Your next 10 appointments already scheduled     Mar 21, 2017 11:20 AM CDT   LAB with [facility] LAB   [facility] ([facility])    [address] [place] [address] [address]  [facility] [address] [phone]           Patient must bring picture ID.  Patient should be prepared to give a urine specimen  Please do not eat 10-12 hours before your appointment if you are coming in fasting for labs on lipids, cholesterol, or glucose (sugar).  Pregnant women should follow their Care Team instructions. Water with medications is okay. Do not drink coffee or other fluids.   If you have concerns about taking  your medications, please ask at office or if scheduling via [employer], send a message by clicking on Secure Messaging, Message Your Care Team.            Mar 23, 2017  1:00 PM CDT   Treatment 60 with Rh Cardiac Rehab 3   [facility] ([facility])    [address]  [facility] [address]   [phone]            Mar 27, 2017  1:00 PM CDT   Treatment 60 with Rh Cardiac Rehab 1   [facility] ([facility])    [address]  [facility] [address]   [phone]            Mar 29, 2017 12:10 PM CDT   LAB with [facility] LAB   [facility] ([facility])    [address] [place] [address] [address]  [place]  [place] [address] [phone]           Patient must bring picture ID.  Patient should be prepared to give a urine specimen  Please do not eat 10-12 hours before your  appointment if you are coming in fasting for labs on lipids, cholesterol, or glucose (sugar).  Pregnant women should follow their Care Team instructions. Water with medications is okay. Do not drink coffee or other fluids.   If you have concerns about taking  your medications, please ask at office or if scheduling via [employer], send a message by clicking on Secure Messaging, Message Your Care Team.            Mar 29, 2017  1:10 PM CDT   Core Return with PARKER Wilde CNP   [facility] [place] [facility] [place] ([facility])    [address]  [place] [address]   [phone]            Mar 30, 2017  1:00 PM CDT   Treatment 60 with Rh Cardiac Rehab 3   [facility] ([facility])    [address]  [facility] [address]   [phone]            Mar 31, 2017 10:30 AM CDT   Return Sleep Patient with Ethan Salguero MD   [facility] ([facility])    [address]  [facility] [address]   [phone]            Apr 03, 2017 10:00 AM CDT   Heart Cath Right Heart Cath with SHCVR1   [facility] ([facility])    [address]  [place] [address]   [phone]            Apr 03, 2017  1:00 PM CDT   Treatment 60 with Rh Cardiac Rehab 1   [facility] ([facility])    [address]  [facility] [address]   [phone]            Apr 04, 2017  4:30 PM CDT   Remote ICD Check with MCLEAN DCR2   [facility] [place] [facility] [place] ([facility])    [address]  [place] [address]   [phone]           This appointment is for a remote check of your debrillator.  This is not an appointment at the office.              Future tests that were ordered for you today     Open Future Orders        Priority Expected Expires  "Ordered    Basic metabolic panel Routine 3/21/2017 3/20/2018 3/20/2017    Heart Cath Right heart cath Routine 4/3/2017 3/20/2018 3/20/2017    Basic metabolic panel Routine 3/27/2017 3/20/2018 3/20/2017    Follow-Up with [facility] Routine 3/27/2017 3/20/2018 3/20/2017            Who to contact     If you have questions or need follow up information about today's clinic visit or your schedule please contact  INFUSION SERVICES directly at [phone].  Normal or non-critical lab and imaging results will be communicated to you by [employer], letter or phone within 4 business days after the clinic has received the results. If you do not hear from us within 7 days, please contact the clinic through [employer] or phone. If you have a critical or abnormal lab result, we will notify you by phone as soon as possible.  Submit refill requests through [employer] or call your pharmacy and they will forward the refill request to us. Please allow 3 business days for your refill to be completed.          Additional Information About Your Visit        MyChart Information     [employer] lets you send messages to your doctor, view your test results, renew your prescriptions, schedule appointments and more. To sign up, go to [email or web address] . Click on \"Log in\" on the left side of the screen, which will take you to the Welcome page. Then click on \"Sign up Now\" on the right side of the page.     You will be asked to enter the access code listed below, as well as some personal information. Please follow the directions to create your username and password.     Your access code is: [email or web address]  Expires: 2017  3:26 PM     Your access code will  in 90 days. If you need help or a new code, please call your [place] clinic or [phone].        Care EveryWhere ID     This is your Care EveryWhere ID. This could be used by other organizations to access your [place] medical records  [id number]        Your " Vitals Were     Pulse                   65            Blood Pressure from Last 3 Encounters:   03/20/17 128/70   03/20/17 122/60   03/06/17 118/62    Weight from Last 3 Encounters:   03/20/17 71.4 kg (157 lb 4.8 oz)   03/08/17 67.1 kg (148 lb)   03/06/17 68.5 kg (151 lb)              Today, you had the following     No orders found for display       Primary Care Provider Office Phone # Fax #    Hector Jc [phone]       [facility] [address]  [facility] [address]        Thank you!     Thank you for choosing [facility] INF[facility]ION [facility]  for your care. Our goal is always to provide you with excellent care. Hearing back from our patients is one way we can continue to improve our services. Please take a few minutes to complete the written survey that you may receive in the mail after your visit with us. Thank you!             Your Updated Medication List - Protect others around you: Learn how to safely use, store and throw away your medicines at www.disposemymeds.org.          This list is accurate as of: 3/20/17  2:52 PM.  Always use your most recent med list.                   Brand Name Dispense Instructions for use    aspirin EC 81 MG EC tablet      Take 81 mg by mouth daily       B-12 1000 MCG Tbcr     100 tablet    Take 1,000 mcg by mouth daily       bumetanide 1 MG tablet    BUMEX     Take 1 mg by mouth 2 times daily       calcitRIOL 0.25 MCG capsule    ROCALTROL     Take 0.25 mcg by mouth three times a week Monday, Wednesday and Friday       carvedilol 3.125 MG tablet    COREG    540 tablet    Take 3 tablets (9.375 mg) by mouth 2 times daily (with meals)       hydrALAZINE 50 MG tablet    APRESOLINE    270 tablet    Take 1 tablet (50 mg) by mouth 3 times daily       isosorbide mononitrate 60 MG 24 hr tablet    IMDUR    30 tablet    Take 60 mg by mouth At Bedtime At hs       KRILL OIL PO      Take 1 capsule by mouth daily       levothyroxine 150 MCG tablet     SYNTHROID/LEVOTHROID     Take 150 mcg by mouth daily       metolazone 2.5 MG tablet    ZAROXOLYN    30 tablet    Take 1 tablet (2.5 mg) by mouth daily as needed       Multi-vitamin Tabs tablet      Take 1 tablet by mouth daily       PLAVIX 75 MG tablet   Generic drug:  clopidogrel      Take 75 mg by mouth daily       potassium chloride SA 20 MEQ CR tablet    K-DUR/KLOR-CON M     Take 1 tablet (20 meq) every Monday, Wednesday, Friday       Sodium Chloride 0.65 % Soln      Spray in nostril as needed

## 2017-03-21 NOTE — TELEPHONE ENCOUNTER
Telemanagment    Weight unchanged after 80 mg IV lasix yesterday. Repeat BMP at 11:20 AM today. Left voicemail for pt to call back.      Roxanne PAZ RN  C.O.R.E. [facility]

## 2017-03-21 NOTE — TELEPHONE ENCOUNTER
Verbal order per Shani Patrick CNP for patient to take metolazone 2.5 mg now, then 30 minutes later his afternoon dose of 1 mg Bumex. Called patient and reviewed his BMP results from today. Patient has not yet taken his afternoon dose of Bumex. He found and read off his metolazone bottle to confirm the dosage. He repeated back the instruction to take 1 metolazone tablet and then his scheduled Bumex 30 minutes later. He will set a timer so that he does not forget.  Will continue to monitor.     Roxanne PAZ RN  C.O.R.E. [facility]

## 2017-03-22 NOTE — TELEPHONE ENCOUNTER
Discussed case with Dr. Arnold about bleeding issues. Please let Perez know that Dr. Arnold plans to contact him .KMannchenCNP

## 2017-03-22 NOTE — TELEPHONE ENCOUNTER
----- Message from Ryan Arnold MD sent at 3/21/2017  8:47 AM CDT -----  Regarding: RE: question prior to cardioMEMS implant  Is he still on Plavix and Aspirin? If he is tolerating both he should be able to proceed. I will give them a call. Thanks   ----- Message -----     From: Shani Patrick APRN CNP     Sent: 3/20/2017   2:26 PM       To: Ryan Arnold MD  Subject: question prior to cardioMEMS implant             Please see detailed notes. Pt on plavix and aspirin. Was recently hospitalized in [place]. Was on Heparin as well. Came home, jabbed himself with a stick while gardening, bleeding would not stop. Ended up going to the ED. Needed stitches. Not sure if this occurred post hospital because he had just been on heparin? Wanted to ask because he will be having a cardioMeMS implant. Thanks Dixon

## 2017-03-22 NOTE — PROGRESS NOTES
Patient just took his AM bumex 15 minutes ago. Gave instruction for patient to take another 1 mg table of Bumex now. This afternoon he should take 2.5 mg of metolazone and 30 minutes later 2 mg tablet of bumex. He should continue bumex at 2 mg BID. Patient's wife wrote down and repeated back the instruction. They will use the 2 mg tablet's they have at home and request refill if needed.    Roxanne DUTTAN RN  C.O.R.E. [facility]

## 2017-03-22 NOTE — PROGRESS NOTES
Called patient's wife and she thinks patient took 2.5 mg of metolazone in the hospital. He was discharged on 2.5 mg metolazone daily and Bumex 2 mg BID. Vijaya GILL

## 2017-03-22 NOTE — PROGRESS NOTES
Telemanagment    Patient was told to take metolazone 2.5 mg x 1 prior to his afternoon Bumex dose. Taking Bumex 1 mg BID. Weight is down 1#. HF sx not improved. Told patient's wife that Dr. Arnold would be contacting him about his concerns for bleeding during RHC and cardioMEMS implant.       Roxanne PAZ RN  C.O.R.E. [facility]

## 2017-03-23 NOTE — PROGRESS NOTES
Patient's wife repeated back the instruction for Bumex 2 mg in AM and 1mg in PM daily. She will have patient to metolazone 2.5 mg every other day (next dose tomorrow) until his weight is 146#. Rescheduled labs to 3/27. Vijaya GILL

## 2017-03-23 NOTE — PROGRESS NOTES
Perez Villalobos 76 year old  03/23/17 1400   Session   Session 60 Day Individualized Treatment Plan   Certified through this date 04/22/17   Cardiac Rehab Assessment   Cardiac Rehab Assessment 1/5/2017 Evaluation completed. PT has an extensive heart history. In 2016 he was diagnosed with CHF. 1/23/2017. PT will be transferring to a cardiac rehab program in [place]. PT signed a release of information and records were sent with PT. PT has denied any symptoms or complaints with exercise. PT was given handouts and verbal information in regards to low sodium choices. PT continues to benefit from monitored exercise and CHF education by participating in a cardiac rehab program in [place]. 3/8/2017 PT returned today from [place] as he wanted to continue the cardiac rehab program. While in [place] he was hospitalized for 5 days with an exacerbation of CHF. He received order to return to cardiac rehab and was seen in [place] before returning home. He followed up with the cardiologist this week. PT is still having problems with fluid retention. He is calling in his weight daily to the [facility] clinic. His energy level varies daily, but is limited by weakness and fatigue. Due to CHF and recent hospitalization, PT would benefit from close monitoring of symptoms, CV response with education for management of CHF. 3/23/2017 PT is having problems with fluid retention and is being agressively treated with diuretics. His weight is still up over 7# the past 13 days. He will being having a cardiomem in early April to monitor the pressures in his heart. Due to sx and weight gain he will need skilled therapy to closely monitor heart function and sx with exercise to prevent rehospitalization as well as CHF education reinforced.   General Information   Treatment Diagnosis Systolic Heart Failure with Lowered EF   Date of Treatment Diagnosis 11/15/16  (Diagnosed with heart failure in 2/2016)   Secondary Treatment Diagnosis (ICD/pacemaker  11/16/2016)   Significant Past CV History Previous MI;Previous PCI   Comorbidities Renal Disease   Other Medical History Numerous hospitalizations the past year for acute renal failure and respiratory failure due to rhabdomyolysis. Symptomatic bradycardia with a V.fib arrest.   Lead up symptoms Slow heart rate in the 40's with near syncope   Hospital Location [facility]   Hospital Discharge Date 11/18/16   Signs and Symptoms Post Hospital Discharge Fatigue;SOB   Outpatient Cardiac Rehab Start Date 01/05/17   Primary Physician Hector Jc   Primary Physician Follow Up Completed   Cardiologist Marc/Elle   Cardiologist Follow Up Completed   Ejection Fraction 30-35%   Risk Stratification High   Summary of Cath Report   Summary of Cath Report Not Applicable   Living and Work Status    Living Arrangements and Social Status house;spouse   Support System Live with an adult   Return to Employment Retired   Preventative Medications   CMS recommended medications Antiplatelets;Beta Blocker;Influenza vaccination;Pneumonia vaccination   Falls Screen   Have you fallen two or more times in the past year? Yes   Have you fallen and had an injury in the past year? No   Pain   Patient Currently in Pain No   Pain Description Comment hip discomfort occurs with walking. 3/8/2017 No complaints of hip pain today.   Physical Assessments   Incisions Not applicable   Edema +3 Moderate   Right Lung Sounds diminished  (base)   Left Lung Sounds diminished  (base)   Limitations Orthopedic   Comments Hip pain bilaterally and leg weakness. 3/6/2017 PT still has lower leg swelling. Wearing compression socks. His wife reports it is better than it was in [place]. Explained to PT the importance of keeping his legs elevated when sitting.   Individualized Treatment Plan   Monitored Sessions Scheduled 18   Monitored Sessions Attended 16  (PT had 6 sessions in [place].)   Oxygen   Supplemental Oxygen needed No   Nutrition Management - Weight  "Management   Assessment Re-assessment   Age 76   Weight 68.5 kg (151 lb 1.6 oz)   Height 1.74 m (5' 8.5\")   BMI (Calculated) 22.69   Goal Weight (at goal weight. )   Initial Rate Your Plate Score. Dietary tool to assess eating patterns. Scores range from 24 to 72. The higher the score the healthier the eating pattern. 64   Weight Management Comments 3/8/2017 Weight is back to his baseline weight when he started the program. 3/23/2017 PT has gained over 7# in the past 13 days. He is being treated with more diuretics.   Nutrition Management - Lipids   Lipids Labs Available   Date 09/16/16   Total Cholesterol 232   Triglycerides 126   HDL 87      Prescribed Lipid Medication No;Intolerant   Lipid Comments PT had rhabdomyolisis secondary to Crestor. 3/8/2017 Reviewed cholesterol levels.   Nutrition Management - Diabetes   Diabetes No   Nutrition Management Summary   Dietary Recommendations Low Fat;Low Cholesterol;Low Sodium   Stages of Change for Diet Compliance Action   Interventions Planned Attend Nutrition Education Class(es);Refer for Individual Diet Consultation   Patient Goals Goal #1   Goal #1 Description Learn ways to reduce the sodium in cooking but still make tasty dishes by attending the nutrition classes and meet possibly with the dietitian.   Goal #1 Target Date 02/01/17   Goal #1 Progress Towards Goal 1/23/2017. PT was given handouts and verbal instruction on low sodium choices and ways to flavor food without using salt. PT reports he has made a drastic change in this area. PT pays close attention to sodium content and checks weight daily. 3/8/2017 PT has not been in Rehab since 1/23/17. He is still retaining fluids. He is watching his salt intake and his wife is cooking from scratch.He feels he is eating only about 1500 mg of sodium per day. Plan to do a food log for sodium at next update. 3/23/2017 Weight has beeen increasing. He feels he is following a low sodium diet. Pt and wife agreed to do a " sodium log to see where he is at. His wife stated that sometimes he tries to add items higher in salt. Plan to review with PT once completed.   Nutrition Summary Comments Trying to watch the sodium levels more carefully the past couple months.   Nutrition Target Outcome Total Chol < 150, HDL > 40 (M), HDL > 50 (W), LDL < 70, Trig < 150   Psychosocial Management   Psychosocial Assessment Re-assessment   Is there history of clinical depression or increased risk of depression? No previous history   Current Level of Stress per Patient Report Mild   Current Coping Skills Uses Stress Management/Relaxation Techniques  (exercise, hobby-works on cars)   Initial Patient Health Questionnaire -9 Score (PHQ-9) for depression. 5-9 Minimal symptoms, 10-14 Minor depression, 15-19 Major depression, moderately severe, > 20 Major depression, severe  6   Initial [facility] Survey score.  Quality of Life:   If total score > 25 review individual areas where patient rated a 4 or 5.  Consider patients current medical condition and what role that plays on the score.   Adjust treatment protocol to improve areas of concern.  Consider the following:  PHQ9 score, DASI, and re-assessment within the next 30 days to assist with developing treatments.  22   Stages of Change Maintenance   Interventions Planned Patient denies need for intervention at this time.   Interventions In Progress or Completed Patient verbalizes understanding of behavioral assessment scores;Patient verbalizes understanding of negative impact of stress to personal health   Patient Goal No   Psychosocial Comments Sometimes PT does not sleep well at night due to being restless. 3/8/2017 PT is feeling fustrated with how poorly he feels some days. He denies any depression. Hopefully he will start to make progress, but will need continued support to prevent depression.   Psychosocial Target Outcome Identify absence or presence of depression using valid screening tool   Other  Core Components - Hypertension   History of or Diagnosis of Hypertension Yes   Currently taking Anti-Hypertensives Yes;Beta blocker   Hypertension Comments Resting BP borderline-will continue to monitor. 3/8/2017 Resting BP wnl. 3/23/2017 No change.   Other Core Components - Tobacco   History of Tobacco Use Yes   Quit Date or Planned Quit Date (1985)   Tobacco Habit Cigarettes;Pipe   Stages of Change Maintenance   Other Core Components Summary   Interventions Planned Instruct patient on the DASH diet;Provide information on home blood pressure monitoring;Educate on the use of 'Stop Light' tool;Educate on importance of monitoring daily weight;Educate on signs/symptoms and when to call the doctor (i.e. increased edema, dyspnea, fatigue, dizziness/lightheadedness, change in sleep patterns, chest discomfort);Instruct and educate to self manage Heart Failure symptoms;Attend education class(es) on Nutrition   Interventions In Progress or Completed Instructed on DASH diet;Listed benefits of weight management;Educated on importance of maintaining low sodium diet;Educated on importance of monitoring daily weight;Educated on signs/symptoms and when to call the doctor;Educated on the importance of using resources to self manage Heart Failure symptoms;Educated on the use of the 'Stop Light' Tool   Patient Goals (See nutrition goal)   Other Core Components Comments PT has a home BP monitor, but does not use it as often as he should. 3/8/2017 PT is watching his sodium intake and weighing daily. Reviewed the Stop Light Tool with PT and given handout. PT is currently calling his weight in daily to the [facility] clinic. 3/23/2017 PT will be having a cardiomem inserted in April to monitor the pressures in his heart.   Other Core Components Target Outcome BP < 140/90 or < 130/80 with DM or CKD;Compliance with Diet, Medications and Symptom Management to allow for stable Heart Failure   Activity/Exercise History   Activity/Exercise Assessment  Re-assessment   Activity/Exercise Status prior to event? Participated in an Exercise Program   Number of Days Currently participating in Moderate Physical Activity? 0   Number of Days Currently performing  Aerobic Exercise (including rehab)? 1  (only today's session)   Number of Minutes per Session Currently of Aerobic Exercise (average)? 13-14 min. bouts x2   Current Stage of Change (Physical Activity) Preparation   Current Stage of Change (Aerobic Exercise) Preparation   Patient Goals Goal #1;Goal #2   Goal #1 Description PT would like to build his leg strength to be able to walk 3/4 mile by attending cardiac rehab and exercising at home.   Goal #1 Target Date 03/01/17   Goal #1 Progress Towards Goal 1/23/2017. PT reports and improvement in strength, but does feel he has met this goal as of yet. PT will continue rehab in [place] and work towards this goal. 3/8/2017 PT has not been exercising at home. He does have a TM and encouraged him to walk up to 10 minutes x 2 for 1-2 days outside of rehab at the same level as here. Also encouraged him to do stand to sit, to stand exercises daily using a chair to build his quad strength as he has difficulty getting out of a chair without using his arms. May add additional LE cals once PT is stronger. 3/23/2016 PT has bought a seated stepper for home exercise. He has not used it yet. Discussed having him start slow working up from 10-15 minutes with a rest break.   Goal #2 Description PT would like to improve his upper body strength by participating in cardiac rehab 3 times per week and adding light weights.   Goal #2 Target Date 03/01/17   Goal #2 Progress Towards Goal 1/23/2017. PT continue rehab in [place] to work towards this goal. 3/8/2017 Plan to add weights next week. 3/23/2017 PT is now doing 1-3# weights in rehab.   Activity/Exercise Comments PT has a treadmill and belongs to [employer]. 3/23/2017 PT purchased a seated stepper to exercise on at home.    Activity/Exercise Target Outcome An Accumulation of 150  Minutes of Aerobic Activity per Week   Exercise Assessment   6 Minute Walk Predicted - Gender Selection Male   6 Minute Walk Predicted (Male) 1642.5   6 Minute Walk Predicted (Female) 1416.3   Initial 6 Minute Walk Distance (Feet) 746 ft   Resting HR 63 bpm   Exercise HR 75 bpm   Post Exercise HR 60 bpm   Resting /60   Exercise /54   Post Exercise /60   Effort Rating 6   Current MET Level 2.1   MET Level Goal 3.0-4.0   ECG Rhythm Paced rhythm  (ectopic beats)   Ectopy None   Current Symptoms Fatigue;Weakness   Limitations/Restrictions Other (see comments);Orthopedic (see comments)  (hip pain and weakness in legs)   Exercise Prescription   Mode Recumbant bike;Nustep;Treadmill;Weights   Duration/Time Intermittent bouts   Frequency 2 days/week   THR (85% of age predicted max HR) 121.55   OMNI Effort Rating (0-10 Scale) 4-6/10   Progression Intermittent bouts;Total exercise time of 20-30 minutes;Progress peak intensity by 1/4 MET per week;Aerobic exercise to OMNI rating of 6 or below and at or below THR   Recommended Home Exercise   Type of Exercise Walking;Treadmill;Other (comments)  (seated stepper)   Frequency (days per week) 2   Duration (minutes per session) Intermittent   Effort Rating Recommended 4-6/10   30 Day Exercise Plan PT to continue to walk 2 minutes bouts on the TM 2-3 times per day. May eventually go to the fitness club to exercise. 3/8/2017 PT to start with 10 minute bouts of walking x2 1-2 days per week and increase as tolerates.   Current Home Exercise   Type of Exercise None   Follow-up/On-going Support   Provider follow-up needed on the following Other (see comments)   Comments Fluid retention with increased weight.   Learning Assessment   Learner Patient   Primary Language English   Preferred Learning Style Reading;Pictures/Video   Barriers to Learning Cognitive   Patient Education   Education recommended Anatomy and  Physiology of the Heart;Heart Failure;Muscle Conditioning;Nutrition;Medication Overview   Education Comments 3/8/2017 PT has been given handouts on sodium, CHF management.   Physician cosignature/electronic signature indicates approval of this ITP document. I have established, reviewed and made necessary changes to the individualized treatment plan and exercise prescription for this patient.

## 2017-03-23 NOTE — PROGRESS NOTES
Continue with bumex 2mg am and 1 mg pm and every other day metolazone until weight 146#. Change BMP to 3/27 and cancel 3/29. Office visit 3/29.Cassie

## 2017-03-23 NOTE — PROGRESS NOTES
Weight down 1# after 2.5 mg metolazone for 2 days and an increase in Bumex to 2 mg BID starting with yesterday afternoon dose. Will update Bandar WATTS.       Roxanne DUTTAN RN  C.O.R.E. [facility]

## 2017-03-27 NOTE — TELEPHONE ENCOUNTER
Refill request received from [facility] in [place] for isosorbide. Patient saw Dr. Tran in clinic this month. 30 day supply with 11 refills e-prescribed. Vijaya GILL

## 2017-03-27 NOTE — PROGRESS NOTES
Called and spoke with patient's wife and reviewed potassium supplement. She wrote down and repeated back the instruction for 40 meq KCl now, 40 meq tomorrow, and 20 meq 3/29. No metolazone for now. WILIAN ordered and scheduled for draw in [place] prior to office visit. Vijaya GILL

## 2017-03-27 NOTE — PROGRESS NOTES
Called patient for critical potassium of 2.6 today in pre-heart cath labs. Patient had been taking metolazone 2.5 mg every other day for wt>146#. Bumex 2 mg AM, 1 mg PM daily. Potassium supplement 20 meq on Monday, Wednesday, Friday. Left voicemail for patient to call back. [facility] f/u 3/29/17. Vijaya GILL

## 2017-03-27 NOTE — PROGRESS NOTES
3/27: KCL 40meq ; 3/28 KCL 40meq  3/29 KCL 20meq . 3/29 repeat BMP ( already planned ) Stop Metolazone. JaquichenMAC

## 2017-03-29 PROBLEM — I50.9 CHF (CONGESTIVE HEART FAILURE) (H): Status: ACTIVE | Noted: 2017-01-01

## 2017-03-29 NOTE — PATIENT INSTRUCTIONS
Call the C.O.R.E. nurse for any questions or concerns:  [phone]    1. Medication changes from today:    2. Weigh yourself daily and write it down.    3. Call [facility] nurse if your weight is up more than 2 pounds in one day or 5 pounds in one week.    4. Call [facility] nurse if you feel more short of breath, have more abdominal bloating, or leg swelling.    5. Continue low sodium diet (less than 2000 mg daily). If you eat less salt, you will retain less fluid.    6. Do NOT take Aleve or ibuprofen without talking to your doctor first.     7. Lab Results:  Component      Latest Ref Rng & Units 3/27/2017 3/29/2017   WBC      4.0 - 11.0 10e9/L 6.7    RBC Count      4.4 - 5.9 10e12/L 3.89 (L)    Hemoglobin      13.3 - 17.7 g/dL 12.8 (L)    Hematocrit      40.0 - 53.0 % 37.6 (L)    MCV      78 - 100 fl 97    MCH      26.5 - 33.0 pg 32.9    MCHC      31.5 - 36.5 g/dL 34.0    RDW      10.0 - 15.0 % 17.5 (H)    Platelet Count      150 - 450 10e9/L 149 (L)    Diff Method       Automated Method    % Neutrophils      % 73.6    % Lymphocytes      % 7.7    % Monocytes      % 17.6    % Eosinophils      % 0.1    % Basophils      % 0.4    % Immature Granulocytes      % 0.6    Nucleated RBCs      0 /100 0    Absolute Neutrophil      1.6 - 8.3 10e9/L 4.9    Absolute Lymphocytes      0.8 - 5.3 10e9/L 0.5 (L)    Absolute Monocytes      0.0 - 1.3 10e9/L 1.2    Absolute Eosinophils      0.0 - 0.7 10e9/L 0.0    Absolute Basophils      0.0 - 0.2 10e9/L 0.0    Abs Immature Granulocytes      0 - 0.4 10e9/L 0.0    Absolute Nucleated RBC       0.0    Sodium      136 - 145 mmol/L 126 (L) 123 (L)   Potassium      3.5 - 5.1 mmol/L 2.6 (LL) 4.2   Chloride      98 - 107 mmol/L 77 (L) 79 (L)   Carbon Dioxide      23 - 29 mmol/L 36 (H) 37 (H)   Anion Gap      6 - 17 mmol/L 13 Not Calculated   Glucose      70 - 105 mg/dL 122 (H) 134 (H)   Urea Nitrogen      7 - 30 mg/dL 90 (H) 101 (H)   Creatinine      0.70 - 1.30 mg/dL 1.89 (H) 2.24 (H)   GFR Estimate       >60 mL/min/1.7m2 35 (L) 29 (L)   GFR Estimate If Black      >60 mL/min/1.7m2 42 (L) 35 (L)   Calcium      8.5 - 10.5 mg/dL 8.6 9.6   INR      0.86 - 1.14 1.10    PTT      22 - 37 sec 27      [facility]: Cardiomyopathy, Optimization, Rehabilitation, Education  The [facility] is a heart failure specialty clinic within the [facility] where you will work with specialized nurse practitioners, physician assistants, doctors, and registered nurses. They are dedicated to helping patients with heart failure to carefully adjust medications, receive education, and learn who and when to call if symptoms develop. They specialize in helping you better understand your condition, slow the progression of your disease, improve the length and quality of your life, help you detect future heart problems before they become life threatening, and avoid hospitalizations.

## 2017-03-29 NOTE — PHARMACY-ADMISSION MEDICATION HISTORY
Admission medication history interview status for the 3/29/2017  admission is complete. See EPIC admission navigator for prior to admission medications     Medication history source reliability:Good- patient/wife    Actions taken by pharmacist (provider contacted, etc):None     Additional medication history information not noted on PTA med list :  -Dose of bumetanide may vary based on weight.  -He usually takes metolazone 2.5mg every other day but stopped on Monday 3/27/17 per provider instruction.  -He usually takes KCl 20meq Mon, Wed, Friday but per provider instruction took extra 40meq in evening Monday 3/27/17 and extra 40meq Tues 3/28/17.    Medication reconciliation/reorder completed by provider prior to medication history? No    Time spent in this activity: 15 min    Prior to Admission medications    Medication Sig Last Dose Taking? Auth Provider   Bumetanide (BUMEX PO) Take 1 mg by mouth daily In afternoon (in addition to 2 tablets in AM) 3/28/2017 at Unknown time Yes Unknown, Entered By History   isosorbide mononitrate (IMDUR) 60 MG 24 hr tablet Take 1 tablet (60 mg) by mouth At Bedtime At hs 3/28/2017 at pm Yes Shani Patrick APRN CNP   bumetanide (BUMEX) 1 MG tablet Take 2 mg by mouth every morning 2 tablets in AM (In addition to 1 tablet in the afternoon daily) 3/29/2017 at am Yes Shani Patrick APRN CNP   metolazone (ZAROXOLYN) 2.5 MG tablet One tablet every other day until wt is 146# per [facility] 3/23/17 Past Week at Unknown time Yes Shani Patrick APRN CNP   potassium chloride SA (K-DUR/KLOR-CON M) 20 MEQ CR tablet Take 1 tablet (20 meq) every Monday, Wednesday, Friday 3/29/2017 at am Yes Shani Patrick APRN CNP   aspirin EC 81 MG EC tablet Take 81 mg by mouth daily 3/29/2017 at am Yes Unknown, Entered By History   levothyroxine (SYNTHROID/LEVOTHROID) 150 MCG tablet Take 150 mcg by mouth daily 3/29/2017 at am Yes Unknown, Entered By History   carvedilol (COREG) 3.125  MG tablet Take 3 tablets (9.375 mg) by mouth 2 times daily (with meals) 3/29/2017 at am Yes Arturo Almeida MD   calcitRIOL (ROCALTROL) 0.25 MCG capsule Take 0.25 mcg by mouth three times a week Monday, Wednesday and Friday 3/29/2017 at am Yes Reported, Patient   Saline (SODIUM CHLORIDE) 0.65 % SOLN Spray in nostril as needed Past Week at Unknown time Yes Unknown, Entered By History   hydrALAZINE (APRESOLINE) 50 MG tablet Take 1 tablet (50 mg) by mouth 3 times daily 3/29/2017 at am Yes Shani Patrick APRN CNP   KRILL OIL PO Take 1 capsule by mouth daily  3/29/2017 at am Yes Reported, Patient   Cyanocobalamin (B-12) 1000 MCG TBCR Take 1,000 mcg by mouth daily 3/29/2017 at am Yes Hodan Guerrero MD   multivitamin, therapeutic with minerals (MULTI-VITAMIN) TABS Take 1 tablet by mouth daily 3/29/2017 at am Yes Reported, Patient   clopidogrel (PLAVIX) 75 MG tablet Take 75 mg by mouth daily 3/29/2017 at am Yes Reported, Patient

## 2017-03-29 NOTE — PROGRESS NOTES
"HPI and Plan:   See [id number]    No orders of the defined types were placed in this encounter.      No orders of the defined types were placed in this encounter.      There are no discontinued medications.      Encounter Diagnosis   Name Primary?     Chronic systolic heart failure (H)        CURRENT MEDICATIONS:  Current Outpatient Prescriptions   Medication Sig Dispense Refill     isosorbide mononitrate (IMDUR) 60 MG 24 hr tablet Take 1 tablet (60 mg) by mouth At Bedtime At hs 30 tablet 11     bumetanide (BUMEX) 1 MG tablet 2 tablets in AM and 1 tablet in the afternoon daily       potassium chloride SA (K-DUR/KLOR-CON M) 20 MEQ CR tablet Take 1 tablet (20 meq) every Monday, Wednesday, Friday       aspirin EC 81 MG EC tablet Take 81 mg by mouth daily       levothyroxine (SYNTHROID/LEVOTHROID) 150 MCG tablet Take 150 mcg by mouth daily       carvedilol (COREG) 3.125 MG tablet Take 3 tablets (9.375 mg) by mouth 2 times daily (with meals) 540 tablet 3     calcitRIOL (ROCALTROL) 0.25 MCG capsule Take 0.25 mcg by mouth three times a week Monday, Wednesday and Friday       hydrALAZINE (APRESOLINE) 50 MG tablet Take 1 tablet (50 mg) by mouth 3 times daily 270 tablet 3     KRILL OIL PO Take 1 capsule by mouth daily        Cyanocobalamin (B-12) 1000 MCG TBCR Take 1,000 mcg by mouth daily 100 tablet 0     multivitamin, therapeutic with minerals (MULTI-VITAMIN) TABS Take 1 tablet by mouth daily       clopidogrel (PLAVIX) 75 MG tablet Take 75 mg by mouth daily       metolazone (ZAROXOLYN) 2.5 MG tablet One tablet every other day until wt is 146# per KMannchen CNP 3/23/17       Saline (SODIUM CHLORIDE) 0.65 % SOLN Spray in nostril as needed         ALLERGIES     Allergies   Allergen Reactions     Lisinopril Other (See Comments)     Angioedema     Augmentin Other (See Comments)     Per pt, \"froze him, affected his legs\"     Crestor [Rosuvastatin] Other (See Comments)     rhabdomyolysis       PAST MEDICAL HISTORY:  Past " Medical History:   Diagnosis Date     Acute renal failure (H)     10/2015 ARF secondary to rhabdomyolysis     Alcoholism (H)      Anemia      Benign essential HTN      BPH (benign prostatic hypertrophy)      CAD (coronary artery disease)     AWMI, stents to Cx, RCA and LAD     Chronic systolic heart failure (H)      CKD (chronic kidney disease)      Complete AV block (H)      Ischemic cardiomyopathy 10/23/2015    EF 30%     Mixed hyperlipidemia      NSTEMI (non-ST elevated myocardial infarction) (H) 10/23/2015     PAD (peripheral artery disease) (H)      Rhabdomyolysis due to statin therapy     crestor     Severe hypothyroidism 9/20/2016     VF (ventricular fibrillation) (H) 11/16/16       PAST SURGICAL HISTORY:  Past Surgical History:   Procedure Laterality Date     APPENDECTOMY       CHOLECYSTECTOMY       ENDARTERECTOMY CAROTID Left 6/11/10     IMPLANT AUTOMATIC IMPLANTABLE CARDIOVERTER DEFIBRILLATOR  11/16/16     STENT, CORONARY, S660 15/18  Nov 2000    PTCA with stent placement of CFX     STENT, CORONARY, S660 15/18  Feb 2001    PTCA with stent placement of CFX RCA      STENT, CORONARY, S660 15/18  April 2007    stent placed in LAD     tonsillectomy and adenoidectomy         FAMILY HISTORY:  Family History   Problem Relation Age of Onset     Unknown/Adopted Mother      Unknown/Adopted Father        SOCIAL HISTORY:  Social History     Social History     Marital status:      Spouse name: N/A     Number of children: N/A     Years of education: N/A     Social History Main Topics     Smoking status: Former Smoker     Packs/day: 1.50     Years: 20.00     Types: Cigarettes     Quit date: 1/1/1980     Smokeless tobacco: Never Used     Alcohol use No     Drug use: Not on file     Sexual activity: Not on file     Other Topics Concern     Caffeine Concern No     decaf coffee     Sleep Concern No     Stress Concern No     Weight Concern No     Special Diet Yes     low fat, low sodium     Exercise Yes     everyday  "    Social History Narrative       Review of Systems:  Skin:  Positive for       Eyes:  Positive for glasses    ENT:  Negative      Respiratory:  Positive for dyspnea on exertion;sleep apnea uses BIPAP   Cardiovascular:  Negative for;palpitations;chest pain Positive for;edema;fatigue    Gastroenterology:        Genitourinary:  Positive for urinary frequency    Musculoskeletal:  Positive for   weakness  Neurologic:  Positive for numbness or tingling of feet    Psychiatric:  Negative      Heme/Lymph/Imm:  Negative      Endocrine:  Positive for thyroid disorder      Physical Exam:  Vitals: /70 (BP Location: Left arm, Patient Position: Chair, Cuff Size: Adult Small)  Pulse 62  Temp 97.4  F (36.3  C)  Ht 1.74 m (5' 8.5\")  Wt 69.3 kg (152 lb 12.8 oz)  SpO2 100%  BMI 22.9 kg/m2    Constitutional:  cooperative;alert and oriented chronically ill;frail      Skin:  warm and dry to the touch        Head:  normocephalic, no masses or lesions        Eyes:  pupils equal and round;sclera white;EOMS intact        ENT:  no pallor or cyanosis        Neck:  carotid pulses are full and equal bilaterally JVP 10-12      Chest:  clear to auscultation basal rales pacemaker incision in the left infraclavicular area was well-healed      Cardiac: regular rhythm, normal S1/S2, no S3 or S4, apical impulse not displaced, no murmurs, gallops or rubs   distant heart sounds              Abdomen:  abdomen soft;no bruits;non-tender        Vascular: pulses full and equal, no bruits auscultated                                        Extremities and Back:  no deformities, clubbing, cyanosis, erythema observed   bilateral LE edema;1+          Neurological:  affect appropriate, oriented to time, person and place              CC  Shani MELLO Mannchen, APRN CNP   [facility]  [address]  [address]  [place] [id number]              "

## 2017-03-29 NOTE — PROGRESS NOTES
Patient seen for [facility] appointment today. He reported a fall yesterday and today with hyponatremia today. RN transported patient to the ED by wheelchair for further evaluation and treatment per Shani Patrick CNP.  Wife was present. Vijaya GILL

## 2017-03-29 NOTE — MR AVS SNAPSHOT
After Visit Summary   3/29/2017    Perez Villalobos    MRN: [id number]           Patient Information     Date Of Birth          1940        Visit Information        Provider Department      3/29/2017 1:10 PM Shani Patrick APRN CNP [facility] [place]        Today's Diagnoses     Chronic systolic heart failure (H)          Care Instructions    Call the C.O.R.E. nurse for any questions or concerns:  [phone]    1. Medication changes from today:    2. Weigh yourself daily and write it down.    3. Call CORE nurse if your weight is up more than 2 pounds in one day or 5 pounds in one week.    4. Call CORE nurse if you feel more short of breath, have more abdominal bloating, or leg swelling.    5. Continue low sodium diet (less than 2000 mg daily). If you eat less salt, you will retain less fluid.    6. Do NOT take Aleve or ibuprofen without talking to your doctor first.     7. Lab Results:  Component      Latest Ref Rng & Units 3/27/2017 3/29/2017   WBC      4.0 - 11.0 10e9/L 6.7    RBC Count      4.4 - 5.9 10e12/L 3.89 (L)    Hemoglobin      13.3 - 17.7 g/dL 12.8 (L)    Hematocrit      40.0 - 53.0 % 37.6 (L)    MCV      78 - 100 fl 97    MCH      26.5 - 33.0 pg 32.9    MCHC      31.5 - 36.5 g/dL 34.0    RDW      10.0 - 15.0 % 17.5 (H)    Platelet Count      150 - 450 10e9/L 149 (L)    Diff Method       Automated Method    % Neutrophils      % 73.6    % Lymphocytes      % 7.7    % Monocytes      % 17.6    % Eosinophils      % 0.1    % Basophils      % 0.4    % Immature Granulocytes      % 0.6    Nucleated RBCs      0 /100 0    Absolute Neutrophil      1.6 - 8.3 10e9/L 4.9    Absolute Lymphocytes      0.8 - 5.3 10e9/L 0.5 (L)    Absolute Monocytes      0.0 - 1.3 10e9/L 1.2    Absolute Eosinophils      0.0 - 0.7 10e9/L 0.0    Absolute Basophils      0.0 - 0.2 10e9/L 0.0    Abs Immature Granulocytes      0 - 0.4 10e9/L 0.0    Absolute Nucleated RBC       0.0     Sodium      136 - 145 mmol/L 126 (L) 123 (L)   Potassium      3.5 - 5.1 mmol/L 2.6 (LL) 4.2   Chloride      98 - 107 mmol/L 77 (L) 79 (L)   Carbon Dioxide      23 - 29 mmol/L 36 (H) 37 (H)   Anion Gap      6 - 17 mmol/L 13 Not Calculated   Glucose      70 - 105 mg/dL 122 (H) 134 (H)   Urea Nitrogen      7 - 30 mg/dL 90 (H) 101 (H)   Creatinine      0.70 - 1.30 mg/dL 1.89 (H) 2.24 (H)   GFR Estimate      >60 mL/min/1.7m2 35 (L) 29 (L)   GFR Estimate If Black      >60 mL/min/1.7m2 42 (L) 35 (L)   Calcium      8.5 - 10.5 mg/dL 8.6 9.6   INR      0.86 - 1.14 1.10    PTT      22 - 37 sec 27      [facility]: Cardiomyopathy, Optimization, Rehabilitation, Education  The [facility] is a heart failure specialty clinic within the [facility] where you will work with specialized nurse practitioners, physician assistants, doctors, and registered nurses. They are dedicated to helping patients with heart failure to carefully adjust medications, receive education, and learn who and when to call if symptoms develop. They specialize in helping you better understand your condition, slow the progression of your disease, improve the length and quality of your life, help you detect future heart problems before they become life threatening, and avoid hospitalizations.              Follow-ups after your visit        Your next 10 appointments already scheduled     Mar 30, 2017  1:00 PM CDT   Treatment 60 with Rh Cardiac Rehab 3   [facility] ([facility])    [address], [address]  [facility] [id number] [phone]            Mar 31, 2017 10:30 AM CDT   Return Sleep Patient with Ethan Salguero MD   [facility] ([facility])    [address]  [facility] [id number] [phone]            Apr 03, 2017 10:00 AM CDT   Heart Cath Right Heart Cath with SHCVR1   [facility] ([facility])    [address]  [place] [address]   [phone]            Apr 04, 2017  4:30 PM CDT   Remote ICD Check with MCLEAN DCR2   [facility] ([facility])    [address]  [place] [address] [phone]           This appointment is for a remote check of your debrillator.  This is not an appointment at the office.            Apr 06, 2017  1:00 PM CDT   Treatment 60 with Rh Cardiac Rehab 3   [facility] ([facility])    [address]  [facility] [address]   [phone]            Apr 10, 2017  1:00 PM CDT   Treatment 60 with Rh Cardiac Rehab 1   [facility] ([facility])    [address]  [facility] [address]   [phone]            Apr 10, 2017  2:15 PM CDT   LAB with RU LAB   [facility] ([facility])    [address]  [facility] [address] [phone]           Patient must bring picture ID.  Patient should be prepared to give a urine specimen  Please do not eat 10-12 hours before your appointment if you are coming in fasting for labs on lipids, cholesterol, or glucose (sugar).  Pregnant women should follow their Care Team instructions. Water with medications is okay. Do not drink coffee or other fluids.   If you have concerns about taking  your medications, please ask at office or if scheduling via [email or web address], send a message by clicking on Secure Messaging, Message Your Care Team.            Apr 10, 2017  3:10 PM CDT   Core Return with PARKER Wilde CNP   [facility] ([facility])    [address]  [facility] [address]   [phone]            Apr 13, 2017  1:00 PM CDT   Treatment 60 with Rh Cardiac Rehab [address] [place] [address] ([facility])    [address]  [address]  [facility] [address] [phone]              Who to contact     If you have questions or need follow up information about today's clinic visit or your schedule please contact [facility] [place] directly at [phone].  Normal or non-critical lab and imaging results will be communicated to you by MyChart, letter or phone within 4 business days after the clinic has received the results. If you do not hear from us within 7 days, please contact the clinic through MyChart or phone. If you have a critical or abnormal lab result, we will notify you by phone as soon as possible.  Submit refill requests through [employer] or call your pharmacy and they will forward the refill request to us. Please allow 3 business days for your refill to be completed.          Additional Information About Your Visit        MyChart Information     [employer] lets you send messages to your doctor, view your test results, renew your prescriptions, schedule appointments and more. To sign up, go to [email or web address] . Click on \"Log in\" on the left side of the screen, which will take you to the Welcome page. Then click on \"Sign up Now\" on the right side of the page.     You will be asked to enter the access code listed below, as well as some personal information. Please follow the directions to create your username and password.     Your access code is: [email or web address]  Expires: 2017  3:26 PM     Your access code will  in 90 days. If you need help or a new code, please call your [facility] clinic or [phone].        Care EveryWhere ID     This is your Care EveryWhere ID. This could be used by other organizations to access your [facility] medical records  [id number]        Your Vitals Were     Pulse Temperature Height Pulse Oximetry BMI (Body Mass Index)       62 97.4  F (36.3  C) 1.74 m (5' 8.5\") 100% 22.9 kg/m2        Blood Pressure from Last 3 Encounters:   17 " 131/71   03/29/17 128/70   03/20/17 128/70    Weight from Last 3 Encounters:   03/29/17 66.2 kg (146 lb)   03/29/17 69.3 kg (152 lb 12.8 oz)   03/23/17 68.5 kg (151 lb 1.6 oz)              We Performed the Following     Follow-Up with [facility]        Primary Care Provider Office Phone # Fax #    Hector Jc [phone]       [employer] [address]  [facility] [address]        Thank you!     Thank you for choosing [facility] [place]  for your care. Our goal is always to provide you with excellent care. Hearing back from our patients is one way we can continue to improve our services. Please take a few minutes to complete the written survey that you may receive in the mail after your visit with us. Thank you!             Your Updated Medication List - Protect others around you: Learn how to safely use, store and throw away your medicines at www.disposemymeds.org.          This list is accurate as of: 3/29/17  2:09 PM.  Always use your most recent med list.                   Brand Name Dispense Instructions for use    aspirin EC 81 MG EC tablet      Take 81 mg by mouth daily       B-12 1000 MCG Tbcr     100 tablet    Take 1,000 mcg by mouth daily       bumetanide 1 MG tablet    BUMEX     2 tablets in AM and 1 tablet in the afternoon daily       calcitRIOL 0.25 MCG capsule    ROCALTROL     Take 0.25 mcg by mouth three times a week Monday, Wednesday and Friday       carvedilol 3.125 MG tablet    COREG    540 tablet    Take 3 tablets (9.375 mg) by mouth 2 times daily (with meals)       hydrALAZINE 50 MG tablet    APRESOLINE    270 tablet    Take 1 tablet (50 mg) by mouth 3 times daily       isosorbide mononitrate 60 MG 24 hr tablet    IMDUR    30 tablet    Take 1 tablet (60 mg) by mouth At Bedtime At        KRILL OIL PO      Take 1 capsule by mouth daily       levothyroxine 150 MCG tablet    SYNTHROID/LEVOTHROID     Take 150 mcg by mouth daily        metolazone 2.5 MG tablet    ZAROXOLYN     One tablet every other day until wt is 146# per KMannchen CNP 3/23/17       Multi-vitamin Tabs tablet      Take 1 tablet by mouth daily       PLAVIX 75 MG tablet   Generic drug:  clopidogrel      Take 75 mg by mouth daily       potassium chloride SA 20 MEQ CR tablet    K-DUR/KLOR-CON M     Take 1 tablet (20 meq) every Monday, Wednesday, Friday       Sodium Chloride 0.65 % Soln      Spray in nostril as needed

## 2017-03-29 NOTE — ED PROVIDER NOTES
History     Chief Complaint:  Hyponatremia     HPI   Perez Villalobos is a 76 year old male with a history of CAD, CHF, CKD, MI, on Plavix and aspirin, who presents from his cardiology clinic for concerns of hyponatremia. The patient had blood work drawn at his clinic this morning which showed a Na of 123 and patient was referred to the ED for further evaluation. Here, the patient also notes that he had a mechanical fall this morning while getting out of bed, hitting his head on the ground. Patient is on aspirin and Plavix. Here he reports pain in the right elbow following this fall. He also feels fatigued. The patient denies any fevers or any other symptoms.     BMP form cardiology clinic today  Sodium 123 (L)   Potassium 4.2    Chloride 79 (L)   Carbon Dioxide 37 (H)   Anion Gap Not Calculated    Glucose 134 (H)   Urea Nitrogen 101 (H)   Creatinine 2.24 (H)   GFR Estimate 29 (L)   GFR Estimate If Black 35 (L)   Calcium 9.6      Allergies:  Lisinopril  Augmentin  Crestor [Rosuvastatin]      Medications:    isosorbide mononitrate (IMDUR) 60 MG 24 hr tablet  bumetanide (BUMEX) 1 MG tablet  metolazone (ZAROXOLYN) 2.5 MG tablet  potassium chloride SA (K-DUR/KLOR-CON M) 20 MEQ CR tablet  aspirin EC 81 MG EC tablet  levothyroxine (SYNTHROID/LEVOTHROID) 150 MCG tablet  carvedilol (COREG) 3.125 MG tablet  calcitRIOL (ROCALTROL) 0.25 MCG capsule  Saline (SODIUM CHLORIDE) 0.65 % SOLN  hydrALAZINE (APRESOLINE) 50 MG tablet  KRILL OIL PO  Cyanocobalamin (B-12) 1000 MCG TBCR  multivitamin, therapeutic with minerals (MULTI-VITAMIN) TABS  clopidogrel (PLAVIX) 75 MG tablet    Past Medical History:    Acute renal failure   Alcoholism   Anemia   Essential hypertension   BPH  CAD  CHF  CKD  Complete AV block  Ischemic cardiomyopathy   Mixed hyperlipidemia   NSTEMI  PAD  Ventricular fibrillation     Past Surgical History:    Appendectomy   Cholecystectomy   Carotid endarterectomy   Defibrillator implant   Coronary stent x 3  T &  A    Family History:    History reviewed. No pertinent family history.     Social History:  Marital Status:     Smoking status: Former smoker  Alcohol use: Yes      Review of Systems   Constitutional: Positive for fatigue.   Gastrointestinal: Negative for abdominal pain, nausea and vomiting.   Musculoskeletal:        Positive elbow pain   All other systems reviewed and are negative.      Physical Exam     Patient Vitals for the past 24 hrs:   BP Temp Temp src Heart Rate Resp SpO2 Weight   03/29/17 1630 127/73 - - 62 12 97 % -   03/29/17 1600 130/75 - - 63 12 93 % -   03/29/17 1530 139/81 - - 65 13 96 % -   03/29/17 1515 - - - 64 14 98 % -   03/29/17 1514 134/76 - - - - - -   03/29/17 1437 - - - 64 22 - -   03/29/17 1436 131/74 - - - - - -   03/29/17 1435 - - - 64 9 - -   03/29/17 1406 131/71 97.8  F (36.6  C) Oral 61 14 97 % 66.2 kg (146 lb)      Physical Exam  General: Alert, interactive in mild distress  Head:  Scalp is atraumatic  Eyes:  The pupils are equal, round, and reactive to light    EOM's intact    No scleral icterus  ENT:      Nose:  The external nose is normal  Ears:  External ears are normal  Mouth/Throat: The oropharynx is normal    Mucus membranes are moist      Neck:  Normal range of motion.      There is no rigidity.    Trachea is in the midline         CV:  Regular rate and rhythm    No murmur   Resp:  Crackles in bilateral bases    Non-labored, no retractions or accessory muscle use      GI:  Abdomen is soft, no distension, no tenderness.       MS:  Normal strength in all 4 extremities, No midline cervical, thoracic, or lumbar tenderness.    1+ pitting edema in bilateral lower extremities    Hematoma and edema to right olecranon  Skin:  Warm and dry, No rash or lesions noted.  Neuro: Strength 5/5 x4.  Sensation intact  In all 4 extremities.      Cranial nerves 2-12 intact.      Psych:  Awake. Alert.  Normal affect.      Appropriate interactions.    Emergency Department Course   ECG:  @  1511  Indication: Hyponatremia   Vent. Rate 65 bpm. HI interval 134 ms. QRS duration 182 ms. QT/QTc 548/569 ms. P-R-T axis 52 262 71.   Atrial-sensed ventricular-paced rhythm. Biventricular pacemaker detected. Abnormal ECG.  No significant change when compared to previous ECG from 1/16/17   Read @ 1521 by Dr. Li.     Imaging:  Radiographic findings were communicated with the patient who voiced understanding of the findings.    CT Head without contrast  IMPRESSION:   1. Atrophy.  2. Nothing acute.   Report per radiology.     XR Right Elbow G/E 3 views  IMPRESSION: Negative left elbow.  Report per radiology.     XR Chest 2 views  IMPRESSION: Small bilateral pleural effusions. Cardiomegaly with  multichamber enlargement. Cardiac device with tips in the right atrium  and right ventricle. Calcified granuloma left lung base. Chest  otherwise negative.  Report per radiology.     Laboratory:  CBC:  WBC 6.4, HGB 12.9 (L),    INR: 1.12  Troponin: 0.015  BNP: 08957 (H)    Interventions:  (1525) Lasix 40 mg IV  (1546) Tylenol 975 mg PO    Emergency Department Course:  Nursing notes and vitals reviewed.  (1425) I performed an exam of the patient as documented above. The patient was placed on continuous pulse oximetry and cardiac monitoring.     EKG was done, interpretation as above.  Blood was drawn from the patient. This was sent for laboratory testing, findings above.    The patient was sent for a chest xray, head CT abdominal elbow xray while in the emergency department, findings above.       (1604) Findings and plan explained to the patient who consents to admission.     (1619) I discussed the patient with Dr. Coulter of the hospitalist service, who will admit the patient to a [facility] bed for further monitoring, evaluation, and treatment.     Impression & Plan      Medical Decision Making:  Perez Villalobos is a 76 year old male who was seen and evaluated. The above work up was undertaken and previous records from the  cardiology clinic were reviewed. Patient has a quite significant hyponatremia as well as ongoing CHF. He received IV lasix at the recommendation of the cardiac NP. Given the multitude of findings they felt that he would require ongoing IV diuresis which can be undertaken during his hospital stay. I spoke with Dr. Coulter from the hospitalist service who was in agreement and the patient was admitted to the [facility]. The patient denies any chest pain. He has a nonischemic ECG and negative troponin. Chest xray demonstrates no signs of acute infiltrate. With no pain I doubt PE. Regarding the patient's fall there is no sign of any acute intracranial hemorrhage. No signs of fracture to the elbow. Patient was subsequently admitted for further evaluation and treatment.     Diagnosis:    ICD-10-CM   1. Chronic combined systolic and diastolic congestive heart failure (H) I50.42   2. Hyponatremia E87.1   3. Contusion of right elbow, initial encounter S50.01XA   4. Renal failure N19       Disposition:  Patient is admitted to a [facility] bed under the care of Dr. Coulter.       Kayden Jo  3/29/2017    EMERGENCY DEPARTMENT    I, Kayden Jo, am serving as a scribe on 3/29/2017 at 2:25 PM to personally document services performed by Dr. Li based on my observations and the provider's statements to me.        Nathen Li MD  03/29/17 8430

## 2017-03-29 NOTE — LETTER
3/29/2017    Hector Jc MD  [employer]   [address]  [facility] [address]    RE: Perez Villalobos       Dear Colleague,    PRIMARY CARE PROVIDER:  Hector Jc MD      CARDIOLOGIST:   Krishna Tran MD      C.O.R.E. NP:   Shani Patrick CNP      REASON FOR VISIT:   Recent episode of CHF.      I had the pleasure of seeing Perez Villalobos, a pleasant 76-year-old patient who has a history of systolic congestive heart failure due to ischemic cardiomyopathy with an ejection fraction of 30%-35%, underlying CAD, status post stenting of all 3 coronary arteries, most recently history notable for a non-STEMI in 10/2016 with stress echocardiogram at that time without evidence of acute ischemia (coronary angiography not done).  To complicate this situation, he was hospitalized in 11/2016 with bradycardia that degenerated into ventricular fibrillation arrest.  He then proceeded with CRT-D placement.        CHF has been complicated by difficult to manage CHF including pleural effusions (requiring thoracentesis), adjustment of diuretics and coordination of care with Dr. Valles, his nephrologist.  He has chronic renal insufficiency with history of ARF due to rhabdomyolysis thought due to statin therapy.  He had been on spironolactone, but was discontinued due to hyponatremia.        He was recently admitted for CHF exacerbation while traveling in [place].  He was eating out fairly often and he noticed increased peripheral edema and abdominal distention.  The initial diuresis was slow but then he was started on daily metolazone and twice daily Bumex and eventually had a vigorous diuresis.  He had a goal weight of around 150; however, during his diuresis, his weight went down to 141 pounds.      He was seen by Dr. Tran when he returned on 03/06/2017.  He was up about 8 pounds at that time.  Dr. Tran thought his dry weight was around 152 pounds.  He suggested metolazone followed by Bumex 1 mg and then he increased  Bumex to 1 mg twice a day.      The patient is on tele-management and his weight did go down to about 141 pounds and then over the next couple of weeks has increased from 141 pounds to 146 pounds.  Then, over the weekend of 03/18, his weight increased to 151 pounds.  He received a dose of metolazone on 03/23 and 03/25 and his weight has come down to 146 pounds.  He was returning today for followup.        Before coming to the clinic, he tripped on his slipper and fell on his right elbow.  He believes he hit his head on a table.  He did not lose consciousness.  He states his shortness of breath has improved from a few days ago; however, he feels very poorly.  He complains of abdominal bloating.  He denies chest pain, chest pressure, neck or arm pain.  He is currently on Bumex 2 mg in the morning and 1 mg in the evening.      His last echocardiogram was 02/18/2017 while in [place].  His left ventricular ejection fraction is 35%-40%.  Right ventricle is normal in size and function.  Moderate mitral regurgitation.  Evidence of pulmonary hypertension.  Right ventricular systolic pressure estimated at 55-60 mmHg, suggestive of significant pulmonary hypertension.      PHYSICAL EXAMINATION:   VITAL SIGNS:  Blood pressure 128/70, pulse is 62 and weight is 152 pounds in the clinic and 146 pounds at home.   NECK:  JVP is about 10 cm of water at a 45 degree angle.  Positive hepatojugular reflex.   LUNGS:  Crackles bibasilar, otherwise clear to auscultation.   CARDIOVASCULAR:  Distant heart sounds, regular rhythm.  No murmur.   ABDOMEN:  Appears somewhat distended, nontender.  Normal bowel sounds.   EXTREMITIES:  1+ peripheral edema bilaterally.      LABORATORY DATA:     Basic metabolic panel shows a sodium of 123, potassium 4.2, , creatinine 2.24.     No facility-administered encounter medications on file as of 3/29/2017.      Outpatient Encounter Prescriptions as of 3/29/2017   Medication Sig Dispense Refill      isosorbide mononitrate (IMDUR) 60 MG 24 hr tablet Take 1 tablet (60 mg) by mouth At Bedtime At hs 30 tablet 11     [DISCONTINUED] bumetanide (BUMEX) 1 MG tablet Take 2 mg by mouth every morning 2 tablets in AM (In addition to 1 tablet in the afternoon daily)       [DISCONTINUED] potassium chloride SA (K-DUR/KLOR-CON M) 20 MEQ CR tablet Take 1 tablet (20 meq) every Monday, Wednesday, Friday       aspirin EC 81 MG EC tablet Take 81 mg by mouth daily       levothyroxine (SYNTHROID/LEVOTHROID) 150 MCG tablet Take 150 mcg by mouth daily       [DISCONTINUED] carvedilol (COREG) 3.125 MG tablet Take 3 tablets (9.375 mg) by mouth 2 times daily (with meals) 540 tablet 3     [DISCONTINUED] calcitRIOL (ROCALTROL) 0.25 MCG capsule Take 0.25 mcg by mouth daily Monday, Wednesday and Friday       [DISCONTINUED] hydrALAZINE (APRESOLINE) 50 MG tablet Take 1 tablet (50 mg) by mouth 3 times daily 270 tablet 3     KRILL OIL PO Take 1 capsule by mouth daily        Cyanocobalamin (B-12) 1000 MCG TBCR Take 1,000 mcg by mouth daily 100 tablet 0     multivitamin, therapeutic with minerals (MULTI-VITAMIN) TABS Take 1 tablet by mouth daily       clopidogrel (PLAVIX) 75 MG tablet Take 75 mg by mouth daily       [DISCONTINUED] metolazone (ZAROXOLYN) 2.5 MG tablet One tablet every other day until wt is 146# per KMannchen CNP 3/23/17       Saline (SODIUM CHLORIDE) 0.65 % SOLN Spray in nostril as needed        ASSESSMENT AND PLAN:   Acute-on-chronic systolic congestive heart failure.  He has failed outpatient CHF treatment.  His weight increased to 152 pounds.  He was given metolazone on 2 occasions with some response down to 146 pounds.  His Bumex currently is at 2 mg in the a.m. and 1 mg in the p.m.  His dry weight is probably around 140 pounds.  He is cachectic.  Because of his difficult to manage her CHF, we were planning a CardioMEMS implantation this next week, to be performed by Dr. Arnold.  He has class III heart failure.  Based on his  assessment and his significantly poor electrolyte panel with a serum sodium 123 and a BUN of 101, we have recommended admission to the hospital.        He currently has a CRT-D.  He denies any defibrillator shocks.  His last device check was 12/2016 showing an underlying rhythm of 2:1 heart block with of 30-40 beats per minute.  He is 91% BiV paced.      I have updated Dr. Min as well regarding his case.     Again, thank you for allowing me to participate in the care of your patient.      Sincerely,    [facility]

## 2017-03-29 NOTE — IP AVS SNAPSHOT
MRN:[id number]                      After Visit Summary   3/29/2017    Perez Villalobos    MRN: [id number]           Thank you!     Thank you for choosing [facility] for your care. Our goal is always to provide you with excellent care. Hearing back from our patients is one way we can continue to improve our services. Please take a few minutes to complete the written survey that you may receive in the mail after you visit with us. Thank you!        Patient Information     Date Of Birth          1940        About your hospital stay     You were admitted on:  March 29, 2017 You last received care in the:  [facility]    You were discharged on:  March 30, 2017        Reason for your hospital stay       You were in the hospital because of increased weight gain and abnormal lab findings in clinic.                  Who to Call     For medical emergencies, please call 911.  For non-urgent questions about your medical care, please call your primary care provider or clinic, [phone]          Attending Provider     Provider Specialty    Trigger, Nathen Rose MD Emergency Medicine    Yfn, Yan Manuel MD Internal Medicine    Zamzam, Kerwin Lloyd MD Internal Medicine       Primary Care Provider Office Phone # Fax #    Hector Jc [phone]       [employer] [address]  [facility] [address]        After Care Instructions     Activity       Your activity upon discharge: activity as tolerated.  No driving until your sleep evaluation.            Diet       Follow this diet upon discharge:       Fluid restriction 2000 ML FLUID      Low sodium diet                  Follow-up Appointments     Follow-up and recommended labs and tests        Follow up with primary care provider, Hector Jc, within 14 days for hospital follow- up.   You need to get a blood draw tomorrow to check your kidney function and potassium level prior to  restarting your Bumex.                  Your next 10 appointments already scheduled     Mar 31, 2017 10:30 AM CDT   Return Sleep Patient with Ethan Salguero MD   [facility] ([facility])    [address]  [facility] [address]   [phone]            Apr 03, 2017 10:00 AM CDT   Heart Cath Right Heart Cath with SHCVR1   [facility] ([facility])    [address]  [place] [address]   [phone]            Apr 04, 2017  4:30 PM CDT   Remote ICD Check with MCLEAN DCR2   [facility] ([facility])    [address]  [facility] [address] [phone]           This appointment is for a remote check of your debrillator.  This is not an appointment at the office.            Apr 05, 2017  7:30 AM CDT   Core Return with PARKER Wilde CNP   [facility] [place] ([facility])    [address]  [place] [address]   [phone]            Apr 06, 2017  1:00 PM CDT   Treatment 60 with Rh Cardiac Rehab 3   [facility] ([facility])    [address]  [facility] [address]   [phone]            Apr 10, 2017  1:00 PM CDT   Treatment 60 with Rh Cardiac Rehab 1   [facility] ([facility])    [address]  [facility] [address]   [phone]            Apr 10, 2017  3:00 PM CDT   LAB with MCLEAN LAB   [facility] ([facility])    [address]  [place] [place] [address] [phone]           Patient must bring picture ID.  Patient should be prepared to give a urine specimen  Please do not eat 10-12 hours before your appointment if you are coming in fasting for labs on lipids, cholesterol, or glucose (sugar).   "Pregnant women should follow their Care Team instructions. Water with medications is okay. Do not drink coffee or other fluids.   If you have concerns about taking  your medications, please ask at office or if scheduling via [facility], send a message by clicking on Secure Messaging, Message Your Care Team.            Apr 10, 2017  3:45 PM CDT   Core MD Return with Krishna Tran MD   [facility] [place] ([facility])    [address] [place] [address] [address]  [facility] [address] [phone]            Apr 13, 2017  1:00 PM CDT   Treatment 60 with Rh Cardiac Rehab 3   [facility] ([facility])    [address]  [facility] [address] [phone]              Additional Services     Cardiac Rehab Referral       Your provider has referred you to: [facility]: [facility] [phone]   [email or web address]                  Future tests that were ordered for you     Basic metabolic panel                 General Recommendations To Control Heart Failure When You Get Home     Instructions To Patients and Families: Please read and check off each of these important instructions as you do them when you get home.           Weight and symptoms      ___ Put a scale in your bathroom  ___ Post a weight chart or calendar next to the scale  ___Weigh yourself every day as soon as you you get up in the morning. You should only be wearing your pajamas. Write your weight on the chart/calendar.  ___ Bring your weight chart/calendar with you to all appointments    ___Call your doctor if you gain 2 pounds in 1 day or 5 pounds in 1 week from your \"dry\" weight (baseline weight). Also call your doctor if you have shortness of breath that gets worse over time, leg swelling or fatigue.         Medicines and diet     ___ Make sure to take your medicines as prescribed.    ___Bring a current list of " your medicines and all of your medicine bottles with you to all appointments.    ___ Limit fluids if you still have swelling or shortness of breath, or if your doctor tells you to do so.  ___ Eat less than 2000 mg of sodium (salt) every day. Read food labels, and do not add salt to meals.   ___ Heart healthy diet with low fat and low cholesterol          Activity and suggested lifestyle changes    ___ Stay active. Talk to your doctor about an exercise program that is safe for your heart.    ___ Stop smoking. Reduce alcohol use.      ___ Lose weight if you are overweight. Extra weight puts a lot of stress on the heart.          Control for Leg Swelling   ___ Keep your legs elevated (raised) as needed for swelling. If swelling is uncomfortable or elevation doesn t help, ask your doctor about using ACE wrap or Jobst stockings.          Follow-up appointments   ___ Make a C.O.R.E. Clinic appointment with a basic metabolic panel lab draw 3 to 5 days after you leave the hospital. Call one of the following locations:   [facility]  [phone],  [facility] [phone],  [facility]  [phone].     ___ Make sure to take your medications as prescribed and bring an accurate list of your medications and your weight chart/calendar to your follow up appointment at the C.O.R.E. Clinic for continued education and adjustments          What is the CORE clinic?    The C.O.R.E (Cardiomyopathy, Optimization, Rehabilitation, Education) Clinic is a heart failure specialty clinic within the [facility]. At C.O.R.E., you will work with nurse practitioners to carefully adjust medicines, get education and learn who and when to call if symptoms appear. C.O.R.E nurses specialize in helping you:    better understand your disease.    slow the progress of your disease.    improve the length and quality of your  "life.    detect future heart problems before they become life threatening.    avoid hospital stays.            Pending Results     No orders found for last 3 day(s).            Statement of Approval     Ordered          17 [id number]  I have reviewed and agree with all the recommendations and orders detailed in this document.  EFFECTIVE NOW     Approved and electronically signed by:  Kerwin Wyman MD             Admission Information     Date & Time Provider Department Dept. Phone    3/29/2017 Kerwin Wyman MD [facility] [phone]      Your Vitals Were     Blood Pressure Temperature Respirations Weight Pulse Oximetry BMI (Body Mass Index)    123/67 (BP Location: Left arm) 97.6  F (36.4  C) (Oral) 18 65.6 kg (144 lb 10 oz) 99% 21.67 kg/m2      MyChart Information     [employer] lets you send messages to your doctor, view your test results, renew your prescriptions, schedule appointments and more. To sign up, go to [email or web address] . Click on \"Log in\" on the left side of the screen, which will take you to the Welcome page. Then click on \"Sign up Now\" on the right side of the page.     You will be asked to enter the access code listed below, as well as some personal information. Please follow the directions to create your username and password.     Your access code is: [email or web address]  Expires: 2017  3:26 PM     Your access code will  in 90 days. If you need help or a new code, please call your [facility] clinic or [phone].        Care EveryWhere ID     This is your Care EveryWhere ID. This could be used by other organizations to access your [facility] medical records  [id number]           Review of your medicines      START taking        Dose / Directions    potassium chloride 20 MEQ Packet   Commonly known as:  KLOR-CON   Used for:  Chronic combined systolic and diastolic congestive heart failure (H)        Dose:  20 mEq   20 mEq by Oral or Feeding Tube " route daily   Quantity:  30 packet   Refills:  1         CONTINUE these medicines which may have CHANGED, or have new prescriptions. If we are uncertain of the size of tablets/capsules you have at home, strength may be listed as something that might have changed.        Dose / Directions    bumetanide 1 MG tablet   Commonly known as:  BUMEX   This may have changed:    - medication strength  - when to take this  - additional instructions  - Another medication with the same name was removed. Continue taking this medication, and follow the directions you see here.   Used for:  Chronic combined systolic and diastolic congestive heart failure (H)        Dose:  1 mg   Take 1 tablet (1 mg) by mouth 2 times daily   Quantity:  30 tablet   Refills:  1         CONTINUE these medicines which have NOT CHANGED        Dose / Directions    aspirin EC 81 MG EC tablet        Dose:  81 mg   Take 81 mg by mouth daily   Refills:  0       B-12 1000 MCG Tbcr   Used for:  Vitamin B 12 deficiency        Dose:  1000 mcg   Take 1,000 mcg by mouth daily   Quantity:  100 tablet   Refills:  0       calcitRIOL 0.25 MCG capsule   Commonly known as:  ROCALTROL        Dose:  0.25 mcg   Take 0.25 mcg by mouth three times a week Monday, Wednesday and Friday   Refills:  0       carvedilol 3.125 MG tablet   Commonly known as:  COREG   Used for:  Chronic systolic heart failure (H)        Dose:  9.375 mg   Take 3 tablets (9.375 mg) by mouth 2 times daily (with meals)   Quantity:  540 tablet   Refills:  3       hydrALAZINE 50 MG tablet   Commonly known as:  APRESOLINE   Used for:  Essential hypertension with goal blood pressure less than 130/80        Dose:  50 mg   Take 1 tablet (50 mg) by mouth 3 times daily   Quantity:  270 tablet   Refills:  3       isosorbide mononitrate 60 MG 24 hr tablet   Commonly known as:  IMDUR   Used for:  Chronic systolic heart failure (H), NSTEMI (non-ST elevated myocardial infarction) (H), Ischemic cardiomyopathy         Dose:  60 mg   Take 1 tablet (60 mg) by mouth At Bedtime At hs   Quantity:  30 tablet   Refills:  11       KRILL OIL PO        Dose:  1 capsule   Take 1 capsule by mouth daily   Refills:  0       levothyroxine 150 MCG tablet   Commonly known as:  SYNTHROID/LEVOTHROID        Dose:  150 mcg   Take 150 mcg by mouth daily   Refills:  0       Multi-vitamin Tabs tablet        Dose:  1 tablet   Take 1 tablet by mouth daily   Refills:  0       PLAVIX 75 MG tablet   Generic drug:  clopidogrel        Dose:  75 mg   Take 75 mg by mouth daily   Refills:  0       Sodium Chloride 0.65 % Soln        Spray in nostril as needed   Refills:  0         STOP taking     metolazone 2.5 MG tablet   Commonly known as:  ZAROXOLYN           potassium chloride SA 20 MEQ CR tablet   Commonly known as:  K-DUR/KLOR-CON M                Where to get your medicines      These medications were [facility] to [id number] - [place] [place] [facility] [address] [facility]  [address] [address]     Phone:  [phone]     bumetanide 1 MG tablet    potassium chloride 20 MEQ Packet                Protect others around you: Learn how to safely use, store and throw away your medicines at www.disposemymeds.org.             Medication List: This is a list of all your medications and when to take them. Check marks below indicate your daily home schedule. Keep this list as a reference.      Medications           Morning Afternoon Evening Bedtime As Needed    aspirin EC 81 MG EC tablet   Take 81 mg by mouth daily   Last time this was given:  81 mg on 3/30/2017  9:19 AM                                   B-12 1000 MCG Tbcr   Take 1,000 mcg by mouth daily                                   bumetanide 1 MG tablet   Commonly known as:  BUMEX   Take 1 tablet (1 mg) by mouth 2 times daily                                      calcitRIOL 0.25 MCG capsule   Commonly known as:  ROCALTROL   Take 0.25 mcg by mouth three times a week Monday, Wednesday and  Friday                                   carvedilol 3.125 MG tablet   Commonly known as:  COREG   Take 3 tablets (9.375 mg) by mouth 2 times daily (with meals)   Last time this was given:  9.375 mg on 3/30/2017  9:19 AM                                      hydrALAZINE 50 MG tablet   Commonly known as:  APRESOLINE   Take 1 tablet (50 mg) by mouth 3 times daily   Last time this was given:  50 mg on 3/30/2017  5:01 PM                                         isosorbide mononitrate 60 MG 24 hr tablet   Commonly known as:  IMDUR   Take 1 tablet (60 mg) by mouth At Bedtime At    Last time this was given:  60 mg on 3/29/2017 10:23 PM                                   KRILL OIL PO   Take 1 capsule by mouth daily                                   levothyroxine 150 MCG tablet   Commonly known as:  SYNTHROID/LEVOTHROID   Take 150 mcg by mouth daily   Last time this was given:  150 mcg on 3/30/2017  7:47 AM            Take 1 hour before the morning meal                       Multi-vitamin Tabs tablet   Take 1 tablet by mouth daily   Last time this was given:  1 tablet on 3/30/2017  9:19 AM                                   PLAVIX 75 MG tablet   Take 75 mg by mouth daily   Last time this was given:  75 mg on 3/30/2017  9:19 AM   Generic drug:  clopidogrel                                   potassium chloride 20 MEQ Packet   Commonly known as:  KLOR-CON   20 mEq by Oral or Feeding Tube route daily   Last time this was given:  40 mEq on 3/30/2017  3:14 PM                                   Sodium Chloride 0.65 % Soln   Spray in nostril as needed

## 2017-03-29 NOTE — ED NOTES
"[facility]  ED Nurse Handoff Report    ED Chief complaint: Abnormal Labs (Na 123 in Heart Clinic, sent here) and Arm Pain (mechanical fall at home onto right elbow, )      ED Diagnosis:   Final diagnoses:   Chronic combined systolic and diastolic congestive heart failure (H)   Hyponatremia   Contusion of right elbow, initial encounter   Renal failure       Code Status: Full Code    Allergies:   Allergies   Allergen Reactions     Lisinopril Other (See Comments)     Angioedema     Augmentin Other (See Comments)     Per pt, \"froze him, affected his legs\"     Crestor [Rosuvastatin] Other (See Comments)     rhabdomyolysis       Activity level:  Stand with Assist     Needed?: No    Isolation: No  Infection: Not Applicable    Bariatric?: No      Vital Signs:   Vitals:    03/29/17 1437 03/29/17 1514 03/29/17 1515 03/29/17 1530   BP:  134/76  139/81   Resp: 22 14 13   Temp:       TempSrc:       SpO2:   98% 96%   Weight:           Cardiac Rhythm: ,   Cardiac  Cardiac Rhythm: A-V Sequential paced    Pain level: 0-10 Pain Scale: 5    Is this patient confused?: No    Patient Report: Initial Complaint: Patient presents to ED with spouse from clinic. Patient was seen this morning at cardiology and when labs were drawn, noticed his sodium level was 123. Also, patient had tripped this morning and fell and hit R elbow and head on floor of bedroom. Patient on blood thinners as well.   Focused Assessment: patients arms bruised and R elbow is swollen and bruised from fall. Small abrasion to top of head. Patient is alert and oriented x 3, PARK, up with assistance. Asymptomatic from hyponatremia.  Tests Performed: Labs, head CT, xray chest and elbow. IV lasix.   Abnormal Results: BNP 47407, CT neg  Treatments provided: IV lasix    Family Comments: Wife is at bedside     OBS brochure/video discussed/provided to patient: N/A    ED Medications:   Medications   sodium chloride (PF) 0.9% PF flush 3 mL (not " administered)   sodium chloride (PF) 0.9% PF flush 3 mL (not administered)   acetaminophen (TYLENOL) tablet 975 mg (not administered)   furosemide (LASIX) injection 40 mg (40 mg Intravenous Given 3/29/17 1525)       Drips infusing?:  No      ED NURSE PHONE NUMBER: [phone]

## 2017-03-29 NOTE — IP AVS SNAPSHOT
[facility]    [address] [place] [address], [address]    [place] [address]    Phone:  [phone]                                       After Visit Summary   3/29/2017    Perez Villalobos    MRN: [id number]           After Visit Summary Signature Page     I have received my discharge instructions, and my questions have been answered. I have discussed any challenges I see with this plan with the nurse or doctor.    ..........................................................................................................................................  Patient/Patient Representative Signature      ..........................................................................................................................................  Patient Representative Print Name and Relationship to Patient    ..................................................               ................................................  Date                                            Time    ..........................................................................................................................................  Reviewed by Signature/Title    ...................................................              ..............................................  Date                                                            Time

## 2017-03-30 PROBLEM — R06.00 DYSPNEA: Status: ACTIVE | Noted: 2017-01-01

## 2017-03-30 NOTE — PLAN OF CARE
Problem: Goal Outcome Summary  Goal: Goal Outcome Summary  OT/CR: Eval complete and Tx initiated. Pt admitted for CHF exacerbation with recent falls. Prior to admit, pt lives in house with wife and reports mod I-I with ADLs. Pt's wife provides all IADLs, including med mgmt. Pt has been attending OP CR 2x/week. Currently, pt requires SBA with FWW for toilet transfer and min A for LE dressing. Pt ambulated ~350 ft with stable CV response requiring SBA and FWW. Pt limited by impaired safety, R UE pain, and decreased balance and activity tolerance. Pt would benefit from daily OT/CR intervention. Recommend discharge home with continued A from wife for IADLs, use of AD at all times, and continued attendance with OP CR at [facility]. Pt may also benefit from OP PT consult.     Occupational Therapy and Cardiac Rehab Discharge Summary    Reason for therapy discharge:    Discharged to home with outpatient therapy.    Progress towards therapy goal(s). See goals on Care Plan in [facility] electronic health record for goal details.  Goals not met.  Barriers to achieving goals:   discharge from facility and discharge on same date as initial evaluation.    Therapy recommendation(s):    Continued therapy is recommended.  Rationale/Recommendations:  Pt would benefit from OP CR and OP PT follow up following discharge home with continued A from wife for ADL/IADLs. .  Continue home exercise program.

## 2017-03-30 NOTE — PLAN OF CARE
Problem: Goal Outcome Summary  Goal: Goal Outcome Summary  Outcome: No Change  Pt A&O. No complaints during the shift. Up with standby assist using walker. Pt on 1500 Fluid restriction. Home cpap on during the night. 100% AV paced on monitor. VSS. Will continue monitor pt.

## 2017-03-30 NOTE — CONSULTS
"Standard CHF consult for 2gm Na diet education    Visited with pt and wife this afternoon  Pt follows a 2gm Na diet at home  Wife reads all labels and buys low Na foods  She rarely buys packaged foods  \"We are so careful\"    Pt has received diet teaching in the past    Declined further review  No questions at this time    Rylie Sims RD, LD  Clinical Dietitian - [facility]  Pager - [phone]    "

## 2017-03-30 NOTE — PLAN OF CARE
Problem: Goal Outcome Summary  Goal: Goal Outcome Summary  PT: Cancel inpatient PT evaluation. Per discussion with OT/CR, pt currently at baseline for transfers and gait. Has a SPC and 4WW to use at home and was attending OP CR prior to admission. Does demo higher level balance deficits and would benefit from OPPT at discharge, however no acute inpatient PT needs identified for safe discharge planning, will defer PT interventions to OP setting.

## 2017-03-30 NOTE — PROGRESS NOTES
PRIMARY CARE PROVIDER:  Hector Jc MD      CARDIOLOGIST:   MD GENOVEVA Magana.O.REVELIA. NP:   Shani Patrick CNP      REASON FOR VISIT:   Recent episode of CHF.      HISTORY OF PRESENT ILLNESS:   I had the pleasure of seeing Perez Villalobos, a pleasant 76-year-old patient who has a history of systolic congestive heart failure due to ischemic cardiomyopathy with an ejection fraction of 30%-35%, underlying CAD, status post stenting of all 3 coronary arteries, most recently history notable for a non-STEMI in 10/2016 with stress echocardiogram at that time without evidence of acute ischemia (coronary angiography not done).  To complicate this situation, he was hospitalized in 11/2016 with bradycardia that degenerated into ventricular fibrillation arrest.  He then proceeded with CRT-D placement.        CHF has been complicated by difficult to manage CHF including pleural effusions (requiring thoracentesis), adjustment of diuretics and coordination of care with Dr. Valles, his nephrologist.  He has chronic renal insufficiency with history of ARF due to rhabdomyolysis thought due to statin therapy.  He had been on spironolactone, but was discontinued due to hyponatremia.        He was recently admitted for CHF exacerbation while traveling in [place].  He was eating out fairly often and he noticed increased peripheral edema and abdominal distention.  The initial diuresis was slow but then he was started on daily metolazone and twice daily Bumex and eventually had a vigorous diuresis.  He had a goal weight of around 150; however, during his diuresis, his weight went down to 141 pounds.      He was seen by Dr. Tran when he returned on 03/06/2017.  He was up about 8 pounds at that time.  Dr. Tran thought his dry weight was around 152 pounds.  He suggested metolazone followed by Bumex 1 mg and then he increased Bumex to 1 mg twice a day.      The patient is on tele-management and his weight did go down to about 141  pounds and then over the next couple of weeks has increased from 141 pounds to 146 pounds.  Then, over the weekend of 03/18, his weight increased to 151 pounds.  He received a dose of metolazone on 03/23 and 03/25 and his weight has come down to 146 pounds.  He was returning today for followup.        Before coming to the clinic, he tripped on his slipper and fell on his right elbow.  He believes he hit his head on a table.  He did not lose consciousness.  He states his shortness of breath has improved from a few days ago; however, he feels very poorly.  He complains of abdominal bloating.  He denies chest pain, chest pressure, neck or arm pain.  He is currently on Bumex 2 mg in the morning and 1 mg in the evening.      His last echocardiogram was 02/18/2017 while in [place].  His left ventricular ejection fraction is 35%-40%.  Right ventricle is normal in size and function.  Moderate mitral regurgitation.  Evidence of pulmonary hypertension.  Right ventricular systolic pressure estimated at 55-60 mmHg, suggestive of significant pulmonary hypertension.      PHYSICAL EXAMINATION:   VITAL SIGNS:  Blood pressure 128/70, pulse is 62 and weight is 152 pounds in the clinic and 146 pounds at home.   NECK:  JVP is about 10 cm of water at a 45 degree angle.  Positive hepatojugular reflex.   LUNGS:  Crackles bibasilar, otherwise clear to auscultation.   CARDIOVASCULAR:  Distant heart sounds, regular rhythm.  No murmur.   ABDOMEN:  Appears somewhat distended, nontender.  Normal bowel sounds.   EXTREMITIES:  1+ peripheral edema bilaterally.      LABORATORY DATA:     Basic metabolic panel shows a sodium of 123, potassium 4.2, , creatinine 2.24.      ASSESSMENT AND PLAN:   Acute-on-chronic systolic congestive heart failure.  He has failed outpatient CHF treatment.  His weight increased to 152 pounds.  He was given metolazone on 2 occasions with some response down to 146 pounds.  His Bumex currently is at 2 mg in the a.m.  and 1 mg in the p.m.  His dry weight is probably around 140 pounds.  He is cachectic.  Because of his difficult to manage her CHF, we were planning a CardioMEMS implantation this next week, to be performed by Dr. Arnold.  He has class III heart failure.  Based on his assessment and his significantly poor electrolyte panel with a serum sodium 123 and a BUN of 101, we have recommended admission to the hospital.        He currently has a CRT-D.  He denies any defibrillator shocks.  His last device check was 2016 showing an underlying rhythm of 2:1 heart block with of 30-40 beats per minute.  He is 91% BiV paced.      I have updated Dr. Min as well regarding his case.         PARKER VILLA, CNP             D: 2017 14:16   T: 2017 08:44   MT: RHIANNON      Name:     RON LUCERO   MRN:      [id number]        Account:      [id number]   :      1940           Service Date: 2017      Document: [id number]

## 2017-03-30 NOTE — PROGRESS NOTES
03/30/17 0822   Quick Adds   Type of Visit Initial Occupational Therapy Evaluation   Living Environment   Lives With spouse   Living Arrangements house   Home Accessibility stairs to enter home;stairs within home   Number of Stairs to Enter Home 1   Number of Stairs Within Home 14   Transportation Available car;family or friend will provide  (Pt no longer drives. Wife provides for now.)   Self-Care   Dominant Hand right   Usual Activity Tolerance good   Current Activity Tolerance moderate   Regular Exercise yes   Activity/Exercise Type walking;biking   Exercise Amount/Frequency 30 mins;3-5 times/wk   Equipment Currently Used at Home grab bar;shower chair   Functional Level Prior   Ambulation 0-->independent   Transferring 0-->independent   Toileting 0-->independent   Bathing 1-->assistive equipment   Dressing 0-->independent   Eating 0-->independent   Communication 0-->understands/communicates without difficulty   Swallowing 0-->swallows foods/liquids without difficulty   Cognition 0 - no cognition issues reported   Fall history within last six months yes   Number of times patient has fallen within last six months 2   General Information   Onset of Illness/Injury or Date of Surgery - Date 03/29/17   Referring Physician PRUDENCIO Coulter MD   Patient/Family Goals Statement Pt plans to return home and continue with OP CR.    Additional Occupational Profile Info/Pertinent History of Current Problem Admitted for CHF exacerbation.    Precautions/Limitations fall precautions   Heart Disease Risk Factors High blood pressure;Lack of physical activity;Dislipidemia;Stress;Medical history;Gender;Age   Cognitive Status Examination   Orientation orientation to person, place and time   Level of Consciousness alert;confused   Able to Follow Commands WNL/WFL   Personal Safety (Cognitive) at risk behaviors demonstrated   Memory (Will continue to monitor and assess as needed)   Visual Perception   Visual Perception Wears glasses   Pain  Assessment   Patient Currently in Pain Yes, see Vital Sign flowsheet  (R elbow)   Mobility   Bed Mobility Bed mobility skill: Rolling/Turning;Bed mobility skill: Scooting/Bridging;Bed mobility skill: Sit to supine;Bed mobility analysis;Bed mobility skill: Supine to sit   Bed Mobility Skill: Rolling/Turning   Level of [place] - Bed Mobility Skill Rolling Turning stand-by assist   Bed Mobility Skill: Scooting/Bridging   Level of [place]: Scooting/Bridging stand-by assist   Bed Mobility Skill: Sit to Supine   Level of [place]: Sit/Supine stand-by assist   Bed Mobility Skill: Supine to Sit   Level of [place]: Supine/Sit stand-by assist   Bed Mobility Analysis   Impairments Contributing to Impaired Bed Mobility pain   Transfer Skills   Transfer Transfer Safety Analysis Bed/Chair;Transfer Safety Analysis Sit/Stand;Transfer Skill: Stand to Sit   Transfer Skill: Bed to Chair/Chair to Bed   Level of [place]: Bed to Chair minimum assist (75% patients effort)   Transfer Safety Analysis Bed/Chair   Transfer Safety Concerns Noted decreased balance during turns;losing balance backward   Impairments Contributing to Impaired Transfers impaired balance   Transfer Skill: Sit to Stand   Level of [place]: Sit/Stand minimum assist (75% patients effort)   Transfer Safety Analysis Sit/Stand   Transfer Safety Concerns Noted: Sit/Stand decreased balance during turns;losing balance backward   Impaired Transfers: Sit/Stand impaired balance;pain   Transfer Skill: Toilet Transfer   Level of [place]: Toilet minimum assist (75% patients effort)   Assistive Device seat riser;grab bars   Upper Body Dressing   Level of [place]: Dress Upper Body stand-by assist   Lower Body Dressing   Level of [place]: Dress Lower Body minimum assist (75% patients effort)   Toileting   Level of [place]: Toilet stand-by assist   Grooming   Level of [place]: Grooming stand-by assist   Eating/Self Feeding   Level of  "[place]: Eating independent   Instrumental Activities of Daily Living (IADL)   IADL Comments Pt's wife provides all IADLs, including med mgmt   Activities of Daily Living Analysis   Impairments Contributing to Impaired Activities of Daily Living balance impaired;cognition impaired;pain   General Therapy Interventions   Planned Therapy Interventions ADL retraining;cognition;transfer training;home program guidelines;progressive activity/exercise;risk factor education   Clinical Impression   Criteria for Skilled Therapeutic Interventions Met yes, treatment indicated   OT Diagnosis Decreased I and safety with ADLs   Influenced by the following impairments Pain; Decreased balance and activity tolerance; Impaired safety   Assessment of Occupational Performance 3-5 Performance Deficits   Identified Performance Deficits Dressing; Toileting; Functional mobility   Clinical Decision Making (Complexity) Moderate complexity   Therapy Frequency daily   Predicted Duration of Therapy Intervention (days/wks) 4 days   Anticipated Discharge Disposition Home with Outpatient Therapy;Home with Assist   Risks and Benefits of Treatment have been explained. Yes   Patient, Family & other staff in agreement with plan of care Yes   [facility] TM \"6 Clicks\"   2016, Trustees of [facility], under license to [employer].  All rights reserved.   6 Clicks Short Forms Daily Activity Inpatient Short Form   [facility]  \"6 Clicks\" Daily Activity Inpatient Short Form   1. Putting on and taking off regular lower body clothing? 3 - A Little   2. Bathing (including washing, rinsing, drying)? 3 - A Little   3. Toileting, which includes using toilet, bedpan or urinal? 3 - A Little   4. Putting on and taking off regular upper body clothing? 4 - None   5. Taking care of personal grooming such as brushing teeth? 3 - A Little   6. Eating meals? 4 - None   Daily Activity Raw Score (Score out of 24.Lower scores equate to " lower levels of function) 20   Total Evaluation Time   Total Evaluation Time (Minutes) 12

## 2017-03-30 NOTE — PROGRESS NOTES
Cardiology Progress Note  [facility], [facility]          Assessment and Plan:   Perez Villalobos is a 76 year old male with a history of CAD s/p multiple stents, ischemic cardiomyopathy and systolic heart failure with EF of 35%, V-fib arrest s/p ICD, hypertension, hyperlipidemia, ckd, hypothyroidism, sleep apnea who presents from [facility] clinic due to abnormal labs and continued dyspnea on exertion.     Chronic Systolic Heart Failure   Weight at baseline (he feels he has been eating more, so dry weight felt to be 142-146 probably more like 145).  He is 144 today.  He is feeling at his baseline without increased SOB.    Bumex was increased from 1mg BID to 2mg in AM and 1mg in PM last weekend; suspect dried him out too much.  His labs are back to baseline now. Agree he was over diuresed. Replace K   -OK to dismiss on Bumex 1mg BID and he will weigh himself daily  -Follow up arranged   -CardioMEMS device procedure next week.       Hyponatremia  Over diuresed  Now back to baseline     Acute on chronic kidney disease   Creatinine back to baseline      CAD  HTN  H/o V-fib arrest s/p ICD  Hyperlipidemia   Intolerant to statins due to rhabdo and ARF.       OK to dismiss from CV standpoint    MEETA Min MD                Interval History:   Labs back to baseline. Feeling at baseline. Weight at baseline              Medications:       aspirin EC  81 mg Oral Daily     [START ON 3/31/2017] calcitRIOL  0.25 mcg Oral Q Mon Wed Fri AM     carvedilol  9.375 mg Oral BID w/meals     clopidogrel  75 mg Oral Daily     cyanocobalamin  1,000 mcg Oral Daily     hydrALAZINE  50 mg Oral TID     isosorbide mononitrate  60 mg Oral At Bedtime     levothyroxine  150 mcg Oral Daily     multivitamin, therapeutic with minerals  1 tablet Oral Daily     sodium chloride (PF)  3 mL Intracatheter Q8H     potassium chloride, potassium chloride, potassium chloride, potassium chloride with lidocaine, potassium chloride,  HOLD MEDICATION, Reason ACE/ARB order not selected, - MEDICATION INSTRUCTIONS -, naloxone, lidocaine, lidocaine 4%, sodium chloride (PF), acetaminophen, acetaminophen, senna-docusate, polyethylene glycol, bisacodyl, ondansetron **OR** ondansetron               Physical Exam:   Blood pressure 129/68, temperature 97.4  F (36.3  C), temperature source Oral, resp. rate 18, weight 65.6 kg (144 lb 10 oz), SpO2 98 %.  Wt Readings from Last 4 Encounters:   17 65.6 kg (144 lb 10 oz)   17 69.3 kg (152 lb 12.8 oz)   17 68.5 kg (151 lb 1.6 oz)   17 71.4 kg (157 lb 4.8 oz)         Vital Sign Ranges  Temperature Temp  Av.7  F (36.5  C)  Min: 97.2  F (36.2  C)  Max: 98.1  F (36.7  C)   Blood pressure Systolic (24hrs), Av , Min:106 , Max:139        Diastolic (24hrs), Av, Min:50, Max:81      Pulse Pulse  Av  Min: 62  Max: 62   Respirations Resp  Av.8  Min: 9  Max: 22   Pulse oximetry SpO2  Av.6 %  Min: 93 %  Max: 100 %         Intake/Output Summary (Last 24 hours) at 17 0910  Last data filed at 17 0722   Gross per 24 hour   Intake              100 ml   Output             1675 ml   Net            -1575 ml       Constitutional: Awake, alert, cooperative, no apparent distress   Lungs: Clear to auscultation bilaterally, no crackles or wheezing   Cardiovascular: Regular rate and rhythm, normal S1 and S2, and no murmur noted   Abdomen: Normal bowel sounds, soft, non-distended, non-tender   Skin: No rashes, no cyanosis, no edema   Other:           Data:     Recent Labs   Lab Test  17   1435  17   1418 17   0753   WBC  6.4  6.7  6.2   < >  9.9   HGB  12.9*  12.8*  12.5   < >  11.8*   MCV  98  97  97.9   < >  106*   PLT  158  149*  190   < >  235   INR  1.12  1.10   --    --   1.01    < > = values in this interval not displayed.      Recent Labs   Lab Test  17   0538  17   1214  17   1418   NA  130*  123*  126*   POTASSIUM  2.2*   4.2  2.6*   CHLORIDE  81*  79*  77*   CO2  35*  37*  36*   BUN  100*  101*  90*   CR  1.76*  2.24*  1.89*   ANIONGAP  14  Not Calculated  13   LEIDY  8.9  9.6  8.6   GLC  113*  134*  122*         Pam Min MD

## 2017-03-30 NOTE — UTILIZATION REVIEW
"  Admission Status; Secondary Review Determination         Under the authority of the Utilization Management Committee, the utilization review process indicated a secondary review on the above patient.  The review outcome is based on review of the medical records, discussions with staff, and applying clinical experience noted on the date of the review.          (x) Observation Status Appropriate - This patient does not meet hospital inpatient criteria and is placed in observation status. If this patient's primary payer is Medicare and was admitted as an inpatient, Condition Code 44 should be used and patient status changed to \"observation\".     RATIONALE FOR DETERMINATION   \"76 year old male with a history of CAD s/p multiple stents, ischemic cardiomyopathy and systolic heart failure with EF of 35%, V-fib arrest s/p ICD, hypertension, hyperlipidemia, ckd, hypothyroidism, sleep apnea who presents from [facility] clinic due to abnormal labs and continued dyspnea on exertion.\"  Patient hyponatremia significantly improved  Overnight, he was cleared for discharge by cardiology.  Discussed his care with the attending physician, patient is receiving potassium,if his level improves he will be discharged. The severity of illness, intensity of service provided, expected LOS and risk for adverse outcome make the care appropriate for further observation; however, doesn't meet criteria for hospital inpatient admission. Dr Wyman notified of this determination.    This document was produced using voice recognition software.      The information on this document is developed by the utilization review team in order for the business office to ensure compliance.  This only denotes the appropriateness of proper admission status and does not reflect the quality of care rendered.         The definitions of Inpatient Status and Observation Status used in making the determination above are those provided in the CMS Coverage Manual, Chapter 1 and " Chapter 6, section 70.4.      Sincerely,     BRIT BELL MD    System Medical Director  Utilization Management  [facility].

## 2017-03-30 NOTE — PLAN OF CARE
Problem: Goal Outcome Summary  Goal: Goal Outcome Summary  Outcome: No Change  VSS. Tele V-Paced. A&O, denies pain, dizziness. Some discomfort to R elbow from fall. FR 1500cc. Strict I&O. Plan for further management of hyponatremia and CHF. Continue to monitor.

## 2017-03-30 NOTE — PROGRESS NOTES
[facility] Discharge Summary    Perez Villalobos MRN# [id number]   Age: 76 year old YOB: 1940     Date of Admission:  3/29/2017  Date of Discharge::  3/30/2017  Admitting Physician:  Kerwin Wyman MD  Discharge Physician:  Kerwin Wyman MD  Primary Physician: Hector Jc  Transferring Facility[facility]          Admission Diagnoses:   Hyponatremia [E87.1]  Renal failure [N19]  Chronic combined systolic and diastolic congestive heart failure (H) [I50.42]  Contusion of right elbow, initial encounter [S50.01XA]          Discharge Diagnosis:   Principle diagnosis: Hypovolemic hyponatremia and hypokalemia, resolved.  JACINTO, resolved  Secondary diagnoses:  Patient Active Problem List    Diagnosis Date Noted     Dyspnea 03/30/2017     Priority: Medium     CHF (congestive heart failure) (H) 03/29/2017     Priority: Medium     Hyponatremia 01/16/2017     Priority: Medium     Hypokalemia 01/16/2017     Priority: Medium     Iron deficiency anemia, unspecified 01/12/2017     Priority: Medium     Anemia of chronic renal failure 01/12/2017     Priority: Medium     Chronic kidney disease, stage III (moderate) 01/12/2017     Priority: Medium     CKD (chronic kidney disease) stage 3, GFR 30-59 ml/min 12/21/2016     Priority: Medium     Cardiorenal syndrome 12/21/2016     Priority: Medium     CKD (chronic kidney disease)      Priority: Medium     ACS (acute coronary syndrome) (H) 10/01/2016     Priority: Medium     Severe hypothyroidism 09/20/2016     Priority: Medium     Bradycardia 09/16/2016     Priority: Medium     Due to hypothyroidism       Chronic systolic heart failure (H)      Priority: Medium     Mixed hyperlipidemia      Priority: Medium     Benign essential HTN      Priority: Medium     Leg weakness, bilateral 02/18/2016     Priority: Medium     Biventricular heart failure (H) 02/11/2016     Priority: Medium     PAD (peripheral artery disease) (H)      Priority: Medium      "Rhabdomyolysis due to statin therapy      Priority: Medium     crestor       Anemia      Priority: Medium     BPH (benign prostatic hypertrophy)      Priority: Medium     NSTEMI (non-ST elevated myocardial infarction) (H) 10/23/2015     Priority: Medium     Ischemic cardiomyopathy 10/23/2015     Priority: Medium     Idiopathic progressive polyneuropathy 10/07/2015     Priority: Medium     Vitamin B12 deficiency without anemia 10/07/2015     Priority: Medium     Diagnosis updated by automated process. Provider to review and confirm.       Muscle weakness (generalized) 10/05/2015     Priority: Medium     Atrial flutter (H) 12/14/2016     Per device check 12/13/16       S/P ICD (internal cardiac defibrillator) procedure 11/29/2016     V-fib arrest       DELONTE (obstructive sleep apnea) 10/12/2016     Bipap       Coronary artery disease involving native coronary artery of native heart without angina pectoris      Pain in joint, shoulder region 01/12/2010             Procedures:   No procedures performed during this admission         Allergies:      Allergies   Allergen Reactions     Lisinopril Other (See Comments)     Angioedema     Augmentin Other (See Comments)     Per pt, \"froze him, affected his legs\"     Crestor [Rosuvastatin] Other (See Comments)     rhabdomyolysis             Medications Prior to Admission:     Prescriptions Prior to Admission   Medication Sig Dispense Refill Last Dose     isosorbide mononitrate (IMDUR) 60 MG 24 hr tablet Take 1 tablet (60 mg) by mouth At Bedtime At hs 30 tablet 11 3/28/2017 at pm     aspirin EC 81 MG EC tablet Take 81 mg by mouth daily   3/29/2017 at am     levothyroxine (SYNTHROID/LEVOTHROID) 150 MCG tablet Take 150 mcg by mouth daily   3/29/2017 at am     carvedilol (COREG) 3.125 MG tablet Take 3 tablets (9.375 mg) by mouth 2 times daily (with meals) 540 tablet 3 3/29/2017 at am     calcitRIOL (ROCALTROL) 0.25 MCG capsule Take 0.25 mcg by mouth three times a week Monday, " Wednesday and Friday   3/29/2017 at am     Saline (SODIUM CHLORIDE) 0.65 % SOLN Spray in nostril as needed   Past Week at Unknown time     hydrALAZINE (APRESOLINE) 50 MG tablet Take 1 tablet (50 mg) by mouth 3 times daily 270 tablet 3 3/29/2017 at am     KRILL OIL PO Take 1 capsule by mouth daily    3/29/2017 at am     Cyanocobalamin (B-12) 1000 MCG TBCR Take 1,000 mcg by mouth daily 100 tablet 0 3/29/2017 at am     multivitamin, therapeutic with minerals (MULTI-VITAMIN) TABS Take 1 tablet by mouth daily   3/29/2017 at am     clopidogrel (PLAVIX) 75 MG tablet Take 75 mg by mouth daily   3/29/2017 at am     [DISCONTINUED] bumetanide (BUMEX) 1 MG tablet Take 2 mg by mouth every morning 2 tablets in AM (In addition to 1 tablet in the afternoon daily)   3/29/2017 at am     [DISCONTINUED] metolazone (ZAROXOLYN) 2.5 MG tablet One tablet every other day until wt is 146# per KMannchen CNP 3/23/17   Past Week at Unknown time     [DISCONTINUED] potassium chloride SA (K-DUR/KLOR-CON M) 20 MEQ CR tablet Take 1 tablet (20 meq) every Monday, Wednesday, Friday   3/29/2017 at am             Discharge Medications:     Current Discharge Medication List      START taking these medications    Details   potassium chloride (KLOR-CON) 20 MEQ Packet 20 mEq by Oral or Feeding Tube route daily  Qty: 30 packet, Refills: 1    Associated Diagnoses: Chronic combined systolic and diastolic congestive heart failure (H)         CONTINUE these medications which have CHANGED    Details   bumetanide (BUMEX) 1 MG tablet Take 1 tablet (1 mg) by mouth 2 times daily  Qty: 30 tablet, Refills: 1    Associated Diagnoses: Chronic combined systolic and diastolic congestive heart failure (H)         CONTINUE these medications which have NOT CHANGED    Details   isosorbide mononitrate (IMDUR) 60 MG 24 hr tablet Take 1 tablet (60 mg) by mouth At Bedtime At hs  Qty: 30 tablet, Refills: 11    Associated Diagnoses: Chronic systolic heart failure (H); NSTEMI (non-ST  elevated myocardial infarction) (H); Ischemic cardiomyopathy      aspirin EC 81 MG EC tablet Take 81 mg by mouth daily      levothyroxine (SYNTHROID/LEVOTHROID) 150 MCG tablet Take 150 mcg by mouth daily      carvedilol (COREG) 3.125 MG tablet Take 3 tablets (9.375 mg) by mouth 2 times daily (with meals)  Qty: 540 tablet, Refills: 3    Associated Diagnoses: Chronic systolic heart failure (H)      calcitRIOL (ROCALTROL) 0.25 MCG capsule Take 0.25 mcg by mouth three times a week Monday, Wednesday and Friday      Saline (SODIUM CHLORIDE) 0.65 % SOLN Spray in nostril as needed      hydrALAZINE (APRESOLINE) 50 MG tablet Take 1 tablet (50 mg) by mouth 3 times daily  Qty: 270 tablet, Refills: 3    Associated Diagnoses: Essential hypertension with goal blood pressure less than 130/80      KRILL OIL PO Take 1 capsule by mouth daily       Cyanocobalamin (B-12) 1000 MCG TBCR Take 1,000 mcg by mouth daily  Qty: 100 tablet, Refills: 0    Associated Diagnoses: Vitamin B 12 deficiency      multivitamin, therapeutic with minerals (MULTI-VITAMIN) TABS Take 1 tablet by mouth daily      clopidogrel (PLAVIX) 75 MG tablet Take 75 mg by mouth daily         STOP taking these medications       metolazone (ZAROXOLYN) 2.5 MG tablet Comments:   Reason for Stopping:         potassium chloride SA (K-DUR/KLOR-CON M) 20 MEQ CR tablet Comments:   Reason for Stopping:                     Consultations:   Cardiology Consult service          Brief History of Presenting Illness:   Perez Villalobos is a 76 year old male with a history of CAD s/p multiple stents, ischemic cardiomyopathy and systolic heart failure with EF of 35%, V-fib arrest s/p ICD, hypertension, hyperlipidemia, ckd, hypothyroidism, sleep apnea and bph who [facility] due [facility] abnormal labs and continued dyspnea on exertion. According to the patient he has been working with his cardiologist Dr. Tran and the [facility] to get his weights down and has been adjusting his  diuretics under their guidance. His goal weight was 142-146 lbs. This past weekend he weighed 151 lbs and had noted significant LE edema as well as scrotal edema. He had been instructed that in addition to his Bumex to take Metolazone 2.5mg QOD until his weights are below 146 lbs. Since this time he has noted good UO and that his LE and scrotal edema have significantly improved. His last dose of Metolazone was on Monday as he was told to stop this after labs were drawn and showed hyponatremia and hypokalemia on 3/27. Despite this he continues to feel NOEL, fatigued and with weakness in his legs. He denies any orthopnea, chest pain, fevers, chills, nausea, vomiting, abdominal pain or dysuria. He did have some diarrhea the last few days but had been taking prune juice for constipation. He admits compliance with his medications and low salt diet.  He was seen in [facility] today and [facility] ER for ongoing hyponatremia and JACINTO.   In the ED, patient was afebrile and saturating at 97% on room air. Labs were notable for sodium of 123, chloride of 79, BUN of 101 the creatinine of 2.24 bicarbonate 37. BNP is elevated at greater than 31,000. Troponin is negative ×1. CBC shows a hemoglobin of 12.9 but otherwise no significant acute abnormalities. CT scan of his head was done showing no acute pathology or evidence of any bleeding. Right elbow x-ray showed nothing acute with no fracture or dislocation. Chest x-ray showed small bilateral pleural effusions, cardiomegaly with multi chamber enlargement. Calcified granuloma in the left lung base chest is otherwise negative. EKG showed ventricular paced rhythm with no signs of acute ischemia. He was given a dose of iv lasix 40 mg and admitted for ongoing mngt of heart failure.              Hospital Course:   Throughout the course of his hospital stay, Mr Villalobos felt improved symptomatically.  Despite the initial diagnosis of CHF exacerbation, Mr Villalobos's clinical  context was more consistent with over-diuresis resulting in hypovolemic hyponatremia, severe hypokalemia (2.2 on the morning of 3/30), and JACINTO.  Diuretics were held during admission with improvement in renal function and sodium level.  Serum potassium was aggressively replaced today 3/30 with PO and IV potassium.  The Cardiology service saw the patient and agreed that he likely was over-diuresed prior to admission with the changes in his home Bumex regimen.  Today 3/30 after holding diuresis Mr CHILDRESS was feeling at his baseline and was eager to discharge home as he has an appointment with a sleep specialist at [facility] in the AM.  Although I would have preferred that he stay for a final potassium recheck (last was 3.2 the afternoon of 3/30) and to monitor patient's renal function in a controlled setting following restart of Bumex, the patient requested discharge.  He will have blood drawn for another BMP in the AM on 3/31 at his [facility] clinic and these values should be assessed prior to patient restarting his Bumex.  His Bumex dose will be 1 mg BID until he is able to follow up for re-evaluation with his Cardiologist, and he will also take 20 mEq potassium supplementation.  Mr CHILDRESS stated understanding of this plan.          Pending Tests at Discharge:   None, but patient will have a follow up BMP in the AM tomorrow 3/31 at [facility]         Discharge Instructions and Follow-Up:   Discharge diet: Low sodium, 2 L fluid restriction   Discharge activity: Activity as tolerated   Discharge follow-up: Follow up with primary care provider in 1-2 weeks           Discharge Disposition:   Discharged to home      Attestation:  I have reviewed today's vital signs, notes, medications, labs and imaging.  Care coordination / counseling time: 30 minutes    Kerwin Wyman MD

## 2017-03-30 NOTE — PROGRESS NOTES
Patient was on home CPAP unit throughout the night.  Will cont to monitor.  3/30/2017  Liss Paniagua RRT

## 2017-03-30 NOTE — PROVIDER NOTIFICATION
MD Notification    Notified Person:  MD    Notified Persons Name: Faustina     Notification Date/Time:0609    Notification Interaction:  Talked with Physician    Purpose of Notification: Critical potassium 2.2    Orders Received: potassium protocol     Comments:

## 2017-03-31 NOTE — TELEPHONE ENCOUNTER
Call placed to pt to review prep for right heart cath/cardiomems procedure scheduled for Mon 4/3/17        Hospital: [facility], reviewed check-in desk and where to park  Arrival Time: 0800, procedure start time 1000  NPO starting at midnight (except allowable meds morning of procedure with small sip of water) reviewed with patient?reviewed  Diabetic? NO  On Anticoagulants? NO  Contrast Dye Allergy? NO  On Aspirin? Yes, will take per usual as well as Plavix  Any Meds to Hold? Bumex, metolazone    to/from procedure? YES, wife  Responsible adult with patient 24hrs post procedure? YES, wife

## 2017-03-31 NOTE — TELEPHONE ENCOUNTER
Pt scheduled for R. heart cath/cardiomemems 4/3/17. Discharged from [facility] yesterday 3/30/17 K+ 3.2, Na 130, , creat 1.76. Message to Dr Tran regarding e-lyte/fluid plan? Awaiting reply. Will call pt with prep and orders once received.

## 2017-03-31 NOTE — TELEPHONE ENCOUNTER
Received a call from Annia, patient's wife. She wanted to let Suzanna know that patient was discharged from the hospital last evening. Patient has an appt for a BMP draw and office visit with Dr. Salguero today. Patient is having his CardioMems placed on Monday 04-03-17. Messaged Suzanna with update.     Penelope GILL  C.O.R.E. [facility]

## 2017-03-31 NOTE — MR AVS SNAPSHOT
After Visit Summary   3/31/2017    Perez Villalobos    MRN: [id number]           Patient Information     Date Of Birth          1940        Visit Information        Provider Department      3/31/2017 10:30 AM Ethan Salguero MD [facility]        Today's Diagnoses     Complex sleep apnea syndrome    -  1       Follow-ups after your visit        Your next 10 appointments already scheduled     Apr 03, 2017 10:00 AM CDT   Heart Cath Right Heart Cath with SHCVR1   [facility] ([facility])    [address]  [place] [address]   [phone]            Apr 04, 2017  4:30 PM CDT   Remote ICD Check with MCLEAN DCR2   [facility] ([facility])    [address]  [place] [address]   [phone]           This appointment is for a remote check of your debrillator.  This is not an appointment at the office.            Apr 05, 2017  7:30 AM CDT   Core Return with PARKER Wilde CNP   [facility] AT [place] ([facility])    [address]  [place] [address]   [phone]            Apr 06, 2017  1:00 PM CDT   Treatment 60 with Rh Cardiac Rehab 3   [facility] ([facility])    [address]  [facility] [address]   [phone]            Apr 10, 2017  1:00 PM CDT   Treatment 60 with Rh Cardiac Rehab 1   [facility] ([facility])    [address]  [place] [facility] [address]   [phone]            Apr 10, 2017  3:00 PM CDT   LAB with MCLEAN LAB   [facility] ([facility])    [address]  [place]  [place] [address]   [phone]           Patient must bring picture ID.  Patient should be prepared to give a urine specimen  Please do not eat 10-12  hours before your appointment if you are coming in fasting for labs on lipids, cholesterol, or glucose (sugar).  Pregnant women should follow their Care Team instructions. Water with medications is okay. Do not drink coffee or other fluids.   If you have concerns about taking  your medications, please ask at office or if scheduling via Secure-NOK, send a message by clicking on Secure Messaging, Message Your Care Team.            Apr 10, 2017  3:45 PM CDT   Core MD Return with Krishna Tran MD   [facility] [place] ([facility])    [address] [place] [address] [address]  [facility] [address]   [phone]            Apr 13, 2017  1:00 PM CDT   Treatment 60 with Rh Cardiac Rehab 3   [facility] ([facility])    [address]  [place] [address] [phone]            Apr 28, 2017  2:00 PM CDT   Return Sleep Patient with Ethan Salguero MD   [facility] ([facility])    [address]  [place] [address] [phone]              Who to contact     If you have questions or need follow up information about today's clinic visit or your schedule please contact [facility] directly at [phone].  Normal or non-critical lab and imaging results will be communicated to you by Nfosharehart, letter or phone within 4 business days after the clinic has received the results. If you do not hear from us within 7 days, please contact the clinic through Nfosharehart or phone. If you have a critical or abnormal lab result, we will notify you by phone as soon as possible.  Submit refill requests through Secure-NOK or call your pharmacy and they will forward the refill request to us. Please allow 3 business days for your refill to be completed.          Additional Information About Your Visit        Secure-NOK Information     Secure-NOK lets you send messages to your  "doctor, view your test results, renew your prescriptions, schedule appointments and more. To sign up, go to [email or web address] . Click on \"Log in\" on the left side of the screen, which will take you to the Welcome page. Then click on \"Sign up Now\" on the right side of the page.     You will be asked to enter the access code listed below, as well as some personal information. Please follow the directions to create your username and password.     Your access code is: [email or web address]  Expires: 2017  3:26 PM     Your access code will  in 90 days. If you need help or a new code, please call your [place] clinic or [phone].        Care EveryWhere ID     This is your Care EveryWhere ID. This could be used by other organizations to access your [place] medical records  [id number]        Your Vitals Were     Pulse Respirations Height Pulse Oximetry BMI (Body Mass Index)       67 12 1.74 m (5' 8.5\") 99% 21.88 kg/m2        Blood Pressure from Last 3 Encounters:   17 107/66   17 110/61   17 128/70    Weight from Last 3 Encounters:   17 66.2 kg (146 lb)   17 65.6 kg (144 lb 10 oz)   17 69.3 kg (152 lb 12.8 oz)              We Performed the Following     Comprehensive DME        Primary Care Provider Office Phone # Fax #    Hector Jc [phone]       [facility] [address]  [facility] [address]        Thank you!     Thank you for choosing [facility]  for your care. Our goal is always to provide you with excellent care. Hearing back from our patients is one way we can continue to improve our services. Please take a few minutes to complete the written survey that you may receive in the mail after your visit with us. Thank you!             Your Updated Medication List - Protect others around you: Learn how to safely use, store and throw away your medicines at www.disposemymeds.org.          This list is accurate as of: " 3/31/17  2:07 PM.  Always use your most recent med list.                   Brand Name Dispense Instructions for use    aspirin EC 81 MG EC tablet      Take 81 mg by mouth daily       B-12 1000 MCG Tbcr     100 tablet    Take 1,000 mcg by mouth daily       bumetanide 1 MG tablet    BUMEX    30 tablet    Take 1 tablet (1 mg) by mouth 2 times daily       calcitRIOL 0.25 MCG capsule    ROCALTROL     Take 0.25 mcg by mouth three times a week Monday, Wednesday and Friday       carvedilol 3.125 MG tablet    COREG    540 tablet    Take 3 tablets (9.375 mg) by mouth 2 times daily (with meals)       hydrALAZINE 50 MG tablet    APRESOLINE    270 tablet    Take 1 tablet (50 mg) by mouth 3 times daily       isosorbide mononitrate 60 MG 24 hr tablet    IMDUR    30 tablet    Take 1 tablet (60 mg) by mouth At Bedtime At hs       KRILL OIL PO      Take 1 capsule by mouth daily       levothyroxine 150 MCG tablet    SYNTHROID/LEVOTHROID     Take 150 mcg by mouth daily       Multi-vitamin Tabs tablet      Take 1 tablet by mouth daily       PLAVIX 75 MG tablet   Generic drug:  clopidogrel      Take 75 mg by mouth daily       potassium chloride 20 MEQ Packet    KLOR-CON    30 packet    20 mEq by Oral or Feeding Tube route daily       Sodium Chloride 0.65 % Soln      Spray in nostril as needed

## 2017-03-31 NOTE — PROGRESS NOTES
75 y/o male just discharged from  for heart failure with increasing edema in the setting of known cardiomyopathy EF 35-45%. He has had previous complex sleep apnea with AHI 2007 and frequent arousals 71/hour and has not wanted sleep study repeated. He derives benefit with improved sleep quality on bilevel 12/8 spontaneous with improved sleep quality and does not have hypoxemia on these settings based on oximetry 1/20/17. Currrent data suggests periodic breathing and central>obstructive events which may reflect excessive pressures or just be a reflection of worsening heart failure.    Plan: Reduce bilevel PAP to 8/6 and return in 2-4 weeks to review data download. Will repeat oximetry if there are persisting symptoms.    Total time 25 min > 50% counseling

## 2017-03-31 NOTE — PROGRESS NOTES
Patient discharged home  At 1810 accompanied by RN and wife   Written instructions sent with patient home and patient/family verbalized understanding.   Belongings sent with the patient patient and wife  Home medications sent with patient - none  Prescriptions sent to the patient preferred pharmacy.

## 2017-03-31 NOTE — TELEPHONE ENCOUNTER
"Reply from Dr Tran - \"Does not need any fluid. No contrast so to speak of with cardiomems right heart.\"   "

## 2017-04-03 NOTE — IP AVS SNAPSHOT
[place] [address]    [place] [address]    Phone:  [phone]                                       After Visit Summary   4/3/2017    Perez Villalobos    MRN: [id number]           After Visit Summary Signature Page     I have received my discharge instructions, and my questions have been answered. I have discussed any challenges I see with this plan with the nurse or doctor.    ..........................................................................................................................................  Patient/Patient Representative Signature      ..........................................................................................................................................  Patient Representative Print Name and Relationship to Patient    ..................................................               ................................................  Date                                            Time    ..........................................................................................................................................  Reviewed by Signature/Title    ...................................................              ..............................................  Date                                                            Time

## 2017-04-03 NOTE — IP AVS SNAPSHOT
MRN:[id number]                      After Visit Summary   4/3/2017    Perez Villalobos    MRN: [id number]           Visit Information        Department      4/3/2017  7:59 AM [facility]          Review of your medicines      UNREVIEWED medicines. Ask your doctor about these medicines        Dose / Directions    aspirin EC 81 MG EC tablet        Dose:  81 mg   Take 81 mg by mouth daily   Refills:  0       B-12 1000 MCG Tbcr   Used for:  Vitamin B 12 deficiency        Dose:  1000 mcg   Take 1,000 mcg by mouth daily   Quantity:  100 tablet   Refills:  0       bumetanide 1 MG tablet   Commonly known as:  BUMEX   Used for:  Chronic combined systolic and diastolic congestive heart failure (H)        Dose:  1 mg   Take 1 tablet (1 mg) by mouth 2 times daily   Quantity:  30 tablet   Refills:  1       calcitRIOL 0.25 MCG capsule   Commonly known as:  ROCALTROL        Dose:  0.25 mcg   Take 0.25 mcg by mouth daily Monday, Wednesday and Friday   Refills:  0       carvedilol 3.125 MG tablet   Commonly known as:  COREG   Used for:  Chronic systolic heart failure (H)        Dose:  9.375 mg   Take 3 tablets (9.375 mg) by mouth 2 times daily (with meals)   Quantity:  540 tablet   Refills:  3       hydrALAZINE 50 MG tablet   Commonly known as:  APRESOLINE   Used for:  Essential hypertension with goal blood pressure less than 130/80        Dose:  50 mg   Take 1 tablet (50 mg) by mouth 3 times daily   Quantity:  270 tablet   Refills:  3       isosorbide mononitrate 60 MG 24 hr tablet   Commonly known as:  IMDUR   Used for:  Chronic systolic heart failure (H), NSTEMI (non-ST elevated myocardial infarction) (H), Ischemic cardiomyopathy        Dose:  60 mg   Take 1 tablet (60 mg) by mouth At Bedtime At hs   Quantity:  30 tablet   Refills:  11       KRILL OIL PO        Dose:  1 capsule   Take 1 capsule by mouth daily   Refills:  0       levothyroxine 150 MCG tablet   Commonly known as:   SYNTHROID/LEVOTHROID        Dose:  150 mcg   Take 150 mcg by mouth daily   Refills:  0       Multi-vitamin Tabs tablet        Dose:  1 tablet   Take 1 tablet by mouth daily   Refills:  0       PLAVIX 75 MG tablet   Generic drug:  clopidogrel        Dose:  75 mg   Take 75 mg by mouth daily   Refills:  0       potassium chloride 20 MEQ Packet   Commonly known as:  KLOR-CON   Used for:  Chronic combined systolic and diastolic congestive heart failure (H)        Dose:  20 mEq   20 mEq by Oral or Feeding Tube route daily   Quantity:  30 packet   Refills:  1       Sodium Chloride 0.65 % Soln        Spray in nostril as needed   Refills:  0                Protect others around you: Learn how to safely use, store and throw away your medicines at www.disposemymeds.org.         Follow-ups after your visit        Your next 10 appointments already scheduled     Apr 04, 2017  4:30 PM CDT   Remote ICD Check with CARIDAD MATT2   [facility] ([facility])    [address] [place] [address] [address]  [facility] [address] [phone]           This appointment is for a remote check of your debrillator.  This is not an appointment at the office.            Apr 05, 2017  7:30 AM CDT   Core Return with PARKER Wilde CNP   [facility] ([facility])    [address]  [facility] [address] [phone]            Apr 06, 2017  1:00 PM CDT   Treatment 60 with Rh Cardiac Rehab 3   [facility] ([facility])    [address], [address]  [facility] [address]   [phone]            Apr 10, 2017  1:00 PM CDT   Treatment 60 with Rh Cardiac Rehab 1   [facility] ([facility])    [address], [address]  [facility] [address]   [phone]            Apr 10, 2017  3:00 PM CDT   LAB with [facility]   [facility] ([facility]  [facility])    [address] [place] [address] [address]  [facility] [address]   [phone]           Patient must bring picture ID.  Patient should be prepared to give a urine specimen  Please do not eat 10-12 hours before your appointment if you are coming in fasting for labs on lipids, cholesterol, or glucose (sugar).  Pregnant women should follow their Care Team instructions. Water with medications is okay. Do not drink coffee or other fluids.   If you have concerns about taking  your medications, please ask at office or if scheduling via [employer], send a message by clicking on Secure Messaging, Message Your Care Team.            Apr 10, 2017  3:45 PM CDT   Core MD Return with Krishna Tran MD   [facility] [place] ([facility])    [address] [place], [place] [id number]  [facility] [address] [phone]            Apr 13, 2017  1:00 PM CDT   Treatment 60 with  Cardiac Rehab 3   [facility] ([facility])    [address], [address]  [facility] [id number] [phone]            Apr 28, 2017  2:00 PM CDT   Return Sleep Patient with Ethan Salguero MD   [facility] ([facility])    [address]  [facility] [id number] [phone]               Care Instructions        After Care Instructions     Discharge Instructions - IF on Metformin (Glucophage or Glucovance) or Metformin containing medications       IF on Metformin (Glucophage or Glucovance) or Metformin containing medications , schedule a Basic Metabolic Panel at [facility] Primary Clinic in 48 - 72 hours post procedure and PRIOR TO resuming the Metformin or Metformin containing medications.  Hold Metformin (Glucophage or Glucovance) or Metformin containing medications until after the Basic Metabolic Panel on the 2nd or 3rd day following the procedure.  May resume after blood draw is complete.                   Further instructions from your care team       Right Heart Cath Discharge Instructions - Femoral    After you go home:      Have an adult stay with you until tomorrow.    Drink extra fluids for 2 days.    You may resume your normal diet.    No smoking       For 24 hours - due to the sedation you received:    Relax and take it easy.    Do NOT make any important or legal decisions.    Do NOT drive or operate machines at home or at work.    Do NOT drink alcohol.    Care of Groin Puncture Site:      For the first 24 hrs - check the puncture site every 1-2 hours while awake.    For 2 days, when you cough, sneeze, laugh or move your bowels, hold your hand over the puncture site and press firmly.    Remove the bandaid after 24 hours. If there is minor oozing, apply another bandaid and remove it after 12 hours.    It is normal to have a small bruise or pea size lump at the site.    You may shower. Do NOT take a bath, or use a hot tub or pool for at least 3 days. Do NOT scrub the site. Do not use lotion or powder near the puncture site.    Activity:            For 2 days:    No stooping or squatting    Do NOT do any heavy activity such as exercise, lifting, or straining.     No housework, yard work or any activity that make you sweat    Do NOT lift more than 10 pounds    Bleeding:      If you start bleeding from the site in your groin, lie down flat and press firmly on/above the site for 10 minutes.     Once bleeding stops, lay flat for 2 hours.     Call [facility] as soon as you can.       Call 911 right away if you have heavy bleeding or bleeding that does not stop.      Medicines:      If you are taking antiplatelet medications such as Plavix, Brilinta or Effient, do not stop taking it until you talk to your cardiologist.        If you are on Metformin (Glucophage), do not restart it until you have blood tests (within 2 to 3 days after discharge).  After you have your blood drawn, you may restart the Metformin.    Take  "your medications, including blood thinners, unless your provider tells you not to.  If you take Coumadin (Warfarin), have your INR checked by your provider in  3-5 days. Call your clinic to schedule this.    If you have stopped any other medicines, check with your provider about when to restart them.    Follow Up Appointments:      Follow up with [facility] Nurse Practitioner at [facility] of patient preference in 7-10 days.    Call the clinic if:      You have increased pain or a large or growing hard lump around the site.    The site is red, swollen, hot or tender.    Blood or fluid is draining from the site.    You have chills or a fever greater than 101 F (38 C).    Your leg turns feels numb, cool or changes color.    You have hives, a rash or unusual itching.    New pain in the back or belly that you cannot control with Tylenol.    Any questions or concerns.          [facility] [place]:    [phone] [facility] (7 days a week)       Additional Information About Your Visit        [employer] Information     [employer] lets you send messages to your doctor, view your test results, renew your prescriptions, schedule appointments and more. To sign up, go to [email or web address] . Click on \"Log in\" on the left side of the screen, which will take you to the Welcome page. Then click on \"Sign up Now\" on the right side of the page.     You will be asked to enter the access code listed below, as well as some personal information. Please follow the directions to create your username and password.     Your access code is: [email or web address]  Expires: 2017  3:26 PM     Your access code will  in 90 days. If you need help or a new code, please call your [place] clinic or [phone].        Care EveryWhere ID     This is your Care EveryWhere ID. This could be used by other organizations to access your [place] medical records  [id number]        Your Vitals Were     Blood Pressure Pulse " "Temperature Respirations Height Weight    117/60 64 97.9  F (36.6  C) (Oral) 18 1.727 m (5' 8\") 69.4 kg (153 lb)    Pulse Oximetry BMI (Body Mass Index)                98% 23.26 kg/m2           Primary Care Provider Office Phone # Fax #    Hector Jc [phone]      Thank you!     Thank you for choosing [facility] for your care. Our goal is always to provide you with excellent care. Hearing back from our patients is one way we can continue to improve our services. Please take a few minutes to complete the written survey that you may receive in the mail after you visit with us. Thank you!             Medication List: This is a list of all your medications and when to take them. Check marks below indicate your daily home schedule. Keep this list as a reference.      Medications           Morning Afternoon Evening Bedtime As Needed    aspirin EC 81 MG EC tablet   Take 81 mg by mouth daily                                B-12 1000 MCG Tbcr   Take 1,000 mcg by mouth daily                                bumetanide 1 MG tablet   Commonly known as:  BUMEX   Take 1 tablet (1 mg) by mouth 2 times daily                                calcitRIOL 0.25 MCG capsule   Commonly known as:  ROCALTROL   Take 0.25 mcg by mouth daily Monday, Wednesday and Friday                                carvedilol 3.125 MG tablet   Commonly known as:  COREG   Take 3 tablets (9.375 mg) by mouth 2 times daily (with meals)                                hydrALAZINE 50 MG tablet   Commonly known as:  APRESOLINE   Take 1 tablet (50 mg) by mouth 3 times daily                                isosorbide mononitrate 60 MG 24 hr tablet   Commonly known as:  IMDUR   Take 1 tablet (60 mg) by mouth At Bedtime At                                 KRILL OIL PO   Take 1 capsule by mouth daily                                levothyroxine 150 MCG tablet   Commonly known as:  SYNTHROID/LEVOTHROID   Take 150 mcg by mouth daily                  "               Multi-vitamin Tabs tablet   Take 1 tablet by mouth daily                                PLAVIX 75 MG tablet   Take 75 mg by mouth daily   Generic drug:  clopidogrel                                potassium chloride 20 MEQ Packet   Commonly known as:  KLOR-CON   20 mEq by Oral or Feeding Tube route daily                                Sodium Chloride 0.65 % Soln   Spray in nostril as needed

## 2017-04-03 NOTE — PROGRESS NOTES
0908 pain right side low back is chronic at 2. Wife here, , and to stay with pt after discharge. Oriented to unit, call light in place. Labs back. Dr to come and consent. Pt took his aspirin 81 mg and plavix this am. Pt has lots of bruising of arms.   1540 pt weight in gown and socks only now 149.2 lbs. Per pt and wife his home am wgt was down 3/10 of lb this am. Call from dr rogers, wait for him to come and see pt. cardiomems device and teaching done per reps. Contrast and avs discharge instructions given to pt and wife and copy given.they understand. Iv fluids post at 30cc/hr, now off. Walked and tolerated well, voided 200 cc x1. Post walk right groin site remains soft, no bleed or hematoma. Pt had meal.  1616 pt seen by dr rogers, he gave pt and wife instructions on wgt and bumex for next day or two, they indicate they understand. Will be seen in clinic on wed. Iv d/c'd. Has all discharge instructions. Walked and tolerated well. Post walk right groins site soft, unchanged. Will discharge via wheel chair to wife.

## 2017-04-03 NOTE — DISCHARGE INSTRUCTIONS
Right Heart Cath Discharge Instructions - Femoral    After you go home:      Have an adult stay with you until tomorrow.    Drink extra fluids for 2 days.    You may resume your normal diet.    No smoking       For 24 hours - due to the sedation you received:    Relax and take it easy.    Do NOT make any important or legal decisions.    Do NOT drive or operate machines at home or at work.    Do NOT drink alcohol.    Care of Groin Puncture Site:      For the first 24 hrs - check the puncture site every 1-2 hours while awake.    For 2 days, when you cough, sneeze, laugh or move your bowels, hold your hand over the puncture site and press firmly.    Remove the bandaid after 24 hours. If there is minor oozing, apply another bandaid and remove it after 12 hours.    It is normal to have a small bruise or pea size lump at the site.    You may shower. Do NOT take a bath, or use a hot tub or pool for at least 3 days. Do NOT scrub the site. Do not use lotion or powder near the puncture site.    Activity:            For 2 days:    No stooping or squatting    Do NOT do any heavy activity such as exercise, lifting, or straining.     No housework, yard work or any activity that make you sweat    Do NOT lift more than 10 pounds    Bleeding:      If you start bleeding from the site in your groin, lie down flat and press firmly on/above the site for 10 minutes.     Once bleeding stops, lay flat for 2 hours.     Call [facility] as soon as you can.       Call 911 right away if you have heavy bleeding or bleeding that does not stop.      Medicines:      If you are taking antiplatelet medications such as Plavix, Brilinta or Effient, do not stop taking it until you talk to your cardiologist.        If you are on Metformin (Glucophage), do not restart it until you have blood tests (within 2 to 3 days after discharge).  After you have your blood drawn, you may restart the Metformin.    Take your medications, including blood thinners,  unless your provider tells you not to.  If you take Coumadin (Warfarin), have your INR checked by your provider in  3-5 days. Call your clinic to schedule this.    If you have stopped any other medicines, check with your provider about when to restart them.    Follow Up Appointments:      Follow up with [facility] Nurse Practitioner at [facility] of patient preference in 7-10 days.    Call the clinic if:      You have increased pain or a large or growing hard lump around the site.    The site is red, swollen, hot or tender.    Blood or fluid is draining from the site.    You have chills or a fever greater than 101 F (38 C).    Your leg turns feels numb, cool or changes color.    You have hives, a rash or unusual itching.    New pain in the back or belly that you cannot control with Tylenol.    Any questions or concerns.          [facility] at [place]:    [phone] [facility] (7 days a week)

## 2017-04-03 NOTE — PROGRESS NOTES
TELEMANAGEMENT: Wt 147# today, down 1# from yesterday and now 1# above max wt parameter.  Swelling reported on survey today.  Pt takes bumex 1mg BID and KCL 20mEq daily.  Pt is scheduled for CardioMEMS implant today, and OV with MARJAN Portillo on 4/5.  Will await further direction after CardioMEMS implantation.  JAIRO Pérze 10:28 AM 4/3/2017

## 2017-04-04 NOTE — PROGRESS NOTES
Called patient and spoke with his wife. She repeated back the instruction for patient to take 2 mg of Bumex for his afternoon dose today and an extra 20 meq of KCl. Will be seen in clinic tomorrow morning by Shani Patrick CNP. Scheduled a lab appointment for after the office visit for a BMP and messaged the [facility] CORE nurse. Vijaya GILL

## 2017-04-04 NOTE — PROGRESS NOTES
[employer] X4 CRT-D ICD Remote Device Check  AP: 13 % BVP: 99 %  Mode: DDD  bpm        Presenting Rhythm: Presenting EGM on transmission showed a AS/BVP  Heart Rate: Heart rate stable with adequate variability.  Sensing: RA WNL, not performed on the RV    Pacing Threshold: Not performed, Auto capture not on    Impedance: WNL  Battery Status: Approximately 8 years longevity.  Atrial Arrhythmia: 0  Ventricular Arrhythmia: 1 episode logged, EGM showed 8 beats of VT, rate 165-190 bpm.   ATP: 0    Shocks: 0    Care Plan: Follow up with Q 3 month remote checks. Will call the patient with today's results and date of next remote check. CHANNING Carson RN

## 2017-04-04 NOTE — MR AVS SNAPSHOT
After Visit Summary   4/4/2017    Perez Villalobos    MRN: [id number]           Patient Information     Date Of Birth          1940        Visit Information        Provider Department      4/4/2017 4:30 PM MCLEAN DCR2 [facility]        Today's Diagnoses     ICD (implantable cardioverter-defibrillator) in place    -  1       Follow-ups after your visit        Your next 10 appointments already scheduled     Apr 04, 2017  4:30 PM CDT   Remote ICD Check with MCLEAN DCR2   [facility] ([facility])    [address]  [facility] [facility] [address]   [phone]           This appointment is for a remote check of your debrillator.  This is not an appointment at the office.            Apr 05, 2017  7:30 AM CDT   Core Return with PARKER Wilde CNP   [facility] ([facility])    [address]  [place] [address]   [phone]            Apr 06, 2017  1:00 PM CDT   Treatment 60 with Rh Cardiac Rehab 3   [facility] ([facility])    [id number] [place] [address], [address]  [facility] [address]   [phone]            Apr 10, 2017  1:00 PM CDT   Treatment 60 with Rh Cardiac Rehab 1   [facility] ([facility])    [address], [address]  [place] [address]   [phone]            Apr 10, 2017  3:00 PM CDT   LAB with MCLEAN LAB   [facility] ([facility])    [address]  [place] [address]   [phone]           Patient must bring picture ID.  Patient should be prepared to give a urine specimen  Please do not eat 10-12 hours before your appointment if you are coming in fasting for labs on lipids, cholesterol, or glucose (sugar).  Pregnant women should follow their Care Team instructions. Water with  medications is okay. Do not drink coffee or other fluids.   If you have concerns about taking  your medications, please ask at office or if scheduling via [employer], send a message by clicking on Secure Messaging, Message Your Care Team.            Apr 10, 2017  3:45 PM CDT   Core MD Return with Krishna Tran MD   [facility] [place] ([facility])    [address]  [place] [address]   [phone]            Apr 13, 2017  1:00 PM CDT   Treatment 60 with Rh Cardiac Rehab 3   [facility] ([facility])    [address]  [facility] [address] [phone]            Apr 28, 2017  2:00 PM CDT   Return Sleep Patient with Ethan Salguero MD   [facility] ([facility])    [address]  [facility] [address]   [phone]            Jul 06, 2017  4:30 PM CDT   Remote ICD Check with MCLEAN DCR2   [facility] ([facility])    [address]  [place] [address]   [phone]           This appointment is for a remote check of your debrillator.  This is not an appointment at the office.              Who to contact     If you have questions or need follow up information about today's clinic visit or your schedule please contact [facility] directly at [phone].  Normal or non-critical lab and imaging results will be communicated to you by [employer], letter or phone within 4 business days after the clinic has received the results. If you do not hear from us within 7 days, please contact the clinic through [employer] or phone. If you have a critical or abnormal lab result, we will notify you by phone as soon as possible.  Submit refill requests through [employer] or call your pharmacy and they will forward the refill request to us. Please allow 3  "business days for your refill to be completed.          Additional Information About Your Visit        MyChart Information     [employer] lets you send messages to your doctor, view your test results, renew your prescriptions, schedule appointments and more. To sign up, go to [email or web address] . Click on \"Log in\" on the left side of the screen, which will take you to the Welcome page. Then click on \"Sign up Now\" on the right side of the page.     You will be asked to enter the access code listed below, as well as some personal information. Please follow the directions to create your username and password.     Your access code is: [email or web address]  Expires: 2017  3:26 PM     Your access code will  in 90 days. If you need help or a new code, please call your [place] clinic or [phone].        Care EveryWhere ID     This is your Care EveryWhere ID. This could be used by other organizations to access your [place] medical records  [id number]         Blood Pressure from Last 3 Encounters:   17 117/65   17 107/66   17 110/61    Weight from Last 3 Encounters:   17 67.7 kg (149 lb 3.2 oz)   17 66.2 kg (146 lb)   17 65.6 kg (144 lb 10 oz)              We Performed the Following     ICD DEVICE INTERROGAT REMOTE (77664)     INTERROGATION DEVICE EVAL REMOTE, PACER/ICD (28344)        Primary Care Provider Office Phone # Fax #    Hector Jc [phone]       [facility] [address]  [facility] [address]        Thank you!     Thank you for choosing [facility] [place]  for your care. Our goal is always to provide you with excellent care. Hearing back from our patients is one way we can continue to improve our services. Please take a few minutes to complete the written survey that you may receive in the mail after your visit with us. Thank you!             Your Updated Medication List - Protect others around you: " Learn how to safely use, store and throw away your medicines at www.disposemymeds.org.          This list is accurate as of: 4/4/17  8:54 AM.  Always use your most recent med list.                   Brand Name Dispense Instructions for use    aspirin EC 81 MG EC tablet      Take 81 mg by mouth daily       B-12 1000 MCG Tbcr     100 tablet    Take 1,000 mcg by mouth daily       bumetanide 1 MG tablet    BUMEX    30 tablet    Take 1 tablet (1 mg) by mouth 2 times daily       calcitRIOL 0.25 MCG capsule    ROCALTROL     Take 0.25 mcg by mouth daily Monday, Wednesday and Friday       carvedilol 3.125 MG tablet    COREG    540 tablet    Take 3 tablets (9.375 mg) by mouth 2 times daily (with meals)       hydrALAZINE 50 MG tablet    APRESOLINE    270 tablet    Take 1 tablet (50 mg) by mouth 3 times daily       isosorbide mononitrate 60 MG 24 hr tablet    IMDUR    30 tablet    Take 1 tablet (60 mg) by mouth At Bedtime At hs       KRILL OIL PO      Take 1 capsule by mouth daily       levothyroxine 150 MCG tablet    SYNTHROID/LEVOTHROID     Take 150 mcg by mouth daily       Multi-vitamin Tabs tablet      Take 1 tablet by mouth daily       PLAVIX 75 MG tablet   Generic drug:  clopidogrel      Take 75 mg by mouth daily       potassium chloride 20 MEQ Packet    KLOR-CON    30 packet    20 mEq by Oral or Feeding Tube route daily       Sodium Chloride 0.65 % Soln      Spray in nostril as needed

## 2017-04-04 NOTE — PROGRESS NOTES
Reviewed case with Dr. Tran. Instruct the patient to take 2mg of bumex this afternoon, extra 20meq of KCL. Reassess 4/5.Girish

## 2017-04-04 NOTE — PROGRESS NOTES
CardioMEMS Review    Patient sent first cardioMEMS PA pressure reading.  PA Diastolic Threshold Parameters: Needs to be established  Diuretic: Bumex 1 mg BID, KCl 20 meq daily  Called patient's wife. She said patient did take 1 mg of Bumex after his procedure yesterday and another in the evening so he did not miss his diuretic dose. She said that Dr. Tran told patient to take 2 mg of Bumex this morning.  Weight is 3# above goal parameter today.  Will route to both Dr. Tran and Shani Patrick CNP.

## 2017-04-05 NOTE — PROGRESS NOTES
TELEMANAGEMENT/CardioMems: wt 148#, down 1# from yesterday, still above max wt parameters. PAD today 29mmHg, down 1mmHg from yesterday. Will review with Bandar in clinic this am. JAIRO Crespo

## 2017-04-05 NOTE — PROGRESS NOTES
PRIMARY CARE PROVIDER:  Hector Jc MD      CARDIOLOGIST:  Krishna Tran MD      C.O.REVELIA. NP:  Shani Patrick CNP      REASON FOR VISIT:  Post-hospitalization, post right heart catheterization, post CardioMEMS implantation.      HISTORY OF PRESENT ILLNESS:  I had the pleasure of seeing Perez Villalobos, a pleasant 76-year-old patient who has a history of systolic congestive heart failure due to ischemic cardiomyopathy with an ejection fraction of 30%-35%, underlying CAD, status post stenting of all 3 coronary arteries, most recently history notable for a non-STEMI in 10/2016 with stress echocardiogram at that time without evidence of acute ischemia (coronary angiography not done).  To complicate this situation, he was hospitalized in 11/2016 with bradycardia that degenerated into ventricular fibrillation arrest.  He then proceeded with CRT-D placement.      The patient has had difficult to manage CHF including pleural effusions (requiring thoracentesis), adjustment of diuretics and coordination of care with Dr. Valles, his nephrologist.  He has chronic renal insufficiency with a history of an RF due to rhabdomyolysis thought due to statin therapy.  He has been on spironolactone in the past but discontinued due to hyponatremia.      He recently vacationed in [place] and was admitted for CHF exacerbation.  He presented to the emergency room with increased peripheral edema and abdominal distention.  His initial diuresis was slow, but then they started him on daily metolazone and Bumex of 2 mg twice a day, which resulted in a vigorous diuresis.  His goal weight at the time was about 150 pounds; however, he diuresed down to 141 pounds.  The patient and his wife then drove back to [place] and the patient's wife stopped the daily metolazone because he had lost over 10 pounds in a few days.  He was then seen by Dr. Tran on 03/06/2017.  At that time his weight was up 8 pounds.  Dr. Tran thought his dry weight was  around 152 pounds.  He suggested metolazone, followed by 1 mg of Bumex and to continue Bumex 1 mg twice a day.      His weight did not come down.  He continued to have abdominal bloating and peripheral edema.  He then came to see me on 03/29/2017 and his BUN and creatinine had risen.  He felt more short of breath with exertion.  He was admitted and followed by Dr. Min.  He was admitted on 03/29/2017 and discharged on 03/31/2017.  Unfortunately, a discharge summary is not available.  His weight went up 2 pounds during his hospital stay.  Dr. Min thought he was over-diuresed.  His Bumex was decreased to 1 mg twice a day p.o.  He was discharged and was seen by Dr. Salguero.  He had CPAP changes.  He then underwent a right heart catheterization on 04/03.  His RA mean was 18, RV 66/20, PCWP 24/29/24, thermodilutional cardiac output 1.95 liters per minute; thermodilution cardiac index 1.09 L/min/m2; PVR 10.3, SVR 1559.      I discussed the case with Dr. Tran after the right heart catheterization.  He suggested 2 mg of Bumex twice a day for 1 day.  His PAD on implant was 32 and our goal is to decrease his weight by 1 pound a week and keep his electrolytes balanced as much as we can.      He returns to the clinic today.  He states he slept well.  No PND or shortness of breath.  He states he feels better with the 1 day of 2 mg twice a day of Bumex.  He denies lightheadedness or dizziness.  His PAD today is 29, down from 32.      PHYSICAL EXAMINATION:   VITAL SIGNS:  Blood pressure 112/60, pulse is 64, weight is 153 pounds in the clinic and 148 pounds at home.  He is currently on the Telemanagement Program.   LUNGS:  Clear to auscultation.   NECK:  JVP about 12 cm of water at a 45-degree angle.   CARDIOVASCULAR:  Distant heart sounds, regular rhythm.  No murmur.   ABDOMEN:  Slightly distended, normal bowel sounds.  No hepatojugular reflex.   EXTREMITIES:  1+ peripheral edema to the knees.      LABORATORY VALUES:  Sodium  128, potassium 4.0, , creatinine 2.45, up from 1.92.      IMPRESSION AND PLAN:  Ron Villalobos is a pleasant 76-year-old patient who has acute on chronic systolic congestive heart failure.  He recently failed outpatient CHF treatment.  Dr. Min thought he was on the dry side and his Bumex was decreased.  He then was discharged and had a right heart catheterization with the above results.  He is significantly volume overloaded.  Our plan is to try to diurese him 1 pound a week.  If this fails, he will need to be admitted to the [facility] and possibly be placed on a Bumex drip which he had in the past, which was very successful.  I have spoken with Dr. Kelley regarding his case.  She said she is willing to see him in 2 weeks.  CardioMEMS was recently implanted on .  His PAD on implant was 32 and today it is 29.  He denies any defibrillator shocks.  He has a CRT-D.  His BUN and creatinine have worsened.  I decreased his Bumex from 2 mg twice a day, which he only had for 1 day, to 2 mg in the a.m. and 1 mg in the p.m.  I have also updated Dr. Tran on his case.      I have increased hydralazine to 1-1/2 tablets or 75 mg in the a.m., 50 mg at 3:00 p.m. and 75 mg at bedtime.  His SVR was 1500.  Therefore, we are trying to increase vasodilation slightly.      If he becomes dizzy or lightheaded, we will decrease the hydralazine back to 50 mg t.i.d.      The patient will return to see Dr. Tran in less than 1 week on 04/10.         [place], PARKER, CNP             D: 2017 10:55   T: 2017 16:14   MT: al      Name:     RON VILLALOBOS   MRN:      [id number]        Account:      [id number]   :      1940           Service Date: 2017      Document: [id number]

## 2017-04-05 NOTE — MR AVS SNAPSHOT
After Visit Summary   4/5/2017    Perez Villalobos    MRN: [id number]           Patient Information     Date Of Birth          1940        Visit Information        Provider Department      4/5/2017 7:30 AM Shani Patrick APRN CNP [facility] [place]        Today's Diagnoses     Ischemic cardiomyopathy    -  1    Chronic systolic heart failure (H)        Essential hypertension with goal blood pressure less than 130/80        Chronic combined systolic and diastolic congestive heart failure (H)          Care Instructions      Call CORE nurse for any questions or concerns:    Deepika Richardson or Anne: [phone]   If you have concerns after hours, please call [phone], option [phone]    1. Medication changes:  Hydralazine 1-1/2 tabs in am and 1 tab at 3pm and 1-1/2 tabs bedtime.     Bumex 2 mg am and 1 mg pm     2. Weigh yourself daily and write it down.     3. Call CORE nurse if your weight is up more than 2 pounds in one day, or 5 pounds in one week.    4. Call CORE nurse if you feel more short of breath, have more abdominal bloating or leg swelling.    5. Continue low sodium diet (less than 2000mg daily). If you eat less salt, you will retain less fluid.     6. Lab results:   Component      Latest Ref Rng & Units 4/5/2017   Sodium      136 - 145 mmol/L 128 (L)   Potassium      3.5 - 5.1 mmol/L 4.0   Chloride      98 - 107 mmol/L 87 (L)   Carbon Dioxide      23 - 29 mmol/L 33 (H)   Anion Gap      6 - 17 mmol/L 12   Glucose      70 - 105 mg/dL 163 (H)   Urea Nitrogen      7 - 30 mg/dL 112 (H)   Creatinine      0.70 - 1.30 mg/dL 2.45 (H)   GFR Estimate      >60 mL/min/1.7m2 26 (L)   GFR Estimate If Black      >60 mL/min/1.7m2 31 (L)   Calcium      8.5 - 10.5 mg/dL 9.6   7. Dr. Kelley's nurse is calling you.     **Do NOT take Aleve or Ibuprofen without checking with your doctor first    [facility]: Cardiomyopathy, Optimization, Rehabilitation, Education   The  [facility] is a heart failure specialty clinic within the [facility] where you will work with specialized nurse practioners, physician assistants, doctors and registered nurses. They are dedicated to helping patients with heart failure to carefully adjust medications, receive education, and learn who and when to call if symptoms develop. They specialize in helping you better understand your condition, slow the progression of your disease, improve the length and quality of your life, help you detect future heart problems before they become life threatening, and avoid hospitalizations.              Follow-ups after your visit        Your next 10 appointments already scheduled     Apr 06, 2017  1:00 PM CDT   Treatment 60 with Rh Cardiac Rehab 3   [facility] ([facility])    [address], [address]  [facility] [address]   [phone]            Apr 10, 2017  1:00 PM CDT   Treatment 60 with Rh Cardiac Rehab 1   [facility] ([facility])    [address], [address]  [facility] [facility] [address]   [phone]            Apr 10, 2017  3:00 PM CDT   LAB with MCLEAN LAB   [facility] [place] ([facility])    [address] [place] [address] [address]  [facility] [address]   [phone]           Patient must bring picture ID.  Patient should be prepared to give a urine specimen  Please do not eat 10-12 hours before your appointment if you are coming in fasting for labs on lipids, cholesterol, or glucose (sugar).  Pregnant women should follow their Care Team instructions. Water with medications is okay. Do not drink coffee or other fluids.   If you have concerns about taking  your medications, please ask at office or if scheduling via [employer], send a message by clicking on Secure Messaging, Message Your Care Team.            Apr 10, 2017  3:45 PM CDT   Core MD Return with Krishna Simental  MD Marc   [facility] AT [place] ([facility])    [address]  [place] [address]   [phone]            Apr 13, 2017  1:00 PM CDT   Treatment 60 with Rh Cardiac Rehab 3   [facility] ([facility])    [address]  [facility] [place] [facility] [address]   [phone]            Apr 28, 2017  2:00 PM CDT   Return Sleep Patient with Ethan Salguero MD   [facility] ([facility])    [address]  [facility] [place] [facility] [address]   [phone]            Jul 06, 2017  4:30 PM CDT   Remote ICD Check with MCLEAN DCR2   [facility] ([facility])    [address]  [facility] [address]   [phone]           This appointment is for a remote check of your debrillator.  This is not an appointment at the office.              Who to contact     If you have questions or need follow up information about today's clinic visit or your schedule please contact [facility] directly at [phone].  Normal or non-critical lab and imaging results will be communicated to you by [employer], letter or phone within 4 business days after the clinic has received the results. If you do not hear from us within 7 days, please contact the clinic through [employer] or phone. If you have a critical or abnormal lab result, we will notify you by phone as soon as possible.  Submit refill requests through [employer] or call your pharmacy and they will forward the refill request to us. Please allow 3 business days for your refill to be completed.          Additional Information About Your Visit        [employer] Information     [employer] lets you send messages to your doctor, view your test results, renew your prescriptions, schedule appointments and more. To sign up, go to [email or web address] . Click on  "\"Log in\" on the left side of the screen, which will take you to the Welcome page. Then click on \"Sign up Now\" on the right side of the page.     You will be asked to enter the access code listed below, as well as some personal information. Please follow the directions to create your username and password.     Your access code is: [email or web address]  Expires: 2017  3:26 PM     Your access code will  in 90 days. If you need help or a new code, please call your [place] clinic or [phone].        Care EveryWhere ID     This is your Care EveryWhere ID. This could be used by other organizations to access your [place] medical records  [id number]        Your Vitals Were     Pulse Height BMI (Body Mass Index)             64 1.74 m (5' 8.5\") 22.99 kg/m2          Blood Pressure from Last 3 Encounters:   17 112/60   17 117/65   17 107/66    Weight from Last 3 Encounters:   17 69.6 kg (153 lb 6.4 oz)   17 67.7 kg (149 lb 3.2 oz)   17 66.2 kg (146 lb)              Today, you had the following     No orders found for display         Today's Medication Changes          These changes are accurate as of: 17  8:27 AM.  If you have any questions, ask your nurse or doctor.               These medicines have changed or have updated prescriptions.        Dose/Directions    bumetanide 1 MG tablet   Commonly known as:  BUMEX   This may have changed:    - how much to take  - how to take this  - when to take this  - additional instructions   Used for:  Chronic combined systolic and diastolic congestive heart failure (H)   Changed by:  Shani Patrick APRN CNP        2mg am and 1 mg pm   Quantity:  90 tablet   Refills:  3       calcitRIOL 0.25 MCG capsule   Commonly known as:  ROCALTROL   This may have changed:  Another medication with the same name was removed. Continue taking this medication, and follow the directions you see here.   Changed by:  Shani Patrick APRN CNP        " Dose:  0.25 mcg   Take 0.25 mcg by mouth daily   Refills:  0       hydrALAZINE 50 MG tablet   Commonly known as:  APRESOLINE   This may have changed:    - how much to take  - how to take this  - when to take this  - additional instructions   Used for:  Essential hypertension with goal blood pressure less than 130/80   Changed by:  Shani Patrick APRN CNP        1-1/2 tabs am, 1 tab at 3 pm and 1-1/2 tabs bedtime.   Quantity:  360 tablet   Refills:  3            Where to get your medicines      These medications [id number] [place] [place] [facility] [address]  [address] [address]     Phone:  [phone]     bumetanide 1 MG tablet                Primary Care Provider Office Phone # Fax #    Hector Jc [phone]       [facility] [address]  [facility] [address]        Thank you!     Thank you for choosing [facility] [place]  for your care. Our goal is always to provide you with excellent care. Hearing back from our patients is one way we can continue to improve our services. Please take a few minutes to complete the written survey that you may receive in the mail after your visit with us. Thank you!             Your Updated Medication List - Protect others around you: Learn how to safely use, store and throw away your medicines at www.disposemymeds.org.          This list is accurate as of: 4/5/17  8:27 AM.  Always use your most recent med list.                   Brand Name Dispense Instructions for use    aspirin EC 81 MG EC tablet      Take 81 mg by mouth daily       B-12 1000 MCG Tbcr     100 tablet    Take 1,000 mcg by mouth daily       bumetanide 1 MG tablet    BUMEX    90 tablet    2mg am and 1 mg pm       calcitRIOL 0.25 MCG capsule    ROCALTROL     Take 0.25 mcg by mouth daily       carvedilol 3.125 MG tablet    COREG    540 tablet    Take 3 tablets (9.375 mg) by mouth 2 times daily  (with meals)       hydrALAZINE 50 MG tablet    APRESOLINE    360 tablet    1-1/2 tabs am, 1 tab at 3 pm and 1-1/2 tabs bedtime.       isosorbide mononitrate 60 MG 24 hr tablet    IMDUR    30 tablet    Take 1 tablet (60 mg) by mouth At Bedtime At hs       KRILL OIL PO      Take 1 capsule by mouth daily       levothyroxine 150 MCG tablet    SYNTHROID/LEVOTHROID     Take 150 mcg by mouth daily       Multi-vitamin Tabs tablet      Take 1 tablet by mouth daily       PLAVIX 75 MG tablet   Generic drug:  clopidogrel      Take 75 mg by mouth daily       potassium chloride 20 MEQ Packet    KLOR-CON    30 packet    20 mEq by Oral or Feeding Tube route daily       Sodium Chloride 0.65 % Soln      Spray in nostril as needed

## 2017-04-05 NOTE — LETTER
4/5/2017    Hector Jc MD  [place]   [address]  [facility] [address]    RE: Perez Villalobos       Dear Colleague,    PRIMARY CARE PROVIDER:  Hector Jc MD      CARDIOLOGIST:  Krishna Tran MD      C.O.R.E. NP:  Shani Patrick CNP      REASON FOR VISIT:  Post-hospitalization, post right heart catheterization, post CardioMEMS implantation.      I had the pleasure of seeing Perez Villalobos, a pleasant 76-year-old patient who has a history of systolic congestive heart failure due to ischemic cardiomyopathy with an ejection fraction of 30%-35%, underlying CAD, status post stenting of all 3 coronary arteries, most recently history notable for a non-STEMI in 10/2016 with stress echocardiogram at that time without evidence of acute ischemia (coronary angiography not done).  To complicate this situation, he was hospitalized in 11/2016 with bradycardia that degenerated into ventricular fibrillation arrest.  He then proceeded with CRT-D placement.      The patient has had difficult to manage CHF including pleural effusions (requiring thoracentesis), adjustment of diuretics and coordination of care with Dr. Valles, his nephrologist.  He has chronic renal insufficiency with a history of an RF due to rhabdomyolysis thought due to statin therapy.  He has been on spironolactone in the past but discontinued due to hyponatremia.      He recently vacationed in [place] and was admitted for CHF exacerbation.  He presented to the emergency room with increased peripheral edema and abdominal distention.  His initial diuresis was slow, but then they started him on daily metolazone and Bumex of 2 mg twice a day, which resulted in a vigorous diuresis.  His goal weight at the time was about 150 pounds; however, he diuresed down to 141 pounds.  The patient and his wife then drove back to [place] and the patient's wife stopped the daily metolazone because he had lost over 10 pounds in a few days.  He was then seen by   Marc on 03/06/2017.  At that time his weight was up 8 pounds.  Dr. Tran thought his dry weight was around 152 pounds.  He suggested metolazone, followed by 1 mg of Bumex and to continue Bumex 1 mg twice a day.      His weight did not come down.  He continued to have abdominal bloating and peripheral edema.  He then came to see me on 03/29/2017 and his BUN and creatinine had risen.  He felt more short of breath with exertion.  He was admitted and followed by Dr. Min.  He was admitted on 03/29/2017 and discharged on 03/31/2017.  Unfortunately, a discharge summary is not available.  His weight went up 2 pounds during his hospital stay.  Dr. Min thought he was over-diuresed.  His Bumex was decreased to 1 mg twice a day p.o.  He was discharged and was seen by Dr. Salguero.  He had CPAP changes.  He then underwent a right heart catheterization on 04/03.  His RA mean was 18, RV 66/20, PCWP 24/29/24, thermodilutional cardiac output 1.95 liters per minute; thermodilution cardiac index 1.09 L/min/m2; PVR 10.3, SVR 1559.      I discussed the case with Dr. Tran after the right heart catheterization.  He suggested 2 mg of Bumex twice a day for 1 day.  His PAD on implant was 32 and our goal is to decrease his weight by 1 pound a week and keep his electrolytes balanced as much as we can.      He returns to the clinic today.  He states he slept well.  No PND or shortness of breath.  He states he feels better with the 1 day of 2 mg twice a day of Bumex.  He denies lightheadedness or dizziness.  His PAD today is 29, down from 32.      PHYSICAL EXAMINATION:   VITAL SIGNS:  Blood pressure 112/60, pulse is 64, weight is 153 pounds in the clinic and 148 pounds at home.  He is currently on the Telemanagement Program.   LUNGS:  Clear to auscultation.   NECK:  JVP about 12 cm of water at a 45-degree angle.   CARDIOVASCULAR:  Distant heart sounds, regular rhythm.  No murmur.   ABDOMEN:  Slightly distended, normal bowel sounds.  No  hepatojugular reflex.   EXTREMITIES:  1+ peripheral edema to the knees.      LABORATORY VALUES:  Sodium 128, potassium 4.0, , creatinine 2.45, up from 1.92.      Outpatient Encounter Prescriptions as of 4/5/2017   Medication Sig Dispense Refill     calcitRIOL (ROCALTROL) 0.25 MCG capsule Take 0.25 mcg by mouth daily       hydrALAZINE (APRESOLINE) 50 MG tablet 1-1/2 tabs am, 1 tab at 3 pm and 1-1/2 tabs bedtime. 360 tablet 3     bumetanide (BUMEX) 1 MG tablet 2mg am and 1 mg pm 90 tablet 3     potassium chloride (KLOR-CON) 20 MEQ Packet 20 mEq by Oral or Feeding Tube route daily 30 packet 1     isosorbide mononitrate (IMDUR) 60 MG 24 hr tablet Take 1 tablet (60 mg) by mouth At Bedtime At hs 30 tablet 11     aspirin EC 81 MG EC tablet Take 81 mg by mouth daily       levothyroxine (SYNTHROID/LEVOTHROID) 150 MCG tablet Take 150 mcg by mouth daily       carvedilol (COREG) 3.125 MG tablet Take 3 tablets (9.375 mg) by mouth 2 times daily (with meals) 540 tablet 3     Saline (SODIUM CHLORIDE) 0.65 % SOLN Spray in nostril as needed       KRILL OIL PO Take 1 capsule by mouth daily        Cyanocobalamin (B-12) 1000 MCG TBCR Take 1,000 mcg by mouth daily 100 tablet 0     multivitamin, therapeutic with minerals (MULTI-VITAMIN) TABS Take 1 tablet by mouth daily       clopidogrel (PLAVIX) 75 MG tablet Take 75 mg by mouth daily       [DISCONTINUED] bumetanide (BUMEX) 1 MG tablet Take 1 tablet (1 mg) by mouth 2 times daily 30 tablet 1     [DISCONTINUED] calcitRIOL (ROCALTROL) 0.25 MCG capsule Take 0.25 mcg by mouth daily Monday, Wednesday and Friday       [DISCONTINUED] hydrALAZINE (APRESOLINE) 50 MG tablet Take 1 tablet (50 mg) by mouth 3 times daily 270 tablet 3     No facility-administered encounter medications on file as of 4/5/2017.      IMPRESSION AND PLAN:  Perez Villalobos is a pleasant 76-year-old patient who has acute on chronic systolic congestive heart failure.  He recently failed outpatient CHF treatment.  Dr. Min  thought he was on the dry side and his Bumex was decreased.  He then was discharged and had a right heart catheterization with the above results.  He is significantly volume overloaded.  Our plan is to try to diurese him 1 pound a week.  If this fails, he will need to be admitted to the [facility] and possibly be placed on a Bumex drip which he had in the past, which was very successful.  I have spoken with Dr. Kelley regarding his case.  She said she is willing to see him in 2 weeks.  CardioMEMS was recently implanted on 04/03.  His PAD on implant was 32 and today it is 29.  He denies any defibrillator shocks.  He has a CRT-D.  His BUN and creatinine have worsened.  I decreased his Bumex from 2 mg twice a day, which he only had for 1 day, to 2 mg in the a.m. and 1 mg in the p.m.  I have also updated Dr. Tran on his case.      I have increased hydralazine to 1-1/2 tablets or 75 mg in the a.m., 50 mg at 3:00 p.m. and 75 mg at bedtime.  His SVR was 1500.  Therefore, we are trying to increase vasodilation slightly.      If he becomes dizzy or lightheaded, we will decrease the hydralazine back to 50 mg t.i.d.      The patient will return to see Dr. Tran in less than 1 week on 04/10.     Again, thank you for allowing me to participate in the care of your patient.      Sincerely,    PARKER [facility]

## 2017-04-05 NOTE — PROGRESS NOTES
Received referral from [facility] [place] for appt with Dr. Kelley for advanced therapy for heart failure. Per Dr. Kelley, she can see pt next Wednesday at 12:00.  I called pt to notify them of appt.  He appeared a little confused on what physician he needed to see and requested I speak with his wife when she returns later today.  I will call her later today.

## 2017-04-05 NOTE — PROGRESS NOTES
HPI and Plan:   See [id number]    Orders Placed This Encounter   Procedures     Basic metabolic panel     Follow-Up with [facility]       Orders Placed This Encounter   Medications     calcitRIOL (ROCALTROL) 0.25 MCG capsule     Sig: Take 0.25 mcg by mouth daily     hydrALAZINE (APRESOLINE) 50 MG tablet     Si-1/2 tabs am, 1 tab at 3 pm and 1-1/2 tabs bedtime.     Dispense:  360 tablet     Refill:  3     bumetanide (BUMEX) 1 MG tablet     Simg am and 1 mg pm     Dispense:  90 tablet     Refill:  3       Medications Discontinued During This Encounter   Medication Reason     calcitRIOL (ROCALTROL) 0.25 MCG capsule Dose adjustment     hydrALAZINE (APRESOLINE) 50 MG tablet Reorder     bumetanide (BUMEX) 1 MG tablet Reorder         Encounter Diagnoses   Name Primary?     Ischemic cardiomyopathy Yes     Chronic systolic heart failure (H)      Essential hypertension with goal blood pressure less than 130/80      Chronic combined systolic and diastolic congestive heart failure (H)        CURRENT MEDICATIONS:  Current Outpatient Prescriptions   Medication Sig Dispense Refill     calcitRIOL (ROCALTROL) 0.25 MCG capsule Take 0.25 mcg by mouth daily       hydrALAZINE (APRESOLINE) 50 MG tablet 1-1/2 tabs am, 1 tab at 3 pm and 1-1/2 tabs bedtime. 360 tablet 3     bumetanide (BUMEX) 1 MG tablet 2mg am and 1 mg pm 90 tablet 3     potassium chloride (KLOR-CON) 20 MEQ Packet 20 mEq by Oral or Feeding Tube route daily 30 packet 1     isosorbide mononitrate (IMDUR) 60 MG 24 hr tablet Take 1 tablet (60 mg) by mouth At Bedtime At hs 30 tablet 11     aspirin EC 81 MG EC tablet Take 81 mg by mouth daily       levothyroxine (SYNTHROID/LEVOTHROID) 150 MCG tablet Take 150 mcg by mouth daily       carvedilol (COREG) 3.125 MG tablet Take 3 tablets (9.375 mg) by mouth 2 times daily (with meals) 540 tablet 3     Saline (SODIUM CHLORIDE) 0.65 % SOLN Spray in nostril as needed       KRILL OIL PO Take 1 capsule by mouth daily         "Cyanocobalamin (B-12) 1000 MCG TBCR Take 1,000 mcg by mouth daily 100 tablet 0     multivitamin, therapeutic with minerals (MULTI-VITAMIN) TABS Take 1 tablet by mouth daily       clopidogrel (PLAVIX) 75 MG tablet Take 75 mg by mouth daily       [DISCONTINUED] bumetanide (BUMEX) 1 MG tablet Take 1 tablet (1 mg) by mouth 2 times daily 30 tablet 1     [DISCONTINUED] calcitRIOL (ROCALTROL) 0.25 MCG capsule Take 0.25 mcg by mouth daily Monday, Wednesday and Friday       [DISCONTINUED] hydrALAZINE (APRESOLINE) 50 MG tablet Take 1 tablet (50 mg) by mouth 3 times daily 270 tablet 3       ALLERGIES     Allergies   Allergen Reactions     Lisinopril Other (See Comments)     Angioedema     Augmentin Other (See Comments)     Per pt, \"froze him, affected his legs\"     Crestor [Rosuvastatin] Other (See Comments)     rhabdomyolysis       PAST MEDICAL HISTORY:  Past Medical History:   Diagnosis Date     Acute renal failure (H)     10/2015 ARF secondary to rhabdomyolysis     Alcoholism (H)      Anemia      Benign essential HTN      BPH (benign prostatic hypertrophy)      CAD (coronary artery disease)     AWMI, stents to Cx, RCA and LAD     Chronic systolic heart failure (H)      CKD (chronic kidney disease)      Complete AV block (H)      Ischemic cardiomyopathy 10/23/2015    EF 30%     Low sodium levels      Mixed hyperlipidemia      NSTEMI (non-ST elevated myocardial infarction) (H) 10/23/2015     PAD (peripheral artery disease) (H)      Rhabdomyolysis due to statin therapy     crestor     Severe hypothyroidism 9/20/2016     VF (ventricular fibrillation) (H) 11/16/16       PAST SURGICAL HISTORY:  Past Surgical History:   Procedure Laterality Date     APPENDECTOMY       CHOLECYSTECTOMY       ENDARTERECTOMY CAROTID Left 6/11/10     IMPLANT AUTOMATIC IMPLANTABLE CARDIOVERTER DEFIBRILLATOR  11/16/16     STENT, CORONARY, S660 15/18  Nov 2000    PTCA with stent placement of CFX     STENT, CORONARY, S660 15/18  Feb 2001    PTCA with stent " "placement of CFX RCA      STENT, CORONARY, S660 15/18  April 2007    stent placed in LAD     tonsillectomy and adenoidectomy         FAMILY HISTORY:  Family History   Problem Relation Age of Onset     Unknown/Adopted Mother      Unknown/Adopted Father        SOCIAL HISTORY:  Social History     Social History     Marital status:      Spouse name: N/A     Number of children: N/A     Years of education: N/A     Social History Main Topics     Smoking status: Former Smoker     Packs/day: 1.50     Years: 20.00     Types: Cigarettes     Quit date: 1/1/1980     Smokeless tobacco: Never Used     Alcohol use 0.0 oz/week     0 Standard drinks or equivalent per week      Comment: occassionally     Drug use: No     Sexual activity: Not Asked     Other Topics Concern     Caffeine Concern No     decaf coffee     Sleep Concern No     Stress Concern No     Weight Concern No     Special Diet Yes     low fat, low sodium     Exercise Yes     everyday     Social History Narrative       Review of Systems:  Skin:  Positive for bruising     Eyes:  Positive for glasses    ENT:  Negative      Respiratory:  Positive for dyspnea on exertion;sleep apnea;cough uses BIPAP- a little SOB last night; coughing this am, phlegm    Cardiovascular:    Positive for;fatigue;exercise intolerance;lightheadedness;edema lightheaded yesterday; edema in legs- varies day to day   Gastroenterology: Positive for excessive gas or bloating    Genitourinary:  Positive for urinary frequency    Musculoskeletal:  Positive for back pain    Neurologic:  Negative      Psychiatric:  Negative      Heme/Lymph/Imm:  Negative      Endocrine:  Positive for thyroid disorder      Physical Exam:  Vitals: /60  Pulse 64  Ht 1.74 m (5' 8.5\")  Wt 69.6 kg (153 lb 6.4 oz)  BMI 22.99 kg/m2    Constitutional:           Skin:           Head:           Eyes:           ENT:           Neck:           Chest:             Cardiac:                    Abdomen:           Vascular:    "                                       Extremities and Back:                 Neurological:                 CC  No referring provider defined for this encounter.

## 2017-04-05 NOTE — PROGRESS NOTES
Per verbal order from Shani Patrick CNP, cardioMEMS diastolic pressure threshold adjusted for goal 20-29 mmHg. Vijaya GLIL

## 2017-04-05 NOTE — PATIENT INSTRUCTIONS
Call [facility] nurse for any questions or concerns:    Deepika Richardson, or Zoraida: [phone]   If you have concerns after hours, please call [phone], option 2    1. Medication changes:  Hydralazine 1-1/2 tabs in am and 1 tab at 3pm and 1-1/2 tabs bedtime.     Bumex 2 mg am and 1 mg pm     2. Weigh yourself daily and write it down.     3. Call [facility] nurse if your weight is up more than 2 pounds in one day, or 5 pounds in one week.    4. Call [facility] nurse if you feel more short of breath, have more abdominal bloating or leg swelling.    5. Continue low sodium diet (less than 2000mg daily). If you eat less salt, you will retain less fluid.     6. Lab results:   Component      Latest Ref Rng & Units 4/5/2017   Sodium      136 - 145 mmol/L 128 (L)   Potassium      3.5 - 5.1 mmol/L 4.0   Chloride      98 - 107 mmol/L 87 (L)   Carbon Dioxide      23 - 29 mmol/L 33 (H)   Anion Gap      6 - 17 mmol/L 12   Glucose      70 - 105 mg/dL 163 (H)   Urea Nitrogen      7 - 30 mg/dL 112 (H)   Creatinine      0.70 - 1.30 mg/dL 2.45 (H)   GFR Estimate      >60 mL/min/1.7m2 26 (L)   GFR Estimate If Black      >60 mL/min/1.7m2 31 (L)   Calcium      8.5 - 10.5 mg/dL 9.6   7. Dr. Kelley's nurse is calling you.     **Do NOT take Aleve or Ibuprofen without checking with your doctor first    [facility]: Cardiomyopathy, Optimization, Rehabilitation, Education   [facility] is a heart failure specialty clinic within the [facility] where you will work with specialized nurse practioners, physician assistants, doctors and registered nurses. They are dedicated to helping patients with heart failure to carefully adjust medications, receive education, and learn who and when to call if symptoms develop. They specialize in helping you better understand your condition, slow the progression of your disease, improve the length and quality of your life, help you detect future heart problems before they become  life threatening, and avoid hospitalizations.

## 2017-04-06 PROBLEM — I50.9 CHF EXACERBATION (H): Status: ACTIVE | Noted: 2017-01-01

## 2017-04-06 NOTE — IP AVS SNAPSHOT
[address] [address]    Phone:  [phone]                                       After Visit Summary   4/6/2017    Perez Villalobos    MRN: [id number]           After Visit Summary Signature Page     I have received my discharge instructions, and my questions have been answered. I have discussed any challenges I see with this plan with the nurse or doctor.    ..........................................................................................................................................  Patient/Patient Representative Signature      ..........................................................................................................................................  Patient Representative Print Name and Relationship to Patient    ..................................................               ................................................  Date                                            Time    ..........................................................................................................................................  Reviewed by Signature/Title    ...................................................              ..............................................  Date                                                            Time

## 2017-04-06 NOTE — PROGRESS NOTES
Patient seen 4/5. Post RHC on 4/3, instructed to take bumex 2mg bid for one day for PAD of 32. Office visit 4/4, Creat increased from 1.9 to 2.45. Decreased bumex to 2mg am and 1 mg pm. Today weight up one pound. Increased shortness of breath. Suggested admission to hospital. Received message from Ellen HANSEN ( [facility]) . She will arrange admission to [facility]. KmannchenCNP

## 2017-04-06 NOTE — IP AVS SNAPSHOT
"    [address] [facility]: [phone]                                              INTERAGENCY TRANSFER FORM - PHYSICIAN ORDERS   2017                    Hospital Admission Date: 2017  RON LUCERO   : 1940  Sex: Male        Attending Provider: Anton Argueta MD     Allergies:  Lisinopril, Augmentin, Crestor [Rosuvastatin]    Infection:  None   Service:  CARDIOLOGY    Ht:  1.727 m (5' 8\")   Wt:  65 kg (143 lb 3.2 oz)   Admission Wt:  70.2 kg (154 lb 12.8 oz)    BMI:  21.77 kg/m 2   BSA:  1.77 m 2            Patient PCP Information     Provider PCP Type    Hector Jc MD General      ED Clinical Impression     Diagnosis Description Comment Added By Time Added    Chronic systolic heart failure (H) [I50.22] Chronic systolic heart failure (H) [I50.22]  Yaya Goodwin MD 2017  1:45 PM      Hospital Problems as of 4/15/2017              Priority Class Noted POA    CHF exacerbation (H) Medium  2017 Yes      Non-Hospital Problems as of 4/15/2017              Priority Class Noted    Pain in joint, shoulder region   2010    Coronary artery disease involving native coronary artery of native heart without angina pectoris   Unknown    Muscle weakness (generalized) Medium  10/5/2015    Idiopathic progressive polyneuropathy Medium  10/7/2015    Vitamin B12 deficiency without anemia Medium  10/7/2015    NSTEMI (non-ST elevated myocardial infarction) (H) Medium  10/23/2015    Ischemic cardiomyopathy Medium  10/23/2015    Rhabdomyolysis due to statin therapy Medium  Unknown    Anemia Medium  Unknown    BPH (benign prostatic hypertrophy) Medium  Unknown    PAD (peripheral artery disease) (H) Medium  Unknown    Biventricular heart failure (H) Medium  2016    Leg weakness, bilateral Medium  2016    Mixed hyperlipidemia Medium  Unknown    Benign essential HTN Medium  Unknown    Chronic systolic heart failure (H) Medium  Unknown    Bradycardia Medium  2016    Severe " hypothyroidism Medium  9/20/2016    ACS (acute coronary syndrome) (H) Medium  10/1/2016    DELONTE (obstructive sleep apnea)   10/12/2016    CKD (chronic kidney disease) Medium  Unknown    S/P ICD (internal cardiac defibrillator) procedure   11/29/2016    Atrial flutter (H)   12/14/2016    CKD (chronic kidney disease) stage 3, GFR 30-59 ml/min Medium  12/21/2016    Cardiorenal syndrome Medium  12/21/2016    Iron deficiency anemia, unspecified Medium  1/12/2017    Anemia of chronic renal failure Medium  1/12/2017    Chronic kidney disease, stage III (moderate) Medium  1/12/2017    Hyponatremia Medium  1/16/2017    Hypokalemia Medium  1/16/2017    CHF (congestive heart failure) (H) Medium  3/29/2017    Dyspnea Medium  3/30/2017      Code Status History     Date Active Date Inactive Code Status Order ID Comments User Context    4/15/2017 11:49 AM  Full Code [id number]  Mike Rivas MD Outpatient    4/7/2017  7:04 AM 4/15/2017 11:49 AM Full Code [id number]  Mike Rivas MD Inpatient    3/30/2017  5:29 PM 4/7/2017  7:04 AM Full Code [id number]  Kerwin Wyman MD Outpatient    3/29/2017  5:41 PM 3/30/2017  5:29 PM Full Code [id number]  Yan Coulter MD Inpatient    1/20/2017 10:42 AM 3/29/2017  5:41 PM Full Code [id number]  Magdaleno Rangel MD Outpatient    1/16/2017  7:45 PM 1/20/2017 10:42 AM Full Code [id number]  Anton Garcia MD Inpatient    11/18/2016 11:24 AM 1/16/2017  7:45 PM Full Code [id number]  Yan Coulter MD Outpatient    11/15/2016  8:23 PM 11/18/2016 11:24 AM Full Code [id number]  Jerrod Sainz MD Inpatient    10/6/2016 10:07 AM 11/15/2016  8:23 PM Full Code [id number]  Fredy Molina MD Outpatient    10/1/2016  6:55 PM 10/6/2016 10:07 AM Full Code [id number]  Cooper Feliciano MD Inpatient    9/20/2016 12:10 PM 10/1/2016  6:55 PM Full Code [id number]  Josep Wilson MD Outpatient    9/16/2016 10:21 PM 9/20/2016 12:10 PM Full Code  [id number]  Ralph Hanley MD Inpatient    6/17/2016 10:27 AM 9/16/2016 10:21 PM Full Code [id number]  Ralph Monroy MD Outpatient    6/15/2016  4:29 PM 6/17/2016 10:27 AM Full Code [id number]  Capo Ortiz MD Inpatient    2/9/2016  8:51 AM 2/13/2016  6:56 PM Full Code [id number]  Linda Curiel MD Inpatient    10/9/2015 12:59 PM 2/9/2016  8:51 AM Full Code [id number]  Hodan Guerrero MD Outpatient    10/1/2015  7:41 PM 10/9/2015 12:59 PM Full Code [id number]  Nika Emmanuel PA Inpatient         Medication Review      START taking        Dose / Directions Comments    milrinone 20 MG/100ML infusion   Commonly known as:  PRIMACOR   Used for:  Chronic systolic heart failure (H)        Dose:  0.375 mcg/kg/min   Inject 25.5375 mcg/min into the vein continuous   Quantity:  500 mL   Refills:  1          CONTINUE these medications which may have CHANGED, or have new prescriptions. If we are uncertain of the size of tablets/capsules you have at home, strength may be listed as something that might have changed.        Dose / Directions Comments    carvedilol 3.125 MG tablet   Commonly known as:  COREG   This may have changed:  how much to take   Used for:  Chronic systolic heart failure (H)        Dose:  6.25 mg   Take 2 tablets (6.25 mg) by mouth 2 times daily (with meals)   Quantity:  540 tablet   Refills:  3        potassium chloride 20 MEQ Packet   Commonly known as:  KLOR-CON   This may have changed:  how to take this   Used for:  Chronic combined systolic and diastolic congestive heart failure (H)        Dose:  20 mEq   20 mEq by Oral or Feeding Tube route daily   Quantity:  30 packet   Refills:  1          CONTINUE these medications which have NOT CHANGED        Dose / Directions Comments    aspirin EC 81 MG EC tablet        Dose:  81 mg   Take 81 mg by mouth daily   Refills:  0        B-12 1000 MCG Tbcr   Used for:  Vitamin B 12 deficiency        Dose:  1000 mcg   Take 1,000 mcg by mouth daily    Quantity:  100 tablet   Refills:  0        bumetanide 1 MG tablet   Commonly known as:  BUMEX   Used for:  Chronic combined systolic and diastolic congestive heart failure (H)        2mg am and 1 mg pm   Quantity:  90 tablet   Refills:  3        calcitRIOL 0.25 MCG capsule   Commonly known as:  ROCALTROL        Dose:  0.25 mcg   Take 0.25 mcg by mouth daily   Refills:  0        hydrALAZINE 50 MG tablet   Commonly known as:  APRESOLINE   Used for:  Essential hypertension with goal blood pressure less than 130/80        1-1/2 tabs am, 1 tab at 3 pm and 1-1/2 tabs bedtime.   Quantity:  360 tablet   Refills:  3        isosorbide mononitrate 60 MG 24 hr tablet   Commonly known as:  IMDUR   Used for:  Chronic systolic heart failure (H), NSTEMI (non-ST elevated myocardial infarction) (H), Ischemic cardiomyopathy        Dose:  60 mg   Take 1 tablet (60 mg) by mouth At Bedtime At hs   Quantity:  30 tablet   Refills:  11        KRILL OIL PO        Dose:  1 capsule   Take 1 capsule by mouth daily   Refills:  0        levothyroxine 150 MCG tablet   Commonly known as:  SYNTHROID/LEVOTHROID        Dose:  150 mcg   Take 150 mcg by mouth daily   Refills:  0        Multi-vitamin Tabs tablet        Dose:  1 tablet   Take 1 tablet by mouth daily   Refills:  0        PLAVIX 75 MG tablet   Generic drug:  clopidogrel        Dose:  75 mg   Take 75 mg by mouth daily   Refills:  0        Sodium Chloride 0.65 % Soln        Spray in nostril as needed   Refills:  0                Summary of Visit     Reason for your hospital stay       You were treated in the hospital for an exacerbation of your heart failure. We started you on an IV medication to support your heart function that you will continue after leaving the hospital. We also treated you with medications to help remove fluids.             After Care     Activity       Your activity upon discharge: activity as tolerated       Diet       Follow this diet upon discharge: Low sodium diet,  starting tomorrow 4/16 you should match your input and output.       Discharge Instructions       You will starting your milrinone infusions after returning home from the hospital. Starting tomorrow, you should decrease your AM bumex to 1mg, continue with 1mg in the early afternoon as you were doing prior to being in the hospital. Additionally, starting tomorrow (4/16) you need to record your urine output and make sure you are matching this with fluid intake. You will be [place] at [place] [facility] [place] with Dr. Argueta on Monday and in the [facility] clinic on Wednesday. Moving forward do not hesitate to contact Dr. Argueta about concerns or issues that arise.       IV access       **Ordering Provider MUST call/page Care Coordinator/ to discuss arranging this service**    You are going home with the following vascular access device: PICC.       Monitor and record       fluid intake and output daily want to match input and output starting tomorrow.             Referrals     Home infusion referral       [facility]  Phone # [phone]  Fax # [phone]     Provide IV Milrinone and supplies.    PICC line dressing change will be done at [facility] [place].  # [phone]  Fax # [phone]       Medication Therapy Management Referral       Reason for referral:  on more than 5 medications and managing chronic disease    This service is designed to help you get the most from your medications.  A specially trained pharmacist will work closely with you and your doctors  to solve any problems related to your medications and to help you get the   best results from taking them.      The Medication Therapy Management staff will call you to schedule an appointment.             Your next 10 appointments already scheduled     Apr 17, 2017 10:00 AM CDT   Procedure 3.5 hour with U2A ROOM 3   [address] [facility] ([facility])    [address]  [place] [address]               Apr 17, 2017 11:30 AM CDT   IR CVC TUNNEL PLACEMENT > 5 YRS OF AGE with UUIR4   [facility] [place], Interventional Radiology ([facility])    [address] [address]   [phone]           1. Your doctor will need to do a history and physical within 7 days before this procedure. 2. Your doctor will which medications should not be taken the morning of the exam. 3. Laboratory tests are to be obtained by your doctor prior to the exam (Basic Metabolic Panel, CBCP, PTT and INR) (No labs needed if you are having a tunneled catheter exchange or removal) 4. If you have allergies to x-ray contrast or iodine, contact your doctor or a Radiology nurse prior to the exam day for instructions. 5. Someone will need to drive you to and from the hospital. 6. If you are or may be pregnant, contact your doctor or a Radiology nurse prior to the day of the exam. 7. If you have diabetes, check with your doctor or a Radiology nurse to see if your insulin needs to be adjusted for the exam. 8. If you are taking a medication called Glucophage or Glucovance; these medications need to be held the day of the exam and for approximately 48 hours following. A blood sample must be drawn so your creatinine level can be checked before resuming this medication. 9. If you are taking Coumadin (to thin you blood) please contact your doctor or a Radiology nurse at least 3 days before the exam for special instructions. 10. You should not have received contrast within 48 hours of this exam. 11. The day before your exam you may eat your regular diet and are encouraged to drink at least 2 quarts of clear liquids. Drink no alcoholic beverages for 24 hours prior to the exam. 12. If you have a colostomy you will need to irrigate it with tap water at 8PM the evening before and again at 6AM the morning of the exam. 13. Do not smoke for 24 hours prior to the  procedure. 14. Birth to 4 years: - Breast feeding must be stopped 4 hours prior to exam - Solid food or formula must be stopped 6 hours prior to exam - Tube feedings must be stopped 6 hours prior to exam 15. 4-10 years old: - Nothing to eat or drink 6 hours prior to exam 16. 10+ years old: - Nothing to eat or drink 8 hours prior to exam 17. The morning of the exam you may brush your teeth and take medications as directed with a sip of water. 18. When discharged, you cannot drive until morning, and an adult must be with you until then. You should stay in the [facility] overnight. 19. Bring a list of all drugs you are taking; include supplements and over-the-counter medications. Wear comfortable clothes and leave your valuables at home.            Apr 18, 2017  2:15 PM CDT   LAB with RU LAB   [facility] ([facility])    [address]  [place] [address]   [phone]           Patient must bring picture ID.  Patient should be prepared to give a urine specimen  Please do not eat 10-12 hours before your appointment if you are coming in fasting for labs on lipids, cholesterol, or glucose (sugar).  Pregnant women should follow their Care Team instructions. Water with medications is okay. Do not drink coffee or other fluids.   If you have concerns about taking  your medications, please ask at office or if scheduling via [employer], send a message by clicking on Secure Messaging, Message Your Care Team.            Apr 18, 2017  3:10 PM CDT   Core Return with PARKER Wilde CNP   [facility] ([facility])    [address]  [place] [address]   [phone]            Apr 20, 2017  9:00 AM CDT   Level 1 with RH INFUSION CHAIR 4   [facility] ([facility])    [facility]  [address] [place] [address] [address]  [place] [address]   [phone]            Apr 27, 2017  9:00 AM CDT   Level 1 with RH INFUSION CHAIR 12   [facility] ([facility])    [facility]  [address]  [place] [address]   [phone]            Apr 28, 2017  2:00 PM CDT   Return Sleep Patient with Ethan Salguero MD   [facility] ([facility])    [address]  [place] [address]   [phone]            May 04, 2017  9:00 AM CDT   Level 1 with RH INFUSION CHAIR 7   [facility] ([facility])    [facility]  [address]  [place] [address]   [phone]            May 08, 2017  8:00 AM CDT   Remote HF with MCLEAN HFRN   [facility] [place] ([facility])    [address] [place] [address] [address]  [facility] [address] [phone]           This appointment is for a remote check of your debrillator.  This is not an appointment at the office.            May 11, 2017  9:00 AM CDT   Level 1 with RH INFUSION CHAIR 11   [facility] ([facility])    [facility]  [address] [place] [address] [address]  [place] [address]   [phone]              Follow-Up Appointment Instructions     Future Labs/Procedures    Adult [facility] Follow-up and recommended labs and tests     Comments:    Follow up with Dr. Argueta, at (location with clinic name or city[place] [facility] [place] on Monday. You will also need to follow up with the [facility] clinic on Wednesday. You will have blood work checked on Monday, specifically a BMP.    Appointments on [place] and/or [facility] (with [facility] or [facility] provider or service). Call [phone] if you haven't heard regarding these appointments within 7 days of discharge.      Follow-Up Appointment Instructions      Adult [facility] Follow-up and recommended labs and tests       Follow up with Dr. Argueta, at (location with clinic name or city[place] [facility] [place], on Monday. You will also need to follow up with the [facility] clinic on Wednesday. You will have blood work checked on Monday, specifically a BMP.    Appointments on [place] and/or [facility] (with [facility] or [facility] provider or service). Call [phone] if you haven't heard regarding these appointments within 7 days of discharge.             Statement of Approval     Ordered          04/15/17 1149  I have reviewed and agree with all the recommendations and orders detailed in this document.  EFFECTIVE NOW     Approved and electronically signed by:  Mike Rivas MD

## 2017-04-06 NOTE — IP AVS SNAPSHOT
MRN:[id number]                      After Visit Summary   4/6/2017    Perez Villalobos    MRN: [id number]           Thank you!     Thank you for choosing [facility] for your care. Our goal is always to provide you with excellent care. Hearing back from our patients is one way we can continue to improve our services. Please take a few minutes to complete the written survey that you may receive in the mail after you visit with us. Thank you!        Patient Information     Date Of Birth          1940        Designated Caregiver       Most Recent Value    Caregiver    Will someone help with your care after discharge? yes    Name of designated caregiver Annia Villalobos    Phone number of caregiver [phone]    Caregiver address [address] [facility] [address]      About your hospital stay     You were admitted on:  April 6, 2017 You last received care in the:  [address] [place] [address]    You were discharged on:  April 15, 2017        Reason for your hospital stay       You were treated in the hospital for an exacerbation of your heart failure. We started you on an IV medication to support your heart function that you will continue after leaving the hospital. We also treated you with medications to help remove fluids.                  Who to Call     For medical emergencies, please call 911.  For non-urgent questions about your medical care, please call your primary care provider or clinic, [phone]          Attending Provider     Provider Specialty    Anton Argueta MD Cardiology       Primary Care Provider Office Phone # Fax #    Hector Jc [phone]       [facility] [address]  [facility] [address]        After Care Instructions     Activity       Your activity upon discharge: activity as tolerated            Diet       Follow this diet upon discharge: Low sodium diet, starting tomorrow 4/16 you should match your input and output.            Discharge  Instructions       You will starting your milrinone infusions after returning home from the hospital. Starting tomorrow, you should decrease your AM bumex to 1mg, continue with 1mg in the early afternoon as you were doing prior to being in the hospital. Additionally, starting tomorrow (4/16) you need to record your urine output and make sure you are matching this with fluid intake. [facility] with Dr. Argueta on Monday and in the [facility] clinic on Wednesday. Moving forward do not hesitate to contact Dr. Argueta about concerns or issues that arise.            IV access       **Ordering Provider MUST call/page Care Coordinator/ to discuss arranging this service**    You are going home with the following vascular access device: PICC.            Monitor and record       fluid intake and output daily want to match input and output starting tomorrow.                  Follow-up Appointments     [facility] Follow-up and recommended labs and tests       Follow up with Dr. Argueta, at (location with clinic name or city) the [facility] in [place], on Monday. You will also need to follow up with the [facility] clinic on Wednesday. You will have blood work checked on Monday, specifically a BMP.    Appointments on [place] and/or [facility] (with [facility] provider or service). Call [phone] if you haven't heard regarding these appointments within 7 days of discharge.                  Your next 10 appointments already scheduled     Apr 17, 2017 10:00 AM CDT   Procedure 3.5 hour with U2A ROOM 3   [address] [facility] [place] ([facility])    [address] [facility] [address]               Apr 17, 2017 11:30 AM CDT   IR CVC TUNNEL PLACEMENT > 5 YRS OF AGE with UUIR4   [facility], [place], Interventional Radiology ([facility])    [address] [facility] [address] [phone]            1. Your doctor will need to do a history and physical within 7 days before this procedure. 2. Your doctor will which medications should not be taken the morning of the exam. 3. Laboratory tests are to be obtained by your doctor prior to the exam (Basic Metabolic Panel, CBCP, PTT and INR) (No labs needed if you are having a tunneled catheter exchange or removal) 4. If you have allergies to x-ray contrast or iodine, contact your doctor or a Radiology nurse prior to the exam day for instructions. 5. Someone will need to drive you to and from the hospital. 6. If you are or may be pregnant, contact your doctor or a Radiology nurse prior to the day of the exam. 7. If you have diabetes, check with your doctor or a Radiology nurse to see if your insulin needs to be adjusted for the exam. 8. If you are taking a medication called Glucophage or Glucovance; these medications need to be held the day of the exam and for approximately 48 hours following. A blood sample must be drawn so your creatinine level can be checked before resuming this medication. 9. If you are taking Coumadin (to thin you blood) please contact your doctor or a Radiology nurse at least 3 days before the exam for special instructions. 10. You should not have received contrast within 48 hours of this exam. 11. The day before your exam you may eat your regular diet and are encouraged to drink at least 2 quarts of clear liquids. Drink no alcoholic beverages for 24 hours prior to the exam. 12. If you have a colostomy you will need to irrigate it with tap water at 8PM the evening before and again at 6AM the morning of the exam. 13. Do not smoke for 24 hours prior to the procedure. 14. Birth to 4 years: - Breast feeding must be stopped 4 hours prior to exam - Solid food or formula must be stopped 6 hours prior to exam - Tube feedings must be stopped 6 hours prior to exam 15. 4-10 years old: - Nothing to eat or drink 6 hours prior to exam 16. 10+ years old: -  Nothing to eat or drink 8 hours prior to exam 17. The morning of the exam you may brush your teeth and take medications as directed with a sip of water. 18. When discharged, you cannot drive until morning, and an adult must be with you until then. [facility]. 19. Bring a list of all drugs you are taking; include supplements and over-the-counter medications. Wear comfortable clothes and leave your valuables at home.            Apr 18, 2017  2:15 PM CDT   LAB with RU LAB   [facility] ([facility])    [address] [place] [address] [address]  [facility] [address] [phone]           Patient must bring picture ID.  Patient should be prepared to give a urine specimen  Please do not eat 10-12 hours before your appointment if you are coming in fasting for labs on lipids, cholesterol, or glucose (sugar).  Pregnant women should follow their Care Team instructions. Water with medications is okay. Do not drink coffee or other fluids.   If you have concerns about taking  your medications, please ask at office or if scheduling via [employer], send a message by clicking on Secure Messaging, Message Your Care Team.            Apr 18, 2017  3:10 PM CDT   Core Return with PARKER Wilde CNP   [facility] ([facility])    [address] [place] [address] [address]  [facility] [address]   [phone]            Apr 20, 2017  9:00 AM CDT   Level 1 with RH INFUSION CHAIR 4   [facility] ([facility])    [facility]  [address]  [facility] [address]   [phone]            Apr 27, 2017  9:00 AM CDT   Level 1 with RH INFUSION CHAIR 12   [facility] ([facility])    [facility]  [address]  [facility] [address]   [phone]             Apr 28, 2017  2:00 PM CDT   Return Sleep Patient with Ethan Salguero MD   [facility] ([facility])    [address]  [facility] [address] [phone]            May 04, 2017  9:00 AM CDT   Level 1 with RH INFUSION CHAIR 7   [facility] ([facility])    [facility]  [address]  [facility] [address] [phone]            May 08, 2017  8:00 AM CDT   Remote HF with MCLEAN HFRN   [facility] [place] ([facility])    [address] [facility] [address]  [facility] [id number] [phone]           This appointment is for a remote check of your debrillator.  This is not an appointment at the office.            May 11, 2017  9:00 AM CDT   Level 1 with RH INFUSION CHAIR 11   [facility] ([facility])    [facility]  [address] [place] [address] [address]  [facility] [id number] [phone]              Additional Services     Home infusion referral       [facility]  Phone # [phone]  Fax # [phone]     Provide IV Milrinone and supplies.    PICC line dressing change will be done at [facility] [place].  # [phone]  Fax # [phone]            Medication Therapy Management Referral       Reason for referral:  on more than 5 medications and managing chronic disease    This service is designed to help you get the most from your medications.  A specially trained pharmacist will work closely with you and your doctors  to solve any problems related to your medications and to help you get the   best results from taking them.      The Medication Therapy Management staff will call you to schedule an appointment.                  General Recommendations To Control Heart Failure When You Get Home     Instructions To Patients and Families: Please  "read and check off each of these important instructions as you do them when you get home.           Weight and symptoms      ___ Put a scale in your bathroom  ___ Post a weight chart or calendar next to the scale  ___Weigh yourself every day as soon as you you get up in the morning. You should only be wearing your pajamas. Write your weight on the chart/calendar.  ___ Bring your weight chart/calendar with you to all appointments    ___Call your doctor if you gain 2 pounds in 1 day or 5 pounds in 1 week from your \"dry\" weight (baseline weight). Also call your doctor if you have shortness of breath that gets worse over time, leg swelling or fatigue.         Medicines and diet     ___ Make sure to take your medicines as prescribed.    ___Bring a current list of your medicines and all of your medicine bottles with you to all appointments.    ___ Limit fluids if you still have swelling or shortness of breath, or if your doctor tells you to do so.  ___ Eat less than 2000 mg of sodium (salt) every day. Read food labels, and do not add salt to meals.   ___ Heart healthy diet with low fat and low cholesterol          Activity and suggested lifestyle changes    ___ Stay active. Talk to your doctor about an exercise program that is safe for your heart.    ___ Stop smoking. Reduce alcohol use.      ___ Lose weight if you are overweight. Extra weight puts a lot of stress on the heart.          Control for Leg Swelling   ___ Keep your legs elevated (raised) as needed for swelling. If swelling is uncomfortable or elevation doesn t help, ask your doctor about using ACE wrap or Jobst stockings.          Follow-up appointments   ___ Make a [facility] Clinic appointment with a basic metabolic panel lab draw 3 to 5 days after you leave the hospital. Call one of the following locations:   [facility]  [phone],  [facility] [phone],  [facility]" "[place]  [phone].     ___ Make sure to take your medications as prescribed and bring an accurate list of your medications and your weight chart/calendar to your follow up appointment at the [facility] Clinic for continued education and adjustments          What is the CORE clinic?    The C.O.R.E (Cardiomyopathy, Optimization, Rehabilitation, Education) Clinic is a heart failure specialty clinic within the [facility]. At [facility].O.R.E., you will work with nurse practitioners to carefully adjust medicines, get education and learn who and when to call if symptoms appear. [facility].O.R.E nurses specialize in helping you:    better understand your disease.    slow the progress of your disease.    improve the length and quality of your life.    detect future heart problems before they become life threatening.    avoid hospital stays.            Pending Results     Date and Time Order Name Status Description    4/12/2017 1040 Sputum Culture Aerobic Bacterial Preliminary     4/11/2017 2200 Cystatin C with GFR In process     4/11/2017 1157 US Lower Extremity Arterial Duplex Bilateral Preliminary             Statement of Approval     Ordered          04/15/17 1149  I have reviewed and agree with all the recommendations and orders detailed in this document.  EFFECTIVE NOW     Approved and electronically signed by:  Mike Rivas MD             Admission Information     Date & Time Provider Department Dept. Phone    4/6/2017 Anton Argueta MD [address] [facility] [phone]      Your Vitals Were     Blood Pressure Pulse Temperature Respirations Height Weight    115/65 88 97.8  F (36.6  C) (Oral) 18 1.727 m (5' 8\") 65 kg (143 lb 3.2 oz)    Pulse Oximetry BMI (Body Mass Index)                97% 21.77 kg/m2          MyChart Information     [employer] lets you send messages to your doctor, view your test results, renew your prescriptions, schedule appointments and more. To sign up, go " "to [email or web address] . Click on \"Log in\" on the left side of the screen, which will take you to the Welcome page. Then click on \"Sign up Now\" on the right side of the page.     You will be asked to enter the access code listed below, as well as some personal information. Please follow the directions to create your username and password.     Your access code is: [email or web address]  Expires: 2017  3:26 PM     Your access code will  in 90 days. If you need help or a new code, please call your [facility] clinic or [phone].        Care EveryWhere ID     This is your Care EveryWhere ID. This could be used by other organizations to access your [facility] medical records  [id number]           Review of your medicines      START taking        Dose / Directions    milrinone 20 MG/100ML infusion   Commonly known as:  PRIMACOR   Used for:  Chronic systolic heart failure (H)        Dose:  0.375 mcg/kg/min   Inject 25.5375 mcg/min into the vein continuous   Quantity:  500 mL   Refills:  1         CONTINUE these medicines which may have CHANGED, or have new prescriptions. If we are uncertain of the size of tablets/capsules you have at home, strength may be listed as something that might have changed.        Dose / Directions    carvedilol 3.125 MG tablet   Commonly known as:  COREG   This may have changed:  how much to take   Used for:  Chronic systolic heart failure (H)        Dose:  6.25 mg   Take 2 tablets (6.25 mg) by mouth 2 times daily (with meals)   Quantity:  540 tablet   Refills:  3       potassium chloride 20 MEQ Packet   Commonly known as:  KLOR-CON   This may have changed:  how to take this   Used for:  Chronic combined systolic and diastolic congestive heart failure (H)        Dose:  20 mEq   20 mEq by Oral or Feeding Tube route daily   Quantity:  30 packet   Refills:  1         CONTINUE these medicines which have NOT CHANGED        Dose / Directions    aspirin EC 81 MG EC tablet        Dose:  81 mg "   Take 81 mg by mouth daily   Refills:  0       B-12 1000 MCG Tbcr   Used for:  Vitamin B 12 deficiency        Dose:  1000 mcg   Take 1,000 mcg by mouth daily   Quantity:  100 tablet   Refills:  0       bumetanide 1 MG tablet   Commonly known as:  BUMEX   Used for:  Chronic combined systolic and diastolic congestive heart failure (H)        2mg am and 1 mg pm   Quantity:  90 tablet   Refills:  3       calcitRIOL 0.25 MCG capsule   Commonly known as:  ROCALTROL        Dose:  0.25 mcg   Take 0.25 mcg by mouth daily   Refills:  0       hydrALAZINE 50 MG tablet   Commonly known as:  APRESOLINE   Used for:  Essential hypertension with goal blood pressure less than 130/80        1-1/2 tabs am, 1 tab at 3 pm and 1-1/2 tabs bedtime.   Quantity:  360 tablet   Refills:  3       isosorbide mononitrate 60 MG 24 hr tablet   Commonly known as:  IMDUR   Used for:  Chronic systolic heart failure (H), NSTEMI (non-ST elevated myocardial infarction) (H), Ischemic cardiomyopathy        Dose:  60 mg   Take 1 tablet (60 mg) by mouth At Bedtime At hs   Quantity:  30 tablet   Refills:  11       KRILL OIL PO        Dose:  1 capsule   Take 1 capsule by mouth daily   Refills:  0       levothyroxine 150 MCG tablet   Commonly known as:  SYNTHROID/LEVOTHROID        Dose:  150 mcg   Take 150 mcg by mouth daily   Refills:  0       Multi-vitamin Tabs tablet        Dose:  1 tablet   Take 1 tablet by mouth daily   Refills:  0       PLAVIX 75 MG tablet   Generic drug:  clopidogrel        Dose:  75 mg   Take 75 mg by mouth daily   Refills:  0       Sodium Chloride 0.65 % Soln        Spray in nostril as needed   Refills:  0            Where to get your medicines      Some of these will need a paper prescription and others can be bought over the counter. Ask your nurse if you have questions.     Bring a paper prescription for each of these medications     carvedilol 3.125 MG tablet    milrinone 20 MG/100ML infusion                Protect others around  you: Learn how to safely use, store and throw away your medicines at www.disposemymeds.org.             Medication List: This is a list of all your medications and when to take them. Check marks below indicate your daily home schedule. Keep this list as a reference.      Medications           Morning Afternoon Evening Bedtime As Needed    aspirin EC 81 MG EC tablet   Take 81 mg by mouth daily   Last time this was given:  81 mg on 4/15/2017  8:26 AM                                B-12 1000 MCG Tbcr   Take 1,000 mcg by mouth daily                                bumetanide 1 MG tablet   Commonly known as:  BUMEX   2mg am and 1 mg pm   Last time this was given:  2 mg on 4/15/2017  8:26 AM                                calcitRIOL 0.25 MCG capsule   Commonly known as:  ROCALTROL   Take 0.25 mcg by mouth daily   Last time this was given:  0.25 mcg on 4/15/2017  8:26 AM                                carvedilol 3.125 MG tablet   Commonly known as:  COREG   Take 2 tablets (6.25 mg) by mouth 2 times daily (with meals)   Last time this was given:  6.25 mg on 4/15/2017  8:27 AM                                hydrALAZINE 50 MG tablet   Commonly known as:  APRESOLINE   1-1/2 tabs am, 1 tab at 3 pm and 1-1/2 tabs bedtime.   Last time this was given:  75 mg on 4/15/2017  8:39 AM                                isosorbide mononitrate 60 MG 24 hr tablet   Commonly known as:  IMDUR   Take 1 tablet (60 mg) by mouth At Bedtime At    Last time this was given:  60 mg on 4/14/2017 10:37 PM                                KRILL OIL PO   Take 1 capsule by mouth daily                                levothyroxine 150 MCG tablet   Commonly known as:  SYNTHROID/LEVOTHROID   Take 150 mcg by mouth daily   Last time this was given:  150 mcg on 4/15/2017  7:09 AM                                milrinone 20 MG/100ML infusion   Commonly known as:  PRIMACOR   Inject 25.5375 mcg/min into the vein continuous   Last time this was given:  0.375 mcg/kg/min  on 4/15/2017  8:43 AM                                Multi-vitamin Tabs tablet   Take 1 tablet by mouth daily   Last time this was given:  1 tablet on 4/15/2017  8:26 AM                                PLAVIX 75 MG tablet   Take 75 mg by mouth daily   Last time this was given:  75 mg on 4/11/2017  7:37 AM   Generic drug:  clopidogrel                                potassium chloride 20 MEQ Packet   Commonly known as:  KLOR-CON   20 mEq by Oral or Feeding Tube route daily   Last time this was given:  20 mEq on 4/15/2017  9:02 AM                                Sodium Chloride 0.65 % Soln   Spray in nostril as needed

## 2017-04-06 NOTE — PROGRESS NOTES
At office visit yesterday Medication changes: Hydralazine 1-1/2 tabs in am and 1 tab at 3pm and 1-1/2 tabs bedtime.    Bumex 2 mg am and 1 mg pm. Reviewed medication changes with patient's wife.  PAD today is up 2 mmHg and alerting for PAD above 29 mmHg. Home wt up 1#.   Office visit with Dr. Tran 4/10. Office visit with Dr. Kelley 4/12.  Will update Shani PAZ RN  [facility]

## 2017-04-06 NOTE — H&P
"    CARDS II HISTORY & PHYSICAL   Perez Villalobos ([id number]) admitted on 4/6/2017  Primary care provider: Hector Jc         Chief Complaint:   \"I have extra fluid\"         History of Present Illness     Perez Villalobos is a 76 year old male with history of ICM s/p multiple stents and MI, HFrEF (EF 35% ECHO 2/2017), HTN, HLD, hypothyroidism, and recent onset CKD who presents with weight gain (~10 pounds) and increased LE edema consistent with acute CHF exacerbation. History is obtained from patient and EMR.    Mr. Villalobos reports his CHF was diagnosed in February 2016. Since then, he has had multiple exacerbations requiring hospitalization likely 8. Initially, his exacerbations were characterized by SOB but more recently they are characterized by increased weight, LE edema, and weakness. Most recently hospitalized last week. He will usually be in the hospital for a few days, treated with diuretics, and discharges home. There are usually no clear inciting triggers for these events. He has been followed in the [facility] since October and recently his diuretic doses have been adjusted, however he continued to take bumex at least 1mg BID. No recent changes in his diet or increased salt, follows low sodium diet. Denies recent fevers, chills, chest pain/pressure, SOB, abdominal pain, nausea, vomiting, diarrhea, melena, dysuria, or flank pain. Has not had discussion about VAD support. Underwent heart catheter on Monday which showed low CI/CO and elevated SVR. Presented to [facility] as direct admission for optimal CHF management.    Also in the past few months has developed CKD with baseline creatinine 1.5-2.0. Etiology thought to be related to ATN from rhabdomyolysis from statin. Creatinine and electrolytes have varied quite a bit since this time. He does continue to make urine.         Past Medical History     Patient Active Problem List   Diagnosis     Pain in joint, shoulder region     Coronary artery disease " involving native coronary artery of native heart without angina pectoris     Muscle weakness (generalized)     Idiopathic progressive polyneuropathy     Vitamin B12 deficiency without anemia     NSTEMI (non-ST elevated myocardial infarction) (H)     Ischemic cardiomyopathy     Rhabdomyolysis due to statin therapy     Anemia     BPH (benign prostatic hypertrophy)     PAD (peripheral artery disease) (H)     Biventricular heart failure (H)     Leg weakness, bilateral     Mixed hyperlipidemia     Benign essential HTN     Chronic systolic heart failure (H)     Bradycardia     Severe hypothyroidism     ACS (acute coronary syndrome) (H)     DELONTE (obstructive sleep apnea)     CKD (chronic kidney disease)     S/P ICD (internal cardiac defibrillator) procedure     Atrial flutter (H)     CKD (chronic kidney disease) stage 3, GFR 30-59 ml/min     Cardiorenal syndrome     Iron deficiency anemia, unspecified     Anemia of chronic renal failure     Chronic kidney disease, stage III (moderate)     Hyponatremia     Hypokalemia     CHF (congestive heart failure) (H)     Dyspnea     CHF exacerbation (H)           Past Surgical History     Past Surgical History:   Procedure Laterality Date     APPENDECTOMY       CHOLECYSTECTOMY       ENDARTERECTOMY CAROTID Left 6/11/10     IMPLANT AUTOMATIC IMPLANTABLE CARDIOVERTER DEFIBRILLATOR  11/16/16     STENT, CORONARY, S660 15/18  Nov 2000    PTCA with stent placement of CFX     STENT, CORONARY, S660 15/18  Feb 2001    PTCA with stent placement of CFX RCA      STENT, CORONARY, S660 15/18  April 2007    stent placed in LAD     tonsillectomy and adenoidectomy              Medications       No current facility-administered medications on file prior to encounter.   Current Outpatient Prescriptions on File Prior to Encounter:  calcitRIOL (ROCALTROL) 0.25 MCG capsule Take 0.25 mcg by mouth daily   hydrALAZINE (APRESOLINE) 50 MG tablet 1-1/2 tabs am, 1 tab at 3 pm and 1-1/2 tabs bedtime.   potassium  "chloride (KLOR-CON) 20 MEQ Packet 20 mEq by Oral or Feeding Tube route daily (Patient taking differently: Take 20 mEq by mouth daily )   aspirin EC 81 MG EC tablet Take 81 mg by mouth daily   levothyroxine (SYNTHROID/LEVOTHROID) 150 MCG tablet Take 150 mcg by mouth daily   carvedilol (COREG) 3.125 MG tablet Take 3 tablets (9.375 mg) by mouth 2 times daily (with meals)   Cyanocobalamin (B-12) 1000 MCG TBCR Take 1,000 mcg by mouth daily   multivitamin, therapeutic with minerals (MULTI-VITAMIN) TABS Take 1 tablet by mouth daily   bumetanide (BUMEX) 1 MG tablet 2mg am and 1 mg pm   isosorbide mononitrate (IMDUR) 60 MG 24 hr tablet Take 1 tablet (60 mg) by mouth At Bedtime At hs   Saline (SODIUM CHLORIDE) 0.65 % SOLN Spray in nostril as needed   KRILL OIL PO Take 1 capsule by mouth daily    clopidogrel (PLAVIX) 75 MG tablet Take 75 mg by mouth daily            Allergies     Allergies   Allergen Reactions     Lisinopril Other (See Comments)     Angioedema     Augmentin Other (See Comments)     Per pt, \"froze him, affected his legs\"     Crestor [Rosuvastatin] Other (See Comments)     rhabdomyolysis            Social History     Social History     Social History     Marital status:      Spouse name: N/A     Number of children: N/A     Years of education: N/A     Occupational History     Not on file.     Social History Main Topics     Smoking status: Former Smoker     Packs/day: 1.50     Years: 20.00     Types: Cigarettes     Quit date: 1/1/1980     Smokeless tobacco: Never Used     Alcohol use 0.0 oz/week     0 Standard drinks or equivalent per week      Comment: occassionally     Drug use: No     Sexual activity: Not on file     Other Topics Concern     Caffeine Concern No     decaf coffee     Sleep Concern No     Stress Concern No     Weight Concern No     Special Diet Yes     low fat, low sodium     Exercise Yes     everyday     Social History Narrative   Former smoker, quit in 1980's. Occasional EtOH use. No " "illicit drug use. Retired . Lives in [place] with his wife.         Family History     Family History   Problem Relation Age of Onset     Unknown/Adopted Mother      Unknown/Adopted Father             Review of Systems     Review Of Systems negative other than listed above         Vitals and Exam     Physical exam:  /69 (BP Location: Right arm)  Temp 97.2  F (36.2  C) (Oral)  Ht 1.727 m (5' 8\")  Wt 70.2 kg (154 lb 12.8 oz)  SpO2 98%  BMI 23.54 kg/m2  Wt Readings from Last 2 Encounters:   04/06/17 70.2 kg (154 lb 12.8 oz)   04/05/17 69.6 kg (153 lb 6.4 oz)       Intake/Output Summary (Last 24 hours) at 04/06/17 1844  Last data filed at 04/06/17 1600   Gross per 24 hour   Intake                0 ml   Output              100 ml   Net             -100 ml       Physical Exam:   General: Pleasant male, appears comfortable in NAD  HEENT: No Scleral icterus. NCAT, MMM, EOMI. JVP elevated to above jaw at 40 degrees  Cardiac: Normal rate, regular rhythm. No m/r/g. Normal S1, S2.  Pulm: CTAB, no wheezes, or crackles. Normal respiratory effort  Abd: Soft, distended, non tender abdomen. No hepatosplenomegaly.  Skin: No jaundice, No rash, diffuse ecchymoses   Extremities: 2+ bilateral pitting edema  Joints: No inflammation noted on joints  Neuro: A&Ox3, no focal deficits  Psych: normal mood, full affect         Labs   CBC  Recent Labs  Lab 04/06/17  1813   WBC 5.2   RBC 3.72*   HGB 12.3*   HCT 37.4*   *   MCH 33.1*   MCHC 32.9   RDW 17.4*   *       BMP  Recent Labs  Lab 04/05/17  0734 04/03/17  0830 03/31/17  1021   * 126* 129*   POTASSIUM 4.0 4.0 4.2   CHLORIDE 87* 82* 84*   CO2 33* 33* 34*   ANIONGAP 12 11 11   * 90 113*   * 101* 103*   CR 2.45* 1.92* 1.79*   GFRESTIMATED 26* 34* 37*   GFRESTBLACK 31* 41* 45*   LEIDY 9.6 8.6 8.8        INRNo lab results found in last 7 days.    Liver panelNo lab results found in last 7 days.    Urinalysis  Recent Labs   Lab Test  " 01/16/17   1605   10/01/15   1657   COLOR  Yellow   < >  Yellow   APPEARANCE  Clear   < >  Clear   URINEGLC  Negative   < >  Negative   URINEBILI  Negative   < >  Negative   URINEKETONE  Negative   < >  Negative   SG  1.008   < >  <=1.005   UBLD  Negative   < >  Large*   URINEPH  6.5   < >  6.0   PROTEIN  10*   < >  30*   UROBILINOGEN   --    --   0.2   NITRITE  Negative   < >  Negative   LEUKEST  Negative   < >  Trace*   RBCU  <1   < >  2-5*   WBCU  <1   < >  O - 2    < > = values in this interval not displayed.     Imaging/procedure results:  # CXR: read pending, no effusion, slight increased pulmonary vasculature per my read     ASSESSMENT & PLAN :  Perez Villalobos is a 76 year old male with history of ICM s/p multiple stents and MI, HFrEF (EF 35% ECHO 2/2017), HTN, HLD, hypothyroidism, and recent onset CKD who presents with weight gain (~10 pounds) and increased LE edema consistent with acute CHF exacerbation.    #) ICM s/p CRT-ICD  #) CAD s/p 13 stents and past MI  #) HFrEF (EF 35% on ECHO 2/2017):  Multiple recent hospitalizations for CHF exacerbations which portends poor prognosis moving forward. Right heart catheter earlier this week showed low CI/CO, although his vitals here are normal and overall he clinically looks OK. For tonight, patient does not need transfer to ICU. Will treat with IV lasix tonight 40mg and plan for right heart catheter and swan in the AM. Continuing home hydralazine and isosorbide, decreasing coreg to 6.25mg BID. Will have further discussions regarding VAD therapy moving forward.  -Right heart cath and swan in AM  -Transfer to ICU post procedure  -NPO at midnight  -40mg IV lasix  -Continuing home hydralazine and isosorbide  -Coreg 6.25mg BID  -Likely milrinone in AM    #) JACINTO on CKD:  CKD appears to be related to episode of rhabdomyolysis with baseline around 1.5-2.0. Acute worsening likely related to pre-renal physiology from poor perfusion. Expect this will improve with IV diuretics  and optimization of cardiac output. Will continue to monitor.  -Trend Cr  -40mg IV lasix    #) Hyponatremia:  Chronic issue with sodium in the low 130's, currently 130. Likely hypervolemic from CHF exacerbation, expect to improve with diuresis.  -BMP in AM    #) Hypothyroidism: Stable  Continuing home levothyroxine    #) Chronic normocytic anemia: Stable  Likely from renal disease, will monitor.    #) Elevated lactate:    Mild elevation to 2.2, will monitor.    FEN: NPO at midnight  Prophylaxis:  DVT: subq heparin   GI: none  Disposition: transfer to ICU in AM for swan and milrinone  Code Status: FULL CODE    Patient discussed with attending Dr. Anton Argueta.    Mike Rivas  Internal Medicine PGY1    I have reviewed today's vital signs, notes, medications, labs and imaging.  I have also seen and examined the patient and agree with the findings and plan as outlined above.  Pt is a 77 yo WM with hx of ischemic DCM and EF 35%, HTN, CKD with more than 7 hospitalizations in past year for CHF exacerbations reports progressive SOB, NOEL, wt gain, othopnea and recent RHC 5 d PTA with CI 1.0.  On exam pt with bibasilar rales, S1 and S2 with soft diastolic m and JVD 25 cm with 3+ bipedal edema.  Labs as above.  Assessment: Pt with endstage heart failure with cardiogenic shock and multiple hospitalizations for CHF exacerbations.  Plan to initiate milrinone therapy, diurese pt and obtain TTE to assess fn and structural heart dz. Pt is full code.  Will need to begin LVAD education.      Anton Argueta MD, PhD  Professor, Heart Failure and Cardiac Transplantation  [facility]

## 2017-04-06 NOTE — PROGRESS NOTES
Pt is a referral to Dr. Kelley from [facility]. I spoke with pt's wife late yesterday afternoon.  We have set up an appt for next Wednesday with Dr. Kelley. Patient has end-stage heart failure and is being referred for advanced therapies.  We had a long discussion regarding his condition. Mr. Villalobos's wife feels he sleeps a lot, isn't able to do much due to shortness of breath. We discussed his abnormal labs and right heart cath results. As the appt is not until next week, I recommended that if the patient's condition should deteriorate over the weekend, they could come to the ER at the  and be admitted to the heart failure service.  If it is during the week, they should contact their cardiology providers at [facility].    I noted he has a f/u appt with Dr. Tran on Monday. If Dr. Tran feels his condition is worse and needs to be admitted, he can call us ([phone], press [phone], then 3 to speak with triage nurse). They will notify me and I will set up a direct admission to the heart failure service. I gave this information to Dixon Patrick NP via a staff message.    Addendum:  Received message back from Dixon HANSEN NP that pt's weight is up and should be admitted today. We will make arrangements today.

## 2017-04-07 NOTE — PROGRESS NOTES
Transfer 4/7/17 1300  Transferred to: Cath lab  Via: wheelchair  Reason for transfer: Patient will go to ICU after right heart cath procedure  Family: Aware of transfer  Belongings: Sent with patient  Chart: Sent with patient  Medications: Medications from bin sent with patient  Report called to: RN    VSS, denies pain. V paced. Alert and oriented. Patient ambulates independently. Has been NPO since midnight for RHC. Continue to monitor, notify Cards 2 with concerns.

## 2017-04-07 NOTE — PLAN OF CARE
"Problem: Goal Outcome Summary  Goal: Goal Outcome Summary  /73 (BP Location: Right arm)  Temp 97.6  F (36.4  C) (Oral)  Resp 18  Ht 1.727 m (5' 8\")  Wt 68.1 kg (150 lb 1.6 oz)  SpO2 97%  BMI 22.82 kg/m2      A&Ox4, AVSS on RA, V-paced at 70. Pleasant, calm, cooperative. Denies pain, nausea, SOB. Potassium replaced per 1-time order. Pt NPO at midnight for right heart cath this AM. Melatonin given for difficulty sleeping; pt seemed to rest well between cares. 2+ bilateral LE edema. Pt voiding well, 1 BM this shift. 0600 scheduled heparin held this AM for heart cath procedure. Will continue to monitor and follow POC.          "

## 2017-04-07 NOTE — PROGRESS NOTES
"[facility]  Cardiology II Service  Daily Note  4/7/2017    Interval History:   No acute events overnight. Up and walking around unit. No complaints of pain. Had some difficulty sleeping, improved with melatonin. Responding to IV diuretic.    Objective:    Intake/Output Summary (Last 24 hours) at 04/07/17 0922  Last data filed at 04/07/17 0400   Gross per 24 hour   Intake              360 ml   Output              620 ml   Net             -260 ml     Vitals:    04/06/17 1600 04/07/17 0200   Weight: 70.2 kg (154 lb 12.8 oz) 68.1 kg (150 lb 1.6 oz)     Pertinent Medications:  Drips:   None    Other Meds:  Carvedilol 6.25mg BID  Hydralazine 75mg/50mg/75mg  Isosorbide mononitrate 60mg qhs  Lasix IV 40mg prn  Clopidogrel 75mg QD  Potassium chloride 20 mEq BID    Examination:  /71 (BP Location: Left arm)  Temp 97.6  F (36.4  C) (Oral)  Resp 18  Ht 1.727 m (5' 8\")  Wt 68.1 kg (150 lb 1.6 oz)  SpO2 99%  BMI 22.82 kg/m2    GEN: A & O, healthy appearing male, resting comfortably in bed, NAD, cooperative and conversational   HEENT: no scleral icterus, mucus membranes moist  NECK: Supple, JVP elevated to mid ear at 30 degrees  RESP: CTA bilaterally, no wheezing, no crackles, no increased work of breathing   CV: Regular, normal rate, S1 and S2 without m/r/g   ABD: Soft, nontender to palpation, slight distention, no HSM, +BS, no guarding  EXT: stable 1-2+ peripheral edema  NEURO: alert and oriented, no apparent focal deficits  SKIN: no rash on limited exam    Data:  CMP  Recent Labs  Lab 04/07/17  0821 04/06/17  1813 04/05/17  0734 04/03/17  0830   * 130* 128* 126*   POTASSIUM 4.0 3.7 4.0 4.0   CHLORIDE 89* 90* 87* 82*   CO2 31 26 33* 33*   ANIONGAP 10 13 12 11   GLC 98 96 163* 90   * 102* 112* 101*   CR 1.97* 2.08* 2.45* 1.92*   GFRESTIMATED 33* 31* 26* 34*   GFRESTBLACK 40* 38* 31* 41*   LEIDY 9.0 8.2* 9.6 8.6   MAG 2.7* 2.5*  --   --    PROTTOTAL  --  7.1  --   --  "   ALBUMIN  --  3.2*  --   --    BILITOTAL  --  1.0  --   --    ALKPHOS  --  80  --   --    AST  --  23  --   --    ALT  --  19  --   --      CBC  Recent Labs  Lab 04/07/17  0821 04/06/17  1813   WBC 5.3 5.2   RBC 3.91* 3.72*   HGB 13.1* 12.3*   HCT 39.5* 37.4*   * 101*   MCH 33.5* 33.1*   MCHC 33.2 32.9   RDW 18.0* 17.4*    149*     INR  Recent Labs  Lab 04/07/17  0821   INR 1.19*     Arterial Blood GasNo lab results found in last 7 days.    Imaging Studies:  No additional Images    Assessment and Plan  Perez Villalobos is a 76 year old male with history of ICM s/p multiple stents and MI, HFrEF (EF 35% ECHO 2/2017), HTN, HLD, hypothyroidism, and recent onset CKD who presents with weight gain (~10 pounds) and increased LE edema consistent with acute CHF exacerbation.     #) ICM s/p CRT-ICD  #) CAD s/p 13 stents and past MI  #) End stage HFrEF (EF 35% on ECHO 2/2017):  Multiple recent hospitalizations for CHF exacerbations which portends poor prognosis moving forward. Treating with IV lasix with some response. Undergoing right heart catheterization today with swan placement. Will be moved to the ICU after procedure and will begin milrinone therapy and monitor hemodynamics. Planning for VAD education and discussions while in the hospital.  -Right heart cath and swan  -Transfer to ICU post procedure  -40mg IV lasix  -Continuing home hydralazine and isosorbide  -Coreg 6.25mg BID  -Will plan to start milrinone once in the unit     #) JACINTO on CKD:  CKD appears to be related to episode of rhabdomyolysis with baseline around 1.5-2.0. Acute worsening likely related to pre-renal physiology from poor perfusion. Expect this will improve with IV diuretics and optimization of cardiac output. Will continue to monitor, currently stable to slightly improved.  -Trend Cr  -40mg IV lasix     #) Hyponatremia:  Chronic issue with sodium in the low 130's, currently 130. Likely hypervolemic from CHF exacerbation, expect to  improve with diuresis. Stable.  -BMP in AM     #) Hypothyroidism: Stable  Continuing home levothyroxine     #) Chronic normocytic anemia: Stable  Likely from renal disease, will monitor.     #) Elevated lactate:   Improving with diuresis. Will continue to monitor.     FEN: NPO for procedure  Prophylaxis: DVT: subq heparin GI: none  Disposition: transfer to ICU after swan  Code Status: FULL CODE     Patient discussed with attending Dr. Anton Argueta.    Mike Rivas MD  Internal Medicine, PGY-1  Pager [phone]    I have reviewed today's vital signs, notes, medications, labs and imaging.  I have also seen and examined the patient and agree with the findings and plan as outlined above.  Pt with RHC in RIJ.  NO complaints.  VSS with CI 1.3 and PCW 35.  Lungs clear with JVP 22 cm.  Abd is benign.  3+ bipedal edema.  Labs with Cr 2.08 and lactic acid 2.2.  Assessment: Pt with cardiogenic shock.  Plan for diuresis and milrinone therapy.  Will follow renal fn closely and arrhythmias.  Pt seen X2 today for total critical care time 30 min.     Anton Argueta MD, PhD  Professor, Heart Failure and Cardiac Transplantation  [facility]

## 2017-04-07 NOTE — PROGRESS NOTES
"CLINICAL NUTRITION SERVICES - BRIEF NOTE    Received positive nutrition screen per nursing for \"unintentional weight loss of 10 lbs in the past two months,\" however per RD discussion with pt's wife today, wt changes solely d/t fluid shifts. She reports pt's appetite/po have been stable PTA.     RD will follow per LOS protocol or if re-consulted.     Ana Lilia Diaz RD, LD  6C pager: [phone]  "

## 2017-04-07 NOTE — PROGRESS NOTES
"  Care Coordinator Progress Note     Admission Date/Time:  4/6/2017  Attending MD:  Anton Argueta MD  Data  Chart reviewed, discussed with interdisciplinary team.   Patient with history of ICM s/p multiple stents and MI, HFrEF (EF 35% ECHO 2/2017), HTN, HLD, hypothyroidism, and recent onset CKD who presents with weight gain (~10 pounds) and increased LE edema consistent with acute CHF exacerbation.    Concerns with insurance coverage for discharge needs: None.  Current Living Situation: Patient lives with spouse. Pt's wife states he \"mostly independent\" with his cares at home and she helps as needed.  Support System: Supportive and Involved wife  Services Involved: [facility]  Transportation: Family or Friend will provide  Barriers to Discharge: Chronic illness    Assessment  This writer met with pt's wifeAnnia to introduce self and role of RNCC. Per pt's wife no current home care needs at this time.   Pt is actively followed the [facility].   Pt is currently receiving IV lasix inpatient and is on telemetry cardiac monitoring. Plan for heart cath today.   Unable to complete discharge planning at this time.  Plan  Anticipated Discharge Date: TBD  Anticipated Discharge Plan: TBD, pending plan of care.  CC will continue to monitor patient's medical condition and progress towards discharge.  Yoselyn Pereira RN BSN  6C Unit Care Coordinator  Phone number: [phone]  Pager: [phone]          "

## 2017-04-07 NOTE — PROGRESS NOTES
Patient monitored in cath lab holding room following Right heart Catheterization.  Ullin-bertin secured at 57cm, pressure bag placed. Sheath insertion site remains stable. Patient is alert, oriented, appropriate. Repositions appropriately. Ambulates short distances with SBAx1. Tolerating food and liquids PO without nausea. Vital signs remain stable. Once ICU bed is available, patient is transferred with RN to 4E.  Report given to JAIRO Purdy on 4E.

## 2017-04-07 NOTE — PLAN OF CARE
Problem: Goal Outcome Summary  Goal: Goal Outcome Summary  Pt A&Ox4, VSS, Vpaced 70.  Pt NPO at midnight for RHCath tomorrow.  Pt having trouble sleeping.  3+ bilateral LE edema, given lasix for excess fluid.  Continue to monitor and contact Cards 2 with concerns.

## 2017-04-07 NOTE — PROGRESS NOTES
CLINICAL NUTRITION SERVICES    Reason for Assessment:  Low-sodium (2 g/day) nutrition education    Diet History:  Pt not in room at RD attempt to visit, however pt's wife available and does the cooking at home. She reports prior history of herself and pt receiving low-sodium nutrition education, but she has further questions regarding how to cook low sodium recipes at home.     Nutrition Diagnosis:  Food- and nutrition-related knowledge deficit r/t previous low-sodium diet AEB pt's wife reports previous formal low-sodium nutrition education, but further questions regarding recipe modifications.    Nutrition Prescription/Recs:  Continue low-sodium diet.      Interventions:  Nutrition Education  1.  Provided verbal instruction on a heart-healthy diet with emphasis on low-sodium meal planning.   2.  Provided the following handouts:  Low-Sodium Foods and Drinks, Seasoning Your Foods Without Adding Salt. Pt's wife declined other handouts today as she already has at home.    Goals:    Pt/wife will verbalize at least five high sodium foods and the importance of avoiding added salt to foods for cooking or seasoning foods.     Follow-up:   Patient to ask any further nutrition-related questions before discharge.  In addition, pt may request outpatient RD appointment.    Ana Lilia Diaz RD, LD  6C pager: [phone]

## 2017-04-07 NOTE — PROCEDURES
PRELIMINARY CARDIAC CATH REPORT:     PROCEDURES PERFORMED:   1. Right heart catheterization.      PHYSICIANS:  1. Attending Interventional Cardiology Staff: Aaron Antony MD  2. Interventional Cardiology Fellow: Ryan Barroso DO        INDICATION:  Perez Villalobos is a 76 year old male with advanced heart failure, hospitalized with concern for low output state. RHC to guide advanced heart failure therapies.     DESCRIPTION:  1. Consent obtained with discussion of risks.  All questions were answered.  2. Sterile prep and procedure.  3. Venous Location: right internal jugular vein  4. Access: Local anesthetic with lidocaine.  A standard (18 g) needle with ultrasound guidance was used to establish access using a modified Seldinger technique.  5. Venous Sheath: 7Fr locking sheath  6. Catheters: 7Fr PA catheter,   7. Fluoroscopy time of 0.7 min.  8. Estimated blood loss of <5 mL.  9. See below for procedure details.    MEDICATIONS:  1. None      RIGHT HEART CATHETERIZATION:  BSA 1.81  1. HR 60 bpm  2. /72/105 mmHg  3. RA 18/19/16   4. RV 68/19  5. PA 68/29/45  6. PCW 32/51/35    7. PA sat 47.6 %   8. PCW Oximeter sat 94 %  9. Hgb 11.8 g/dL   10. Lauren CO 2.4   11. Lauren CI 1.3   12. TD CO 2.0   13. TD CI 1.1   14. PVR 4.2       COMPLICATIONS:  1. None    SUMMARY:   1. Advanced chronic heart failure.  2. Increased right-sided and increased left-sided filling pressures.  3. Moderate secondary pulmonary hypertension with a mean PAP of 45 mmHg.  4. Decreased cardiac output of 2.4 L/min and cardiac index of 1.3 based on Lauren estimation.  5. Sheath and PA catheter was secured in place at 55 cm.     PLAN:   1. Return to the primary inpatient team for further evaluation and management.    The attending cardiologist was present for the entire procedure.        See CVIS report for final draft.    Ryan Barroso DO, [facility]  Interventional Cardiology Fellow  [phone]        Staff Cardiologist: I supervised the cardiology  fellow and reviewed the hemodynamic findings with the fellow at the completion of the procedure.  I agree with the documentation above.    Aaron Antony MD

## 2017-04-08 NOTE — PROGRESS NOTES
" 04/08/17 1500   Quick Adds   Type of Visit Initial Occupational Therapy Evaluation   Living Environment   Lives With grandchild(chavez);spouse   Living Arrangements house   Home Accessibility bed and bath are not on the first floor;stairs to enter home;stairs within home   Number of Stairs to Enter Home 1   Number of Stairs Within Home 14  (9 up 5 down split level)   Stair Railings at Home inside, present at both sides   Transportation Available car;family or friend will provide   Self-Care   Usual Activity Tolerance moderate   Current Activity Tolerance fair   Regular Exercise yes   Activity/Exercise Type other (see comments)  (outpatient CR)   Equipment Currently Used at Home bath bench;cane, straight;walker, standard   Functional Level Prior   Ambulation 1-->assistive equipment   Transferring 0-->independent   Toileting 0-->independent   Bathing 1-->assistive equipment   Dressing 2-->assistive person  (at times of exacerbation)   Communication 0-->understands/communicates without difficulty   Cognition 0 - no cognition issues reported   Fall history within last six months yes   Number of times patient has fallen within last six months 3   Prior Functional Level Comment Pt recieves assist as needed from SO and great grandamparo   General Information   Referring Physician Mike Ch   Patient/Family Goals Statement return to PLOF and \"get my heart better\"   Additional Occupational Profile Info/Pertinent History of Current Problem Perez Villalobos is a 76 year old male with history of ICM s/p multiple stents and MI, HFrEF (EF 35% ECHO 2/2017), HTN, HLD, hypothyroidism, and recent onset CKD who presents with weight gain (~10 pounds) and increased LE edema consistent with acute CHF exacerbation.    Precautions/Limitations other (see comments)  (swan line)   General Observations pt very motivated in therapy.    Cognitive Status Examination   Orientation orientation to person, place and time   Level of Consciousness " alert   Able to Follow Commands WNL/WFL   Cognitive Comment no concerns at this time, will assess as necessary.    Visual Perception   Visual Perception No deficits were identified;Wears glasses   Sensory Examination   Sensory Quick Adds No deficits were identified   Sensory Comments B UE's WFL   Range of Motion (ROM)   ROM Quick Adds No deficits were identified;Other (describe)   ROM Comment B UE/LE WFL   Strength   Manual Muscle Testing Quick Adds Other   Strength Comments B UE's grossly 4/5   Hand Strength   Hand Strength Comments no deficits   Coordination   Coordination Comments no deficits   Transfer Skill: Sit to Stand   Level of [place]: Sit/Stand contact guard   Lower Body Dressing   Level of [place]: Dress Lower Body (educatoin required for modification)   Instrumental Activities of Daily Living (IADL)   Previous Responsibilities shopping;yardwork;finances   Activities of Daily Living Analysis   Impairments Contributing to Impaired Activities of Daily Living balance impaired;strength decreased  (decreased activity tolerance. )   General Therapy Interventions   Planned Therapy Interventions ADL retraining;IADL retraining;bed mobility training;strengthening;transfer training;home program guidelines;progressive activity/exercise;risk factor education   Clinical Impression   Criteria for Skilled Therapeutic Interventions Met yes, treatment indicated   OT Diagnosis decreased ADL I   Assessment of Occupational Performance 3-5 Performance Deficits   Identified Performance Deficits dressing, bathing, leisure, shopping, G/H   Clinical Decision Making (Complexity) Low complexity   Therapy Frequency 5 times/wk   Predicted Duration of Therapy Intervention (days/wks) 2 weeks   Anticipated Discharge Disposition Home with Home Therapy;Home with Outpatient Therapy   Risks and Benefits of Treatment have been explained. Yes   Patient, Family & other staff in agreement with plan of care Yes   Clinical Impression  "Comments Pt presents to OT with generla deconditioning and decreased activity tolerance leading to decreased ADL I.    [facility] AM-PAC TM \"6 Clicks\"   2016, Trustees of [facility], under license to [employer].  All rights reserved.   6 Clicks Short Forms Daily Activity Inpatient Short Form   [facility] AM-PAC  \"6 Clicks\" Daily Activity Inpatient Short Form   1. Putting on and taking off regular lower body clothing? 3 - A Little   2. Bathing (including washing, rinsing, drying)? 3 - A Little   3. Toileting, which includes using toilet, bedpan or urinal? 3 - A Little   4. Putting on and taking off regular upper body clothing? 4 - None   5. Taking care of personal grooming such as brushing teeth? 4 - None   6. Eating meals? 4 - None   Daily Activity Raw Score (Score out of 24.Lower scores equate to lower levels of function) 21   Total Evaluation Time   Total Evaluation Time (Minutes) 4     "

## 2017-04-08 NOTE — PROGRESS NOTES
"Vascular Access Services Notes:    PICC is placed on-hold as requested by the patient. Patient was experiencing \"cramping pain\" of the right arm & would like pain resolved before PICC insertion. 4E RN (Rajwinder) was aware & will notify VAS if pt's ready for PICC.          Cuate Lester, BSN, RN [facility]      "

## 2017-04-08 NOTE — PROGRESS NOTES
"[facility]  Cardiology II Service  Daily Note  4/8/2017    Interval History:   No acute events overnight. Tolerated right heart catheter well yesterday. Hemodynamics have improved after initiating milrinone with CI initially reaching >2.0, however throughout the morning this decreased to 1.7. Milrinone was increased. Not having great response to intermittent lasix dosing, starting drip. Was having some pain in his neck and right arm, this has improved.    Objective:    CVP: 19  PAP: 55/30  PCWP: 30  SvO2: 54  CO: 3.1  CI: 1.7  SVR: 1782  PVR: 215    Intake/Output Summary (Last 24 hours) at 04/08/17 1219  Last data filed at 04/08/17 1000   Gross per 24 hour   Intake           861.35 ml   Output              825 ml   Net            36.35 ml     Vitals:    04/07/17 0200 04/07/17 1800 04/08/17 0100   Weight: 68.1 kg (150 lb 1.6 oz) 69.6 kg (153 lb 7 oz) 68.6 kg (151 lb 3.8 oz)     Pertinent Medications:  Drips:   Milrinone 0.125 --> 0.25 mcg/Kg/min  Lasix 5 mg/hr     Other Meds:  Carvedilol 6.25mg BID  Hydralazine 75mg/50mg/75mg  Isosorbide mononitrate 60mg qhs  Lasix IV 60mg bolus prior to drip  Clopidogrel 75mg QD  Potassium chloride 20 mEq BID    Examination:  /72  Temp 97.4  F (36.3  C) (Oral)  Resp 16  Ht 1.727 m (5' 8\")  Wt 68.6 kg (151 lb 3.8 oz)  SpO2 98%  BMI 23 kg/m2    GEN: A & O, healthy appearing male, sitting comfortably in chair, NAD, cooperative and conversational   HEENT: no scleral icterus, mucus membranes moist  NECK: Supple, JVP stably elevated  RESP: CTA bilaterally, no wheezing, no crackles, no increased work of breathing   CV: Regular, normal rate, S1 and S2 without m/r/g   ABD: Soft, nontender to palpation, slight distention similar to previous, +BS, no guarding  EXT: stable 1-2+ peripheral edema  NEURO: alert and oriented, no apparent focal deficits  SKIN: no rash on limited exam, bruising stable    Data:  CMP    Recent Labs  Lab 04/08/17  0335 " 04/07/17  0821 04/06/17  1813 04/05/17  0734    130* 130* 128*   POTASSIUM 3.4 4.0 3.7 4.0   CHLORIDE 96 89* 90* 87*   CO2 29 31 26 33*   ANIONGAP 12 10 13 12   * 98 96 163*   * 102* 102* 112*   CR 1.86* 1.97* 2.08* 2.45*   GFRESTIMATED 35* 33* 31* 26*   GFRESTBLACK 43* 40* 38* 31*   LEIDY 8.1* 9.0 8.2* 9.6   MAG 2.4* 2.7* 2.5*  --    PROTTOTAL  --   --  7.1  --    ALBUMIN  --   --  3.2*  --    BILITOTAL  --   --  1.0  --    ALKPHOS  --   --  80  --    AST  --   --  23  --    ALT  --   --  19  --      CBC    Recent Labs  Lab 04/07/17  0821 04/06/17  1813   WBC 5.3 5.2   RBC 3.91* 3.72*   HGB 13.1* 12.3*   HCT 39.5* 37.4*   * 101*   MCH 33.5* 33.1*   MCHC 33.2 32.9   RDW 18.0* 17.4*    149*     INR    Recent Labs  Lab 04/08/17  0335 04/07/17  0821   INR 1.19* 1.19*     Arterial Blood Gas    Recent Labs  Lab 04/08/17  0755 04/08/17  0335 04/07/17  2349 04/07/17  1942   O2PER 2L 5L 5L 21.0       Imaging Studies:  CXR 4/8:  Impression:  1. Right IJ [place] catheter tip projects over the lateral right  pulmonary artery.  2. Stable small left pleural effusion with overlying atelectasis  versus consolidation.    Assessment and Plan  Perez Villalobos is a 76 year old male with history of ICM s/p multiple stents and MI, HFrEF (EF 35% ECHO 2/2017), HTN, HLD, hypothyroidism, and recent onset CKD who presents with weight gain (~10 pounds) and increased LE edema consistent with acute CHF exacerbation. Now day 1 post swan placement, have started milrinone with increase in CI. Diuresing with IV lasix.     #) ICM s/p CRT-ICD  #) CAD s/p 13 stents and past MI  #) End stage HFrEF (EF 35% on ECHO 2/2017):  Multiple recent hospitalizations for CHF exacerbations which portends poor prognosis moving forward. Had swan catheter placed 4/7, hemodynamics show elevated wedge and SVR with low CI/CO. He has been started on milrinone drip, increased today to 0.25. His CI is responding to milrinone (likely  combination of ionotropic and afterload affects). Checking hemodynamics q4h. No having vigorous response to intermittent lasix, starting drip at 5 mg/hr, goal net negative 1-2L. Will add cholothiazide if needed. Having ongoing discussions with Walker and his wife about LVAD support, they would like to have more information, plan for education at start of next week.  -Hemodynamic monitoring q4h  -Milrinone 0.25 mcg/Kg/min  -Lasix gtt 5 mg/hr  -Continuing home hydralazine 75 mg/50 mg/75 mg  -Decreased home coreg to 6.25 mg BID  -Isosorbide mononitrate 60 mg qhs     #) JACINTO on CKD:  CKD appears to be related to episode of rhabdomyolysis with baseline around 1.5-2.0. Acute worsening likely related to pre-renal physiology from poor perfusion. Has gradually trended down since admission, currently within recent baseline, will continue to monitor.  -Trend Cr  -Lasix gtt     #) Hyponatremia:  Chronic issue with sodium in the low 130's, currently 137. Likely hypervolemic from CHF exacerbation, expect to improve with diuresis. Continuing to monitor.  -BMP in AM/PM     #) Hypothyroidism: Stable  Continuing home levothyroxine     #) Chronic normocytic anemia: Stable  Likely from renal disease, will monitor.     #) Elevated lactate: Resolved     FEN: 2L fluid restriction, 2g sodium diet  Prophylaxis: DVT: subq heparin GI: none  Disposition: requires ongoing ICU cares for hemodynamic monitoring  Code Status: FULL CODE     Patient discussed with attending Dr. Anton Argueta.    Mike Rivas MD  Internal Medicine, PGY-1  Pager [phone]    I have reviewed today's vital signs, notes, medications, labs and imaging.  I have also seen and examined the patient and agree with the findings and plan as outlined above.  Pt with spouse at bedside.  VSS with CI 1.8-2.2 on milrinone 0.125 mcg/kg/min.  Lungs with bibasilar rales.  S1 and S2 with soft HSM.  2+ bipedal edema.  Labs with Cr 1.89 and BUN 89.  Assessment: Pt with cardiogenic shock  now on milrinone.  Would advance milrinone dose and continue diuresis.  Continue LVAD education and workup.  Pt seen X3 with total critical care time 30 min.     Anton Argueta MD, PhD  Professor, Heart Failure and Cardiac Transplantation  [facility]

## 2017-04-08 NOTE — PLAN OF CARE
Problem: Goal Outcome Summary  Goal: Goal Outcome Summary  OT: REC home with outpatient CR, pt ambulating in hallway CGA for 300 feet on RA with no need for sitting rest break. Pt with mild LOB throughout and would benefit from PT.

## 2017-04-08 NOTE — PLAN OF CARE
Problem: Cardiac Output Decreased (Adult)  Goal: Adequate Cardiac Output/Effective Tissue Perfusion  Patient will demonstrate the desired outcomes by discharge/transition of care.  Outcome: No Change  D:CHF exacerbation, SWAN placed and Milirnone gtt started to increased CO.  I/A: HR: 60-70's. 100% v-paced. BP WDL. SWAN intact. CO/CI improving with Milrinone. O2 via NC applied overnight for dipping sats while sleeping. Adequate UO, minimal response to lasix. Wt down today. Pt c/o of some discomfort in neck, dilaudid x1 effective. No acute events overnight.   P: Q4hr HOWARD. Continue current POC. Monitor cardiovascular status.

## 2017-04-08 NOTE — PLAN OF CARE
Problem: Goal Outcome Summary  Goal: Goal Outcome Summary  Outcome: No Change  D/I/A: Pt transferred from Cath lab around 1745  Neuro: A & Ox4. Calls appropriately. Standby assist.  CV: V-paced 100%. HR 65-70. BP stable. [place] in RIJ. CVP 24, PA pressure 71/39.   Pulm: 02 sats % on RA  GI: 2g Na, 2L fluid restriction. Dinner ordered.  : Voids with urinal, 40mg of Lasix given.  Gtt: Milrinone gtt started @ 0.125mcg/kg/min  P: Continue to monitor Cardiac status, notify MDs of any changes.

## 2017-04-08 NOTE — PLAN OF CARE
Problem: Goal Outcome Summary  Goal: Goal Outcome Summary  Outcome: No Change  D/I/A:   Neuro: A & Ox4. Calling appropriately. Up to chair and ambulating in room throughout the day. Ambulated in [place] x2. Pt complained of R upper extremity cramping, refused PICC placement in R arm due to pain, MDs aware.  CV: HR 60s-70s 100% V-paced. BP stable. [place] in RIJ. q4hr FICKs. Latest PA pressure at 1600 56/25. CVP 16. MDs wedged at bedside with PAWP of 30. Milrinone increased to 0.25mcg/kg/min.   Pulm: RA 02 sats 95%. RR WNL.   GI: 2g Na, 2L fluid restriction.   : Lasix gtt started due to low UO. Output remains low. Voiding in urinal.   Gtt: Lasix at 5mg/hr, Milrinone at 0.25mcg/kg/min  P: Continue to monitor cardiac output and urine output and notify MDs of any concerns.

## 2017-04-09 NOTE — PROGRESS NOTES
"[facility]  Cardiology II Service  Daily Note  4/9/2017    Interval History:   No acute events overnight. CI remained in the 1.7 range initially after increasing milrinone, however by this morning has improved to 2.1. Urine output increasing with lasix drip (increased to 10 mg/hr overnight). Received 2 doses of IV hydralazine overnight, tolerated well. Feels his lower extremity swelling has improved. Walker and his wife are very interested in learning more about the VADs    Objective:    CVP: 16  PAP: 60/28  PCWP: 32  SvO2: 60  CO: 3.9  CI: 2.1  SVR: 1540  PVR: 219    Intake/Output Summary (Last 24 hours) at 04/09/17 0659  Last data filed at 04/09/17 0600   Gross per 24 hour   Intake          1656.85 ml   Output             2860 ml   Net         -1203.15 ml     Vitals:    04/07/17 1800 04/08/17 0100 04/09/17 0000   Weight: 69.6 kg (153 lb 7 oz) 68.6 kg (151 lb 3.8 oz) 68.9 kg (151 lb 14.4 oz)     Pertinent Medications:  Drips:   Milrinone 0.25 mcg/Kg/min  Lasix 5 --> 10 mg/hr     Other Meds:  Carvedilol 6.25mg BID  Hydralazine 75mg/50mg/75mg  Isosorbide mononitrate 60mg qhs  Clopidogrel 75mg QD  Potassium chloride 20 mEq BID, extra 60 mEq IV    Examination:  /65  Temp 98.4  F (36.9  C) (Oral)  Resp 20  Ht 1.727 m (5' 8\")  Wt 68.9 kg (151 lb 14.4 oz)  SpO2 98%  BMI 23.1 kg/m2    GEN: A & O, healthy appearing male, sitting comfortably in chair, NAD, cooperative and conversational   HEENT: no scleral icterus, mucus membranes moist  NECK: Supple, swan catheter in place in right IJ  RESP: CTA bilaterally, no wheezing, no crackles, no increased work of breathing   CV: Regular, normal rate, S1 and S2 without m/r/g   ABD: Soft, nontender to palpation, slight distention similar to previous, +BS, no guarding  EXT: slightly improved 1+ bilateral LE edema  NEURO: alert and oriented, no apparent focal deficits  SKIN: no rash on limited exam, bruising stable    Data:  CMP    Recent " Labs  Lab 04/09/17  0355 04/08/17  1654 04/08/17  0335 04/07/17  0821 04/06/17  1813    134 137 130* 130*   POTASSIUM 3.0* 3.8 3.4 4.0 3.7   CHLORIDE 95 93* 96 89* 90*   CO2 31 31 29 31 26   ANIONGAP 10 10 12 10 13   * 163* 102* 98 96   BUN 80* 89* 100* 102* 102*   CR 1.65* 1.89* 1.86* 1.97* 2.08*   GFRESTIMATED 41* 35* 35* 33* 31*   GFRESTBLACK 49* 42* 43* 40* 38*   LEIDY 8.1* 8.7 8.1* 9.0 8.2*   MAG 2.3  --  2.4* 2.7* 2.5*   PROTTOTAL  --   --   --   --  7.1   ALBUMIN  --   --   --   --  3.2*   BILITOTAL  --   --   --   --  1.0   ALKPHOS  --   --   --   --  80   AST  --   --   --   --  23   ALT  --   --   --   --  19     CBC    Recent Labs  Lab 04/09/17  0355 04/07/17  0821 04/06/17  1813   WBC  --  5.3 5.2   RBC  --  3.91* 3.72*   HGB 11.3* 13.1* 12.3*   HCT  --  39.5* 37.4*   MCV  --  101* 101*   MCH  --  33.5* 33.1*   MCHC  --  33.2 32.9   RDW  --  18.0* 17.4*   PLT  --  160 149*     INR    Recent Labs  Lab 04/09/17  0355 04/08/17  0335 04/07/17  0821   INR 1.14 1.19* 1.19*     Arterial Blood Gas    Recent Labs  Lab 04/09/17  0355 04/08/17  2330 04/08/17  1958 04/08/17  1558   O2PER 4L 21 21.0 21.0       Imaging Studies:  CXR 4/8:  Impression:  1. Right IJ [place] catheter tip projects over the lateral right  pulmonary artery.  2. Stable small left pleural effusion with overlying atelectasis  versus consolidation.    Assessment and Plan  Perez Villalobos is a 76 year old male with history of ICM s/p multiple stents and MI, HFrEF (EF 35% ECHO 2/2017), HTN, HLD, hypothyroidism, and recent onset CKD who presents with weight gain (~10 pounds) and increased LE edema consistent with acute CHF exacerbation. Now day 2 post swan placement, have started milrinone with increase in CI. Diuresing with IV lasix. Ongoing discussions about VAD therapy.     #) ICM s/p CRT-ICD  #) CAD s/p 13 stents and past MI  #) End stage HFrEF (EF 35% on ECHO 2/2017):  Multiple recent hospitalizations for CHF exacerbations which  portends poor prognosis moving forward. Had swan catheter placed 4/7, hemodynamics show elevated wedge and SVR with low CI/CO. He has been started on milrinone drip, increased to 0.25 on 4/8 with good response hemodynamically (likely combination of ionotropic and afterload affects). Checking hemodynamics q4h. If his index were to drop below 2.0 would increase milrinone to 0.30; goal is to increase milrinone as able while monitoring for arrhythmia. Was not having vigorous response to intermittent lasix, currently on drip 10 mg/hr, goal net negative 1-2L. Having ongoing discussions with Walker and his wife about LVAD support, they would like to have more information, plan for education at start of next week.  -Hemodynamic monitoring q4h  -Milrinone 0.25 mcg/Kg/min would increase to 0.30 mcg/Kg/min if CI drops below 2.0  -Lasix gtt 10 mg/hr  -Continuing home hydralazine 75 mg/50 mg/75 mg  -Decreased home coreg to 6.25 mg BID  -Isosorbide mononitrate 60 mg qhs     #) JACINTO on CKD:  CKD appears to be related to episode of rhabdomyolysis with baseline around 1.5-2.0. Acute worsening likely related to pre-renal physiology from poor perfusion. Has gradually trended down since admission, currently within recent baseline, will continue to monitor.  -Trend Cr  -Lasix gtt     #) Hyponatremia:  Chronic issue with sodium in the low 130's, currently 136. Likely hypervolemic from CHF exacerbation, has improved with diuresis. Continuing to monitor.  -BMP in AM/PM    #) Hypokalemia:  Developed after starting high dose lasix therapy. Replacing orally and IV, being judicious given resolving kidney injury.  -60 mEq IV 4/9     #) Hypothyroidism: Stable  Continuing home levothyroxine     #) Chronic normocytic anemia: Stable  Likely from renal disease, will monitor.     #) Elevated lactate: Resolved     FEN: 2L fluid restriction, 2g sodium diet  Prophylaxis: DVT: subq heparin GI: none  Disposition: requires ongoing ICU cares for hemodynamic  monitoring  Code Status: FULL CODE     Patient discussed with attending Dr. Anton Argueta.    Mike Rivas MD  Internal Medicine, PGY-1  Pager [phone]    I have reviewed today's vital signs, notes, medications, labs and imaging.  I have also seen and examined the patient and agree with the findings and plan as outlined above.  Pt sitting up in chair eating.  VSS with /65, CVP 13 and CI 2.2 on lasix gtt 10 mg/hr.  PCW 25.  Lungs with right basilar rales.  S1 and S2 with S3 and 1+ bipedal edema.  Labs with Cr 1.65.  Assessment: Pt with cardiogenic shock on milrinone.  Rec increasing milrinone and further diuresis.  Cont LVAD workup. Pt seen X2 with total critical care time 30 min.     Anton Argueta MD, PhD  Professor, Heart Failure and Cardiac Transplantation  [facility]

## 2017-04-09 NOTE — PLAN OF CARE
Problem: Cardiac Output Decreased (Adult)  Goal: Adequate Cardiac Output/Effective Tissue Perfusion  Patient will demonstrate the desired outcomes by discharge/transition of care.   Outcome: No Change  D:CHF exacerbation, SWAN in place. Milrinone  & lasix gtt.   I/A: HR: 60-70's. 100% v-paced. MAP's 's, IV hydralazine x2, no effect. Lasix 40 mg x1 bolus, then gtt increased to 10 mg. Increased UO overnight. SWAN intact. O2 via NC applied overnight for dipping sats while sleeping, pt wears BiPAP at home, refused BiPAP overnight. Pt c/o of a heqadache, tylenol x1 effective. No acute events overnight.   P: Q4hr HOWARD. Continue current POC. Monitor cardiovascular status.

## 2017-04-09 NOTE — PLAN OF CARE
Problem: Goal Outcome Summary  Goal: Goal Outcome Summary     A/Ox4, calls appropriately, up w/ standby assist. Denies pain. 100% vpaced, MAP 90s, room air. CO 3.7 CI 2.0 SVR 1772 CVP 17. Urine output 130-140 ml/hr. Milrinone increased to 0.3 mcg/kg/min, lasix bolus 80mg x1 and gtt increased to 15 ml/hr. K+ replaced. Continue to monitor cardiac output and diurese.

## 2017-04-10 NOTE — PLAN OF CARE
Pt VSS throughout shift. SVO2 64, 54, 55. CI 2.6 and 2.0. Milrinone gtt continues, lasix gtt continues, one lasix bolus administered. Goal net -500cc q 24 hrs. VAD coordinator met with pt and wife. Social work to meet with pt and wife tomorrow. Continuing to monitor.

## 2017-04-10 NOTE — PLAN OF CARE
Problem: Goal Outcome Summary  Goal: Goal Outcome Summary  OT 4E: Cancel - pt busy with other healthcare providers upon attempt. Will reschedule per POC.

## 2017-04-10 NOTE — CONSULTS
CVTS Consultation Note:        History of Present Illness      Perez Villalobos is a 76 year old male with history of ICM s/p multiple stents and MI, HFrEF (EF 35% ECHO 2/2017), HTN, HLD, hypothyroidism, and recent onset CKD who presents with weight gain (~10 pounds) and increased LE edema consistent with acute CHF exacerbation. History is obtained from patient and EMR.     Mr. Villalobos reports his CHF was diagnosed in February 2016. Since then, he has had multiple exacerbations requiring hospitalization likely 8. Initially, his exacerbations were characterized by SOB but more recently they are characterized by increased weight, LE edema, and weakness. Most recently hospitalized last week. He will usually be in the hospital for a few days, treated with diuretics, and discharges home. There are usually no clear inciting triggers for these events. He has been followed in the [facility] since October and recently his diuretic doses have been adjusted, however he continued to take bumex at least 1mg BID. No recent changes in his diet or increased salt, follows low sodium diet. Denies recent fevers, chills, chest pain/pressure, SOB, abdominal pain, nausea, vomiting, diarrhea, melena, dysuria, or flank pain. Has not had discussion about VAD support. Underwent heart catheter on Monday which showed low CI/CO and elevated SVR. Presented to [facility] as direct admission for optimal CHF management.     Also in the past few months has developed CKD with baseline creatinine 1.5-2.0. Etiology thought to be related to ATN from rhabdomyolysis from statin. Creatinine and electrolytes have varied quite a bit since this time. He does continue to make urine.          Past Medical History          Patient Active Problem List   Diagnosis     Pain in joint, shoulder region     Coronary artery disease involving native coronary artery of native heart without angina pectoris     Muscle weakness (generalized)     Idiopathic progressive polyneuropathy      Vitamin B12 deficiency without anemia     NSTEMI (non-ST elevated myocardial infarction) (H)     Ischemic cardiomyopathy     Rhabdomyolysis due to statin therapy     Anemia     BPH (benign prostatic hypertrophy)     PAD (peripheral artery disease) (H)     Biventricular heart failure (H)     Leg weakness, bilateral     Mixed hyperlipidemia     Benign essential HTN     Chronic systolic heart failure (H)     Bradycardia     Severe hypothyroidism     ACS (acute coronary syndrome) (H)     DELONTE (obstructive sleep apnea)     CKD (chronic kidney disease)     S/P ICD (internal cardiac defibrillator) procedure     Atrial flutter (H)     CKD (chronic kidney disease) stage 3, GFR 30-59 ml/min     Cardiorenal syndrome     Iron deficiency anemia, unspecified     Anemia of chronic renal failure     Chronic kidney disease, stage III (moderate)     Hyponatremia     Hypokalemia     CHF (congestive heart failure) (H)     Dyspnea     CHF exacerbation (H)             Past Surgical History       Past Surgical History          Past Surgical History:   Procedure Laterality Date     APPENDECTOMY         CHOLECYSTECTOMY         ENDARTERECTOMY CAROTID Left 6/11/10     IMPLANT AUTOMATIC IMPLANTABLE CARDIOVERTER DEFIBRILLATOR   11/16/16     STENT, CORONARY, S660 15/18   Nov 2000     PTCA with stent placement of CFX     STENT, CORONARY, S660 15/18   Feb 2001     PTCA with stent placement of CFX RCA      STENT, CORONARY, S660 15/18   April 2007     stent placed in LAD     tonsillectomy and adenoidectomy                     Medications         No current facility-administered medications on file prior to encounter.   Current Outpatient Prescriptions on File Prior to Encounter:  calcitRIOL (ROCALTROL) 0.25 MCG capsule Take 0.25 mcg by mouth daily   hydrALAZINE (APRESOLINE) 50 MG tablet 1-1/2 tabs am, 1 tab at 3 pm and 1-1/2 tabs bedtime.   potassium chloride (KLOR-CON) 20 MEQ Packet 20 mEq by Oral or Feeding Tube route daily (Patient taking  "differently: Take 20 mEq by mouth daily )   aspirin EC 81 MG EC tablet Take 81 mg by mouth daily   levothyroxine (SYNTHROID/LEVOTHROID) 150 MCG tablet Take 150 mcg by mouth daily   carvedilol (COREG) 3.125 MG tablet Take 3 tablets (9.375 mg) by mouth 2 times daily (with meals)   Cyanocobalamin (B-12) 1000 MCG TBCR Take 1,000 mcg by mouth daily   multivitamin, therapeutic with minerals (MULTI-VITAMIN) TABS Take 1 tablet by mouth daily   bumetanide (BUMEX) 1 MG tablet 2mg am and 1 mg pm   isosorbide mononitrate (IMDUR) 60 MG 24 hr tablet Take 1 tablet (60 mg) by mouth At Bedtime At hs   Saline (SODIUM CHLORIDE) 0.65 % SOLN Spray in nostril as needed   KRILL OIL PO Take 1 capsule by mouth daily    clopidogrel (PLAVIX) 75 MG tablet Take 75 mg by mouth daily              Allergies            Allergies   Allergen Reactions     Lisinopril Other (See Comments)       Angioedema     Augmentin Other (See Comments)       Per pt, \"froze him, affected his legs\"     Crestor [Rosuvastatin] Other (See Comments)       rhabdomyolysis              Social History       Social History    Social History            Social History     Marital status:        Spouse name: N/A     Number of children: N/A     Years of education: N/A          Occupational History     Not on file.              Social History Main Topics     Smoking status: Former Smoker       Packs/day: 1.50       Years: 20.00       Types: Cigarettes       Quit date: 1/1/1980     Smokeless tobacco: Never Used     Alcohol use 0.0 oz/week        0 Standard drinks or equivalent per week          Comment: occassionally     Drug use: No     Sexual activity: Not on file            Other Topics Concern     Caffeine Concern No       decaf coffee     Sleep Concern No     Stress Concern No     Weight Concern No     Special Diet Yes       low fat, low sodium     Exercise Yes       everyday      Social History Narrative      Former smoker, quit in 1980's. Occasional EtOH use. No " "illicit drug use. Retired . Lives in [place] with his wife.          Family History       Family History          Family History   Problem Relation Age of Onset     Unknown/Adopted Mother       Unknown/Adopted Father                   Review of Systems      Review Of Systems negative other than listed above          Vitals and Exam      Physical exam:  /69 (BP Location: Right arm)  Temp 97.2  F (36.2  C) (Oral)  Ht 1.727 m (5' 8\")  Wt 70.2 kg (154 lb 12.8 oz)  SpO2 98%  BMI 23.54 kg/m2      Wt Readings from Last 2 Encounters:   04/06/17 70.2 kg (154 lb 12.8 oz)   04/05/17 69.6 kg (153 lb 6.4 oz)         Intake/Output Summary (Last 24 hours) at 04/06/17 1844  Last data filed at 04/06/17 1600    Gross per 24 hour   Intake                0 ml   Output              100 ml   Net             -100 ml         Physical Exam:   General: Pleasant male, appears comfortable in NAD  HEENT: No Scleral icterus. NCAT, MMM, EOMI. JVP elevated to above jaw at 40 degrees  Cardiac: Normal rate, regular rhythm. No m/r/g. Normal S1, S2.  Pulm: CTAB, no wheezes, or crackles. Normal respiratory effort  Abd: Soft, distended, non tender abdomen. No hepatosplenomegaly.  Skin: No jaundice, No rash, diffuse ecchymoses   Extremities: 2+ bilateral pitting edema  Joints: No inflammation noted on joints  Neuro: A&Ox3, no focal deficits  Psych: normal mood, full affect          Labs   CBC  Recent Labs  Lab 04/06/17  1813   WBC 5.2   RBC 3.72*   HGB 12.3*   HCT 37.4*   *   MCH 33.1*   MCHC 32.9   RDW 17.4*   *         BMP  Recent Labs  Lab 04/05/17  0734 04/03/17  0830 03/31/17  1021   * 126* 129*   POTASSIUM 4.0 4.0 4.2   CHLORIDE 87* 82* 84*   CO2 33* 33* 34*   ANIONGAP 12 11 11   * 90 113*   * 101* 103*   CR 2.45* 1.92* 1.79*   GFRESTIMATED 26* 34* 37*   GFRESTBLACK 31* 41* 45*   LEIDY 9.6 8.6 8.8          INRNo lab results found in last 7 days.     Liver panelNo lab results found in " last 7 days.     Urinalysis        Recent Labs   Lab Test  01/16/17   1605    10/01/15   1657   COLOR  Yellow   < >  Yellow   APPEARANCE  Clear   < >  Clear   URINEGLC  Negative   < >  Negative   URINEBILI  Negative   < >  Negative   URINEKETONE  Negative   < >  Negative   SG  1.008   < >  <=1.005   UBLD  Negative   < >  Large*   URINEPH  6.5   < >  6.0   PROTEIN  10*   < >  30*   UROBILINOGEN   --    --   0.2   NITRITE  Negative   < >  Negative   LEUKEST  Negative   < >  Trace*   RBCU  <1   < >  2-5*   WBCU  <1   < >  O - 2    < > = values in this interval not displayed.       ASSESSMENT & PLAN :    Perez Villalobos is a 76 year old male with history of ICM s/p multiple stents and MI, HFrEF (EF 35% ECHO 2/2017), HTN, HLD, hypothyroidism, and recent onset CKD who presents with weight gain (~10 pounds) and increased LE edema consistent with acute CHF exacerbation.    Patient had multiple admissions to the hospital for worsening CHF. He appears failed the inotropic drips and a LVAD with DT seems to be indicated at this stage pending social issues are resolved. Regardless with approach to choose he is a very high risk candidate for a LVAD therapy at this stage.

## 2017-04-10 NOTE — PROGRESS NOTES
"Met with patient, wife to present the HeartMate2, HeartMate 3, and HVAD VAD as possible treatment option.     Discussed patient and caregiver responsibilities before and after VAD implant.  Clarified the need for a caregiver to be present for education and to assist patient for at least first 30 days after a patient returns home. Patient's designated caregiver is wife.     Discussed basic overview of VAD equipment, on going management, need for anticoagulation, regular dressing change, grounded three-pronged outlet and functioning telephone. Also discussed frequency of follow-up clinic appointments and the need to stay locally for at least 2-4 weeks.  Reviewed restrictions of having an VAD such as no swimming, bathing, boats, MRI's, or arc welding.     Provided and discussed the VAD educational brochures, information regarding the VAD and transplant support groups, information on INTERMACs registry, and \"VAD What You Should Know\" consent with additional details. Patient and wife signed consent. VAD coordinator contact information provided.  Encouraged patient, wife to call with questions.  "

## 2017-04-10 NOTE — PLAN OF CARE
Problem: Cardiac Output Decreased (Adult)  Goal: Adequate Cardiac Output/Effective Tissue Perfusion  Patient will demonstrate the desired outcomes by discharge/transition of care.   D:CHF exacerbation, SWAN in place. Milrinone & lasix gtt.   I/A: HR: 60-70's. 100% v-paced. MAP's 's. Adequate UO, >100 mL/hr. Laxis gtt 15 mg. K+ replaced frequently. SWAN intact. CO: 4.4, CI: 2.5, SVR: 1591, CVP: 10. O2 via NC applied overnight for dipping sats while sleeping, pt wears BiPAP at home, refused BiPAP hospitalized. No acute events overnight.   P: Q4hr HOWARD. Continue current POC. Monitor cardiovascular status.

## 2017-04-10 NOTE — ADDENDUM NOTE
Encounter addended by: Saritha Barrera EP on: 4/10/2017  4:45 PM<BR>     Actions taken: Flowsheet accepted

## 2017-04-10 NOTE — PROGRESS NOTES
"[facility]  Cardiology II Service  Daily Note  4/10/2017    Interval History:   No acute events overnight. Sleeping has been difficult at times, able to get some sleep overnight. No acute complaints this morning. Walker and Annia are anxiously awaiting their conversations with the LVAD educators today.    Objective:    CVP: 12  PAP: 55/23  PCWP: 23  SvO2: 64  CO: 4.7  CI: 2.6  SVR: 1480  PVR: 181    Intake/Output Summary (Last 24 hours) at 04/10/17 1135  Last data filed at 04/10/17 1100   Gross per 24 hour   Intake           2743.4 ml   Output             3355 ml   Net           -611.6 ml     Vitals:    04/08/17 0100 04/09/17 0000 04/10/17 0400   Weight: 68.6 kg (151 lb 3.8 oz) 68.9 kg (151 lb 14.4 oz) 67.6 kg (149 lb 0.5 oz)     Pertinent Medications:  Drips:   Milrinone 0.3 mcg/Kg/min  Lasix 15 mg/hr     Other Meds:  Carvedilol 6.25mg BID  Hydralazine 75mg/50mg/75mg  Isosorbide mononitrate 60mg qhs  Clopidogrel 75mg QD  Potassium chloride 20 mEq BID, extra 80 mEq IV    Examination:  /81  Temp 97.7  F (36.5  C) (Oral)  Resp 16  Ht 1.727 m (5' 8\")  Wt 67.6 kg (149 lb 0.5 oz)  SpO2 97%  BMI 22.66 kg/m2    GEN: A&O, healthy appearing male, sitting comfortably in chair, NAD, cooperative and conversational   HEENT: mucus membranes moist  NECK: swan catheter in place in right IJ surrounding site c/d/i  RESP: CTA bilaterally, no wheezing, no crackles, no increased work of breathing   CV: Regular, normal rate, S1 and S2 without m/r/g   ABD: Soft, nontender to palpation, slight distention improved from previous, +BS, no guarding  EXT: stably improved 1+ bilateral LE edema  NEURO: alert and oriented, no apparent focal deficits  SKIN: no rash on limited exam, bruising stable    Data:  CMP    Recent Labs  Lab 04/10/17  0830 04/10/17  0308 04/10/17  0204 04/09/17  1959 04/09/17  1511  04/09/17  0355  04/08/17  0335  04/06/17  1813    137  --  135 136  --  136  < > 137  < > " 130*   POTASSIUM 3.6 3.3* 3.4 3.3* 3.3*  < > 3.0*  < > 3.4  < > 3.7   CHLORIDE 97 96  --  94 95  --  95  < > 96  < > 90*   CO2 30 31  --  32 33*  --  31  < > 29  < > 26   ANIONGAP 10 11  --  9 8  --  10  < > 12  < > 13   * 122*  --  142* 146*  --  118*  < > 102*  < > 96   BUN 59* 61*  --  71* 70*  --  80*  < > 100*  < > 102*   CR 1.47* 1.48*  --  1.51* 1.64*  --  1.65*  < > 1.86*  < > 2.08*   GFRESTIMATED 46* 46*  --  45* 41*  --  41*  < > 35*  < > 31*   GFRESTBLACK 56* 56*  --  54* 49*  --  49*  < > 43*  < > 38*   LEIDY 8.2* 8.2*  --  8.5 8.1*  --  8.1*  < > 8.1*  < > 8.2*   MAG  --  2.2  --   --  2.3  --  2.3  --  2.4*  < > 2.5*   PHOS 2.4*  --   --   --   --   --   --   --   --   --   --    PROTTOTAL  --   --   --   --   --   --   --   --   --   --  7.1   ALBUMIN  --   --   --   --   --   --   --   --   --   --  3.2*   BILITOTAL  --   --   --   --   --   --   --   --   --   --  1.0   ALKPHOS  --   --   --   --   --   --   --   --   --   --  80   AST  --   --   --   --   --   --   --   --   --   --  23   ALT  --   --   --   --   --   --   --   --   --   --  19   < > = values in this interval not displayed.  CBC    Recent Labs  Lab 04/10/17  0308 04/09/17  0355 04/07/17  0821 04/06/17  1813   WBC  --   --  5.3 5.2   RBC  --   --  3.91* 3.72*   HGB 11.0* 11.3* 13.1* 12.3*   HCT  --   --  39.5* 37.4*   MCV  --   --  101* 101*   MCH  --   --  33.5* 33.1*   MCHC  --   --  33.2 32.9   RDW  --   --  18.0* 17.4*   PLT  --  126* 160 149*     INR    Recent Labs  Lab 04/10/17  0308 04/09/17  0355 04/08/17  0335 04/07/17  0821   INR 1.13 1.14 1.19* 1.19*     Arterial Blood Gas    Recent Labs  Lab 04/10/17  0830 04/10/17  0308 04/09/17  2346 04/09/17  1959   O2PER 21 4L 4L 21       Imaging Studies:  CXR 4/10:  Impression: Stable small left pleural effusion with associated  atelectasis/consolidation    Assessment and Plan  Perez CHILDRESS Wilfredo is a 76 year old male with history of ICM s/p multiple stents and MI, HFrEF (EF 35%  ECHO 2/2017), HTN, HLD, hypothyroidism, and recent onset CKD who presents with weight gain (~10 pounds) and increased LE edema consistent with acute CHF exacerbation. Now day 3 post swan placement, have started milrinone with increase in CI. Diuresing with IV lasix. Replacing potassium with IV and oral. Ongoing discussions about VAD therapy.     #) ICM s/p CRT-ICD  #) CAD s/p 13 stents and past MI  #) End stage HFrEF (EF 35% on ECHO 2/2017):  Multiple recent hospitalizations for CHF exacerbations which portends poor prognosis moving forward. Had swan catheter placed 4/7, hemodynamics showed elevated wedge and SVR with low CI/CO. Since beginning milrinone, his hemodynamics have improved (currently on 0.30). If his index were to drop below 2.0 would increase milrinone further; goal is to increase milrinone as able while monitoring for arrhythmia. Was not having vigorous response to intermittent lasix, currently on drip 15 mg/hr, goal net negative 1-2L. VAD education today, Walker would be a good candidate for VAD therapy given preserved RV function and his level of underlying heart failure. This of course would depend upon his preferences.  -Hemodynamic monitoring q4h, currently stable  -Milrinone 0.30 mcg/Kg/min would increase if CI drops below 2.0  -Lasix gtt 15 mg/hr  -Continuing home hydralazine 75 mg/50 mg/75 mg  -Decreased home coreg to 6.25 mg BID  -Isosorbide mononitrate 60 mg qhs     #) JACINTO on CKD:  CKD appears to be related to episode of rhabdomyolysis with baseline around 1.5-2.0. Acute worsening likely related to pre-renal physiology from poor perfusion. Has gradually trended down since admission with improvement of cardiac output and diuresis, currently within recent baseline, will continue to monitor.  -Trend Cr  -Lasix gtt     #) Hyponatremia: resolved  Chronic issue with sodium in the low 130's, currently 136. Likely hypervolemic from CHF exacerbation, has improved with diuresis. Continuing to  monitor.  -BMP in AM/PM    #) Hypokalemia:  Developed after starting high dose lasix therapy. Replacing orally and IV, being judicious given resolving kidney injury.  -80 mEq IV 4/10  -20 mEq oral BID     #) Hypothyroidism: Stable  Continuing home levothyroxine     #) Chronic normocytic anemia: Stable  Likely from renal disease, will monitor.     #) Elevated lactate: Resolved     FEN: 2L fluid restriction, 2g sodium diet  Prophylaxis: DVT: subq heparin GI: none  Disposition: requires ongoing ICU cares for hemodynamic monitoring  Code Status: FULL CODE     Patient discussed with attending Dr. Anton Argueta.    Mike Rivas MD  Internal Medicine, PGY-1  Pager [phone]    I have reviewed today's vital signs, notes, medications, labs and imaging.  I have also seen and examined the patient and agree with the findings and plan as outlined above.  Pt without complaints.  VSS with CVP 10, CI 2.5 on 0.3 mcg/kg/min milrinone.  Lungs clear and S1 and S2 with soft S3 and 2+ bipedal edema.  Labs with Cr 1.48.  Assessment: Pt with cardiogenic shock now supported with milrinone.  Renal fn improving.  Plan is to continue LVAD workup and education.  Total critical care time 30 min.     Anton Argueta MD, PhD  Professor, Heart Failure and Cardiac Transplantation  [facility]

## 2017-04-10 NOTE — ADDENDUM NOTE
Encounter addended by: Saritha Barrera EP on: 4/10/2017  4:38 PM<BR>     Actions taken: Flowsheet data copied forward, Flowsheet accepted

## 2017-04-11 NOTE — PLAN OF CARE
Problem: Goal Outcome Summary  Goal: Goal Outcome Summary  OT 4E: Pt motivated in therapies and progressing in activity tolerance. Pt seen for functional transfers and aerobic exercise to increase independence with ADLs. Pt CGA for functional transfers and assist for line management. Pt ambulated hallway 1200ft with CGA and FWW. Recommend use of FWW for ambulation at this time. Pt took x2 standing rest breaks however endorsed increased energy during today's walk. Recommend RN ambulate with pt daily to maintain functional endurance prior to LVAD.      Defer discharge recommendations at this time pending medical course.

## 2017-04-11 NOTE — PROGRESS NOTES
D:  Received information that the medical team was interested in working patient up for LVAD.   I:  This writer met with he and his wife this morning. Completed full eval.  A:  Patient lives in [place]. Has adequate support system with wife as primary caregiver. She is a retired RN and has been present while he has been in the hospital most every day.  P:  Will type up eval when able and communicate findings to the cardiology team.

## 2017-04-11 NOTE — PLAN OF CARE
Problem: Goal Outcome Summary  Goal: Goal Outcome Summary  Outcome: No Change  VSS. Pt is on 2L overnight for low O2 saturation in the 80's. RA during the day and is usually 95%. CVP 12,10,10. CO 3.3, 4.7, 4.6 and CI 1.8, 2.6, 2.5. Replaced his K per MD. Day team will put orders in for replacement protocol since he isnt on any and on lasix drip at 15. Milrinone drip at 0.3. Standby assist. UO is good minimum 150mL out per occurrence. MONIK procedure set for today. Has been NPO since midnight. Skin is good and he is A/Ox4. Will continue to monitor pt and update MD as needed.

## 2017-04-11 NOTE — PROGRESS NOTES
Focus: Palliative Care     D: Perez Villalobos is a 76 year old male with ICM here for exacerbation of his CHF and consult was received for preparedness planning for LVAD.     I: I attempted to meet with Walker and wife today however he was working with therapy and then was going to MONIK. Discussed with bedside RN.    A: Not available.    P: I plan to see Walker and wife tomorrow to complete preparedness planning.    PARKER Rothman CNS  Palliative Care Consult Team  Pager: [phone]

## 2017-04-11 NOTE — CONSULTS
[facility]  Palliative Care Consultation         Perez Villalobos MRN# [id number]   Age: 76 year old YOB: 1940   Date of Admission: 4/6/2017    Reason for consult: LVAD Preparedness Planning       Requesting physician/service: Yaya Goodwin MD       Recommendations          Visited with patient and wife, Annia villeda. Discussed palliative care team and ways in which we could be helpful including symptom management, decisional support/goals of care, and additional support/coping with stressors. Discussed roles of other team members and ways they could be helpful. Patient and wife actively engaged in discussion for LVAD preparedness planning. He has a health care directive and wife feels that it could be something that could be updated since it was completed when he was doing quite well in his health. Wife verbalizes the challenges of being a full time caregiver and ways in which she is able to cope with these challenges, she is a [place] nurse and is currently coordinating a caregiver support group at her [employer]. Wife is very interested in how to engage our team in the future if needs arise, discussed that she could request our teams involvement again and that the medical team could alert the team as well for any palliative care needs. Based on my assessment, I do not have any concerns moving forward with the LVAD evaluation.     LVAD preparedness plan    Patient s legally designated health care agent: Wife- Annai Villalobos    Patient s description of how they make decisions (by themselves, in consultation with certain close loved ones, based on advice from medical professionals, etc):  Patient values hearing information from his medical professionals and will involve his wife in decisions.    Patient s personal goals/hopes for receiving the LVAD: stay out of the hospital, get stronger so he can work on his cars.    Patient s worries/concerns when considering receiving the  "LVAD: Not getting the LVAD    How does the patient envision or anticipate his/her future with the LVAD? Getting to have more time.  Patient s thoughts about what constitutes a  good  quality of life: Less hospitalizations so he can be able to do what he wants, he also would like to travel.    Patient s thoughts about what health conditions would be unacceptable (situations the patient would want her/his doctors and loved ones to know that she/he would not want life prolonged)? Feels that he would not want to be \"a vegetable\" or be to \"laying in bed\"    LVADs carry a risk of stroke which can be impairing. After a stroke, what sort of functional outcomes would be acceptable vs unacceptable to the patient? Patient thinks that an unacceptable outcome were if he were no longer be able to interact in a meaningful way with his surroundings.      Chronic mechanical ventilation via a tracheostomy tube is a risk. What are the circumstances the patient would want her/his physicians and family to keep them alive with long-term mechanical ventilation vs allow them to die?  He was less sure of this question but feels that if he were to need to be in a nursing home this would be an unacceptable outcome to him.      POLST - not discussed    PARKER Rothman CNS  Palliative Care Consult Team  Pager: [phone]    55 minutes spent with patient, with >50% counseling and in care coordination.        Disease Process/es & Symptoms     ICM s/p CRT-ICD  HFrEF  JACINTO on CKD thought to be due to statin related rhabomyolysis  CAD s/p stent x13  History of MI  Hyponatremia  Hypothyroidism  Anemia       Support/Coping   (We typically ask each of our patients the following questions:)    How do you make sense of this? Not discussed  Are you Rastafari? Spiritual? Not so much? Rastafari, attend [employer]  Are you at peace? Not discussed  What are your concerns, medical and non-medical, that are not being addressed? Not getting the LVAD  Who are " "your \"go-to\" people when you need support? Wife    Plan for psychosocial/spiritual support/follow-up from palliative team: Will discuss case during palliative interdisciplinary team rounds and involve LICSW and  as needed.     Decision-Making & Goals of Care     Discussion/counseling today about prognosis/goals of care/decisions:   See above  Patient has a completed health care directive available in the chart (Y/N): Yes  Health care agent (only if patient has an available, complete HCD): Annia Villalobos- Wife, Alternate Anton Villalobos- Son  There is no POLST (Physician orders for life-sustaining treatment) form on file for this patient  Code Status: Full code   Patient has decision-making capacity (Y/N): Yes    Coordination of Care     Findings & plan of care discussed with: Primary cardiology team  Follow-up plan from palliative team: do not anticipate further needs at this time and will sign off, please re-consult us if we can be helpful in the future.  Thank you for involving us in the patient's care.           Chief Complaint     LVAD preparedness planning         History of Present Illness     History obtained from: chart review    Perez Villalobos is a 76 year old male with a history of ICM s/p stents of all 3 coronary arteries (13 stents) and MI, HFrEF, HTN, HLD, hypothyroidism, and recent onset CKD (baseline 1.5-2.0) who presents 4/6 with weight gain and edema consistent with acute CHF exacerbation. Over the last year he has had frequent hospitalizations, about 6 in the last 6 months.  He was hospitalized 11/15/16-11/18/16 with bradycardia that degenerated into ventricular fibrillation arrest which lead to placement of CRT-D device, and hospitalized 1/20-1/16 for hyponatremia and hypokalemia, and hospitalized 03/29/17-03/31/17 for increased BUN, creatinine, and increasing shortness of breath with exertion at [facility]. Most recently he was hospitalized on a trip in [place] around 02/2017 for CHF " exacerbation. Most recently he had CardioMEMS implanted 04/03/2017. He had a heart catheter procedure which showed low CI and CO and elevated SVR and it was decided to admit him from there for management of his CHF. While in the hospital he had a swan placed and was started on milrinone which helped improve his cardiac hemodynamics.    Palliative Care team consulted 4/11 for VAD assessment, heart failure pre VAD planning          Past Medical History:   I have reviewed this patient's past medical history  This patient  has a past medical history of Acute renal failure (H); Alcoholism (H); Anemia; Benign essential HTN; BPH (benign prostatic hypertrophy); CAD (coronary artery disease); Chronic systolic heart failure (H); CKD (chronic kidney disease); Complete AV block (H); Ischemic cardiomyopathy (10/23/2015); Low sodium levels; Mixed hyperlipidemia; NSTEMI (non-ST elevated myocardial infarction) (H) (10/23/2015); PAD (peripheral artery disease) (H); Rhabdomyolysis due to statin therapy; Severe hypothyroidism (9/20/2016); and VF (ventricular fibrillation) (H) (11/16/16).           Past Surgical History:   I have reviewed this patient's past surgical history  This patient  has a past surgical history that includes stent, coronary, s660 15/18 (Nov 2000); stent, coronary, s660 15/18 (Feb 2001); stent, coronary, s660 15/18 (April 2007); Endarterectomy carotid (Left, 6/11/10); appendectomy; tonsillectomy and adenoidectomy; Cholecystectomy; and Implant automatic implantable cardioverter defibrillator (11/16/16).            Social/Spiritual History:     Living situation: lives with spouse  Family system: three children, two who live out of state  Functional status (needs help with ADLs or IADLs): wife provides assistance and patient has been requiring more assistance   Employment/education: retired   Use of community resources: none at this time  Activities/interests: working on cars, traveling, helping with  household chores  History of substance use/abuse: former smoker, occasional ETOH use            Family History:   I have reviewed this patient's family history  The family history includes Unknown/Adopted in his father and mother.               Allergies:   All allergies reviewed and addressed  This patient is allergic to is allergic to lisinopril; augmentin; and crestor [rosuvastatin].           Medications:   I have reviewed this patient's medication profile and medications during this hospitalization    Medications of Note  Milrinone and lasix drip  Acetaminophen 650 mg q4h PRN  Chloraseptic lozenge q1h PRN  Hydromorphone 0.3-0.5 mg q4h PRN  Melatonin 3 mg at bedtime PRN             Review of Systems:   The comprehensive review of systems is negative other than noted here and in the HPI.    Palliative Symptom Review (0=no symptom/no concern, 1=mild, 2=moderate, 3=severe):      Pain: 0      Fatigue: 0      Nausea: 0      Constipation: 0      Diarrhea: 0      Depressive Symptoms: 0      Anxiety: 0      Drowsiness: 0      Poor Appetite: 0      Shortness of Breath: 0      Insomnia: 0            Physical Exam:   Vitals were reviewed  Temp: 97.9  F (36.6  C) Temp src: Oral BP: 128/67   Heart Rate: 70 Resp: 16 SpO2: 91 % O2 Device: None (Room air) Oxygen Delivery: 2 LPM  Constitutional: Awake, alert, cooperative, no apparent distress  Lungs: No increased work of breathing, good air exchange, clear to auscultation bilaterally  Cardiovascular: Regular rate and rhythm, normal S1 and S2  Abdomen: normal bowel sounds, non-distended  Neurologic: Awake, alert, oriented to name, place and time.  Neuropsychiatric: Normal affect, mood, orientation, memory and insight.  Skin: scabbed right lower leg without redness            Data Reviewed:     ROUTINE ICU LABS (Last four results)  CMP  Recent Labs  Lab 04/11/17  0508 04/10/17  1537 04/10/17  0830 04/10/17  0308  04/09/17  1511  04/09/17  0355  04/06/17  1813    135 137 137   < > 136  --  136  < > 130*   POTASSIUM 3.3* 4.3 3.6 3.3*  < > 3.3*  < > 3.0*  < > 3.7   CHLORIDE 96 96 97 96  < > 95  --  95  < > 90*   CO2 32 32 30 31  < > 33*  --  31  < > 26   ANIONGAP 9 7 10 11  < > 8  --  10  < > 13   * 124* 192* 122*  < > 146*  --  118*  < > 96   BUN 55* 60* 59* 61*  < > 70*  --  80*  < > 102*   CR 1.34* 1.44* 1.47* 1.48*  < > 1.64*  --  1.65*  < > 2.08*   GFRESTIMATED 52* 48* 46* 46*  < > 41*  --  41*  < > 31*   GFRESTBLACK 62 58* 56* 56*  < > 49*  --  49*  < > 38*   LEIDY 8.5 8.4* 8.2* 8.2*  < > 8.1*  --  8.1*  < > 8.2*   MAG 2.1  --   --  2.2  --  2.3  --  2.3  < > 2.5*   PHOS  --   --  2.4*  --   --   --   --   --   --   --    PROTTOTAL  --   --   --   --   --   --   --   --   --  7.1   ALBUMIN  --   --   --   --   --   --   --   --   --  3.2*   BILITOTAL  --   --   --   --   --   --   --   --   --  1.0   ALKPHOS  --   --   --   --   --   --   --   --   --  80   AST  --   --   --   --   --   --   --   --   --  23   ALT  --   --   --   --   --   --   --   --   --  19   < > = values in this interval not displayed.  CBC  Recent Labs  Lab 04/10/17  0308 04/09/17  0355 04/07/17  0821 04/06/17  1813   WBC  --   --  5.3 5.2   RBC  --   --  3.91* 3.72*   HGB 11.0* 11.3* 13.1* 12.3*   HCT  --   --  39.5* 37.4*   MCV  --   --  101* 101*   MCH  --   --  33.5* 33.1*   MCHC  --   --  33.2 32.9   RDW  --   --  18.0* 17.4*   PLT  --  126* 160 149*     INR  Recent Labs  Lab 04/11/17  0508 04/10/17  0308 04/09/17  0355 04/08/17  0335   INR 1.08 1.13 1.14 1.19*     Arterial Blood Gas  Recent Labs  Lab 04/11/17  1200 04/11/17  0756 04/11/17  0404 04/11/17  0022   O2PER 21 21 2L 2L       Last chest xray 4/11  Impression:    1. Crucible-Dionna catheter tip now projects over the mid right pulmonary  artery.  2. Stable left pleural effusion with associated  atelectasis/consolidation.

## 2017-04-11 NOTE — PLAN OF CARE
Problem: Goal Outcome Summary  Goal: Goal Outcome Summary  Outcome: No Change  Pt A&Ox4, afebrile, call light appropriate, SBA.  Paced rhythmn, 70-80's.  CO/CI 4.1/2.3, SVR 1700's, 's, SV 55-60, CVP 8-10; continues on Milrinone gtt @ 0.3, Lasix 15.  Na Diet, 2L fluid restriction.  Sats 90's on RA except after sedation w/MONIK, required 2L O2 to maintain sats.  Bedside MONIK, CT Head, CXR, US for LVAD workup completed today.  Goal for -1500ml/day, dependent edema improving.  Wife updated on careplan at bedside.  Potassium replaced per protocol.  Will continue to monitor/assess and update team as needed.

## 2017-04-11 NOTE — PROGRESS NOTES
CLINICAL NUTRITION SERVICES - ASSESSMENT NOTE     Nutrition Prescription    RECOMMENDATIONS FOR MDs/PROVIDERS TO ORDER:  Once medically approp, re-adv diet back to 2 gram sodium diet.     Malnutrition Status:    Unable to determine (see below).    Recommendations already ordered by Registered Dietitian (RD):  Rec continue strawberry or vanilla Ensure Plus shakes between meals, each supplement provides 480 kcal and 15 g PRO.    Future/Additional Recommendations:  1. Review 2 gram sodium diet and VAD nutrition education as able/approp.    2. If patient should receive VAD and require FT placement/TF post-op, rec Impact Peptide @ 15 ml/hr with adv by 10 ml q 8 hrs as tolerated and if K+/Mg++ remain WNL and Phos > 1.9 until goal 55 ml/hr (1320 ml/day) to provide 1980 kcal (29 kcalkg), 124 g PRO (1.8 g/kg), 1016 ml free H2O, 84 g Fat (50% from MCTs), 185 g CHO and no Fiber daily per dosing weight of 68 kg.    3. If requires TF, flush FT with 30 ml water q 4 hrs for patency.     4. If requires TF, ensure K+/Mg++/Phos ordered daily until TFs adv to goal infusion to evaluate for sx of refeeding with nutrition received, need for lyte replacement per already-ordered lyte protocols and approp to continue to adv nutritional provisions.    5. If requires TF, order to obtain baseline acute phase PRO (CRP) and recheck every Monday to evaluate trend with duration/volumes of immune-modulating TF received and ongoing approp of specialized TF therapy vs. approp to change to maintenance-type formula.     REASON FOR ASSESSMENT  Perez Villalobos is a/an 76 year old male assessed by the dietitian for Provider Order - Heart Failure pre VAD planning    NUTRITION HISTORY  Patient busy with many different cares/procedures today and per discussion with RN, prefer to have RD visit at another time when patient is not NPO. Patient's wife received low sodium diet education on 4/7 (see RD note for details). Per discussion with patient's wife on 4/7,  "stable appetite/intake PTA (see brief RD note for details). Per H&P, on multivitamin with minerals, calcitriol and vitamin B12 1000 mcg PTA.    CURRENT NUTRITION ORDERS  Diet: NPO today, 2000 ml fluid restriction, strawberry or vanilla Ensure Plus shakes between meals  Intake/Tolerance: Fair-good diet tolerance, consuming % of meals while on 2 gram sodium diet per RN documentation, unable to discuss with patient today    LABS  Labs reviewed  -K+ low @ 3.3, Mg++ WNL, Phos low @ 2.4 when last checked 4/10  -Prealbumin WNL @ 20 on 4/10    MEDICATIONS  Medications reviewed  -PRN K+/Mg++/Phos IV replacement protocols & K+ replacement  -Thera-vit-m  -Calcitriol  -Vitamin B12 1000 mcg  -Lasix drip  -Milrinone     ANTHROPOMETRICS  Height: 172.7 cm (5' 8\")  Most Recent Weight: 67.6 kg (149 lb 0.5 oz)    IBW: 70 kg  BMI: Normal BMI  Weight History: Per brief RD note 4/7, weight changes solely due to fluid shifts (per discussion with patient's wife), net negative this adm  Wt Readings from Last 20 Encounters:   04/10/17 67.6 kg (149 lb 0.5 oz)   04/05/17 69.6 kg (153 lb 6.4 oz)   04/03/17 67.7 kg (149 lb 3.2 oz)   03/31/17 66.2 kg (146 lb)   03/30/17 65.6 kg (144 lb 10 oz)   03/29/17 69.3 kg (152 lb 12.8 oz)   03/23/17 68.5 kg (151 lb 1.6 oz)   03/20/17 71.4 kg (157 lb 4.8 oz)   03/08/17 67.1 kg (148 lb)   03/06/17 68.5 kg (151 lb)   03/02/17 67.5 kg (148 lb 12.8 oz)   01/23/17 71 kg (156 lb 9.6 oz)   01/23/17 71.9 kg (158 lb 9.6 oz)   01/20/17 70 kg (154 lb 6.4 oz)   01/20/17 70 kg (154 lb 6.4 oz)   01/16/17 71.7 kg (158 lb)   01/13/17 70 kg (154 lb 5.2 oz)   01/06/17 70.3 kg (155 lb)   01/05/17 73.4 kg (161 lb 12.8 oz)   12/30/16 75.8 kg (167 lb)     Dosing Weight: 68 kg (actual weight)    ASSESSED NUTRITION NEEDS  Estimated Energy Needs: 2063-8911 kcals/day (25 - 30 kcals/kg)  Justification: Maintenance  Estimated Protein Needs: 75-95 grams protein/day (1.1 - 1.4 grams of pro/kg)  Justification: Increased with current " cardiac status, pending renal function/CKD  Estimated Fluid Needs: 2000 mL/day  Justification: On a fluid restriction    PHYSICAL FINDINGS  See malnutrition section below.  -Ajit: 21, Nutrition Ajit: 3  -+BM on 4/10    MALNUTRITION  % Intake: Decreased intake likely does not meet criteria  % Weight Loss: Weight loss likely does not meet criteria  Subcutaneous Fat Loss: Unable to assess  Muscle Loss: Unable to assess  Fluid Accumulation/Edema: Trace-mild  Malnutrition Diagnosis: Unable to determine due to inability to obtain detailed nutrition/weight history or complete physical assessment at this time    NUTRITION DIAGNOSIS  Predicted inadequate nutrient intake (protein-energy) related to intermittent NPO status for procedures, dietary restrictions and LOS.    INTERVENTIONS  Implementation  Nutrition Education- Not appropriate at this time per discussion with RN.    Goals  Patient to consume % of nutritionally adequate meal trays TID, or the equivalent with supplements/snacks.     Monitoring/Evaluation  Progress toward goals will be monitored and evaluated per protocol.      Zuleika Huitron RD, LD (pager [phone])  RD will continue to follow

## 2017-04-11 NOTE — PROGRESS NOTES
"[facility]  Cardiology II Service  Daily Note      Interval History:   - No acute events overnight.       Objective:    CVP: 10  PCWP: 22  SvO2: 65  CI: 2.5  SVR: ~1400      Intake/Output Summary (Last 24 hours) at 04/11/17 1245  Last data filed at 04/11/17 1200   Gross per 24 hour   Intake          1759.41 ml   Output             4300 ml   Net         -2540.59 ml         Vitals:    04/08/17 0100 04/09/17 0000 04/10/17 0400   Weight: 68.6 kg (151 lb 3.8 oz) 68.9 kg (151 lb 14.4 oz) 67.6 kg (149 lb 0.5 oz)     Pertinent Medications:  Drips:   Milrinone 0.3 mcg/Kg/min  Lasix 15 mg/hr     Other Meds:  Carvedilol 6.25mg BID  Hydralazine 75mg/50mg/75mg  Isosorbide mononitrate 60mg qhs  Clopidogrel 75mg QD - Last dose 4/11  Potassium chloride 20 mEq BID, extra 80 mEq IV    Examination:  /75  Temp 98.1  F (36.7  C) (Oral)  Resp 16  Ht 1.727 m (5' 8\")  Wt 67.6 kg (149 lb 0.5 oz)  SpO2 96%  BMI 22.66 kg/m2    GEN: A&O, healthy appearing male, sitting comfortably in chair, NAD, cooperative and conversational   HEENT: mucus membranes moist  NECK: swan catheter in place in right IJ surrounding site c/d/i  RESP: CTA bilaterally, no wheezing, no crackles, no increased work of breathing   CV: Regular, normal rate, S1 and S2 without m/r/g   ABD: Soft, nontender to palpation, slight distention improved from previous, +BS, no guarding  EXT: stably improved 1+ bilateral LE edema  NEURO: alert and oriented, no apparent focal deficits  SKIN: no rash on limited exam, bruising stable    Data:  CMP    Recent Labs  Lab 04/11/17  0508 04/10/17  1537 04/10/17  0830 04/10/17  0308  04/09/17  1511  04/09/17  0355  04/06/17  1813    135 137 137  < > 136  --  136  < > 130*   POTASSIUM 3.3* 4.3 3.6 3.3*  < > 3.3*  < > 3.0*  < > 3.7   CHLORIDE 96 96 97 96  < > 95  --  95  < > 90*   CO2 32 32 30 31  < > 33*  --  31  < > 26   ANIONGAP 9 7 10 11  < > 8  --  10  < > 13   * 124* 192* 122*  " < > 146*  --  118*  < > 96   BUN 55* 60* 59* 61*  < > 70*  --  80*  < > 102*   CR 1.34* 1.44* 1.47* 1.48*  < > 1.64*  --  1.65*  < > 2.08*   GFRESTIMATED 52* 48* 46* 46*  < > 41*  --  41*  < > 31*   GFRESTBLACK 62 58* 56* 56*  < > 49*  --  49*  < > 38*   LEIDY 8.5 8.4* 8.2* 8.2*  < > 8.1*  --  8.1*  < > 8.2*   MAG 2.1  --   --  2.2  --  2.3  --  2.3  < > 2.5*   PHOS  --   --  2.4*  --   --   --   --   --   --   --    PROTTOTAL  --   --   --   --   --   --   --   --   --  7.1   ALBUMIN  --   --   --   --   --   --   --   --   --  3.2*   BILITOTAL  --   --   --   --   --   --   --   --   --  1.0   ALKPHOS  --   --   --   --   --   --   --   --   --  80   AST  --   --   --   --   --   --   --   --   --  23   ALT  --   --   --   --   --   --   --   --   --  19   < > = values in this interval not displayed.  CBC    Recent Labs  Lab 04/10/17  0308 04/09/17  0355 04/07/17  0821 04/06/17  1813   WBC  --   --  5.3 5.2   RBC  --   --  3.91* 3.72*   HGB 11.0* 11.3* 13.1* 12.3*   HCT  --   --  39.5* 37.4*   MCV  --   --  101* 101*   MCH  --   --  33.5* 33.1*   MCHC  --   --  33.2 32.9   RDW  --   --  18.0* 17.4*   PLT  --  126* 160 149*     INR    Recent Labs  Lab 04/11/17  0508 04/10/17  0308 04/09/17  0355 04/08/17  0335   INR 1.08 1.13 1.14 1.19*     Arterial Blood Gas    Recent Labs  Lab 04/11/17  0404 04/11/17  0022 04/10/17  2059 04/10/17  1520   O2PER 2L 2L 21 21       Imaging Studies:  CXR 4/10:  Impression: Stable small left pleural effusion with associated  atelectasis/consolidation    Assessment and Plan  Perez Villalobos is a 76 year old male with history of ICM s/p multiple stents and MI, HFrEF (EF 35% ECHO 2/2017), HTN, HLD, hypothyroidism, and recent onset CKD who presents with weight gain (~10 pounds) and increased LE edema consistent with acute CHF exacerbation. Now day 3 post swan placement, have started milrinone with increase in CI. Diuresing with IV lasix. Replacing potassium with IV and oral. Ongoing  discussions about VAD therapy.     #) ICM s/p CRT-ICD  #) CAD s/p 13 stents and past MI  #) End stage HFrEF (EF 35% on ECHO 2/2017):  Multiple recent hospitalizations for CHF exacerbations which portends poor prognosis moving forward. Had swan catheter placed 4/7, hemodynamics showed elevated wedge and SVR with low CI/CO. Since beginning milrinone, his hemodynamics have improved (currently on 0.30). If his index were to drop below 2.0 would increase milrinone further; goal is to increase milrinone as able while monitoring for arrhythmia. Was not having vigorous response to intermittent lasix, currently on drip 15 mg/hr, goal net negative 1-2L. VAD education today, Walker would be a good candidate for VAD therapy given preserved RV function and his level of underlying heart failure. This of course would depend upon his preferences.  -Hemodynamic monitoring q4h, currently stable  -Milrinone 0.30 mcg/Kg/min would increase if CI drops below 2.0  -Lasix gtt 15 mg/hr  -Continuing home hydralazine 75 mg/50 mg/75 mg  -Decreased home coreg to 6.25 mg BID  -Isosorbide mononitrate 60 mg qhs  -Will obtain MONIK today to assess his AI, as part of work up towards LVAD  - Plavix held today in anticipation of surgery      #) JACINTO on CKD:  CKD appears to be related to episode of rhabdomyolysis with baseline around 1.5-2.0. Acute worsening likely related to pre-renal physiology from poor perfusion. Has gradually trended down since admission with improvement of cardiac output and diuresis, currently within recent baseline, will continue to monitor.  -Trend Cr  -Lasix gtt  -Goal net negative 0.5 to 1 L     #) Hyponatremia: resolved  Chronic issue with sodium in the low 130's, currently 136. Likely hypervolemic from CHF exacerbation, has improved with diuresis. Continuing to monitor.  -BMP in AM/PM    #) Hypokalemia:  Developed after starting high dose lasix therapy. Replacing orally and IV, being judicious given resolving kidney injury.  -80  mEq IV 4/10  -20 mEq oral BID     #) Hypothyroidism: Stable  Continuing home levothyroxine     #) Chronic normocytic anemia: Stable  Likely from renal disease, will monitor.     #) Elevated lactate: Resolved     FEN: 2L fluid restriction, 2g sodium diet  Prophylaxis: DVT: subq heparin GI: none  Disposition: requires ongoing ICU cares for hemodynamic monitoring  Code Status: FULL CODE     Patient discussed with attending Dr. Anton Argueta.    Yaya Goodwin MD  Cardiovascular Disease Fellow  Pager: [phone]    I have reviewed today's vital signs, notes, medications, labs and imaging.  I have also seen and examined the patient and agree with the findings and plan as outlined above.  Pt without complaints.  VSS with CVP 10, CI 2.5 on lasix 15 mg/hr and milrinone 0.3mcg/kg/min.  Lungs clear and S1 and S2 with soft diastolic M.  Abd is benign.  Labs with Cr 1.34.  Assessment: Pt with cardiogenic shock with improving renal fn on milrinone therapy.  Plan for MONIK today to assess AoV.  Cont LVAD education.  Total critical care time 30 min.     Anton Argueta MD, PhD  Professor, Heart Failure and Cardiac Transplantation  [facility]

## 2017-04-12 NOTE — CONSULTS
Patient of Dr. Kelley seen in the hospital on patient care unit 4E for psychosocial assessment.   60 minutes spent with patient. 100% of visit consisted of counseling related to issues surrounding Destination LVAD placement.    Psychosocial Assessment   Name: Perez Villalobos     MRN:  [id number]        Patient Name / Age / Race: Perez Villalobos 76 year old    Source of Information: Patient and Wife   : Elisha Lei   Interview Date: April 11, 2017   Reason for Referral: Mechanical Circulatory Support     Current Living Situation   Location ([facility] [address] [place] [id number]   With Whom: Living arrangements - the patient lives with their spouse and Grandson (age 26) Temporarily living there while looking for employment.   Type of Home: single family; Split entry with one step into house and 9 steps to upper level and 5 steps to lower level   Working Phone? Yes    Three Pronged Outlet? Yes    Distance from Hospital: 20-30 minutes   Need to Relocate Post Surgery? No   Discussed? No     Vocational/Employment/Financial   Employment: Retired   Occupation:  for [employer] X 25 years, then [employer] for 5 years before [employer] 10-12 years ago.   Education: B.A. Physics and Math   ? Yes ; [employer]. [facility]   Income: SS [employer], pension and spouse's income ([employer])   Insurance: Medicare   Name of Private Insurance: [email or web address] for Seniors   Medication Coverage: [employer] part D plan    In current situation, pt. can afford medication and supply costs:  Yes ; has experienced the donut hole in the past   Other Financial Concerns/Issues: None     Family/Social Support   Marital Status:    Partner Name: Annia   Length of Time : 52 years   Partner s Employment: Retired RN and worked many years in the ED.   Relationship: stable/supportive   Children: 3 sons: Valencia lives in [place], [place]. Dominic, lives in [place], and Chandan  lives in [place]   Parents:    Siblings: Only child (adopted) No siblings   Other Family or Friends Close by:    Support System: available, helpful   Primary Support Person: Wife   Issues: None; wife capable and able to be primary caregiver.     Activities/Functional Ability   Current Level: ambulatory and independent with ADL's   Daily Activities: Very fatigued and sleeps a lot. Frequent hospitalizations this year.   Transportation: Other; stopped driving due to falling asleep. Stopped driving 4 months ago. Feet feel very heavy.     Medical Status   Patient s understanding of need for surgical intervention: Good   Advanced Directive? Yes     Details: Wife listed as [facility] On file with [place]   Adherence History: No non-compliance noted.   Ability to Adhere to Complex Medical Regime: Yes, does receive help from Annia to manage medications. Her knowledge of nursing care helps him keep up with changing doses and medications. Has noted some forgetfulness and slowed processing skills.     Behavioral   Chemical Dependency Issues: Yes : Per patient report, he drank heavily in the . Wife convinced him to go through CD treatment. Participated in outpatient prorgam in  and abstained from alcohol until last few years. Started with social drinking, then once-twice/week, then one/day, now averages a medium bottle of vodka/week. Wife and patient have been talking about getting back involved with AA. Patient receptive, but been too sick and in and out of the hospital too much. No withdrawal symptoms noted and no negative consequences as a result of drinking. I'm sure the alcohol is not helping with his heart failure, however. When asked if he could abstain from alcohol from this point moving forward, patient said it would be difficult.   Smoker? No   Psychiatric impairment: No   Coping Style/Strategies: Usually exercises to help cope with stress, but been too sick for this lately. Also likes to work on cars in his  [place]. Has 2 classic cars. Enters them in car shows.   Recent Legal History: No   Teaching Completed During Assessment Related To Mechanical Circulatory Support: 1. Housing and relocation needs post surgery.  2. Caregiver needs post surgery.  3. Financial issues related to surgery.  4. Risks of alcohol use post surgery.  5. Common psychosocial stressors pre/post surgery.  6. Support group availability.   Psychosocial Risks Reviewed Related To Mechanical Circulatory Support: 1. Increased stress related to your emotional, family, social, employment, or financial situation.  2. Affect on work and/or disability benefits.  3. Affect on future health and life insurance.  4. Outcome expectations may not be met.  5. Mental Health risks: anxiety, depression, PTSD, guilt, grief and chronic fatigue.     Observed Behavior   Well informed? Yes  Angry? No   Process info well? Yes  Hostile? No   Evasive? No Oriented X3? Yes    Cautious/Suspicious? No Motivated? Yes    Appropriate Behavior? Yes  Depressed? No   Appropriate Affect? Yes  Anxious? No   Interview Observations: Good eye contact. Open and honest in conversation. No evasiveness when talking about his alcohol use. Seems able to handle complicated medical regime.     Selection Criteria Met   Plan for Support Yes    Chemical Dependence No   Smoking Yes    Mental Health Yes    Adequate Finances Yes      Risk Issues:    Current drinking. Patient and wife aware that patient cannot drink alcohol anymore. Patient very willing to become involved with AA again as this was helpful to him in the past. However, patient reports stopping alcohol will be difficult for him.    Final Evaluation/Assessment:     Perez and his wife seemed open and forthcoming with information during interview. Wife seems very supportive and both the patient and wife have good relationships with other family members. Wife able to be primary caregiver post-VAD. He has adequate health insurance and adequate  finances to pay for medications/supplies. He doesn't seem to have any active mental health issues, but is currently drinking alcohol. He has never experienced any negative consequences as a result of alcohol like loss of job, poor family relationships, DWI or any legal problems. He went through one CD program (outpatient) and found it helpful. He got involved in AA and was abstinent from alcohol for many years until recent few years and now averaging one bottle Vodka/week. He doesn't seem to experience withdrawal symptoms, however, when in the hospital. Patient willing to quit alcohol again, but reports this will be difficult. Patient and wife verbalize understanding toward contraindication of alcohol with coumadin post-VAD. Patient should sign contract/agreement that he will remain completely abstinent and agree to get involved with AA again. Will need to talk with team to discuss current substance misuse policy in regards to patient's situation.    Patient understands risks and benefits of Mechanical Circulatory Support: Yes     Recommendation:    Conditional; upon further discussion with team and neuropsychology consult.    Signature: Elisha Lei     Title: Licensed Independent Clinical                Interview Date: April 11, 2017

## 2017-04-12 NOTE — PLAN OF CARE
Problem: Cardiac Output Decreased (Adult)  Goal: Adequate Cardiac Output/Effective Tissue Perfusion  Patient will demonstrate the desired outcomes by discharge/transition of care.   Outcome: Improving  D:CHF exacerbation, SWAN in place. Milrinone & lasix gtt to improve CO.   I/A: HR: 's. 100% v-paced. Several VT runs noted about 2 am while pt was standing to use the urinal. Asymptomatic, resolved once pt laid down. MD aware. MAP's 's. Adequate UO. Laxis gtt 15 mg. K+ replaced x1. SWAN intact. CO: 4.6, CI: 2.6, SVR: 1544.7, CVP: 11, post Milrinone increase to 0.375. O2 via NC applied overnight for dipping sats while sleeping, pt wears BiPAP at home, refused BiPAP hospitalized. Pt slept well overnight.    P: Q4hr HOWARD. Monitor cardiovascular status. LVAD work up continued.

## 2017-04-12 NOTE — PROGRESS NOTES
During transfer from bed to chair side port of SWAN with Milrinone noted to fall out of place, port covered with tegaderm, no bleeding at site, Milrinone relocated to CVP port.  MD notified immediately.  ICARE entered.  Plan for PICC placement stat and SWAN removal; catheter pieces retained for  notification.  Will continue to assess/monitor.

## 2017-04-12 NOTE — ADDENDUM NOTE
Encounter addended by: Saritha Barrera EP on: 4/12/2017 12:12 PM<BR>     Actions taken: Sign clinical note

## 2017-04-12 NOTE — PLAN OF CARE
Problem: Goal Outcome Summary  Goal: Goal Outcome Summary  Outcome: No Change  Pt A&Ox4, afebrile, using call light appropriately.  SWAN removed r/t side port disconnecting from catheter, PICC line placed, SWAN removed without incident.   CVP 10-13; CO 3.6-4.7, CI 2.0-2.7, SVR 5903-8240, -290, SV 44-55; SVO2 59-64.   Bedside ECHO done, Sputum Cx sent.  Ambulating in room frequently, SBA.  Milrinone continues at 0.375, Lasix at 14, additional 40mg Lasix this afternoon.  Will continue to monitor and update team as needed.

## 2017-04-12 NOTE — PLAN OF CARE
Problem: Goal Outcome Summary  Goal: Goal Outcome Summary  OT 4E Cx Pt busy with many providers today, sleeping soundly at return in PM.

## 2017-04-12 NOTE — PROGRESS NOTES
"[facility]  Cardiology II Service  Daily Note  4/12/2017    Interval History:   No acute events overnight, CI dropped <2 to 1.6 and milrinone was increased from 0.3 --> 0.375. No complaints of pain or SOB this morning. Walker and his wife Annia are very interested in pursuing the LVAD. To this end underwent MONIK, CT scans, and ultrasound. Hoping to speak with neuropsychology today. PICC placed, plan to remove swan this afternoon.    Objective:    CVP: 10  PAP: 61/25  PCWP: 20  SvO2: 68  CO: 4.7  CI: 2.7  SVR: 1494  PVR: 289    Intake/Output Summary (Last 24 hours) at 04/12/17 0913  Last data filed at 04/12/17 0700   Gross per 24 hour   Intake          1814.81 ml   Output             2675 ml   Net          -860.19 ml     Vitals:    04/09/17 0000 04/10/17 0400 04/12/17 0300   Weight: 68.9 kg (151 lb 14.4 oz) 67.6 kg (149 lb 0.5 oz) 65.1 kg (143 lb 8.3 oz)     Pertinent Medications:  Drips:   Milrinone 0.3 --> 0.375 mcg/Kg/min  Lasix 15 mg/hr     Other Meds:  Carvedilol 6.25mg BID  Hydralazine 75mg/50mg/75mg  Isosorbide mononitrate 60mg qhs  Clopidogrel 75mg holding for LVAD  Potassium chloride 20 mEq BID, extra 80 mEq IV    Examination:  /69  Temp 98.6  F (37  C) (Oral)  Resp 16  Ht 1.727 m (5' 8\")  Wt 65.1 kg (143 lb 8.3 oz)  SpO2 98%  BMI 21.82 kg/m2    GEN: A&O, healthy appearing male, sitting comfortably in chair, NAD, cooperative and conversational   HEENT: mucus membranes slightly dry  NECK: swan catheter in place in right IJ surrounding site c/d/i  RESP: CTA bilaterally, no wheezing, no crackles, no increased work of breathing   CV: Regular, normal rate, S1 and S2 without m/r/g   ABD: Soft, nontender to palpation, slight distention improved from previous, +BS, no guarding  EXT: stable 1+ bilateral LE edema  NEURO: alert and oriented, fluent speech, no apparent focal deficits  SKIN: no rash on limited exam, bruising stable    Data:  CMP    Recent Labs  Lab " 04/12/17 0311 04/11/17  2144 04/11/17  1512 04/11/17  0508 04/10/17  1537 04/10/17  0830 04/10/17  0308  04/06/17  1813     --  139 137 135 137 137  < > 130*   POTASSIUM 3.6 4.4 3.0* 3.3* 4.3 3.6 3.3*  < > 3.7   CHLORIDE 99  --  98 96 96 97 96  < > 90*   CO2 32  --  33* 32 32 30 31  < > 26   ANIONGAP 8  --  8 9 7 10 11  < > 13   *  --  105* 105* 124* 192* 122*  < > 96   BUN 50*  --  47* 55* 60* 59* 61*  < > 102*   CR 1.48*  --  1.32* 1.34* 1.44* 1.47* 1.48*  < > 2.08*   GFRESTIMATED 46*  --  53* 52* 48* 46* 46*  < > 31*   GFRESTBLACK 56*  --  64 62 58* 56* 56*  < > 38*   LEIDY 8.3*  --  8.6 8.5 8.4* 8.2* 8.2*  < > 8.2*   MAG 2.1  --  2.2 2.1  --   --  2.2  < > 2.5*   PHOS 3.1  --   --   --   --  2.4*  --   --   --    PROTTOTAL 6.8  --   --   --   --   --   --   --  7.1   ALBUMIN 3.0*  --   --   --   --   --   --   --  3.2*   BILITOTAL 1.3  --   --   --   --   --   --   --  1.0   ALKPHOS 72  --   --   --   --   --   --   --  80   AST 17  --   --   --   --   --   --   --  23   ALT 14  --   --   --   --   --   --   --  19   < > = values in this interval not displayed.  CBC    Recent Labs  Lab 04/12/17  0311 04/10/17  0308 04/09/17  0355 04/07/17  0821 04/06/17  1813   WBC 8.5  --   --  5.3 5.2   RBC 3.55*  --   --  3.91* 3.72*   HGB 11.9* 11.0* 11.3* 13.1* 12.3*   HCT 36.3*  --   --  39.5* 37.4*   *  --   --  101* 101*   MCH 33.5*  --   --  33.5* 33.1*   MCHC 32.8  --   --  33.2 32.9   RDW 17.7*  --   --  18.0* 17.4*   *  --  126* 160 149*     INR    Recent Labs  Lab 04/12/17  0311 04/11/17  0508 04/10/17  0308 04/09/17  0355   INR 1.09 1.08 1.13 1.14     Arterial Blood Gas    Recent Labs  Lab 04/12/17  0830 04/12/17  0311 04/11/17  2323 04/11/17  2037   O2PER 21 4L 4L 21       Imaging Studies:  CXR 4/12:  Impression:   1. Increased perihilar opacities, favor pulmonary edema.  2. Stable small pleural effusions with associated  atelectasis/consolidation.    Assessment and Plan  Perez Villalobos  is a 76 year old male with history of ICM s/p multiple stents and MI, HFrEF (EF 35% ECHO 2/2017), HTN, HLD, hypothyroidism, and recent onset CKD who presents with weight gain (~10 pounds) and increased LE edema consistent with acute CHF exacerbation. Now day 3 post swan placement, have started milrinone with increase in CI. Diuresing with IV lasix. Replacing potassium with IV and oral. Ongoing discussions about VAD therapy.     Plan for today:  -Milrinone increased to 0.375 mcg/kg/min  -PICC placement, removal of swan  -Concern for possible apical pneumothorax, repeating CXR  -Continuing lasix drip at 15 mg/hr    #) ICM s/p CRT-ICD  #) CAD s/p 13 stents and past MI  #) End stage HFrEF (EF 35% on ECHO 2/2017):  Multiple recent hospitalizations for CHF exacerbations which portends poor prognosis moving forward. Had swan catheter placed 4/7, hemodynamics showed elevated wedge and SVR with low CI/CO. Since beginning milrinone, his hemodynamics have improved (currently on 0.375 increased overnight 4/11 -> 4/12). Goal is to increase milrinone as able while monitoring for arrhythmia. Was not having vigorous response to intermittent lasix, currently on drip 15 mg/hr, goal net negative 1-2L. VAD education ongoing, Walker would be a good candidate for VAD therapy given preserved RV function and his level of underlying heart failure. This of course would depend upon his preferences. PICC placed 4/12 with plan to remove swan.  -Hemodynamic monitoring q4h, currently stable  -Milrinone 0.30 --> 0.375 mcg/Kg/min CI > 2.0 since  -Lasix gtt 15 mg/hr  -Continuing home hydralazine 75 mg/50 mg/75 mg  -Decreased home coreg to 6.25 mg BID  -Isosorbide mononitrate 60 mg qhs     #) JACINTO on CKD:  CKD appears to be related to episode of rhabdomyolysis with baseline around 1.5-2.0. Acute worsening likely related to pre-renal physiology from poor perfusion. Has gradually trended down since admission with improvement of cardiac output and diuresis,  currently within recent baseline, will continue to monitor.  -Trend Cr  -Lasix gtt     #) Hyponatremia: resolved  Chronic issue with sodium in the low 130's, currently 136. Likely hypervolemic from CHF exacerbation, has improved with diuresis. Continuing to monitor.  -BMP in AM/PM    #) Hypokalemia:  Developed after starting high dose lasix therapy. Replacing orally and IV, being judicious given resolving kidney injury.  -20 mEq IV 4/12  -20 mEq oral BID     #) Hypothyroidism: Stable  Continuing home levothyroxine     #) Chronic normocytic anemia: Stable  Likely from renal disease, will monitor.     FEN: 2L fluid restriction, 2g sodium diet  Prophylaxis: DVT: subq heparin GI: none  Disposition: requires ongoing ICU cares for hemodynamic monitoring  Code Status: FULL CODE     Patient discussed with attending Dr. Anton Argueta.    Mike Rivas MD  Internal Medicine, PGY-1  Pager [phone]    I have reviewed today's vital signs, notes, medications, labs and imaging.  I have also seen and examined the patient and agree with the findings and plan as outlined above. Pt without complaints.  VSS with /69 on milrinone 0.375 mcg/kg/min.  CVP 11 and CI 2.6 with 3.4 L UO.  Lungs clear and S1 and S2 with soft HSM and diastolic murmur.  Abd is benign.  Labs with Cr 1.48 and WBC 8.5.  Assessment: Pt with cardiogenic shock stabilized on milrinone.  Plan to cont to workup for LVAD.  Continue current therapies.  Total critical care time 30 min.     Anton Argueta MD, PhD  Professor, Heart Failure and Cardiac Transplantation  [facility]

## 2017-04-12 NOTE — PROGRESS NOTES
Care Coordinator Progress Note     Admission Date/Time:  4/6/2017  Attending MD:  Anton Argueta MD     Data  Chart reviewed, discussed with interdisciplinary team.   Patient is with history of ICM s/p multiple stents and MI, HFrEF (EF 35% ECHO 2/2017), HTN, HLD, hypothyroidism, and recent onset CKD who presents with weight gain (~10 pounds) and increased LE edema consistent with acute CHF exacerbation.       Assessment  Pt transferred to ICU on 4/7 after RHC. See floor CC note on 4/7 for full assessment.   Pt remain in ICU for hemodynamic monitoring.  Per chart review and morning report, pt is undergoing LVAD work up/ evaluation.  CC visited with pt for support.  Pt stated the team have been updating him well about the plan of care.  Pt stated he has been very busy on meeting different teams and getting procedures done.      Plan  Anticipated Discharge Date:  TBD.  Anticipated Discharge Plan:   TBD.  CC will cont to follow plan of care.      Shaneka Jones RN, BSN  4A and 4E/ ICU  Care Coordinator  Phone: [phone]  Pager: [phone]

## 2017-04-12 NOTE — PROGRESS NOTES
Cardiac Rehab Discharge Summary    Reason for therapy discharge:    Change in medical status.    Progress towards therapy goal(s). See goals on Care Plan in [facility] electronic health record for goal details.  Goals not met.  Barriers to achieving goals:   limited tolerance for therapy.    Therapy recommendation(s):    Continued therapy is recommended.  Rationale/Recommendations:  Pt in preparation for VAD placement..

## 2017-04-13 NOTE — CONSULTS
NEUROPSYCHOLOGICAL EVALUATION      DATE OF EVALUATION:  2017.       RELEVANT HISTORY AND REASON FOR REFERRAL:  Perez Villalobos is a 76-year-old  white man with a history of MI, congestive heart failure, stage 3 chronic kidney disease, coronary artery disease, peripheral artery disease and obstructive sleep apnea, now with worsening heart failure.  He was admitted on 2017 for cardiac tune up, and to be considered for destination LVAD.  This neuropsychological evaluation is requested by the cardiac team, as a routine part of the LVAD workup, to assess cognitive and emotional functioning, as well as lifestyle issues, as it pertains to his suitability for this intervention.      He was born in [place] but grew up in [place], adopted in infancy.  His parents operated a confectionary store; his mother also held office jobs and sold [employer] products.  His father  young, around age 56, of pancreatitis.  Ms. Villalobos did well in school, attending the [employer] and [employer], ultimately earning a Bachelor's degree in physics and math.  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  He lives in [place] with Annia, his wife of 52 years. Their three sons live in [place], [place], [place], [place] and [place], [place].       BEHAVIORAL OBSERVATIONS AND CLINICAL INTERVIEW FINDINGS:  I initially met with him for the interview portion of this evaluation on 2017.  He was sitting up having a late lunch when I arrived.  His wife was present and was included in the interview, per his preference.  Mr. Villalobos presents as an older gentleman with very short cropped hair and bald sanchez, clean-shaven, dressed in pajama bottoms and a gown.  A Lorimor catheter was in place and multiple IVs were running including milrinone.  He was alert and lucid, but sometimes a little slow to gather thoughts and come up with the answers to my questions.  His wife helped a bit  with that.      Please see the cardiology notes for a detailed account of the cardiac history.  Briefly, innumerable stents were placed pre and post a 2007 MI.  He has a pacemaker/defibrillator in place which has not fired.  He underwent endarterectomy.  There have been many hospitalizations elsewhere over the last six months, mostly for heart failure.  During one of these hospitalizations, on 11/16/2016, heart rate slowed before he went into a V-fib arrest, requiring shocks and a few minutes of CPR.  A second arrest occurred shortly thereafter, but he was revived quickly on that occasion.  A temporary pacer was placed before the permanent one was implanted.  He and his wife agree he had no cognitive sequelae from those events. The neurological history is otherwise negative for stroke symptoms, significant head traumas, seizures, other cardiac arrests, or any diseases involving the brain.  The psychiatric history is likewise unremarkable, per his reports.      There is a history of heavy drinking.  He was in the habit of having multiple large martinis (each containing 3-4 shots of distilled liquor) daily for a period of years until the early 1980s, when his wife gave him an ultimatum.  He then completed outpatient chemical dependency treatment at [facility], around 1983.  He had blackouts, but denies DWIs or work problems stemming from alcohol use. For several years he remained abstinent and active in AA.  But then he resumed use, in moderation at first, taking occasional drinks for special occasions.  Unfortunately, this progressed until he got just about back to his usual heavy drinking, right up until the time of this admission.  He was unable or unwilling to provide specific amounts; his forthright wife couldn't be sure of the amounts, as he was hiding bottles in the garage, trying to conceal his use.  They were in the process of discussing this before the cardiac problems reached a critical level, and he seems  agreeable to quitting the habit again.  In fact, the cardiologists have already discussed this with him, and he understands he'll need to remain abstinent into the future.  Otherwise, he was a smoker for 25-30 years, averaging half a pack a day, before quitting in the early 1980s.  Drug use was denied.      Testing was attempted that day but was short-lived, as he had the Ortley catheter removed just before my psychometrist arrived, and thereafter became very tired and unable to persist.  Testing was ultimately completed on 04/13/2017.  He was sitting up in the chair, quite lucid and pleasant on that occasion.  He made a good effort on everything and results are considered technically valid.      NEUROPSYCHOLOGICAL FINDINGS:  Select intellectual abilities were assessed with subtests from the Wechsler Adult Intelligence Scale-IV.  Speeded graphomotor learning (Coding) was low average.  Word knowledge and expressive communicability (Vocabulary) was average.  Visuospatial processing and constructional abilities (Block Design) were above average.      Memory performance was variable, but generally subnormal.  Immediate memory for two story passages from the Wechsler Memory Scale-Revised was borderline impaired, with 14 of 50 story elements recalled immediately after presentation.  Retention of the stories 30 minutes later was low average.  Word list learning (Ceasar Auditory Verbal Learning Test) was borderline impaired for learning over trials.  Performance was inconsistent across trials, and there was an interruption during the second trial.  Ultimately six of 15 words were learned by the last trial.  All of the originally learned material was recalled after a brief distractor exercise (average performance).  Free recall of the list 30 minutes later was borderline impaired, with two of 15 words recalled.  Seven of the 15 words were correctly selected from a paragraph format.  Immediate memory for figural material (four  designs from the WMS) was low average.  Free recall 30 minutes later was borderline impaired but some additional detail was remembered when cues were provided.  Just one of the figures was correctly recognized when presented in multiple-choice format.  His copy drawings of three Atkins-Gestalt were well-executed.  Two of the three figures were recalled immediately after presentation.      Performance on a simple numerical sequencing exercise (Trail Making Test, Part A), requiring sustained attention, was high average for speed and error free.  Performance on a more complex sequencing exercise (Trail Making Test, Part B), requiring divided attention (alternating between number and letter sequences), was above average for speed with one error involving loss of set.  Verbal associative fluency on the Controlled Oral Word Association Test (generating words beginning with target letters) was average.  A variety of brief exercises requiring sustained attention and cognitive flexibility  (Test of Sustained Attention and Tracking) were impaired for speed with three errors.  Single word reading, as measured by the Wide Range Achievement Test-4, was average at the high school level.  Finger tapping speeds were mildly impaired for the right (dominant) hand and low average for the left hand.  He had a monitor taped to his left index finger which may have slowed him down.      The MMPI-2-RF, a self-report personality measure, was completed in a timely manner.  All items were answered.  The validity indicators suggest consistent, candid responding and the results are considered technically valid.  There are physical symptoms of a  neurological sort besides general malaise.  There are no indications of behavioral or mood disturbance or more severe forms of psychopathology.  This is a slightly introverted, socially avoidant individual.  Coping resources appear reasonably robust.      CONCLUSIONS AND RECOMMENDATIONS:  This  76-year-old man has required numerous hospitalizations in recent months for heart failure.  There is a history of MI and he had a cardiac arrest while in the hospital last November, but fortunately was quickly revived.  He has a pacemaker/defibrillator and multiple stents.  Destination LVAD is now under consideration.  The main psychosocial concern is alcoholism.  He habitually drank multiple strong martinis every day for years until the early 1980s, when juancarlos completed outpatient CD treatment at [facility] circa 1983, after his wife gave him an ultimatum.  This resulted in several years of abstinence.  Unfortunately, he resumed drinking, moderate at first, escalating to recent use comparable to the heavy use of the past.  The exact amount is unclear, as the patient is vague on the subject, and endeavors to conceal his use from his wife, by hiding liquor bottles in the garage.  They agree he needs to get back into treatment and resume abstinence, and that message has been clearly sent by the cardiac team.  It is unclear how well Mr. Villalobos will be able to participate in CD treatment in the immediate aftermath of the LVAD, but eventually he should be able to participate.  I recommend a formal chemical dependency treatment evaluation and full compliance with all recommendations stemming from it.  Given the history and his medical condition, Mr. Villalobos should be striving for complete abstinence from alcohol.      Testing reveals mildly impaired memory and cognitive slowing on some timed tasks.  This is very likely due to severe heart failure and is potentially reversible with improved cardiac functioning.  Otherwise, there are no strong indications of focal or diffuse cerebral dysfunction or any signs of dementia.         Lara Ballard Psy.D., LP  Diplomate in Clinical Neuropsychology/[facility]     DIAGNOSTIC IMPRESSION:  Cognitive disorder in the context of heart failure.  This evaluation included approximately 2 hours  of testing administered by a psychometrist with interpretation by a neuropsychologist (CPT code 46465) and an additional 3 hours of professional time spent on the interview, data integration, record review and report preparation (CPT code 78520).         REMY RODRIGUEZ             D: 2017 15:13   T: 2017 16:04   MT: TATYANA      Name:     RON LUCERO   MRN:      [id number]        Account:       [id number]   :      1940           Consult Date:  2017      Document: [id number]

## 2017-04-13 NOTE — PROGRESS NOTES
"[facility]  Cardiology II Service  Daily Note  4/13/2017    Interval History:   No acute events overnight. Received one time dose of metolazone with good response. Cough continues, productive of clear sputum. Denies fevers, chills, and chest pain. Feeling stronger everyday. Walker and his wife Annia continue to be interested in pursuing LVAD for therapy for his heart failure.    Objective:    Intake/Output Summary (Last 24 hours) at 04/13/17 0826  Last data filed at 04/13/17 0800   Gross per 24 hour   Intake           2667.8 ml   Output             2160 ml   Net            507.8 ml     Vitals:    04/10/17 0400 04/12/17 0300 04/13/17 0400   Weight: 67.6 kg (149 lb 0.5 oz) 65.1 kg (143 lb 8.3 oz) 65.4 kg (144 lb 2.9 oz)     Pertinent Medications:  Drips:   Milrinone 0.375 mcg/Kg/min  Lasix 15 mg/hr     Other Meds:  Carvedilol 6.25mg BID  Hydralazine 75mg/50mg/75mg  Isosorbide mononitrate 60mg qhs  Clopidogrel 75mg holding for LVAD  Potassium chloride 20 mEq BID, extra 20 mEq    Examination:  /75  Temp 98.5  F (36.9  C) (Oral)  Resp 16  Ht 1.727 m (5' 8\")  Wt 65.4 kg (144 lb 2.9 oz)  SpO2 99%  BMI 21.92 kg/m2    GEN: A&O, healthy appearing male, sitting comfortably in chair, NAD, cooperative and conversational   HEENT: mucus membranes moist  NECK: swan catheter removed, site clean/dry/intact  RESP: CTA bilaterally, no wheezing, no crackles, no increased work of breathing   CV: Regular, normal rate, S1 and S2 without m/r/g   ABD: Soft, nontender to palpation, slight distention continues to improve from previous, +BS, no guarding  EXT: slightly decreased 1+ bilateral LE edema  NEURO: alert and oriented, fluent speech, no apparent focal deficits  SKIN: no rash on limited exam, bruising stable    Data:  CMP    Recent Labs  Lab 04/13/17  0353 04/13/17  0304 04/12/17  1928 04/12/17  1819 04/12/17  1637 04/12/17  0830 04/12/17  0311  04/10/17  0830  04/06/17  1813    " Results questioned - new specimen has been requestedNOTIFIED KELLEY SANTOS RN ON U4E AT 0330 4/13/17 BY   --  132* 129*  --  139  < > 137  < > 130*   POTASSIUM 3.4 Results questioned - new specimen has been requestedNOTIFIED KELLEY SANTOS RN ON U4E AT 0330 4/13/17 BY  3.6 10.0* >10.0Specimen markedly hemolyzed, potassium may be falsely elevated Critical Value called to and read back byRYLAN ROBERTSON RN 4E 1811 4/12/2017 BY AA* 4.0 3.6  < > 3.6  < > 3.7   CHLORIDE 97 Results questioned - new specimen has been requestedNOTIFIED KELLEY SANTOS RN ON U4E AT 0330 4/13/17 BY   --  102 94  --  99  < > 97  < > 90*   CO2 30 Results questioned - new specimen has been requestedNOTIFIED KELLEY SANTOS RN ON U4E AT 0330 4/13/17 BY   --  27 30  --  32  < > 30  < > 26   ANIONGAP 9 Results questioned - new specimen has been requestedNOTIFIED KELLEY SANTOS RN ON U4E AT 0330 4/13/17 BY   --  3 4  --  8  < > 10  < > 13   * Results questioned - new specimen has been requestedNOTIFIED KELLEY SANTOS RN ON U4E AT 0330 4/13/17 BY   --  148* 228*  --  119*  < > 192*  < > 96   BUN 49* Results questioned - new specimen has been requestedNOTIFIED KELLEY SANTOS RN ON U4E AT 0330 4/13/17 BY   --  41* 42*  --  50*  < > 59*  < > 102*   CR 1.38* Results questioned - new specimen has been requestedNOTIFIED KELLEY SANTOS RN ON U4E AT 0330 4/13/17 BY  1.39* Unsatisfactory specimen - hemolyzed Canceled, Test creditedNOTIFIED HIREN JAIN RN 1926 4/12/2017, WILL REDRAW BY AG Unsatisfactory specimen - hemolyzed Canceled, Test creditedNOTIFIED RYLAN ROBERTSON RN 4E 1811 4/12/2017 BY AA  --  1.48*  < > 1.47*  < > 2.08*   GFRESTIMATED 50* Results questioned - new specimen has been requestedNOTIFIED KELLEY SANTOS RN ON U4E AT 0330 4/13/17 BY   --  Not Calculated Canceled, Test creditedNOTIFIED HIREN JAIN RN 1926 4/12/2017, WILL REDRAW BY AG Not Calculated Canceled, Test creditedNOTIFIED RYLAN ROBERTSON RN 4E 2089  4/12/2017 BY [place]  --  46*  < > 46*  < > 31*   GFRESTBLACK 60* Results questioned - new specimen has been requestedNOTIFIED KELLEY SANTOS RN ON U4E AT 0330 4/13/17 BY [place]ANU[place]  --  Not Calculated Canceled, Test creditedNOTIFIED HIREN JAIN RN 1926 4/12/2017, WILL REDRAW BY AG Not Calculated Canceled, Test creditedNOTIFIED RYLAN ROBERTSON RN 4E 1811 4/12/2017 BY [place]  --  56*  < > 56*  < > 38*   LEIDY 8.3* Results questioned - new specimen has been requestedNOTIFIED KELLEY SANTOS RN ON U4E AT 0330 4/13/17 BY JANU[place]  --  5.9* 7.7*  --  8.3*  < > 8.2*  < > 8.2*   MAG 2.0 Results questioned - new specimen has been requestedNOTIFIED KELLEY SANTOS RN ON U4E AT 0330 4/13/17 BY JANU[place]  --   --  2.2 2.2 2.1  < >  --   < > 2.5*   PHOS  --   --   --   --   --  2.7 3.1  --  2.4*  --   --    PROTTOTAL  --   --   --   --   --   --  6.8  --   --   --  7.1   ALBUMIN  --   --   --   --   --   --  3.0*  --   --   --  3.2*   BILITOTAL  --   --   --   --   --   --  1.3  --   --   --  1.0   ALKPHOS  --   --   --   --   --   --  72  --   --   --  80   AST  --   --   --   --   --   --  17  --   --   --  23   ALT  --   --   --   --   --   --  14  --   --   --  19   < > = values in this interval not displayed.  CBC    Recent Labs  Lab 04/12/17  0311 04/10/17  0308 04/09/17  0355 04/07/17  0821 04/06/17  1813   WBC 8.5  --   --  5.3 5.2   RBC 3.55*  --   --  3.91* 3.72*   HGB 11.9* 11.0* 11.3* 13.1* 12.3*   HCT 36.3*  --   --  39.5* 37.4*   *  --   --  101* 101*   MCH 33.5*  --   --  33.5* 33.1*   MCHC 32.8  --   --  33.2 32.9   RDW 17.7*  --   --  18.0* 17.4*   *  --  126* 160 149*     INR    Recent Labs  Lab 04/13/17  0304 04/12/17  0311 04/11/17  0508 04/10/17  0308   INR 1.11 1.09 1.08 1.13     Arterial Blood Gas    Recent Labs  Lab 04/12/17  1637 04/12/17  1157 04/12/17  0830 04/12/17  0311   O2PER 21 21 21 4L       Imaging Studies:  CXR 4/13:  Impression:   1. Decreased pleural effusions with improved  atelectasis/consolidation.  2.  Stable perihilar opacities, pulmonary edema versus infection    Assessment and Plan  Perez Villalobos is a 76 year old male with history of ICM s/p multiple stents and MI, HFrEF (EF 35% ECHO 2/2017), HTN, HLD, hypothyroidism, and recent onset CKD who presents with weight gain (~10 pounds) and increased LE edema consistent with acute CHF exacerbation. [place] now removed (4/12) and stable milrinone dose 0.375. Diuresing with IV lasix. Replacing potassium with IV and oral. Ongoing discussions about VAD therapy.     Plan for today:  - Extra 20 mEq potassium  - Spacing hemodynamics to q8h  - Checking procalcitonin due to ongoing cough  - No changes to milrinone and lasix  - Ongoing pre-LVAD workup (CT chest, PSA, plasma free hemoglobin, PFTs)    #) ICM s/p CRT-ICD  #) CAD s/p 13 stents and past MI  #) End stage HFrEF (EF 35% on ECHO 2/2017):  Multiple recent hospitalizations for CHF exacerbations which portends poor prognosis moving forward. Had swan catheter placed 4/7, hemodynamics showed elevated wedge and SVR with low CI/CO. Since beginning milrinone, his hemodynamics have improved (currently on 0.375 increased overnight 4/11 -> 4/12). Goal is to increase milrinone as able while monitoring for arrhythmia. Was not having vigorous response to intermittent lasix, currently on drip 15 mg/hr, goal net negative 1-2L. VAD education ongoing, Walker would be a good candidate for VAD therapy given preserved RV function and his level of underlying heart failure. This of course would depend upon his preferences. PICC placed 4/12 with plan to remove swan.  -Hemodynamic monitoring spacing to q8h, currently stable  -Milrinone 0.375 mcg/Kg/min CI > 2.0 since  -Lasix gtt 15 mg/hr  -Continuing home hydralazine 75 mg/50 mg/75 mg  -Decreased home coreg to 6.25 mg BID  -Isosorbide mononitrate 60 mg qhs     #) JACINTO on CKD:  CKD appears to be related to episode of rhabdomyolysis with baseline around 1.5-2.0. Acute worsening likely related to  pre-renal physiology from poor perfusion. Has gradually trended down since admission with improvement of cardiac output and diuresis, currently within recent baseline, will continue to monitor.  -Trend Cr  -Lasix gtt     #) Hyponatremia: resolved  Chronic issue with sodium in the low 130's, currently stable. Likely hypervolemic from CHF exacerbation, has improved with diuresis. Continuing to monitor.  -BMP in AM/PM while diuresing    #) Hypokalemia:  Developed after starting high dose lasix therapy. Replacing orally and IV, being judicious given resolving kidney injury.  -20 mEq IV 4/13  -20 mEq oral BID    #) Cough:  Productive of clear sputum, no fevers/chills. CXR with some question of concern for infection. Will obtain procalcitonin and consider antibiotics based on results.  -Procalcitonin     #) Hypothyroidism: Stable  Continuing home levothyroxine     #) Chronic normocytic anemia: Stable  Likely from renal disease, will monitor.     FEN: 2L fluid restriction, 2g sodium diet  Prophylaxis: DVT: ambulation GI: none  Disposition: requires ongoing ICU cares for hemodynamic monitoring  Code Status: FULL CODE     Patient discussed with attending Dr. Anton Argueta.    Mike Rivas MD  Internal Medicine, PGY-1  Pager [phone]    I have reviewed today's vital signs, notes, medications, labs and imaging.  I have also seen and examined the patient and agree with the findings and plan as outlined above.  Pt feeling better on milrinone.  Spouse at bedside.  98.5 and /69 on lasix 15 mg/hr and milrinone 0.375 mcg/kg/min.  Lungs clear and S1 and S2 with soft diastolic m.  Abd is benign.  Labs with Cr 1.38.  Assessment: Pt with cardiogenic shock with support with milrinone.  Plan to continue to workup for LVAD.  Total critical care time 30 min.     Anton Argueta MD, PhD  Professor, Heart Failure and Cardiac Transplantation  [facility]

## 2017-04-13 NOTE — PLAN OF CARE
Problem: Goal Outcome Summary  Goal: Goal Outcome Summary  OT 4E: Pt busy with multiple procedures throughout the day and planned to go for CT upon later attempt. Therapist provided pt with LVAD CR Folder. Therapist educated pt on signs and symptoms of exercise intolerance and use of OMNI and SOB scale throughout exercise. Therapist discussed sternal precautions and ways to adapt, grade, and modify ADLs in addition the importance of strengthening LEs to maintain independence with functional transfers. Pt educated on recommendations for daily ambulation. Pt provided with LE ergometer to complete later this PM to engage in aerobic exercise.      Defer discharge recommendations at this.

## 2017-04-13 NOTE — PROGRESS NOTES
CLINICAL NUTRITION SERVICES - BRIEF NOTE    Follow-up with patient/wife for pre VAD planning consult.    INTERVENTIONS  Recommendations / Nutrition Prescription  Continue 2 gram sodium diet, 2000 ml fluid restriction and strawberry or vanilla Ensure Plus shakes between meals.    Implementation  Nutrition Education- Reviewed low sodium diet information and provided low sodium recipe booklet. Discussed nutrition recs if to receive VAD including increased protein needs and trying smaller, more frequent meals if experience early satiety.       Zuleika Huitron RD, LD (pager [phone])  RD will continue to follow

## 2017-04-13 NOTE — PLAN OF CARE
Problem: Cardiac Output Decreased (Adult)  Goal: Adequate Cardiac Output/Effective Tissue Perfusion  Patient will demonstrate the desired outcomes by discharge/transition of care.   Outcome: Improving  D:CHF exacerbation. R heart cath 04/07/17. Milrinone & lasix gtt to improve CO. LVAD workup.   I/A: HR: 's. 100% v-paced. MAP's 's. Adequate 100 mL/hr, metolazone x1 overnight. Laxis gtt 15 mg. Peripheral edema improving. K+ replaced x2, Mg replaced x1. Hemostasis obtained at PICC site, sterile dressing placed.RA while awake, BIPAP with 2 Lpm while sleeping. Pt slept well. No acute events overnight.   P: Monitor cardiovascular status. LVAD work up continued.

## 2017-04-14 NOTE — PROGRESS NOTES
Met with Perez and his wife-Annia.     Reviewed role of I in his Milrinone therapy.  Discussed RN will visit tomorrow at the hospital to have Annia (states she is a retired ED nurse) hook up his Milrinone, review CADD pump function and alarms, potential PICC complications and corrective actions and cardiac monitoring.  Reviewed that they will have a back up pump and that should go with them whenever they leave the home  Both verbalize understanding of information presented.  Given contact information for [facility].  They will be seen by [facility] RN after they receive training by the [facility] nurse.

## 2017-04-14 NOTE — PLAN OF CARE
Problem: Goal Outcome Summary  Goal: Goal Outcome Summary  Outcome: No Change     Denies discomfort or dyspnea. Has harsh, nonproductive cough. Monitor reveals 100% paced rhythm. CVP 12 per picc. Up to use urinal frequently~steady gait when up with assistance. See flow sheets for other assessments. Will continue to closely monitor; keeping MDs as well as family updated.

## 2017-04-14 NOTE — PROGRESS NOTES
Care Coordinator- Discharge Planning     Admission Date/Time:  4/6/2017  Attending MD:  Anton Argueta MD     Data  Chart reviewed, discussed with interdisciplinary team.   Patient was admitted for: acute CHF exacerbation.     Assessment  I was informed by charge nurse and cards 2 team, that the plan is to d/c pt home with IV Milrinone and cont. Evaluating LVAD candidacy and work up.  Pt will need home infusion arrangement and line care education.    CC visited pt and spouse to discuss about the d/c plan.  Pt and spouse stated the team have been updating them well about the plan of care.  Pt and spouse expressed some disappointment for not being able to get the LVAD at this admission but verbalized understanding of the plan.  CC discussed about home infusion.  Pt and spouse stated they would like to stay in the  system and request referral to be send to [facility].  Referral send to [facility] and request coverage info.  [facility] reported pt has 100% coverage.  CC shared coverage info with pt and spouse.  CC discussed with pt and spouse about home nursing visit and coverage.  Pt is not home bound and will not have nursing coverage (will be private pay for nursing).  Pt and spouse declined for having a nurse visit arranged.  Pt spouse is a nurse and she will assist if needed.  Pt stated he goes to [facility] and would like to have his dressing change there if he can.  CC called [facility] # [phone] and request if pt can have PICC line dressing change.  [facility] nurse stated they don't do dressing change at their clinic and suggested to contact their infusion center.   CC called [facility] infusion center #[phone] and request to have once a week PICC line dressing change.  [facility] agreed to do the dressing change and scheduled pt for every Thursday 9:00 am.  His 1st appointment will be 4/20/17 at 9:00.  [facility] nurse stated if pt needs lab drawn,  order need to be faxed to # [phone].    CC called PLC and arranged PICC line and infusion teaching for tomorrow, 4/15 at 9:00.    CC called [facility] liaison, Candy # [phone] and request if she can come to the unit and meet pt and family.  Candy agreed to see pt and spouse today around 2:00.    CC visited pt and spouse again and reviewed the above plans.  Pt and spouse agreed with the plan.    CC shared the above plan with bed side RN, charge nurse and cards 2 team.  Pt  might need to come back to the clinic on Monday to have Ng placement.  Pt will need double lumen Ng per [facility]. Cards 2 team have been informed.  Cards 2 team stated they might keep the PICC for a while, and do Ng in a few weeks but decision haven't made yet.  CC instructed pt wife to cancel his weekly infusion center dressing appointments if he gets Ng or if she start doing his dressing changes.  Pt spouse agreed.    Coordination of Care and Referrals: Provided patient/family with options for Home infusion company.     Plan  Anticipated Discharge Date: 1-2 day.  Anticipated Discharge Plan:  Home with IV inotrope.    -[facility] will provide the medication and supplies.    -[facility] infusion center will do weekly PICC line dressing change # [phone], fax # [phone].  -If pt needs lab drawn, order need to be faxed to [facility] infusion center fax # [phone].  -Pt will have PICC line care and infusion teaching tomorrow, 4/15 at 9:00.  -Medication order need to be signed early in the morning on the day of d/c for home infusion company to come to the hospital and connect pt home pump/med.  -Pt spouse will provide transport.      Shaneka Jones RN, BSN  4A and 4E/ ICU  Care Coordinator  Phone: [phone]  Pager: [phone]

## 2017-04-14 NOTE — TELEPHONE ENCOUNTER
I received a call from Shaneka (Care Coordinator from [facility]- [phone]). Patient is being discharged with a PICC line. Shaneka was asking if we could do patient's PICC line dressing changes. I informed her that we are not able to do PICC line dressing changes. I reviewed this with Lin our supervisor. We recommended that she call the infusion center to see if they can do it. I gave Shaneka the number for our infusion center in our building.     TGarbers RN  [facility]

## 2017-04-14 NOTE — PLAN OF CARE
Problem: Goal Outcome Summary  Goal: Goal Outcome Summary  Outcome: No Change  DX:HF     D/I: 75 y/o male with extensive cardiac hx, admitted with reoccurring HF exacerbation and LVAD workup. Patient was told by providers today he would be discharged home with milrinone gtt after education on at-home pump and drug dosages adjusted to maintain current diuretic effect from inpatient. Patient and wife were told their insurance would not cover surgery at this point due to potential effect of alcoholism on his heart health. Patient has been advised to maintain sobriety for four month period, and then reevaluate heart function and possible recovery of cardiac function. Patient will be monitored regularly per cardiologists recommendations during outpatient period. Cardiac rhythm 100% paced without ectopy. BRITNI persists at +3 in legs & ankles. Milrinone gtt at flat rate of 0.375 mcg/kg/min. Non-productive congested cough. GI/ WNL.      A: Patient and spouse disappointed regarding having to prolong surgery, but state understanding. Patient appears happy with current state of health since addition of milrinone.     P: transfer to . [facility] contacted, but yet to provide education. PLC to provide PICC line education tomorrow at 0900. Continue plan of care as ordered.     Nathen DUTTAN, RN, CCRN  4E

## 2017-04-14 NOTE — PLAN OF CARE
Problem: Goal Outcome Summary  Goal: Goal Outcome Summary  OT 4E: Pt progressing in activity tolerance. Pt ambulated hallway 20 minutes with CGA and FWW with x1 rest break. Pt's HR 94 O2 98% on RA BP 120s/60s. Pt demonstrated carryover on education discussed in previous session.      Defer discharge recommendations at this time pending medical course. If pt does not have LVAD placed, pt may benefit from FWW for safety with functional mobility and OP Cardiac Rehab to maintain strength and endurance.

## 2017-04-14 NOTE — PROGRESS NOTES
"[facility]  Cardiology II Service  Daily Note      Interval History:   - No acute events overnight, no chest pain or SOB  - Discussed at the transplant meeting this morning; plan is to wait for four months for him to demonstrate abstainance while he is closely monitored out pt, and then will resume consideration for LVAD      Objective:    Intake/Output Summary (Last 24 hours) at 04/14/17 1339  Last data filed at 04/14/17 1300   Gross per 24 hour   Intake          2300.35 ml   Output             5060 ml   Net         -2759.65 ml         Vitals:    04/12/17 0300 04/13/17 0400 04/14/17 0400   Weight: 65.1 kg (143 lb 8.3 oz) 65.4 kg (144 lb 2.9 oz) 63.8 kg (140 lb 10.5 oz)     Pertinent Medications:  Drips:   Milrinone 0.375 mcg/Kg/min  Lasix 15 mg/hr - changing to home dose Bumex    Other Meds:  Carvedilol 6.25mg BID  Hydralazine 75mg/50mg/75mg  Isosorbide mononitrate 60mg qhs  Clopidogrel 75mg holding for LVAD  Potassium chloride 20 mEq BID, extra 20 mEq    Examination:  /72  Temp 98.5  F (36.9  C) (Oral)  Resp 18  Ht 1.727 m (5' 8\")  Wt 63.8 kg (140 lb 10.5 oz)  SpO2 97%  BMI 21.39 kg/m2    GEN: A&O, healthy appearing male, sitting comfortably in chair, NAD, cooperative and conversational   HEENT: mucus membranes moist  NECK: swan catheter removed, site clean/dry/intact  RESP: CTA bilaterally, no wheezing, no crackles, no increased work of breathing   CV: Regular, normal rate, S1 and S2 without m/r/g   ABD: Soft, nontender to palpation, slight distention continues to improve from previous, +BS, no guarding  EXT: slightly decreased 1+ bilateral LE edema  NEURO: alert and oriented, fluent speech, no apparent focal deficits  SKIN: no rash on limited exam, bruising stable    Data:  CMP    Recent Labs  Lab 04/14/17  0353 04/13/17  1556 04/13/17  1036 04/13/17  0353 04/13/17  0304  04/12/17  0830 04/12/17  0311  04/10/17  0830    134  --  136 Results questioned - " new specimen has been requestedNOTIFIED KELLEY SANTOS RN ON U4E AT 0330 4/13/17 BY   < >  --  139  < > 137   POTASSIUM 3.5 3.8 3.7 3.4 Results questioned - new specimen has been requestedNOTIFIED KELLEY SANTOS RN ON U4E AT 0330 4/13/17 BY   < > 4.0 3.6  < > 3.6   CHLORIDE 92* 93*  --  97 Results questioned - new specimen has been requestedNOTIFIED KELLEY SANTOS RN ON U4E AT 0330 4/13/17 BY   < >  --  99  < > 97   CO2 31 31  --  30 Results questioned - new specimen has been requestedNOTIFIED KELLEY SANTOS RN ON U4E AT 0330 4/13/17 BY   < >  --  32  < > 30   ANIONGAP 12 10  --  9 Results questioned - new specimen has been requestedNOTIFIED KELLEY SANTOS RN ON U4E AT 0330 4/13/17 BY   < >  --  8  < > 10   * 190*  --  105* Results questioned - new specimen has been requestedNOTIFIED KELLEY SANTOS RN ON U4E AT 0330 4/13/17 BY   < >  --  119*  < > 192*   BUN 52* 51*  --  49* Results questioned - new specimen has been requestedNOTIFIED KELLEY SANTOS RN ON U4E AT 0330 4/13/17 BY   < >  --  50*  < > 59*   CR 1.41* 1.51*  --  1.38* Results questioned - new specimen has been requestedNOTIFIED KELLEY SANTOS RN ON U4E AT 0330 4/13/17 BY   < >  --  1.48*  < > 1.47*   GFRESTIMATED 49* 45*  --  50* Results questioned - new specimen has been requestedNOTIFIED KELLEY SANTOS RN ON U4E AT 0330 4/13/17 BY   < >  --  46*  < > 46*   GFRESTBLACK 59* 54*  --  60* Results questioned - new specimen has been requestedNOTIFIED KELLEY SANTOS RN ON U4E AT 0330 4/13/17 BY   < >  --  56*  < > 56*   LEIDY 8.4* 8.4*  --  8.3* Results questioned - new specimen has been requestedNOTIFIED KELLEY SANTOS RN ON U4E AT 0330 4/13/17 BY JANUARY  < >  --  8.3*  < > 8.2*   MAG 2.5* 2.8*  --  2.0 Results questioned - new specimen has been requestedNOTIFIED KELLEY SANTOS RN ON U4E AT 0330 4/13/17 BY JANUARY  < > 2.2 2.1  < >  --    PHOS  --  2.7  --   --   --   --  2.7 3.1  --  2.4*   PROTTOTAL  --   --   --   --   --   --    --  6.8  --   --    ALBUMIN  --   --   --   --   --   --   --  3.0*  --   --    BILITOTAL  --   --   --   --   --   --   --  1.3  --   --    ALKPHOS  --   --   --   --   --   --   --  72  --   --    AST  --   --   --   --   --   --   --  17  --   --    ALT  --   --   --   --   --   --   --  14  --   --    < > = values in this interval not displayed.  CBC    Recent Labs  Lab 04/14/17  0353 04/12/17  0311 04/10/17  0308 04/09/17  0355   WBC 5.2 8.5  --   --    RBC 3.35* 3.55*  --   --    HGB 11.0* 11.9* 11.0* 11.3*   HCT 33.9* 36.3*  --   --    * 102*  --   --    MCH 32.8 33.5*  --   --    MCHC 32.4 32.8  --   --    RDW 16.9* 17.7*  --   --    * 125*  --  126*     INR    Recent Labs  Lab 04/14/17  0353 04/13/17  0304 04/12/17  0311 04/11/17  0508   INR 0.97 1.11 1.09 1.08     Arterial Blood Gas    Recent Labs  Lab 04/14/17  0636 04/13/17  1556 04/12/17  1637 04/12/17  1157   O2PER 2L 21 21 21       Imaging Studies:  CXR 4/13:  Impression:   1. Decreased pleural effusions with improved  atelectasis/consolidation.  2. Stable perihilar opacities, pulmonary edema versus infection    Assessment and Plan  Perez Villalobos is a 76 year old male with history of ICM s/p multiple stents and MI, HFrEF (EF 35% ECHO 2/2017), HTN, HLD, hypothyroidism, and recent onset CKD who presents with weight gain (~10 pounds) and increased LE edema consistent with acute CHF exacerbation. [place] now removed (4/12) and stable milrinone dose 0.375. Diuresing with IV lasix. Replacing potassium with IV and oral. Ongoing discussions about VAD therapy.     Plan for today:  - Arrange education for PICC line milrinone infusion as out pt  - Changing Lasix inf to home dose PO bumex while monitoring his volume stats  - Repeat echo today  - Prepare for d/c tomorrow  - Set up out pt follow up with core clinic in 2-3 days and Dr. Argueta at the earliest clinic appointment available    #) ICM s/p CRT-ICD  #) CAD s/p 13 stents and past MI  #) End stage  HFrEF (EF 35% on ECHO 2/2017):  Multiple recent hospitalizations for CHF exacerbations which portends poor prognosis moving forward. Had swan catheter placed 4/7, hemodynamics showed elevated wedge and SVR with low CI/CO. Since beginning milrinone, his hemodynamics have improved (currently on 0.375 increased overnight 4/11 -> 4/12). Goal is to increase milrinone as able while monitoring for arrhythmia. Was not having vigorous response to intermittent lasix, currently on drip 15 mg/hr, goal net negative 1-2L. VAD education ongoing.   - Discussed at transplant meeting on 4/14 - given hx of EtOH intake, plan is to monitor him closely as out pt while he remains on Milrinone and demonstrates abstainance.   - Will resume DT LVAD consideration at 4 months from now  -Milrinone 0.375 mcg/Kg/min CI > 2.0 since  -Lasix gtt being transitioned to PO bumex in anticipation of discharge  -Continuing home hydralazine 75 mg/50 mg/75 mg  -Decreased home coreg to 6.25 mg BID  -Isosorbide mononitrate 60 mg qhs     #) JACINTO on CKD:  CKD appears to be related to episode of rhabdomyolysis with baseline around 1.5-2.0. Acute worsening likely related to pre-renal physiology from poor perfusion. Has gradually trended down since admission with improvement of cardiac output and diuresis, currently within recent baseline, will continue to monitor.  -Trend Cr  -Monitor renal fxn and fluid status on bumex     #) Hyponatremia: resolved  Chronic issue with sodium in the low 130's, currently stable. Likely hypervolemic from CHF exacerbation, has improved with diuresis. Continuing to monitor.  -BMP in AM/PM while diuresing    #) Hypokalemia:  Developed after starting high dose lasix therapy. Replacing orally and IV, being judicious given resolving kidney injury.  -20 mEq IV 4/13  -20 mEq oral BID    #) Cough:  Productive of clear sputum, no fevers/chills. CXR with some question of concern for infection. Will obtain procalcitonin and consider antibiotics  based on results.  - Procalcitonin negative     #) Hypothyroidism: Stable  Continuing home levothyroxine     #) Chronic normocytic anemia: Stable  Likely from renal disease, will monitor.     FEN: 2L fluid restriction, 2g sodium diet  Prophylaxis: DVT: ambulation GI: none  Disposition: Likely d/c tomorrow  Code Status: FULL CODE     Patient discussed with attending Dr. Anton Argueta.    Yaya Goodwin MD  Cardiovascular Disease Fellow  Pager: [phone]      I have reviewed today's vital signs, notes, medications, labs and imaging.  I have also seen and examined the patient and agree with the findings and plan as outlined above.  Pt with spouse at bedside Afebrile with HR 82 and /70 on lasix 15 mg/hr and 0.375 mcg/kg/min milrinone.  4.3 L UO.  Lungs clear and S2 and S2 2with S3 and benign abd.  Labs with Cr 1.41 and WBC 5.2.  Assessment: Pt with cardiogenic shock on milrinone therapy.  Pt unable to tolerate uptitration of oral afterload reducing agents.  Plan to transition diuretic therapy.  Total critical care time 30 min.     Anton Argueta MD, PhD  Professor, Heart Failure and Cardiac Transplantation  [facility]

## 2017-04-14 NOTE — CONSULTS
Patient is on IR schedule1/17/17  for a single lumen tunneled central line placement for milrinone    Labs WNL for procedure.  Orders for NPO, scrubs and antibiotics have been entered.   Consent will be done prior to procedure.    Please contact the IR charge RN at [phone] for estimated time of procedure.     Cailin Lozano IR RPA  [phone] Call pager  [phone] pager

## 2017-04-15 NOTE — PLAN OF CARE
Patient completed in home infusion set up with Milrinone by [facility] staff, switched to home pump and Milrinone. Patient discharged unit 6C via wheelchair and with wife at 1645.

## 2017-04-15 NOTE — PROGRESS NOTES
Transferred at 2300  Transferred from: 4E  Via: wheelchair with ICU NST.  Reason for transfer:Pt appropriate for 6C- improved patient condition  Family: Aware of transfer, will be back in the morning.   Belongings: Sent with pt- placed at bedside and on window ledge.   Medications: Meds from bin sent with pt and placed in patient's bin  Report called from: JAIRO Mejia on 4E      Zehra Weinberg RN 04/14/17 11:47 PM

## 2017-04-15 NOTE — PLAN OF CARE
"Problem: Discharge Planning  Goal: Discharge Planning (Adult, OB, Behavioral, Peds)  4/15/17 I saw the patient(pt) and his wife in the room for PLC PICC/Milrinone via CADD Soils pump.Wife is a nurse.Pt and wife correctly returned CADD Li continuous pump skills,heparin flush every day to the other PICC lumen using [facility] supplies on PLC model. I also covered basic PICC troubleshooting.Pt and wife asking many relevant questions, able to answer \"teach back\",and verbalized of content present.PICC dressing and end cap change every seven days in clinic or per home care. Also see education flow sheet. All written material given and reviewed today's appointment .          "

## 2017-04-15 NOTE — PLAN OF CARE
Problem: Goal Outcome Summary  Goal: Goal Outcome Summary  Outcome: Adequate for Discharge Date Met:  04/15/17  D: Patient admitted for heart failure exacerbation and LVAD workup (now complete).   I/A: Alert, oriented. VSS. Denies pain. Rhythm: paced. Patient and wife completed PICC and in home infusion pump education. Up independently, voiding. Appetite: good. Discharge orders signed and paperwork reviewed with patient and wife at 1400; questions answered, patient appointments clarified with MD; patient to be seen in clinic on 4/17. IV out and telemetry off. PICC dressing C/D/I. Milrinone 0.375 mcg/kg/min running at 7.7ml/hr.   P: [facility] has been in contact with patient and family; plan for home infusion to set up home Milrinone at 1600.

## 2017-04-15 NOTE — PLAN OF CARE
Problem: Goal Outcome Summary  Goal: Goal Outcome Summary  OT 4E: Pt continues to progress in functional endurance for ADLs/IADLs. Pt seen for mobility and stairs assessment. Pt ambulated hallway with CGA. Pt with decreased step-length however improved balance noted from previous sessions. Pt navigated stairs to simulate home environment. Pt ascended/descended 10 stairs with CGA and use of railings. Recommend pt use FWW for community mobility to increase safety - pt has FWW at home.      Rec home with assistance of wife. Pt may benefit from OP Cardiac Rehab to maintain and progress functional endurance in preparation for potential LVAD.

## 2017-04-15 NOTE — PROGRESS NOTES
Pt transferred to 6C from .  Zehra vaca RN took report, Pt transferred off monitor with approval of cross-cover cards.  Pt transferred with all belongings, meds and chart.  Pt AVSS upon transfer.

## 2017-04-15 NOTE — PLAN OF CARE
Problem: Goal Outcome Summary  Goal: Goal Outcome Summary  Outcome: No Change  D:Heart Failure Exacerbation. LVAD workup- now complete.      I:Milrinone at 0.375mcg/kg/min, running at 7.7mL/hr. Part 2/2 K+ replaced with 20mEq per protocol. Home CPAP machine on while asleep.      A:A/Ox4. Denies pain. Lungs- diminished in bases. Paced Rhythm. Active bowel sounds. Good urine output. Mild edema BLE. Up with SBA. PIV. Double lumen PICC.   Temp:  [97.8  F (36.6  C)-98.5  F (36.9  C)] 97.8  F (36.6  C)  Heart Rate:  [81-94] 83  Resp:  [16-18] 16  BP: (108-137)/(64-94) 125/81  SpO2:  [95 %-100 %] 97 %     P: Teaching today at 9am. Continue to monitor and assess with reports of concerns to Cards 2 team.      Zehra Weinberg RN 04/15/17 6:54 AM     Hours of Care [phone]

## 2017-04-15 NOTE — DISCHARGE SUMMARY
Cardiology II Discharge Summary    Perez Villalobos MRN# [id number]   YOB: 1940 Age: 76 year old     Date of Admission:  4/6/2017  Date of Discharge:  4/15/2017  Admitting Physician:  Anton Argueta MD  Discharge Physician:  Anton Argueta MD  Discharging Service:  Cardiology II     Primary Provider: Hector Jc          Outpatient Providers To Do:   Follow up with Dr. Argueta on Monday 4/17/17 to discuss ongoing heart failure management and BMP check. Will need to discuss diuretics, will be transitioning to bumex 1 mg BID 4/16/17.  Follow up with [facility] on Wednesday 4/19/17 for ongoing management         Discharge Diagnosis:   Active Problems:    CHF exacerbation (H)               Discharge Disposition:   Discharged to home           Condition on Discharge:   Discharge condition: Stable   Code status on discharge: Full Code           Procedures:   Right heart catheterization  TTE  MONIK          Discharge Medications:     Current Discharge Medication List      START taking these medications    Details   milrinone (PRIMACOR) infusion 200 mcg/mL PREMIX Inject 25.5375 mcg/min into the vein continuous  Qty: 500 mL, Refills: 1    Associated Diagnoses: Chronic systolic heart failure (H)         CONTINUE these medications which have CHANGED    Details   carvedilol (COREG) 3.125 MG tablet Take 2 tablets (6.25 mg) by mouth 2 times daily (with meals)  Qty: 540 tablet, Refills: 3    Associated Diagnoses: Chronic systolic heart failure (H)         CONTINUE these medications which have NOT CHANGED    Details   calcitRIOL (ROCALTROL) 0.25 MCG capsule Take 0.25 mcg by mouth daily      hydrALAZINE (APRESOLINE) 50 MG tablet 1-1/2 tabs am, 1 tab at 3 pm and 1-1/2 tabs bedtime.  Qty: 360 tablet, Refills: 3    Associated Diagnoses: Essential hypertension with goal blood pressure less than 130/80      potassium chloride (KLOR-CON) 20 MEQ Packet 20 mEq by Oral or Feeding Tube route daily  Qty: 30  packet, Refills: 1    Associated Diagnoses: Chronic combined systolic and diastolic congestive heart failure (H)      aspirin EC 81 MG EC tablet Take 81 mg by mouth daily      levothyroxine (SYNTHROID/LEVOTHROID) 150 MCG tablet Take 150 mcg by mouth daily      Cyanocobalamin (B-12) 1000 MCG TBCR Take 1,000 mcg by mouth daily  Qty: 100 tablet, Refills: 0    Associated Diagnoses: Vitamin B 12 deficiency      multivitamin, therapeutic with minerals (MULTI-VITAMIN) TABS Take 1 tablet by mouth daily      bumetanide (BUMEX) 1 MG tablet 2mg am and 1 mg pm  Qty: 90 tablet, Refills: 3    Associated Diagnoses: Chronic combined systolic and diastolic congestive heart failure (H)      isosorbide mononitrate (IMDUR) 60 MG 24 hr tablet Take 1 tablet (60 mg) by mouth At Bedtime At hs  Qty: 30 tablet, Refills: 11    Associated Diagnoses: Chronic systolic heart failure (H); NSTEMI (non-ST elevated myocardial infarction) (H); Ischemic cardiomyopathy      Saline (SODIUM CHLORIDE) 0.65 % SOLN Spray in nostril as needed      KRILL OIL PO Take 1 capsule by mouth daily       clopidogrel (PLAVIX) 75 MG tablet Take 75 mg by mouth daily                   Consultations:   IR  Neuropsychiatry             Brief History of Illness:   Perez Villalobos is a 76 year old male with history of ICM s/p multiple stents and MI, HFrEF (EF 35% ECHO 2/2017), HTN, HLD, hypothyroidism, and recent onset CKD who presented with increasing shortness of breath, weight gain, and lower extremity edema consistent with acute decompensated heart failure. Had swan catheter placed and started on milrinone with titration to stable dose of 0.375 mcg/kg/min. Underwent LVAD workup, however given EtOH use history this will be deferred while he works towards abstinence. PICC placed for home milrinone therapy.           Hospital Course By Problem:   #) ICM s/p CRT-ICD  #) CAD s/p 13 stents and past MI  #) End stage HFrEF (EF 35% on ECHO 2/2017):  Multiple recent hospitalizations  for CHF exacerbations which portends poor prognosis moving forward. Had swan catheter placed 4/7, hemodynamics showed elevated wedge and SVR with low CI/CO. Since beginning milrinone, his hemodynamics have improved and he remained stable hemodynamically on dose of 0.375 mcg/kg/min. Monitoring at this dose did not reveal any concerning arrhythmias. PICC line was placed so Walker can receive in home milrinone infusion. LVAD workup was undertaken, however given his past and more recent EtOH use history it was decided he will demonstrate abstinence from EtOH over the next 4 months and resume workup after this period of time. For fluid removal was treated initially with lasix drip. Day of discharge remained slightly volume up, however there was concern he would become dehydrated if he continued his previous bumex dosing. To this end, he will decrease his bumex to 1 mg BID starting 4/16 and closely monitor his urine output and match it with fluid intake. Continued prior heart failure medications.       #) JACINTO on CKD:  CKD appears to be related to episode of rhabdomyolysis with baseline around 1.5-2.0. Acute worsening at presentation likely related to pre-renal physiology from poor renal perfusion. Gradually trended down after admission with improvement of cardiac output and diuresis, currently within recent baseline, will continue to monitor as an outpatient, BMP on Monday 4/17.      #) Hyponatremia:   Chronic issue with sodium in the low 130's, stable at presentation. Likely hypervolemic from CHF exacerbation, improved with diuresis to normal.     #) Hypokalemia:  Developed after starting high dose lasix therapy. Replaced orally and IV. Discharged on prior home potassium regimen with BMP check on Monday 4/17.     #) Cough:  Productive of clear sputum, no fevers/chills. CXR with some question of concern for infection, however procalcitonin was negative and no antibiotics were started.      #) Hypothyroidism:  Continued home  levothyroxine      #) Chronic normocytic anemia:  Likely from renal disease, remained stable.    Patient seen and discussed with attending Dr. Anton Rivas  Internal Medicine PGY1            Final Day of Progress before Discharge:     Patient Vitals for the past 12 hrs:   BP Temp Temp src Pulse Heart Rate Resp SpO2 Weight   04/15/17 0827 115/65 - - 88 - - - -   04/15/17 0723 118/67 97.8  F (36.6  C) Oral 79 78 18 - -   04/15/17 0344 125/81 97.8  F (36.6  C) Oral - 83 16 97 % 65 kg (143 lb 3.2 oz)   04/15/17 0023 - - - - - - 100 % -   04/15/17 0020 128/74 98  F (36.7  C) Oral - 83 16 - -       EXAM:  Constitutional: healthy appearing male, resting comfortably in bed in NAD   Cardiovascular: normal rate, regular rhythm, S1 and S2 without murmur/rub/gallop  Respiratory: clear bilaterally  Psychiatric: normal affect/mood  Neck: JVP elevated to upper third of neck, improved from admission  ENT: sclera anicteric, mucus membranes moist  Abdomen: soft, non tender, mildly distended improved from earlier, normoactive bowel sounds  NEURO: alert and oriented, fluent speech, no apparent focal deficits  SKIN: stable bruising over bilateral forearms  JOINT/EXTREMITIES: 1+ bilateral lower extremity edema improved from earlier         Data:  All laboratory data reviewed             Significant Results:     Recent Results (from the past 48 hour(s))   CT Chest w/o Contrast    Narrative    CT chest without contrast, 4/13/2017 4:33 PM.     Comparison: CT CAP  10/3/2015..    History:   Pre-LVAD workup.     Technique: Helical CT images from the thoracic inlet through the upper  abdomen were obtained without intravenous contrast, with axial and  coronal reformations. Images are displayed at 1 and 5 mm intervals.  Images reviewed in lung, soft tissue, and bone windows.     Total DLP: 294 mGy*cm.    Findings:   Lungs: Central airways are patent. No pneumothorax. Small bilateral  pleural effusions with associated  intralobular septal thickening and  bibasilar groundglass opacities. Mild peribronchial wall thickening. 9  mm nodule in the lateral right lower lobe (image 244 series 6),  unchanged since 2015. Additional bilateral sub-5 mm pulmonary nodules.    Chest: Cardiac size is mildly enlarged. Extensive coronary artery  calcifications/stents. Left chest cardiac device with lead tips in the  right atrium, right ventricle, coronary sinus. No pericardial  effusion. Main pulmonary artery is dilated measuring up to 3.4 cm in  diameter. Bilateral gynecomastia. Mildly prominent scattered  mediastinal lymph nodes, likely reactive.    Partially view of the upper abdomen. Cholecystectomy clips.    Bones: Chronic appearing nonunion fracture of the sternum. No  suspicious osseous lesions.      Impression    Impression:   1. Cardiomegaly with small bilateral pleural effusions, associated  bibasilar intralobular septal thickening and groundglass opacities.  Favor this represents cardiogenic pulmonary edema, although infection  is not totally excluded.  2. Mildly prominent scattered mediastinal lymph nodes, likely  reactive.  3. 9 mm nodule in the lateral right lower lobe, unchanged since at  least 10/3/2015. Additional sub-5 mm pulmonary nodules are also  unchanged.  4. Dilatation of the main pulmonary artery up to 3.4 cm, can be seen  in pulmonary hypertension.    I have personally reviewed the examination and initial interpretation  and I agree with the findings.    JANELLE OSPINA MD                Pending Results:   Unresulted Labs Ordered in the Past 30 Days of this Admission     Date and Time Order Name Status Description    4/12/2017 1040 Sputum Culture Aerobic Bacterial Preliminary     4/11/2017 2200 Cystatin C with GFR In process                   Discharge Instructions and Follow-Up:     Discharge Procedure Orders  Home infusion referral     Medication Therapy Management Referral   Referral Type: Med Therapy Management      Reason for your hospital stay   Order Comments: You were treated in the hospital for an exacerbation of your heart failure. We started you on an IV medication to support your heart function that you will continue after leaving the hospital. We also treated you with medications to help remove fluids.     Adult [facility] Follow-up and recommended labs and tests   Order Comments: Follow up with Dr. Argueta, at (location with clinic name or city) the [facility] in [place], on Monday. You will also need to follow up with the [facility] on Wednesday. You will have blood work checked on Monday, specifically a BMP.    Appointments on [place] and/or [facility] (with [facility] or [facility] provider or service). Call [phone] if you haven't heard regarding these appointments within 7 days of discharge.     Activity   Order Comments: Your activity upon discharge: activity as tolerated   Order Specific Question Answer Comments   Is discharge order? Yes      Monitor and record   Order Comments: fluid intake and output daily want to match input and output starting tomorrow.     Discharge Instructions   Order Comments: You will starting your milrinone infusions after returning home from the hospital. Starting tomorrow, you should decrease your AM bumex to 1mg, continue with 1mg in the early afternoon as you were doing prior to being in the hospital. Additionally, starting tomorrow (4/16) you need to record your urine output and make sure you are matching this with fluid intake. You will be seen at the [facility] (heart clinic) with Dr. Argueta on Monday and in the [facility] clinic on Wednesday. Moving forward do not hesitate to contact Dr. Argueta about concerns or issues that arise.     IV access   Order Comments: **Ordering Provider MUST call/page Care Coordinator/ to discuss arranging this service**    You are going home with the following vascular access device: PICC.     Full Code     Diet   Order Comments: Follow this diet upon  discharge: Low sodium diet, starting tomorrow 4/16 you should match your input and output.   Order Specific Question Answer Comments   Is discharge order? Yes          I have reviewed today's vital signs, notes, medications, labs and imaging.  I have also seen and examined the patient and agree with the findings and plan as outlined above.  Pt admitted with cardiogenic shock.  He was stabilized on milrinone therapy and will be d/c to home to establish 4 mo period of sobriety in order to be considered for LVAD.  Pt d/c to home accompanied by spouse.  Total d/c time > 30 min.     Anton Argueta MD, PhD  Professor, Heart Failure and Cardiac Transplantation  [facility]

## 2017-04-16 NOTE — PLAN OF CARE
Problem: Goal Outcome Summary  Goal: Goal Outcome Summary  Occupational Therapy Discharge Summary     Reason for therapy discharge:    Discharged to home with outpatient therapy.     Progress towards therapy goal(s). See goals on Care Plan in [facility] electronic health record for goal details.  Goals partially met.  Barriers to achieving goals:   discharge from facility.     Therapy recommendation(s):    Continued therapy is recommended.  Rationale/Recommendations:  Rec home with assistance of wife. Pt may benefit from OP Cardiac Rehab to maintain and progress functional endurance in preparation for potential LVAD. .

## 2017-04-17 NOTE — NURSING NOTE
Chief Complaint   Patient presents with     New Patient     f/u after ED visit, CHF     Note the new medications: none  Stop the following medications: none    Labs today    The results from today include: none  Please follow up with Dr. Argueta or Dr. Kelley in one month  [facility] next week

## 2017-04-17 NOTE — PROGRESS NOTES
Patient has LVAD so they will be followed by Cardiology for Post DC follow up              VAD Tracking   Referral date: 4/10/17   Evaluation date: 4/14/17   Committee date: 4/14/17   Implant Information   Episode Care Team      Committee Review   Ready for committee: Yes

## 2017-04-17 NOTE — PATIENT INSTRUCTIONS
Patient Instructions:  It was a pleasure to see you in the cardiology clinic today.      If you have any questions, you can reach my nurse, Ellen Campa, at [phone].  Press Option #1 for the [facility], and then press Option #3 for nursing.  We are encouraging the use of Informed Tradeshart to communicate with your HealthCare Provider    Note the new medications: none  Stop the following medications: none    Labs today    The results from today include: none  Please follow up with Dr. Argueta or Dr. Kelley in one month  [facility] next week    Sincerely,      Anton Argueta MD     If you have an urgent need after hours (8:00 am to 4:30 pm) please call [phone] and ask for the cardiology fellow on call.

## 2017-04-17 NOTE — MR AVS SNAPSHOT
After Visit Summary   4/17/2017    Perez Villalobos    MRN: [id number]           Patient Information     Date Of Birth          1940        Visit Information        Provider Department      4/17/2017 3:00 PM Anton Argueta MD [facility]        Today's Diagnoses     Chronic systolic congestive heart failure (H)    -  1      Care Instructions    Patient Instructions:  It was a pleasure to see you in the cardiology clinic today.      If you have any questions, you can reach my nurse, Ellen Campa, at [phone].  Press Option #1 for the [facility], and then press Option #3 for nursing.  We are encouraging the use of [employer] to communicate with your HealthCare Provider    Note the new medications: none  Stop the following medications: none    Labs today    The results from today include: none  Please follow up with Dr. Argueta or Dr. Kelley in one month  CORE next week    Sincerely,      Anton Argueta MD     If you have an urgent need after hours (8:00 am to 4:30 pm) please call [phone] and ask for the cardiology fellow on call.                  Follow-ups after your visit        Follow-up notes from your care team     Return in about 4 weeks (around 5/15/2017), or if symptoms worsen or fail to improve.      Your next 10 appointments already scheduled     Apr 17, 2017  5:15 PM CDT   Lab with  LAB   [facility] ([facility])    [address] [address] [phone]            Apr 18, 2017  2:15 PM CDT   LAB with  LAB   [facility] [place] ([facility])    [address]  [facility] [address] [phone]           Patient must bring picture ID.  Patient should be prepared to give a urine specimen  Please do not eat 10-12 hours before your appointment if you are coming in fasting for labs on lipids, cholesterol, or glucose  (sugar).  Pregnant women should follow their Care Team instructions. Water with medications is okay. Do not drink coffee or other fluids.   If you have concerns about taking  your medications, please ask at office or if scheduling via Imagin[employer], send a message by clicking on Secure Messaging, Message Your Care Team.            Apr 18, 2017  3:10 PM CDT   Core Return with PARKER Wilde CNP   [facility] [place] ([facility])    [address]  [facility] [address] [phone]            Apr 20, 2017  9:00 AM CDT   Level 1 with RH INFUSION CHAIR 4   [facility] ([facility])    [facility]  [address]  [facility] [address] [phone]            Apr 27, 2017  9:00 AM CDT   Level 1 with RH INFUSION CHAIR 12   [facility] ([facility])    [facility]  [address]  [facility] [address] [phone]            Apr 28, 2017 10:00 AM CDT   Lab with  LAB    [facility] ([facility])    [address] [place] [address]  1st Floor  [facility] [address] [phone]            Apr 28, 2017 10:30 AM CDT   (Arrive by 10:15 AM)   CORE NEW with PARKER Thomas CNP   [facility] ([facility])    [address] [place] [address]  3rd Floor  [facility] [address] [phone]            Apr 28, 2017  2:00 PM CDT   Return Sleep Patient with Ethan Salguero MD   [facility] ([facility])    [address]  [facility] [id number] [phone]            May 04, 2017  9:00 AM CDT   Level 1 with RH INFUSION CHAIR 7   [facility] ([facility])    [facility]  [address]  [facility] [address]  "  [phone]            May 08, 2017  8:00 AM CDT   Remote HF with MCLEAN HFRN   [facility] AT [place] ([facility])    [id number]  [place] [address] [phone]           This appointment is for a remote check of your debrillator.  This is not an appointment at the office.              Future tests that were ordered for you today     Open Future Orders        Priority Expected Expires Ordered    IR CVC Tunnel Placement > 5 Yrs of Age Routine  2018            Who to contact     If you have questions or need follow up information about today's clinic visit or your schedule please contact [facility] directly at [phone].  Normal or non-critical lab and imaging results will be communicated to you by Mashalothart, letter or phone within 4 business days after the clinic has received the results. If you do not hear from us within 7 days, please contact the clinic through Mashalothart or phone. If you have a critical or abnormal lab result, we will notify you by phone as soon as possible.  Submit refill requests through [employer] or call your pharmacy and they will forward the refill request to us. Please allow 3 business days for your refill to be completed.          Additional Information About Your Visit        [employer] Information     [employer] lets you send messages to your doctor, view your test results, renew your prescriptions, schedule appointments and more. To sign up, go to [email or web address] . Click on \"Log in\" on the left side of the screen, which will take you to the Welcome page. Then click on \"Sign up Now\" on the right side of the page.     You will be asked to enter the access code listed below, as well as some personal information. Please follow the directions to create your username and password.     Your access code is: [email or web address]  Expires: 2017  3:26 PM     Your access code will  in 90 days. If you need help " "or a new code, please call your [facility] or [phone].        Care EveryWhere ID     This is your Care EveryWhere ID. This could be used by other organizations to access your [place] medical records  [id number]        Your Vitals Were     Pulse Height Pulse Oximetry BMI (Body Mass Index)          86 1.727 m (5' 8\") 99% 21.82 kg/m2         Blood Pressure from Last 3 Encounters:   04/17/17 128/71   04/15/17 125/72   04/05/17 112/60    Weight from Last 3 Encounters:   04/17/17 65.1 kg (143 lb 8 oz)   04/15/17 65 kg (143 lb 3.2 oz)   04/05/17 69.6 kg (153 lb 6.4 oz)              We Performed the Following     Alcohol ethyl     Basic metabolic panel          Today's Medication Changes          These changes are accurate as of: 4/17/17  4:19 PM.  If you have any questions, ask your nurse or doctor.               These medicines have changed or have updated prescriptions.        Dose/Directions    bumetanide 1 MG tablet   Commonly known as:  BUMEX   This may have changed:  additional instructions   Used for:  Chronic combined systolic and diastolic congestive heart failure (H)        2mg am and 1 mg pm   Quantity:  90 tablet   Refills:  3       potassium chloride 20 MEQ Packet   Commonly known as:  KLOR-CON   This may have changed:  how to take this   Used for:  Chronic combined systolic and diastolic congestive heart failure (H)        Dose:  20 mEq   20 mEq by Oral or Feeding Tube route daily   Quantity:  30 packet   Refills:  1                Primary Care Provider Office Phone # Fax #    Hector Jc [phone]       [facility] [address]  [facility] [address]        Thank you!     Thank you for choosing [facility]  for your care. Our goal is always to provide you with excellent care. Hearing back from our patients is one way we can continue to improve our services. Please take a few minutes to complete the written survey that you may receive in the mail after your " visit with us. Thank you!             Your Updated Medication List - Protect others around you: Learn how to safely use, store and throw away your medicines at www.disposemymeds.org.          This list is accurate as of: 4/17/17  4:19 PM.  Always use your most recent med list.                   Brand Name Dispense Instructions for use    aspirin EC 81 MG EC tablet      Take 81 mg by mouth daily       B-12 1000 MCG Tbcr     100 tablet    Take 1,000 mcg by mouth daily       bumetanide 1 MG tablet    BUMEX    90 tablet    2mg am and 1 mg pm       calcitRIOL 0.25 MCG capsule    ROCALTROL     Take 0.25 mcg by mouth daily       carvedilol 3.125 MG tablet    COREG    540 tablet    Take 2 tablets (6.25 mg) by mouth 2 times daily (with meals)       hydrALAZINE 50 MG tablet    APRESOLINE    360 tablet    1-1/2 tabs am, 1 tab at 3 pm and 1-1/2 tabs bedtime.       isosorbide mononitrate 60 MG 24 hr tablet    IMDUR    30 tablet    Take 1 tablet (60 mg) by mouth At Bedtime At hs       KRILL OIL PO      Take 1 capsule by mouth daily       levothyroxine 150 MCG tablet    SYNTHROID/LEVOTHROID     Take 150 mcg by mouth daily       milrinone 20 MG/100ML infusion    PRIMACOR    500 mL    Inject 25.5375 mcg/min into the vein continuous       Multi-vitamin Tabs tablet      Take 1 tablet by mouth daily       PLAVIX 75 MG tablet   Generic drug:  clopidogrel      Take 75 mg by mouth daily       potassium chloride 20 MEQ Packet    KLOR-CON    30 packet    20 mEq by Oral or Feeding Tube route daily       Sodium Chloride 0.65 % Soln      Spray in nostril as needed

## 2017-04-17 NOTE — LETTER
4/17/2017      RE: Perez Villalobos  [address]  [facility] [address]       Dear Colleague,    Thank you for the opportunity to participate in the care of your patient, Perez Villalobos, at the [facility] HEART [facility]. Please see a copy of my visit note below.    Date: April 18, 2017  Note: Cardiology Clinic note  Author: Andrea Cabrera MD    Reason for Visit: Hospital follow up    HPI:  76 year old male with history of ICM s/p multiple (13) stents and MI, HFrEF (EF 35% ECHO 2/2017), HTN, HLD, hypothyroidism, and recent onset CKD who presented with increasing shortness of breath, weight gain, and lower extremity edema consistent with acute decompensated heart failure. Had swan catheter placed and started on milrinone with titration to stable dose of 0.375 mcg/kg/min. Underwent LVAD workup, however given EtOH use history this will be deferred while he works towards abstinence. PICC placed for home milrinone therapy. He was seen today in the clinic, 2 days after hospital discharge. On the day of discharge, he remained slightly volume up, however there was concern he would become dehydrated if he continued his previous bumex dosing. To this end, we decreased his bumex to 1 mg BID starting 4/16 and asked patient to closely monitor his urine output and match it with fluid intake, record everything and present to the clinic.  Patient today states he is doing relatively well. His weight (65 kg) today matches the discharge weight. According to the I/Os as outpatient, he has been close to even. He also states that he hasn't drunk at all and he is willing to join AA. Denies chest pain, palpitations PND, increase in edema    ROS: All others reviewed and negative. Please see HPI for pertinent positives (10 point ROS).     PMH:   Past Medical History:   Diagnosis Date     Acute renal failure (H)     10/2015 ARF secondary to rhabdomyolysis     Alcoholism (H)       Anemia      Benign essential HTN      BPH (benign prostatic hypertrophy)      CAD (coronary artery disease)     AWMI, stents to Cx, RCA and LAD     Chronic systolic heart failure (H)      CKD (chronic kidney disease)      Complete AV block (H)      Ischemic cardiomyopathy 10/23/2015    EF 30%     Low sodium levels      Mixed hyperlipidemia      NSTEMI (non-ST elevated myocardial infarction) (H) 10/23/2015     PAD (peripheral artery disease) (H)      Rhabdomyolysis due to statin therapy     crestor     Severe hypothyroidism 9/20/2016     VF (ventricular fibrillation) (H) 11/16/16       FH:   Family History   Problem Relation Age of Onset     Unknown/Adopted Mother      Unknown/Adopted Father        SH:   Social History     Social History     Marital status:      Spouse name: N/A     Number of children: N/A     Years of education: N/A     Occupational History     Not on file.     Social History Main Topics     Smoking status: Former Smoker     Packs/day: 1.50     Years: 20.00     Types: Cigarettes     Quit date: 1/1/1980     Smokeless tobacco: Never Used     Alcohol use 0.0 oz/week     0 Standard drinks or equivalent per week      Comment: occassionally     Drug use: No     Sexual activity: Not on file     Other Topics Concern     Caffeine Concern No     decaf coffee     Sleep Concern No     Stress Concern No     Weight Concern No     Special Diet Yes     low fat, low sodium     Exercise Yes     everyday     Social History Narrative         Home Meds:   Current Outpatient Prescriptions on File Prior to Visit:  carvedilol (COREG) 3.125 MG tablet Take 2 tablets (6.25 mg) by mouth 2 times daily (with meals)   milrinone (PRIMACOR) infusion 200 mcg/mL PREMIX Inject 25.5375 mcg/min into the vein continuous   calcitRIOL (ROCALTROL) 0.25 MCG capsule Take 0.25 mcg by mouth daily   hydrALAZINE (APRESOLINE) 50 MG tablet 1-1/2 tabs am, 1 tab at 3 pm and 1-1/2 tabs bedtime.   bumetanide (BUMEX) 1 MG tablet 2mg am and 1 mg  "pm (Patient taking differently: 1mg am and 1 mg pm)   potassium chloride (KLOR-CON) 20 MEQ Packet 20 mEq by Oral or Feeding Tube route daily (Patient taking differently: Take 20 mEq by mouth daily )   isosorbide mononitrate (IMDUR) 60 MG 24 hr tablet Take 1 tablet (60 mg) by mouth At Bedtime At hs   aspirin EC 81 MG EC tablet Take 81 mg by mouth daily   levothyroxine (SYNTHROID/LEVOTHROID) 150 MCG tablet Take 150 mcg by mouth daily   Saline (SODIUM CHLORIDE) 0.65 % SOLN Spray in nostril as needed   KRILL OIL PO Take 1 capsule by mouth daily    Cyanocobalamin (B-12) 1000 MCG TBCR Take 1,000 mcg by mouth daily   multivitamin, therapeutic with minerals (MULTI-VITAMIN) TABS Take 1 tablet by mouth daily   clopidogrel (PLAVIX) 75 MG tablet Take 75 mg by mouth daily     No current facility-administered medications on file prior to visit.     Labs:      Imaging:  Echo (4/12/2017)  Moderate left ventricular dilation is present. Moderately (EF 30-35%) reduced  left ventricular function is present. Traced at 32%. Apical wall akinesis is  present.  Global right ventricular function is mildly to moderately reduced.  Dilation of the inferior vena cava is present with abnormal respiratory  variation in diameter.  Moderate pulmonary hypertension is present.Right ventricular systolic pressure  is 56mmHg above the right atrial pressure.     No change from last study.    [facility] 4/3/2017  RIGHT HEART CATHETERIZATION:  BSA 1.82m2  --RA 19/21/18   --RV 66/20  --PA 65/31/44   --PCW 24/29/24   --TD CO 1.95 L/min   --TD CI 1.09 L/min/m2  --PVR 10.3   --SVR 1559    CARDIOMEMS INSERTION  A left side selective pulmonary angiogram was performed.  The  cardiomems device was inserted into the left pulmonary artery over the  0.018\" wire to the landing zone.  It was deployed without issue.  The  cardioMEMS delivery catheter was removed without issue.  The device  was calibrated with real-time pulmonary pressures acquired from the  reinsertion of " "the PA catheter into the R pulmonary artery.    COMPLICATIONS:  None    SUMMARY:   --Successful CardioMEMS insertion in the left pulmonary artery  --Increased right-sided and increased left-sided filling pressures.  --Moderate pulmonary hypertension with a mean PAP of 44 mmHg.  Likely  mixed pre- and post-capillary etiology with transpulmonary gradient of  20mmHg, PVR 10.3, and elevated PCWP.  --Decreased cardiac output of 1.95 L/min and cardiac index of 1.09  based on thermodilution.    PLAN:   --DAPT: Aspirin 81 mg po daily lifelong and Plavix 75 mg po daily for  at least 30 days.  He is currently taking DAPT for his prior PCI.  --Bedrest per protocol.  --Discharge today per protocol  --Continued medical management and lifestyle modification for  cardiovascular risk factor optimization.   EKG:     Blood pressure 128/71, pulse 86, height 1.727 m (5' 8\"), weight 65.1 kg (143 lb 8 oz), SpO2 99 %.  General: NAD, AAx3  HEENT: Externally normal, sclera clear  CV: regular rhythm, s1, s2, no murmurs or rubs, JVD mid neck  Lungs: CTAB, non-labored breathing, no wheezing, no crackles  Abd: soft, non-distended, non-tender  Ext:+1b/l edema, right PICC line  Skin: no rashes or concerning lesions noted  Neuro: grossly non-focal  Psych: mood/affect appropriate      Assessment:  76 year old male with history of ICM s/p multiple (13) stents and MI, HFrEF (EF 35% ECHO 2/2017), HTN, HLD, hypothyroidism, and recent onset CKD who presented with increasing shortness of breath, weight gain, and lower extremity edema consistent with acute decompensated heart failure. He was discharged on milrinone and is in cardiology clinic today for follow up and volume assessment and further diuretic adjustment.    Recommendations:  --Continue current regimen  --Strict I/O as outpatient for 2 days  --BMP today with EtOH level  --CORE clinic next week  --F/U with Dr. Kelley or Kendall    Case discussed and patient seen with Dr. Argueta. Plan reflects our " mutual opinion.     Andrea Cabrera MD  Cardiology Fellow  [phone]    I have reviewed today's vital signs, notes, medications, labs and imaging.  I have also seen and examined the patient and agree with the findings and plan as outlined above.  Pt with endstage heart failure on milrinone therapy who states that he is doing well without new SOB, NOEL, rashes, joint pain, orthopnea, PND or bipedal edeama.  VSS with lungs CTA and nl S1 and S2 with benign abd.  Assessment: Pt with endstage heart failure on milrinone therapy with abstenance from alcohol and awaiting LVAD placement/workup.  ICD in place.  Will see pt back in one month.     Anton Argueta MD, PhD  Professor, Heart Failure and Cardiac Transplantation  [facility]

## 2017-04-18 NOTE — PROGRESS NOTES
Patient's wife called and said the plan was for patient to follow up with either Dr. Kelley or Dr. Argueta. She said they have a nurse visit next Friday. Dictation from Dr. Argueta yesterday is not yet available. Canceled the office visit with Shani Patrick CNP for today which was made prior to his hospitalization. Per Dixon Patrick CNP, will dis-enroll from our telemanagment and transfer his cardioMEMS data to their care since we are not making medication changes and he was discharged on milrinone infusion. Patient has not sent a cardioMEMS transmission since prior to his hospitalization. Called patient back and he agrees to continue recording his weight daily, but will no longer call into [facility] daily. Left a voicemail for Adrianne research RN with an update.  Patient is due for 30 day appointment for research purposes between 4/26/17-5/10/17.  Vijaya GILL

## 2017-04-19 NOTE — PROGRESS NOTES
SUBJECTIVE/OBJECTIVE:                                                    Perez Villalobos is a 76 year old male called for an initial visit for Medication Therapy Management.  He was discharged from [facility] on 4/6/17 and discharged on 4/15/17 for acute heart failure exacerbation.  I visit with his wife, Annia, today. He has documented consent for me to speak with her.  She is a genet, well informed care-giver who worked as an RN before longterm.      Chief Complaint: Transitions of Care.  Personal Healthcare Goals: Stay out of the hospital.    Allergies/ADRs: Reviewed in Epic  Tobacco: History of tobacco dependence - quit 1980    Alcohol: not currently using but has a history of alcohol abuse which is potentially impacting his heart failure care.   Caffeine: no caffeine  Activity: Limited to activities of daily living  PMH: Reviewed in Epic    Medication Adherence: no issues reported and has assistance setting up med boxes    HFrEF 35%: Current meds include bumetanide 1 mg twice daily, carvedilol 6.25 mg twice daily (recent decrease from 9.375 mg twice daily), hydralazine 50 mg 1.5 tablets twice daily and 1 tablet once daily, isosorbide mononitrate ER 60 mg daily and milrinone 25.5375 mcg/min infusions at home.  Patient is not on ACE/ARB due to history of angioedema.  He is gaining 1 pound daily since discharge.  They unsure of who to call to report his weight gain at this moment. Patient's wife used to measure his blood pressure at home but does not now because his blood pressure cuff is too large due to recent dramatic weight loss.  Needs a smaller blood pressure cuff.  Patient's wife does not measuire his pulse unless he requests it.  When his pulse is low, he often feels unwell and fatigued with some weakness.  She does weigh him daily.  A pacemaker placed in November 2016.  Patient's wife states he does not necessarily feel better with the pacemaker but she understands that it helps to prevent bradycardia and  arrythymia.       The newest medication added to this patient's regimen is milrinone home infusions daily.  Patient is instructed to start new infusions at 4 pm daily.  However, the timing can sometimes be inconvenient.  The patient is currently starting a caregiver support group at her [employer] to provide community for people like her caring for spouses, parents and children who are ill.  She is also responsible for all of her 's care an appointments.  She would like information and how to make infusions a little more convenient for her and her .       Patient states that she believes her 's mental health is stable despite multiple hospitalizations and frustration with his lack of improvement.      CAD:  Patient has had multiple stents placed on different occassions over the years.  He has had recurrent ACS and angioplasty despite DAPT.  He continues DAPT with clopidogrel and baby aspirin as a result.  He does not use a statin due to rhabdomyolysis in 2015 from rosuvastatin use.      Overactive Bladder:  Patient has restartedTolterodine ER 4 mg since discharge due to an increase in urinary incontinence since discharge.  This was prescribed by Hector Jc, his family practice physician.  Patient and his wife have noticed a large improvement in his incontinence and deny side effects.      Vitamin B12 Deficiency: He is currently taking vitamin B12 1000 mcg daily.  His vitamin B12 deficiency was discovered at a hospitalization a few years ago.  He feels much more energized and engaged when taking.      Hypothroidism: Started on levothyroxine last fall.  His level was elevated at the primary care office but miscommunication resulted in a delayed follow-up appointment.  Levothyroxine was subsequently started at a low dose. However, the delay in starting therapy resulted in a hospitalization.  His heart rate was in the 30's and his TSH was >2000.  He has been using a steady dose of levothyroxine  since.      Cough: Has been using the saline nasal spray a lot for some general coughing.  The patient's wife does not notice that this cough gets worse when he is more fluid overloaded. She also does not believe that she has noticed frothy sputum.      Current labs include:  BP Readings from Last 3 Encounters:   04/17/17 128/71   04/15/17 125/72   04/05/17 112/60     Today's Vitals: There were no vitals taken for this visit.  Lab Results   Component Value Date    A1C 5.7 04/12/2017   .  Lab Results   Component Value Date    CHOL 232 09/16/2016     Lab Results   Component Value Date    TRIG 126 09/16/2016     Lab Results   Component Value Date    HDL 87 09/16/2016     Lab Results   Component Value Date     09/16/2016       Liver Function Studies -   Recent Labs   Lab Test  04/12/17   0311   PROTTOTAL  6.8   ALBUMIN  3.0*   BILITOTAL  1.3   ALKPHOS  72   AST  17   ALT  14       Lab Results   Component Value Date    UCRR 49 10/02/2015       Last Basic Metabolic Panel:  Lab Results   Component Value Date     04/17/2017      Lab Results   Component Value Date    POTASSIUM 3.5 04/17/2017     Lab Results   Component Value Date    CHLORIDE 89 04/17/2017     Lab Results   Component Value Date    BUN 75 04/17/2017     Lab Results   Component Value Date    CR 1.71 04/17/2017     GFR Estimate   Date Value Ref Range Status   04/17/2017 39 (L) >60 mL/min/1.7m2 Final     Comment:     Non  GFR Calc   04/15/2017 45 (L) >60 mL/min/1.7m2 Final     Comment:     Non  GFR Calc   04/14/2017 44 (L) >60 mL/min/1.7m2 Final     Comment:     Non  GFR Calc       TSH   Date Value Ref Range Status   04/12/2017 1.99 0.40 - 4.00 mU/L Final     Most Recent Immunizations   Administered Date(s) Administered     Influenza (High Dose) 3 valent vaccine 10/09/2015     ASSESSMENT:                                                       Current medications were reviewed today.     Medication  Adherence: no issues identified    HFrEF 35%: Improving.  Patient is transferring care to CORE clinic at [facility]. One pound weight gain daily likely warrants an increase in bumetanide to reduce risk of future hospitalization.  Patient would benefit from a new blood pressure cuff that is the proper size.  Lastly, patient's wife would benefit from further information about home infusion pump to ensure that milrinone infusions are completed before next dose is due.      CAD: stable.     Overactive Bladder: Stable since restarting tolterodine.  Tolterodine is M3 specific and is unlikely to cause drug interactions with his heart failure medications or additional dizziness/light headedness.  If symptoms of constipation arise, it is possible that potassium may cause lesions in his stomach.  Monitor for constipation and signs of abdominal pain.      Vitamin B12 deficiency: Stable.      Hypothyroidism: Stable.     Cough:  Stable.  Symptoms do not indicate CHF exacerbation.      PLAN:                                                      1) Consider increase in bumetanide dosing given steady weight gain.  2) Consider reordering blood pressure cuff in a smaller size for home monitoring.     I spent 40 minutes with this patient today.  I offer these suggestions with the understanding that I don't fully understand Perez's past medical history and the complexity of his health conditions. Perez should make no changes without the approval of his physician. A copy of the visit note was provided to the patient's primary care provider.    Will follow up as needed per patient and provider. .    The patient was mailed a summary of these recommendations as an after visit summary.     Jennifer Harris, Pharm.D.  Medication Therapy Management Pharmacist  Page/VM:  [phone]

## 2017-04-19 NOTE — LETTER
*      Perez Villalobos  [address] [facility] [address]        Dear Nicholas,    It was so nice to speak with you on Wednesday, April 19th.  I hope I was able to give you some useful information during our Medication Therapy Management (MTM) visit. The purpose of this visit with a clinical pharmacist was to review the medicines you received when discharged from the hospital. We want to make sure that you know which medicines to take and what they are for. We also want to make sure all your medicines are working, safe, and as easy to take as possible.    Enclosed is a summary of the suggestions we talked about and any other thoughts I had. There is also a list of your medicines included. This information has also been shared with your primary care provider.    Feel free to call if you have any questions or concerns. By working together with you and your doctor, I hope to help you feel confident managing your medicines and improving your quality of life.       Arturo wilson,        Jennifer Harris, Pharm.D.  Medication Therapy Management Pharmacist  Page/VM:  [phone]

## 2017-04-19 NOTE — MR AVS SNAPSHOT
After Visit Summary   4/19/2017    Perez Villalobos    MRN: [id number]           Patient Information     Date Of Birth          1940        Visit Information        Provider Department      4/19/2017 10:00 AM Jennifer Harris [facility] MTM        Today's Diagnoses     Acute on chronic systolic congestive heart failure (H)    -  1    Benign prostatic hyperplasia with lower urinary tract symptoms, unspecified morphology        Coronary artery disease involving native coronary artery of native heart without angina pectoris        Vitamin B12 deficiency without anemia        Severe hypothyroidism        Dyspnea, unspecified type          Care Instructions    Recommendations from today's MT visit:                                                    Today we reviewed what your medicines are for, how to know if they are working, that your medicines are safe and how to make your medicine regimen as easy as possible.     1. I will forward the information you provided me about Walker's weight gain since discharge to Dr. Argueta.  I will also request that he order a new blood pressure cuff for Walker in an appropriate size.      2.  I will pass on information about your home infusion pump to the team at [facility].  If you do not receive a phone call regarding this issue, please bring it up at your next cardiology visit.      3. I have added tolterodine ER 4 mg capsules daily to Walker's medication list.  If he develops constipation and/or stomach pain, this could be a sign that tolterodine is slowing gastric emptying and allowing potassium to wear on his stomach.  Please call a member of Walker's health care team right away if this occurs.    4.  Good luck with your support group!  I hope you create a strong community of folks that can support each other as caregivers.      Next MTM visit: Please feel free to call me any time with medication-related questions!    To schedule another MT appointment,  please call the clinic directly or you may call the MTM scheduling line at [phone] or toll-free at [phone].     My Clinical Pharmacist's contact information:                                                      It was a pleasure seeing you today!  Please feel free to contact me with any questions or concerns you have.      Jennifer Harris, Pharm.D.  Medication Therapy Management Pharmacist  Page/VM:  [phone]    You may receive a survey about the [facility] services you received.  I would appreciate your feedback to help me serve you better in the future. Please fill it out and return it when you can. Your comments will be anonymous.                Follow-ups after your visit        Your next 10 appointments already scheduled     Apr 20, 2017  9:00 AM CDT   Level 1 with  INFUSION CHAIR 4   [facility] ([facility])    [facility]  [address]  [facility] [address]   [phone]            Apr 27, 2017  9:00 AM CDT   Level 1 with  INFUSION CHAIR 12   [facility] ([facility])    [facility]  [address]  [facility] [address]   [phone]            Apr 28, 2017 10:00 AM CDT   Lab with  LAB   [facility] ([facility])    [address]  1st Floor  [facility] [id number] [phone]            Apr 28, 2017 10:30 AM CDT   (Arrive by 10:15 AM)   CORE NEW with PARKER [facility] ([facility])    [address]  3rd Floor  [facility] [id number] [phone]            Apr 28, 2017  2:00 PM CDT   Return Sleep Patient with Ethan Salguero MD   [facility] ([facility])    [address]  [facility] [id number] [phone]            May 04, 2017  9:00 AM CDT   Level 1 with  RH INFUSION CHAIR 7   [facility] ([facility])    [facility]  [address]  [facility] [address]   [phone]            May 08, 2017  8:00 AM CDT   Remote HF with MLCEAN HFRN   [facility] [place] ([facility])    [address]  [place] [address] [phone]           This appointment is for a remote check of your debrillator.  This is not an appointment at the office.            May 11, 2017  9:00 AM CDT   Level 1 with RH INFUSION CHAIR 11   [facility] ([facility])    [facility]  [address]  [facility] [address]   [phone]            May 15, 2017  2:00 PM CDT   (Arrive by 1:45 PM)   RETURN HEART FAILURE with Anton Argueta MD   [facility] ([facility])    [address] [place] [address] [phone]            Jul 06, 2017  4:30 PM CDT   Remote ICD Check with MCLEAN DCR2   [facility] [place] ([facility])    [address]  [place] [address] [phone]           This appointment is for a remote check of your debrillator.  This is not an appointment at the office.              Who to contact     If you have questions or need follow up information about today's clinic visit or your schedule please contact [facility] directly at [phone].  Normal or non-critical lab and imaging results will be communicated to you by MyChart, letter or phone within 4 business days after the clinic has received the results. If you do not hear from us within 7 days, please contact the clinic through MyChart or phone. If you have a critical or abnormal lab result, we will notify you by phone as soon as possible.  Submit refill requests through [employer]  "or call your pharmacy and they will forward the refill request to us. Please allow 3 business days for your refill to be completed.          Additional Information About Your Visit        MyChart Information     [employer] lets you send messages to your doctor, view your test results, renew your prescriptions, schedule appointments and more. To sign up, go to [email or web address] . Click on \"Log in\" on the left side of the screen, which will take you to the Welcome page. Then click on \"Sign up Now\" on the right side of the page.     You will be asked to enter the access code listed below, as well as some personal information. Please follow the directions to create your username and password.     Your access code is: [email or web address]  Expires: 2017  3:26 PM     Your access code will  in 90 days. If you need help or a new code, please call your [facility] clinic or [phone].        Care EveryWhere ID     This is your Care EveryWhere ID. This could be used by other organizations to access your [facility] medical records  [id number]         Blood Pressure from Last 3 Encounters:   17 128/71   04/15/17 125/72   17 112/60    Weight from Last 3 Encounters:   17 143 lb 8 oz (65.1 kg)   04/15/17 143 lb 3.2 oz (65 kg)   17 153 lb 6.4 oz (69.6 kg)              Today, you had the following     No orders found for display         Today's Medication Changes          These changes are accurate as of: 17 11:33 AM.  If you have any questions, ask your nurse or doctor.               These medicines have changed or have updated prescriptions.        Dose/Directions    bumetanide 1 MG tablet   Commonly known as:  BUMEX   This may have changed:  additional instructions   Used for:  Chronic combined systolic and diastolic congestive heart failure (H)        2mg am and 1 mg pm   Quantity:  90 tablet   Refills:  3       potassium chloride 20 MEQ Packet   Commonly known as:  KLOR-CON   This may have " changed:  how to take this   Used for:  Chronic combined systolic and diastolic congestive heart failure (H)        Dose:  20 mEq   20 mEq by Oral or Feeding Tube route daily   Quantity:  30 packet   Refills:  1                Primary Care Provider Office Phone # Fax #    Hector Jc [phone]       [employer] [address]  [facility] [address]        Thank you!     Thank you for choosing [facility]  for your care. Our goal is always to provide you with excellent care. Hearing back from our patients is one way we can continue to improve our services. Please take a few minutes to complete the written survey that you may receive in the mail after your visit with us. Thank you!             Your Updated Medication List - Protect others around you: Learn how to safely use, store and throw away your medicines at www.disposemymeds.org.          This list is accurate as of: 4/19/17 11:33 AM.  Always use your most recent med list.                   Brand Name Dispense Instructions for use    aspirin EC 81 MG EC tablet      Take 81 mg by mouth daily       B-12 1000 MCG Tbcr     100 tablet    Take 1,000 mcg by mouth daily       bumetanide 1 MG tablet    BUMEX    90 tablet    2mg am and 1 mg pm       calcitRIOL 0.25 MCG capsule    ROCALTROL     Take 0.25 mcg by mouth daily       carvedilol 3.125 MG tablet    COREG    540 tablet    Take 2 tablets (6.25 mg) by mouth 2 times daily (with meals)       hydrALAZINE 50 MG tablet    APRESOLINE    360 tablet    1-1/2 tabs am, 1 tab at 3 pm and 1-1/2 tabs bedtime.       isosorbide mononitrate 60 MG 24 hr tablet    IMDUR    30 tablet    Take 1 tablet (60 mg) by mouth At Bedtime At hs       KRILL OIL PO      Take 1 capsule by mouth daily       levothyroxine 150 MCG tablet    SYNTHROID/LEVOTHROID     Take 150 mcg by mouth daily       milrinone 20 MG/100ML infusion    PRIMACOR    500 mL    Inject 25.5375 mcg/min into the vein continuous        Multi-vitamin Tabs tablet      Take 1 tablet by mouth daily       PLAVIX 75 MG tablet   Generic drug:  clopidogrel      Take 75 mg by mouth daily       potassium chloride 20 MEQ Packet    KLOR-CON    30 packet    20 mEq by Oral or Feeding Tube route daily       Sodium Chloride 0.65 % Soln      Spray in nostril as needed       tolterodine 4 MG 24 hr capsule    DETROL LA     Take 4 mg by mouth daily

## 2017-04-19 NOTE — PROGRESS NOTES
Date: April 18, 2017  Note: Cardiology Clinic note  Author: Andrea Cabrera MD    Reason for Visit: Hospital follow up    HPI:  76 year old male with history of ICM s/p multiple (13) stents and MI, HFrEF (EF 35% ECHO 2/2017), HTN, HLD, hypothyroidism, and recent onset CKD who presented with increasing shortness of breath, weight gain, and lower extremity edema consistent with acute decompensated heart failure. Had swan catheter placed and started on milrinone with titration to stable dose of 0.375 mcg/kg/min. Underwent LVAD workup, however given EtOH use history this will be deferred while he works towards abstinence. PICC placed for home milrinone therapy. He was seen today in the clinic, 2 days after hospital discharge. On the day of discharge, he remained slightly volume up, however there was concern he would become dehydrated if he continued his previous bumex dosing. To this end, we decreased his bumex to 1 mg BID starting 4/16 and asked patient to closely monitor his urine output and match it with fluid intake, record everything and present to the clinic.  Patient today states he is doing relatively well. His weight (65 kg) today matches the discharge weight. According to the I/Os as outpatient, he has been close to even. He also states that he hasn't drunk at all and he is willing to join AA. Denies chest pain, palpitations PND, increase in edema    ROS: All others reviewed and negative. Please see HPI for pertinent positives (10 point ROS).     PMH:   Past Medical History:   Diagnosis Date     Acute renal failure (H)     10/2015 ARF secondary to rhabdomyolysis     Alcoholism (H)      Anemia      Benign essential HTN      BPH (benign prostatic hypertrophy)      CAD (coronary artery disease)     AWMI, stents to Cx, RCA and LAD     Chronic systolic heart failure (H)      CKD (chronic kidney disease)      Complete AV block (H)      Ischemic cardiomyopathy 10/23/2015    EF 30%     Low sodium levels       Mixed hyperlipidemia      NSTEMI (non-ST elevated myocardial infarction) (H) 10/23/2015     PAD (peripheral artery disease) (H)      Rhabdomyolysis due to statin therapy     crestor     Severe hypothyroidism 9/20/2016     VF (ventricular fibrillation) (H) 11/16/16       FH:   Family History   Problem Relation Age of Onset     Unknown/Adopted Mother      Unknown/Adopted Father        SH:   Social History     Social History     Marital status:      Spouse name: N/A     Number of children: N/A     Years of education: N/A     Occupational History     Not on file.     Social History Main Topics     Smoking status: Former Smoker     Packs/day: 1.50     Years: 20.00     Types: Cigarettes     Quit date: 1/1/1980     Smokeless tobacco: Never Used     Alcohol use 0.0 oz/week     0 Standard drinks or equivalent per week      Comment: occassionally     Drug use: No     Sexual activity: Not on file     Other Topics Concern     Caffeine Concern No     decaf coffee     Sleep Concern No     Stress Concern No     Weight Concern No     Special Diet Yes     low fat, low sodium     Exercise Yes     everyday     Social History Narrative         Home Meds:   Current Outpatient Prescriptions on File Prior to Visit:  carvedilol (COREG) 3.125 MG tablet Take 2 tablets (6.25 mg) by mouth 2 times daily (with meals)   milrinone (PRIMACOR) infusion 200 mcg/mL PREMIX Inject 25.5375 mcg/min into the vein continuous   calcitRIOL (ROCALTROL) 0.25 MCG capsule Take 0.25 mcg by mouth daily   hydrALAZINE (APRESOLINE) 50 MG tablet 1-1/2 tabs am, 1 tab at 3 pm and 1-1/2 tabs bedtime.   bumetanide (BUMEX) 1 MG tablet 2mg am and 1 mg pm (Patient taking differently: 1mg am and 1 mg pm)   potassium chloride (KLOR-CON) 20 MEQ Packet 20 mEq by Oral or Feeding Tube route daily (Patient taking differently: Take 20 mEq by mouth daily )   isosorbide mononitrate (IMDUR) 60 MG 24 hr tablet Take 1 tablet (60 mg) by mouth At Bedtime At hs   aspirin EC 81 MG EC  "tablet Take 81 mg by mouth daily   levothyroxine (SYNTHROID/LEVOTHROID) 150 MCG tablet Take 150 mcg by mouth daily   Saline (SODIUM CHLORIDE) 0.65 % SOLN Spray in nostril as needed   KRILL OIL PO Take 1 capsule by mouth daily    Cyanocobalamin (B-12) 1000 MCG TBCR Take 1,000 mcg by mouth daily   multivitamin, therapeutic with minerals (MULTI-VITAMIN) TABS Take 1 tablet by mouth daily   clopidogrel (PLAVIX) 75 MG tablet Take 75 mg by mouth daily     No current facility-administered medications on file prior to visit.     Labs:      Imaging:  Echo (4/12/2017)  Moderate left ventricular dilation is present. Moderately (EF 30-35%) reduced  left ventricular function is present. Traced at 32%. Apical wall akinesis is  present.  Global right ventricular function is mildly to moderately reduced.  Dilation of the inferior vena cava is present with abnormal respiratory  variation in diameter.  Moderate pulmonary hypertension is present.Right ventricular systolic pressure  is 56mmHg above the right atrial pressure.     No change from last study.    [facility] 4/3/2017  RIGHT HEART CATHETERIZATION:  BSA 1.82m2  --RA 19/21/18   --RV 66/20  --PA 65/31/44   --PCW 24/29/24   --TD CO 1.95 L/min   --TD CI 1.09 L/min/m2  --PVR 10.3   --SVR 1559    CARDIOMEMS INSERTION  A left side selective pulmonary angiogram was performed.  The  cardiomems device was inserted into the left pulmonary artery over the  0.018\" wire to the landing zone.  It was deployed without issue.  The  cardioMEMS delivery catheter was removed without issue.  The device  was calibrated with real-time pulmonary pressures acquired from the  reinsertion of the PA catheter into the R pulmonary artery.    COMPLICATIONS:  None    SUMMARY:   --Successful CardioMEMS insertion in the left pulmonary artery  --Increased right-sided and increased left-sided filling pressures.  --Moderate pulmonary hypertension with a mean PAP of 44 mmHg.  Likely  mixed pre- and post-capillary " "etiology with transpulmonary gradient of  20mmHg, PVR 10.3, and elevated PCWP.  --Decreased cardiac output of 1.95 L/min and cardiac index of 1.09  based on thermodilution.    PLAN:   --DAPT: Aspirin 81 mg po daily lifelong and Plavix 75 mg po daily for  at least 30 days.  He is currently taking DAPT for his prior PCI.  --Bedrest per protocol.  --Discharge today per protocol  --Continued medical management and lifestyle modification for  cardiovascular risk factor optimization.   EKG:     Blood pressure 128/71, pulse 86, height 1.727 m (5' 8\"), weight 65.1 kg (143 lb 8 oz), SpO2 99 %.  General: NAD, AAx3  HEENT: Externally normal, sclera clear  CV: regular rhythm, s1, s2, no murmurs or rubs, JVD mid neck  Lungs: CTAB, non-labored breathing, no wheezing, no crackles  Abd: soft, non-distended, non-tender  Ext:+1b/l edema, right PICC line  Skin: no rashes or concerning lesions noted  Neuro: grossly non-focal  Psych: mood/affect appropriate      Assessment:  76 year old male with history of ICM s/p multiple (13) stents and MI, HFrEF (EF 35% ECHO 2/2017), HTN, HLD, hypothyroidism, and recent onset CKD who presented with increasing shortness of breath, weight gain, and lower extremity edema consistent with acute decompensated heart failure. He was discharged on milrinone and is in cardiology clinic today for follow up and volume assessment and further diuretic adjustment.    Recommendations:  --Continue current regimen  --Strict I/O as outpatient for 2 days  --BMP today with EtOH level  --CORE clinic next week  --F/U with Dr. Kelley or Kendall    Case discussed and patient seen with Dr. Argueta. Plan reflects our mutual opinion.     Andrea Cabrera MD  Cardiology Fellow  [phone]    I have reviewed today's vital signs, notes, medications, labs and imaging.  I have also seen and examined the patient and agree with the findings and plan as outlined above.  Pt with endstage heart failure on milrinone therapy who " states that he is doing well without new SOB, NOEL, rashes, joint pain, orthopnea, PND or bipedal edeama.  VSS with lungs CTA and nl S1 and S2 with benign abd.  Assessment: Pt with endstage heart failure on milrinone therapy with abstenance from alcohol and awaiting LVAD placement/workup.  ICD in place.  Will see pt back in one month.     Anton Argueta MD, PhD  Professor, Heart Failure and Cardiac Transplantation  [facility]

## 2017-04-19 NOTE — Clinical Note
Romeo Lanier I spoke with Walker's wife, Annia, on the phone today for a care transitions visit.  She notes that Walker has gained 1# daily since discharge despite good medication adherence.  She is unsure who to call or when to call someone about this.  She is also hopeful she can get a new blood pressure cuff for her  that is the appropriate size (order for size M pended in this encounter).  Lastly, she is having trouble finishing milrinone infusions in time to start the next one at 4 PM at home.  Thank you for your time.  Alexx

## 2017-04-19 NOTE — PROGRESS NOTES
Attempted to contact cardiology triage nurse at [facility] with patient's cardioMEMS readings today which are copied below, however the line disconnected twice.         Roxanne PAZ RN  C.O.R.E. [facility]

## 2017-04-20 NOTE — TELEPHONE ENCOUNTER
Discussed weights, symptoms and CardioMEMS readings with Dr. Kelley.  Called Annia back and discussed recommendations.  Annia and Perez are having a very difficult time with CardioMEMS readings capturing, they have a call with [employer] tech tomorrow morning at 8:30am.  Annia verbalizes understanding and agrees with plan of care. Merlyn Schmitt RN      Date: 4/20/2017  Time of Call: 1:12 PM  Diagnosis:  Heart failure  VORB - Ordering provider: Giovanna Kelley MD   Order: Take Bumex 3mg today. Then tomorrow start taking Bumex 2mg BID.  Start taking potassium 20mEqs BID  Order received by: Merlyn Schmitt RN   Follow-up/additional notes: Continue to monitor CardioMEMS. Monitor BP's at home.

## 2017-04-20 NOTE — TELEPHONE ENCOUNTER
Pt's wife called to report that pt has gained 3.7 pounds since Monday. Monday's weight-141.7, today's weight-145.4. Pt is weighing at same time in AM with same amount of clothing on. Pt has been monitoring intake/output per Dr. Argueta's orders and following those guidelines, as well as low sodium diet. Wife states she notices pt's feet and ankles are more swollen than normal, denies SOB. Will route to [facility] for advisement.

## 2017-04-20 NOTE — PROGRESS NOTES
Infusion Nursing Note:  Perez Villalobos presents today for PICC Line Dressing Change, wife wanted to watch dressing change so she can perform herself at home.  Will come again next week and will perform the dressing change while being observed by RN.    Patient seen by provider today: No   present during visit today: Not Applicable.    Note: N/A.    Intravenous Access:  PICC.    Treatment Conditions:  Not Applicable.      Post Infusion Assessment:  Site patent and intact, free from redness, edema or discomfort.  No evidence of extravasations.    Discharge Plan:   Discharge instructions reviewed with: Patient and Family.  Patient and/or family verbalized understanding of discharge instructions and all questions answered.  AVS to patient via [employer].  Patient will return 4/27/17 for next appointment.   Patient discharged in stable condition accompanied by: wife.  Departure Mode: Ambulatory.    Uzma Fairchild RN

## 2017-04-20 NOTE — MR AVS SNAPSHOT
After Visit Summary   4/20/2017    Perez Villalobos    MRN: [id number]           Patient Information     Date Of Birth          1940        Visit Information        Provider Department      4/20/2017 9:00 AM RH INFUSION CHAIR 4 [facility]        Today's Diagnoses     Chronic systolic heart failure (H)    -  1       Follow-ups after your visit        Your next 10 appointments already scheduled     Apr 27, 2017  9:00 AM CDT   Level 1 with RH INFUSION CHAIR 12   [facility] ([facility])    [facility]  [address]  [facility] [address]   [phone]            Apr 28, 2017 10:00 AM CDT   Lab with  LAB   [facility] ([facility])    [address] [place] [address]  1st Floor  [facility] [id number] [phone]            Apr 28, 2017 10:30 AM CDT   (Arrive by 10:15 AM)   CORE NEW with PARKER [facility] ([facility])    [address] [place] [address]  3rd Floor  [facility] [id number] [phone]            Apr 28, 2017  2:00 PM CDT   Return Sleep Patient with Ethan Salguero MD   [facility] ([facility])    [address]  [facility] [id number] [phone]            May 04, 2017  9:00 AM CDT   Level 1 with RH INFUSION CHAIR 7   [facility] ([facility])    [facility]  [address]  [facility] [address]   [phone]            May 08, 2017  8:00 AM CDT   Remote HF with CARIDAD HFRN   [facility] [place] ([facility])    [address]  [facility] [address] [phone]           This appointment is for a remote check of your debrillator.  This is not an appointment at the office.            May 11, 2017   9:00 AM CDT   Level 1 with RH INFUSION CHAIR 11   [facility] ([facility])    [facility]  [address]  [facility] [address]   [phone]            May 15, 2017  2:00 PM CDT   (Arrive by 1:45 PM)   RETURN HEART FAILURE with Anton Argueta MD   [facility] ([facility])    [address]  3rd Floor  [facility] [id number] [phone]            Jul 06, 2017  4:30 PM CDT   Remote ICD Check with MCLEAN DCR2   [facility] [place] ([facility])    [address]  [facility] [id number] [phone]           This appointment is for a remote check of your debrillator.  This is not an appointment at the office.              Future tests that were ordered for you today     Open Future Orders        Priority Expected Expires Ordered    Basic metabolic panel Routine 4/28/2017 4/29/2017 4/19/2017            Who to contact     If you have questions or need follow up information about today's clinic visit or your schedule please contact [facility] directly at [phone].  Normal or non-critical lab and imaging results will be communicated to you by FiREappshart, letter or phone within 4 business days after the clinic has received the results. If you do not hear from us within 7 days, please contact the clinic through [employer] or phone. If you have a critical or abnormal lab result, we will notify you by phone as soon as possible.  Submit refill requests through [employer] or call your pharmacy and they will forward the refill request to us. Please allow 3 business days for your refill to be completed.          Additional Information About Your Visit        [employer] Information     [employer] lets you send messages to your doctor, view your test results, renew your prescriptions, schedule appointments and more. To  "sign up, go to [email or web address] . Click on \"Log in\" on the left side of the screen, which will take you to the Welcome page. Then click on \"Sign up Now\" on the right side of the page.     You will be asked to enter the access code listed below, as well as some personal information. Please follow the directions to create your username and password.     Your access code is: [email or web address]  Expires: 2017  3:26 PM     Your access code will  in 90 days. If you need help or a new code, please call your [place] clinic or [phone].        Care EveryWhere ID     This is your Care EveryWhere ID. This could be used by other organizations to access your [place] medical records  [id number]         Blood Pressure from Last 3 Encounters:   17 128/71   04/15/17 125/72   17 112/60    Weight from Last 3 Encounters:   17 65.1 kg (143 lb 8 oz)   04/15/17 65 kg (143 lb 3.2 oz)   17 69.6 kg (153 lb 6.4 oz)              We Performed the Following     Patient care order          Today's Medication Changes          These changes are accurate as of: 17 10:24 AM.  If you have any questions, ask your nurse or doctor.               These medicines have changed or have updated prescriptions.        Dose/Directions    bumetanide 1 MG tablet   Commonly known as:  BUMEX   This may have changed:  additional instructions   Used for:  Chronic combined systolic and diastolic congestive heart failure (H)        2mg am and 1 mg pm   Quantity:  90 tablet   Refills:  3       potassium chloride 20 MEQ Packet   Commonly known as:  KLOR-CON   This may have changed:  how to take this   Used for:  Chronic combined systolic and diastolic congestive heart failure (H)        Dose:  20 mEq   20 mEq by Oral or Feeding Tube route daily   Quantity:  30 packet   Refills:  1                Primary Care Provider Office Phone # Fax #    Hector Jc [phone]       [phone]  [facility] [address]        Thank you!     Thank you for choosing  [facility]  for your care. Our goal is always to provide you with excellent care. Hearing back from our patients is one way we can continue to improve our services. Please take a few minutes to complete the written survey that you may receive in the mail after your visit with us. Thank you!             Your Updated Medication List - Protect others around you: Learn how to safely use, store and throw away your medicines at www.disposemymeds.org.          This list is accurate as of: 4/20/17 10:24 AM.  Always use your most recent med list.                   Brand Name Dispense Instructions for use    aspirin EC 81 MG EC tablet      Take 81 mg by mouth daily       B-12 1000 MCG Tbcr     100 tablet    Take 1,000 mcg by mouth daily       bumetanide 1 MG tablet    BUMEX    90 tablet    2mg am and 1 mg pm       calcitRIOL 0.25 MCG capsule    ROCALTROL     Take 0.25 mcg by mouth daily       carvedilol 3.125 MG tablet    COREG    540 tablet    Take 2 tablets (6.25 mg) by mouth 2 times daily (with meals)       hydrALAZINE 50 MG tablet    APRESOLINE    360 tablet    1-1/2 tabs am, 1 tab at 3 pm and 1-1/2 tabs bedtime.       isosorbide mononitrate 60 MG 24 hr tablet    IMDUR    30 tablet    Take 1 tablet (60 mg) by mouth At Bedtime At hs       KRILL OIL PO      Take 1 capsule by mouth daily       levothyroxine 150 MCG tablet    SYNTHROID/LEVOTHROID     Take 150 mcg by mouth daily       milrinone 20 MG/100ML infusion    PRIMACOR    500 mL    Inject 25.5375 mcg/min into the vein continuous       Multi-vitamin Tabs tablet      Take 1 tablet by mouth daily       PLAVIX 75 MG tablet   Generic drug:  clopidogrel      Take 75 mg by mouth daily       potassium chloride 20 MEQ Packet    KLOR-CON    30 packet    20 mEq by Oral or Feeding Tube route daily       Sodium Chloride 0.65 % Soln      Spray in nostril as needed        tolterodine 4 MG 24 hr capsule    DETROL LA     Take 4 mg by mouth daily

## 2017-04-21 NOTE — PROGRESS NOTES
Wife calls in reporting patient symptoms. Weight same as yesterday. The cardiomems is not working. Patient is fatigued and falling asleep during conversation which is what he did prior to his last admission. She states he couldn't sleep well last night - was unable to lay flat. His heart rates are in the 90s and BPs 130/78 - slightly elevated compared to his baseline. He increased his Bumex yesterday (see note).     Date: 4/21/2017    Time of Call: 3:09 PM     Diagnosis:  See pt call above     [ TORB ] Ordering provider: Kelly Mata NP  Order: Increase Bumex to 3mg in the morning and 2 mg in the evening over the weekend. Increase Potassium to 20 mEq TID over the weekend. Call patient on Monday to check on symptoms and weight. Have a BMP on Monday.     Order received by: Jim Rodriguez      Follow-up/additional notes: Called patient and went over changes.  Also gave patient number for on call Cardiologist in case he does not improve or gets worse over the weekend. Patient verbalized understanding.     Jim Rodriguez, RN, BSN  Cardiology Care Coordinator  [facility]  [email or web address]  [phone]

## 2017-04-21 NOTE — PROGRESS NOTES
Wife calls in reporting patient symptoms.  Weight same as yesterday. The cardiomems is not working. Patient is fatigued and falling asleep during conversation which is what he did prior to his last admission. She states he couldn't sleep well last night - was unable to lay flat. His heart rates are in the 90s and BPs 130/78 - slightly elevated compared to his baseline. He increased his Bumex yesterday (see note).     Will route to [facility] clinic for follow up.

## 2017-04-23 NOTE — TELEPHONE ENCOUNTER
Paged to Pt's wife regarding Pt's weight.    4/20/2017 was instructed to take bumex 3mg once then 2mg BID and start taking potassium 20meq BID  4/21/2017 instructed to incr bumex to 3mg qam and 2mg qpm, incr KCL to 20meq TID, BMP on Monday     Despite these changes he has gained 2lb daily since 4/21.     No chest pain but incr orthopnea and LE swelling bilat.    This morning he already took bumex 3mg, and so I instructed him to take 4mg tonight, as well as 40meq of KCL (so 80meq total for the day over 60meq total for the day), and then 4mg in the morning with his KCL and to f/u with a BMP tomorrow and telephone call as scheduled.     Jamal Clark MD  PGY-4 Cardiology  Pager: [phone]  April 23, 2017

## 2017-04-24 NOTE — IP AVS SNAPSHOT
MRN:[id number]                      After Visit Summary   4/24/2017    Perez Villalobos    MRN: [id number]           Thank you!     Thank you for choosing [facility] for your care. Our goal is always to provide you with excellent care. Hearing back from our patients is one way we can continue to improve our services. Please take a few minutes to complete the written survey that you may receive in the mail after you visit with us. Thank you!        Patient Information     Date Of Birth          1940        Designated Caregiver       Most Recent Value    Caregiver    Will someone help with your care after discharge? no      About your hospital stay     You were admitted on:  April 24, 2017 You last received care in the:  [address] [facility]    You were discharged on:  May 1, 2017        Reason for your hospital stay       You were hospitalized with increased fluid and swelling related to your heart failure. We treated you with IV water pills to remove fluid and eventually transitioned to oral diuretics. You will be discharged on an increased dose of bumex with close follow up in the [facility] clinic.                  Who to Call     For medical emergencies, please call 911.  For non-urgent questions about your medical care, please call your primary care provider or clinic, [phone]          Attending Provider     Provider Specialty    Johnnie Baldwin MD Emergency Medicine    [facility], Clive Jimenez MD Cardiology       Primary Care Provider Office Phone # Fax #    Hector Jc [phone]       [facility] [address]  [facility] [address]         When to contact your care team       Contact your care team if you notice more swelling, difficulty breathing, or your weight increases.                  After Care Instructions     Activity       Your activity upon discharge: activity as tolerated            Diet       Follow this diet upon discharge: 2 gram sodium diet and 2  liter fluid restriction            Discharge Instructions       You will be taking an increased dose of your bumex when you leave the hospital. You will be taking 2 mg in the morning and 2 mg in the early afternoon. You should also take more potassium than you were prior to hospitalization; you should take 40 mEq in the morning and 40 mEq in the afternoon. You will continue your other medications you have been on prior to staying in the hospital. You should be seen in the [facility] clinic later this week, either Thursday or Friday. Be sure to closely monitor your fluid intake and urine output, they should be about the same. Additionally check your weight everyday.            Monitor and record       You will need to monitor and record your fluid intake and urine output and make sure they are close to the same. Additionally, you should be checking your weight everyday at the same time in the same amount of clothes. If your weight is increasing you need to let your care team know.                  Follow-up Appointments     Adult [facility] Follow-up and recommended labs and tests       You will need to follow up with the CORE clinic at the end of this week, either Thursday or Friday. They will recheck your labs and kidney function and check your weight and fluid balance.    Appointments on [place] and/or [facility] (with [facility] or [facility] provider or service). Call [phone] if you haven't heard regarding these appointments within 7 days of discharge.                  Your next 10 appointments already scheduled     May 04, 2017  9:00 AM CDT   Level 1 with RH INFUSION CHAIR 2   [facility] ([facility])    [facility]  [address]  [facility] [address]   [phone]            May 08, 2017  8:00 AM CDT   Remote HF with MCLEAN HFRN   [facility] [place] ([facility])    [address] [place] [address]  [address]  [place] [address] [phone]           This appointment is for a remote check of your debrillator.  This is not an appointment at the office.            May 11, 2017  9:00 AM CDT   Level 1 with RH INFUSION CHAIR 11   [facility] ([facility])    [facility]  [address] [place] [address]   [phone]            May 15, 2017  2:00 PM CDT   (Arrive by 1:45 PM)   RETURN HEART FAILURE with Anton Argueta MD   [facility] ([facility])    [address]  3rd Floor  [facility] [address] [phone]            Jul 06, 2017  4:30 PM CDT   Remote ICD Check with MCLEAN DCR2   [facility] [place] ([facility])    [address]  [place] [address] [phone]           This appointment is for a remote check of your debrillator.  This is not an appointment at the office.              Additional Services     Home infusion referral       Your provider has referred you to: [facility]   Phone: [phone]   Fax: [phone]     For resumption of home IV milrinone and PICC line supplies.            Medication Therapy Management Referral       Reason for referral:  on more than 5 medications and managing chronic disease and on more than 10 medications and hospitalized or in the ED in the past 6 months     This service is designed to help you get the most from your medications.  A specially trained pharmacist will work closely with you and your doctors  to solve any problems related to your medications and to help you get the   best results from taking them.      The Medication Therapy Management staff will call you to schedule an appointment.                  General Recommendations To Control Heart Failure When You Get Home     Instructions To Patients and Families: Please read and check off each of these  "important instructions as you do them when you get home.           Weight and symptoms      ___ Put a scale in your bathroom  ___ Post a weight chart or calendar next to the scale  ___Weigh yourself every day as soon as you you get up in the morning. You should only be wearing your pajamas. Write your weight on the chart/calendar.  ___ Bring your weight chart/calendar with you to all appointments    ___Call your doctor if you gain 2 pounds in 1 day or 5 pounds in 1 week from your \"dry\" weight (baseline weight). Also call your doctor if you have shortness of breath that gets worse over time, leg swelling or fatigue.         Medicines and diet     ___ Make sure to take your medicines as prescribed.    ___Bring a current list of your medicines and all of your medicine bottles with you to all appointments.    ___ Limit fluids if you still have swelling or shortness of breath, or if your doctor tells you to do so.  ___ Eat less than 2000 mg of sodium (salt) every day. Read food labels, and do not add salt to meals.   ___ Heart healthy diet with low fat and low cholesterol          Activity and suggested lifestyle changes    ___ Stay active. Talk to your doctor about an exercise program that is safe for your heart.    ___ Stop smoking. Reduce alcohol use.      ___ Lose weight if you are overweight. Extra weight puts a lot of stress on the heart.          Control for Leg Swelling   ___ Keep your legs elevated (raised) as needed for swelling. If swelling is uncomfortable or elevation doesn t help, ask your doctor about using ACE wrap or Jobst stockings.          Follow-up appointments   ___ Make a [facility] Clinic appointment with a basic metabolic panel lab draw 3 to 5 days after you leave the hospital. Call one of the following locations:   [facility]  [phone],  [facility] [phone],  [facility]  [phone].     ___ Make " "sure to take your medications as prescribed and bring an accurate list of your medications and your weight chart/calendar to your follow up appointment at the [facility].O.R.E. Clinic for continued education and adjustments          What is the CORE clinic?    The C.O.R.E (Cardiomyopathy, Optimization, Rehabilitation, Education) Clinic is a heart failure specialty clinic within the [facility]. At C.O.R.E., you will work with nurse practitioners to carefully adjust medicines, get education and learn who and when to call if symptoms appear. C.O.R.E nurses specialize in helping you:    better understand your disease.    slow the progress of your disease.    improve the length and quality of your life.    detect future heart problems before they become life threatening.    avoid hospital stays.            Pending Results     No orders found from 4/22/2017 to 4/25/2017.            Statement of Approval     Ordered          05/01/17 1409  I have reviewed and agree with all the recommendations and orders detailed in this document.  EFFECTIVE NOW     Approved and electronically signed by:  Mike Rivas MD             Admission Information     Date & Time Provider Department Dept. Phone    4/24/2017 Clive Davis MD [address] [facility] [phone]      Your Vitals Were     Blood Pressure Pulse Temperature Respirations Height Weight    128/76 (BP Location: Left arm) 86 97.9  F (36.6  C) (Oral) 18 1.727 m (5' 8\") 65 kg (143 lb 3.2 oz)    Pulse Oximetry BMI (Body Mass Index)                96% 21.77 kg/m2          [facility] Information     [employer] lets you send messages to your doctor, view your test results, renew your prescriptions, schedule appointments and more. To sign up, go to [email or web address] . Click on \"Log in\" on the left side of the screen, which will take you to the Welcome page. Then click on \"Sign up Now\" on the right side of the page.     You will be " asked to enter the access code listed below, as well as some personal information. Please follow the directions to create your username and password.     Your access code is: [email or web address]  Expires: 2017  3:26 PM     Your access code will  in 90 days. If you need help or a new code, please call your [facility] clinic or [phone].        Care EveryWhere ID     This is your Care EveryWhere ID. This could be used by other organizations to access your [facility] medical records  [id number]           Review of your medicines      CONTINUE these medicines which may have CHANGED, or have new prescriptions. If we are uncertain of the size of tablets/capsules you have at home, strength may be listed as something that might have changed.        Dose / Directions    hydrALAZINE 25 MG tablet   Commonly known as:  APRESOLINE   This may have changed:    - medication strength  - how much to take  - how to take this  - when to take this  - additional instructions        Dose:  75 mg   Take 3 tablets (75 mg) by mouth 4 times daily   Quantity:  60 tablet   Refills:  1       potassium chloride 20 MEQ Packet   Commonly known as:  KLOR-CON   This may have changed:  how much to take   Used for:  Chronic combined systolic and diastolic congestive heart failure (H)        Dose:  40 mEq   40 mEq by Oral or Feeding Tube route 2 times daily   Quantity:  180 packet   Refills:  3         CONTINUE these medicines which have NOT CHANGED        Dose / Directions    aspirin EC 81 MG EC tablet        Dose:  81 mg   Take 81 mg by mouth daily   Refills:  0       B-12 1000 MCG Tbcr   Used for:  Vitamin B 12 deficiency        Dose:  1000 mcg   Take 1,000 mcg by mouth daily   Quantity:  100 tablet   Refills:  0       bumetanide 1 MG tablet   Commonly known as:  BUMEX   Used for:  Chronic combined systolic and diastolic congestive heart failure (H)        Dose:  2 mg   Take 2 tablets (2 mg) by mouth 2 times daily   Quantity:  360 tablet    Refills:  3       calcitRIOL 0.25 MCG capsule   Commonly known as:  ROCALTROL        Dose:  0.25 mcg   Take 0.25 mcg by mouth daily   Refills:  0       carvedilol 3.125 MG tablet   Commonly known as:  COREG   Used for:  Chronic systolic heart failure (H)        Dose:  6.25 mg   Take 2 tablets (6.25 mg) by mouth 2 times daily (with meals)   Quantity:  540 tablet   Refills:  3       isosorbide mononitrate 60 MG 24 hr tablet   Commonly known as:  IMDUR   Used for:  Chronic systolic heart failure (H), NSTEMI (non-ST elevated myocardial infarction) (H), Ischemic cardiomyopathy        Dose:  60 mg   Take 1 tablet (60 mg) by mouth At Bedtime At hs   Quantity:  30 tablet   Refills:  11       KRILL OIL PO        Dose:  1 capsule   Take 1 capsule by mouth daily   Refills:  0       levothyroxine 150 MCG tablet   Commonly known as:  SYNTHROID/LEVOTHROID        Dose:  150 mcg   Take 150 mcg by mouth daily   Refills:  0       milrinone 20 MG/100ML infusion   Commonly known as:  PRIMACOR   Used for:  Chronic systolic heart failure (H)        Dose:  0.375 mcg/kg/min   Inject 25.5375 mcg/min into the vein continuous   Quantity:  500 mL   Refills:  1       Multi-vitamin Tabs tablet        Dose:  1 tablet   Take 1 tablet by mouth daily   Refills:  0       PLAVIX 75 MG tablet   Generic drug:  clopidogrel        Dose:  75 mg   Take 75 mg by mouth daily   Refills:  0       Sodium Chloride 0.65 % Soln        Spray in nostril as needed   Refills:  0       tolterodine 4 MG 24 hr capsule   Commonly known as:  DETROL LA        Dose:  4 mg   Take 4 mg by mouth daily   Refills:  0            Where to get your medicines      These medications were sent to [id number] - [facility] [address] [facility]  [id number] [place] [address] [place] [facility] [address]     Phone:  [phone]     bumetanide 1 MG tablet    hydrALAZINE 25 MG tablet    milrinone 20 MG/100ML infusion    potassium chloride 20 MEQ Packet                Protect others  around you: Learn how to safely use, store and throw away your medicines at www.disposemymeds.org.             Medication List: This is a list of all your medications and when to take them. Check marks below indicate your daily home schedule. Keep this list as a reference.      Medications           Morning Afternoon Evening Bedtime As Needed    aspirin EC 81 MG EC tablet   Take 81 mg by mouth daily   Last time this was given:  81 mg on 5/1/2017  8:47 AM                                B-12 1000 MCG Tbcr   Take 1,000 mcg by mouth daily                                bumetanide 1 MG tablet   Commonly known as:  BUMEX   Take 2 tablets (2 mg) by mouth 2 times daily   Last time this was given:  2 mg on 5/1/2017  2:02 PM                                calcitRIOL 0.25 MCG capsule   Commonly known as:  ROCALTROL   Take 0.25 mcg by mouth daily   Last time this was given:  0.25 mcg on 5/1/2017  8:45 AM                                carvedilol 3.125 MG tablet   Commonly known as:  COREG   Take 2 tablets (6.25 mg) by mouth 2 times daily (with meals)   Last time this was given:  3.125 mg on 5/1/2017  8:47 AM                                hydrALAZINE 25 MG tablet   Commonly known as:  APRESOLINE   Take 3 tablets (75 mg) by mouth 4 times daily   Last time this was given:  75 mg on 5/1/2017  2:02 PM                                isosorbide mononitrate 60 MG 24 hr tablet   Commonly known as:  IMDUR   Take 1 tablet (60 mg) by mouth At Bedtime At    Last time this was given:  60 mg on 4/30/2017 10:13 PM                                KRILL OIL PO   Take 1 capsule by mouth daily                                levothyroxine 150 MCG tablet   Commonly known as:  SYNTHROID/LEVOTHROID   Take 150 mcg by mouth daily   Last time this was given:  150 mcg on 5/1/2017  6:21 AM                                milrinone 20 MG/100ML infusion   Commonly known as:  PRIMACOR   Inject 25.5375 mcg/min into the vein continuous   Last time this was  given:  0.375 mcg/kg/min on 5/1/2017 11:46 AM                                Multi-vitamin Tabs tablet   Take 1 tablet by mouth daily   Last time this was given:  1 tablet on 5/1/2017  8:45 AM                                PLAVIX 75 MG tablet   Take 75 mg by mouth daily   Last time this was given:  75 mg on 5/1/2017  8:46 AM   Generic drug:  clopidogrel                                potassium chloride 20 MEQ Packet   Commonly known as:  KLOR-CON   40 mEq by Oral or Feeding Tube route 2 times daily   Last time this was given:  40 mEq on 5/1/2017  9:00 AM                                Sodium Chloride 0.65 % Soln   Spray in nostril as needed                                tolterodine 4 MG 24 hr capsule   Commonly known as:  DETROL LA   Take 4 mg by mouth daily   Last time this was given:  4 mg on 5/1/2017  8:46 AM

## 2017-04-24 NOTE — ED NOTES
"ED to Floor Handoff      S:  Perez Villalobos is a 76 year old male who speaks English and lives with a spouse,  in a home  They arrived in the ED by car from clinic with a complaint of Abnormal Labs    Initial vitals were:   BP: 95/53  Heart Rate: 78  Temp: 97.6  F (36.4  C)  Resp: 16  Height: 172.7 cm (5' 8\")  Weight: 68.2 kg (150 lb 5.7 oz)  SpO2: 97 %  Allergies:   Allergies   Allergen Reactions     Lisinopril Other (See Comments)     Angioedema     Augmentin Other (See Comments)     Per pt, \"froze him, affected his legs\"     Crestor [Rosuvastatin] Other (See Comments)     rhabdomyolysis   .  The meds given in the ED and their home medications are:   Current Facility-Administered Medications   Medication     chlorothiazide (DIURIL) intermittent infusion 300 mg     NaCl 0.9 % infusion     Current Outpatient Prescriptions   Medication     bumetanide (BUMEX) 1 MG tablet     potassium chloride (KLOR-CON) 20 MEQ Packet     tolterodine (DETROL LA) 4 MG 24 hr capsule     carvedilol (COREG) 3.125 MG tablet     milrinone (PRIMACOR) infusion 200 mcg/mL PREMIX     calcitRIOL (ROCALTROL) 0.25 MCG capsule     hydrALAZINE (APRESOLINE) 50 MG tablet     isosorbide mononitrate (IMDUR) 60 MG 24 hr tablet     aspirin EC 81 MG EC tablet     levothyroxine (SYNTHROID/LEVOTHROID) 150 MCG tablet     KRILL OIL PO     Cyanocobalamin (B-12) 1000 MCG TBCR     multivitamin, therapeutic with minerals (MULTI-VITAMIN) TABS     clopidogrel (PLAVIX) 75 MG tablet     Saline (SODIUM CHLORIDE) 0.65 % SOLN     Social demographics are   Social History     Social History     Marital status:      Spouse name: N/A     Number of children: N/A     Years of education: N/A     Social History Main Topics     Smoking status: Former Smoker     Packs/day: 1.50     Years: 20.00     Types: Cigarettes     Quit date: 1/1/1980     Smokeless tobacco: Never Used     Alcohol use 0.0 oz/week     0 Standard drinks or equivalent per week      Comment: occassionally " "    Drug use: No     Sexual activity: Not Asked     Other Topics Concern     Caffeine Concern No     decaf coffee     Sleep Concern No     Stress Concern No     Weight Concern No     Special Diet Yes     low fat, low sodium     Exercise Yes     everyday     Social History Narrative       B:   The patient has been ill for 1 day(s) and during this time the symptoms have increased.  In the ED was diagnosed with   Final diagnoses:   Acute kidney injury (H)   Hypervolemia, unspecified hypervolemia type    Infection/sepsis suspected:No Isolation type; No active isolations   A:   In the ED these meds were given:   Medications   chlorothiazide (DIURIL) intermittent infusion 300 mg (not administered)   NaCl 0.9 % infusion (not administered)   heparin 100 UNIT/ML injection 500 Units (500 Units Intravenous Given 4/24/17 1414)     Drips running?  Yes, Milrinone  Labs results   Labs Ordered and Resulted from Time of ED Arrival Up to the Time of Departure from the ED   CBC WITH PLATELETS DIFFERENTIAL - Abnormal; Notable for the following:        Result Value    RBC Count 3.28 (*)     Hemoglobin 10.8 (*)     Hematocrit 32.5 (*)     RDW 16.1 (*)     All other components within normal limits   BASIC METABOLIC PANEL - Abnormal; Notable for the following:     Sodium 131 (*)     Glucose 122 (*)     Urea Nitrogen 87 (*)     Creatinine 2.42 (*)     GFR Estimate 26 (*)     GFR Estimate If Black 32 (*)     All other components within normal limits   NT PROBNP INPATIENT - Abnormal; Notable for the following:     N-Terminal Pro BNP Inpatient 87665 (*)     All other components within normal limits   STRICT INTAKE AND OUTPUT     Imaging Studies: No results found for this or any previous visit (from the past 24 hour(s)).  Recent vital signs /68  Temp 97.6  F (36.4  C) (Oral)  Resp 20  Ht 1.727 m (5' 8\")  Wt 68.2 kg (150 lb 5.7 oz)  SpO2 99%  BMI 22.86 kg/m2  Cardiac Rhythm: ,      Abnormal labs/tests/findings requiring " intervention:---  Pain control: pt had none  Nausea control: pt had none  R:   Transfer assistance needed: Stand with Assist of 2  Family present during ED course? Yes   Family currently present? Yes  Pt needs tele? Yes  Code Status: Full Code  Tasks needing to be completed:---    Frank herrera-- [id number] [phone] [facility]  [phone] [facility]

## 2017-04-24 NOTE — ED PROVIDER NOTES
History     Chief Complaint   Patient presents with     Abnormal Labs     HPI  Perez Villalobos is a 76 year old male with a history of NSTEMI s/p stenting, ischemic cardiomyopathy, biventricular heart failure, CHF, HTN, CKD stage III and VF. Hospitalized (4/6/17-4/15/17) due to CHF exacerbation. who presents to the Emergency Department with his wife for evaluation of abnormal labs. Today, the patient had a blood draw with labs and found to have low sodium at 126 and elevated creatinine at 2.38 and was instructed to present to the ED. Here, he reports fatigue for the past few days as well as bilateral lower extremity swelling. He has been on Bumex but notes decreased urine output for the past few days. He denies any other concerns or complaints at this time. No pain or fevers. No history of diabetes.    Past Medical History:   Diagnosis Date     Acute renal failure (H)     10/2015 ARF secondary to rhabdomyolysis     Alcoholism (H)      Anemia      Benign essential HTN      BPH (benign prostatic hypertrophy)      CAD (coronary artery disease)     AWMI, stents to Cx, RCA and LAD     Chronic systolic heart failure (H)      CKD (chronic kidney disease)      Complete AV block (H)      Ischemic cardiomyopathy 10/23/2015    EF 30%     Low sodium levels      Mixed hyperlipidemia      NSTEMI (non-ST elevated myocardial infarction) (H) 10/23/2015     PAD (peripheral artery disease) (H)      Rhabdomyolysis due to statin therapy     crestor     Severe hypothyroidism 9/20/2016     VF (ventricular fibrillation) (H) 11/16/16       Past Surgical History:   Procedure Laterality Date     APPENDECTOMY       CHOLECYSTECTOMY       ENDARTERECTOMY CAROTID Left 6/11/10     IMPLANT AUTOMATIC IMPLANTABLE CARDIOVERTER DEFIBRILLATOR  11/16/16     PICC INSERTION Right 04/12/2017    5fr DL Bard PICC, 41cm (3cm external) in the R basilic vein w/ tip in the low SVC.     STENT, CORONARY, S660 15/18  Nov 2000    PTCA with stent placement of CFX      "STENT, CORONARY, S660 15/18  Feb 2001    PTCA with stent placement of CFX RCA      STENT, CORONARY, S660 15/18  April 2007    stent placed in LAD     tonsillectomy and adenoidectomy         Family History   Problem Relation Age of Onset     Unknown/Adopted Mother      Unknown/Adopted Father        Social History   Substance Use Topics     Smoking status: Former Smoker     Packs/day: 1.50     Years: 20.00     Types: Cigarettes     Quit date: 1/1/1980     Smokeless tobacco: Never Used     Alcohol use 0.0 oz/week     0 Standard drinks or equivalent per week      Comment: occassionally       Current Facility-Administered Medications   Medication     [START ON 4/25/2017] aspirin EC EC tablet 81 mg     [START ON 4/25/2017] calcitRIOL (ROCALTROL) capsule 0.25 mcg     carvedilol (COREG) tablet 3.125 mg     [START ON 4/25/2017] clopidogrel (PLAVIX) tablet 75 mg     [START ON 4/25/2017] B-12 TBCR 1,000 mcg     isosorbide mononitrate (IMDUR) 24 hr tablet 60 mg     [START ON 4/25/2017] levothyroxine (SYNTHROID/LEVOTHROID) tablet 150 mcg     milrinone (PRIMACOR) infusion 200 mcg/mL PREMIX     multivitamin, therapeutic with minerals (THERA-VIT-M) tablet 1 tablet     [START ON 4/25/2017] tolterodine (DETROL LA) 24 hr capsule 4 mg     HOLD nitroglycerin IF     heparin sodium PF injection 5,000 Units     albuterol neb solution 2.5 mg     Reason ACE/ARB order not selected     Continuing beta blocker from home medication list OR beta blocker order already placed during this visit        Allergies   Allergen Reactions     Lisinopril Other (See Comments)     Angioedema     Augmentin Other (See Comments)     Per pt, \"froze him, affected his legs\"     Crestor [Rosuvastatin] Other (See Comments)     rhabdomyolysis     I have reviewed the Medications, Allergies, Past Medical and Surgical History, and Social History in the Epic system.    Review of Systems   Constitutional: Negative for chills and fever.   HENT: Negative for congestion.  " "  Eyes: Negative for visual disturbance.   Respiratory: Negative for shortness of breath.    Cardiovascular: Negative for chest pain.   Gastrointestinal: Negative for abdominal pain, nausea and vomiting.   Endocrine: Negative for polydipsia and polyuria.   Musculoskeletal: Negative for back pain, gait problem, neck pain and neck stiffness.        Swelling in lower extremities   Skin: Negative for color change.   Neurological: Negative for light-headedness.   Hematological: Negative for adenopathy. Does not bruise/bleed easily.   Psychiatric/Behavioral: Negative for agitation and behavioral problems.       Physical Exam   BP: 95/53  Heart Rate: 78  Temp: 97.6  F (36.4  C)  Resp: 16  Height: 172.7 cm (5' 8\")  Weight: 68.2 kg (150 lb 5.7 oz)  SpO2: 97 %  Physical Exam   Constitutional: He is oriented to person, place, and time. He appears well-developed and well-nourished. No distress.   HENT:   Head: Normocephalic and atraumatic.   Mouth/Throat: Oropharynx is clear and moist. No oropharyngeal exudate.   Eyes: Conjunctivae and EOM are normal. No scleral icterus.   Neck: Normal range of motion.   Cardiovascular: Normal rate, normal heart sounds and intact distal pulses.    Pulmonary/Chest: Effort normal and breath sounds normal. No respiratory distress. He has no wheezes. He has no rales.   Abdominal: Soft. Bowel sounds are normal. There is no tenderness.   Musculoskeletal: Normal range of motion. He exhibits edema ( 3+ b/l LE edema). He exhibits no tenderness.   Neurological: He is alert and oriented to person, place, and time. No cranial nerve deficit. He exhibits normal muscle tone. Coordination normal.   Skin: Skin is warm. No rash noted. He is not diaphoretic.   Psychiatric: He has a normal mood and affect. His behavior is normal. Judgment and thought content normal.   Nursing note and vitals reviewed.      ED Course     2:18 PM  The patient was seen and examined by Dr. Baldwin in Room 23.     ED Course "     Procedures             Critical Care time:  none               Labs Ordered and Resulted from Time of ED Arrival Up to the Time of Departure from the ED   CBC WITH PLATELETS DIFFERENTIAL - Abnormal; Notable for the following:        Result Value    RBC Count 3.28 (*)     Hemoglobin 10.8 (*)     Hematocrit 32.5 (*)     RDW 16.1 (*)     All other components within normal limits   BASIC METABOLIC PANEL - Abnormal; Notable for the following:     Sodium 131 (*)     Glucose 122 (*)     Urea Nitrogen 87 (*)     Creatinine 2.42 (*)     GFR Estimate 26 (*)     GFR Estimate If Black 32 (*)     All other components within normal limits   NT PROBNP INPATIENT - Abnormal; Notable for the following:     N-Terminal Pro BNP Inpatient 36364 (*)     All other components within normal limits   STRICT INTAKE AND OUTPUT            Assessments & Plan (with Medical Decision Making)   76-year-old man with history of severe congestive heart failure presenting with abnormal labs. Differential diagnosis: Acute kidney injury, dehydration, fluid overload.    After thorough history and physical exam patient appears to be in no acute distress. I did review his outpatient laboratory studies that showed elevated creatinine of 2.38 which is high from creatinine 1.1 from 1 week ago. Sodium was also low at 126. Discussed these findings with regards with the heart failure staff on-call, Dr. Davis, who recommended treating the patient a dose of intravenous Diuril. I will order this. He was admitted to heart failure service for further management. Patient and wife agree with the plan.    I have reviewed the nursing notes.  I have reviewed the findings, diagnosis, plan and need for follow up with the patient.  Current Discharge Medication List          Final diagnoses:   Acute kidney injury (H)   Hypervolemia, unspecified hypervolemia type     I, Jackie Chaudhari, malcom serving as a trained medical scribe to document services personally performed by  Johnnie Baldwin MD, based on the provider's statements to me.      I, Johnnie Baldwin MD, was physically present and have reviewed and verified the accuracy of this note documented by Jackie Chaudhari.     4/24/2017   [facility], [place], EMERGENCY DEPARTMENT     Johnnie Baldwin MD  04/24/17 0591

## 2017-04-24 NOTE — H&P
"         Cardiology History and Physical  Perez Villalobos MRN: [id number]  Age: 76 year old, : 1940  Primary care provider: Hector Jc           Chief Complaint:     \"I have been swelling up\"          History of Present Illness:     History is obtained from the patient and EMR    Perez Villalobos is a 76 year old male with history of ICM s/p multiple stents and MI, HFrEF (EF 35% ECHO 2017), HTN, HLD, hypothyroidism, and recent onset CKD who was recently hospitalized at [facility] from -4/15 with cardiogenic shock discharged on milrinone who returns to the hospital today with increasing weight, lower extremity edema, and worsening fatigue/SOB.    Mr. Villalobos was hospitalized here in the cardiac ICU from -4/15. He presented with increasing fatigue/SOB and weight. That was his 7th hospitalization in the past year. He was started on milrinone and up titrated to stable dose of 0.375 mcg/kg/min. Underwent LVAD workup but was deferred for now given EtOH abuse history with recent increase in drinking. Discharged with milrinone and bumex to be taken based on home weights and I/O's.    Was doing well after discharge, weight stable and more active than he has been in a while. Was taking bumex 1 mg BID. Unfortunately starting last Monday his weight slowly increased by roughly 1 pound each day. He increased his home bumex per directions of the [facility]. Did not have notice much of a response. In addition to the increasing weight, he notes increasing abdominal distention, fatigue, LE edema, and orthopnea. Cough has been more productive recently. No changes in diet, taking medications as prescribed. Past 2 days developed loose stools, no melena/hematochezia. Denies chest pain, palpitations, ICd shocks, and palpitations. No recent ill contacts.    Currently resting comfortably in bed, no acute complaints other than the ongoing fatigue and swelling.          Past Medical History:     Past Medical History: " "  Diagnosis Date     Acute renal failure (H)     10/2015 ARF secondary to rhabdomyolysis     Alcoholism (H)      Anemia      Benign essential HTN      BPH (benign prostatic hypertrophy)      CAD (coronary artery disease)     AWMI, stents to Cx, RCA and LAD     Chronic systolic heart failure (H)      CKD (chronic kidney disease)      Complete AV block (H)      Ischemic cardiomyopathy 10/23/2015    EF 30%     Low sodium levels      Mixed hyperlipidemia      NSTEMI (non-ST elevated myocardial infarction) (H) 10/23/2015     PAD (peripheral artery disease) (H)      Rhabdomyolysis due to statin therapy     crestor     Severe hypothyroidism 9/20/2016     VF (ventricular fibrillation) (H) 11/16/16              Past Surgical History:      Past Surgical History:   Procedure Laterality Date     APPENDECTOMY       CHOLECYSTECTOMY       ENDARTERECTOMY CAROTID Left 6/11/10     IMPLANT AUTOMATIC IMPLANTABLE CARDIOVERTER DEFIBRILLATOR  11/16/16     PICC INSERTION Right 04/12/2017    5fr DL Bard PICC, 41cm (3cm external) in the R basilic vein w/ tip in the low SVC.     STENT, CORONARY, S660 15/18  Nov 2000    PTCA with stent placement of CFX     STENT, CORONARY, S660 15/18  Feb 2001    PTCA with stent placement of CFX RCA      STENT, CORONARY, S660 15/18  April 2007    stent placed in LAD     tonsillectomy and adenoidectomy                Social History:     Lives with wife in [place]. Does not smoke, no EtOH since prior to previous hospitalization. No illicit drug use.            Family History:   Adopted uncertain of family history         Allergies:     Allergies   Allergen Reactions     Lisinopril Other (See Comments)     Angioedema     Augmentin Other (See Comments)     Per pt, \"froze him, affected his legs\"     Crestor [Rosuvastatin] Other (See Comments)     rhabdomyolysis              Medications:     Prescriptions Prior to Admission   Medication Sig Dispense Refill Last Dose     bumetanide (BUMEX) 1 MG tablet Take 2 " tablets (2 mg) by mouth 2 times daily 360 tablet 3      potassium chloride (KLOR-CON) 20 MEQ Packet 20 mEq by Oral or Feeding Tube route 2 times daily 180 packet 3      tolterodine (DETROL LA) 4 MG 24 hr capsule Take 4 mg by mouth daily   Taking     carvedilol (COREG) 3.125 MG tablet Take 2 tablets (6.25 mg) by mouth 2 times daily (with meals) 540 tablet 3 Taking     milrinone (PRIMACOR) infusion 200 mcg/mL PREMIX Inject 25.5375 mcg/min into the vein continuous 500 mL 1 Taking     calcitRIOL (ROCALTROL) 0.25 MCG capsule Take 0.25 mcg by mouth daily   Taking     hydrALAZINE (APRESOLINE) 50 MG tablet 1-1/2 tabs am, 1 tab at 3 pm and 1-1/2 tabs bedtime. 360 tablet 3 Taking     isosorbide mononitrate (IMDUR) 60 MG 24 hr tablet Take 1 tablet (60 mg) by mouth At Bedtime At hs 30 tablet 11 Taking     aspirin EC 81 MG EC tablet Take 81 mg by mouth daily   Taking     levothyroxine (SYNTHROID/LEVOTHROID) 150 MCG tablet Take 150 mcg by mouth daily   Taking     KRILL OIL PO Take 1 capsule by mouth daily    Taking     Cyanocobalamin (B-12) 1000 MCG TBCR Take 1,000 mcg by mouth daily 100 tablet 0 Taking     multivitamin, therapeutic with minerals (MULTI-VITAMIN) TABS Take 1 tablet by mouth daily   Taking     clopidogrel (PLAVIX) 75 MG tablet Take 75 mg by mouth daily   Taking     Saline (SODIUM CHLORIDE) 0.65 % SOLN Spray in nostril as needed   Taking                    Physical Exam:     B/P: 119/68, T: 97.6, P: 77, R: 10    Wt Readings from Last 4 Encounters:   04/24/17 68.2 kg (150 lb 5.7 oz)   04/17/17 65.1 kg (143 lb 8 oz)   04/15/17 65 kg (143 lb 3.2 oz)   04/05/17 69.6 kg (153 lb 6.4 oz)     No intake or output data in the 24 hours ending 04/24/17 1708    Gen: AA&Ox3, no acute distress  HEENT:AT/ NC, EOM grossly intact, mucous membranes pink, moist without plaque or exudate  Neck: JVP elevated to level of ear at 40 degrees  BACK: no CVA tenderness  PULM/THORAX: Clear to auscultation bilaterally, no  rales/rhonchi/wheezes  CV: RRR, S1 and S2 appreciated, no extra heart sounds, murmurs or rub auscultated.   ABD: distended, soft, nontender. Normoactive bowel sounds x 4, no HSM appreciated. Positive fluid wave  EXT: LE edema 2-3+ to knees bilaterally. No asymmetrical edema or tenderness to palpation in calves bilaterally.  NEURO: alert and oriented, fluent speech, no apparent focal deficits          Data:     Labs Reviewed on Admission  Results for RON LUCERO (MRN [id number]) as of 4/24/2017 19:44   Ref. Range 4/24/2017 14:15   Sodium Latest Ref Range: 133 - 144 mmol/L 131 (L)   Potassium Latest Ref Range: 3.4 - 5.3 mmol/L 4.3   Chloride Latest Ref Range: 94 - 109 mmol/L 94   Carbon Dioxide Latest Ref Range: 20 - 32 mmol/L 24   Urea Nitrogen Latest Ref Range: 7 - 30 mg/dL 87 (H)   Creatinine Latest Ref Range: 0.66 - 1.25 mg/dL 2.42 (H)   GFR Estimate Latest Ref Range: >60 mL/min/1.7m2 26 (L)   GFR Estimate If Black Latest Ref Range: >60 mL/min/1.7m2 32 (L)   Calcium Latest Ref Range: 8.5 - 10.1 mg/dL 9.2   Anion Gap Latest Ref Range: 3 - 14 mmol/L 13   N-Terminal Pro BNP Inpatient Latest Ref Range: 0 - 1800 pg/mL 94814 (H)   Glucose Latest Ref Range: 70 - 99 mg/dL 122 (H)   WBC Latest Ref Range: 4.0 - 11.0 10e9/L 5.2   Hemoglobin Latest Ref Range: 13.3 - 17.7 g/dL 10.8 (L)   Hematocrit Latest Ref Range: 40.0 - 53.0 % 32.5 (L)   Platelet Count Latest Ref Range: 150 - 450 10e9/L 234   RBC Count Latest Ref Range: 4.4 - 5.9 10e12/L 3.28 (L)   MCV Latest Ref Range: 78 - 100 fl 99           Most Recent Imaging:     MONIK 4/18:  Interpretation Summary  Severely (EF <30%) reduced left ventricular function is present.  Global right ventricular function is moderately reduced.  Mild- moderate functional MR is present. MV ERO 0.07 cm2, VC of 5 mm and R Vol  is 12 ml.  The aortic valve is tricuspid. Mild aortic valve sclerosis is present. Aortic  insufficiency is mild.  Complex atherothrombi of the aorta are present in the  descending thoracic  aorta.  Small loculated pericardial effusion posterior to the RA.  No prior MONIK for comparison.           Assessment and Plan:     Perez Villalobos is a 76 year old male with history of ICM s/p multiple stents and MI, HFrEF (EF 35% ECHO 2/2017), HTN, HLD, hypothyroidism, and recent onset CKD who was recently hospitalized at [facility] from 4/6-4/15 with cardiogenic shock discharged on milrinone who returns to the hospital today with increasing weight, lower extremity edema, and worsening fatigue/SOB concerning for acute decompensated heart failure.    #Acute on Chronic HFrEF 2/2 ICM, inotrope dependent  #Increased weight:  Story and exam (LE edema, elevated neck veins) concerning for acute decompensated heart failure. Was failing outpatient diuretic therapy. Etiology for exacerbation could be related to progression of underlying disease or developing poor response to diuretics. Does not appear to have infection, medication and diet compliance is good, no chest pain or palpitations. Received chlorothiazide in ED, will monitor response and given IV lasix as needed for fluid removal. Goal for overnight is -1L by AM. Continuing home milrinone and other heart failure medications other than decreasing beta blocker to coreg 3.125 mg BID.  -chlorothiazide 300 mg IV once in ED  -additional IV lasix based on response  -strict I/O and daily weight  -milrinone 0.375 mcg/kg/min  -tele, O2 monitoring  -no ECHO or right heart cath for now  -continuing home imdur, hydralazine, decreasing coreg to 3.125 mg BID  -plavix and aspirin    #JACINTO on CKD:  Increasing creatinine as weight has been increasing. Likely pre-renal physiology from above, expect will improve with diuresis    #Hypothyroidism:  Continuing home replacement    #Chronic normocytic anemia:  Likely related to underlying kidney disease, currently stable.    FEN: 2 gram sodium, 2L fluid  PPX: heparin  Dispo: Cards 2 admission for fluid removal  Code Status: Full  CODE     Patient discussed with staff attending, Dr. Davis.  Please feel free to page with questions.    Mike Evelyn  PGY-1 Internal Medicine  Cardiology Service   I personally provided care for this patient, reviewed chart, discussed course with patient, housestaff and consulting physicians.  I answered all questions.    Clive Davis M.D.  Division of Cardiology  Department of Medicine

## 2017-04-24 NOTE — ED NOTES
Patient presents from clinic with c/o increased creatinine. Patient has cardiac hx and is on milrinone infusion. Patient reports fluid retention, decreased appetite, and orthopnea.

## 2017-04-24 NOTE — IP AVS SNAPSHOT
"    [address] [facility]: [phone]                                              INTERAGENCY TRANSFER FORM - PHYSICIAN ORDERS   2017                    Hospital Admission Date: 2017  RON LUCERO   : 1940  Sex: Male        Attending Provider: Clive Davis MD     Allergies:  Lisinopril, Augmentin, Crestor [Rosuvastatin]    Infection:  None   Service:  CARDIOLOGY    Ht:  1.727 m (5' 8\")   Wt:  65 kg (143 lb 3.2 oz)   Admission Wt:  68.2 kg (150 lb 5.7 oz)    BMI:  21.77 kg/m 2   BSA:  1.77 m 2            Patient PCP Information     Provider PCP Type    Hector Jc MD General      ED Clinical Impression     Diagnosis Description Comment Added By Time Added    Acute kidney injury (H) [N17.9] Acute kidney injury (H) [N17.9]  Johnnie Baldwin MD 2017  3:25 PM    Hypervolemia, unspecified hypervolemia type [E87.70] Hypervolemia, unspecified hypervolemia type [E87.70]  Johnnie Baldwin MD 2017  3:25 PM      Hospital Problems as of 2017              Priority Class Noted POA    CHF (congestive heart failure) (H) Medium  3/29/2017 Yes      Non-Hospital Problems as of 2017              Priority Class Noted    Pain in joint, shoulder region   2010    Coronary artery disease involving native coronary artery of native heart without angina pectoris   Unknown    Muscle weakness (generalized) Medium  10/5/2015    Idiopathic progressive polyneuropathy Medium  10/7/2015    Vitamin B12 deficiency without anemia Medium  10/7/2015    NSTEMI (non-ST elevated myocardial infarction) (H) Medium  10/23/2015    Ischemic cardiomyopathy Medium  10/23/2015    Rhabdomyolysis due to statin therapy Medium  Unknown    Anemia Medium  Unknown    BPH (benign prostatic hypertrophy) Medium  Unknown    PAD (peripheral artery disease) (H) Medium  Unknown    Biventricular heart failure (H) Medium  2016    Leg weakness, bilateral Medium  2016    Mixed hyperlipidemia Medium  Unknown "    Benign essential HTN Medium  Unknown    Chronic systolic heart failure (H) Medium  Unknown    Bradycardia Medium  9/16/2016    Severe hypothyroidism Medium  9/20/2016    ACS (acute coronary syndrome) (H) Medium  10/1/2016    DELONTE (obstructive sleep apnea)   10/12/2016    CKD (chronic kidney disease) Medium  Unknown    S/P ICD (internal cardiac defibrillator) procedure   11/29/2016    Atrial flutter (H)   12/14/2016    CKD (chronic kidney disease) stage 3, GFR 30-59 ml/min Medium  12/21/2016    Cardiorenal syndrome Medium  12/21/2016    Iron deficiency anemia, unspecified Medium  1/12/2017    Anemia of chronic renal failure Medium  1/12/2017    Chronic kidney disease, stage III (moderate) Medium  1/12/2017    Hyponatremia Medium  1/16/2017    Hypokalemia Medium  1/16/2017    Dyspnea Medium  3/30/2017    CHF exacerbation (H) Medium  4/6/2017      Code Status History     Date Active Date Inactive Code Status Order ID Comments User Context    5/1/2017  1:53 PM  Full Code [id number]  Mike Rivas MD Outpatient    4/24/2017  6:17 PM 5/1/2017  1:53 PM Full Code [id number]  Mike Rivas MD Inpatient    4/15/2017 11:49 AM 4/24/2017  6:17 PM Full Code [id number]  Mike Rivas MD Outpatient    4/7/2017  7:04 AM 4/15/2017 11:49 AM Full Code [id number]  Mike Rivas MD Inpatient    3/30/2017  5:29 PM 4/7/2017  7:04 AM Full Code [id number]  Kerwin Wyman MD Outpatient    3/29/2017  5:41 PM 3/30/2017  5:29 PM Full Code [id number]  Yan Coulter MD Inpatient    1/20/2017 10:42 AM 3/29/2017  5:41 PM Full Code [id number]  Magdaleno Rangel MD Outpatient    1/16/2017  7:45 PM 1/20/2017 10:42 AM Full Code [id number]  Anton Garcia MD Inpatient    11/18/2016 11:24 AM 1/16/2017  7:45 PM Full Code [id number]  Yan Coulter MD Outpatient    11/15/2016  8:23 PM 11/18/2016 11:24 AM Full Code [id number]  Jerrod Sainz MD Inpatient    10/6/2016 10:07 AM  11/15/2016  8:23 PM Full Code [id number]  Fredy Molina MD Outpatient    10/1/2016  6:55 PM 10/6/2016 10:07 AM Full Code [id number]  Cooper Feliciano MD Inpatient    9/20/2016 12:10 PM 10/1/2016  6:55 PM Full Code [id number]  Josep Wilson MD Outpatient    9/16/2016 10:21 PM 9/20/2016 12:10 PM Full Code [id number]  Ralph Hanley MD Inpatient    6/17/2016 10:27 AM 9/16/2016 10:21 PM Full Code [id number]  Ralph Monroy MD Outpatient    6/15/2016  4:29 PM 6/17/2016 10:27 AM Full Code [id number]  Capo Ortiz MD Inpatient    2/9/2016  8:51 AM 2/13/2016  6:56 PM Full Code [id number]  Linda Curiel MD Inpatient    10/9/2015 12:59 PM 2/9/2016  8:51 AM Full Code [id number]  Hodan Guerrero MD Outpatient    10/1/2015  7:41 PM 10/9/2015 12:59 PM Full Code [id number]  Nika Emmanuel PA Inpatient         Medication Review      CONTINUE these medications which may have CHANGED, or have new prescriptions. If we are uncertain of the size of tablets/capsules you have at home, strength may be listed as something that might have changed.        Dose / Directions Comments    hydrALAZINE 25 MG tablet   Commonly known as:  APRESOLINE   This may have changed:    - medication strength  - how much to take  - how to take this  - when to take this  - additional instructions        Dose:  75 mg   Take 3 tablets (75 mg) by mouth 4 times daily   Quantity:  60 tablet   Refills:  1        potassium chloride 20 MEQ Packet   Commonly known as:  KLOR-CON   This may have changed:  how much to take   Used for:  Chronic combined systolic and diastolic congestive heart failure (H)        Dose:  40 mEq   40 mEq by Oral or Feeding Tube route 2 times daily   Quantity:  180 packet   Refills:  3          CONTINUE these medications which have NOT CHANGED        Dose / Directions Comments    aspirin EC 81 MG EC tablet        Dose:  81 mg   Take 81 mg by mouth daily   Refills:  0        B-12 1000 MCG Tbcr   Used for:  Vitamin B  12 deficiency        Dose:  1000 mcg   Take 1,000 mcg by mouth daily   Quantity:  100 tablet   Refills:  0        bumetanide 1 MG tablet   Commonly known as:  BUMEX   Used for:  Chronic combined systolic and diastolic congestive heart failure (H)        Dose:  2 mg   Take 2 tablets (2 mg) by mouth 2 times daily   Quantity:  360 tablet   Refills:  3        calcitRIOL 0.25 MCG capsule   Commonly known as:  ROCALTROL        Dose:  0.25 mcg   Take 0.25 mcg by mouth daily   Refills:  0        carvedilol 3.125 MG tablet   Commonly known as:  COREG   Used for:  Chronic systolic heart failure (H)        Dose:  6.25 mg   Take 2 tablets (6.25 mg) by mouth 2 times daily (with meals)   Quantity:  540 tablet   Refills:  3        isosorbide mononitrate 60 MG 24 hr tablet   Commonly known as:  IMDUR   Used for:  Chronic systolic heart failure (H), NSTEMI (non-ST elevated myocardial infarction) (H), Ischemic cardiomyopathy        Dose:  60 mg   Take 1 tablet (60 mg) by mouth At Bedtime At hs   Quantity:  30 tablet   Refills:  11        KRILL OIL PO        Dose:  1 capsule   Take 1 capsule by mouth daily   Refills:  0        levothyroxine 150 MCG tablet   Commonly known as:  SYNTHROID/LEVOTHROID        Dose:  150 mcg   Take 150 mcg by mouth daily   Refills:  0        milrinone 20 MG/100ML infusion   Commonly known as:  PRIMACOR   Used for:  Chronic systolic heart failure (H)        Dose:  0.375 mcg/kg/min   Inject 25.5375 mcg/min into the vein continuous   Quantity:  500 mL   Refills:  1        Multi-vitamin Tabs tablet        Dose:  1 tablet   Take 1 tablet by mouth daily   Refills:  0        PLAVIX 75 MG tablet   Generic drug:  clopidogrel        Dose:  75 mg   Take 75 mg by mouth daily   Refills:  0        Sodium Chloride 0.65 % Soln        Spray in nostril as needed   Refills:  0        tolterodine 4 MG 24 hr capsule   Commonly known as:  DETROL LA        Dose:  4 mg   Take 4 mg by mouth daily   Refills:  0                 Summary of Visit     Reason for your hospital stay       You were hospitalized with increased fluid and swelling related to your heart failure. We treated you with IV water pills to remove fluid and eventually transitioned to oral diuretics. You will be discharged on an increased dose of bumex with close follow up in the [facility] clinic.             After Care     Activity       Your activity upon discharge: activity as tolerated       Diet       Follow this diet upon discharge: 2 gram sodium diet and 2 liter fluid restriction       Discharge Instructions       You will be taking an increased dose of your bumex when you leave the hospital. You will be taking 2 mg in the morning and 2 mg in the early afternoon. You should also take more potassium than you were prior to hospitalization; you should take 40 mEq in the morning and 40 mEq in the afternoon. You will continue your other medications you have been on prior to staying in the hospital. You should be seen in the [facility] clinic later this week, either Thursday or Friday. Be sure to closely monitor your fluid intake and urine output, they should be about the same. Additionally check your weight everyday.       Monitor and record       You will need to monitor and record your fluid intake and urine output and make sure they are close to the same. Additionally, you should be checking your weight everyday at the same time in the same amount of clothes. If your weight is increasing you need to let your care team know.             Referrals     Home infusion referral       Your provider has referred you to: [facility]   Phone: [phone]   Fax: [phone]     For resumption of home IV milrinone and PICC line supplies.       Medication Therapy Management Referral       Reason for referral:  on more than 5 medications and managing chronic disease and on more than 10 medications and hospitalized or in the ED in the past 6 months     This service is designed to  help you get the most from your medications.  A specially trained pharmacist will work closely with you and your doctors  to solve any problems related to your medications and to help you get the   best results from taking them.      The Medication Therapy Management staff will call you to schedule an appointment.             Your next 10 appointments already scheduled     May 04, 2017  9:00 AM CDT   Level 1 with RH INFUSION CHAIR 2   [facility] ([facility])    [facility]  [address]  [facility] [address]   [phone]            May 08, 2017  8:00 AM CDT   Remote HF with MCLEAN HFRN   [facility] AT [place] ([facility])    [address]  [facility] [facility] [address]   [phone]           This appointment is for a remote check of your debrillator.  This is not an appointment at the office.            May 11, 2017  9:00 AM CDT   Level 1 with RH INFUSION CHAIR 11   [facility] ([facility])    [facility]  [address]  [facility] [address]   [phone]            May 15, 2017  2:00 PM CDT   (Arrive by 1:45 PM)   RETURN HEART FAILURE with Anton Argueta MD   [facility] ([facility])    [address] [place] [id number] [phone]            Jul 06, 2017  4:30 PM CDT   Remote ICD Check with MCLEAN DCR2   [facility] AT [place] ([facility])    [address]  [facility] [address] [phone]           This appointment is for a remote check of your debrillator.  This is not an appointment at the office.              Follow-Up Appointment Instructions     Future Labs/Procedures    Adult [facility] Follow-up and recommended labs and tests     Comments:    You will need to follow up  with the [facility] clinic at the end of this week, either Thursday or Friday. They will recheck your labs and kidney function and check your weight and fluid balance.    Appointments on [place] and/or [facility] (with [facility] or [facility] provider or service). Call [phone] if you haven't heard regarding these appointments within 7 days of discharge.      Follow-Up Appointment Instructions     Adult [facility] Follow-up and recommended labs and tests       You will need to follow up with the [facility]ORE clinic at the end of this week, either Thursday or Friday. They will recheck your labs and kidney function and check your weight and fluid balance.    Appointments on [place] and/or [facility] (with [facility] or [facility] provider or service). Call [phone] if you haven't heard regarding these appointments within 7 days of discharge.             Statement of Approval     Ordered          05/01/17 1409  I have reviewed and agree with all the recommendations and orders detailed in this document.  EFFECTIVE NOW     Approved and electronically signed by:  Mike Rivas MD

## 2017-04-24 NOTE — PROGRESS NOTES
"Following up with Walker today after our call last Thursday and Friday increasing diuretic to attempt to bring weight back down.  Noted that Walker called the Cards On-call phone yesterday and was advised to increase Bumex again, see note. Labs drawn today show Na+ low and Creat up to 2.38. I called and spoke with his wife Annia; she states Walker has been more tired, low appetite and his weight is only down 2lbs today but still up 8lbs since last week.  No CardioMEMS reading available, a new sensor was mailed to them, but Annia says she hasn't opened it yet.      Component      Latest Ref Rng & Units 4/15/2017 2017 2017   Sodium      133 - 144 mmol/L 135 132 (L) 126 (L)   Potassium      3.4 - 5.3 mmol/L 3.5 3.5 4.4   Chloride      94 - 109 mmol/L 91 (L) 89 (L) 90 (L)   Carbon Dioxide      20 - 32 mmol/L 32 33 (H) 25   Urea Nitrogen      7 - 30 mg/dL 59 (H) 75 (H) 91 (H)   Creatinine      0.66 - 1.25 mg/dL 1.51 (H) 1.71 (H) 2.38 (H)   Ethanol g/dL      <0.01 g/dL  <0.01 <0.01       Discussed with Kelly Mata NP on Cards 2 service who consulted with Dr. Davis and advised patient to be evaluated in the ED.  I called Annia back and she agrees to bring Walker in.  I called nurse report to the ED.  They will notify Cards 2 upon patient arrival if admission is likely.     Merlyn Schmitt RN BSN CHFN  Cardiology Care Coordinator - C.O.R.E. [facility]  Questions and [phone]  First press #1 for the [employer] and then press #3 for \"Medical Advise\" to reach us Cardiology Nurses.    "

## 2017-04-24 NOTE — IP AVS SNAPSHOT
[address] [address]    Phone:  [phone]                                       After Visit Summary   4/24/2017    Perez Villalobos    MRN: [id number]           After Visit Summary Signature Page     I have received my discharge instructions, and my questions have been answered. I have discussed any challenges I see with this plan with the nurse or doctor.    ..........................................................................................................................................  Patient/Patient Representative Signature      ..........................................................................................................................................  Patient Representative Print Name and Relationship to Patient    ..................................................               ................................................  Date                                            Time    ..........................................................................................................................................  Reviewed by Signature/Title    ...................................................              ..............................................  Date                                                            Time

## 2017-04-24 NOTE — ED NOTES
Bed: IN03  Expected date:   Expected time:   Means of arrival:   Comments:  Perez Villalobos  Discharge 1 week ago on milrinone  CHF, stents  Fluid up despite increases in bumex  Creat 2.38  Decreased appetite, fatigue  Possible admission: page cards 2

## 2017-04-25 NOTE — PROGRESS NOTES
CLINICAL NUTRITION SERVICES     Per RN, pt would like to receive Ensure Plus once daily as per home regimen.     INTERVENTIONS:  Implementation:  Medical food supplement therapy: Ordered strawberry or vanilla Ensure Plus to be sent at 14:00 daily.     Follow up/Monitoring:  As per protocol.    Bisi Barroso, MS, RD, LD, [facility]   6C Pgr:  [phone]

## 2017-04-25 NOTE — PHARMACY-ADMISSION MEDICATION HISTORY
Admission medication history interview status for the 4/24/2017 admission is complete. See Epic admission navigator for allergy information, pharmacy, prior to admission medications and immunization status.     Medication history interview sources:  interview with patient and wife  Pharmacy: [facility] some from [facility], and [facility]    Changes made to PTA medication list (reason)  Added: none  Deleted: none  Changed:   - Corrected hydralazine directions to include strength in mg    Additional medication history information (including reliability of information, actions taken by pharmacist):  1) wife good historian. Milrinone from [facility] home infusion.      Prior to Admission medications    Medication Sig Last Dose Taking? Auth Provider   bumetanide (BUMEX) 1 MG tablet Take 2 tablets (2 mg) by mouth 2 times daily 4/24/2017 at Unknown time Yes Giovanna Kelley MD   potassium chloride (KLOR-CON) 20 MEQ Packet 20 mEq by Oral or Feeding Tube route 2 times daily 4/24/2017 at AM Yes Giovanna Kelley MD   tolterodine (DETROL LA) 4 MG 24 hr capsule Take 4 mg by mouth daily 4/24/2017 at Unknown time Yes Reported, Patient   carvedilol (COREG) 3.125 MG tablet Take 2 tablets (6.25 mg) by mouth 2 times daily (with meals) 4/24/2017 at Unknown time Yes Yaya Goodwin MD   milrinone (PRIMACOR) infusion 200 mcg/mL PREMIX Inject 25.5375 mcg/min into the vein continuous Continuous at Unknown time Yes Yaya Goodwin MD   calcitRIOL (ROCALTROL) 0.25 MCG capsule Take 0.25 mcg by mouth daily 4/24/2017 at Unknown time Yes Reported, Patient   hydrALAZINE (APRESOLINE) 50 MG tablet Take 1-1/2  tablets (75mg) in the morning, 1 tablet (50mg) at 3 pm, and 1-1/2 tablets (75mg) at bedtime. 4/24/2017 at Unknown time Yes Shani Patrick APRN CNP   isosorbide mononitrate (IMDUR) 60 MG 24 hr tablet Take 1 tablet (60 mg) by mouth At Bedtime At hs 4/24/2017 at Unknown time Yes Shani Patrick APRN  CNP   aspirin EC 81 MG EC tablet Take 81 mg by mouth daily 4/24/2017 at Unknown time Yes Unknown, Entered By History   levothyroxine (SYNTHROID/LEVOTHROID) 150 MCG tablet Take 150 mcg by mouth daily 4/24/2017 at Unknown time Yes Unknown, Entered By History   Saline (SODIUM CHLORIDE) 0.65 % SOLN Spray in nostril as needed Past Week at Unknown time Yes Unknown, Entered By History   KRILL OIL PO Take 1 capsule by mouth daily  4/24/2017 at Unknown time Yes Reported, Patient   Cyanocobalamin (B-12) 1000 MCG TBCR Take 1,000 mcg by mouth daily 4/24/2017 at Unknown time Yes Hodan Guerrero MD   multivitamin, therapeutic with minerals (MULTI-VITAMIN) TABS Take 1 tablet by mouth daily 4/24/2017 at Unknown time Yes Reported, Patient   clopidogrel (PLAVIX) 75 MG tablet Take 75 mg by mouth daily 4/24/2017 at Unknown time Yes Reported, Patient     Medication history completed by: Esra Nusseibeh, MaggieD Candidate

## 2017-04-25 NOTE — PROGRESS NOTES
"[facility]  Cardiology II Service / Advanced Heart Failure  Daily Note  April 25, 2017      Interval History:   Patient diuresed well overnight. No events. Tele without events    ROS:  The Review of Systems is negative other than noted in the HPI    Pertinent Medications:  I have reviewed this patient's current medications      hydrALAZINE  50 mg Oral 4x Daily     aspirin EC  81 mg Oral Daily     calcitRIOL  0.25 mcg Oral Daily     carvedilol  3.125 mg Oral BID w/meals     clopidogrel  75 mg Oral Daily     isosorbide mononitrate  60 mg Oral At Bedtime     levothyroxine  150 mcg Oral Daily     multivitamin, therapeutic with minerals  1 tablet Oral Daily     tolterodine  4 mg Oral Daily     heparin  5,000 Units Subcutaneous Q12H     cyanocolbalamin  1,000 mcg Oral Daily       Examination:  /65 (BP Location: Left arm)  Pulse 79  Temp 98.7  F (37.1  C) (Oral)  Resp 18  Ht 1.727 m (5' 8\")  Wt 65.2 kg (143 lb 11.2 oz)  SpO2 96%  BMI 21.85 kg/m2  GEN: A & O, NAD, cooperative and conversational   HEENT: PERRL, no scleral icterus, mucus membranes moist  NECK: Supple, no LAD, JVP angle of jaw  RESP: b/l crackles   CV: Regular, normal rate, S1 and S2 without m/r/g   ABD: Soft, nontender to palpation, no HSM, +BS, no guarding  EXT: +2 LE edema more prominent below the knee  NEURO: CN II-XII intact, normal 5/5 strength in all extremitites  SKIN: Normal skin turgor, no rash on limited exam    Data:  CMP  Recent Labs  Lab 04/25/17  0630 04/24/17  1944 04/24/17  1415 04/24/17  1025    130* 131* 126*   POTASSIUM 2.8* 3.5 4.3 4.4   CHLORIDE 92* 92* 94 90*   CO2 27 26 24 25   ANIONGAP 14 12 13 11   * 104* 122* 185*   BUN 84* 88* 87* 91*   CR 2.13* 2.39* 2.42* 2.38*   GFRESTIMATED 30* 26* 26* 27*   GFRESTBLACK 37* 32* 32* 32*   LEIDY 8.8 9.1 9.2 8.7   MAG 2.5* 2.8*  --   --    PROTTOTAL 6.9 7.6  --   --    ALBUMIN 3.0* 3.5  --   --    BILITOTAL 1.1 1.2  --   --    ALKPHOS " 80 84  --   --    AST 15 20  --   --    ALT 13 15  --   --      CBC  Recent Labs  Lab 04/25/17  0630 04/24/17 1944 04/24/17  1415   WBC 5.3  --  5.2   RBC 3.11*  --  3.28*   HGB 10.2*  --  10.8*   HCT 31.0*  --  32.5*     --  99   MCH 32.8  --  32.9   MCHC 32.9  --  33.2   RDW 16.2*  --  16.1*    247 234     INR  Recent Labs  Lab 04/25/17  0630 04/24/17 1944   INR 1.16* 1.12     Arterial Blood Gas  Recent Labs  Lab 04/24/17 1944   O2PER 21       Imaging Studies:  CXR (4/24/2017)  1. Stable cardiomegaly without signs of pulmonary edema.  2. Small bilateral pleural effusions.  3. Minimal streaky bibasilar opacities, favor atelectasis.       Assessment and Plan  76 year old male with history of ICM s/p multiple stents and MI, HFrEF (EF 35% ECHO 2/2017), HTN, HLD, hypothyroidism, and recent onset CKD who was recently hospitalized at [facility] from 4/6-4/15 with cardiogenic shock discharged on milrinone who returns to the hospital today with increasing weight, lower extremity edema, and worsening fatigue/SOB concerning for acute decompensated heart failure    #) HFrEF 2/2 ICM on milrinone: presents with volume overload with increasing SOB, edema and increased weight. Warm and wet  --Floor  --Continue home milrinone 0.375mcg/kg/min  --Continue diuresis with lasix gtt 5mg/hr  --increase hydralazine to 62.5mg QID  --Continue ASA 81, coreg 3.125 BID, Imdur 60 daily, K supplementation    #) CAD  --ASA, plavix    #) hypothyroidism  -levothyuroxine      FEN: low salt  PROPHY: heparin SQ  LINES: PICC, PIV  DISPO: floor  CODE STATUS: Full code    Plan of care discussed with Dr. Susan Cabrera MD  Cardiology Fellow  [phone]          I personally provided care for this patient, reviewed chart, discussed course with patient, housestaff and consulting physicians.  I answered all questions.    Clive Davis M.D.  Division of Cardiology  Department of Medicine

## 2017-04-25 NOTE — PLAN OF CARE
Problem: Goal Outcome Summary  Goal: Goal Outcome Summary  Outcome: No Change     D: Pt admitted 4/24 for abnormal lab values from clinic (hyponatremia and increased creatinine).   I/A: VSSA on RA. Monitor shows 100% paced rhythm. Milrinone gtt running at 0.375 mcg/kg/min. Lasix gtt running at 5 mg/hr. 40meq potassium given per order. No c/o pain overnight. Using urinal with adequate UO. Up with SBA d/t lines. Pt refusing heparin injections.   P: Continue to monitor and notify Cards 2 with pertinent changes.

## 2017-04-25 NOTE — PROGRESS NOTES
04/25/17 1100   Quick Adds   Type of Visit Initial Occupational Therapy Evaluation   Living Environment   Lives With grandchild(chavez);spouse  (Adult grandson)   Living Arrangements house   Home Accessibility bed and bath are not on the first floor;stairs to enter home;stairs within home   Number of Stairs to Enter Home 1   Number of Stairs Within Home 14  (split level 9 up and 5 down)   Stair Railings at Home inside, present at both sides   Transportation Available car;family or friend will provide   Self-Care   Dominant Hand right   Usual Activity Tolerance moderate   Current Activity Tolerance fair   Regular Exercise yes   Activity/Exercise Type walking   Exercise Amount/Frequency 10 mins   Equipment Currently Used at Home cane, straight;walker, standard   Activity/Exercise/Self-Care Comment Pt does some walking, going up and down the stairs at home and car maintenance   Functional Level Prior   Ambulation 1-->assistive equipment   Transferring 0-->independent   Toileting 0-->independent   Bathing 0-->independent   Dressing 2-->assistive person   Eating 0-->independent   Communication 0-->understands/communicates without difficulty   Swallowing 0-->swallows foods/liquids without difficulty   Fall history within last six months yes   Number of times patient has fallen within last six months 3   Which of the above functional risks had a recent onset or change? none   Prior Functional Level Comment Pt reports wife helps don/doff socks and shoes.  Pt occassionally uses cane for mobility around the home and walker for longer distances   General Information   Onset of Illness/Injury or Date of Surgery - Date 04/24/17   Referring Physician Mike Mejia   Patient/Family Goals Statement To return home and back to fixing up his cars   Additional Occupational Profile Info/Pertinent History of Current Problem Perez Villalobos is a 76 year old male with history of ICM s/p multiple stents and MI, HFrEF (EF 35% ECHO 2/2017),  HTN, HLD, hypothyroidism, and recent onset CKD who was recently hospitalized at [facility] from 4/6-4/15 with cardiogenic shock discharged on milrinone who returns to the hospital with increasing weight, lower extremity edema, and worsening fatigue/SOB.   Precautions/Limitations no known precautions/limitations   General Observations Pt's wife present throughout evaluation and appears to have a supportive family.   Cognitive Status Examination   Orientation orientation to person, place and time   Level of Consciousness alert   Able to Follow Commands WNL/WFL   Personal Safety (Cognitive) WNL/WFL   Cognitive Comment Pt stated that he has been having some difficulty with finding his words since he started retaining fluid   Visual Perception   Visual Perception Wears glasses   Sensory Examination   Sensory Comments Protective sensation WFL   Range of Motion (ROM)   ROM Comment BUE AROM WFL   Strength   Strength Comments BUE WFL   Hand Strength   Hand Strength Comments BUE WFL   Coordination   Upper Extremity Coordination No deficits were identified   Gross Motor Coordination WFL   Fine Motor Coordination WFL   Transfer Skills   Transfer Comments SBA for sit to stand   Transfer Skill: Sit to Stand   Level of [place]: Sit/Stand stand-by assist   Toileting   Level of [place]: Toilet stand-by assist   Physical Assist/Nonphysical Assist: Toilet set-up required   Assistive Device other (see comments)  (Pt used urinal while standing)   Instrumental Activities of Daily Living (IADL)   IADL Comments Pt stated that he does some light meal prep for breakfast and microwave use   Activities of Daily Living Analysis   ADL Comments Pt difficulties with socks and shoes due to trouble reaching feet   General Therapy Interventions   Planned Therapy Interventions ADL retraining;strengthening;home program guidelines;progressive activity/exercise   Intervention Comments Instruct Pt in use of AE for LE dressing   Clinical Impression  "  Criteria for Skilled Therapeutic Interventions Met yes, treatment indicated   OT Diagnosis decreased ADL I   Influenced by the following impairments general deconditioning   Assessment of Occupational Performance 1-3 Performance Deficits   Identified Performance Deficits LE dressing   Clinical Decision Making (Complexity) Low complexity   Therapy Frequency other (see comments)  (1-2 additional sessions)   Predicted Duration of Therapy Intervention (days/wks) 2 days   Anticipated Equipment Needs at Discharge dressing stick;sock aide;long shoe horn   Anticipated Discharge Disposition Home with Assist   Risks and Benefits of Treatment have been explained. Yes   Patient, Family & other staff in agreement with plan of care Yes   Clinical Impression Comments Pt presents with decreased ADL I due to general deconditioning and limited activity tolerance.  Pt would benefit from skilled OT/CR to address LE dressing and development of HEP.   [facility] [employer] TM \"6 Clicks\"   2016, Trustees of [facility], under license to [employer].  All rights reserved.   6 Clicks Short Forms Daily Activity Inpatient Short Form   [facility] AM-PAC  \"6 Clicks\" Daily Activity Inpatient Short Form   1. Putting on and taking off regular lower body clothing? 3 - A Little   2. Bathing (including washing, rinsing, drying)? 4 - None   3. Toileting, which includes using toilet, bedpan or urinal? 4 - None   4. Putting on and taking off regular upper body clothing? 4 - None   5. Taking care of personal grooming such as brushing teeth? 4 - None   6. Eating meals? 4 - None   Daily Activity Raw Score (Score out of 24.Lower scores equate to lower levels of function) 23   Total Evaluation Time   Total Evaluation Time (Minutes) 8     "

## 2017-04-25 NOTE — PLAN OF CARE
Admission          4/24/2017  2:01 PM  -----------------------------------------------------------  Diagnosis:  CHF    Admitted from:  UED  Via:  Stretcher  Accompanied by:  Wife, Annia  Belongings:  Placed in closet; valuables sent home with family  Admission Profile:  Complete  Teaching:  Orientation to unit, call don't fall, use of console, meal times, visiting hours, when to call for the RN (angina/sob/dizzyness, etc.), and enforced importance of safety   Access:  R PICC dlb lumen, L PIV  Telemetry:  Placed on pt, paced rhythm  Ht./Wt.:  Complete    Temp:  [97.6  F (36.4  C)-97.9  F (36.6  C)] 97.9  F (36.6  C)  Pulse:  [76-79] 79  Heart Rate:  [73-78] 76  Resp:  [10-20] 18  BP: ()/(53-77) 125/77  SpO2:  [96 %-100 %] 98 %     Pt has home milrinone infusing- orders in, awaiting pharmacy verification to switch over.  Pt alert and oriented.  VSS on room air.  No complaints of pain.  Pt up independently, calls appropriately.  CXR done.  Continue w/ plan of care and notify team w/ changes/concerns.

## 2017-04-25 NOTE — PLAN OF CARE
Problem: Goal Outcome Summary  Goal: Goal Outcome Summary  OT: REC Home with assist as needed.  Pt continues to present with general deconditioning resulting in decreased ADL I however posing no threat to returning home safely.  Pt ambulated 600 ft with CGA while holding onto IV pole with 1 standing rest break.  Pt has difficulty donning/doffing socks and shoes. Pt should ambulate daily with staff assist for lines.

## 2017-04-25 NOTE — PROGRESS NOTES
CLINICAL NUTRITION SERVICES    Reason for Assessment:  Low-sodium (2 g/day) nutrition education, received consult.     Diet History:  Per discussion with pt and his wife, they have received low-sodium nutrition education in the past. They do have low-sodium recipes and pt's wife states that they do fairly well with the sodium restriction. Per chart review, pt's wife last received nutrition education on low-sodium eating on 4/7/2017 when pt was not in the room.     Nutrition Diagnosis:  None at this time.    Nutrition Prescription/Recs:  Continue low-sodium diet. Continue fluid restriction as per team.     Interventions:  Nutrition Education  1.  Offered verbal instruction on a heart-healthy diet with emphasis on low-sodium meal planning. Pt and wife declined need for further education.  2.  Offered to provide handouts on low-sodium diet and fluid restriction as well as recipes. Pt and wife declined need for additional handouts stating they believe they have received all the handouts already.     Goals:    Pt will verbalize at least five high sodium foods and the importance of avoiding added salt to foods for cooking or seasoning foods.     Follow-up:   Patient to ask any further nutrition-related questions before discharge.  In addition, pt may request outpatient RD appointment.     Bisi Barroso, MS, RD, LD, [facility]   6C Pgr:  [phone]

## 2017-04-25 NOTE — PLAN OF CARE
"Problem: Goal Outcome Summary  Goal: Goal Outcome Summary  Outcome: Therapy, progress towards functional goals is fair  /65 (BP Location: Left arm)  Pulse 79  Temp 98.7  F (37.1  C) (Oral)  Resp 18  Ht 1.727 m (5' 8\")  Wt 65.2 kg (143 lb 11.2 oz)  SpO2 96%  BMI 21.85 kg/m2  Neuro: A&Ox4.   Cardiac: VSS.Paced rhythm.  Respiratory: RA   GI/: Voiding spontaneously. No BM this shift.   Diet/appetite: Tolerating diet. Denies nausea   Activity: Up independently.  Pain: . Denies   Skin: Intact, no new deficits noted.  Lines: L & R PIVs  Drains:  None.  Milrinone gtt  at 0.375 mcg/kg/min. Lasix gtt at 5 mg/hr. 40meq potassium given per order.Will continue to monitor and follow plan of care.              "

## 2017-04-26 NOTE — DISCHARGE SUMMARY
[facility] Discharge Summary     Perez Villalobos MRN# [id number]   Age: 76 year old YOB: 1940      Date of Admission:  3/29/2017  Date of Discharge::  3/30/2017  Admitting Physician:  Kerwin Wyman MD  Discharge Physician:  Kerwin Wyman MD  Primary Physician: Hector Jc  Transferring Facility: [facility]      Admission Diagnoses:   Hyponatremia [E87.1]  Renal failure [N19]  Chronic combined systolic and diastolic congestive heart failure (H) [I50.42]  Contusion of right elbow, initial encounter [S50.01XA]      Discharge Diagnosis:   Principle diagnosis: Hypovolemic hyponatremia and hypokalemia, resolved.  JACINTO, resolved  Secondary diagnoses:        Patient Active Problem List     Diagnosis Date Noted     Dyspnea 03/30/2017       Priority: Medium     CHF (congestive heart failure) (H) 03/29/2017       Priority: Medium     Hyponatremia 01/16/2017       Priority: Medium     Hypokalemia 01/16/2017       Priority: Medium     Iron deficiency anemia, unspecified 01/12/2017       Priority: Medium     Anemia of chronic renal failure 01/12/2017       Priority: Medium     Chronic kidney disease, stage III (moderate) 01/12/2017       Priority: Medium     CKD (chronic kidney disease) stage 3, GFR 30-59 ml/min 12/21/2016       Priority: Medium     Cardiorenal syndrome 12/21/2016       Priority: Medium     CKD (chronic kidney disease)         Priority: Medium     ACS (acute coronary syndrome) (H) 10/01/2016       Priority: Medium     Severe hypothyroidism 09/20/2016       Priority: Medium     Bradycardia 09/16/2016       Priority: Medium       Due to hypothyroidism     Chronic systolic heart failure (H)         Priority: Medium     Mixed hyperlipidemia         Priority: Medium     Benign essential HTN         Priority: Medium     Leg weakness, bilateral 02/18/2016       Priority: Medium     Biventricular heart failure (H) 02/11/2016       Priority: Medium     PAD (peripheral artery disease)  "(H)         Priority: Medium     Rhabdomyolysis due to statin therapy         Priority: Medium       crestor     Anemia         Priority: Medium     BPH (benign prostatic hypertrophy)         Priority: Medium     NSTEMI (non-ST elevated myocardial infarction) (H) 10/23/2015       Priority: Medium     Ischemic cardiomyopathy 10/23/2015       Priority: Medium     Idiopathic progressive polyneuropathy 10/07/2015       Priority: Medium     Vitamin B12 deficiency without anemia 10/07/2015       Priority: Medium       Diagnosis updated by automated process. Provider to review and confirm.     Muscle weakness (generalized) 10/05/2015       Priority: Medium     Atrial flutter (H) 12/14/2016       Per device check 12/13/16     S/P ICD (internal cardiac defibrillator) procedure 11/29/2016       V-fib arrest     DELONTE (obstructive sleep apnea) 10/12/2016       Bipap     Coronary artery disease involving native coronary artery of native heart without angina pectoris       Pain in joint, shoulder region 01/12/2010          Procedures:   No procedures performed during this admission      Allergies:             Allergies   Allergen Reactions     Lisinopril Other (See Comments)       Angioedema     Augmentin Other (See Comments)       Per pt, \"froze him, affected his legs\"     Crestor [Rosuvastatin] Other (See Comments)       rhabdomyolysis         Medications Prior to Admission:       Prescriptions Prior to Admission            Prescriptions Prior to Admission   Medication Sig Dispense Refill Last Dose     isosorbide mononitrate (IMDUR) 60 MG 24 hr tablet Take 1 tablet (60 mg) by mouth At Bedtime At hs 30 tablet 11 3/28/2017 at pm     aspirin EC 81 MG EC tablet Take 81 mg by mouth daily     3/29/2017 at am     levothyroxine (SYNTHROID/LEVOTHROID) 150 MCG tablet Take 150 mcg by mouth daily     3/29/2017 at am     carvedilol (COREG) 3.125 MG tablet Take 3 tablets (9.375 mg) by mouth 2 times daily (with meals) 540 tablet 3 3/29/2017 at " am     calcitRIOL (ROCALTROL) 0.25 MCG capsule Take 0.25 mcg by mouth three times a week Monday, Wednesday and Friday     3/29/2017 at am     Saline (SODIUM CHLORIDE) 0.65 % SOLN Spray in nostril as needed     Past Week at Unknown time     hydrALAZINE (APRESOLINE) 50 MG tablet Take 1 tablet (50 mg) by mouth 3 times daily 270 tablet 3 3/29/2017 at am     KRILL OIL PO Take 1 capsule by mouth daily      3/29/2017 at am     Cyanocobalamin (B-12) 1000 MCG TBCR Take 1,000 mcg by mouth daily 100 tablet 0 3/29/2017 at am     multivitamin, therapeutic with minerals (MULTI-VITAMIN) TABS Take 1 tablet by mouth daily     3/29/2017 at am     clopidogrel (PLAVIX) 75 MG tablet Take 75 mg by mouth daily     3/29/2017 at am     [DISCONTINUED] bumetanide (BUMEX) 1 MG tablet Take 2 mg by mouth every morning 2 tablets in AM (In addition to 1 tablet in the afternoon daily)     3/29/2017 at am     [DISCONTINUED] metolazone (ZAROXOLYN) 2.5 MG tablet One tablet every other day until wt is 146# per KMannchen CNP 3/23/17     Past Week at Unknown time     [DISCONTINUED] potassium chloride SA (K-DUR/KLOR-CON M) 20 MEQ CR tablet Take 1 tablet (20 meq) every Monday, Wednesday, Friday     3/29/2017 at am             Discharge Medications:            Current Discharge Medication List             START taking these medications     Details   potassium chloride (KLOR-CON) 20 MEQ Packet 20 mEq by Oral or Feeding Tube route daily  Qty: 30 packet, Refills: 1     Associated Diagnoses: Chronic combined systolic and diastolic congestive heart failure (H)                 CONTINUE these medications which have CHANGED     Details   bumetanide (BUMEX) 1 MG tablet Take 1 tablet (1 mg) by mouth 2 times daily  Qty: 30 tablet, Refills: 1     Associated Diagnoses: Chronic combined systolic and diastolic congestive heart failure (H)                 CONTINUE these medications which have NOT CHANGED     Details   isosorbide mononitrate (IMDUR) 60 MG 24 hr tablet Take  1 tablet (60 mg) by mouth At Bedtime At hs  Qty: 30 tablet, Refills: 11     Associated Diagnoses: Chronic systolic heart failure (H); NSTEMI (non-ST elevated myocardial infarction) (H); Ischemic cardiomyopathy       aspirin EC 81 MG EC tablet Take 81 mg by mouth daily       levothyroxine (SYNTHROID/LEVOTHROID) 150 MCG tablet Take 150 mcg by mouth daily       carvedilol (COREG) 3.125 MG tablet Take 3 tablets (9.375 mg) by mouth 2 times daily (with meals)  Qty: 540 tablet, Refills: 3     Associated Diagnoses: Chronic systolic heart failure (H)       calcitRIOL (ROCALTROL) 0.25 MCG capsule Take 0.25 mcg by mouth three times a week Monday, Wednesday and Friday       Saline (SODIUM CHLORIDE) 0.65 % SOLN Spray in nostril as needed       hydrALAZINE (APRESOLINE) 50 MG tablet Take 1 tablet (50 mg) by mouth 3 times daily  Qty: 270 tablet, Refills: 3     Associated Diagnoses: Essential hypertension with goal blood pressure less than 130/80       KRILL OIL PO Take 1 capsule by mouth daily        Cyanocobalamin (B-12) 1000 MCG TBCR Take 1,000 mcg by mouth daily  Qty: 100 tablet, Refills: 0     Associated Diagnoses: Vitamin B 12 deficiency       multivitamin, therapeutic with minerals (MULTI-VITAMIN) TABS Take 1 tablet by mouth daily       clopidogrel (PLAVIX) 75 MG tablet Take 75 mg by mouth daily                STOP taking these medications         metolazone (ZAROXOLYN) 2.5 MG tablet Comments:   Reason for Stopping:            potassium chloride SA (K-DUR/KLOR-CON M) 20 MEQ CR tablet Comments:   Reason for Stopping:                     Consultations:   Cardiology Consult service      Brief History of Presenting Illness:   Perez Villalobos is a 76 year old male with a history of CAD s/p multiple stents, ischemic cardiomyopathy and systolic heart failure with EF of 35%, V-fib arrest s/p ICD, hypertension, hyperlipidemia, ckd, hypothyroidism, sleep apnea and bph who [facility] due to abnormal labs and continued  dyspnea on exertion. According to the patient he has been working with his cardiologist Dr. Tran and the [facility] to get his weights down and has been adjusting his diuretics under their guidance. His goal weight was 142-146 lbs. This past weekend he weighed 151 lbs and had noted significant LE edema as well as scrotal edema. He had been instructed that in addition to his Bumex to take Metolazone 2.5mg QOD until his weights are below 146 lbs. Since this time he has noted good UO and that his LE and scrotal edema have significantly improved. His last dose of Metolazone was on Monday as he was told to stop this after labs were drawn and showed hyponatremia and hypokalemia on 3/27. Despite this he continues to feel NOEL, fatigued and with weakness in his legs. He denies any orthopnea, chest pain, fevers, chills, nausea, vomiting, abdominal pain or dysuria. He did have some diarrhea the last few days but had been taking prune juice for constipation. He admits compliance with his medications and low salt diet. He was seen in [facility] today and referred to the [facility] ER for ongoing hyponatremia and AJCINTO.   In the ED, patient was afebrile and saturating at 97% on room air. Labs were notable for sodium of 123, chloride of 79, BUN of 101 the creatinine of 2.24 bicarbonate 37. BNP is elevated at greater than 31,000. Troponin is negative ×1. CBC shows a hemoglobin of 12.9 but otherwise no significant acute abnormalities. CT scan of his head was done showing no acute pathology or evidence of any bleeding. Right elbow x-ray showed nothing acute with no fracture or dislocation. Chest x-ray showed small bilateral pleural effusions, cardiomegaly with multi chamber enlargement. Calcified granuloma in the left lung base chest is otherwise negative. EKG showed ventricular paced rhythm with no signs of acute ischemia. He was given a dose of iv lasix 40 mg and admitted for ongoing mngt of heart failure.          [facility]  Course:   Throughout the course of his hospital stay, Mr Villalobos felt improved symptomatically. Despite the initial diagnosis of CHF exacerbation, Mr Villalobos's clinical context was more consistent with over-diuresis resulting in hypovolemic hyponatremia, severe hypokalemia (2.2 on the morning of 3/30), and JACINTO. Diuretics were held during admission with improvement in renal function and sodium level. Serum potassium was aggressively replaced today 3/30 with PO and IV potassium. The Cardiology service saw the patient and agreed that he likely was over-diuresed prior to admission with the changes in his home Bumex regimen. Today 3/30 after holding diuresis Mr CHILDRESS was feeling at his baseline and was eager to discharge home as he has an appointment with a sleep specialist at [facility] in the AM. Although I would have preferred that he stay for a final potassium recheck (last was 3.2 the afternoon of 3/30) and to monitor patient's renal function in a controlled setting following restart of Bumex, the patient requested discharge. He will have blood drawn for another BMP in the AM on 3/31 at his [facility] clinic and these values should be assessed prior to patient restarting his Bumex. His Bumex dose will be 1 mg BID until he is able to follow up for re-evaluation with his Cardiologist, and he will also take 20 mEq potassium supplementation. Mr CHILDRESS stated understanding of this plan.      Pending Tests at Discharge:   None, but patient will have a follow up BMP in the AM tomorrow 3/31 at [facility]          Discharge Instructions and Follow-Up:    Discharge diet: Low sodium, 2 L fluid restriction   Discharge activity: Activity as tolerated   Discharge follow-up: Follow up with primary care provider in 1-2 weeks           Discharge Disposition:    Discharged to home       Attestation:  I have reviewed today's vital signs, notes, medications, labs and imaging.  Care coordination / counseling time: 30 minutes     Kerwin Wyman MD

## 2017-04-26 NOTE — PLAN OF CARE
Problem: Goal Outcome Summary  Goal: Goal Outcome Summary  OT: REC Home with assist as needed.  Pt's general deconditioning leads to decreased participation in Pt's meaningful occupations, including IADL and Leisure.  No found deficits would prevent safe discharge home.  Therapist educated Pt on use of sock aide to assist with LE dressing.  Pt demos understanding of AE and SO stated that she would attempt to acquire a sock aide.  Pt able to complete 10 minutes on NuStep.

## 2017-04-26 NOTE — PROGRESS NOTES
Cards 2 Progress Note    Date of Admission: 4/24/2017  Hospital Day #: 2   Date of Service (when I saw the patient): 04/26/2017     Assessment & Plan   Perez Villalobos is a 76 year old male with history of ICM s/p multiple stents and MI, HFrEF (EF 35% ECHO 2/2017), HTN, HLD, hypothyroidism, and recent onset CKD who was recently hospitalized at [facility] from 4/6-4/15 with cardiogenic shock discharged on milrinone who returns to the hospital with increasing weight, lower extremity edema, and worsening fatigue/SOB concerning for acute decompensated heart failure. Currently diuresing well with lasix drip, continues to have fluid to be removed.     #Acute on Chronic HFrEF 2/2 ICM, inotrope dependent  #Increased weight:  Story and exam (LE edema, elevated neck veins) concerning for acute decompensated heart failure. Was failing outpatient diuretic therapy. Etiology for exacerbation could be related to progression of underlying disease or developing poor response to diuretics. Does not appear to have infection, medication and diet compliance is good, no chest pain or palpitations. Diuresing well on lasix drip, symptoms improving, goal for -2 to 3L by the end of the day. Will eventually transition to oral diuretic and observe while in the hospital to better assess diuretic needs. Continuing home milrinone and other heart failure medications other than decreasing beta blocker to coreg 3.125 mg BID.  -lasix drip 5 mg/hr, goal -2-3L  -strict I/O and daily weight  -milrinone 0.375 mcg/kg/min  -tele, O2 monitoring  -no ECHO or right heart cath for now  -continuing home imdur, hydralazine, decreasing coreg to 3.125 mg BID  -plavix and aspirin    #JACINTO on CKD:  Increasing creatinine as weight has been increasing. Likely pre-renal physiology from above, improving with diuresis.    #Hypokalemia:  Replacing aggressively, related to diuretic use.     #Hypothyroidism:  Continuing home replacement     #Chronic normocytic anemia:  Likely related  "to underlying kidney disease, currently stable.     FEN: 2 gram sodium, 2L fluid  PPX: heparin  Dispo: Cards 2 admission for fluid removal  Code Status: Full CODE      Patient discussed with staff attending, Dr. Davis. Please feel free to page with questions.     Mike Rivas  PGY-1 Internal Medicine  _____________________________________________________________________________  Interval History   No acute events overnight. Diuresing well with lasix drip. Edema is improving as are his symptoms. Discussed plan to transition to oral diuretic once near ideal weight and observe while in the hospital for response.    Physical Exam   /71 (BP Location: Left arm)  Pulse 85  Temp 97.7  F (36.5  C) (Oral)  Resp 16  Ht 1.727 m (5' 8\")  Wt 64.5 kg (142 lb 3.2 oz)  SpO2 98%  BMI 21.62 kg/m2  I/O last 3 completed shifts:  In: 1877.7 [P.O.:1495; I.V.:382.7]  Out: 2820 [Urine:2820]    Constitutional: Pleasant male, seen resting comfortably in bed in NAD. Alert and interactive.   HEENT: NCAT. EOMI, anicteric sclera. JVP elevated to upper third of neck  Respiratory: Non-labored breathing, good air exchange, lungs clear to auscultation bilaterally. No cough or wheeze noted.   Cardiovascular: normal rate, regular rhythm. No murmur or rub.   GI: Normoactive bowel sounds. Abdomen soft, stably distended, non-tender. No palpable masses or organomegaly.  Skin: Warm and dry. Stable bruising.  Musculoskeletal: 2+ bilateral lower extremity edema to knees - stable   Neurologic: A&O x 3, fluent speech, no apparent focal deficits    Data   CBC  Recent Labs  Lab 04/25/17  0630 04/24/17  1944 04/24/17  1415   WBC 5.3  --  5.2   RBC 3.11*  --  3.28*   HGB 10.2*  --  10.8*   HCT 31.0*  --  32.5*     --  99   MCH 32.8  --  32.9   MCHC 32.9  --  33.2   RDW 16.2*  --  16.1*    247 234     CMP  Recent Labs  Lab 04/26/17  1244 04/26/17  0640 04/25/17 2010 04/25/17  0630 04/24/17  1944 04/24/17  1415   NA  --  134  --  " 133 130* 131*   POTASSIUM 3.5 2.8* 3.4 2.8* 3.5 4.3   CHLORIDE  --  94  --  92* 92* 94   CO2  --  29  --  27 26 24   ANIONGAP  --  11  --  14 12 13   GLC  --  105*  --  134* 104* 122*   BUN  --  78*  --  84* 88* 87*   CR  --  1.76*  --  2.13* 2.39* 2.42*   GFRESTIMATED  --  38*  --  30* 26* 26*   GFRESTBLACK  --  46*  --  37* 32* 32*   LEIDY  --  8.9  --  8.8 9.1 9.2   MAG  --  2.5*  --  2.5* 2.8*  --    PROTTOTAL  --  7.4  --  6.9 7.6  --    ALBUMIN  --  3.4  --  3.0* 3.5  --    BILITOTAL  --  1.1  --  1.1 1.2  --    ALKPHOS  --  82  --  80 84  --    AST  --  19  --  15 20  --    ALT  --  13  --  13 15  --      INR  Recent Labs  Lab 04/26/17  0640 04/25/17  0630 04/24/17  1944   INR 1.11 1.16* 1.12   I personally provided care for this patient, reviewed chart, discussed course with patient, housestaff and consulting physicians.  I answered all questions.    Clive Davis M.D.  Division of Cardiology  Department of Medicine

## 2017-04-26 NOTE — PLAN OF CARE
Problem: Goal Outcome Summary  Goal: Goal Outcome Summary     Pt admitted with CHF exacerbation/fluid overload/JACINTO. Vital signs stable, afebrile, A&Ox4, paced rhythm. Denied pain overnight. Lasix gtt continues at 5 mg/hr (5 ml/hr) with adequate urine output. Milrinone gtt continues at 0.375 mg/kg/min (7.7 ml/hr). Slept between cares. Up independently. PCU collect. Continue to monitor and notify MD with pertinent changes.

## 2017-04-26 NOTE — PLAN OF CARE
Problem: Goal Outcome Summary  Goal: Goal Outcome Summary  Outcome: Therapy, progress towards functional goals is fair  D/A/I:  Patient A&O x4, denied pain, palpitations, difficulty breathing, SOB, dizziness, and nausea.  Lung sounds clear to auscultation, no abnormal heart sounds noted.  Legs roverto with 2-3+ edema.  Furosemide gtt continued at 5 mg/hr (5 ml/hr).  Patient had good urine output, see I&O flowsheet.  In paced rhythm with underlying atrial fibrillation, HR 70s-80s, SBP 110s, SaO2 98% on room air.  Milrinone gtt continued at 0.375 mcg/kg/min (7.7 ml/hr).  Potassium replaced per one-time order.  Ambulated independently with steady gait.  Wife visited during shift.     P:  Continue to monitor pain, K+ level, VS, heart rhythm, fluid status, bowel status, cardiac and respiratory status.  Notify care team of changes in patient condition or other concerns.  Continue diuresis.    I personally provided care for this patient, reviewed chart, discussed course with patient, housestaff and consulting physicians.  I answered all questions.    Clive Davis M.D.  Division of Cardiology  Department of Medicine

## 2017-04-26 NOTE — PLAN OF CARE
Problem: Goal Outcome Summary  Goal: Goal Outcome Summary     D; Patient recently discharged on home milrinone drip readmitted with decompensated heart failure. Lasix drip initiated upon admission and still infusing.  I: Redrew potassium this evening and gave one time dose of 40meq (3.4). Monitoring Is/Os.   A: Paced rhythm, BPs 130s/90s, room air. AxOx4,up ad amarilis independently. LE edema, no wraps on, denies pain.  P: Continue IV diuresis, monitor Is/Os. Check lytes and notify team for replacement if needed.

## 2017-04-27 NOTE — PLAN OF CARE
Problem: Goal Outcome Summary  Goal: Goal Outcome Summary  Outcome: No Change  D/I: Pt with HF and fluid overload continues on milrinone and lasix drips. Last K+ 3.8, replaced with 20 meq.V-paced 80s-100s. VSS. Adequate u/o. Congested cough, lungs decreased, sats good on RA. LE edema.Up ambulating well in the [place].   A: Stable.  P: Monitor and assist as needed.

## 2017-04-27 NOTE — PROGRESS NOTES
Cards 2 Progress Note    Date of Admission: 4/24/2017  Hospital Day #: 3   Date of Service (when I saw the patient): 04/27/2017     Assessment & Plan   Perez Villalobos is a 76 year old male with history of ICM s/p multiple stents and MI, HFrEF (EF 35% ECHO 2/2017), HTN, HLD, hypothyroidism, and recent onset CKD who was recently hospitalized at [facility] from 4/6-4/15 with cardiogenic shock discharged on milrinone who returns to the hospital with increasing weight, lower extremity edema, and worsening fatigue/SOB concerning for acute decompensated heart failure. Currently diuresing well with lasix drip, continues to have fluid to be removed, plan to continue IV lasix until signs of becoming dry. Will then transition to oral regimen and monitor for a few days to ensure stability.     #Acute on Chronic HFrEF 2/2 ICM, inotrope dependent  #Increased weight:  Story and exam (LE edema, elevated neck veins) concerning for acute decompensated heart failure. Was failing outpatient diuretic therapy. Etiology for exacerbation could be related to progression of underlying disease or developing poor response to diuretics. Does not appear to have infection, medication and diet compliance is good, no chest pain or palpitations. Diuresing well on lasix drip, symptoms improving, goal for -2 to 3L by the end of the day. Will eventually transition to oral diuretic and observe while in the hospital to better assess diuretic needs. Continuing home milrinone and other heart failure medications other than decreasing beta blocker to coreg 3.125 mg BID.  -lasix drip 5 mg/hr, goal -2-3L  -strict I/O and daily weight  -milrinone 0.375 mcg/kg/min  -tele, O2 monitoring  -no ECHO or right heart cath for now  -continuing home imdur, hydralazine, decreasing coreg to 3.125 mg BID  -plavix and aspirin    #JACINTO on CKD:  Increasing creatinine as weight has been increasing. Likely pre-renal physiology from above, improving with diuresis. Will continue with  "diuresis until signs of becoming dry.    #Cough:  CXR negative for infectious process, long standing issue. If he were to develop fever would send sputum culture. For now will treat symptomatically with Tessalon.    #Hypokalemia:  Replacing aggressively, related to diuretic use.     #Hypothyroidism:  Continuing home replacement     #Chronic normocytic anemia:  Likely related to underlying kidney disease, currently stable.     FEN: 2 gram sodium, 2L fluid  PPX: heparin  Dispo: Cards 2 admission for fluid removal  Code Status: Full CODE      Patient discussed with staff attending, Dr. Davis. Please feel free to page with questions.     Mike Rivas  PGY-1 Internal Medicine  _____________________________________________________________________________  Interval History   No acute events overnight. Continues to have cough that is most bothersome in the AM. Working with therapy, feeling improved since admission. Good response with diuretics, however continues to have fluid to be removed.    Physical Exam   /60 (BP Location: Left arm)  Pulse 79  Temp 97.6  F (36.4  C) (Oral)  Resp 18  Ht 1.727 m (5' 8\")  Wt 65.7 kg (144 lb 12.8 oz)  SpO2 99%  BMI 22.02 kg/m2  I/O last 3 completed shifts:  In: 1614.8 [P.O.:1310; I.V.:304.8]  Out: 2650 [Urine:2650]    Constitutional: Pleasant male, seen resting comfortably in chair in NAD. Alert and interactive.   HEENT: NCAT. EOMI, anicteric sclera. JVP elevated to upper third of neck - stable  Respiratory: Non-labored breathing, good air exchange, lungs clear to auscultation bilaterally. No wheeze noted.   Cardiovascular: normal rate, regular rhythm. No murmur or rub.   GI: Normoactive bowel sounds. Abdomen soft, stably distended, non-tender. No palpable masses or organomegaly.  Skin: Warm and dry. Stable bruising.  Musculoskeletal: 2+ bilateral lower extremity edema to knees - improving  Neurologic: A&O x 3, fluent speech, normal gait, no apparent focal " deficits    Data   CBC    Recent Labs  Lab 04/27/17 0625 04/25/17 0630 04/24/17  1944 04/24/17  1415   WBC  --  5.3  --  5.2   RBC  --  3.11*  --  3.28*   HGB  --  10.2*  --  10.8*   HCT  --  31.0*  --  32.5*   MCV  --  100  --  99   MCH  --  32.8  --  32.9   MCHC  --  32.9  --  33.2   RDW  --  16.2*  --  16.1*    228 247 234     CMP  Recent Labs  Lab 04/27/17 0625 04/26/17  1920 04/26/17  1244 04/26/17 0640 04/25/17 0630 04/24/17  1944    136  --  134  --  133 130*   POTASSIUM 3.2* 3.8 3.5 2.8*  < > 2.8* 3.5   CHLORIDE 97 95  --  94  --  92* 92*   CO2 30 30  --  29  --  27 26   ANIONGAP 11 11  --  11  --  14 12   GLC 99 116*  --  105*  --  134* 104*   BUN 69* 75*  --  78*  --  84* 88*   CR 1.69* 1.87*  --  1.76*  --  2.13* 2.39*   GFRESTIMATED 40* 35*  --  38*  --  30* 26*   GFRESTBLACK 48* 43*  --  46*  --  37* 32*   LEIDY 8.2* 8.7  --  8.9  --  8.8 9.1   MAG 2.3  --   --  2.5*  --  2.5* 2.8*   PROTTOTAL  --   --   --  7.4  --  6.9 7.6   ALBUMIN  --   --   --  3.4  --  3.0* 3.5   BILITOTAL  --   --   --  1.1  --  1.1 1.2   ALKPHOS  --   --   --  82  --  80 84   AST  --   --   --  19  --  15 20   ALT  --   --   --  13  --  13 15   < > = values in this interval not displayed.  INR    Recent Labs  Lab 04/27/17 0625 04/26/17 0640 04/25/17 0630 04/24/17  1944   INR 1.12 1.11 1.16* 1.12   I personally provided care for this patient, reviewed chart, discussed course with patient, housestaff and consulting physicians.  I answered all questions.    Clive Davis M.D.  Division of Cardiology  Department of Medicine

## 2017-04-27 NOTE — PROGRESS NOTES
Care Coordinator Progress Note     Admission Date/Time:  2017  Attending MD:  Clive Davis  Data  Chart reviewed, discussed with interdisciplinary team.   Patient was admitted for:    Acute kidney injury (H)  Hypervolemia, unspecified hypervolemia type.    Concerns with insurance coverage for discharge needs: None.  Current Living Situation: Patient lives with spouse.  Support System: Supportive and Involved spouse  Services Involved: [facility]  Transportation: Family or Friend will provide  Barriers to Discharge: Diuresis    Coordination of Care and Referrals: This writer met with pt and his wife to introduce self and role of RNCC. Provided patient/family with options for resumption of home IV milrinone. Pt states his home infusion is through [facility], which he will resume upon discharge. Pt states he has been receiving his PICC line dressing changes at [facility] and they are in the process of training his wife to be able to do them at home.        Assessment  OT recommending discharge to home with assist as needed, pt and spouse are in agreement with this plan.  Pt has home milrinone pump here at the hospital, but his home supply of IV milrinone will   per his wife.  Pt is already scheduled for outpatient PICC line dressing changes every Thursday at 9 am at [facility] (P: [phone], F: [phone].)  Intervention  Arrangements made with [facility] (P: [phone], F: [phone]) for resumption of home IV milrinone and PICC line supplies.      Plan  Anticipated Discharge Date: TBD  Anticipated Discharge Plan: Discharge to home with resumption of home infusion  CC will continue to monitor patient's medical condition and progress towards discharge.  Yoselyn Pereira RN BSN  6C Unit Care Coordinator  Phone number: [phone]  Pager: [phone]

## 2017-04-27 NOTE — PLAN OF CARE
Problem: Goal Outcome Summary  Goal: Goal Outcome Summary  OT: REC Home with assist as needed and continued CR.  Pt presents with general deconditioning and decreased activity tolerance limiting ADL I.  Pt demos carryover of EC/WS and self-monitoring from previous sessions.  Pt completed 9 stairs with no rest breaks and SBA and 10 minutes on NuStep.

## 2017-04-27 NOTE — PLAN OF CARE
Problem: Goal Outcome Summary  Goal: Goal Outcome Summary     Pt admitted with CHF exacerbation/fluid overload/JACINTO. Vital signs stable, afebrile, A&Ox4, paced rhythm. Denied pain overnight. Lasix gtt continues at 5 mg/hr (5 ml/hr) with adequate urine output. Milrinone gtt continues at 0.375 mg/kg/min (7.7 ml/hr). Slept between cares. Up independently. Weight up today. PCU collect. Continue to monitor and notify MD with pertinent changes.

## 2017-04-28 NOTE — PLAN OF CARE
Problem: Goal Outcome Summary  Goal: Goal Outcome Summary  Outcome: Therapy, progress towards functional goals is fair  D/A/I:  Patient A&O x4, denied pain, palpitations, difficulty breathing, SOB, dizziness, and nausea.  Had congested cough, lung sounds diminished in left lung base, wheezes auscultated in right lung base.  Was given benzonatate, which improved the cough.  Wheezes improved on reassessment.  Legs were roverto with +2 edema.  Furosemide gtt continued at 10 mg/hr (10 ml/hr); patient had good urine output, see I&O flowsheet.  In paced rhythm with HR 80s, SBP 120s, SaO2 % on room air.  Milrinone gtt continued at 0.375 mcg/kg/min (7.7 ml/hr).  Ambulated in room independently with steady gait.     P:  Continue to monitor pain, VS, heart rhythm, fluid status, cardiac and respiratory status.  Notify care team of changes in patient condition or other concerns.  Continue diuresis.

## 2017-04-28 NOTE — PROGRESS NOTES
04/28/17 1127   General Information   Discipline OT   Onset of Edema (acute on chronic)   Affected Body Part(s) Left LE;Right LE  (has had genital edema, not currently)   Etiology Comments JACINTO on CKD, HF, decreased muscle pump, fluid overload   Pertinent history of current problem (PT: include personal factors and/or comorbidities that impact the POC; OT: include additional occupational profile info) Perez Villalobos is a 76 year old male with history of ICM s/p multiple stents and MI, HFrEF (EF 35% ECHO 2/2017), HTN, HLD, hypothyroidism, and recent onset CKD who was recently hospitalized at [facility] from 4/6-4/15 with cardiogenic shock discharged on milrinone who returns to the hospital today with increasing weight, lower extremity edema, and worsening fatigue/SOB.   Pain   Patient currently in pain No   Edema Examination / Assessment   Skin Condition Pitting;Dryness   Skin Condition Comments Pt with deep and soft 2+-3+ pitting, greatest distally in toes, feet, and ankle. Edema present to knee crease, trace edema into thighs, pt declines scrotal edema though has had it before. Pt with dusky appearance of skin, skin shiny and taut with varicosities visible in feet and ankles. Pt with small healing scab on lateral R LE near + 20. Toenails thickening and yellow.   Capillary Refill Symmetrical   Dorsal Pedal Pulse Symmetrical   Girth Measurements   Girth Measurements Refer to separate Girth Measurement flowsheet   Sensation   Sensation (WFL) (occasional n/t in feet)   Assessment/Plan   Patient presents with Edema   Assessment Pt to benefit from skilled OT to manage B LE edema to promote skin integrity, comfort, and I/ease with ADLs/IADLs. See daily flowsheet for details regarding today's treatment.    Assessment of Occupational Performance 1-3 Performance Deficits   Identified Performance Deficits home management   Clinical Decision Making (Complexity) Low complexity   Planned Edema Interventions Gradient compression  bandaging;Fit for compression garment;Exercises;Precautions to prevent infection/exacerbation;Education;ADL training   Treatment Frequency 5 times/wk  (5x/week edema + 3x/week OT)   Treatment Duration 1 week   Patient, Family and/or Staff in agreement with plan of care. Yes   Risks and benefits of treatment have been explained. Yes   Total Evaluation Time   Total Evaluation Time (Minutes) 3

## 2017-04-28 NOTE — PLAN OF CARE
Problem: Goal Outcome Summary  Goal: Goal Outcome Summary     Pt admitted with CHF exacerbation/fluid overload/JACINTO. Vital signs stable, afebrile, A&Ox4, paced rhythm. Denied pain overnight. Lasix gtt increased to 20 mg/hr (20 ml/hr) after 40 mg IV push bolus this AM. Milrinone gtt continues at 0.375 mg/kg/min (7.7 ml/hr). Slept between cares. Up independently. PCU collect. Continue to monitor and notify MD with pertinent changes.

## 2017-04-28 NOTE — PROGRESS NOTES
Cards 2 Progress Note    Date of Admission: 4/24/2017  Hospital Day #: 4   Date of Service (when I saw the patient): 04/28/2017     Assessment & Plan   Perez Villalobos is a 76 year old male with history of ICM s/p multiple stents and MI, HFrEF (EF 35% ECHO 2/2017), HTN, HLD, hypothyroidism, and recent onset CKD who was recently hospitalized at [facility] from 4/6-4/15 with cardiogenic shock discharged on milrinone who returns to the hospital with increasing weight, lower extremity edema, and worsening fatigue/SOB concerning for acute decompensated heart failure. Currently diuresing well with lasix drip, continues to have fluid to be removed, plan to continue IV lasix until signs of becoming dry. Will then transition to oral regimen and monitor for a few days to ensure stability.     #Acute on Chronic HFrEF 2/2 ICM, inotrope dependent  #Increased weight:  Story and exam (LE edema, elevated neck veins) concerning for acute decompensated heart failure. Was failing outpatient diuretic therapy. Etiology for exacerbation could be related to progression of underlying disease or developing poor response to diuretics. Does not appear to have infection, medication and diet compliance is good, no chest pain or palpitations. Diuresing well on lasix drip, symptoms improving, goal for -1L by the end of the day. Will eventually transition to oral diuretic and observe while in the hospital to better assess diuretic needs. Continuing home milrinone and other heart failure medications other than decreasing beta blocker to coreg 3.125 mg BID.  -lasix drip increased this morning to 20 mg/hr, will recheck BMP this afternoon and consider diuretic changes  -strict I/O and daily weight  -milrinone 0.375 mcg/kg/min  -tele, O2 monitoring  -no ECHO or right heart cath for now  -continuing home imdur, hydralazine, decreasing coreg to 3.125 mg BID  -plavix and aspirin    #JACINOT on CKD:  Increasing creatinine as weight has been increasing. Likely pre-renal  "physiology from above, improving with diuresis. Will continue with diuresis until signs of becoming dry.    #Cough:  CXR negative for infectious process, long standing issue. If he were to develop fever would send sputum culture. For now will treat symptomatically with Tessalon. Cough improving with tessalon.    #Hypokalemia:  Replacing aggressively, related to diuretic use. Scheduled 40 mEq BID with extra prn, if continuing on drip will increase to 60 mEq BID.     #Hypothyroidism:  Continuing home replacement.     #Chronic normocytic anemia:  Likely related to underlying kidney disease, currently stable.     FEN: 2 gram sodium, 2L fluid  PPX: heparin  Dispo: Cards 2 admission for fluid removal  Code Status: Full CODE      Patient discussed with staff attending, Dr. Davis. Please feel free to page with questions.     Mike Rivas  PGY-1 Internal Medicine  _____________________________________________________________________________  Interval History   No acute events overnight. Cough has been improved with starting tessalon. Legs continue to be puffy, but less than at admission. Ambulating throughout the day.    Physical Exam   /80 (BP Location: Left arm)  Pulse 79  Temp 97.4  F (36.3  C) (Oral)  Resp 16  Ht 1.727 m (5' 8\")  Wt 64.1 kg (141 lb 4.8 oz)  SpO2 100%  BMI 21.48 kg/m2  I/O last 3 completed shifts:  In: 1771.79 [P.O.:1380; I.V.:391.79]  Out: 2750 [Urine:2750]    Constitutional: Pleasant male, seen sitting comfortably in chair in NAD. Alert and interactive.   HEENT: NCAT. Anicteric sclera. JVP to middle neck - improving  Respiratory: Non-labored breathing, good air exchange, lungs clear to auscultation bilaterally. No wheeze noted.   Cardiovascular: normal rate, regular rhythm. No murmur or rub.   GI: Normoactive bowel sounds. Abdomen soft, improving distention, non-tender. No palpable masses or organomegaly.  Skin: Warm and dry. Stable bruising.  Musculoskeletal: 2+ bilateral lower " extremity edema to mid shin - improving  Neurologic: A&O x 3, fluent speech, normal gait, no apparent focal deficits    Data   CBC    Recent Labs  Lab 04/27/17  0625 04/25/17  0630 04/24/17  1944 04/24/17  1415   WBC  --  5.3  --  5.2   RBC  --  3.11*  --  3.28*   HGB  --  10.2*  --  10.8*   HCT  --  31.0*  --  32.5*   MCV  --  100  --  99   MCH  --  32.8  --  32.9   MCHC  --  32.9  --  33.2   RDW  --  16.2*  --  16.1*    228 247 234     CMP  Recent Labs  Lab 04/28/17  1210 04/28/17  0510 04/27/17  1826 04/27/17 0625 04/26/17  1920 04/26/17 0640  04/25/17 0630 04/24/17  1944   NA  --  138 135 138 136  --  134  --  133 130*   POTASSIUM 4.4 2.9* 3.6 3.2* 3.8  < > 2.8*  < > 2.8* 3.5   CHLORIDE  --  98 96 97 95  --  94  --  92* 92*   CO2  --  30 32 30 30  --  29  --  27 26   ANIONGAP  --  10 8 11 11  --  11  --  14 12   GLC  --  102* 127* 99 116*  --  105*  --  134* 104*   BUN  --  68* 68* 69* 75*  --  78*  --  84* 88*   CR  --  1.65* 1.73* 1.69* 1.87*  --  1.76*  --  2.13* 2.39*   GFRESTIMATED  --  41* 38* 40* 35*  --  38*  --  30* 26*   GFRESTBLACK  --  49* 47* 48* 43*  --  46*  --  37* 32*   LEIDY  --  8.2* 8.9 8.2* 8.7  --  8.9  --  8.8 9.1   MAG  --  2.2  --  2.3  --   --  2.5*  --  2.5* 2.8*   PROTTOTAL  --   --   --   --   --   --  7.4  --  6.9 7.6   ALBUMIN  --   --   --   --   --   --  3.4  --  3.0* 3.5   BILITOTAL  --   --   --   --   --   --  1.1  --  1.1 1.2   ALKPHOS  --   --   --   --   --   --  82  --  80 84   AST  --   --   --   --   --   --  19  --  15 20   ALT  --   --   --   --   --   --  13  --  13 15   < > = values in this interval not displayed.  INR    Recent Labs  Lab 04/28/17  0510 04/27/17  0625 04/26/17  0640 04/25/17  0630   INR 1.16* 1.12 1.11 1.16*   I personally provided care for this patient, reviewed chart, discussed course with patient, housestaff and consulting physicians.  I answered all questions.    Clive Davis M.D.  Division of Cardiology  Department of Medicine

## 2017-04-28 NOTE — PLAN OF CARE
Problem: Goal Outcome Summary  Goal: Goal Outcome Summary  Continues on lasix gtt @ 20mg/hr. K this a.m was 2.9-replaced and recheck level up to 4.4 at noon. Cr 1.65. LE pitting edema persists. OT placed wraps to legs today which pt is tolerating well.   Pt anxious to transition to oral diuretics as well as have Ng line placed this admission for home milrinone. Diet changed to 3g Na to allow for a few more food options to order. Maintaining fluid restriction of 2L. Encouraging activity throughout day; up in chair visiting with spouse awaiting Cards 2 team rounds.

## 2017-04-28 NOTE — PLAN OF CARE
Problem: Goal Outcome Summary  Goal: Goal Outcome Summary  Edema 6C: Pt with soft, deep 2+-3+ pitting in B LEs to the knee creases. Pt wrapped with GCB to promote fluid mobilization for eventual transition into compression stockings. OK for pt to wear compression bandages until therapy returns. However, please remove any compression if it causes numbness, tingling, pain, becomes soiled, or if pt becomes unable to verbalize pain. Will follow up with patient.

## 2017-04-28 NOTE — PLAN OF CARE
Problem: Goal Outcome Summary  Goal: Goal Outcome Summary  OT: REC Home with assist as needed.  Pt presents with general deconditioning resulting in decreased ADL I and activty tolerance.  Pt able to ambulate 650 ft with SBA and no rest breaks using IV pole as AD and ascend/ descend a total of 21 stairs with 1 standing rest break and 1 seated rest break and CGA. Pt would likely benefit from continued CR.

## 2017-04-29 NOTE — PLAN OF CARE
Problem: Individualization  Goal: Patient Preferences  Outcome: Improving  D:Was already up out of bed sitting in chair this morning with wife present and MD reviewing medical plan of care.   Hydralazine increased from 62.5mg to 75 mg po qid starting with next scheduled dose.  No change in Lasix drip at 20mg/hr or milrinone drip at 23.53mcg/min (7.7ml/hr).   K+ 3.4 and was replaced with 40mEq po KCL.  Mg 2.2.   Next BMP at 1600.  Denies chest pain or shortness of breath at rest or with activity.   Lungs are diminished throughout on the right and clear on the left.  Has lymphedema wraps on lower extremities.  Rhythm is 100% Paced with no ectopy.  HR 76,  Bp 123/75 MAP 99.  Cr 1.56 from 1.62 UN 31 from 33 yesterday.  Weight today 64.8kg and yesterday 64.09kg     A:Is doing well.  No physical complaints.  Tolerating diet and activity well.  Weight is unchanged from yesterday.   Lungs are clear.  O2 sat is normal on room air.   Rhythm is Paced with no arrhythmias.   Blood pressure is normal.   Afebrile.   Condition is improving.   Potassium level below normal range.     P:Continue to assess level of comfort and tolerance to activity.  Monitor electrolytes, rhythm and vital signs.

## 2017-04-29 NOTE — PROGRESS NOTES
Cards 2 Progress Note    Date of Admission: 4/24/2017  Hospital Day #: 5   Date of Service (when I saw the patient): 04/29/2017     Assessment & Plan   Perez Villalobos is a 76 year old male with history of ICM s/p multiple stents and MI, HFrEF (EF 35% ECHO 2/2017), HTN, HLD, hypothyroidism, and recent onset CKD who was recently hospitalized at [facility] from 4/6-4/15 with cardiogenic shock discharged on milrinone who returns to the hospital with increasing weight, lower extremity edema, and worsening fatigue/SOB concerning for acute decompensated heart failure. Initially treated with lasix drip 20 mg/hr, transitioning to oral bumex this afternoon and will monitor response.     #Acute on Chronic HFrEF 2/2 ICM, inotrope dependent  #Increased weight:  Story and exam (LE edema, elevated neck veins) concerning for acute decompensated heart failure. Was failing outpatient diuretic therapy. Etiology for exacerbation could be related to progression of underlying disease or developing poor response to diuretics. Does not appear to have infection, medication and diet compliance is good, no chest pain or palpitations. Diuresed well on lasix drip, transitioning to oral bumex this afternoon. Will monitor weights and I/O's over coming days with oral bumex and adjust as needed. Continuing home milrinone and other heart failure medications other than decreasing beta blocker to coreg 3.125 mg BID.  -lasix drip d/c 4/29  -resuming bumex 2 mg BID starting this afternoon  -strict I/O and daily weight  -milrinone 0.375 mcg/kg/min  -tele, O2 monitoring  -no ECHO or right heart cath for now  -continuing home imdur, hydralazine, decreasing coreg to 3.125 mg BID  -plavix and aspirin    #JACINTO on CKD:  Increasing creatinine as weight has been increasing. Likely pre-renal physiology from above, improving with diuresis. Continue to monitor after transitioning to oral bumex today.    #Cough:  CXR negative for infectious process, long standing issue. If he  "were to develop fever would send sputum culture. For now will treat symptomatically with Tessalon. Cough improving with tessalon.    #Hypokalemia:  Replacing aggressively, related to diuretic use. Scheduled 40 mEq BID with extra prn, if continuing on drip will increase to 60 mEq BID.     #Hypothyroidism:  Continuing home replacement.     #Chronic normocytic anemia:  Likely related to underlying kidney disease, currently stable.     FEN: 2 gram sodium, 2L fluid  PPX: heparin  Dispo: Cards 2 admission for fluid removal  Code Status: Full CODE      Patient discussed with staff attending, Dr. Davis. Please feel free to page with questions.     Mike Evelyn  PGY-1 Internal Medicine  _____________________________________________________________________________  Interval History   No acute events overnight. Has been diuresing well and he thinks he is putting on some muscle weight and getting stronger. Cough remains but overall is improved.    Physical Exam   /75 (BP Location: Right arm)  Pulse 79  Temp 97.5  F (36.4  C) (Oral)  Resp 18  Ht 1.727 m (5' 8\")  Wt 64.9 kg (143 lb)  SpO2 99%  BMI 21.74 kg/m2  I/O last 3 completed shifts:  In: 1944.8 [P.O.:1280; I.V.:664.8]  Out: 2955 [Urine:2955]    Constitutional: Pleasant male, seen sitting comfortably in chair in NAD. Alert and interactive.   HEENT: NCAT. Anicteric sclera. JVP to lower 1/3 neck - improving  Respiratory: Non-labored breathing, good air exchange, lungs clear to auscultation bilaterally. No wheeze noted.   Cardiovascular: normal rate, regular rhythm. No murmur or rub.   GI: Normoactive bowel sounds. Abdomen soft, improving distention, non-tender. No palpable masses or organomegaly.  Skin: Warm and dry. Stable bruising.  Musculoskeletal: LE wrapped this morning, trace edema superior  Neurologic: A&O x 3, fluent speech, normal gait, no apparent focal deficits    Data   CBC    Recent Labs  Lab 04/29/17  0616 04/27/17  0625 04/25/17  0630 " 04/24/17 1944 04/24/17  1415   WBC 5.0  --  5.3  --  5.2   RBC 3.25*  --  3.11*  --  3.28*   HGB 10.8*  --  10.2*  --  10.8*   HCT 32.8*  --  31.0*  --  32.5*   *  --  100  --  99   MCH 33.2*  --  32.8  --  32.9   MCHC 32.9  --  32.9  --  33.2   RDW 16.5*  --  16.2*  --  16.1*    234 228 247 234     CMP  Recent Labs  Lab 04/29/17  0616 04/28/17  2030 04/28/17  1210 04/28/17  0510 04/27/17  1826 04/27/17  0625  04/26/17  0640  04/25/17  0630 04/24/17 1944    133  --  138 135 138  < > 134  --  133 130*   POTASSIUM 3.4 4.4 4.4 2.9* 3.6 3.2*  < > 2.8*  < > 2.8* 3.5   CHLORIDE 96 95  --  98 96 97  < > 94  --  92* 92*   CO2 31 33*  --  30 32 30  < > 29  --  27 26   ANIONGAP 9 6  --  10 8 11  < > 11  --  14 12   * 100*  --  102* 127* 99  < > 105*  --  134* 104*   BUN 61* 63*  --  68* 68* 69*  < > 78*  --  84* 88*   CR 1.56* 1.62*  --  1.65* 1.73* 1.69*  < > 1.76*  --  2.13* 2.39*   GFRESTIMATED 43* 41*  --  41* 38* 40*  < > 38*  --  30* 26*   GFRESTBLACK 52* 50*  --  49* 47* 48*  < > 46*  --  37* 32*   LEIDY 8.8 9.1  --  8.2* 8.9 8.2*  < > 8.9  --  8.8 9.1   MAG 2.2 2.3  --  2.2  --  2.3  --  2.5*  --  2.5* 2.8*   PROTTOTAL  --   --   --   --   --   --   --  7.4  --  6.9 7.6   ALBUMIN  --   --   --   --   --   --   --  3.4  --  3.0* 3.5   BILITOTAL  --   --   --   --   --   --   --  1.1  --  1.1 1.2   ALKPHOS  --   --   --   --   --   --   --  82  --  80 84   AST  --   --   --   --   --   --   --  19  --  15 20   ALT  --   --   --   --   --   --   --  13  --  13 15   < > = values in this interval not displayed.  INR    Recent Labs  Lab 04/29/17  0616 04/28/17  0510 04/27/17  0625 04/26/17  0640   INR 1.12 1.16* 1.12 1.11       I personally provided care for this patient, reviewed chart, discussed course with patient, housestaff and consulting physicians.  I answered all questions.    Clive Davis M.D.  Division of Cardiology  Department of Medicine

## 2017-04-29 NOTE — PLAN OF CARE
Problem: Goal Outcome Summary  Goal: Goal Outcome Summary  Outcome: No Change  Pt A/Ox4, VSS on CPAP overnight. Pt on milrinone gtt running at 0.375 mcg/kg/min, lasix gtt running at 20 mg/hr. Pt PCU collect, labs drawn. Pt's weight up to 143lbs, previous 141.3lbs, pt does however have lymph wraps in place which were placed yesterday. Continue to monitor and notify MD of questions or concerns.

## 2017-04-29 NOTE — PLAN OF CARE
Problem: Individualization  Goal: Patient Preferences  Outcome: No Change  D:Up sitting in chair reading a paper.  Has had no complaints and has not verbalized no concerns.  No episodes of chest pain or shortness of breath.  Lungs are clear in the upper lobes and diminished in the lower lobes.  Has moderate edema in the lower extremities bilaterally.  Lymphedema wraps are on.   Continue on a lasix drip at 20mg/hr and milrinone drip at 0.375 mcg/kg/min: 7.7ml/hr.   Rhythm is Paced with no ectopy.  HR 84,  Bp 124/71 MAP 92.  O2 sat 99% on room air.     A:Is doing well.  Tolerating activity well.  O2 sats are normal on room air.  Rhythm is unchanged with no arrythmias.  Blood pressure is stable.   Condition is stable.       P:Continue to assess level of comfort and tolerance to activity.  Continue to monitor rhythm and vital signs.  Continue lasix and milrinone as ordered.  Monitor and record intake and output.  Notify MD with any acute changes in  Condition.

## 2017-04-29 NOTE — PLAN OF CARE
Problem: Goal Outcome Summary  Goal: Goal Outcome Summary  Edema/6C: Wraps removed and measurements taken with 9/12 reductions bilaterally. Pt with 2+ pitting in BLEs, skin intact. Skin cares performed prior to reapplication of GCB with sween 24 from MTPs to knee creases for continued edema management and protection of skin integrity. Okay to wear x 48 hours until therapist returns. Please remove wraps if causing numbness, tingling, pain, or becomes soiled.,

## 2017-04-30 NOTE — INTERIM SUMMARY
"Clive Davis M.D.  Cardiovascular Medicine    I personally saw and examined this patient, discussed care with housestaff and other consultants, reviewed current laboratories and imaging studies, and conveyed impression and diagnostic/therapeutic plan to patient.    Problem List  1. Atherosclerotic cardiovascular disease with previous MI and stenting  2. Acute and chronic congestive heart failure treated with milrinone dependency  3. History of alcohol usage with myocardial depression  4. Acute renal failure, resolving  5. Anemia with macrocytosis  6. Hypothyroidism with replacement    History    Objective  /76 (BP Location: Left arm)  Pulse 79  Temp 98  F (36.7  C) (Oral)  Resp 16  Ht 1.727 m (5' 8\")  Wt 64.9 kg (143 lb)  SpO2 98%  BMI 21.74 kg/m2  Constitutional: alert, oriented, normal gait and station, normal mentation.  Oral: moist mucous membrans  Lymph: without pathologic adenopathy  Chest: clear to ausculation and percussion  Cor: No evidence of left or right ventricular activity.  Rhythm is regular.  S1 normal, S2 split physiologically. Murmurs are not present  Abdomen: without tenderness, rebound, guarding, masses, ascites  Extremities: Edema not present  Neuro: no focal defects, normal mentation  Skin: without open lesions  Psych: oriented, verbal, mental status in tact  Intake/Output Summary (Last 24 hours) at 04/30/17 0630  Last data filed at 04/30/17 0553   Gross per 24 hour   Intake           1904.8 ml   Output             2360 ml   Net           -455.2 ml     Wt Readings from Last 5 Encounters:   04/29/17 64.9 kg (143 lb)   04/17/17 65.1 kg (143 lb 8 oz)   04/15/17 65 kg (143 lb 3.2 oz)   04/05/17 69.6 kg (153 lb 6.4 oz)   04/03/17 67.7 kg (149 lb 3.2 oz)     Wt Readings from Last 24 Encounters:   04/30/17 64.8 kg (142 lb 12.8 oz)   04/17/17 65.1 kg (143 lb 8 oz)   04/15/17 65 kg (143 lb 3.2 oz)   04/05/17 69.6 kg (153 lb 6.4 oz)   04/03/17 67.7 kg (149 lb 3.2 oz)   03/31/17 66.2 kg " (146 lb)   03/30/17 65.6 kg (144 lb 10 oz)   03/29/17 69.3 kg (152 lb 12.8 oz)   03/23/17 68.5 kg (151 lb 1.6 oz)   03/20/17 71.4 kg (157 lb 4.8 oz)   03/08/17 67.1 kg (148 lb)   03/06/17 68.5 kg (151 lb)   03/02/17 67.5 kg (148 lb 12.8 oz)   01/23/17 71 kg (156 lb 9.6 oz)   01/23/17 71.9 kg (158 lb 9.6 oz)   01/20/17 70 kg (154 lb 6.4 oz)   01/20/17 70 kg (154 lb 6.4 oz)   01/16/17 71.7 kg (158 lb)   01/13/17 70 kg (154 lb 5.2 oz)   01/06/17 70.3 kg (155 lb)   01/05/17 73.4 kg (161 lb 12.8 oz)   12/30/16 75.8 kg (167 lb)   12/21/16 74.3 kg (163 lb 14.4 oz)   12/20/16 74.8 kg (165 lb)       Meds    hydrALAZINE  75 mg Oral 4x Daily     bumetanide  2 mg Oral BID     potassium chloride  40 mEq Oral BID     aspirin EC  81 mg Oral Daily     calcitRIOL  0.25 mcg Oral Daily     carvedilol  3.125 mg Oral BID w/meals     clopidogrel  75 mg Oral Daily     isosorbide mononitrate  60 mg Oral At Bedtime     levothyroxine  150 mcg Oral Daily     multivitamin, therapeutic with minerals  1 tablet Oral Daily     tolterodine  4 mg Oral Daily     heparin  5,000 Units Subcutaneous Q12H     cyanocolbalamin  1,000 mcg Oral Daily       Labs  CMP  Recent Labs  Lab 04/29/17  1625 04/29/17  0616 04/28/17  2030 04/28/17  1210 04/28/17  0510  04/27/17  0625  04/26/17  0640  04/25/17  0630 04/24/17  1944    136 133  --  138  < > 138  < > 134  --  133 130*   POTASSIUM 4.0 3.4 4.4 4.4 2.9*  < > 3.2*  < > 2.8*  < > 2.8* 3.5   CHLORIDE 96 96 95  --  98  < > 97  < > 94  --  92* 92*   CO2 32 31 33*  --  30  < > 30  < > 29  --  27 26   ANIONGAP 7 9 6  --  10  < > 11  < > 11  --  14 12   * 109* 100*  --  102*  < > 99  < > 105*  --  134* 104*   BUN 64* 61* 63*  --  68*  < > 69*  < > 78*  --  84* 88*   CR 1.66* 1.56* 1.62*  --  1.65*  < > 1.69*  < > 1.76*  --  2.13* 2.39*   GFRESTIMATED 40* 43* 41*  --  41*  < > 40*  < > 38*  --  30* 26*   GFRESTBLACK 49* 52* 50*  --  49*  < > 48*  < > 46*  --  37* 32*   LEIDY 8.8 8.8 9.1  --  8.2*  < > 8.2*   < > 8.9  --  8.8 9.1   MAG  --  2.2 2.3  --  2.2  --  2.3  --  2.5*  --  2.5* 2.8*   PROTTOTAL  --   --   --   --   --   --   --   --  7.4  --  6.9 7.6   ALBUMIN  --   --   --   --   --   --   --   --  3.4  --  3.0* 3.5   BILITOTAL  --   --   --   --   --   --   --   --  1.1  --  1.1 1.2   ALKPHOS  --   --   --   --   --   --   --   --  82  --  80 84   AST  --   --   --   --   --   --   --   --  19  --  15 20   ALT  --   --   --   --   --   --   --   --  13  --  13 15   < > = values in this interval not displayed.  CBC  Recent Labs  Lab 04/29/17 0616 04/27/17 0625 04/25/17 0630 04/24/17 1944 04/24/17  1415   WBC 5.0  --  5.3  --  5.2   RBC 3.25*  --  3.11*  --  3.28*   HGB 10.8*  --  10.2*  --  10.8*   HCT 32.8*  --  31.0*  --  32.5*   *  --  100  --  99   MCH 33.2*  --  32.8  --  32.9   MCHC 32.9  --  32.9  --  33.2   RDW 16.5*  --  16.2*  --  16.1*    234 228 247 234     INR  Recent Labs  Lab 04/29/17  0616 04/28/17  0510 04/27/17  0625 04/26/17  0640   INR 1.12 1.16* 1.12 1.11     Arterial Blood Gas  Recent Labs  Lab 04/24/17 1944   O2PER 21       Imaging     Assessment/Plan   1. Daily weight, sodium restriction, weekly clinic visits  2. Attendance at AA meetings 2-3 times per week  3. Absolute abstinence from alcohol  4. Titrate carvedilol utilizing very small incremental dosage increases with milrinone bridge to beta blockade  5. Discharge during next 48 hours

## 2017-04-30 NOTE — PLAN OF CARE
Problem: Goal Outcome Summary  Goal: Goal Outcome Summary  Outcome: No Change  Pt A/Ox4, VSS on CPAP overnight. Pt on milrinone gtt running at 0.375 mcg/kg/min. Pt PCU collect, labs drawn. Lymph wraps in place. Continue to monitor and notify MD of questions or concerns.

## 2017-04-30 NOTE — PLAN OF CARE
Problem: Individualization  Goal: Patient Preferences  Outcome: Improving  D:Up out of bed most of the day.   Has up ambulating in halls a few times.  Visited with wife this afternoon.   Denies pain.   Denies shortness of breath.  Lungs have crackles on the right lower lobe, otherwise clear.  Has lymphedema wraps on lower extremities. Denies numbness or tingling.   Rhythm is 100% V-Paced with no ectopy.  HR 84,   Temp 98.8  Bp 127/70 MAP 98.  O2 sat 97% on room air.   Continue on a milrinone drip as ordered.  Is on oral bumex with good urine output.  K+  3.4 and was replaced as ordered.     A:Is doing well.  Tolerating diet and activity well.  Breathing easy and maitaining normal O2 sats on room air.  Rhythm is Paced with no arrhythmias.   Afebrile and vital signs are stable.   Condition continue to improve.     P:Continue to assess level of comfort.  Assess respiratory status.  Spot check O2 sats with vital signs.   Monitor rhythm, temp and vital signs.  Notify MD with any acute changes in status.

## 2017-05-01 NOTE — PROGRESS NOTES
Home Infusion  Perez is discharging today and will be going home on continuous milrinone therapy.  He was home IV milrinone before readmission and is familiar with [facility] services.  Perez requires assist with hook up of home medication and pump prior to dc.    Met with Perez and his wife Annai at bedside once supplies arrived.  Assisted with hook up of milrinone.  Reviewed pump settings and verified with orders.  Reminded them not to flush medication lumen with NS.  PICC dressing will be needing a change by 5/4/17.  Annia did bag hook up, initiation of infusion and flushing of dormant lumen with heparin all with good technique and understanding..    Perez and Annia verbalized understanding of information given.  Continuous home milrinone is running.  Perez is ready for discharge from I perspective and I nurse  will contact Perez to set up next home nurse visit.    Candy [facility] Liaison  [phone] pager

## 2017-05-01 NOTE — PLAN OF CARE
Problem: Goal Outcome Summary  Goal: Goal Outcome Summary  OT/EDEMA 6C: Patient tolerated wear of gradient compression bandaging well, according to set 48 hour schedule and with reductions indicated in 9/12 circumferential measurements from toe line to knee creases. Patient with 2+ pitting edema primarily located over arches of feet and appropriate at this time to transition into compression stockings for longer term edema management. Patient fit/issued with medium/knee high/20-30 mmHg Jobst compression stockings to be worn during DAYTIME hours only (off at night time for skin integrity purposes). *Refer to patient care order for further instructions* Please discontinue use of stockings entirely if increased pain, numbness/tingling or soiling occurs.

## 2017-05-01 NOTE — DISCHARGE SUMMARY
Discharge Summary    Perez Villalobos MRN# [id number]   YOB: 1940 Age: 76 year old     Date of Admission:  4/24/2017  Date of Discharge:  5/1/2017  Admitting Physician:  Clive Davis MD  Discharge Physician:  Alfa Grimm MD  Discharging Service:  Cardiology     Primary Provider: Hector Jc          Outpatient Providers To Do:   Follow up with [facility] clinic on Thursday or Friday for BMP, weight, and fluid balance check.         Discharge Diagnosis:   Active Problems:    CHF (congestive heart failure) (H)               Discharge Disposition:   Discharged to home           Condition on Discharge:   Discharge condition: Stable   Code status on discharge: Full Code           Procedures:   No procedures performed during this admission          Discharge Medications:     Current Discharge Medication List      CONTINUE these medications which have CHANGED    Details   hydrALAZINE (APRESOLINE) 25 MG tablet Take 3 tablets (75 mg) by mouth 4 times daily  Qty: 60 tablet, Refills: 1    Associated Diagnoses: Acute on chronic systolic congestive heart failure (H)      milrinone (PRIMACOR) infusion 200 mcg/mL PREMIX Inject 25.5375 mcg/min into the vein continuous  Qty: 500 mL, Refills: 1    Associated Diagnoses: Chronic systolic heart failure (H)      bumetanide (BUMEX) 1 MG tablet Take 2 tablets (2 mg) by mouth 2 times daily  Qty: 360 tablet, Refills: 3    Associated Diagnoses: Chronic combined systolic and diastolic congestive heart failure (H)      potassium chloride (KLOR-CON) 20 MEQ Packet 40 mEq by Oral or Feeding Tube route 2 times daily  Qty: 180 packet, Refills: 3    Associated Diagnoses: Chronic combined systolic and diastolic congestive heart failure (H)         CONTINUE these medications which have NOT CHANGED    Details   tolterodine (DETROL LA) 4 MG 24 hr capsule Take 4 mg by mouth daily      carvedilol (COREG) 3.125 MG tablet Take 2 tablets (6.25 mg) by mouth 2 times daily  (with meals)  Qty: 540 tablet, Refills: 3    Associated Diagnoses: Chronic systolic heart failure (H)      calcitRIOL (ROCALTROL) 0.25 MCG capsule Take 0.25 mcg by mouth daily      isosorbide mononitrate (IMDUR) 60 MG 24 hr tablet Take 1 tablet (60 mg) by mouth At Bedtime At hs  Qty: 30 tablet, Refills: 11    Associated Diagnoses: Chronic systolic heart failure (H); NSTEMI (non-ST elevated myocardial infarction) (H); Ischemic cardiomyopathy      aspirin EC 81 MG EC tablet Take 81 mg by mouth daily      levothyroxine (SYNTHROID/LEVOTHROID) 150 MCG tablet Take 150 mcg by mouth daily      Saline (SODIUM CHLORIDE) 0.65 % SOLN Spray in nostril as needed      KRILL OIL PO Take 1 capsule by mouth daily       Cyanocobalamin (B-12) 1000 MCG TBCR Take 1,000 mcg by mouth daily  Qty: 100 tablet, Refills: 0    Associated Diagnoses: Vitamin B 12 deficiency      multivitamin, therapeutic with minerals (MULTI-VITAMIN) TABS Take 1 tablet by mouth daily      clopidogrel (PLAVIX) 75 MG tablet Take 75 mg by mouth daily                   Consultations:   No consultations were requested during this admission             Brief History of Illness:   Perez Villalobos is a 76 year old male with history of ICM s/p multiple stents and MI, HFrEF (EF 35% ECHO 2/2017), HTN, HLD, hypothyroidism, and recent onset CKD who was recently hospitalized at [facility] from 4/6-4/15 with cardiogenic shock discharged on milrinone who returns to the hospital with increasing weight, lower extremity edema, and worsening fatigue/SOB concerning for acute decompensated heart failure. Initially treated with lasix drip 20 mg/hr, transitioning to oral bumex with stable weight and creatinine.          Hospital Course By Problem:   #Acute on Chronic HFrEF 2/2 ICM, inotrope dependent  #Increased weight:  Story and exam (LE edema, elevated neck veins) were concerning for acute decompensated heart failure. Was failing outpatient diuretic therapy. Etiology for exacerbation could be  related to progression of underlying disease or developing poor response to diuretics. Does not appear to have infection, medication and diet compliance is good, no chest pain or palpitations. Diuresed well on lasix drip, transitioned to oral Bumex and monitored for 48 hours. His weight remained stable as did creatinine. He will be discharging on oral bumex 2 mg BID with close monitoring of his weight and intake/output. If his weight increases or he notes increased swelling instructed to call care team for further instruction. Continuing home milrinone and other heart failure medications at time of discharge (coreg 6.25 mg BID, imdur 60 mg, and increased hydralazine to 75 mg QID).    #JACINTO on CKD:  Increasing creatinine as weight has been increasing. Likely pre-renal physiology from above, improved with diuresis. Remained stable while on oral diuretic, will need close follow up post discharge.     #Cough:  CXR negative for infectious process, long standing issue. If he were to develop fever would send sputum culture. For now will treat symptomatically with Tessalon. Cough improving with tessalon.     #Hypokalemia:  Required aggressive replacement while in the hospital. We increased his home dose to 40 mEq BID given we increased his diuretic dose. Will need to be checked on Thursday/Friday at [facility] clinic appointment.      #Hypothyroidism:  Continued home replacement.      #Chronic normocytic anemia:  Likely related to underlying kidney disease, remained stable.            Final Day of Progress before Discharge:     Patient Vitals for the past 12 hrs:   BP Temp Temp src Pulse Heart Rate Resp SpO2 Weight   05/01/17 1500 128/76 97.9  F (36.6  C) Oral - 94 18 96 % -   05/01/17 1131 125/73 98.1  F (36.7  C) Oral - 86 16 98 % -   05/01/17 0800 - - - 86 - - - -   05/01/17 0745 118/70 98.7  F (37.1  C) Oral - 88 16 96 % -   05/01/17 0612 - - - - - - - 65 kg (143 lb 3.2 oz)       EXAM:  Constitutional: sitting comfortably in  chair in NAD  Cardiovascular: normal rate, regular rhythm, no murmur/rub/gallop  Respiratory: clear bilaterally  Neck: JVP improved from admission to mid neck  ENT: sclera anicteric, mucus membranes moist  Abdomen: soft, non tender, improved distention from admission  NEURO: alert and oriented, fluent speech, no apparent focal deficits  SKIN: stable bruising  JOINT/EXTREMITIES: improved LE edema, no current swelling         Data:  All laboratory data reviewed          Pending Results:   Unresulted Labs Ordered in the Past 30 Days of this Admission     No orders found from 2/23/2017 to 4/25/2017.                  Discharge Instructions and Follow-Up:     Discharge Procedure Orders  Medication Therapy Management Referral   Referral Type: Med Therapy Management     Home infusion referral     Reason for your hospital stay   Order Comments: You were hospitalized with increased fluid and swelling related to your heart failure. We treated you with IV water pills to remove fluid and eventually transitioned to oral diuretics. You will be discharged on an increased dose of bumex with close follow up in the [facility] clinic.     Adult [facility] Follow-up and recommended labs and tests   Order Comments: You will need to follow up with the [facility] clinic at the end of this week, either Thursday or Friday. They will recheck your labs and kidney function and check your weight and fluid balance.    Appointments on [facility] and/or [facility] (with [facility] or [facility] provider or service). Call [phone] if you haven't heard regarding these appointments within 7 days of discharge.     Activity   Order Comments: Your activity upon discharge: activity as tolerated   Order Specific Question Answer Comments   Is discharge order? Yes      Monitor and record   Order Comments: You will need to monitor and record your fluid intake and urine output and make sure they are close to the same. Additionally, you should be checking your weight everyday  at the same time in the same amount of clothes. If your weight is increasing you need to let your care team know.     When to contact your care team   Order Comments: Contact your care team if you notice more swelling, difficulty breathing, or your weight increases.     Discharge Instructions   Order Comments: You will be taking an increased dose of your bumex when you leave the hospital. You will be taking 2 mg in the morning and 2 mg in the early afternoon. You should also take more potassium than you were prior to hospitalization; you should take 40 mEq in the morning and 40 mEq in the afternoon. You will continue your other medications you have been on prior to staying in the hospital. You should be seen in the [facility] clinic later this week, either Thursday or Friday. Be sure to closely monitor your fluid intake and urine output, they should be about the same. Additionally check your weight everyday.     Full Code     Diet   Order Comments: Follow this diet upon discharge: 2 gram sodium diet and 2 liter fluid restriction   Order Specific Question Answer Comments   Is discharge order? Yes

## 2017-05-01 NOTE — PLAN OF CARE
Problem: Goal Outcome Summary  Goal: Goal Outcome Summary  Outcome: No Change  Pt A/Ox4, VSS on CPAP overnight. Pt on milrinone gtt running at 0.375 mcg/kg/min. Pt PCU collect, labs drawn. Lymph wraps in place. Possible DC home today. Continue to monitor and notify MD of questions or concerns.

## 2017-05-01 NOTE — PLAN OF CARE
Problem: Goal Outcome Summary  Goal: Goal Outcome Summary  OT/CR/6C:  Pt declining physical exercise/activity this AM due to lunch being on the way and pt stating that he has plans to walk in the [place] with his wife this PM.  Discussed D/C plan with pt including recommendations for OP CR.  Pt declining continued CR upon discharge and states that he has a treadmill, recumbent bike, and free weights at home and that he would prefer to exercise IND.  Discussed signs/symptoms of activity intolerance, self-monitoring skills, and progression of aerobic exercise via HEP, pt demonstrates understanding and reports competency with these concepts.       REC:  Home with assist PRN.

## 2017-05-02 NOTE — PROGRESS NOTES
"Tell me how you have been doing since you were discharged from the hospital.  \"Pretty good, except for I can't sleep or nap well.\"  Still unable to get a [employer] reading. I called the company, [employer] and asked them to reach out to Perez for more troubleshooting.      ACTIVITY  How is your activity tolerance?     ok     ASSISTANCE  Do you have someone at home to assist you with your daily activities?   yes wife Annia     MEDICATIONS  I would like to review your discharge medications and answer any questions you may have about them and also make sure you have all the medications that are new to you, and discuss any changes that were made to your pre-hospital medications.     Is someone helping you to set up your medications?     self and Annia     FOLLOW UP  Do you have a follow up appointment with your provider?   What other discharge instructions do you have?   Are you to get labs, procedures or tests before you see your provider?      You are scheduled to see Margarita Jin NP on 5/4/17 1pm and labs at 12:30pm for [facility].            CONTACT INFORMATION  Please feel free to call us with any other questions or symptoms that are concerning for you at [phone], if it is after 4:30 in the afternoon, or a weekend please call [phone] and ask for the on call specialist.  We want to do everything we can to help prevent you needing to return to the ED, so please do not hesitate to call us.       HEART FAILURE PATIENTS  Please weigh yourself daily and record your weights.  If you gain 2 pounds in 24 hours or 5 pounds in one week please call [facility] at [phone].     DIET  It is recommended you follow a 2000 mg low sodium diet, avoid processed food, canned food and fast food restaurants.       "

## 2017-05-02 NOTE — PLAN OF CARE
Problem: Goal Outcome Summary  Goal: Goal Outcome Summary  Occupational Therapy Discharge Summary     Reason for therapy discharge:    Discharged to home.     Progress towards therapy goal(s). See goals on Care Plan in [facility] electronic health record for goal details.  Goals not met.  Barriers to achieving goals:   discharge from facility.     Therapy recommendation(s):    No further therapy is recommended.  Continued daytime wear of Jobst compression stockings for long term edema management.

## 2017-05-03 NOTE — PROGRESS NOTES
"Walker's 1st CardioMEMS transmission since 4/19/17 came through today, please see below.  CardioMEMS technician came into the house to troubleshoot transmission issues. PA diastolic as elevated as last admission.  Discussed with Dr. Kelley.  Walker states he's \"feeling pretty good\" and denies an increase in weights, denies SOB with activity.  We are changing Walker's [facility] appt to instead see Dr. Kelley with labs tomorrow. Nicholas (wife) verbalizes understanding and agrees with plan of care. eMrlyn Schmitt RN        "

## 2017-05-04 NOTE — PROGRESS NOTES
May 4, 2017      CC:   MD Dixon Dodge MD       New consult for end stage heart failure, being considered for advanced heart failure therapies     Dear Niraj and Dixon,    We had the pelas    Mr Villalobos is a 76 year old gentleman with history of ICM s/p multiple PCIs (no CABG) and MI, ICM (EF 35% ECHO 2/2017) and stage D NYHA III with multiple recent hospitalizations at [facility] and [facility] for heart failure exacerbation and volume overload.     He has an extensive coronary artery disease history requiring multiple PCIs and stents in the past with most recently a NSTEMI in 10/2016 and a VF arrest in November of 2016 after which he underwent a CRT-D. More recently,he had a precipitous decline in his functional status and heart failure now requiring advance heart failure therapies. He has had multiple hospitalizations for heart failure in the last 1-2 months.    On April 4th, he underwent a RHC which showed elevated filling pressures both right and left, and was started on milrinone, PICC line placed, and underwent a full LVAD work up, however it was discovered that he had a strong history of alcohol dependence in the past with prior treatment and had been continuing to drink until his last hospitalization in April. Since then he has been free of alcohol since April 4th. Since his hospitalization he has looked up the different AA meetings near their home and is planning to attend, though has not yet attended.    He was last hospitalized for a week and was discharged 3 days ago, since then his weight has been stable at 140 lbs and he has been doing well with his medications. He does feel dizzy / lightheaded when initially standing, but denies any symptoms with ambulation. He is able to go 1/2 block without SOB, and has orthopnea, no PND, and bilateral edema which has been stable.     His family history is unknown due to adoption.    Social history is notable for prior history of tobacco, but has quit for  many years. He was previously employed as a , but is now retired (both he and his wife Annia). He had a strong history of etoh abuse, but has been abstinent since April 4th, 2017. His wife is a former nurse at [employer], they also have a niece who is nurse who lives in [facility], and a son who lives 30-45 min away and are all able to assist should he undergo LVAD.    ROS: All others reviewed and negative. Please see HPI for pertinent positives (10 point ROS).     PMH:   Past Medical History:   Diagnosis Date     Acute renal failure (H)     10/2015 ARF secondary to rhabdomyolysis     Alcoholism (H)      Anemia      Benign essential HTN      BPH (benign prostatic hypertrophy)      CAD (coronary artery disease)     AWMI, stents to Cx, RCA and LAD     Chronic systolic heart failure (H)      CKD (chronic kidney disease)      Complete AV block (H)      Ischemic cardiomyopathy 10/23/2015    EF 30%     Low sodium levels      Mixed hyperlipidemia      NSTEMI (non-ST elevated myocardial infarction) (H) 10/23/2015     PAD (peripheral artery disease) (H)      Rhabdomyolysis due to statin therapy     crestor     Severe hypothyroidism 9/20/2016     VF (ventricular fibrillation) (H) 11/16/16       FH:   Family History   Problem Relation Age of Onset     Unknown/Adopted Mother      Unknown/Adopted Father        The patient is  and has several children.  He lives in the [facility] area.  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  He denies any tobacco use.  He did have heavier alcohol use in the past and went through treatment for this.  More recently, he was drinking several drinks a day before coming into the hospital for cardiogenic shock about a month and a half ago.  He has been sober for 1 month and understands his need to maintain sobriety before being considered for mechanical support.       Home Meds:   Current Outpatient Prescriptions on File Prior to Visit:  hydrALAZINE  "(APRESOLINE) 25 MG tablet Take 3 tablets (75 mg) by mouth 4 times daily   milrinone (PRIMACOR) infusion 200 mcg/mL PREMIX Inject 25.5375 mcg/min into the vein continuous   bumetanide (BUMEX) 1 MG tablet Take 2 tablets (2 mg) by mouth 2 times daily   potassium chloride (KLOR-CON) 20 MEQ Packet 40 mEq by Oral or Feeding Tube route 2 times daily   tolterodine (DETROL LA) 4 MG 24 hr capsule Take 4 mg by mouth daily   carvedilol (COREG) 3.125 MG tablet Take 2 tablets (6.25 mg) by mouth 2 times daily (with meals)   calcitRIOL (ROCALTROL) 0.25 MCG capsule Take 0.25 mcg by mouth daily   isosorbide mononitrate (IMDUR) 60 MG 24 hr tablet Take 1 tablet (60 mg) by mouth At Bedtime At hs   aspirin EC 81 MG EC tablet Take 81 mg by mouth daily   levothyroxine (SYNTHROID/LEVOTHROID) 150 MCG tablet Take 150 mcg by mouth daily   Saline (SODIUM CHLORIDE) 0.65 % SOLN Spray in nostril as needed   KRILL OIL PO Take 1 capsule by mouth daily    Cyanocobalamin (B-12) 1000 MCG TBCR Take 1,000 mcg by mouth daily   multivitamin, therapeutic with minerals (MULTI-VITAMIN) TABS Take 1 tablet by mouth daily   clopidogrel (PLAVIX) 75 MG tablet Take 75 mg by mouth daily     No current facility-administered medications on file prior to visit.       ROS: 10 point ROS neg other than the symptoms noted above in the HPI.    /67 (BP Location: Left arm, Patient Position: Chair, Cuff Size: Adult Small)  Pulse 78  Ht 1.727 m (5' 8\")  Wt 66.8 kg (147 lb 4.8 oz)  SpO2 99%  BMI 22.4 kg/m2    General: cachectic appearing   HEENT: Externally normal, sclera clear, temporal wasting   CV: PMI diffuse, RRR, s1, s2, there is a HSM at the apex JVD at 15 cm  Lungs: CTAB, non-labored breathing, no wheezing, there are bilateral crackles at the bases with decreased BS below the mid right lung.  Abd: soft, non-distended, non-tender  Ext:+1 bilateral edema, right PICC line  Skin: ecchymoses at the wrist  Neuro: grossly non-focal  Psych: mood/affect " appropriate    Labs:  Results for orders placed or performed in visit on 05/04/17 (from the past 24 hour(s))   Basic metabolic panel   Result Value Ref Range    Sodium 137 133 - 144 mmol/L    Potassium 4.4 3.4 - 5.3 mmol/L    Chloride 99 94 - 109 mmol/L    Carbon Dioxide 27 20 - 32 mmol/L    Anion Gap 11 3 - 14 mmol/L    Glucose 114 (H) 70 - 99 mg/dL    Urea Nitrogen 74 (H) 7 - 30 mg/dL    Creatinine 1.71 (H) 0.66 - 1.25 mg/dL    GFR Estimate 39 (L) >60 mL/min/1.7m2    GFR Estimate If Black 47 (L) >60 mL/min/1.7m2    Calcium 8.8 8.5 - 10.1 mg/dL   N terminal pro BNP outpatient   Result Value Ref Range    N-Terminal Pro Bnp 11975 (H) 0 - 450 pg/mL       Imaging:  Echo (4/12/2017)  Interpretation Summary  Moderate left ventricular dilation is present. Moderately (EF 30-35%) reduced  left ventricular function is present. Traced at 32%. Apical wall akinesis is  present.  Global right ventricular function is mildly to moderately reduced.  Dilation of the inferior vena cava is present with abnormal respiratory  variation in diameter.  Moderate pulmonary hypertension is present.Right ventricular systolic pressure  is 56mmHg above the right atrial pressure.     No change from last study.     Left Ventricle  Left ventricular wall thickness is normal. Moderate left ventricular dilation  is present. Moderately (EF 30-35%) reduced left ventricular function is  present. Grade II or moderate diastolic dysfunction. Apical wall akinesis is  present.     Right Ventricle  Mild to moderate right ventricular dilation is present. Global right  ventricular function is mildly to moderately reduced. A pacemaker lead is  noted in the right ventricle.     Atria  Severe left atrial enlargement is present. Moderate right atrial enlargement  is present.     Mitral Valve  Mild to moderate mitral insufficiency is present.        Aortic Valve  The aortic valve is tricuspid. Mild aortic insufficiency is present.     Tricuspid Valve  Mild tricuspid  insufficiency is present. Right ventricular systolic pressure  is 56mmHg above the right atrial pressure. Moderate pulmonary hypertension is  present.     Pulmonic Valve  Trace pulmonic insufficiency is present.     Vessels  The thoracic aorta is normal. Dilation of the inferior vena cava is present  with abnormal respiratory variation in diameter.     Pericardium  No pericardial effusion is present.     MMode/2D Measurements & Calculations  IVSd: 1.1 cm  LVIDd: 5.5 cm  LVIDs: 4.3 cm  LVPWd: 0.82 cm  FS: 21.5 %  EDV(Teich): 146.2 ml  ESV(Teich): 83.1 ml  LV mass(C)d: 198.6 grams     Ao root diam: 3.2 cm  asc Aorta Diam: 3.3 cm  LVOT diam: 2.1 cm  LVOT area: 3.5 cm2  LA Volume (BP): 85.7 ml  LA Volume Index (BP): 48.4 ml/m2     Doppler Measurements & Calculations  MV E max duke: 100.0 cm/sec  MV dec time: 0.17 sec  PA acc time: 0.06 sec     TR max duke: 373.3 cm/sec  TR max P.7 mmHg  Lateral E/e': 11.5  Medial E/e': 19.6    MONIK 17  Interpretation Summary  Severely (EF <30%) reduced left ventricular function is present.  Global right ventricular function is moderately reduced.  Mild- moderate functional MR is present. MV ERO 0.07 cm2, VC of 5 mm and R Vol  is 12 ml.  The aortic valve is tricuspid. Mild aortic valve sclerosis is present. Aortic  insufficiency is mild.  Complex atherothrombi of the aorta are present in the descending thoracic  aorta.  Small loculated pericardial effusion posterior to the RA.  No prior MONIK for comparison.    [facility] 4/3/2017  RIGHT HEART CATHETERIZATION:  BSA 1.82m2  --RA 18   --RV 66/20  --PA 65/31/44   --PCW    --TD CO 1.95 L/min   --TD CI 1.09 L/min/m2  --PVR 10.3   --SVR 1559    EKG: Bi-V paced    Assessment:  Mr Villalobos is a 76 year old gentleman with a history of CAD s/p PCI with ICM, who has end stage disease by all measures. He is Stage D NYHA III on IV milrinone. He has had an LVAD evaluation in house and we are holding off until he can demonstrate  sobriety for 3-4 months. I reviewed his CardioMEMS.  This shows continually elevated PA diastolic pressures and PA mean pressures.  He has elevated neck veins today on exam, and his creatinine overall is relatively stable at present but is far above his baseline.  I would say that he is continuing to slide on inotropes.  He has extreme cardiac wasting.  It is highly important that he continue to remain abstinent from alcohol.  He has several more months of sobriety before we will consider him for mechanical support, and we are getting to a point where this may be prohibitively high risk.  I have encouraged him to maintain his physical activity and to try to eat as much as possible.  He has verbally committed to going to Alcoholics Anonymous, and we are going to perform several random alcohol tests to ensure compliance.   It may be that I need to bring him back to the committee in the next month or two if I cannot keep him out of the hospital or if he continues to decline. The fact that he has a virgin chest is helpful and that this overall lowers his chance of perioperative complications somewhat, although I spelled out for them today that I think he will have a prolonged recovery from an LVAD based on the degree of cachexia present.     Chronic systolic heart failure - ischemic  Stage D  NYHA Class IIIB  ACEi/ARB no, renal dysfunction  BB yes - Coreg 6.25 BID  Aldosterone antagonist - no renal dysfunction  SCD prophylaxis  BIV ICD   % BiV pacin%  Fluid status hypervolemic, we will increase Bumex to 3 mg in the am the 2 mg the PM and he will be seen weekly at this point   NSAID use: contraindicated   Sleep Apnea Evaluation: deferred  Follow up    Other on hydralazine / imdur for afterload reduction    CAD: on ASA, no statin at this point and I will look into why     Thank you so much for this referral and we will continue to keep you posted in the months to come regarding his progress and whether or not we will  implant him with an LVAD.     The patient was seen wit the fellow Mariano Reinoso    Sincerely,     Giovanna Kelley MD       of Medicine   [facility] Division of Cardiology           CC: MD Dixon Dodge MD

## 2017-05-04 NOTE — NURSING NOTE
Chief Complaint   Patient presents with     New Patient     New CORE; 76yr old male with a h/o chronic systolic HF and recent hospitalization now on milrinone presenting for follow-up with labs prior     TEOFILO Houser

## 2017-05-04 NOTE — MR AVS SNAPSHOT
After Visit Summary   5/4/2017    Perez Villalobos    MRN: [id number]           Patient Information     Date Of Birth          1940        Visit Information        Provider Department      5/4/2017 8:30 AM Giovanna Kelley MD [facility]        Today's Diagnoses     Chronic combined systolic and diastolic congestive heart failure (H)          Care Instructions    Increase Bumex 3 mg in AM, 2 mg in afternoon  Follow up with Grecia in one week.  Follow up with Dr. Kelley in 3-4 weeks     CORE/heart failure appt with Grecia 5/12/17 at 12pm, 11:30 labs.   Follow-up CORE/heart failure appt with Grecia 5/19/17 at 12pm, 11:30 labs.      Kell Branham, RN  Cardiology Care Coordinator  Please be aware that I work part-time but I will be checking messages several times per week.   For urgent needs, please call the number below.    [phone], press 1 for [employer], press 3 to speak to a nurse    .        Follow-ups after your visit        Follow-up notes from your care team     Return in about 1 week (around 5/11/2017), or Grecia NP, for Physical Exam, Lab Work.      Your next 10 appointments already scheduled     May 05, 2017  3:00 PM CDT   TELEMEDICINE with Donis Orellana DENITA   [facility] ([facility])    [address] [place] [id number] [phone]           Note: this is not an onsite visit; there is no need to come to the facility.            May 08, 2017  8:00 AM CDT   Remote HF with MCLEAN HFRN   [facility] [place] ([facility])    [address] [place] [id number] [phone]           This appointment is for a remote check of your debrillator.  This is not an appointment at the office.            May 11, 2017  9:00 AM CDT   Level 1 with RH INFUSION CHAIR 11   [facility] ([facility])    [facility]  [address]  [address]  [facility] [address]   [phone]            May 15, 2017  2:00 PM CDT   (Arrive by 1:45 PM)   RETURN HEART FAILURE with Anton Argueta MD   [facility] ([facility])    [address]  3rd [address]  [facility] [address] [phone]            Jul 06, 2017  4:30 PM CDT   Remote ICD Check with MCLEAN DCR2   [facility] [place] ([facility])    [address]  [facility] [id number] [phone]           This appointment is for a remote check of your debrillator.  This is not an appointment at the office.              Who to contact     If you have questions or need follow up information about today's clinic visit or your schedule please contact [facility] directly at [phone].  Normal or non-critical lab and imaging results will be communicated to you by [employer], letter or phone within 4 business days after the clinic has received the results. If you do not hear from us within 7 days, please contact the clinic through [employer] or phone. If you have a critical or abnormal lab result, we will notify you by phone as soon as possible.  Submit refill requests through [employer] or call your pharmacy and they will forward the refill request to us. Please allow 3 business days for your refill to be completed.          Additional Information About Your Visit        Ev[employer] Information     [employer] lets you send messages to your doctor, view your test results, renew your prescriptions, schedule appointments and more. To sign up, go to [email or web address] . Click on \"Log in\" on the left side of the screen, which will take you to the Welcome page. Then click on \"Sign up Now\" on the right side of the page.     You will be asked to enter the access code listed below, as well as some personal information. Please follow the directions to create your username and password.     Your access " "code is: [email or web address]  Expires: 2017  3:26 PM     Your access code will  in 90 days. If you need help or a new code, please call your [facility] clinic or [phone].        Care EveryWhere ID     This is your Care EveryWhere ID. This could be used by other organizations to access your [facility] medical records  [id number]        Your Vitals Were     Pulse Height Pulse Oximetry BMI (Body Mass Index)          78 1.727 m (5' 8\") 99% 22.4 kg/m2         Blood Pressure from Last 3 Encounters:   17 117/67   17 128/76   17 128/71    Weight from Last 3 Encounters:   17 66.8 kg (147 lb 4.8 oz)   17 65 kg (143 lb 3.2 oz)   17 65.1 kg (143 lb 8 oz)              Today, you had the following     No orders found for display         Today's Medication Changes          These changes are accurate as of: 17 10:17 AM.  If you have any questions, ask your nurse or doctor.               These medicines have changed or have updated prescriptions.        Dose/Directions    BUMEX 1 MG tablet   This may have changed:    - how much to take  - how to take this  - when to take this  - additional instructions   Used for:  Chronic combined systolic and diastolic congestive heart failure (H)   Generic drug:  bumetanide   Changed by:  Giovanna Kelley MD        Take 3 mg in AM, continue 2 mg in afternoon   Quantity:  360 tablet   Refills:  3                Primary Care Provider Office Phone # Fax #    Hector Jc [phone]       [facility] [address]  [facility] [address]        Thank you!     Thank you for choosing [facility]  for your care. Our goal is always to provide you with excellent care. Hearing back from our patients is one way we can continue to improve our services. Please take a few minutes to complete the written survey that you may receive in the mail after your visit with us. Thank you!             Your Updated Medication List - " Protect others around you: Learn how to safely use, store and throw away your medicines at www.disposemymeds.org.          This list is accurate as of: 5/4/17 10:17 AM.  Always use your most recent med list.                   Brand Name Dispense Instructions for use    aspirin EC 81 MG EC tablet      Take 81 mg by mouth daily       B-12 1000 MCG Tbcr     100 tablet    Take 1,000 mcg by mouth daily       BUMEX 1 MG tablet   Generic drug:  bumetanide     360 tablet    Take 3 mg in AM, continue 2 mg in afternoon       calcitRIOL 0.25 MCG capsule    ROCALTROL     Take 0.25 mcg by mouth daily       carvedilol 3.125 MG tablet    COREG    540 tablet    Take 2 tablets (6.25 mg) by mouth 2 times daily (with meals)       hydrALAZINE 25 MG tablet    APRESOLINE    60 tablet    Take 3 tablets (75 mg) by mouth 4 times daily       isosorbide mononitrate 60 MG 24 hr tablet    IMDUR    30 tablet    Take 1 tablet (60 mg) by mouth At Bedtime At hs       KRILL OIL PO      Take 1 capsule by mouth daily       levothyroxine 150 MCG tablet    SYNTHROID/LEVOTHROID     Take 150 mcg by mouth daily       milrinone 20 MG/100ML infusion    PRIMACOR    500 mL    Inject 25.5375 mcg/min into the vein continuous       Multi-vitamin Tabs tablet      Take 1 tablet by mouth daily       PLAVIX 75 MG tablet   Generic drug:  clopidogrel      Take 75 mg by mouth daily       potassium chloride 20 MEQ Packet    KLOR-CON    180 packet    40 mEq by Oral or Feeding Tube route 2 times daily       Sodium Chloride 0.65 % Soln      Spray in nostril as needed       tolterodine 4 MG 24 hr capsule    DETROL LA     Take 4 mg by mouth daily

## 2017-05-04 NOTE — PATIENT INSTRUCTIONS
Increase Bumex 3 mg in AM, 2 mg in afternoon  Follow up with Grecia in one week.  Follow up with Dr. Kelley in 3-4 weeks     CORE/heart failure appt with Grecia 5/12/17 at 12pm, 11:30 labs.   Follow-up CORE/heart failure appt with Grecia 5/19/17 at 12pm, 11:30 labs.      Kell Branham, RN  Cardiology Care Coordinator  Please be aware that I work part-time but I will be checking messages several times per week.   For urgent needs, please call the number below.    [phone], press 1 for [employer], press 3 to speak to a nurse    .

## 2017-05-04 NOTE — LETTER
5/4/2017      RE: Perez Villalobos  [address]  [facility] [address]       Dear Colleague,    Thank you for the opportunity to participate in the care of your patient, Perez Villalobos, at the [facility]. Please see a copy of my visit note below.    May 4, 2017      CC:   MD Dixon Dodge MD       New consult for end stage heart failure, being considered for advanced heart failure therapies     Dear Niraj and Dixon,    We had the pelas    Mr Villalobos is a 76 year old gentleman with history of ICM s/p multiple PCIs (no CABG) and MI, ICM (EF 35% ECHO 2/2017) and stage D NYHA III with multiple recent hospitalizations at [facility] and [facility] for heart failure exacerbation and volume overload.     He has an extensive coronary artery disease history requiring multiple PCIs and stents in the past with most recently a NSTEMI in 10/2016 and a VF arrest in November of 2016 after which he underwent a CRT-D. More recently,he had a precipitous decline in his functional status and heart failure now requiring advance heart failure therapies. He has had multiple hospitalizations for heart failure in the last 1-2 months.    On April 4th, he underwent a RHC which showed elevated filling pressures both right and left, and was started on milrinone, PICC line placed, and underwent a full LVAD work up, however it was discovered that he had a strong history of alcohol dependence in the past with prior treatment and had been continuing to drink until his last hospitalization in April. Since then he has been free of alcohol since April 4th. Since his hospitalization he has looked up the different AA meetings near their home and is planning to attend, though has not yet attended.    He was last hospitalized for a week and was discharged 3 days ago, since then his weight has been stable at 140 lbs and he has been doing well with his medications. He does feel dizzy  / lightheaded when initially standing, but denies any symptoms with ambulation. He is able to go 1/2 block without SOB, and has orthopnea, no PND, and bilateral edema which has been stable.     His family history is unknown due to adoption.    Social history is notable for prior history of tobacco, but has quit for many years. He was previously employed as a , but is now retired (both he and his wife Annia). He had a strong history of etoh abuse, but has been abstinent since April 4th, 2017. His wife is a former nurse at [employer], they also have a niece who is nurse who lives in [facility], and a son who lives 30-45 min away and are all able to assist should he undergo LVAD.    ROS: All others reviewed and negative. Please see HPI for pertinent positives (10 point ROS).     PMH:   Past Medical History:   Diagnosis Date     Acute renal failure (H)     10/2015 ARF secondary to rhabdomyolysis     Alcoholism (H)      Anemia      Benign essential HTN      BPH (benign prostatic hypertrophy)      CAD (coronary artery disease)     AWMI, stents to Cx, RCA and LAD     Chronic systolic heart failure (H)      CKD (chronic kidney disease)      Complete AV block (H)      Ischemic cardiomyopathy 10/23/2015    EF 30%     Low sodium levels      Mixed hyperlipidemia      NSTEMI (non-ST elevated myocardial infarction) (H) 10/23/2015     PAD (peripheral artery disease) (H)      Rhabdomyolysis due to statin therapy     crestor     Severe hypothyroidism 9/20/2016     VF (ventricular fibrillation) (H) 11/16/16       FH:   Family History   Problem Relation Age of Onset     Unknown/Adopted Mother      Unknown/Adopted Father        The patient is  and has several children.  He lives in the [facility] area.  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  He denies any tobacco use.  He did have heavier alcohol use in the past and went through treatment for this.  More recently, he was drinking several  "drinks a day before coming into the hospital for cardiogenic shock about a month and a half ago.  He has been sober for 1 month and understands his need to maintain sobriety before being considered for mechanical support.       Home Meds:   Current Outpatient Prescriptions on File Prior to Visit:  hydrALAZINE (APRESOLINE) 25 MG tablet Take 3 tablets (75 mg) by mouth 4 times daily   milrinone (PRIMACOR) infusion 200 mcg/mL PREMIX Inject 25.5375 mcg/min into the vein continuous   bumetanide (BUMEX) 1 MG tablet Take 2 tablets (2 mg) by mouth 2 times daily   potassium chloride (KLOR-CON) 20 MEQ Packet 40 mEq by Oral or Feeding Tube route 2 times daily   tolterodine (DETROL LA) 4 MG 24 hr capsule Take 4 mg by mouth daily   carvedilol (COREG) 3.125 MG tablet Take 2 tablets (6.25 mg) by mouth 2 times daily (with meals)   calcitRIOL (ROCALTROL) 0.25 MCG capsule Take 0.25 mcg by mouth daily   isosorbide mononitrate (IMDUR) 60 MG 24 hr tablet Take 1 tablet (60 mg) by mouth At Bedtime At hs   aspirin EC 81 MG EC tablet Take 81 mg by mouth daily   levothyroxine (SYNTHROID/LEVOTHROID) 150 MCG tablet Take 150 mcg by mouth daily   Saline (SODIUM CHLORIDE) 0.65 % SOLN Spray in nostril as needed   KRILL OIL PO Take 1 capsule by mouth daily    Cyanocobalamin (B-12) 1000 MCG TBCR Take 1,000 mcg by mouth daily   multivitamin, therapeutic with minerals (MULTI-VITAMIN) TABS Take 1 tablet by mouth daily   clopidogrel (PLAVIX) 75 MG tablet Take 75 mg by mouth daily     No current facility-administered medications on file prior to visit.       ROS: 10 point ROS neg other than the symptoms noted above in the HPI.    /67 (BP Location: Left arm, Patient Position: Chair, Cuff Size: Adult Small)  Pulse 78  Ht 1.727 m (5' 8\")  Wt 66.8 kg (147 lb 4.8 oz)  SpO2 99%  BMI 22.4 kg/m2    General: cachectic appearing   HEENT: Externally normal, sclera clear, temporal wasting   CV: PMI diffuse, RRR, s1, s2, there is a HSM at the apex JVD at 15 " cm  Lungs: CTAB, non-labored breathing, no wheezing, there are bilateral crackles at the bases with decreased BS below the mid right lung.  Abd: soft, non-distended, non-tender  Ext:+1 bilateral edema, right PICC line  Skin: ecchymoses at the wrist  Neuro: grossly non-focal  Psych: mood/affect appropriate    Labs:  Results for orders placed or performed in visit on 05/04/17 (from the past 24 hour(s))   Basic metabolic panel   Result Value Ref Range    Sodium 137 133 - 144 mmol/L    Potassium 4.4 3.4 - 5.3 mmol/L    Chloride 99 94 - 109 mmol/L    Carbon Dioxide 27 20 - 32 mmol/L    Anion Gap 11 3 - 14 mmol/L    Glucose 114 (H) 70 - 99 mg/dL    Urea Nitrogen 74 (H) 7 - 30 mg/dL    Creatinine 1.71 (H) 0.66 - 1.25 mg/dL    GFR Estimate 39 (L) >60 mL/min/1.7m2    GFR Estimate If Black 47 (L) >60 mL/min/1.7m2    Calcium 8.8 8.5 - 10.1 mg/dL   N terminal pro BNP outpatient   Result Value Ref Range    N-Terminal Pro Bnp 60754 (H) 0 - 450 pg/mL       Imaging:  Echo (4/12/2017)  Interpretation Summary  Moderate left ventricular dilation is present. Moderately (EF 30-35%) reduced  left ventricular function is present. Traced at 32%. Apical wall akinesis is  present.  Global right ventricular function is mildly to moderately reduced.  Dilation of the inferior vena cava is present with abnormal respiratory  variation in diameter.  Moderate pulmonary hypertension is present.Right ventricular systolic pressure  is 56mmHg above the right atrial pressure.     No change from last study.     Left Ventricle  Left ventricular wall thickness is normal. Moderate left ventricular dilation  is present. Moderately (EF 30-35%) reduced left ventricular function is  present. Grade II or moderate diastolic dysfunction. Apical wall akinesis is  present.     Right Ventricle  Mild to moderate right ventricular dilation is present. Global right  ventricular function is mildly to moderately reduced. A pacemaker lead is  noted in the right  ventricle.     Atria  Severe left atrial enlargement is present. Moderate right atrial enlargement  is present.     Mitral Valve  Mild to moderate mitral insufficiency is present.        Aortic Valve  The aortic valve is tricuspid. Mild aortic insufficiency is present.     Tricuspid Valve  Mild tricuspid insufficiency is present. Right ventricular systolic pressure  is 56mmHg above the right atrial pressure. Moderate pulmonary hypertension is  present.     Pulmonic Valve  Trace pulmonic insufficiency is present.     Vessels  The thoracic aorta is normal. Dilation of the inferior vena cava is present  with abnormal respiratory variation in diameter.     Pericardium  No pericardial effusion is present.     MMode/2D Measurements & Calculations  IVSd: 1.1 cm  LVIDd: 5.5 cm  LVIDs: 4.3 cm  LVPWd: 0.82 cm  FS: 21.5 %  EDV(Teich): 146.2 ml  ESV(Teich): 83.1 ml  LV mass(C)d: 198.6 grams     Ao root diam: 3.2 cm  asc Aorta Diam: 3.3 cm  LVOT diam: 2.1 cm  LVOT area: 3.5 cm2  LA Volume (BP): 85.7 ml  LA Volume Index (BP): 48.4 ml/m2     Doppler Measurements & Calculations  MV E max duke: 100.0 cm/sec  MV dec time: 0.17 sec  PA acc time: 0.06 sec     TR max duke: 373.3 cm/sec  TR max P.7 mmHg  Lateral E/e': 11.5  Medial E/e': 19.6    MONIK 17  Interpretation Summary  Severely (EF <30%) reduced left ventricular function is present.  Global right ventricular function is moderately reduced.  Mild- moderate functional MR is present. MV ERO 0.07 cm2, VC of 5 mm and R Vol  is 12 ml.  The aortic valve is tricuspid. Mild aortic valve sclerosis is present. Aortic  insufficiency is mild.  Complex atherothrombi of the aorta are present in the descending thoracic  aorta.  Small loculated pericardial effusion posterior to the RA.  No prior MONIK for comparison.    [facility] 4/3/2017  RIGHT HEART CATHETERIZATION:  BSA 1.82m2  --RA 18   --RV 66/20  --PA 65/31/44   --PCW    --TD CO 1.95 L/min   --TD CI 1.09 L/min/m2  --PVR  10.3   --SVR 1557    EKG: Bi-V paced    Assessment:  Mr Villalobos is a 76 year old gentleman with a history of CAD s/p PCI with ICM, who has end stage disease by all measures. He is Stage D NYHA III on IV milrinone. He has had an LVAD evaluation in house and we are holding off until he can demonstrate sobriety for 3-4 months. I reviewed his CardioMEMS.  This shows continually elevated PA diastolic pressures and PA mean pressures.  He has elevated neck veins today on exam, and his creatinine overall is relatively stable at present but is far above his baseline.  I would say that he is continuing to slide on inotropes.  He has extreme cardiac wasting.  It is highly important that he continue to remain abstinent from alcohol.  He has several more months of sobriety before we will consider him for mechanical support, and we are getting to a point where this may be prohibitively high risk.  I have encouraged him to maintain his physical activity and to try to eat as much as possible.  He has verbally committed to going to Alcoholics Anonymous, and we are going to perform several random alcohol tests to ensure compliance.   It may be that I need to bring him back to the committee in the next month or two if I cannot keep him out of the hospital or if he continues to decline. The fact that he has a virgin chest is helpful and that this overall lowers his chance of perioperative complications somewhat, although I spelled out for them today that I think he will have a prolonged recovery from an LVAD based on the degree of cachexia present.     Chronic systolic heart failure - ischemic  Stage D  NYHA Class IIIB  ACEi/ARB no, renal dysfunction  BB yes - Coreg 6.25 BID  Aldosterone antagonist - no renal dysfunction  SCD prophylaxis  BIV ICD   % BiV pacin%  Fluid status hypervolemic, we will increase Bumex to 3 mg in the am the 2 mg the PM and he will be seen weekly at this point   NSAID use: contraindicated   Sleep Apnea  Evaluation: deferred  Follow up    Other on hydralazine / imdur for afterload reduction    CAD: on ASA, no statin at this point and I will look into why     Thank you so much for this referral and we will continue to keep you posted in the months to come regarding his progress and whether or not we will implant him with an LVAD.     The patient was seen wit the fellow Mariano Reinoso    Sincerely,     Giovanna Kelley MD       of Medicine   [facility] Division of Cardiology           CC: MD Dixon Dodge MD

## 2017-05-05 NOTE — TELEPHONE ENCOUNTER
Called patient at 3:05 PM for scheduled MTM transitions of care visit and call went to [facility].  Left message that I would call back in 5-10 minutes. At 3:12 PM called a second time and again left message this time with phone number for MTM Scheduling Line to reschedule appointment.    Vinnie Orellana, MaggieD  Medication Therapy Management Provider  Pager ([employer]): [phone]

## 2017-05-08 NOTE — PROGRESS NOTES
Dr. Kelley and I reviewed Walker's CardioMEMS readings (see below). I then called Walker to check-in with him and review his readings.  On 5/4/17 in clinic Walker's Bumex was increased to 3/2 by . We discussed Walker's CardioMEMS numbers and medication change recommendation. Walker verbalizes understanding and agrees with plan of care. Merlyn Schmitt RN      CardioMEMS Readings:      Date: 5/8/2017  Time of Call: 1:58 PM  Diagnosis:  Heart failure  VORB - Ordering provider: Giovanna Kelley MD     Order: Increase Bumex to 3mg BID.  BMP tomorrow  Order received by: Merlyn Schmitt RN   Follow-up/additional notes:

## 2017-05-11 NOTE — PROGRESS NOTES
Infusion Nursing Note:  Perez Villalobos presents today for Assisted wife in changing PICC Line dressing.    Patient seen by provider today: No   present during visit today: Not Applicable.    Note: Wife wanted to change the PICC line dressing in order for her to feel comfortable doing it at home.  Offered guidance and assistance.  Wife verbalized feeling comfortable at home performing sterile dressing change.    Intravenous Access:  PICC.    Treatment Conditions:  Not Applicable.      Post Infusion Assessment:  Site patent and intact, free from redness, edema or discomfort.  No evidence of extravasations.    Discharge Plan:   Discharge instructions reviewed with: Patient.  Patient and/or family verbalized understanding of discharge instructions and all questions answered.  Patient discharged in stable condition accompanied by: wife.  Departure Mode: Ambulatory.    Uzma Fairchild RN

## 2017-05-11 NOTE — MR AVS SNAPSHOT
After Visit Summary   5/11/2017    Perez Villalobos    MRN: [id number]           Patient Information     Date Of Birth          1940        Visit Information        Provider Department      5/11/2017 9:00 AM [facility] INFUSION CHAIR [facility]        Today's Diagnoses     Acute on chronic systolic congestive heart failure (H)    -  1       Follow-ups after your visit        Your next 10 appointments already scheduled     May 12, 2017 11:30 AM CDT   Lab with [facility] LAB    Health Lab ([facility])    [address] [place] [address]   [phone]            May 12, 2017 12:00 PM CDT   (Arrive by 11:45 AM)   CORE NEW with Grecia Miramontes NP   [facility] ([facility])    [address] [place] [address]   [phone]            May 15, 2017  2:00 PM CDT   (Arrive by 1:45 PM)   RETURN HEART FAILURE with Anton Argueta MD   [facility] ([facility])    [address] [place] [address]   [phone]            May 19, 2017 11:30 AM CDT   Lab with UC LAB    [facility] Lab ([facility])    [address] [place] [id number]   [phone]            May 19, 2017 12:00 PM CDT   (Arrive by 11:45 AM)   CORE RETURN with Grecia Miramontes NP   [facility] ([facility])    [address] [place] [id number]   [phone]            May 26, 2017 12:00 PM CDT   Lab with UC LAB    [facility] ([facility])    [address] [place] [address]   [phone]            May 26, 2017 12:30 PM CDT   (Arrive by 12:15 PM)   RETURN HEART FAILURE with Giovanna Kelley MD   [facility] ([facility])    [address] [place] [address]  "Floor  [facility] [address] [phone]            2017  4:30 PM CDT   Remote ICD Check with MCLEAN DCR2   [facility] [place] ([facility])    [address]  [facility] [id number] [phone]           This appointment is for a remote check of your debrillator.  This is not an appointment at the office.              Who to contact     If you have questions or need follow up information about today's clinic visit or your schedule please contact [facility] directly at [phone].  Normal or non-critical lab and imaging results will be communicated to you by [employer], letter or phone within 4 business days after the clinic has received the results. If you do not hear from us within 7 days, please contact the clinic through [employer] or phone. If you have a critical or abnormal lab result, we will notify you by phone as soon as possible.  Submit refill requests through [employer] or call your pharmacy and they will forward the refill request to us. Please allow 3 business days for your refill to be completed.          Additional Information About Your Visit        [employer] Information     [employer] lets you send messages to your doctor, view your test results, renew your prescriptions, schedule appointments and more. To sign up, go to [email or web address] . Click on \"Log in\" on the left side of the screen, which will take you to the Welcome page. Then click on \"Sign up Now\" on the right side of the page.     You will be asked to enter the access code listed below, as well as some personal information. Please follow the directions to create your username and password.     Your access code is: [email or web address]  Expires: 2017  3:26 PM     Your access code will  in 90 days. If you need help or a new code, please call your [facility] clinic or [phone].        Care EveryWhere ID     This is your Care EveryWhere " ID. This could be used by other organizations to access your [facility] medical records  [id number]         Blood Pressure from Last 3 Encounters:   05/04/17 117/67   05/01/17 128/76   04/17/17 128/71    Weight from Last 3 Encounters:   05/04/17 66.8 kg (147 lb 4.8 oz)   05/01/17 65 kg (143 lb 3.2 oz)   04/17/17 65.1 kg (143 lb 8 oz)              Today, you had the following     No orders found for display       Primary Care Provider Office Phone # Fax #    Hector Jc [phone]       [facility] [address]  [facility] [address]        Thank you!     Thank you for choosing [facility]  for your care. Our goal is always to provide you with excellent care. Hearing back from our patients is one way we can continue to improve our services. Please take a few minutes to complete the written survey that you may receive in the mail after your visit with us. Thank you!             Your Updated Medication List - Protect others around you: Learn how to safely use, store and throw away your medicines at www.disposemymeds.org.          This list is accurate as of: 5/11/17  9:31 AM.  Always use your most recent med list.                   Brand Name Dispense Instructions for use    aspirin EC 81 MG EC tablet      Take 81 mg by mouth daily       B-12 1000 MCG Tbcr     100 tablet    Take 1,000 mcg by mouth daily       bumetanide 1 MG tablet    BUMEX    540 tablet    Take 3 tablets (3 mg) by mouth 2 times daily       calcitRIOL 0.25 MCG capsule    ROCALTROL     Take 0.25 mcg by mouth daily       carvedilol 3.125 MG tablet    COREG    540 tablet    Take 2 tablets (6.25 mg) by mouth 2 times daily (with meals)       hydrALAZINE 25 MG tablet    APRESOLINE    1080 tablet    Take 3 tablets (75 mg) by mouth 4 times daily       isosorbide mononitrate 60 MG 24 hr tablet    IMDUR    30 tablet    Take 1 tablet (60 mg) by mouth At Bedtime At hs       KRILL OIL PO      Take 1  capsule by mouth daily       levothyroxine 150 MCG tablet    SYNTHROID/LEVOTHROID     Take 150 mcg by mouth daily       milrinone 20-5 MG/100ML-% infusion    PRIMACOR    500 mL    Inject 25.5375 mcg/min into the vein continuous       Multi-vitamin Tabs tablet      Take 1 tablet by mouth daily       PLAVIX 75 MG tablet   Generic drug:  clopidogrel      Take 75 mg by mouth daily       potassium chloride 20 MEQ Packet    KLOR-CON    180 packet    40 mEq by Oral or Feeding Tube route 2 times daily       Sodium Chloride 0.65 % Soln      Spray in nostril as needed       tolterodine 4 MG 24 hr capsule    DETROL LA     Take 4 mg by mouth daily

## 2017-05-12 NOTE — MR AVS SNAPSHOT
After Visit Summary   5/12/2017    Ron Villalobos    MRN: [id number]           Patient Information     Date Of Birth          1940        Visit Information        Provider Department      5/12/2017 12:00 PM Grecia Miramontes NP [facility]        Today's Diagnoses     Chronic systolic heart failure (H)        Chronic combined systolic and diastolic congestive heart failure (H)          Care Instructions    You were seen today in the Cardiovascular Clinic at the [facility].     Cardiology Providers you saw during your visit: Grecia Miramontes NP       1.  Please increase your Bumex to 4mg in the morning and 3mg in the afternoon.  2.  Please increase your potassium to 40mEqs three times daily.      Results for RON VILLALOBOS (MRN [id number]) as of 5/12/2017 12:47   Ref. Range 5/12/2017 11:47   Sodium Latest Ref Range: 133 - 144 mmol/L 137   Potassium Latest Ref Range: 3.4 - 5.3 mmol/L 3.3 (L)   Chloride Latest Ref Range: 94 - 109 mmol/L 96   Carbon Dioxide Latest Ref Range: 20 - 32 mmol/L 31   Urea Nitrogen Latest Ref Range: 7 - 30 mg/dL 63 (H)   Creatinine Latest Ref Range: 0.66 - 1.25 mg/dL 1.68 (H)   GFR Estimate Latest Ref Range: >60 mL/min/1.7m2 40 (L)   GFR Estimate If Black Latest Ref Range: >60 mL/min/1.7m2 48 (L)   Calcium Latest Ref Range: 8.5 - 10.1 mg/dL 8.6   Anion Gap Latest Ref Range: 3 - 14 mmol/L 10   Glucose Latest Ref Range: 70 - 99 mg/dL 126 (H)               Please limit your fluid intake to 2 L (64 ounces) daily.  2 Liters a day = 8.5 cups, or 72 ounces.  Please limit your salt intake to 2 grams a day or less.    If you gain 2# in 24 hours or 5# in one week call Merlyn Schmitt RN so we can adjust your medications as needed over the phone.    Please feel free to call me with any questions or concerns.      Merlyn Schmitt RN BSN [facility]  [facility]  Cardiology Care Coordinator-Heart Failure Clinic    Questions and scheduling:  "[phone].   First press #1 for the University and then press #3 for \"Medical Questions\" to reach us Cardiology Nurses.     On Call Cardiologist for after hours or on weekends: [phone]   option #4 and ask to speak to the on-call Cardiologist. Inform them you are a CORE/heart failure patient at the [facility].        If you need a medication refill please contact your pharmacy.  Please allow 3 business days for your refill to be completed.  _______________________________________________________  C.O.R.E. CLINIC Cardiomyopathy, Optimization, Rehabilitation, Education   The C.O.R.E. CLINIC is a heart failure specialty clinic within the [facility] where you will work with specialized nurse practitioners dedicated to helping patients with heart failure carefully adjust medications, receive education, and learn who and when to call if symptoms develop. They specialize in helping you better understand your condition, slow the progression of your disease, improve the length and quality of your life, help you detect future heart problems before they become life threatening, and avoid hospitalizations.  As always, thank you for trusting us with your health care needs!             Follow-ups after your visit        Your next 10 appointments already scheduled     May 19, 2017 11:30 AM CDT   Lab with etouches LAB   [facility] ([facility])    [address] [place] [address] [id number] [phone]            May 19, 2017 12:00 PM CDT   (Arrive by 11:45 AM)   CORE RETURN with Grecia Miramontes NP   [facility] ([facility])    [address] [place] [address] [id number] [phone]            May 26, 2017 12:00 PM CDT   Lab with etouches LAB    [facility]    [address] [place] [id number] [phone]            May 26, 2017 12:30 PM " CDT   (Arrive by 12:15 PM)   RETURN HEART FAILURE with Giovanna Kelley MD   [facility])    [address]  3rd Floor  [facility] [address] [phone]            Jun 26, 2017   Procedure with Sincere Snyder MD   [facility] (--)    [address]  [facility] [address] [phone]            Jun 26, 2017   Procedure with Sincere Snyder MD   [facility] (--)    [address]  [facility] [address]   [phone]            Jul 06, 2017  4:30 PM CDT   Remote ICD Check with MCLEAN DCR2   [facility] ([facility])    [address] [facility] [address]  [place] [address] [phone]           This appointment is for a remote check of your debrillator.  This is not an appointment at the office.            Jul 10, 2017   Procedure with Sincere Snyder MD   [facility] (--)    [address]  [facility] [address] [phone]            Jul 10, 2017   Procedure with Sincere Snyder MD   [facility] (--)    [address]  [facility] [address]   [phone]              Who to contact     If you have questions or need follow up information about today's clinic visit or your schedule please contact [facility] directly at [phone].  Normal or non-critical lab and imaging results will be communicated to you by MyChart, letter or phone within 4 business days after the clinic has received the results. If you do not hear from us within 7 days, please contact the clinic through MyChart or phone. If you have a critical or abnormal lab result, we will notify you by phone as soon as possible.  Submit refill requests through [employer] or call your pharmacy and they will forward the refill request to us. Please allow 3 business days for your refill to be completed.  "         Additional Information About Your Visit        MyChart Information     CSR lets you send messages to your doctor, view your test results, renew your prescriptions, schedule appointments and more. To sign up, go to [email or web address] . Click on \"Log in\" on the left side of the screen, which will take you to the Welcome page. Then click on \"Sign up Now\" on the right side of the page.     You will be asked to enter the access code listed below, as well as some personal information. Please follow the directions to create your username and password.     Your access code is: [email or web address]  Expires: 2017  3:26 PM     Your access code will  in 90 days. If you need help or a new code, please call your [place] clinic or [phone].        Care EveryWhere ID     This is your Care EveryWhere ID. This could be used by other organizations to access your [place] medical records  [id number]        Your Vitals Were     Pulse Height Pulse Oximetry BMI (Body Mass Index)          77 1.727 m (5' 8\") 98% 22.05 kg/m2         Blood Pressure from Last 3 Encounters:   17 114/56   17 117/67   17 128/76    Weight from Last 3 Encounters:   17 65.8 kg (145 lb)   17 66.8 kg (147 lb 4.8 oz)   17 65 kg (143 lb 3.2 oz)              Today, you had the following     No orders found for display         Today's Medication Changes          These changes are accurate as of: 17  1:08 PM.  If you have any questions, ask your nurse or doctor.               These medicines have changed or have updated prescriptions.        Dose/Directions    bumetanide 1 MG tablet   Commonly known as:  BUMEX   This may have changed:    - how much to take  - how to take this  - when to take this  - additional instructions   Used for:  Chronic combined systolic and diastolic congestive heart failure (H)   Changed by:  Grecia Miramontes, MARJAN        Take 4mg in the morning and 3mg in the afternoon. "   Quantity:  540 tablet   Refills:  3       carvedilol 6.25 MG tablet   Commonly known as:  COREG   This may have changed:  medication strength   Used for:  Chronic systolic heart failure (H)   Changed by:  Grecia Miramontes NP        Dose:  6.25 mg   Take 1 tablet (6.25 mg) by mouth 2 times daily (with meals)   Quantity:  240 tablet   Refills:  3       potassium chloride 20 MEQ Packet   Commonly known as:  KLOR-CON   This may have changed:    - how to take this  - when to take this   Used for:  Chronic combined systolic and diastolic congestive heart failure (H)   Changed by:  Grecia Miramontes NP        Dose:  40 mEq   Take 40 mEq by mouth 3 times daily   Quantity:  540 packet   Refills:  3            Where to get your medicines      These medications [id number] [place] [place] [facility] [address] [facility]  [address] [facility] [address]     Phone:  [phone]     bumetanide 1 MG tablet    carvedilol 6.25 MG tablet    potassium chloride 20 MEQ Packet                Primary Care Provider Office Phone # Fax #    Hector Jc [phone]       [facility] [address]  [facility] [address]        Thank you!     Thank you for choosing [facility]  for your care. Our goal is always to provide you with excellent care. Hearing back from our patients is one way we can continue to improve our services. Please take a few minutes to complete the written survey that you may receive in the mail after your visit with us. Thank you!             Your Updated Medication List - Protect others around you: Learn how to safely use, store and throw away your medicines at www.disposemymeds.org.          This list is accurate as of: 5/12/17  1:08 PM.  Always use your most recent med list.                   Brand Name Dispense Instructions for use    aspirin EC 81 MG EC tablet      Take 81 mg by mouth daily       B-12 1000 MCG Tbcr     100 tablet    Take 1,000  mcg by mouth daily       bumetanide 1 MG tablet    BUMEX    540 tablet    Take 4mg in the morning and 3mg in the afternoon.       calcitRIOL 0.25 MCG capsule    ROCALTROL     Take 0.25 mcg by mouth daily       carvedilol 6.25 MG tablet    COREG    240 tablet    Take 1 tablet (6.25 mg) by mouth 2 times daily (with meals)       hydrALAZINE 25 MG tablet    APRESOLINE    1080 tablet    Take 3 tablets (75 mg) by mouth 4 times daily       isosorbide mononitrate 60 MG 24 hr tablet    IMDUR    30 tablet    Take 1 tablet (60 mg) by mouth At Bedtime At        [facility] OIL PO      Take 1 capsule by mouth daily       levothyroxine 150 MCG tablet    SYNTHROID/LEVOTHROID     Take 150 mcg by mouth daily       milrinone 20-5 MG/100ML-% infusion    PRIMACOR    500 mL    Inject 25.5375 mcg/min into the vein continuous       Multi-vitamin Tabs tablet      Take 1 tablet by mouth daily       PLAVIX 75 MG tablet   Generic drug:  clopidogrel      Take 75 mg by mouth daily       potassium chloride 20 MEQ Packet    KLOR-CON    540 packet    Take 40 mEq by mouth 3 times daily       Sodium Chloride 0.65 % Soln      Spray in nostril as needed       tolterodine 4 MG 24 hr capsule    DETROL LA     Take 4 mg by mouth daily

## 2017-05-12 NOTE — LETTER
"5/12/2017    RE: Perez Villalobos  [address] [facility] [address]       Dear Colleague,    Thank you for the opportunity to participate in the care of your patient, Perez Villalobos, at the [facility]. Please see a copy of my visit note below.    HPI  Mr. Villalobos is a 76 year old man with ICM s/p numerous PCI most recently with NSTEMI (10/16), VF arrest (11/16) HFrEF (EF 35%), HTN, HLD, s/p CRT-D,  and numerous recent admissions including several with cardiogenic shock requiring inotrope support. Hospital course included initiation of LVAD evaluation revealing a strong ETOH history with ongoing drinking (last use documented 4/4/17). He was discharged 11 days ago on home inotropes and has since seen Dr. Kelley a week ago when his diuretics were increased. His cardiomems readings were climbing again prompting anther increase in this diuretics 4 days ago. Unfortunately, his renal function has since declined. He presents to clinic for follow up.    Mr. Villalobos is feeling fairly well today, though he notes he didn't feel well generally several days ago. He denies overt dyspnea. He does notes some mild orthopnea prompting him to sleep with the HOB at about 15 degrees. No PND, chest pain, palpitations, or syncope. He is occasionally lightheaded with positional changes. Lower extremity edema is improved, though he note slight edema above his compression stockings. Occasional bloating. Appetite is diminished and endorses early satiety. No nausea or vomiting. He endorses dozing at intervals during the day.    He continues home inotropes per PICC line. His wife is concerned the PICC site appeared a little \"puffy\" today and they had difficulties with blood return today.    He has not had any alcohol since April 4, 2017. He has attended an AA meeting May 7, 2017 and plans to continue weekly.    PMH  Past Medical History:   Diagnosis Date     Acute renal " failure (H)     10/2015 ARF secondary to rhabdomyolysis     Alcoholism (H)      Anemia      Benign essential HTN      BPH (benign prostatic hypertrophy)      CAD (coronary artery disease)     AWMI, stents to Cx, RCA and LAD     Chronic systolic heart failure (H)      CKD (chronic kidney disease)      Complete AV block (H)      Ischemic cardiomyopathy 10/23/2015    EF 30%     Low sodium levels      Mixed hyperlipidemia      NSTEMI (non-ST elevated myocardial infarction) (H) 10/23/2015     PAD (peripheral artery disease) (H)      Rhabdomyolysis due to statin therapy     crestor     Severe hypothyroidism 9/20/2016     VF (ventricular fibrillation) (H) 11/16/16     Social History     Social History     Marital status:      Spouse name: N/A     Number of children: N/A     Years of education: N/A     Occupational History     Not on file.     Social History Main Topics     Smoking status: Former Smoker     Packs/day: 1.50     Years: 20.00     Types: Cigarettes     Quit date: 1/1/1980     Smokeless tobacco: Never Used     Alcohol use 0.0 oz/week     0 Standard drinks or equivalent per week      Comment: occassionally     Drug use: No     Sexual activity: Not on file     Other Topics Concern     Caffeine Concern No     decaf coffee     Sleep Concern No     Stress Concern No     Weight Concern No     Special Diet Yes     low fat, low sodium     Exercise Yes     everyday     Social History Narrative     Family History   Problem Relation Age of Onset     Unknown/Adopted Mother      Unknown/Adopted Father      MEDS    Current Outpatient Prescriptions on File Prior to Visit:  hydrALAZINE (APRESOLINE) 25 MG tablet Take 3 tablets (75 mg) by mouth 4 times daily   bumetanide (BUMEX) 1 MG tablet Take 3 tablets (3 mg) by mouth 2 times daily   milrinone (PRIMACOR) infusion 200 mcg/mL PREMIX Inject 25.5375 mcg/min into the vein continuous   potassium chloride (KLOR-CON) 20 MEQ Packet 40 mEq by Oral or Feeding Tube route 2 times  "daily   tolterodine (DETROL LA) 4 MG 24 hr capsule Take 4 mg by mouth daily   carvedilol (COREG) 3.125 MG tablet Take 2 tablets (6.25 mg) by mouth 2 times daily (with meals)   calcitRIOL (ROCALTROL) 0.25 MCG capsule Take 0.25 mcg by mouth daily   isosorbide mononitrate (IMDUR) 60 MG 24 hr tablet Take 1 tablet (60 mg) by mouth At Bedtime At hs   aspirin EC 81 MG EC tablet Take 81 mg by mouth daily   levothyroxine (SYNTHROID/LEVOTHROID) 150 MCG tablet Take 150 mcg by mouth daily   Saline (SODIUM CHLORIDE) 0.65 % SOLN Spray in nostril as needed   KRILL OIL PO Take 1 capsule by mouth daily    Cyanocobalamin (B-12) 1000 MCG TBCR Take 1,000 mcg by mouth daily   multivitamin, therapeutic with minerals (MULTI-VITAMIN) TABS Take 1 tablet by mouth daily   clopidogrel (PLAVIX) 75 MG tablet Take 75 mg by mouth daily     No current facility-administered medications on file prior to visit.     Physical examination:  /56  Pulse 77  Ht 1.727 m (5' 8\")  Wt 65.8 kg (145 lb)  SpO2 98%  BMI 22.05 kg/m2  GENERAL APPEARANCE: healthy, alert and no distress  HEENT: no icterus, no xanthelasmas, normal pupil size and reaction, normal palate, mucosa moist, no cyanosis.  NECK: left neck scar, no adenopathy, no asymmetry, masses  CHEST: lungs clear to auscultation - no rales, rhonchi or wheezes, no use of accessory muscles, no retractions, respirations are unlabored, normal respiratory rate, no kyphosis, no scoliosis  CARDIOVASCULAR: regular rhythm, normal S1 JVP is at 12 cm with +HJR  ABDOMEN: soft, non tender, without hepatomegaly, no masses palpable  EXTREMITIES: no clubbing, cyanosis or edema  NEURO: alert and oriented to person/place/time, normal speech, gait and affect  SKIN: PICC site is without redness or warmth, slight bulge at insertion site is soft and nontender, no ecchymoses, no rashes    LABS  Last Basic Metabolic Panel:  Lab Results   Component Value Date     05/12/2017      Lab Results   Component Value Date    " POTASSIUM 3.3 05/12/2017     Lab Results   Component Value Date    CHLORIDE 96 05/12/2017     Lab Results   Component Value Date    LEIDY 8.6 05/12/2017     Lab Results   Component Value Date    CO2 31 05/12/2017     Lab Results   Component Value Date    BUN 63 05/12/2017     Lab Results   Component Value Date    CR 1.68 05/12/2017     Lab Results   Component Value Date     05/12/2017       Assessment and Plan  Mr. Villalobos is a 76 year old man with a history of advanced NICM on home inotropes, who appears volume up and his renal function has gradually declining since discharge. Fortunately, he is feeling fairly well. He will increase Bumex to 4 mg in the mornings and continue 3 mg in the afternoons. He is hypokalemic and will increase potassium to 40 mEq TID. Mr. Villalobos's PICC site is without redness but does have a very subtle bulge just superior to the exit. It is not tender. He was advised to monitor it for any progression, redness, tenderness, or for fevers. He was encouraged to increase protein intake and applauded for abstaining from ETOH. He will continue daily cardiomems recordings and return to clinic in 1 week and as needed.    30 minutes spent in direct care, >50% in counseling    Please do not hesitate to contact me if you have any questions/concerns.     Sincerely,     Grecia Miramontes NP

## 2017-05-12 NOTE — NURSING NOTE
Chief Complaint   Patient presents with     Follow Up For     New CORE; 76yr old male with a h/o chronic systolic HF and recent hospitalization now on milrinone presenting for follow-up with labs prior

## 2017-05-12 NOTE — PROGRESS NOTES
"HPI  Mr. Villalobos is a 76 year old man with ICM s/p numerous PCI most recently with NSTEMI (10/16), VF arrest (11/16) HFrEF (EF 35%), HTN, HLD, s/p CRT-D,  and numerous recent admissions including several with cardiogenic shock requiring inotrope support. Hospital course included initiation of LVAD evaluation revealing a strong ETOH history with ongoing drinking (last use documented 4/4/17). He was discharged 11 days ago on home inotropes and has since seen Dr. Kelley a week ago when his diuretics were increased. His cardiomems readings were climbing again prompting anther increase in this diuretics 4 days ago. Unfortunately, his renal function has since declined. He presents to clinic for follow up.    Mr. Villalobos is feeling fairly well today, though he notes he didn't feel well generally several days ago. He denies overt dyspnea. He does notes some mild orthopnea prompting him to sleep with the HOB at about 15 degrees. No PND, chest pain, palpitations, or syncope. He is occasionally lightheaded with positional changes. Lower extremity edema is improved, though he note slight edema above his compression stockings. Occasional bloating. Appetite is diminished and endorses early satiety. No nausea or vomiting. He endorses dozing at intervals during the day.    He continues home inotropes per PICC line. His wife is concerned the PICC site appeared a little \"puffy\" today and they had difficulties with blood return today.    He has not had any alcohol since April 4, 2017. He has attended an AA meeting May 7, 2017 and plans to continue weekly.    PM  Past Medical History:   Diagnosis Date     Acute renal failure (H)     10/2015 ARF secondary to rhabdomyolysis     Alcoholism (H)      Anemia      Benign essential HTN      BPH (benign prostatic hypertrophy)      CAD (coronary artery disease)     AWMI, stents to Cx, RCA and LAD     Chronic systolic heart failure (H)      CKD (chronic kidney disease)      Complete AV block (H) "      Ischemic cardiomyopathy 10/23/2015    EF 30%     Low sodium levels      Mixed hyperlipidemia      NSTEMI (non-ST elevated myocardial infarction) (H) 10/23/2015     PAD (peripheral artery disease) (H)      Rhabdomyolysis due to statin therapy     crestor     Severe hypothyroidism 9/20/2016     VF (ventricular fibrillation) (H) 11/16/16     Social History     Social History     Marital status:      Spouse name: N/A     Number of children: N/A     Years of education: N/A     Occupational History     Not on file.     Social History Main Topics     Smoking status: Former Smoker     Packs/day: 1.50     Years: 20.00     Types: Cigarettes     Quit date: 1/1/1980     Smokeless tobacco: Never Used     Alcohol use 0.0 oz/week     0 Standard drinks or equivalent per week      Comment: occassionally     Drug use: No     Sexual activity: Not on file     Other Topics Concern     Caffeine Concern No     decaf coffee     Sleep Concern No     Stress Concern No     Weight Concern No     Special Diet Yes     low fat, low sodium     Exercise Yes     everyday     Social History Narrative     Family History   Problem Relation Age of Onset     Unknown/Adopted Mother      Unknown/Adopted Father      MEDS    Current Outpatient Prescriptions on File Prior to Visit:  hydrALAZINE (APRESOLINE) 25 MG tablet Take 3 tablets (75 mg) by mouth 4 times daily   bumetanide (BUMEX) 1 MG tablet Take 3 tablets (3 mg) by mouth 2 times daily   milrinone (PRIMACOR) infusion 200 mcg/mL PREMIX Inject 25.5375 mcg/min into the vein continuous   potassium chloride (KLOR-CON) 20 MEQ Packet 40 mEq by Oral or Feeding Tube route 2 times daily   tolterodine (DETROL LA) 4 MG 24 hr capsule Take 4 mg by mouth daily   carvedilol (COREG) 3.125 MG tablet Take 2 tablets (6.25 mg) by mouth 2 times daily (with meals)   calcitRIOL (ROCALTROL) 0.25 MCG capsule Take 0.25 mcg by mouth daily   isosorbide mononitrate (IMDUR) 60 MG 24 hr tablet Take 1 tablet (60 mg) by  "mouth At Bedtime At hs   aspirin EC 81 MG EC tablet Take 81 mg by mouth daily   levothyroxine (SYNTHROID/LEVOTHROID) 150 MCG tablet Take 150 mcg by mouth daily   Saline (SODIUM CHLORIDE) 0.65 % SOLN Spray in nostril as needed   KRILL OIL PO Take 1 capsule by mouth daily    Cyanocobalamin (B-12) 1000 MCG TBCR Take 1,000 mcg by mouth daily   multivitamin, therapeutic with minerals (MULTI-VITAMIN) TABS Take 1 tablet by mouth daily   clopidogrel (PLAVIX) 75 MG tablet Take 75 mg by mouth daily     No current facility-administered medications on file prior to visit.     Physical examination:  /56  Pulse 77  Ht 1.727 m (5' 8\")  Wt 65.8 kg (145 lb)  SpO2 98%  BMI 22.05 kg/m2  GENERAL APPEARANCE: healthy, alert and no distress  HEENT: no icterus, no xanthelasmas, normal pupil size and reaction, normal palate, mucosa moist, no cyanosis.  NECK: left neck scar, no adenopathy, no asymmetry, masses  CHEST: lungs clear to auscultation - no rales, rhonchi or wheezes, no use of accessory muscles, no retractions, respirations are unlabored, normal respiratory rate, no kyphosis, no scoliosis  CARDIOVASCULAR: regular rhythm, normal S1 JVP is at 12 cm with +HJR  ABDOMEN: soft, non tender, without hepatomegaly, no masses palpable  EXTREMITIES: no clubbing, cyanosis or edema  NEURO: alert and oriented to person/place/time, normal speech, gait and affect  SKIN: PICC site is without redness or warmth, slight bulge at insertion site is soft and nontender, no ecchymoses, no rashes    LABS  Last Basic Metabolic Panel:  Lab Results   Component Value Date     05/12/2017      Lab Results   Component Value Date    POTASSIUM 3.3 05/12/2017     Lab Results   Component Value Date    CHLORIDE 96 05/12/2017     Lab Results   Component Value Date    LEIDY 8.6 05/12/2017     Lab Results   Component Value Date    CO2 31 05/12/2017     Lab Results   Component Value Date    BUN 63 05/12/2017     Lab Results   Component Value Date    CR 1.68 " 05/12/2017     Lab Results   Component Value Date     05/12/2017       Assessment and Plan  Mr. Villalobos is a 76 year old man with a history of advanced NICM on home inotropes, who appears volume up and his renal function has gradually declining since discharge. Fortunately, he is feeling fairly well. He will increase Bumex to 4 mg in the mornings and continue 3 mg in the afternoons. He is hypokalemic and will increase potassium to 40 mEq TID. Mr. Villalobos's PICC site is without redness but does have a very subtle bulge just superior to the exit. It is not tender. He was advised to monitor it for any progression, redness, tenderness, or for fevers. He was encouraged to increase protein intake and applauded for abstaining from ETOH. He will continue daily cardiomems recordings and return to clinic in 1 week and as needed.    30 minutes spent in direct care, >50% in counseling

## 2017-05-12 NOTE — PATIENT INSTRUCTIONS
"You were seen today in the Cardiovascular Clinic at the [facility].     Cardiology Providers you saw during your visit: Grecia Miramontes NP       1.  Please increase your Bumex to 4mg in the morning and 3mg in the afternoon.  2.  Please increase your potassium to 40mEqs three times daily.      Results for RON LUCERO (MRN [id number]) as of 2017 12:47   Ref. Range 2017 11:47   Sodium Latest Ref Range: 133 - 144 mmol/L 137   Potassium Latest Ref Range: 3.4 - 5.3 mmol/L 3.3 (L)   Chloride Latest Ref Range: 94 - 109 mmol/L 96   Carbon Dioxide Latest Ref Range: 20 - 32 mmol/L 31   Urea Nitrogen Latest Ref Range: 7 - 30 mg/dL 63 (H)   Creatinine Latest Ref Range: 0.66 - 1.25 mg/dL 1.68 (H)   GFR Estimate Latest Ref Range: >60 mL/min/1.7m2 40 (L)   GFR Estimate If Black Latest Ref Range: >60 mL/min/1.7m2 48 (L)   Calcium Latest Ref Range: 8.5 - 10.1 mg/dL 8.6   Anion Gap Latest Ref Range: 3 - 14 mmol/L 10   Glucose Latest Ref Range: 70 - 99 mg/dL 126 (H)               Please limit your fluid intake to 2 L (64 ounces) daily.  2 Liters a day = 8.5 cups, or 72 ounces.  Please limit your salt intake to 2 grams a day or less.    If you gain 2# in 24 hours or 5# in one week call Merlyn Schmitt RN so we can adjust your medications as needed over the phone.    Please feel free to call me with any questions or concerns.      Merlyn Schmitt RN BSN CHFN  [facility]  Cardiology Care Coordinator-Heart Failure Clinic    Questions and [phone].   First press #1 for the University and then press #3 for \"Medical Questions\" to reach us Cardiology Nurses.     On Call Cardiologist for after hours or on weekends: [phone]   option #4 and ask to speak to the on-call Cardiologist. Inform them you are a CORE/heart failure patient at the [place].        If you need a medication refill please contact your pharmacy.  Please allow 3 business days for your refill to be " completed.  _______________________________________________________  C.O.R.E. CLINIC Cardiomyopathy, Optimization, Rehabilitation, Education   The C.O.R.E. CLINIC is a heart failure specialty clinic within the [facility] where you will work with specialized nurse practitioners dedicated to helping patients with heart failure carefully adjust medications, receive education, and learn who and when to call if symptoms develop. They specialize in helping you better understand your condition, slow the progression of your disease, improve the length and quality of your life, help you detect future heart problems before they become life threatening, and avoid hospitalizations.  As always, thank you for trusting us with your health care needs!

## 2017-05-15 NOTE — PROGRESS NOTES
Grecia Miramontes, MARJAN and I reviewed Walker's CardioMEMS readings (see below). I then called Walker to check-in with him and review his readings and I spoke with his wife Annia.  We discussed Walker's CardioMEMS numbers and recommendation of no changes for now. Annia understanding and agrees with plan of care. Merlyn Schmitt RN

## 2017-05-19 NOTE — NURSING NOTE
Chief Complaint   Patient presents with     Follow Up For     Return CORE; 76yr old male with a h/o chronic systolic HF on milrinone presenting for follow-up with labs prior

## 2017-05-19 NOTE — PATIENT INSTRUCTIONS
"You were seen today in the Cardiovascular Clinic at the [facility].     Cardiology Providers you saw during your visit: Grecia Miramontes NP       1.  No changes in your medications today.  2.  Please be careful about heat and humidity.  3.  Please make a follow-up CORE/heart failure appt with Grecia on 17 at 10:30am with labs at 10am.     Results for RON LUCERO (MRN [id number]) as of 2017 11:20   Ref. Range 2017 10:27   Sodium Latest Ref Range: 133 - 144 mmol/L 138   Potassium Latest Ref Range: 3.4 - 5.3 mmol/L 3.9   Chloride Latest Ref Range: 94 - 109 mmol/L 97   Carbon Dioxide Latest Ref Range: 20 - 32 mmol/L 32   Urea Nitrogen Latest Ref Range: 7 - 30 mg/dL 61 (H)   Creatinine Latest Ref Range: 0.66 - 1.25 mg/dL 1.60 (H)   GFR Estimate Latest Ref Range: >60 mL/min/1.7m2 42 (L)   GFR Estimate If Black Latest Ref Range: >60 mL/min/1.7m2 51 (L)   Calcium Latest Ref Range: 8.5 - 10.1 mg/dL 8.7   Anion Gap Latest Ref Range: 3 - 14 mmol/L 9   Glucose Latest Ref Range: 70 - 99 mg/dL 111 (H)       Please limit your fluid intake to 2 L (64 ounces) daily.  2 Liters a day = 8.5 cups, or 72 ounces.  Please limit your salt intake to 2 grams a day or less.    If you gain 2# in 24 hours or 5# in one week call Merlyn Schmitt RN so we can adjust your medications as needed over the phone.    Please feel free to call me with any questions or concerns.      Merlyn Schmitt RN BSN CHFN  [facility]  Cardiology Care Coordinator-Heart Failure Clinic    Questions and [phone].   First press #1 for the [email or web address] and then press #3 for \"Medical Questions\" to reach us Cardiology Nurses.     On Call Cardiologist for after hours or on weekends: [phone]   option #4 and ask to speak to the on-call Cardiologist. Inform them you are a CORE/heart failure patient at the [place].        If you need a medication refill please contact your pharmacy.  Please allow 3 " business days for your refill to be completed.  _______________________________________________________  C.O.R.E. CLINIC Cardiomyopathy, Optimization, Rehabilitation, Education   The C.O.R.E. CLINIC is a heart failure specialty clinic within the [facility] where you will work with specialized nurse practitioners dedicated to helping patients with heart failure carefully adjust medications, receive education, and learn who and when to call if symptoms develop. They specialize in helping you better understand your condition, slow the progression of your disease, improve the length and quality of your life, help you detect future heart problems before they become life threatening, and avoid hospitalizations.  As always, thank you for trusting us with your health care needs!

## 2017-05-19 NOTE — LETTER
5/19/2017      RE: Perez Villalobos  [address] [facility] [address]       Dear Colleague,    Thank you for the opportunity to participate in the care of your patient, Perez Villalobos, at the [facility]. Please see a copy of my visit note below.    HPI  Mr. Villalobos is a 76 year old man with ICM s/p numerous PCI most recently with NSTEMI (10/16), VF arrest (11/16) HFrEF (EF 35%), HTN, HLD, s/p CRT-D,  and numerous recent admissions including several with cardiogenic shock requiring inotrope support. Hospital course included initiation of LVAD evaluation revealing a strong ETOH history with ongoing drinking (last use documented 4/4/17). He was discharged to home on inotropes and has since seen several times when we've increased his diuretics. His cardiomems readings have slowly been trending down. Today PA diastolic is down to 24, the lowest reading to date. He returns to clinic for follow up.    Of note, Mr. Villalobos's milrinone pump was set up based on a discharge weight of 68.1 kg, current weight at home is down to 63.2 kg.    Mr. Villalobos denies liset shortness of breath but continues to note more awareness of his breathing even at rest. He endorses orthopnea but no PND. No chest pain, palpitations, or ankle edema. Feet or mildly swollen and belly is mildly bloated. Appetite is good. Occassional positional lightheadedness. No syncope or falls. He did have 2 episodes last week when he just felt exhausted and generally unwell that followed working in the garage. The episodes resolved with rest.    He continues home inotropes per PICC line. No redness, discharge, or tenderness. No fevers or chills.    He has not had any alcohol since April 4, 2017. He has attended another AA meeting May 15, 2017 and plans to continue weekly.    PMH  Past Medical History:   Diagnosis Date     Acute renal failure (H)     10/2015 ARF secondary to rhabdomyolysis      Alcoholism (H)      Anemia      Benign essential HTN      BPH (benign prostatic hypertrophy)      CAD (coronary artery disease)     AWMI, stents to Cx, RCA and LAD     Chronic systolic heart failure (H)      CKD (chronic kidney disease)      Complete AV block (H)      Ischemic cardiomyopathy 10/23/2015    EF 30%     Low sodium levels      Mixed hyperlipidemia      NSTEMI (non-ST elevated myocardial infarction) (H) 10/23/2015     PAD (peripheral artery disease) (H)      Rhabdomyolysis due to statin therapy     crestor     Severe hypothyroidism 9/20/2016     VF (ventricular fibrillation) (H) 11/16/16     Social History     Social History     Marital status:      Spouse name: N/A     Number of children: N/A     Years of education: N/A     Occupational History     Not on file.     Social History Main Topics     Smoking status: Former Smoker     Packs/day: 1.50     Years: 20.00     Types: Cigarettes     Quit date: 1/1/1980     Smokeless tobacco: Never Used     Alcohol use 0.0 oz/week     0 Standard drinks or equivalent per week      Comment: occassionally     Drug use: No     Sexual activity: Not on file     Other Topics Concern     Caffeine Concern No     decaf coffee     Sleep Concern No     Stress Concern No     Weight Concern No     Special Diet Yes     low fat, low sodium     Exercise Yes     everyday     Social History Narrative     Family History   Problem Relation Age of Onset     Unknown/Adopted Mother      Unknown/Adopted Father      MEDS    Current Outpatient Prescriptions on File Prior to Visit:  carvedilol (COREG) 6.25 MG tablet Take 1 tablet (6.25 mg) by mouth 2 times daily (with meals)   bumetanide (BUMEX) 1 MG tablet Take 4mg in the morning and 3mg in the afternoon.   potassium chloride (KLOR-CON) 20 MEQ Packet Take 40 mEq by mouth 3 times daily   hydrALAZINE (APRESOLINE) 25 MG tablet Take 3 tablets (75 mg) by mouth 4 times daily   milrinone (PRIMACOR) infusion 200 mcg/mL PREMIX Inject 68.8541  "mcg/min into the vein continuous   tolterodine (DETROL LA) 4 MG 24 hr capsule Take 4 mg by mouth daily   calcitRIOL (ROCALTROL) 0.25 MCG capsule Take 0.25 mcg by mouth daily   isosorbide mononitrate (IMDUR) 60 MG 24 hr tablet Take 1 tablet (60 mg) by mouth At Bedtime At hs   aspirin EC 81 MG EC tablet Take 81 mg by mouth daily   levothyroxine (SYNTHROID/LEVOTHROID) 150 MCG tablet Take 150 mcg by mouth daily   Saline (SODIUM CHLORIDE) 0.65 % SOLN Spray in nostril as needed   KRILL OIL PO Take 1 capsule by mouth daily    Cyanocobalamin (B-12) 1000 MCG TBCR Take 1,000 mcg by mouth daily   multivitamin, therapeutic with minerals (MULTI-VITAMIN) TABS Take 1 tablet by mouth daily   clopidogrel (PLAVIX) 75 MG tablet Take 75 mg by mouth daily     No current facility-administered medications on file prior to visit.     Physical examination:  /73 (BP Location: Left arm, Patient Position: Chair, Cuff Size: Adult Regular)  Pulse 77  Ht 1.727 m (5' 8\")  Wt 66.5 kg (146 lb 9.6 oz)  SpO2 99%  BMI 22.29 kg/m2  GENERAL APPEARANCE: thin, alert and no distress  HEENT: no icterus, no xanthelasmas, normal pupil size and reaction, normal palate, mucosa moist, no cyanosis.  NECK: left neck scar, no adenopathy, no asymmetry, masses  CHEST: lungs clear to auscultation with fine rales in bilateral lower lobes  CARDIOVASCULAR: regular rhythm, normal S1 JVP is at 15 cm sitting upright   ABDOMEN: soft, non tender, without hepatomegaly, no masses palpable  EXTREMITIES: no clubbing, cyanosis or edema with compression socks on  NEURO: alert and oriented to person/place/time, normal speech, gait and affect  SKIN: PICC site is without redness or warmth, is soft and nontender, no ecchymoses, no rashes    LABS  Last Basic Metabolic Panel:  Lab Results   Component Value Date     05/19/2017      Lab Results   Component Value Date    POTASSIUM 3.9 05/19/2017     Lab Results   Component Value Date    CHLORIDE 97 05/19/2017     Lab Results "   Component Value Date    LEIDY 8.7 05/19/2017     Lab Results   Component Value Date    CO2 32 05/19/2017     Lab Results   Component Value Date    BUN 61 05/19/2017     Lab Results   Component Value Date    CR 1.60 05/19/2017     Lab Results   Component Value Date     05/19/2017         Assessment and Plan  Mr. Villalobos is a 76 year old man with a history of advanced NICM on home inotropes, who appears volume overloaded but is diuresing well on current dose of diuretics. Renal function continues to improve with gentle diuresis. We'll make no changes today. He was encouraged to plan his activities to avoid high temperatures or humidity and to anticipate rest periods. If he has any complaints, even vague symptoms, he was asked to notify our team. He will continue daily cardiomems readings. He will return to see Dr. Kelley in 1 week and in CORE clinic 2 weeks later or as needed.    1. Chronic systolic heart failure secondary to NICM  Stage: D  NYHA: IIIA (on inotropes)  ACE/ARB: deferred given recent renal dysfunction  BB: yes  Ald Ant: deferred given recent renal dysfunction  Volume: hypervolemic but improving  Follow up: 1 week and prn    25 minutes spent in direct care, >50% in counseling      Please do not hesitate to contact me if you have any questions/concerns.     Sincerely,     Grecia Miramontes NP

## 2017-05-19 NOTE — MR AVS SNAPSHOT
"              After Visit Summary   2017    Ron Villalobos    MRN: [id number]           Patient Information     Date Of Birth          1940        Visit Information        Provider Department      2017 10:30 AM Grecia Miramontes NP  [facility]        Care Instructions    You were seen today in the Cardiovascular Clinic at the [facility].     Cardiology Providers you saw during your visit: Grecia Miramontes NP       1.  No changes in your medications today.  2.  Please be careful about heat and humidity.  3.  Please make a follow-up CORE/heart failure appt with Grecia on 17 at 10:30am with labs at 10am.     Results for RON VILLALOBOS (MRN [id number]) as of 2017 11:20   Ref. Range 2017 10:27   Sodium Latest Ref Range: 133 - 144 mmol/L 138   Potassium Latest Ref Range: 3.4 - 5.3 mmol/L 3.9   Chloride Latest Ref Range: 94 - 109 mmol/L 97   Carbon Dioxide Latest Ref Range: 20 - 32 mmol/L 32   Urea Nitrogen Latest Ref Range: 7 - 30 mg/dL 61 (H)   Creatinine Latest Ref Range: 0.66 - 1.25 mg/dL 1.60 (H)   GFR Estimate Latest Ref Range: >60 mL/min/1.7m2 42 (L)   GFR Estimate If Black Latest Ref Range: >60 mL/min/1.7m2 51 (L)   Calcium Latest Ref Range: 8.5 - 10.1 mg/dL 8.7   Anion Gap Latest Ref Range: 3 - 14 mmol/L 9   Glucose Latest Ref Range: 70 - 99 mg/dL 111 (H)       Please limit your fluid intake to 2 L (64 ounces) daily.  2 Liters a day = 8.5 cups, or 72 ounces.  Please limit your salt intake to 2 grams a day or less.    If you gain 2# in 24 hours or 5# in one week call Merlyn Schmitt RN so we can adjust your medications as needed over the phone.    Please feel free to call me with any questions or concerns.      Merlyn Schmitt RN BSN CHFN  [facility]  Cardiology Care Coordinator-Heart Failure Clinic    Questions and [phone].   First press #1 for the [employer] and then press #3 for \"Medical Questions\" to reach us " Cardiology Nurses.     On Call Cardiologist for after hours or on weekends: [phone]   option #4 and ask to speak to the on-call Cardiologist. Inform them you are a CORE/heart failure patient at the [place].        If you need a medication refill please contact your pharmacy.  Please allow 3 business days for your refill to be completed.  _______________________________________________________  C.O.R.E. CLINIC Cardiomyopathy, Optimization, Rehabilitation, Education   The C.O.R.E. CLINIC is a heart failure specialty clinic within the [facility] where you will work with specialized nurse practitioners dedicated to helping patients with heart failure carefully adjust medications, receive education, and learn who and when to call if symptoms develop. They specialize in helping you better understand your condition, slow the progression of your disease, improve the length and quality of your life, help you detect future heart problems before they become life threatening, and avoid hospitalizations.  As always, thank you for trusting us with your health care needs!             Follow-ups after your visit        Your next 10 appointments already scheduled     May 26, 2017 12:00 PM CDT   Lab with ICVRx LAB   [facility])    [address] [place] [address]   [phone]            May 26, 2017 12:30 PM CDT   (Arrive by 12:15 PM)   RETURN HEART FAILURE with Giovanna Kelley MD   [facility] ([facility])    [address] [place] [address]   [phone]            Jun 09, 2017 10:00 AM CDT   Lab with ICVRx LAB   [facility] ([facility])    [address] [place] [address]   [phone]            Jun 09, 2017 10:30 AM CDT   (Arrive by 10:15 AM)   CORE RETURN with Grecia Miramontes NP   [facility]  ([facility])    [address]  3rd Floor  [facility] [address]   [phone]            Jun 26, 2017   Procedure with Sincere Snyder MD   [facility] Per[facility] (--)    [address]  [place] [address] [phone]            Jun 26, 2017   Procedure with Sincere Snyder MD   [facility] (--)    [address]  [place] [address] [phone]            Jul 06, 2017  4:30 PM CDT   Remote ICD Check with MCLEAN DCR2   [facility] [place] ([facility])    [address]  [place] [address] [phone]           This appointment is for a remote check of your debrillator.  This is not an appointment at the office.            Jul 10, 2017   Procedure with Sincere Snyder MD   [facility] (--)    [address]  [place] [address] [phone]            Jul 10, 2017   Procedure with Sincere Snyder MD   [facility] (--)    [address]  [place] [address] [phone]              Who to contact     If you have questions or need follow up information about today's clinic visit or your schedule please contact [facility] directly at [phone].  Normal or non-critical lab and imaging results will be communicated to you by [employer]Shane Serviceshart, letter or phone within 4 business days after the clinic has received the results. If you do not hear from us within 7 days, please contact the clinic through [employer] or phone. If you have a critical or abnormal lab result, we will notify you by phone as soon as possible.  Submit refill requests through Trello or call your pharmacy and they will forward the refill request to us. Please allow 3 business days for your refill to be completed.          Additional Information About Your Visit        Trello Information     Trello lets you send  "messages to your doctor, view your test results, renew your prescriptions, schedule appointments and more. To sign up, go to [email or web address] . Click on \"Log in\" on the left side of the screen, which will take you to the Welcome page. Then click on \"Sign up Now\" on the right side of the page.     You will be asked to enter the access code listed below, as well as some personal information. Please follow the directions to create your username and password.     Your access code is: [email or web address]  Expires: 2017  3:26 PM     Your access code will  in 90 days. If you need help or a new code, please call your [place] clinic or [phone].        Care EveryWhere ID     This is your Care EveryWhere ID. This could be used by other organizations to access your [place] medical records  [id number]        Your Vitals Were     Pulse Height Pulse Oximetry BMI (Body Mass Index)          77 1.727 m (5' 8\") 99% 22.29 kg/m2         Blood Pressure from Last 3 Encounters:   17 129/73   17 114/56   17 117/67    Weight from Last 3 Encounters:   17 66.5 kg (146 lb 9.6 oz)   17 65.8 kg (145 lb)   17 66.8 kg (147 lb 4.8 oz)              Today, you had the following     No orders found for display       Primary Care Provider Office Phone # Fax #    Hector Jc [phone]       [facility] [address]  [facility] [address]        Thank you!     Thank you for choosing [facility]  for your care. Our goal is always to provide you with excellent care. Hearing back from our patients is one way we can continue to improve our services. Please take a few minutes to complete the written survey that you may receive in the mail after your visit with us. Thank you!             Your Updated Medication List - Protect others around you: Learn how to safely use, store and throw away your medicines at www.disposemymeds.org.          This list is accurate as of: " 5/19/17 11:30 AM.  Always use your most recent med list.                   Brand Name Dispense Instructions for use    aspirin EC 81 MG EC tablet      Take 81 mg by mouth daily       B-12 1000 MCG Tbcr     100 tablet    Take 1,000 mcg by mouth daily       bumetanide 1 MG tablet    BUMEX    540 tablet    Take 4mg in the morning and 3mg in the afternoon.       calcitRIOL 0.25 MCG capsule    ROCALTROL     Take 0.25 mcg by mouth daily       carvedilol 6.25 MG tablet    COREG    240 tablet    Take 1 tablet (6.25 mg) by mouth 2 times daily (with meals)       hydrALAZINE 25 MG tablet    APRESOLINE    1080 tablet    Take 3 tablets (75 mg) by mouth 4 times daily       isosorbide mononitrate 60 MG 24 hr tablet    IMDUR    30 tablet    Take 1 tablet (60 mg) by mouth At Bedtime At hs       KRILL OIL PO      Take 1 capsule by mouth daily       levothyroxine 150 MCG tablet    SYNTHROID/LEVOTHROID     Take 150 mcg by mouth daily       milrinone 20-5 MG/100ML-% infusion    PRIMACOR    500 mL    Inject 25.5375 mcg/min into the vein continuous       Multi-vitamin Tabs tablet      Take 1 tablet by mouth daily       PLAVIX 75 MG tablet   Generic drug:  clopidogrel      Take 75 mg by mouth daily       potassium chloride 20 MEQ Packet    KLOR-CON    540 packet    Take 40 mEq by mouth 3 times daily       Sodium Chloride 0.65 % Soln      Spray in nostril as needed       tolterodine 4 MG 24 hr capsule    DETROL LA     Take 4 mg by mouth daily

## 2017-05-19 NOTE — PROGRESS NOTES
HPI  Mr. Villalobos is a 76 year old man with ICM s/p numerous PCI most recently with NSTEMI (10/16), VF arrest (11/16) HFrEF (EF 35%), HTN, HLD, s/p CRT-D,  and numerous recent admissions including several with cardiogenic shock requiring inotrope support. Hospital course included initiation of LVAD evaluation revealing a strong ETOH history with ongoing drinking (last use documented 4/4/17). He was discharged to home on inotropes and has since seen several times when we've increased his diuretics. His cardiomems readings have slowly been trending down. Today PA diastolic is down to 24, the lowest reading to date. He returns to clinic for follow up.    Of note, Mr. Villalobos's milrinone pump was set up based on a discharge weight of 68.1 kg, current weight at home is down to 63.2 kg.    Mr. Villalobos denies liset shortness of breath but continues to note more awareness of his breathing even at rest. He endorses orthopnea but no PND. No chest pain, palpitations, or ankle edema. Feet or mildly swollen and belly is mildly bloated. Appetite is good. Occassional positional lightheadedness. No syncope or falls. He did have 2 episodes last week when he just felt exhausted and generally unwell that followed working in the garage. The episodes resolved with rest.    He continues home inotropes per PICC line. No redness, discharge, or tenderness. No fevers or chills.    He has not had any alcohol since April 4, 2017. He has attended another AA meeting May 15, 2017 and plans to continue weekly.    PMH  Past Medical History:   Diagnosis Date     Acute renal failure (H)     10/2015 ARF secondary to rhabdomyolysis     Alcoholism (H)      Anemia      Benign essential HTN      BPH (benign prostatic hypertrophy)      CAD (coronary artery disease)     AWMI, stents to Cx, RCA and LAD     Chronic systolic heart failure (H)      CKD (chronic kidney disease)      Complete AV block (H)      Ischemic cardiomyopathy 10/23/2015    EF 30%     Low  sodium levels      Mixed hyperlipidemia      NSTEMI (non-ST elevated myocardial infarction) (H) 10/23/2015     PAD (peripheral artery disease) (H)      Rhabdomyolysis due to statin therapy     crestor     Severe hypothyroidism 9/20/2016     VF (ventricular fibrillation) (H) 11/16/16     Social History     Social History     Marital status:      Spouse name: N/A     Number of children: N/A     Years of education: N/A     Occupational History     Not on file.     Social History Main Topics     Smoking status: Former Smoker     Packs/day: 1.50     Years: 20.00     Types: Cigarettes     Quit date: 1/1/1980     Smokeless tobacco: Never Used     Alcohol use 0.0 oz/week     0 Standard drinks or equivalent per week      Comment: occassionally     Drug use: No     Sexual activity: Not on file     Other Topics Concern     Caffeine Concern No     decaf coffee     Sleep Concern No     Stress Concern No     Weight Concern No     Special Diet Yes     low fat, low sodium     Exercise Yes     everyday     Social History Narrative     Family History   Problem Relation Age of Onset     Unknown/Adopted Mother      Unknown/Adopted Father      MEDS    Current Outpatient Prescriptions on File Prior to Visit:  carvedilol (COREG) 6.25 MG tablet Take 1 tablet (6.25 mg) by mouth 2 times daily (with meals)   bumetanide (BUMEX) 1 MG tablet Take 4mg in the morning and 3mg in the afternoon.   potassium chloride (KLOR-CON) 20 MEQ Packet Take 40 mEq by mouth 3 times daily   hydrALAZINE (APRESOLINE) 25 MG tablet Take 3 tablets (75 mg) by mouth 4 times daily   milrinone (PRIMACOR) infusion 200 mcg/mL PREMIX Inject 25.5375 mcg/min into the vein continuous   tolterodine (DETROL LA) 4 MG 24 hr capsule Take 4 mg by mouth daily   calcitRIOL (ROCALTROL) 0.25 MCG capsule Take 0.25 mcg by mouth daily   isosorbide mononitrate (IMDUR) 60 MG 24 hr tablet Take 1 tablet (60 mg) by mouth At Bedtime At hs   aspirin EC 81 MG EC tablet Take 81 mg by mouth  "daily   levothyroxine (SYNTHROID/LEVOTHROID) 150 MCG tablet Take 150 mcg by mouth daily   Saline (SODIUM CHLORIDE) 0.65 % SOLN Spray in nostril as needed   KRILL OIL PO Take 1 capsule by mouth daily    Cyanocobalamin (B-12) 1000 MCG TBCR Take 1,000 mcg by mouth daily   multivitamin, therapeutic with minerals (MULTI-VITAMIN) TABS Take 1 tablet by mouth daily   clopidogrel (PLAVIX) 75 MG tablet Take 75 mg by mouth daily     No current facility-administered medications on file prior to visit.     Physical examination:  /73 (BP Location: Left arm, Patient Position: Chair, Cuff Size: Adult Regular)  Pulse 77  Ht 1.727 m (5' 8\")  Wt 66.5 kg (146 lb 9.6 oz)  SpO2 99%  BMI 22.29 kg/m2  GENERAL APPEARANCE: thin, alert and no distress  HEENT: no icterus, no xanthelasmas, normal pupil size and reaction, normal palate, mucosa moist, no cyanosis.  NECK: left neck scar, no adenopathy, no asymmetry, masses  CHEST: lungs clear to auscultation with fine rales in bilateral lower lobes  CARDIOVASCULAR: regular rhythm, normal S1 JVP is at 15 cm sitting upright   ABDOMEN: soft, non tender, without hepatomegaly, no masses palpable  EXTREMITIES: no clubbing, cyanosis or edema with compression socks on  NEURO: alert and oriented to person/place/time, normal speech, gait and affect  SKIN: PICC site is without redness or warmth, is soft and nontender, no ecchymoses, no rashes    LABS  Last Basic Metabolic Panel:  Lab Results   Component Value Date     05/19/2017      Lab Results   Component Value Date    POTASSIUM 3.9 05/19/2017     Lab Results   Component Value Date    CHLORIDE 97 05/19/2017     Lab Results   Component Value Date    LEIDY 8.7 05/19/2017     Lab Results   Component Value Date    CO2 32 05/19/2017     Lab Results   Component Value Date    BUN 61 05/19/2017     Lab Results   Component Value Date    CR 1.60 05/19/2017     Lab Results   Component Value Date     05/19/2017         Assessment and Plan  MrGladys " Wilfredo is a 76 year old man with a history of advanced NICM on home inotropes, who appears volume overloaded but is diuresing well on current dose of diuretics. Renal function continues to improve with gentle diuresis. We'll make no changes today. He was encouraged to plan his activities to avoid high temperatures or humidity and to anticipate rest periods. If he has any complaints, even vague symptoms, he was asked to notify our team. He will continue daily cardiomems readings. He will return to see Dr. Kelley in 1 week and in CORE clinic 2 weeks later or as needed.    1. Chronic systolic heart failure secondary to NICM  Stage: D  NYHA: IIIA (on inotropes)  ACE/ARB: deferred given recent renal dysfunction  BB: yes  Ald Ant: deferred given recent renal dysfunction  Volume: hypervolemic but improving  Follow up: 1 week and prn    25 minutes spent in direct care, >50% in counseling

## 2017-05-26 NOTE — NURSING NOTE
Chief Complaint   Patient presents with     Follow Up For     one week follow up for HF on Milrinone/labs

## 2017-05-26 NOTE — MR AVS SNAPSHOT
After Visit Summary   5/26/2017    Perez Villalobos    MRN: [id number]           Patient Information     Date Of Birth          1940        Visit Information        Provider Department      5/26/2017 3:00 PM Giovanna Kelley MD [facility]        Today's Diagnoses     Acute on chronic systolic heart failure (H)    -  1      Care Instructions    Increase Bumex to 4 mg twice daily over weekend,    Mon. Return to  Bumex 4 mg in AM, 3 mg in afternoon  Labs on Tues. At [facility]   Dr. Kelley will call you next week with plan  Follow up Aug. 31, at 1:30 with Dr. Allie Branham, RN  Cardiology Care Coordinator  Please be aware that I work part-time but I will be checking messages several times per week.   For urgent needs, please call the number below.    [phone], press 1 for [employer], press 3 to speak to a nurse    .          Follow-ups after your visit        Follow-up notes from your care team     Return in about 3 months (around 8/26/2017) for Physical Exam, Lab Work.      Your next 10 appointments already scheduled     Jun 09, 2017 10:00 AM CDT   Lab with  LAB   [facility] ([facility])    [address]  1st Floor  [facility] [address] [phone]            Jun 09, 2017 10:30 AM CDT   (Arrive by 10:15 AM)   CORE RETURN with Grecia Miramontes NP   [facility] Heart Care ([facility])    [address]  3rd Floor  [facility] [address] [phone]            Jun 26, 2017   Procedure with Sincere Snyder MD   [facility] (--)    [address]  [facility] [address]   [phone]            Jun 26, 2017   [facility]    [address]  [facility] [address]   [phone]            Jul 06, 2017  4:30 PM CDT   Remote ICD Check with MCLEAN DCR2   [facility]  "[facility] [place] ([facility])    [address]  [place] [address] [phone]           This appointment is for a remote check of your debrillator.  This is not an appointment at the office.            Jul 10, 2017   Procedure with Sincere Snyder MD   [facility]    [address]  [place] [address] [phone]            Jul 10, 2017   Procedure with Sincere Snyder MD   [facility] (--)    [address]  [place] [address] [phone]              Future tests that were ordered for you today     Open Future Orders        Priority Expected Expires Ordered    Basic metabolic panel Routine 5/30/2017 5/26/2018 5/26/2017            Who to contact     If you have questions or need follow up information about today's clinic visit or your schedule please contact [facility] directly at [phone].  Normal or non-critical lab and imaging results will be communicated to you by Xiamhart, letter or phone within 4 business days after the clinic has received the results. If you do not hear from us within 7 days, please contact the clinic through [employer] or phone. If you have a critical or abnormal lab result, we will notify you by phone as soon as possible.  Submit refill requests through REPUCOM or call your pharmacy and they will forward the refill request to us. Please allow 3 business days for your refill to be completed.          Additional Information About Your Visit        REPUCOM Information     REPUCOM lets you send messages to your doctor, view your test results, renew your prescriptions, schedule appointments and more. To sign up, go to [email or web address] . Click on \"Log in\" on the left side of the screen, which will take you to the Welcome page. Then click on \"Sign up Now\" on the right side of the page.     You will be asked to enter the access code listed below, as " "well as some personal information. Please follow the directions to create your username and password.     Your access code is: [email or web address]  Expires: 2017  3:26 PM     Your access code will  in 90 days. If you need help or a new code, please call your [place] clinic or [phone].        Care EveryWhere ID     This is your Care EveryWhere ID. This could be used by other organizations to access your [place] medical records  [id number]        Your Vitals Were     Pulse Height Pulse Oximetry BMI (Body Mass Index)          83 1.727 m (5' 8\") 98% 22.02 kg/m2         Blood Pressure from Last 3 Encounters:   17 134/76   17 129/73   17 114/56    Weight from Last 3 Encounters:   17 65.7 kg (144 lb 12.8 oz)   17 66.5 kg (146 lb 9.6 oz)   17 65.8 kg (145 lb)               Primary Care Provider Office Phone # Fax #    Hector Jc [phone]       [facility] [address]  [facility] [address]        Thank you!     Thank you for choosing [facility]  for your care. Our goal is always to provide you with excellent care. Hearing back from our patients is one way we can continue to improve our services. Please take a few minutes to complete the written survey that you may receive in the mail after your visit with us. Thank you!             Your Updated Medication List - Protect others around you: Learn how to safely use, store and throw away your medicines at www.disposemymeds.org.          This list is accurate as of: 17  4:13 PM.  Always use your most recent med list.                   Brand Name Dispense Instructions for use    aspirin EC 81 MG EC tablet      Take 81 mg by mouth daily       B-12 1000 MCG Tbcr     100 tablet    Take 1,000 mcg by mouth daily       bumetanide 1 MG tablet    BUMEX    540 tablet    Take 4mg in the morning and 3mg in the afternoon.       calcitRIOL 0.25 MCG capsule    ROCALTROL     Take 0.25 mcg by mouth " daily       carvedilol 6.25 MG tablet    COREG    240 tablet    Take 1 tablet (6.25 mg) by mouth 2 times daily (with meals)       hydrALAZINE 25 MG tablet    APRESOLINE    1080 tablet    Take 3 tablets (75 mg) by mouth 4 times daily       isosorbide mononitrate 60 MG 24 hr tablet    IMDUR    30 tablet    Take 1 tablet (60 mg) by mouth At Bedtime At hs       KRILL OIL PO      Take 1 capsule by mouth daily       levothyroxine 150 MCG tablet    SYNTHROID/LEVOTHROID     Take 150 mcg by mouth daily       milrinone 20-5 MG/100ML-% infusion    PRIMACOR    500 mL    Inject 25.5375 mcg/min into the vein continuous       Multi-vitamin Tabs tablet      Take 1 tablet by mouth daily       PLAVIX 75 MG tablet   Generic drug:  clopidogrel      Take 75 mg by mouth daily       potassium chloride 20 MEQ Packet    KLOR-CON    540 packet    Take 40 mEq by mouth 3 times daily       Sodium Chloride 0.65 % Soln      Spray in nostril as needed       tolterodine 4 MG 24 hr capsule    DETROL LA     Take 4 mg by mouth daily

## 2017-05-26 NOTE — PATIENT INSTRUCTIONS
Increase Bumex to 4 mg twice daily over weekend,    Mon. Return to  Bumex 4 mg in AM, 3 mg in afternoon  Labs on Tues. At [facility]   Dr. Kelley will call you next week with plan  Follow up Aug. 31, at 1:30 with Dr. Allie Branham, RN  Cardiology Care Coordinator  Please be aware that I work part-time but I will be checking messages several times per week.   For urgent needs, please call the number below.    [phone], press 1 for [employer], press 3 to speak to a nurse    .

## 2017-05-26 NOTE — LETTER
5/26/2017    RE: Perez Villalobos  [address]  [facility] [address]       Dear Colleague,    Thank you for the opportunity to participate in the care of your patient, Perez Villalobos, at the [facility] HEART [facility] at [facility]. Please see a copy of my visit note below.    May 4, 2017      CC:   MD Dixon Dodge MD     Follow up  end stage heart failure, being considered for advanced heart failure therapies     Dear Niraj and Dixon,    I had the pleasure of seeing Mr. Villalobos in the [facility] advanced heart failure clinic today. As you know he is a  76 year old gentleman with history of ICM s/p multiple PCIs, stage D with multiple recent hospitalizations at [facility] and [facility] for heart failure exacerbation and volume overload. He is now inotrope dependent to maintain end organ function.     He has an extensive coronary artery disease history requiring multiple PCIs and stents in the past with most recently a NSTEMI in 10/2016 and a VF arrest in November of 2016 after which he underwent a CRT-D.    On April 4th, he underwent a RHC which showed elevated filling pressures both right and left, and was started on milrinone, PICC line placed, and underwent a full LVAD work up, however it was discovered that he had been drinking alcohol daily until his last hospitalization in April. Since then he has been free of alcohol since April 4th.     He was then hospitalized again for worsening renal function and decompensated heart failure despite IV inotropes. We are struggling in the CORE clinic to keep him out. He does feel dizzy / lightheaded when initially standing, but denies any symptoms with ambulation. He is SOB with walking around the house and sometimes at rest. He denies present orthopnea, + PND, + increasing abdominal girth and nausea with eating.      ROS: All others reviewed and negative. Please see HPI for pertinent positives (10 point ROS).     PMH:   Past  Medical History:   Diagnosis Date     Acute renal failure (H)     10/2015 ARF secondary to rhabdomyolysis     Alcoholism (H)      Anemia      Benign essential HTN      BPH (benign prostatic hypertrophy)      CAD (coronary artery disease)     AWMI, stents to Cx, RCA and LAD     Chronic systolic heart failure (H)      CKD (chronic kidney disease)      Complete AV block (H)      Ischemic cardiomyopathy 10/23/2015    EF 30%     Low sodium levels      Mixed hyperlipidemia      NSTEMI (non-ST elevated myocardial infarction) (H) 10/23/2015     PAD (peripheral artery disease) (H)      Rhabdomyolysis due to statin therapy     crestor     Severe hypothyroidism 9/20/2016     VF (ventricular fibrillation) (H) 11/16/16       FH:   Family History   Problem Relation Age of Onset     Unknown/Adopted Mother      Unknown/Adopted Father        The patient is  and has several children.  He lives in the [facility] area.  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  He denies any tobacco use.  He did have heavier alcohol use in the past and went through treatment for this.  More recently, he was drinking several drinks a day before coming into the hospital for cardiogenic shock about a month and a half ago.  He has been not drinking alcohol  for 2 months and understands his need to maintain abstinence before being considered for mechanical support.       Home Meds:   Current Outpatient Prescriptions on File Prior to Visit:  carvedilol (COREG) 6.25 MG tablet Take 1 tablet (6.25 mg) by mouth 2 times daily (with meals)   bumetanide (BUMEX) 1 MG tablet Take 4mg in the morning and 3mg in the afternoon.   potassium chloride (KLOR-CON) 20 MEQ Packet Take 40 mEq by mouth 3 times daily   hydrALAZINE (APRESOLINE) 25 MG tablet Take 3 tablets (75 mg) by mouth 4 times daily   milrinone (PRIMACOR) infusion 200 mcg/mL PREMIX Inject 25.5375 mcg/min into the vein continuous   tolterodine (DETROL LA) 4 MG 24 hr capsule Take 4  "mg by mouth daily   calcitRIOL (ROCALTROL) 0.25 MCG capsule Take 0.25 mcg by mouth daily   isosorbide mononitrate (IMDUR) 60 MG 24 hr tablet Take 1 tablet (60 mg) by mouth At Bedtime At hs   aspirin EC 81 MG EC tablet Take 81 mg by mouth daily   levothyroxine (SYNTHROID/LEVOTHROID) 150 MCG tablet Take 150 mcg by mouth daily   Saline (SODIUM CHLORIDE) 0.65 % SOLN Spray in nostril as needed   KRILL OIL PO Take 1 capsule by mouth daily    Cyanocobalamin (B-12) 1000 MCG TBCR Take 1,000 mcg by mouth daily   multivitamin, therapeutic with minerals (MULTI-VITAMIN) TABS Take 1 tablet by mouth daily   clopidogrel (PLAVIX) 75 MG tablet Take 75 mg by mouth daily     No current facility-administered medications on file prior to visit.       ROS: 10 point ROS neg other than the symptoms noted above in the HPI.    /76 (BP Location: Left arm, Patient Position: Chair, Cuff Size: Adult Regular)  Pulse 83  Ht 1.727 m (5' 8\")  Wt 65.7 kg (144 lb 12.8 oz)  SpO2 98%  BMI 22.02 kg/m2  General: cachectic appearing   HEENT: Externally normal, sclera clear, temporal wasting   CV: PMI diffuse, RRR, s1, s2, there is a HSM at the apex JVD at 15 cm  Lungs: CTAB, non-labored breathing, no wheezing, there are bilateral crackles at the bases with decreased BS below the mid right lung.  Abd: soft, non-distended, non-tender  Ext:+1 bilateral edema, right PICC line  Skin: ecchymoses at the wrist  Neuro: grossly non-focal  Psych: mood/affect appropriate    Labs:  No results found for this or any previous visit (from the past 24 hour(s)).    Imaging:echocardiogram: 5/26/2017   Interpretation Summary  Severe left ventricular dilation is present.  The Ejection Fraction is estimated at 20-25%.  Dilation of the inferior vena cava is present with abnormal respiratory  variation in diameter.  _____________________________________________________________________________      Becky 4/3/2017  RIGHT HEART CATHETERIZATION:  BSA 1.82m2  --RA "    --RV 66/20  --PA 65/31/44   --PCW    --TD CO 1.95 L/min   --TD CI 1.09 L/min/m2  --PVR 10.3   --SVR 1559    EKG: Bi-V paced    Assessment:  Mr Villalobos is a 76 year old gentleman with a history of CAD s/p PCI with ICM, who has end stage disease by all measures. He is Stage D, class IV and is inotrope dependent for end organ perfusion. He is on home milrinone.  I have reviewed his CardioMEMS.  This shows continually elevated PA diastolic pressures and PA mean pressures.  He has elevated neck veins today on exam, and his renal function is deteriorating despite inotropes and we have having to continually place him on higher and higher doses of diuretics to keep him out of the hospital. He is continuing to decline.  He has extreme cardiac wasting.     He has had an LVAD evaluation in house and we are holding off until he can demonstrate abstinence from alcohol for 4 months which is our policy, and this is also supported by guidelines. His surveillance testing has been negative.  His cardiomyopathy is not related to alcohol but abstinence of daily use is required by our program.     He is compliant with medical therapy, has few other co-morbidities,  and and had a good functional status until recently which is encouraging about his chances of functional recovery post LVAD.     I have encouraged him to maintain his physical activity and to try to eat as much as possible.  He has verbally committed to going to Alcoholics Anonymous, and we are going to perform several random alcohol tests to ensure compliance.   It may be that I need to bring him back into the hospital if he continues to decline.    Chronic systolic heart failure - ischemic  Stage D  NYHA Class IV  ACEi/ARB no, renal dysfunction  BB yes - Coreg 6.25 BID  Aldosterone antagonist - no renal dysfunction  SCD prophylaxis  BIV ICD   % BiV pacin%  Fluid status hypervolemic, we will increase Bumex to 4 mg BID through the weekend and then  return to 4/3 next week.    NSAID use: contraindicated   Sleep Apnea Evaluation: deferred  Follow up    Other on hydralazine / imdur for afterload reduction    CAD: on ASA, no statin at this point and I will look into why     Thank you so much for this referral and we will continue to keep you posted in the months to come regarding his progress. We are planning for LVAD implantation at the end of July.      Sincerely,     Giovanna Kelley MD       of Medicine   [facility] Division of Cardiology           CC: MD Dixon Dodge MD         Please do not hesitate to contact me if you have any questions/concerns.     Sincerely,     Giovanna Kelley MD

## 2017-05-31 NOTE — PROGRESS NOTES
May 4, 2017      CC:   MD Dixon Dodge MD     Follow up  end stage heart failure, being considered for advanced heart failure therapies     Dear Niraj and Dixon,    I had the pleasure of seeing Mr. Villalobos in the [facility] advanced heart failure clinic today. As you know he is a  76 year old gentleman with history of ICM s/p multiple PCIs, stage D with multiple recent hospitalizations at [facility] and [facility] for heart failure exacerbation and volume overload. He is now inotrope dependent to maintain end organ function.     He has an extensive coronary artery disease history requiring multiple PCIs and stents in the past with most recently a NSTEMI in 10/2016 and a VF arrest in November of 2016 after which he underwent a CRT-D.    On April 4th, he underwent a RHC which showed elevated filling pressures both right and left, and was started on milrinone, PICC line placed, and underwent a full LVAD work up, however it was discovered that he had been drinking alcohol daily until his last hospitalization in April. Since then he has been free of alcohol since April 4th.     He was then hospitalized again for worsening renal function and decompensated heart failure despite IV inotropes. We are struggling in the [facility] clinic to keep him out. He does feel dizzy / lightheaded when initially standing, but denies any symptoms with ambulation. He is SOB with walking around the house and sometimes at rest. He denies present orthopnea, + PND, + increasing abdominal girth and nausea with eating.      ROS: All others reviewed and negative. Please see HPI for pertinent positives (10 point ROS).     PMH:   Past Medical History:   Diagnosis Date     Acute renal failure (H)     10/2015 ARF secondary to rhabdomyolysis     Alcoholism (H)      Anemia      Benign essential HTN      BPH (benign prostatic hypertrophy)      CAD (coronary artery disease)     AWMI, stents to Cx, RCA and LAD     Chronic systolic heart failure (H)      CKD  (chronic kidney disease)      Complete AV block (H)      Ischemic cardiomyopathy 10/23/2015    EF 30%     Low sodium levels      Mixed hyperlipidemia      NSTEMI (non-ST elevated myocardial infarction) (H) 10/23/2015     PAD (peripheral artery disease) (H)      Rhabdomyolysis due to statin therapy     crestor     Severe hypothyroidism 9/20/2016     VF (ventricular fibrillation) (H) 11/16/16       FH:   Family History   Problem Relation Age of Onset     Unknown/Adopted Mother      Unknown/Adopted Father        The patient is  and has several children.  He lives in the [place] area.  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  He denies any tobacco use.  He did have heavier alcohol use in the past and went through treatment for this.  More recently, he was drinking several drinks a day before coming into the hospital for cardiogenic shock about a month and a half ago.  He has been not drinking alcohol  for 2 months and understands his need to maintain abstinence before being considered for mechanical support.       Home Meds:   Current Outpatient Prescriptions on File Prior to Visit:  carvedilol (COREG) 6.25 MG tablet Take 1 tablet (6.25 mg) by mouth 2 times daily (with meals)   bumetanide (BUMEX) 1 MG tablet Take 4mg in the morning and 3mg in the afternoon.   potassium chloride (KLOR-CON) 20 MEQ Packet Take 40 mEq by mouth 3 times daily   hydrALAZINE (APRESOLINE) 25 MG tablet Take 3 tablets (75 mg) by mouth 4 times daily   milrinone (PRIMACOR) infusion 200 mcg/mL PREMIX Inject 25.5375 mcg/min into the vein continuous   tolterodine (DETROL LA) 4 MG 24 hr capsule Take 4 mg by mouth daily   calcitRIOL (ROCALTROL) 0.25 MCG capsule Take 0.25 mcg by mouth daily   isosorbide mononitrate (IMDUR) 60 MG 24 hr tablet Take 1 tablet (60 mg) by mouth At Bedtime At hs   aspirin EC 81 MG EC tablet Take 81 mg by mouth daily   levothyroxine (SYNTHROID/LEVOTHROID) 150 MCG tablet Take 150 mcg by mouth  "daily   Saline (SODIUM CHLORIDE) 0.65 % SOLN Spray in nostril as needed   KRILL OIL PO Take 1 capsule by mouth daily    Cyanocobalamin (B-12) 1000 MCG TBCR Take 1,000 mcg by mouth daily   multivitamin, therapeutic with minerals (MULTI-VITAMIN) TABS Take 1 tablet by mouth daily   clopidogrel (PLAVIX) 75 MG tablet Take 75 mg by mouth daily     No current facility-administered medications on file prior to visit.       ROS: 10 point ROS neg other than the symptoms noted above in the HPI.    /76 (BP Location: Left arm, Patient Position: Chair, Cuff Size: Adult Regular)  Pulse 83  Ht 1.727 m (5' 8\")  Wt 65.7 kg (144 lb 12.8 oz)  SpO2 98%  BMI 22.02 kg/m2  General: cachectic appearing   HEENT: Externally normal, sclera clear, temporal wasting   CV: PMI diffuse, RRR, s1, s2, there is a HSM at the apex JVD at 15 cm  Lungs: CTAB, non-labored breathing, no wheezing, there are bilateral crackles at the bases with decreased BS below the mid right lung.  Abd: soft, non-distended, non-tender  Ext:+1 bilateral edema, right PICC line  Skin: ecchymoses at the wrist  Neuro: grossly non-focal  Psych: mood/affect appropriate    Labs:  No results found for this or any previous visit (from the past 24 hour(s)).    Imaging:echocardiogram: 5/26/2017   Interpretation Summary  Severe left ventricular dilation is present.  The Ejection Fraction is estimated at 20-25%.  Dilation of the inferior vena cava is present with abnormal respiratory  variation in diameter.  _____________________________________________________________________________      Becky 4/3/2017  RIGHT HEART CATHETERIZATION:  BSA 1.82m2  --RA 19/21/18   --RV 66/20  --PA 65/31/44   --PCW 24/29/24   --TD CO 1.95 L/min   --TD CI 1.09 L/min/m2  --PVR 10.3   --SVR 1559    EKG: Bi-V paced    Assessment:  Mr Villalobos is a 76 year old gentleman with a history of CAD s/p PCI with ICM, who has end stage disease by all measures. He is Stage D, class IV and is inotrope " dependent for end organ perfusion. He is on home milrinone.  I have reviewed his CardioMEMS.  This shows continually elevated PA diastolic pressures and PA mean pressures.  He has elevated neck veins today on exam, and his renal function is deteriorating despite inotropes and we have having to continually place him on higher and higher doses of diuretics to keep him out of the hospital. He is continuing to decline.  He has extreme cardiac wasting.     He has had an LVAD evaluation in house and we are holding off until he can demonstrate abstinence from alcohol for 4 months which is our policy, and this is also supported by guidelines. His surveillance testing has been negative.  His cardiomyopathy is not related to alcohol but abstinence of daily use is required by our program.     He is compliant with medical therapy, has few other co-morbidities,  and and had a good functional status until recently which is encouraging about his chances of functional recovery post LVAD.     I have encouraged him to maintain his physical activity and to try to eat as much as possible.  He has verbally committed to going to Alcoholics Anonymous, and we are going to perform several random alcohol tests to ensure compliance.   It may be that I need to bring him back into the hospital if he continues to decline.    Chronic systolic heart failure - ischemic  Stage D  NYHA Class IV  ACEi/ARB no, renal dysfunction  BB yes - Coreg 6.25 BID  Aldosterone antagonist - no renal dysfunction  SCD prophylaxis  BIV ICD   % BiV pacin%  Fluid status hypervolemic, we will increase Bumex to 4 mg BID through the weekend and then return to 4/3 next week.    NSAID use: contraindicated   Sleep Apnea Evaluation: deferred  Follow up    Other on hydralazine / imdur for afterload reduction    CAD: on ASA, no statin at this point and I will look into why     Thank you so much for this referral and we will continue to keep you posted in the months to come  regarding his progress. We are planning for LVAD implantation at the end of July.      Sincerely,     Giovanna Kelley MD       of Medicine   [facility] Division of Cardiology           CC: MD Dixon Dodge MD

## 2017-06-01 PROBLEM — I50.9 HEART FAILURE, CHRONIC, WITH ACUTE DECOMPENSATION (H): Status: ACTIVE | Noted: 2017-01-01

## 2017-06-01 NOTE — LETTER
Transition Communication Hand-off for Care Transitions to Next Level of Care Provider    Name: Perez Villalobos  MRN #: [id number]  Primary Care Provider: Hector Jc     Primary Clinic: [facility] [address]  [facility] [address]     Reason for Hospitalization:  Heart failure (H) [I50.9]  Admit Date/Time: 2017 12:27 PM  Discharge Date: 17  Payor Source: Payor: [employer]ARE / Plan: [employer] SENIORS NON FPA / Product Type: HMO /     Readmission Assessment Measure (YUMIKO) Risk Score/category: very high           Reason for Communication Hand-off Referral: Admission diagnoses: CHF  Fragility  Multiple providers/specialties    Discharge Plan: Home with home infusion for Dobutamine       Concern for non-adherence with plan of care:   No  Discharge Needs Assessment:  Needs       Most Recent Value    Anticipated Changes Related to Illness inability to care for self    Equipment Currently Used at Home cane, straight, grab bar, raised toilet, shower chair, walker, rolling    Transportation Available car, family or friend will provide          Follow-up specialty is recommended: Yes    Follow-up plan:  Future Appointments  Date Time Provider Department Center   2017 6:00 AM Farnaz Peter, [facility]   2017 4:30 PM [facility]       Any outstanding tests or procedures:    Procedures     Future Labs/Procedures    INFUSION THERAPY-NON CHEMO     Comments:    Perez MONROE Villalobos, : 1940  Body surface area is 1.82 meters squared. Body mass index is 23.23 kg/(m^2).    IF APPROPRIATE INDICATE:  Day 1 =     Dobutamine 5mcg/kg/min - start 17 - continuous - every day. No stop date until patient obtains a left ventricular assist device likely in August. No premedication required. No vitals or laboratory monitoring required.     Diagnosis:  Cardiogenic shock (h)  (primary encounter diagnosis)  Acute renal failure, unspecified acute renal failure type (h)    Med Name: Dobutamine    Med Dose:  5 mcg/kg/min  Patient Weight: 152 lbs 12.46 oz          Referrals     Future Labs/Procedures    Home infusion referral     Comments:    Your provider has referred you to: [facility]: [facility] [place] [phone]   [email or web address]    Local Address (if different from home address): N/A    Anticipated Length of Therapy: per MD order    Home Infusion Pharmacist to adjust therapy based on labs and clinical assessments: Yes    Labs:  May draw labs from Venous Catheter: Yes  Home Infusion Pharmacist to order labs based on therapy type and clinical assessments: Yes  Call/Fax Lab Results to:     Agency Staff to assess nursing needs for Infusion Therapy.    Access Device Management:  IV Access Type: PICC  Flush with Heparin and Normal Saline IVP PRN and routine site care (per agency protocol) to maintain access device? Yes    Medication Therapy Management Referral     Comments:    Reason for referral:  on more than 5 medications and managing chronic disease and on more than 10 medications and hospitalized or in the ED in the past 6 months     This service is designed to help you get the most from your medications.  A specially trained pharmacist will work closely with you and your doctors  to solve any problems related to your medications and to help you get the   best results from taking them.      The Medication Therapy Management staff will call you to schedule an appointment.              JUSTIN Arias     AVS/Discharge Summary is the source of truth; this is a helpful guide for improved communication of patient story

## 2017-06-01 NOTE — H&P
CARDIOLOGY CARDS 2 HISTORY AND PHYSICAL     HPI:  Mr. Perez Villalobos is a 76-year old male known to Dr. Kelley with a PMHx of CAD, HFrEF (NYHA IV, stage D) who was admitted for evaluation of decompensated HFrEF.     Mr. Villalobos's recent course has been notable for multiple admissions for HFrEF in the last few months. Most notably Mr. Villalobos was admitted to [facility] for decompensated HFrEF in early April. This prompted concomitant evaluation for LVAD placement as well as CardioMEMS implantation. His LVAD was ultimately not placed due to the discovery of heavy alcohol consumption until early April 2017. Following this hospitalization, he was seen in the [facility] CORE clinic but noted to have progressive dyspnea. Mr. Villalobos was subsequently hospitalized in mid April, at which point he was discharged to the community on home milrinone. At this stage, Mr. Villalobos reported being NYHA 3 but still being able to sleep well and not being dyspneic at rest. Since then, he has been seen numerous times in the CORE clinic with progressive increases in his diuretic doses. Due to Mr. Villalobos's consistent dyspnea at rest despite milrinone usage, Mr. Villalobos was referred for RHC and inpatient optimization.     At the time of the consultation, Mr. Villalobos notes ongoing dyspnea that limits his exercise tolerance to walking 15 yards on level ground. He has days where he is NYHA 4 and he has had some PND and abdominal distension.     ROS otherwise negative.     PAST MEDICAL HISTORY:  - CAD   - Had 13 prior stents to RCA and LAD, last revascularization in 11/2016 to LAD    - c/b VFib arrest in 11/2016   - HFrEF   - ICM    - s/p [employer] CRT-D after VFib arrest   - CardioMems implantation 04/03/2017   - Dyslipidemia   - Prior rhabdomyolysis due to rosuvastatin  - PAD   - ABIs 04/2017: bilateral non-compressible ABPIs    - US LE arterial Dupplex: focal stenosis in distal superficial femoral a., hemodynamically significant stenosis in left common  iliac artery    - No prior revascularization  - Ethanol dependence   - Last drink 04/04/2017   - Hypothyroidism  - BPH    PAST SURGICAL HISTORY:  - Appendectomy  - Cholecystectomy  - Carotid endarterectomy  - Tonsillectomy and adenoidectomy    FAMILY HISTORY:  - Adopted, thus biological FHx unclear    SOCIAL HISTORY:   - Prior smoker for 20 years x 1 PPD; stopped in 1980s  - Heavy alcohol usage for some years until April 2017 (when he was hospitalized for his HFrEF exacerbation)    HOME MEDICATIONS:  Home cardiac meds: Milrinone 0.375 mcg/kg/min. ASA 81 mg q24h, bumetanide 4 mg qAM/3 mg qPM, clopidogrel 75 mg q24h, carvedilol 6.25 mg BID, hydralazine 75 mg TID, ISMN 60 mg 24h, potassium chloride 40 mEQ BID.   Prior to Admission medications    Medication Sig Start Date End Date Taking? Authorizing Provider   carvedilol (COREG) 6.25 MG tablet Take 1 tablet (6.25 mg) by mouth 2 times daily (with meals) 5/12/17   Grecia Miramontes NP   bumetanide (BUMEX) 1 MG tablet Take 4mg in the morning and 3mg in the afternoon. 5/12/17   Grecia Miramontes NP   potassium chloride (KLOR-CON) 20 MEQ Packet Take 40 mEq by mouth 3 times daily 5/12/17   Grecia Miramontes NP   hydrALAZINE (APRESOLINE) 25 MG tablet Take 3 tablets (75 mg) by mouth 4 times daily 5/8/17   Giovanna Kelley MD   milrinone (PRIMACOR) infusion 200 mcg/mL PREMIX Inject 25.5375 mcg/min into the vein continuous 5/1/17   Alfa Grimm MD   tolterodine (DETROL LA) 4 MG 24 hr capsule Take 4 mg by mouth daily    Reported, Patient   calcitRIOL (ROCALTROL) 0.25 MCG capsule Take 0.25 mcg by mouth daily    Reported, Patient   isosorbide mononitrate (IMDUR) 60 MG 24 hr tablet Take 1 tablet (60 mg) by mouth At Bedtime At hs 3/27/17   Shani Patrick APRN CNP   aspirin EC 81 MG EC tablet Take 81 mg by mouth daily    Unknown, Entered By History   levothyroxine (SYNTHROID/LEVOTHROID) 150 MCG tablet Take 150 mcg by mouth daily    Unknown, Entered By  History   Saline (SODIUM CHLORIDE) 0.65 % SOLN Spray in nostril as needed    Unknown, Entered By History   KRILL OIL PO Take 1 capsule by mouth daily     Reported, Patient   Cyanocobalamin (B-12) 1000 MCG TBCR Take 1,000 mcg by mouth daily 10/9/15   Hodan Guerrero MD   multivitamin, therapeutic with minerals (MULTI-VITAMIN) TABS Take 1 tablet by mouth daily    Reported, Patient   clopidogrel (PLAVIX) 75 MG tablet Take 75 mg by mouth daily    Reported, Patient       VITAL SIGNS:  T 98.2, HR 84, /84, SpO2 98%, RR 16    PHYSICAL EXAM:  Gen: Mildly cachectic but not in any acute distress  HEENT: MMM, no carotid bruits  Resp: No signs of resp distress, chest ausc with fine insp creps up to mid-zones bilaterally  CVS: JVP to the angle of the mandible, no thrills/heaves, S1+S2 without added sounds or murmurs  Abdo:  ND, S, NT, no HSM, +BS  Extremities: Warm, well-perfused UEs/LEs. No LE edema. Good DP/PT/popliteals bilaterally  Neuro: GCS 15/15, AAOx3    Labs:   Hb 11.6, Hct 11.6, WBC 6.5, Plt 201   Na 135, K 4.7, Cl 95, CO2 31, BUN 65, Cr 1.82  T prot 8.0, albumin 3.4, T bili 1.2, ALP 86, ALT 22, AST 25  INR 1.07       EKG: V-paced rhythm with rate of 88 bpm  TTE 05/26:  Severe left ventricular dilation is present. The Ejection Fraction is estimated at 20-25%. Dilation of the inferior vena cava is present with abnormal respiratory variation in diameter.  CXR 06/01:   Left hilar fullness. LLL atelectasis as well as some right costophrenic angle blunting.     RHC 04/03/17:  RA 19/21/18, RV 66/20, PA 65/31/44, PCW 24/29/24, TD-CO/CI 1.95/1.09, PVR 10.3, SVR 1559    RHC numbers 06/01:   (cath lab), PA sat 48, Ao sat 98   RA 20/21/15, RV 69/28/46, PCW 29, F-CO/CI 2.92/1.64, TD-CO/CI 2.77/1.55, SVR 2541/2400    ASSESSMENT/PLAN:  Mr. Perez Villalobos is a 76-year old male with a PMhx of CAD s/p multiple PCI, HFrEF (NYHA 4, stage D), and PAD who presented for evaluation of increasing dyspnea and fatigue of several  weeks' duration since his discharge from [facility].     Mr. Villalobos's RHC reflects significantly elevated SVR and hypervolemia. Hence we will pursue aggressive afterload reduction and diuresis in an attempt to temporize his symptoms before potential VAD placement.    - HFrEF NYHA 4 stage D  (acute-on-chronic decompensated HF/ambulatory cardiogenic shock)   - Start furosemide infusion at 10 mg/hr with 80 mg bolus   - Increase hydralazine to 100 mg QID   - Increase home Imdur to 120 mg q24h   - Continue home carvedilol 6.25 mg BID   - Q4H HDs    - Hx of CAD and VFib arrest   - Continue aspirin 81, clopidogrel 75 mg q24h, carvedilol as above     Chronic/inactive issues:   - PAD: Continue risk factor management as above   - BPH: Tolterodine   - Alcohol dependence: Continue thiamine  - Hypothyroidism: Continue home levothyroxine      Graham Kemp   Cardiology Fellow  [phone]          Critical Care ICU Note - Cardiology  Charity Merchant M.D.    The patient admitted to the ICU with on-going need for parenteral medications for the adjustment of blood pressure and cardiac output and maintenance of renal function.      The patient is seen for low cardiac output necessitating inotropic agents, vasopressors and afterload reducing agents; fluid and diuretic management to maintain blood pressure and secondary organ function    I personally reviewed:  Hemodynamic parameters obtained by central hemodynamic monitoring including RAP, estimated LVEDP, pulmonary artery pressure, cardiac output and vascular resistances in order to adjust fluids and infused medications for blood pressure and cardiac output maintenance.    Pt with chronic systolic heart failure 2/2 ischemic cardiomyopathy who is inotrope dependent admitted for worsening volume status and functional capacity.  RHC today confirmed significantly elevated filling pressures with low cardiac index and high SVR.  Will aggressively diurese and will try and optimize patient's oral  vasodilator regimen.  Pt needs to complete a total of 4 months of sobriety prior to being eligible for LVAD.  Pt and family aware.    Charity Merchant MD  Section Head - Advanced Heart Failure, Transplantation and Mechanical Circulatory Support  Co-Director - [facility]  Associate Professor of Medicine, [facility]    I spent 60 minutes in critical care of the patient including 30 minutes of direct patient care and discussion with the patient, patient's family and care team.

## 2017-06-01 NOTE — IP AVS SNAPSHOT
MRN:[id number]                      After Visit Summary   6/1/2017    Perez Villalobos    MRN: [id number]           Thank you!     Thank you for choosing [facility] for your care. Our goal is always to provide you with excellent care. Hearing back from our patients is one way we can continue to improve our services. Please take a few minutes to complete the written survey that you may receive in the mail after you visit with us. Thank you!        Patient Information     Date Of Birth          1940        About your hospital stay     You were admitted on:  June 1, 2017 You last received care in the:  [address] [place] [address]    You were discharged on:  June 16, 2017        Reason for your hospital stay       Cardiogenic Shock                  Who to Call     For medical emergencies, please call 911.  For non-urgent questions about your medical care, please call your primary care provider or clinic, [phone]          Attending Provider     Provider Specialty    Charity Merchant MD Cardiology       Primary Care Provider Office Phone # Fax #    Hector Jc [phone]       When to contact your care team       If you start feeling worse or gaining weight.                  After Care Instructions     Activity       Your activity upon discharge: activity as tolerated            Diet       Follow this diet upon discharge: Measure your sodium amount and do not eat more than 2 grams per day. Measure your fluid amount and do not drink more than 2 liters per day.            Discharge Instructions       Please come back to the hospital to get lab checked in two days. Please come back Monday or Tuesday (I put in orders for this and someone should be calling to schedule this for you) to the heart failure clinic. Like we discussed, there is a chance that the lasix dose that we picked in not optimal because we did not have a chance to give you oral medications and watch you for  multiple days. If you feel worse or gain more than 2lbs in one day day or 5lbs in one week, please call us or come back to the hospital.            IV access       You are going home with the following vascular access device: PICC.            Monitor and record       Weight daily.                  Follow-up Appointments     [facility]ult [facility] Follow-up and recommended labs and tests       Basic Metabolic Panel and Magnesium on 6/18/17 to be done in the hospital laboratory.   Follow up in heart failure CORE clinic on 6/19/17 or 6/20/17 but no later than this.   Follow up with Dr. Kelley in one month.      Appointments on [facility] and/or [facility] (with [facility] provider or service). Call [phone] if you haven't heard regarding these appointments within 7 days of discharge.                  Your next 10 appointments already scheduled     Jul 06, 2017  4:30 PM CDT   Remote ICD Check with MCLEAN DCR2   [facility] [place] ([facility])    [address]  [facility] [address] [phone]           This appointment is for a remote check of your debrillator.  This is not an appointment at the office.            Jul 10, 2017   Procedure with Sincere Snyder MD   [facility] (--)    [address]  [place] [address] [phone]            Jul 10, 2017   Procedure with Sincere Snyder MD   [facility] (--)    [address]  [facility] [address] [phone]            Aug 04, 2017   Procedure with Nain Bullard MD   [facility], Same Day Surgery (--)    [address]  [place] [id number] [phone]              Additional Services     Home infusion referral       Your provider has referred you to: LM: [facility] [place] [phone]   [email or web address]    Local Address (if different from home address):  N/A    Anticipated Length of Therapy: per MD order    Home Infusion Pharmacist to adjust therapy based on labs and clinical assessments: Yes    Labs:  May draw labs from Venous Catheter: Yes  Home Infusion Pharmacist to order labs based on therapy type and clinical assessments: Yes  Call/Fax Lab Results to:     Agency Staff to assess nursing needs for Infusion Therapy.    Access Device Management:  IV Access Type: PICC  Flush with Heparin and Normal Saline IVP PRN and routine site care (per agency protocol) to maintain access device? Yes            Medication Therapy Management Referral       Reason for referral:  on more than 5 medications and managing chronic disease and on more than 10 medications and hospitalized or in the ED in the past 6 months     This service is designed to help you get the most from your medications.  A specially trained pharmacist will work closely with you and your doctors  to solve any problems related to your medications and to help you get the   best results from taking them.      The Medication Therapy Management staff will call you to schedule an appointment.                  Future tests that were ordered for you     INFUSION THERAPY-NON CHEMO       Perez Villalobos, : 1940  Body surface area is 1.82 meters squared. Body mass index is 23.23 kg/(m^2).    IF APPROPRIATE INDICATE:  Day 1 =     Dobutamine 5mcg/kg/min - start 17 - continuous - every day. No stop date until patient obtains a left ventricular assist device likely in August. No premedication required. No vitals or laboratory monitoring required.     Diagnosis:  Cardiogenic shock (h)  (primary encounter diagnosis)  Acute renal failure, unspecified acute renal failure type (h)    Med Name: Dobutamine   Med Dose:  5 mcg/kg/min  Patient Weight: 152 lbs 12.46 oz                  General Recommendations To Control Heart Failure When You Get Home     Instructions To Patients and Families: Please read and check off  "each of these important instructions as you do them when you get home.           Weight and symptoms      ___ Put a scale in your bathroom  ___ Post a weight chart or calendar next to the scale  ___Weigh yourself every day as soon as you you get up in the morning. You should only be wearing your pajamas. Write your weight on the chart/calendar.  ___ Bring your weight chart/calendar with you to all appointments    ___Call your doctor if you gain 2 pounds in 1 day or 5 pounds in 1 week from your \"dry\" weight (baseline weight). Also call your doctor if you have shortness of breath that gets worse over time, leg swelling or fatigue.         Medicines and diet     ___ Make sure to take your medicines as prescribed.    ___Bring a current list of your medicines and all of your medicine bottles with you to all appointments.    ___ Limit fluids if you still have swelling or shortness of breath, or if your doctor tells you to do so.  ___ Eat less than 2000 mg of sodium (salt) every day. Read food labels, and do not add salt to meals.   ___ Heart healthy diet with low fat and low cholesterol          Activity and suggested lifestyle changes    ___ Stay active. Talk to your doctor about an exercise program that is safe for your heart.    ___ Stop smoking. Reduce alcohol use.      ___ Lose weight if you are overweight. Extra weight puts a lot of stress on the heart.          Control for Leg Swelling   ___ Keep your legs elevated (raised) as needed for swelling. If swelling is uncomfortable or elevation doesn t help, ask your doctor about using ACE wrap or Jobst stockings.          Follow-up appointments   ___ Make a [facility] Clinic appointment with a basic metabolic panel lab draw 3 to 5 days after you leave the hospital. Call one of the following locations:   [facility]  [phone],  [facility] [phone],  [facility]  [phone]. " "    ___ Make sure to take your medications as prescribed and bring an accurate list of your medications and your weight chart/calendar to your follow up appointment at the [facility] Clinic for continued education and adjustments          What is the CORE clinic?    The C.O.R.E (Cardiomyopathy, Optimization, Rehabilitation, Education) Clinic is a heart failure specialty clinic within the [facility]. At C.O.R.E., you will work with nurse practitioners to carefully adjust medicines, get education and learn who and when to call if symptoms appear. [facility].O.R.E nurses specialize in helping you:    better understand your disease.    slow the progress of your disease.    improve the length and quality of your life.    detect future heart problems before they become life threatening.    avoid hospital stays.            Pending Results     No orders found from 5/30/2017 to 6/2/2017.            Statement of Approval     Ordered          06/16/17 1405  I have reviewed and agree with all the recommendations and orders detailed in this document.  EFFECTIVE NOW     Approved and electronically signed by:  Terell Martinez MD             Admission Information     Date & Time Provider Department Dept. Phone    6/1/2017 Charity Merchant MD [address] [facility] [phone]      Your Vitals Were     Blood Pressure Pulse Temperature Respirations Height Weight    155/89 88 97  F (36.1  C) (Axillary) 14 1.727 m (5' 8\") 69.3 kg (152 lb 12.5 oz)    Pulse Oximetry BMI (Body Mass Index)                100% 23.23 kg/m2          MyChart Information     [employer] lets you send messages to your doctor, view your test results, renew your prescriptions, schedule appointments and more. To sign up, go to [email or web address] . Click on \"Log in\" on the left side of the screen, which will take you to the Welcome page. Then click on \"Sign up Now\" on the right side of the page.     You will be asked " to enter the access code listed below, as well as some personal information. Please follow the directions to create your username and password.     Your access code is: [email or web address]  Expires: 2017  8:15 AM     Your access code will  in 90 days. If you need help or a new code, please call your [facility] clinic or [phone].        Care EveryWhere ID     This is your Care EveryWhere ID. This could be used by other organizations to access your [facility] medical records  [id number]           Review of your medicines      START taking        Dose / Directions    D5W 5 % SOLN 170 mL with DOBUTamine 250 MG/20ML SOLN 1,000 mg        Dose:  5 mcg/kg/min   Inject 0.3555 mg/min into the vein continuous   Quantity:  1000 mL   Refills:  3       furosemide 80 MG tablet   Commonly known as:  LASIX        Dose:  80 mg   Take 1 tablet (80 mg) by mouth 2 times daily   Quantity:  30 tablet   Refills:  3       isosorbide dinitrate 30 MG tablet   Commonly known as:  ISORDIL        Dose:  60 mg   Take 2 tablets (60 mg) by mouth 3 times daily   Quantity:  90 tablet   Refills:  3         CONTINUE these medicines which may have CHANGED, or have new prescriptions. If we are uncertain of the size of tablets/capsules you have at home, strength may be listed as something that might have changed.        Dose / Directions    hydrALAZINE 100 MG Tabs tablet   Commonly known as:  APRESOLINE   This may have changed:    - medication strength  - how much to take        Dose:  100 mg   Take 1 tablet (100 mg) by mouth 4 times daily   Quantity:  60 tablet   Refills:  3       potassium chloride 20 MEQ Packet   Commonly known as:  KLOR-CON   This may have changed:  when to take this   Used for:  Chronic combined systolic and diastolic congestive heart failure (H)        Dose:  40 mEq   Take 40 mEq by mouth 2 times daily   Quantity:  540 packet   Refills:  3         CONTINUE these medicines which have NOT CHANGED        Dose / Directions     aspirin EC 81 MG EC tablet        Dose:  81 mg   Take 81 mg by mouth daily   Refills:  0       B-12 1000 MCG Tbcr   Used for:  Vitamin B 12 deficiency        Dose:  1000 mcg   Take 1,000 mcg by mouth daily   Quantity:  100 tablet   Refills:  0       calcitRIOL 0.25 MCG capsule   Commonly known as:  ROCALTROL        Dose:  0.25 mcg   Take 0.25 mcg by mouth daily   Refills:  0       KRILL OIL PO        Dose:  1 capsule   Take 1 capsule by mouth daily   Refills:  0       levothyroxine 150 MCG tablet   Commonly known as:  SYNTHROID/LEVOTHROID        Dose:  150 mcg   Take 150 mcg by mouth daily   Refills:  0       Multi-vitamin Tabs tablet        Dose:  1 tablet   Take 1 tablet by mouth daily   Refills:  0       PLAVIX 75 MG tablet   Generic drug:  clopidogrel        Dose:  75 mg   Take 75 mg by mouth daily   Refills:  0       Sodium Chloride 0.65 % Soln        Spray in nostril as needed   Refills:  0       tolterodine 4 MG 24 hr capsule   Commonly known as:  DETROL LA        Dose:  4 mg   Take 4 mg by mouth daily   Refills:  0         STOP taking     bumetanide 1 MG tablet   Commonly known as:  BUMEX           carvedilol 6.25 MG tablet   Commonly known as:  COREG           isosorbide mononitrate 60 MG 24 hr tablet   Commonly known as:  IMDUR           milrinone 20-5 MG/100ML-% infusion   Commonly known as:  PRIMACOR                Where to get your medicines      Some of these will need a paper prescription and others can be bought over the counter. Ask your nurse if you have questions.     Bring a paper prescription for each of these medications     D5W 5 % SOLN 170 mL with DOBUTamine 250 MG/20ML SOLN 1,000 mg    furosemide 80 MG tablet    hydrALAZINE 100 MG Tabs tablet    isosorbide dinitrate 30 MG tablet    potassium chloride 20 MEQ Packet                Protect others around you: Learn how to safely use, store and throw away your medicines at www.disposemymeds.org.             Medication List: This is a list of all  your medications and when to take them. Check marks below indicate your daily home schedule. Keep this list as a reference.      Medications           Morning Afternoon Evening Bedtime As Needed    aspirin EC 81 MG EC tablet   Take 81 mg by mouth daily   Last time this was given:  81 mg on 6/16/2017  8:43 AM                                B-12 1000 MCG Tbcr   Take 1,000 mcg by mouth daily                                calcitRIOL 0.25 MCG capsule   Commonly known as:  ROCALTROL   Take 0.25 mcg by mouth daily   Last time this was given:  0.25 mcg on 6/16/2017  8:43 AM                                D5W 5 % SOLN 170 mL with DOBUTamine 250 MG/20ML SOLN 1,000 mg   Inject 0.3555 mg/min into the vein continuous   Last time this was given:  6/16/2017  2:00 PM                                furosemide 80 MG tablet   Commonly known as:  LASIX   Take 1 tablet (80 mg) by mouth 2 times daily   Last time this was given:  80 mg on 6/16/2017  8:53 AM                                hydrALAZINE 100 MG Tabs tablet   Commonly known as:  APRESOLINE   Take 1 tablet (100 mg) by mouth 4 times daily   Last time this was given:  100 mg on 6/16/2017 11:52 AM                                isosorbide dinitrate 30 MG tablet   Commonly known as:  ISORDIL   Take 2 tablets (60 mg) by mouth 3 times daily   Last time this was given:  60 mg on 6/16/2017  8:43 AM                                KRILL OIL PO   Take 1 capsule by mouth daily                                levothyroxine 150 MCG tablet   Commonly known as:  SYNTHROID/LEVOTHROID   Take 150 mcg by mouth daily   Last time this was given:  150 mcg on 6/16/2017  7:02 AM                                Multi-vitamin Tabs tablet   Take 1 tablet by mouth daily   Last time this was given:  1 tablet on 6/16/2017  8:44 AM                                PLAVIX 75 MG tablet   Take 75 mg by mouth daily   Last time this was given:  75 mg on 6/16/2017  8:43 AM   Generic drug:  clopidogrel                                 potassium chloride 20 MEQ Packet   Commonly known as:  KLOR-CON   Take 40 mEq by mouth 2 times daily   Last time this was given:  40 mEq on 6/16/2017  8:44 AM                                Sodium Chloride 0.65 % Soln   Spray in nostril as needed                                tolterodine 4 MG 24 hr capsule   Commonly known as:  DETROL LA   Take 4 mg by mouth daily   Last time this was given:  4 mg on 6/16/2017  8:43 AM

## 2017-06-01 NOTE — PROCEDURES
PRELIMINARY CARDIAC CATH REPORT:     PROCEDURES PERFORMED:   Right Heart Catheterization    PHYSICIANS:  Attending Physician: Jeff Ricketts MD  Interventional Cardiology Fellow: None  Cardiology Fellow: Gabe Lynn MD    INDICATION:  Perez Villalobos is a 76 year old male with ICM s/p CRT-D on milrinone is here on urgent basis for RHC due to dyspnea and nausea.    DESCRIPTION:  1. Consent obtained with discussion of risks.  All questions were answered.  2. Sterile prep and procedure.  3. Location with Sheaths:   Rt IJ  7 Fr 10 cm [short]  4. Access: Local anesthetic with lidocaine.  A standard 18 guage needle with ultrasound guidance was used to establish vascular access using a modified Seldinger technique.  5. Diagnostic Catheters:   4 Fr  [place] Dionna  6. Estimated blood loss: < 5 ml    MEDICATIONS:  The procedure was performed under local anesthesia with lidocaine  Heart rate, BP, respiration, oxygen saturation and patient responses were monitored throughout the procedure with the assistance of the RN under my supervision.    Procedures:    HEMODYNAMICS:  1. HR 84 bpm  2. /80/104 mmHg  3. RA 20/21/17   4. RV 69/17  5. PA 69/28/46   6. PCW 30/38/29   7. PA sat 48.1%   8. PCW sat 94.0%  9. Hgb 10.8 g/dL   10. Lauren CO 2.9   11. Lauren CI 1.6   12. TD CO  2.8  13. TD CI 1.6   14. PVR 5.8          Sheath Removal:  7 Fr sheath with [place] was left in.    Contrast: Isovue, 0 ml     Fluoroscopy Time: 1.1 min    COMPLICATIONS:  1. None    SUMMARY:   >> High right sided filling pressures.  >> High left sided filling pressures.  >> Severe pulmonary hypertension, mixed pre-capillary and post-capillary  >> Reduced cardiac output, 2.9 L/min with index 1.6 L/min/m2    PLAN:   >>. Admit to inpatient    The attending interventional cardiologist was present and supervised all critical aspects the procedure.    Findings discussed with Dr. Merchant.    See CVIS report for final draft.    Gabe Lynn MD   Cardiology  Fellow    Jeff Ricketts MD  Cardiology Staff

## 2017-06-01 NOTE — PROGRESS NOTES
Walker and Annia (spouse) spoke with Dr. Kelley via phone today. Walker reports increased nausea and SOB. Per Dr. Kelley Walker is requiring inpatient care and will be directly admitted to the [facility] from home. Walker will be admitted to Cards 2, Dr Merchant - plan to go to the Cath Lab first for a RHC then to 4E with likely the swan in.  Report was called to (Staff nurse name) on (Unit name and bed number).      I called Walker and nAnia back and communicated check in time and location.  Walker verbalizes understanding and agrees with plan of care.    Merlyn Schmitt RN BSN CHFN  Cardiology Care Coordinator - [facility]  [phone]

## 2017-06-01 NOTE — IP AVS SNAPSHOT
[address] [address]    Phone:  [phone]                                       After Visit Summary   6/1/2017    Perez Villalobos    MRN: [id number]           After Visit Summary Signature Page     I have received my discharge instructions, and my questions have been answered. I have discussed any challenges I see with this plan with the nurse or doctor.    ..........................................................................................................................................  Patient/Patient Representative Signature      ..........................................................................................................................................  Patient Representative Print Name and Relationship to Patient    ..................................................               ................................................  Date                                            Time    ..........................................................................................................................................  Reviewed by Signature/Title    ...................................................              ..............................................  Date                                                            Time

## 2017-06-01 NOTE — IP AVS SNAPSHOT
"    [facility] [address] [facility]: [phone]                                              INTERAGENCY TRANSFER FORM - PHYSICIAN ORDERS   2017                    Hospital Admission Date: 2017  RON LUCERO   : 1940  Sex: Male        Attending Provider: Charity Merchant MD     Allergies:  Lisinopril, Augmentin, Crestor [Rosuvastatin]    Infection:  None   Service:  CARDIOLOGY    Ht:  1.727 m (5' 8\")   Wt:  69.3 kg (152 lb 12.5 oz)   Admission Wt:  64 kg (141 lb 1.5 oz)    BMI:  23.23 kg/m 2   BSA:  1.82 m 2            Patient PCP Information     Provider PCP Type    eHctor Jc MD General      ED Clinical Impression     Diagnosis Description Comment Added By Time Added    Cardiogenic shock (H) [R57.0] Cardiogenic shock (H) [R57.0]  Yolanda Diaz MD 2017 10:04 PM    Acute renal failure, unspecified acute renal failure type (H) [N17.9] Acute renal failure, unspecified acute renal failure type (H) [N17.9]  Yolanda Diaz MD 2017 10:05 PM    Chronic combined systolic and diastolic congestive heart failure (H) [I50.42] Chronic combined systolic and diastolic congestive heart failure (H) [I50.42]  Terell Martinez MD 2017 10:18 AM      Hospital Problems as of 2017              Priority Class Noted POA    Heart failure, chronic, with acute decompensation (H) Medium  2017 Yes      Non-Hospital Problems as of 2017              Priority Class Noted    Pain in joint, shoulder region   2010    Coronary artery disease involving native coronary artery of native heart without angina pectoris   Unknown    Muscle weakness (generalized) Medium  10/5/2015    Idiopathic progressive polyneuropathy Medium  10/7/2015    Vitamin B12 deficiency without anemia Medium  10/7/2015    NSTEMI (non-ST elevated myocardial infarction) (H) Medium  10/23/2015    Ischemic cardiomyopathy Medium  10/23/2015    Rhabdomyolysis due to statin therapy Medium  Unknown    Anemia " Medium  Unknown    BPH (benign prostatic hypertrophy) Medium  Unknown    PAD (peripheral artery disease) (H) Medium  Unknown    Biventricular heart failure (H) Medium  2/11/2016    Leg weakness, bilateral Medium  2/18/2016    Mixed hyperlipidemia Medium  Unknown    Benign essential HTN Medium  Unknown    Chronic systolic heart failure (H) Medium  Unknown    Bradycardia Medium  9/16/2016    Severe hypothyroidism Medium  9/20/2016    ACS (acute coronary syndrome) (H) Medium  10/1/2016    DELONTE (obstructive sleep apnea)   10/12/2016    CKD (chronic kidney disease) Medium  Unknown    S/P ICD (internal cardiac defibrillator) procedure   11/29/2016    Atrial flutter (H)   12/14/2016    CKD (chronic kidney disease) stage 3, GFR 30-59 ml/min Medium  12/21/2016    Cardiorenal syndrome Medium  12/21/2016    Iron deficiency anemia, unspecified Medium  1/12/2017    Anemia of chronic renal failure Medium  1/12/2017    Chronic kidney disease, stage III (moderate) Medium  1/12/2017    Hyponatremia Medium  1/16/2017    Hypokalemia Medium  1/16/2017    CHF (congestive heart failure) (H) Medium  3/29/2017    Dyspnea Medium  3/30/2017    CHF exacerbation (H) Medium  4/6/2017      Code Status History     Date Active Date Inactive Code Status Order ID Comments User Context    6/16/2017  2:04 PM  Full Code [id number]  Terell Martinez MD Outpatient    6/11/2017  6:51 PM 6/16/2017  2:04 PM Full Code [id number]  Terell Martinez MD Inpatient    6/3/2017 12:34 AM 6/7/2017  4:59 PM Full Code [id number]  Graham Kemp MD Inpatient    5/1/2017  1:53 PM 6/3/2017 12:34 AM Full Code [id number]  Mike Rivas MD Outpatient    4/24/2017  6:17 PM 5/1/2017  1:53 PM Full Code [id number]  Mike Rivas MD Inpatient    4/15/2017 11:49 AM 4/24/2017  6:17 PM Full Code [id number]  Mike Rivas MD Outpatient    4/7/2017  7:04 AM 4/15/2017 11:49 AM Full Code [id number]  Mike Rivas MD  Inpatient    3/30/2017  5:29 PM 4/7/2017  7:04 AM Full Code [id number]  Kerwin Wyman MD Outpatient    3/29/2017  5:41 PM 3/30/2017  5:29 PM Full Code [id number]  Yan Coulter MD Inpatient    1/20/2017 10:42 AM 3/29/2017  5:41 PM Full Code [id number]  Magdaleno Rangel MD Outpatient    1/16/2017  7:45 PM 1/20/2017 10:42 AM Full Code [id number]  Anton Garcia MD Inpatient    11/18/2016 11:24 AM 1/16/2017  7:45 PM Full Code [id number]  Yan Coulter MD Outpatient    11/15/2016  8:23 PM 11/18/2016 11:24 AM Full Code [id number]  Jerrod Sainz MD Inpatient    10/6/2016 10:07 AM 11/15/2016  8:23 PM Full Code [id number]  Fredy Molina MD Outpatient    10/1/2016  6:55 PM 10/6/2016 10:07 AM Full Code [id number]  Cooper Feliciano MD Inpatient    9/20/2016 12:10 PM 10/1/2016  6:55 PM Full Code [id number]  Josep Wilson MD Outpatient    9/16/2016 10:21 PM 9/20/2016 12:10 PM Full Code [id number]  Ralph Hanley MD Inpatient    6/17/2016 10:27 AM 9/16/2016 10:21 PM Full Code [id number]  Ralph Monroy MD Outpatient    6/15/2016  4:29 PM 6/17/2016 10:27 AM Full Code [id number]  Capo Ortiz MD Inpatient    2/9/2016  8:51 AM 2/13/2016  6:56 PM Full Code [id number]  Linda Curiel MD Inpatient    10/9/2015 12:59 PM 2/9/2016  8:51 AM Full Code [id number]  Hodan Guerrero MD Outpatient    10/1/2015  7:41 PM 10/9/2015 12:59 PM Full Code [id number]  Nika Emmanuel PA Inpatient         Medication Review      START taking        Dose / Directions Comments    D5W 5 % SOLN 170 mL with DOBUTamine 250 MG/20ML SOLN 1,000 mg        Dose:  5 mcg/kg/min   Inject 0.3555 mg/min into the vein continuous   Quantity:  1000 mL   Refills:  3        furosemide 80 MG tablet   Commonly known as:  LASIX        Dose:  80 mg   Take 1 tablet (80 mg) by mouth 2 times daily   Quantity:  30 tablet   Refills:  3        isosorbide dinitrate 30 MG tablet   Commonly known as:  ISORDIL         Dose:  60 mg   Take 2 tablets (60 mg) by mouth 3 times daily   Quantity:  90 tablet   Refills:  3          CONTINUE these medications which may have CHANGED, or have new prescriptions. If we are uncertain of the size of tablets/capsules you have at home, strength may be listed as something that might have changed.        Dose / Directions Comments    hydrALAZINE 100 MG Tabs tablet   Commonly known as:  APRESOLINE   This may have changed:    - medication strength  - how much to take        Dose:  100 mg   Take 1 tablet (100 mg) by mouth 4 times daily   Quantity:  60 tablet   Refills:  3        potassium chloride 20 MEQ Packet   Commonly known as:  KLOR-CON   This may have changed:  when to take this   Used for:  Chronic combined systolic and diastolic congestive heart failure (H)        Dose:  40 mEq   Take 40 mEq by mouth 2 times daily   Quantity:  540 packet   Refills:  3          CONTINUE these medications which have NOT CHANGED        Dose / Directions Comments    aspirin EC 81 MG EC tablet        Dose:  81 mg   Take 81 mg by mouth daily   Refills:  0        B-12 1000 MCG Tbcr   Used for:  Vitamin B 12 deficiency        Dose:  1000 mcg   Take 1,000 mcg by mouth daily   Quantity:  100 tablet   Refills:  0        calcitRIOL 0.25 MCG capsule   Commonly known as:  ROCALTROL        Dose:  0.25 mcg   Take 0.25 mcg by mouth daily   Refills:  0        KRILL OIL PO        Dose:  1 capsule   Take 1 capsule by mouth daily   Refills:  0        levothyroxine 150 MCG tablet   Commonly known as:  SYNTHROID/LEVOTHROID        Dose:  150 mcg   Take 150 mcg by mouth daily   Refills:  0        Multi-vitamin Tabs tablet        Dose:  1 tablet   Take 1 tablet by mouth daily   Refills:  0        PLAVIX 75 MG tablet   Generic drug:  clopidogrel        Dose:  75 mg   Take 75 mg by mouth daily   Refills:  0        Sodium Chloride 0.65 % Soln        Spray in nostril as needed   Refills:  0        tolterodine 4 MG 24 hr capsule   Commonly  known as:  DETROL LA        Dose:  4 mg   Take 4 mg by mouth daily   Refills:  0          STOP taking     bumetanide 1 MG tablet   Commonly known as:  BUMEX           carvedilol 6.25 MG tablet   Commonly known as:  COREG           isosorbide mononitrate 60 MG 24 hr tablet   Commonly known as:  IMDUR           milrinone 20-5 MG/100ML-% infusion   Commonly known as:  PRIMACOR                   Summary of Visit     Reason for your hospital stay       Cardiogenic Shock             After Care     Activity       Your activity upon discharge: activity as tolerated       Diet       Follow this diet upon discharge: Measure your sodium amount and do not eat more than 2 grams per day. Measure your fluid amount and do not drink more than 2 liters per day.       Discharge Instructions       Please come back to the hospital to get lab checked in two days. Please come back Monday or Tuesday (I put in orders for this and someone should be calling to schedule this for you) to the heart failure clinic. Like we discussed, there is a chance that the lasix dose that we picked in not optimal because we did not have a chance to give you oral medications and watch you for multiple days. If you feel worse or gain more than 2lbs in one day day or 5lbs in one week, please call us or come back to the hospital.       IV access       You are going home with the following vascular access device: PICC.       Monitor and record       Weight daily.             Procedures     INFUSION THERAPY-NON CHEMO       Perez Villalobos, : 1940  Body surface area is 1.82 meters squared. Body mass index is 23.23 kg/(m^2).    IF APPROPRIATE INDICATE:  Day 1 =     Dobutamine 5mcg/kg/min - start 17 - continuous - every day. No stop date until patient obtains a left ventricular assist device likely in August. No premedication required. No vitals or laboratory monitoring required.     Diagnosis:  Cardiogenic shock (h)  (primary encounter  diagnosis)  Acute renal failure, unspecified acute renal failure type (h)    Med Name: Dobutamine   Med Dose:  5 mcg/kg/min  Patient Weight: 152 lbs 12.46 oz             Referrals     Home infusion referral       Your provider has referred you to: [facility]: [facility] [place] [phone]   [email or web address]    Local Address (if different from home address): N/A    Anticipated Length of Therapy: per MD order    Home Infusion Pharmacist to adjust therapy based on labs and clinical assessments: Yes    Labs:  May draw labs from Venous Catheter: Yes  Home Infusion Pharmacist to order labs based on therapy type and clinical assessments: Yes  Call/Fax Lab Results to:     Agency Staff to assess nursing needs for Infusion Therapy.    Access Device Management:  IV Access Type: PICC  Flush with Heparin and Normal Saline IVP PRN and routine site care (per agency protocol) to maintain access device? Yes       Medication Therapy Management Referral       Reason for referral:  on more than 5 medications and managing chronic disease and on more than 10 medications and hospitalized or in the ED in the past 6 months     This service is designed to help you get the most from your medications.  A specially trained pharmacist will work closely with you and your doctors  to solve any problems related to your medications and to help you get the   best results from taking them.      The Medication Therapy Management staff will call you to schedule an appointment.             Your next 10 appointments already scheduled     Jul 06, 2017  4:30 PM CDT   Remote ICD Check with MCLEAN DCR2   [facility] [place] ([facility])    [address] [place] [id number] [phone]           This appointment is for a remote check of your debrillator.  This is not an appointment at the office.            Jul 10, 2017   Procedure with Sincere ORTIZ  MD Claudio   [facility] (--)    [address]  [place] [address]   [phone]            Jul 10, 2017   Procedure with Sincere Snyder MD   [facility] (--)    [address]  [place] [address]   [phone]            Aug 04, 2017   Procedure with Nain Bullard MD   [facility], Same Day Surgery (--)    [address]  [place] [address] [phone]              Follow-Up Appointment Instructions     Future Labs/Procedures    Adult [facility] Follow-up and recommended labs and tests     Comments:    Basic Metabolic Panel and Magnesium on 6/18/17 to be done in the hospital laboratory.   Follow up in heart failure clinic on 6/19/17 or 6/20/17 but no later than this.       Appointments on [place] and/or [facility] (with [facility] or [facility] provider or service). Call [phone] if you haven't heard regarding these appointments within 7 days of discharge.      Follow-Up Appointment Instructions     Adult [facility] Follow-up and recommended labs and tests       Basic Metabolic Panel and Magnesium on 6/18/17 to be done in the hospital laboratory.   Follow up in heart failure clinic on 6/19/17 or 6/20/17 but no later than this.       Appointments on [place] and/or [facility] (with [facility] or [facility] provider or service). Call [phone] if you haven't heard regarding these appointments within 7 days of discharge.             Statement of Approval     Ordered          06/16/17 1405  I have reviewed and agree with all the recommendations and orders detailed in this document.  EFFECTIVE NOW     Approved and electronically signed by:  Terell Martinez MD

## 2017-06-02 NOTE — PLAN OF CARE
Problem: Goal Outcome Summary  Goal: Goal Outcome Summary  Outcome: No Change  100% paced, B/P: 124/70, CVP 13-14, PA 60s/20s, Notified Dr. Negron that CI went from 1.8 to 2.4 due to 2 g Hbg drop, now 9.7. Plan to recheck CBC at 0800.   Milrinone and Lasix gtt infusing, see MAR/doc flow sheet.     Plan: Will continue to monitor/assess and update MD as needed      2300 Notified Dr. Rider pt refusing sq heparin.

## 2017-06-02 NOTE — PLAN OF CARE
Problem: Cardiac: Heart Failure (Adult)  Goal: Signs and Symptoms of Listed Potential Problems Will be Absent or Manageable (Cardiac: Heart Failure)  Signs and symptoms of listed potential problems will be absent or manageable by discharge/transition of care (reference Cardiac: Heart Failure (Adult) CPG).  Outcome: No Change  PT arrived per MD recommendation d/t worsening symptoms. Sent to cath lab for swan placement. [place] numbers CVP 15, PA 69/28, CI 1.6. Milrinone dependent, on current dose of 0.375, lasix gtt also started. PPM/ICD in place. Room air. A/O. Moves well x1 for assist with lines, good UOP, up to chair eating dinner. Plan to tweak meds and remove fluid. See MAR and flowsheet for additional information.

## 2017-06-02 NOTE — PLAN OF CARE
Problem: Goal Outcome Summary  Goal: Goal Outcome Summary  Outcome: Improving  CI 1.6-1.9 during shift. PA pressures increased 60-70/40's. 100% V-paced at a rate of 90. Up on multiple walks around unit. Lasix gtt at 10 mg/hr with good urine output, milrinone at 0.375. Eating all of meals.

## 2017-06-02 NOTE — PROGRESS NOTES
CARDIOLOGY CARDS 2 PROGRESS NOTE    SUBJECTIVE:  Mr. Villalobos felt well overnight and into the morning. No dyspnea but abdo distension remains static. No chest pain or palpitations.     ROS otherwise negative.    OBJECTIVE:  Vital signs:  124/70 (Range 113-148/66-84)  HR 78 (78-87)  RR 16 (16-18)  Tm 98.4   Weight 06/02 141 lbs     I/O: -169 mL   Intake: 1055 mL (920 PO, 135 IV)  Output: 1225 urine     Hb 9.7, BSA 1.75   CVP 24, PA 64/34, W 32    PHYSICAL EXAM:  Gen: Looks well  HEENT: MMM, no oxygen   Resp: No signs of resp distress, chest ausc with crackles to mid-zones but finer   CVS: JVP still to the angle of the mandible, S1+S2 without added sounds or murmrs  Abdo: ND, S, NT, +BS  Extremities: Warm peripheries this morning. Minimal edema  Neuro:    Recent Labs   Lab Test  06/02/17   0406   06/01/17   1429   HGB  9.7*   --   11.6*   HCT  29.9*   --   34.9*   WBC  4.8   --   6.5   MCV  96   --   96   MCH  31.2   --   32.0   MCHC  32.4   --   33.2   RDW  15.7*   --   15.9*   PLT  184   --   201   NA  137   --   135   POTASSIUM  3.2*   < >  4.7   CHLORIDE  97   --   95   CO2  30   --   31   BUN  62*   --   65*   CR  1.69*   --   1.82*   GLC  99   --   102*   LEIDY  8.6   --   9.5   ALBUMIN   --    --   3.4   BILITOTAL   --    --   1.2   ALKPHOS   --    --   86   AST   --    --   25   ALT   --    --   22    < > = values in this interval not displayed.       Micro:  Nil    Imaging:  CXR 06/02: Capay in good position, LLL atectasis persists    Drips: Milrinone 0.375 micrograms/kg/min, furosemide 10 mg/hr   Cardiac meds: Aspirin 81 mg q24h, clopidogrel 75 mg q24h, carvedilol 6.25 mg BID, hydralazine 100 QID, Imdur 120 mg q24h, potassium chloride 40 mEq TID  Non-cardiac meds: Calcitriol 0.25 micrograms daily, levothyroxine 150 micrograms daily, tolterodien 4 mg q24h, cyanocobalamin 1000 micrograms daily, heparin 5000 units SQ, multivitamin      ASSESSMENT/PLAN:  Mr. Perez Villalobos is a 76-year old male with a PMhx of CAD s/p  multiple PCI, HFrEF (NYHA 4, stage D), and PAD who presented for evaluation of increasing dyspnea and fatigue of several weeks' duration since his discharge from [facility]. He presents for inpatient optimization of HF with invasive hemodynamic monitoring and PO vasodilators.     - HFrEF NYHA 4 stage D  (acute-on-chronic decompensated HF/ambulatory cardiogenic shock)   - Continue furosemide infusion at 10 mg/hr without additional boluses    - Continue hydralazine 100 mg QID, Imdur 120 mg q24h   - If no improvement in SVR over the course of the day/next 24h, will increase Imdur dosage and consider addition of minoxidil    - Continue home carvedilol 6.25 mg BID   - Q4H HDs     - Hx of CAD and VFib arrest   - Continue aspirin 81, clopidogrel 75 mg q24h, carvedilol as above      Chronic/inactive issues:   - PAD: Continue risk factor management as above   - BPH: Tolterodine   - Alcohol dependence: Continue thiamine  - Hypothyroidism: Continue home levothyroxine    Seen and staffed with Dr. Merchant.    Graham Kemp  Cardiology Fellow  Pager: [phone]            Critical Care ICU Note - Cardiology  Charity Merchant M.D.     The patient remains in the ICU with on-going need for parenteral medications for the adjustment of blood pressure and cardiac output and maintenance of renal function.       The patient is seen for low cardiac output necessitating inotropic agents, vasopressors and afterload reducing agents; fluid and diuretic management to maintain blood pressure and secondary organ function     I personally reviewed:  Hemodynamic parameters obtained by central hemodynamic monitoring including RAP, estimated LVEDP, pulmonary artery pressure, cardiac output and vascular resistances in order to adjust fluids and infused medications for blood pressure and cardiac output maintenance.     Pt symptomatically improved today.  Hydralazine dose maximized and nitrate increase.  SVR improved however remains above goal.  Continue to  monitor hemodynamics.  If SVR not at goal tomorrow after full effect of current regimen realized will plan to add minoxidil.  Will continue with diuresis as well.      Charity Merchant MD  Section Head - Advanced Heart Failure, Transplantation and Mechanical Circulatory Support  Co-Director - Adult Congenital and Cardiovascular Genetics Center  Associate Professor of Medicine, [facility]     I spent 60 minutes in critical care of the patient including 30 minutes of direct patient care and discussion with the patient, patient's family and care team.

## 2017-06-02 NOTE — PROGRESS NOTES
"CLINICAL NUTRITION SERVICES - ASSESSMENT NOTE     Nutrition Prescription    RECOMMENDATIONS FOR MDs/PROVIDERS TO ORDER:  Continue multivitamin/mineral supplement. Vitamin B12 supplementation likely no longer needed.     Malnutrition Status:    Severe malnutrition in the context of acute on chronic illness    Recommendations already ordered by Registered Dietitian (RD):  1. Ensure Plus BID @ 10 am and 2 pm  2. Ordered 100 mg thiamine daily for NYHA IV heart failure.     Future/Additional Recommendations:  If continues w/ poor appetite and intakes are <50% of meals then consider calorie counts to determine need for further nutrition intervention.     REASON FOR ASSESSMENT  Perez Villalobos is a/an 76 year old male assessed by the dietitian for Admission Nutrition Risk Screen for unintentional loss of 10# or more in the past two months, malnourishment with severe HF. Please prescribe supplements of your choice and 2 g Na education    NUTRITION HISTORY  -PMHx of CAD, HFrEF (NYHA IV, stage D) who was admitted for evaluation of decompensated HFrEF. Hx of EtOH dependence (last drink 4/4/17)  -Pt has been followed by RD in past admissions, most recently seen in April 2017. Pt received 2 g Na diet education at this time (and several times in the past per RD chart review).   -Pt has had a poor appetite for a while now but more notably had decreased intake over the past week. Pt's wife states his portion sizes have decreased and has trouble with foods that require a lot of chewing (fatigue). Pt has been consuming 1 Ensure per day at home.    CURRENT NUTRITION ORDERS  Diet: Low Saturated Fat/2400 mg Sodium  Intake/Tolerance: Pt is enjoying the food and would like to have Ensure Plus ordered.     LABS  Labs reviewed  Last B12 level 1630 on 4/12/17    MEDICATIONS  Medications reviewed  1000 mcg vitamin B12  Thera-vit-M    ANTHROPOMETRICS  Height: 172.7 cm (5' 8\")  Most Recent Weight: 64 kg (141 lb 1.5 oz)    IBW: 70 kg  BMI: Normal " BMI  Weight History: 5# (3.4%) wt loss in 2 weeks and also 2.1% wt loss in the past week  Wt Readings from Last 10 Encounters:   06/02/17 64 kg (141 lb 1.5 oz)   05/26/17 65.7 kg (144 lb 12.8 oz)   05/19/17 66.5 kg (146 lb 9.6 oz)   05/12/17 65.8 kg (145 lb)   05/04/17 66.8 kg (147 lb 4.8 oz)   05/01/17 65 kg (143 lb 3.2 oz)   04/17/17 65.1 kg (143 lb 8 oz)   04/15/17 65 kg (143 lb 3.2 oz)   04/05/17 69.6 kg (153 lb 6.4 oz)   04/03/17 67.7 kg (149 lb 3.2 oz)     Dosing Weight: 64 kg (actual admit wt 6/2)    ASSESSED NUTRITION NEEDS  Estimated Energy Needs: 1600 - 1920 kcals/day (25 - 30 kcals/kg)  Justification: Maintenance  Estimated Protein Needs: 70 - 90 grams protein/day (1.1 - 1.4 grams of pro/kg)  Justification: Increased needs w/ cardiac status  Estimated Fluid Needs: 25 - 30 mL/kg or per MD  Justification: Maintenance    PHYSICAL FINDINGS  See malnutrition section below.  Appears thin    MALNUTRITION  % Intake: < 75% for > 7 days (non-severe)  % Weight Loss: > 2% in 1 week (severe)  Subcutaneous Fat Loss: Facial region: mild  Muscle Loss: Temporal and Thoracic region (clavicle, acromium bone, deltoid, trapezius, pectoral): mild  Fluid Accumulation/Edema: Moderate (BLE)  Malnutrition Diagnosis: Severe malnutrition in the context of acute on chronic illness    NUTRITION DIAGNOSIS  Inadequate oral intake related to poor appetite hindering PO intakes as evidenced by reports of decreased portion sizes over past week, 2% wt loss in the past week, some mild muscle/fat wasting noted      INTERVENTIONS  Implementation  Nutrition Education: Provided education on continuing low sodium diet and how to cope w/ poor appetite. Recommended continue w/ ONS for easy kcals/PRO as well as eating small/frequent meals throughout the day. Encouraged continuing to watch sodium in diet but also liberalizing when need if appropriate or needed pending food intake (for example, if portion sizes are very small anyways, pt may still be  able to fit in a higher-sodium food and still be under salt restriction). Pt and wife declined need for handouts at this time. Pt/wife have a very good understanding of low sodium diet.  Medical food supplement therapy - Ordered Ensure Plus BID between measl    Goals  Patient to consume % of nutritionally adequate meal trays TID, or the equivalent with supplements/snacks.     Monitoring/Evaluation  Progress toward goals will be monitored and evaluated per protocol.    Marisabel Dominique RD, LD, [facility]  CVICU Dietitian  Pager: [phone]

## 2017-06-03 NOTE — PROGRESS NOTES
[id number]    Patient A&O X 4. VSS, HR paced in 90s. Denies any pain, dizziness, lightheadedness. On lasix gtt at 10mg/hr and home milrinone dose at 0.375mcg/kg/min. Good appetite. Will continue to monitor and notify team of any questions or concerns.

## 2017-06-03 NOTE — PROGRESS NOTES
Cards 2 ICU Progress Note    Assessment/Plan: 76-year old male with a PMhx of CAD s/p multiple PCI, HFrEF (NYHA 4, stage D), and PAD who presented for evaluation of increasing dyspnea and fatigue of several weeks' duration since his discharge from [facility]. He presents for inpatient optimization of HF with invasive hemodynamic monitoring and PO vasodilators.     Cardiovascular  #HFrEF NYHA 4 stage D  (acute-on-chronic decompensated HF/ambulatory cardiogenic shock)  -CI has responded very well to reduction of high SVR to normal range  -started minoxidil and subsequently increased to 5 bid  -cont lasix gtt at 10  -KCl 40 tid, following BID BMPs  -cont home coreg 6.25 bid  -if hemodynamics cont to confirm improvement with SVR reduction, will remove swan this PM      #Hx of CAD and VFib arrest  -Continue aspirin 81, clopidogrel 75 mg q24h, carvedilol as above     No other active system's issues    Chronic/inactive issues:   - PAD: Continue risk factor management as above   - BPH: Tolterodine   - Alcohol dependence: Continue thiamine  - Hypothyroidism: Continue home levothyroxine     Prophylaxis:     - DVT: heparin subq   - GI: eating   - Aspiration: normal mentation    Dispo: Tomorrow or Monday if continues to do well    Patient seen and discussed with Dr. Mayur Martinez PGY2  [phone]      Critical Care ICU Note - Cardiology  Charity Merchant M.D.      The patient remains in the ICU with on-going need for parenteral medications for the adjustment of blood pressure and cardiac output and maintenance of renal function.        The patient is seen for low cardiac output necessitating inotropic agents, vasopressors and afterload reducing agents; fluid and diuretic management to maintain blood pressure and secondary organ function      I personally reviewed:  Hemodynamic parameters obtained by central hemodynamic monitoring including RAP, estimated LVEDP, pulmonary artery pressure, cardiac output and vascular  "resistances in order to adjust fluids and infused medications for blood pressure and cardiac output maintenance.      Pt symptomatically improved today.  Cardiac index and filling pressures improved with reduction of SVR.  Have added and subsequently increase minoxidil to optimize SVR.  Metolazone given today to enhance diuretics.  Have removed PA catheter and will transfer out of ICU.  Pt needs to complete 4 months of sobriety prior to LVAD candidacy.  Discussed this with patient and his wife today and addressed questions/concerns.  Both voiced understanding.          Charity Merchant MD  Section Head - Advanced Heart Failure, Transplantation and Mechanical Circulatory Support  Co-Director - [facility]  Associate Professor of Medicine, [facility]      I spent 60 minutes in critical care of the patient including 40 minutes of direct patient care and discussion with the patient, patient's family and care team.    ==========================================================  Interval Events/Subjective: Feels well this AM. A little short of breath after walking more than he though he should walk yesterday but this resolved with rest. Overnight, SVR up to 2300 so was given minoxidil 2.5 once and hydralazine 50 once with SVR declining to 1300 and CI of 2.6. ROS negative for fevers, rigors, current dyspnea, CP, n/v/c/d, rashes. Still has some LE swelling.     Objective  Blood pressure 122/76, temperature 97.7  F (36.5  C), temperature source Oral, resp. rate 14, height 1.727 m (5' 8\"), weight 64.4 kg (141 lb 15.6 oz), SpO2 92 %.  Resp: 14    Physical Exam  gen- elderly man, sitting in bed, comfortable  pulm- bibasilar crackles, symmetric, non labored  cv- loud s2, no m/r/g, swan in place  abdmn- soft, nt, nd  extrm- warmer than on admission, edema 2+ up to knees     Labs reviewed  No new EKG  Imaging reviewed, CXR similar to prior with swan in good position    ROUTINE " ICU LABS (Last four results)  CMP  Recent Labs  Lab 06/03/17  0410 06/02/17  2104 06/02/17  0904 06/02/17  0406  06/01/17  1429 05/30/17  1004     --   --  137  --  135 137   POTASSIUM 3.2* 3.9 4.6 3.2*  < > 4.7 4.8   CHLORIDE 97  --   --  97  --  95 99   CO2 29  --   --  30  --  31 31   ANIONGAP 10  --   --  10  --  9 7   *  --   --  99  --  102* 102*   BUN 58*  --   --  62*  --  65* 64*   CR 1.64*  --   --  1.69*  --  1.82* 1.73*   GFRESTIMATED 41*  --   --  39*  --  36* 38*   GFRESTBLACK 49*  --   --  48*  --  44* 47*   LEIDY 8.3*  --   --  8.6  --  9.5 9.3   MAG 2.3 2.3  --  2.3  --   --   --    PHOS 3.5 3.5  --   --   --   --   --    PROTTOTAL  --   --   --   --   --  8.0  --    ALBUMIN  --   --   --   --   --  3.4  --    BILITOTAL  --   --   --   --   --  1.2  --    ALKPHOS  --   --   --   --   --  86  --    AST  --   --   --   --   --  25  --    ALT  --   --   --   --   --  22  --    < > = values in this interval not displayed.  CBC  Recent Labs  Lab 06/03/17  0410 06/02/17  0904 06/02/17  0406 06/01/17  1429   WBC 6.0 5.4 4.8 6.5   RBC 3.30* 3.47* 3.11* 3.63*   HGB 10.4* 10.9* 9.7* 11.6*   HCT 31.6* 33.7* 29.9* 34.9*   MCV 96 97 96 96   MCH 31.5 31.4 31.2 32.0   MCHC 32.9 32.3 32.4 33.2   RDW 16.0* 15.9* 15.7* 15.9*   * 218 184 201     INR  Recent Labs  Lab 06/03/17  0410 06/02/17  0406 06/01/17  1429   INR 1.06 1.17* 1.07     Arterial Blood Gas  Recent Labs  Lab 06/03/17  0410 06/02/17  2347 06/02/17  2104 06/02/17  1528   O2PER 2L 21.0 21 21%

## 2017-06-03 NOTE — PROGRESS NOTES
06/03/17 1101   Quick Adds   Type of Visit Initial Occupational Therapy Evaluation   Living Environment   Lives With spouse   Living Arrangements house  (split entry)   Home Accessibility stairs to enter home;stairs within home   Number of Stairs to Enter Home 1   Number of Stairs Within Home 14  (9 up, 5 down)   Stair Railings at Home inside, present at both sides   Transportation Available car;family or friend will provide   Living Environment Comment Pt's spouse is retired and can assist as needed; pt's bed/bath, kitchen, and living room are on upper level therefore pt can stay on this level if needed however he does like to use family room in lower level for watching tv   Self-Care   Dominant Hand right   Usual Activity Tolerance moderate   Current Activity Tolerance fair   Regular Exercise yes   Activity/Exercise Type other (see comments)  (TM, recumbent bike, weights)   Exercise Amount/Frequency 3-5 times/wk  (can do ~15 min on each)   Equipment Currently Used at Home cane, straight;grab bar;raised toilet;shower chair;walker, rolling   Activity/Exercise/Self-Care Comment Pt typically (I) with mobility; does have both cane and walker that he uses rarely if needed   Functional Level Prior   Ambulation 0-->independent   Transferring 0-->independent   Toileting 1-->assistive equipment   Bathing 3-->assistive equipment and person   Dressing 2-->assistive person   Eating 0-->independent   Communication 0-->understands/communicates without difficulty   Swallowing 0-->swallows foods/liquids without difficulty   Cognition 0 - no cognition issues reported   Fall history within last six months yes   Number of times patient has fallen within last six months 1   Prior Functional Level Comment Pt was (I) with toileting, g/h, most dressing (spouse assists with socks/shoes at times)   General Information   Onset of Illness/Injury or Date of Surgery - Date 06/01/17   Referring Physician Graham Kemp MD   Additional  "Occupational Profile Info/Pertinent History of Current Problem Pt is a 76-year old male with a PMhx of CAD s/p multiple PCI, HFrEF (NYHA 4, stage D), and PAD who presented for evaluation of increasing dyspnea and fatigue of several weeks' duration since his discharge from [facility]. He presents for inpatient optimization of HF with invasive hemodynamic monitoring and PO vasodilators   Weight-Bearing Status - LUE full weight-bearing   Weight-Bearing Status - RUE full weight-bearing   Weight-Bearing Status - LLE full weight-bearing   Weight-Bearing Status - RLE full weight-bearing   Heart Disease Risk Factors Smoking;Dislipidemia;Medical history;Gender;Age   Cognitive Status Examination   Orientation orientation to person, place and time   Level of Consciousness alert   Able to Follow Commands WNL/WFL   Personal Safety (Cognitive) WNL/WFL   Memory intact   Cognitive Comment Pt reports noted some slower speech - \"difficulty getting thoughts out\"   Visual Perception   Visual Perception Wears glasses  (for reading)   Visual Perception Comments Pt has worsening cataracts that make it difficult to read (uses glasses and magnifying glass) and watch tv; has not caused any issues with safety during mobility   Sensory Examination   Sensory Quick Adds Other (describe)   Sensory Comments Intermittently tingling in B toes (pt attributes to edema)   Pain Assessment   Patient Currently in Pain No   Range of Motion (ROM)   ROM Comment BUE/LEs WFL   Strength   Strength Comments Pt notes subjective weakness in BLEs   Mobility   Bed Mobility Bed mobility skill: Sit to supine;Bed mobility skill: Scooting/Bridging   Bed Mobility Skill: Scooting/Bridging   Level of [place]: Scooting/Bridging independent   Bed Mobility Skill: Sit to Supine   Level of [place]: Sit/Supine independent   Transfer Skill: Sit to Stand   Level of [place]: Sit/Stand stand-by assist   Physical Assist/Nonphysical Assist: Sit/Stand verbal cues;1 person " "assist   Activities of Daily Living Analysis   Impairments Contributing to Impaired Activities of Daily Living strength decreased   General Therapy Interventions   Planned Therapy Interventions ADL retraining;IADL retraining;bed mobility training;cognition;ROM;strengthening;stretching;transfer training;home program guidelines;progressive activity/exercise;risk factor education   Clinical Impression   Criteria for Skilled Therapeutic Interventions Met yes, treatment indicated   OT Diagnosis decreased functional endurance for ADL/IADL (I)   Influenced by the following impairments weakness   Assessment of Occupational Performance 3-5 Performance Deficits   Identified Performance Deficits bathing, dressing, household tasks   Clinical Decision Making (Complexity) Moderate complexity   Therapy Frequency daily   Predicted Duration of Therapy Intervention (days/wks) 7 days   Anticipated Equipment Needs at Discharge (TBD pending LOS)   Anticipated Discharge Disposition Home with Home Therapy;Home with Outpatient Therapy;Transitional Care Facility   Risks and Benefits of Treatment have been explained. Yes   Patient, Family & other staff in agreement with plan of care Yes   [facility] TM \"6 Clicks\"   2016, Trustees of [facility], under license to [employer].  All rights reserved.   6 Clicks Short Forms Daily Activity Inpatient Short Form   [facility]  \"6 Clicks\" Daily Activity Inpatient Short Form   1. Putting on and taking off regular lower body clothing? 3 - A Little   2. Bathing (including washing, rinsing, drying)? 2 - A Lot   3. Toileting, which includes using toilet, bedpan or urinal? 3 - A Little   4. Putting on and taking off regular upper body clothing? 2 - A Lot   5. Taking care of personal grooming such as brushing teeth? 3 - A Little   6. Eating meals? 3 - A Little   Daily Activity Raw Score (Score out of 24.Lower scores equate to lower levels of function) 16   Total Evaluation " Time   Total Evaluation Time (Minutes) 12

## 2017-06-03 NOTE — PROGRESS NOTES
Patient's vitals remained stable throughout the night. MAP>65, 100% paced VVI (PPM), SpO2 >93%, afebrile, alert and oriented, approximately 1550 cc urine output during shift. Low CI 1.6-1.8, SVR ~2300; one time dose of 50 mg hydralazine and 2.5 mg minoxidil given. Minoxidil started daily. CI increase to 2.6 with SVR ~1300. CVP 18-20, PA 65-68/25-35, SvO2 45-65. On 2L NC while sleeping and room air during the day. Milrinone @ 0.375 mcg/kg/min and Lasix @ 10 mg/hr. Electrolytes replaced. Plan is for future LVAD placement. Will continue to monitor patient closely and update MDs as needed.

## 2017-06-03 NOTE — PLAN OF CARE
Problem: Goal Outcome Summary  Goal: Goal Outcome Summary     4E: OT/CR eval completed and tx initiated. Pt able to demonstrate SBA-(I) with functional transfers and mobility (primarily limited by lines/equipment). Pt and spouse educated on OT/CR role, POC, and recommendations for aerobic exercise. Pt able to complete ~10 min functional mobility with SBA (around entire 4C and 4E units) with normal vitals response (O2 >90% on RA, HR in mid 80s, pre /65, post /62; RN present for mobility to monitor PA.     Recommend pt discharge home with OPCR to increase functional endurance for ADL/IADLs.

## 2017-06-03 NOTE — PLAN OF CARE
Problem: Goal Outcome Summary  Goal: Goal Outcome Summary  PT evaluation cx and deferred. After consultation with OT, patient is independent with functional mobility and has no skilled acute PT needs.  OT to follow patient for cardiac rehab/endurance. Will complete PT order

## 2017-06-04 NOTE — PROGRESS NOTES
Cards 2 ICU Progress Note    Assessment/Plan: 76-year old male with a PMhx of CAD s/p multiple PCI, HFrEF (NYHA 4, stage D), and PAD who presented for evaluation of increasing dyspnea and fatigue of several weeks' duration since his discharge from [facility]. He presents for inpatient optimization of HF with invasive hemodynamic monitoring and PO vasodilators.     Cardiovascular  #HFrEF NYHA 4 stage D  (acute-on-chronic decompensated HF/ambulatory cardiogenic shock)  #Hx of CAD and VFib arrest  -CI has responded very well to reduction of high SVR to normal range  -He had some symptomatic hypotension with up titration of minoxidil and lasix gtt  -Pulse pressure has widened   -Close to euvolemic (hemodynamic pressures on swan were lower when SVR was lower)  -minoxidil 5 bid -> 2.5 bid due to lower MAPs with sx this am  -hold lasix gtt, anticipate will start home diuretics tomorrow  -hold home KCl 40 tid, follow BMPs  -cont hydralazine 100 q4, imdur 120 qday and home coreg 6.25 bid  -continue aspirin 81, clopidogrel 75 mg q24h    No other active system's issues    Chronic/inactive issues:   - PAD: continue risk factor management as above   - BPH: tolterodine   - Alcohol dependence: continue thiamine  - Hypothyroidism: continue home levothyroxine     Prophylaxis:     - DVT: heparin subq   - GI: eating   - Aspiration: normal mentation    Dispo: likely d/c tomorrow    Patient seen and discussed with Dr. Mayur Martinez PGY2  [phone]      I have reviewed today's vital signs, notes, medications, labs and imaging. I have also seen and examined the patient and agree with the findings and plan as outlined above.    Charity Merchant MD  Section Head - Advanced Heart Failure, Transplantation and Mechanical Circulatory Support  Co-Director - Adult Congenital and Cardiovascular Genetics Center  Associate Professor of Medicine, [facility]      ==========================================================  Interval  "Events/Subjective: Dizzy this morning with SBPs to 90s. Minoxodil initially held but later given as BPs recovered. ROS negative for fevers, rigors, dyspnea, n/v/c/d, dysuria.     Objective  Blood pressure 94/48, pulse 80, temperature 98.3  F (36.8  C), temperature source Oral, resp. rate 16, height 1.727 m (5' 8\"), weight 66.1 kg (145 lb 11.2 oz), SpO2 99 %.  Resp: 16    Physical Exam  gen- elderly man, sitting in bed, comfortable  pulm- cta, symmetric, non labored  cv- loud s2, no m/r/g,   abdmn- soft, nt, nd  extrm- warmer than on admission, edema 2+ up to knees     Labs reviewed  No new EKG  Imaging reviewed, none new    ROUTINE ICU LABS (Last four results)  CMP  Recent Labs  Lab 06/04/17  0715 06/03/17  1915 06/03/17  1113 06/03/17  0410 06/02/17  2104  06/02/17  0406  06/01/17  1429    134  --  136  --   --  137  --  135   POTASSIUM 4.5 4.6 4.0 3.2* 3.9  < > 3.2*  < > 4.7   CHLORIDE 97 95  --  97  --   --  97  --  95   CO2 26 31  --  29  --   --  30  --  31   ANIONGAP 10 8  --  10  --   --  10  --  9   * 140*  --  107*  --   --  99  --  102*   BUN 69* 64*  --  58*  --   --  62*  --  65*   CR 2.23* 1.84*  --  1.64*  --   --  1.69*  --  1.82*   GFRESTIMATED 29* 36*  --  41*  --   --  39*  --  36*   GFRESTBLACK 35* 43*  --  49*  --   --  48*  --  44*   LEIDY 9.0 8.6  --  8.3*  --   --  8.6  --  9.5   MAG 2.5* 2.3  --  2.3 2.3  --  2.3  < >  --    PHOS  --   --   --  3.5 3.5  --   --   --   --    PROTTOTAL  --   --   --   --   --   --   --   --  8.0   ALBUMIN  --   --   --   --   --   --   --   --  3.4   BILITOTAL  --   --   --   --   --   --   --   --  1.2   ALKPHOS  --   --   --   --   --   --   --   --  86   AST  --   --   --   --   --   --   --   --  25   ALT  --   --   --   --   --   --   --   --  22   < > = values in this interval not displayed.  CBC    Recent Labs  Lab 06/04/17  0715 06/03/17  0410 06/02/17  0904 06/02/17  0406   WBC 6.2 6.0 5.4 4.8   RBC 3.13* 3.30* 3.47* 3.11*   HGB 9.9* 10.4* " 10.9* 9.7*   HCT 29.9* 31.6* 33.7* 29.9*   MCV 96 96 97 96   MCH 31.6 31.5 31.4 31.2   MCHC 33.1 32.9 32.3 32.4   RDW 16.1* 16.0* 15.9* 15.7*    132* 218 184     INR    Recent Labs  Lab 06/04/17  0715 06/03/17  0410 06/02/17  0406 06/01/17  1429   INR 1.14 1.06 1.17* 1.07     Arterial Blood Gas    Recent Labs  Lab 06/04/17  0715 06/03/17  1431 06/03/17  1113 06/03/17  0928   O2PER 21.0 21.0 21.0 Duplicate request Charge creditedCORRECTED ON 06/03 AT 0945: PREVIOUSLY REPORTED AS 21.0  21.0

## 2017-06-04 NOTE — PLAN OF CARE
Problem: Goal Outcome Summary  Goal: Goal Outcome Summary  Outcome: No Change  D: Paced with occ. PACs and PVCs, HR 70s-90s. BP was low this AM (see flowsheet). Confirmed with MDs to hold hydralazine and minoxidil. Administered when BP was stable.   I: Lasix infsion d/c'd this afternoon. Milrinone continues through 2L PICC.   A: Pt eating, denies pain, and up to bathroom independently.   P: Continue to monitor. Notify MDs as needed.

## 2017-06-04 NOTE — PROGRESS NOTES
[id number]    Patient A&O X 4. VSS, HR paced in 90s. Denies any pain, dizziness, lightheadedness. On lasix gtt at 10mg/hr and home milrinone dose at 0.375mcg/kg/min. Good appetite. Will continue to monitor and notify team of any questions or concerns.

## 2017-06-05 NOTE — PROGRESS NOTES
Cards 2 ICU Progress Note    Assessment/Plan: 76-year old male with a PMhx of CAD s/p multiple PCI, HFrEF (NYHA 4, stage D), and PAD who presented for evaluation of increasing dyspnea and fatigue of several weeks' duration since his discharge from [facility]. He presents for inpatient optimization of HF with invasive hemodynamic monitoring and PO vasodilators.     Cardiovascular  #HFrEF NYHA 4 stage D  (acute-on-chronic decompensated HF/ambulatory cardiogenic shock)  #Hx of CAD and VFib arrest  -CI has responded very well to reduction of high SVR to normal range  -He had some symptomatic hypotension with up titration of minoxidil and lasix gtt  -Pulse pressure has widened   -cont home milrinone gtt  -hold PM minoxidil dose  -hold lasix gtt, Cr still up trending   -hold home KCl 40 tid, follow BMPs  -cont hydralazine 100 QID -> TID  -cont imdur 120 qday and home coreg 6.25 bid  -continue aspirin 81, clopidogrel 75 mg q24h    #JACINTO- due to increased SVR and diuresis. Possibly with concomitant increase in effective milrinone dose. As above. SVO2 72 arguing against low flow state from cardiac etiology.     No other active system's issues    Chronic/inactive issues:   - PAD: continue risk factor management as above   - BPH: tolterodine   - Alcohol dependence: continue thiamine  - Hypothyroidism: continue home levothyroxine     Prophylaxis:     - DVT: heparin subq   - GI: eating   - Aspiration: normal mentation    Dispo: will need to see Cr down trend, recommended to have OP PT    Patient seen and discussed with Dr. Joe Martinez PGY2      ==========================================================  Interval Events/Subjective: K 5.5 yesterday, EKG with no peaks, given CaGluc and kyaxalate. Not dizzy this AM. ROS negative for fevers, rigors, dyspnea, n/v/c/d, dysuria. Worries about low UOP.     Objective  Blood pressure 111/63, pulse 80, temperature 98.1  F (36.7  C), temperature source Oral, resp. rate 16,  "height 1.727 m (5' 8\"), weight 68.4 kg (150 lb 11.2 oz), SpO2 98 %.  Resp: 16    Physical Exam  gen- elderly man, sitting in bed, comfortable  pulm- cta, symmetric, non labored  cv- loud s2, no m/r/g, jvp 6cm   abdmn- soft, nt, nd  extrm- warmer than on admission, edema 2+ up to knees     Labs reviewed  EKG reviewed, no peaked T waves  Imaging reviewed, none new    ROUTINE ICU LABS (Last four results)  CMP  Recent Labs  Lab 06/05/17  0530 06/05/17  0025 06/04/17  1800 06/04/17  0715 06/03/17  1915  06/03/17  0410 06/02/17  2104  06/01/17  1429   *  --  134 133 134  --  136  --   < > 135   POTASSIUM 5.0 5.5* 5.1 4.5 4.6  < > 3.2* 3.9  < > 4.7   CHLORIDE 94  --  96 97 95  --  97  --   < > 95   CO2 27  --  28 26 31  --  29  --   < > 31   ANIONGAP 9  --  9 10 8  --  10  --   < > 9   *  --  154* 108* 140*  --  107*  --   < > 102*   BUN 84*  --  76* 69* 64*  --  58*  --   < > 65*   CR 2.92*  --  2.58* 2.23* 1.84*  --  1.64*  --   < > 1.82*   GFRESTIMATED 21*  --  24* 29* 36*  --  41*  --   < > 36*   GFRESTBLACK 25*  --  29* 35* 43*  --  49*  --   < > 44*   LEIDY 9.0  --  8.8 9.0 8.6  --  8.3*  --   < > 9.5   MAG 2.4*  --   --  2.5* 2.3  --  2.3 2.3  < >  --    PHOS  --   --   --   --   --   --  3.5 3.5  --   --    PROTTOTAL  --   --   --   --   --   --   --   --   --  8.0   ALBUMIN  --   --   --   --   --   --   --   --   --  3.4   BILITOTAL  --   --   --   --   --   --   --   --   --  1.2   ALKPHOS  --   --   --   --   --   --   --   --   --  86   AST  --   --   --   --   --   --   --   --   --  25   ALT  --   --   --   --   --   --   --   --   --  22   < > = values in this interval not displayed.  CBC    Recent Labs  Lab 06/04/17  0715 06/03/17  0410 06/02/17  0904 06/02/17  0406   WBC 6.2 6.0 5.4 4.8   RBC 3.13* 3.30* 3.47* 3.11*   HGB 9.9* 10.4* 10.9* 9.7*   HCT 29.9* 31.6* 33.7* 29.9*   MCV 96 96 97 96   MCH 31.6 31.5 31.4 31.2   MCHC 33.1 32.9 32.3 32.4   RDW 16.1* 16.0* 15.9* 15.7*    132* 218 184 "     INR    Recent Labs  Lab 06/04/17  0715 06/03/17  0410 06/02/17  0406 06/01/17  1429   INR 1.14 1.06 1.17* 1.07     Arterial Blood Gas    Recent Labs  Lab 06/05/17  0530 06/04/17  0715 06/03/17  1431 06/03/17  1113   O2PER 21.0 21.0 21.0 21.0

## 2017-06-05 NOTE — PLAN OF CARE
Problem: Goal Outcome Summary  Goal: Goal Outcome Summary  Outcome: No Change  D/I: Pt in with HF continues on milrinone drip. BPs low but stable with MAPs > 65. Creat, BUN, and K+ increasing, MDs alerted. On FR, small voids. Paced, on RA, moves well, up ad amarilis.  A: Stable with labs slightly worsening.  P: Monitor and assist as needed.

## 2017-06-05 NOTE — PLAN OF CARE
Problem: Goal Outcome Summary  Goal: Goal Outcome Summary  OT/CR/6C:  Facilitated functional mobility ~250' X2, requires 1 standing rest break on each bout of ambulation due to LE weakness.  Tolerates 7 continuous minutes on NuStep before endorsing fatigue and pt ascends/descends 4 stairs with SBA and single rail.  Issued HEP for LE strengthening due to c/o weakness and difficulty performing household and community-distance navigation.  Limited by fatigue, strength, and endurance.       REC:  Home with assist from wife PRN.  Pt would benefit from OP PT for continued therapy to address deficits in LE strength and endurance.

## 2017-06-05 NOTE — PLAN OF CARE
Problem: Goal Outcome Summary  Goal: Goal Outcome Summary  Outcome: Declining  D/A: Pt admitted with heart failure.  Milrinone gtt cont at 0.375 mcg/kg/min. Potassium level at MN was 5.5, up from 5.1.  U.O. Has been declining pt stated. Last BUM was 76, Cr. 2.58.  Lasix gtt was discontinued 6/4 noon. Wt up 5 lbs from yesterday. Lungs with crackles bases, dim breath sounds on the Left lower lobe. +2 bilat LE edema. Afebrile, /56. 97% sats on room air.   I: HR and rhythm monitored. MD notified about K+. EKG ordered.  1gm Calcium Gluconate IV  and 15 gms Kayexalate  PO ordered and given at 0230. Cares as needed. Blood drawn as ordered.  R: Pt denies any complaints. Hemodynamically stable. Kidney function decreasing.  PLAN: Draw am labs and assess results.

## 2017-06-06 NOTE — PLAN OF CARE
Problem: Goal Outcome Summary  Goal: Goal Outcome Summary  OT: REC Home with assist as needed.  Pt presents with general deconditioning, decreased activity tolerance and functional endurance resulting in decreased ADL I.  Pt states that he has less energy today.  Therapist encouraged Pt to ambulated and Pt able to ambulate a total of ~200 ft with SBA, using a IV pole as assistive device and requiring one long standing rest break.

## 2017-06-06 NOTE — PROGRESS NOTES
"Cards 2 ICU Progress Note    Assessment/Plan: 76-year old male with a PMhx of CAD s/p multiple PCI, HFrEF (NYHA 4, stage D), and PAD who presented for evaluation of increasing dyspnea and fatigue of several weeks' duration since his discharge from [facility]. He presents for inpatient optimization of HF with invasive hemodynamic monitoring and PO vasodilators.     Cardiovascular  #HFrEF NYHA 4 stage D  (acute-on-chronic decompensated HF/ambulatory cardiogenic shock)  #Hx of CAD and VFib arrest  #JACINTO  -CI has responded very well to reduction of high SVR to normal range  -He had some symptomatic hypotension with up titration of minoxidil and lasix gtt  -Pulse pressure has widened   -Cr continues to up trend likely due to MAP drop below what the kidneys had adapted to  -cont home milrinone gtt  -minoxidil 2.5 bid -> qday  -volume up so start lasix 1x iv then lasix gtt  -hold home KCl 40 tid, follow BMPs  -cont hydralazine TID  -cont imdur 120 qday and home coreg 6.25 bid  -continue aspirin 81, clopidogrel 75 mg q24h    No other active system's issues    Chronic/inactive issues:   - PAD: continue risk factor management as above   - BPH: tolterodine   - Alcohol dependence: continue thiamine  - Hypothyroidism: continue home levothyroxine     Prophylaxis:     - DVT: heparin subq   - GI: eating   - Aspiration: normal mentation    Dispo: will need to see Cr down trend, recommended to have OP PT    Patient seen and discussed with Dr. Joe Martinez PGY2      ==========================================================  Interval Events/Subjective: No overnight events. ROS negative for fevers, rigors, dyspnea, n/v/c/d, dysuria. Worries about low UOP.     Objective  Blood pressure 101/57, pulse 88, temperature 98.3  F (36.8  C), temperature source Oral, resp. rate 16, height 1.727 m (5' 8\"), weight 70 kg (154 lb 6.4 oz), SpO2 99 %.  Resp: 16    Physical Exam  gen- elderly man, sitting in bed, comfortable  pulm- cta, " symmetric, non labored  cv- loud s2, no m/r/g, jvp 14cm   abdmn- soft, nt, nd  extrm- warm, edema 2+ up to knees     Labs reviewed  EKG none new  Imaging reviewed, none new    ROUTINE ICU LABS (Last four results)  CMP  Recent Labs  Lab 06/06/17  0440 06/05/17  1540 06/05/17  0530 06/05/17  0025 06/04/17  1800 06/04/17  0715  06/03/17  0410 06/02/17  2104  06/01/17  1429   * 130* 130*  --  134 133  < > 136  --   < > 135   POTASSIUM 4.9 4.7 5.0 5.5* 5.1 4.5  < > 3.2* 3.9  < > 4.7   CHLORIDE 91* 92* 94  --  96 97  < > 97  --   < > 95   CO2 25 26 27  --  28 26  < > 29  --   < > 31   ANIONGAP 14 11 9  --  9 10  < > 10  --   < > 9   * 176* 124*  --  154* 108*  < > 107*  --   < > 102*   BUN 88* 86* 84*  --  76* 69*  < > 58*  --   < > 65*   CR 3.33* 3.06* 2.92*  --  2.58* 2.23*  < > 1.64*  --   < > 1.82*   GFRESTIMATED 18* 20* 21*  --  24* 29*  < > 41*  --   < > 36*   GFRESTBLACK 22* 24* 25*  --  29* 35*  < > 49*  --   < > 44*   LEIDY 8.9 8.6 9.0  --  8.8 9.0  < > 8.3*  --   < > 9.5   MAG 2.5* 2.4* 2.4*  --   --  2.5*  < > 2.3 2.3  < >  --    PHOS  --   --   --   --   --   --   --  3.5 3.5  --   --    PROTTOTAL  --   --   --   --   --   --   --   --   --   --  8.0   ALBUMIN  --   --   --   --   --   --   --   --   --   --  3.4   BILITOTAL  --   --   --   --   --   --   --   --   --   --  1.2   ALKPHOS  --   --   --   --   --   --   --   --   --   --  86   AST  --   --   --   --   --   --   --   --   --   --  25   ALT  --   --   --   --   --   --   --   --   --   --  22   < > = values in this interval not displayed.  CBC    Recent Labs  Lab 06/04/17 0715 06/03/17 0410 06/02/17 0904 06/02/17 0406   WBC 6.2 6.0 5.4 4.8   RBC 3.13* 3.30* 3.47* 3.11*   HGB 9.9* 10.4* 10.9* 9.7*   HCT 29.9* 31.6* 33.7* 29.9*   MCV 96 96 97 96   MCH 31.6 31.5 31.4 31.2   MCHC 33.1 32.9 32.3 32.4   RDW 16.1* 16.0* 15.9* 15.7*    132* 218 184     INR    Recent Labs  Lab 06/04/17 0715 06/03/17 0410 06/02/17  0406  06/01/17  1429   INR 1.14 1.06 1.17* 1.07     Arterial Blood Gas    Recent Labs  Lab 06/06/17  0820 06/05/17  0530 06/04/17  0715 06/03/17  1431   O2PER 21.0 21.0 21.0 21.0

## 2017-06-06 NOTE — PLAN OF CARE
Problem: Goal Outcome Summary  Goal: Goal Outcome Summary  Outcome: No Change  D/I/A:  Pt here with HF exacerbation, on home milrinone.  Slept between cares.  Denies pain, VSS.      P:  Possible dc pending labs, med optimization.  Update md's with changes/concerns.

## 2017-06-06 NOTE — PLAN OF CARE
Problem: Goal Outcome Summary  Goal: Goal Outcome Summary  Outcome: No Change     D/I/A:  AOx4 in room, somewhat lethargic. V-paced (70's - 90's), SaO2 stable on RA. Pt denied pain, nausea. K stable at 4.9 (down from high of 5.5 on 6/5). Cr continues trend up (3.33). Lasix gtt restarted at 5 mg/hr w/ 20 mg bolus. Milrinone gtt maintained and monitored at 0.375 mcg/kg/min. Pt worked w/ rehab 1x.     Plan:  Monitor HR/R, kidney function, and lytes. Encourage safe ambulation. Notify provider of changes in prep for DC once kidney function stabilizes. BMP to be checked this PM.         Problem: Cardiac: Heart Failure (Adult)  Goal: Signs and Symptoms of Listed Potential Problems Will be Absent or Manageable (Cardiac: Heart Failure)  Signs and symptoms of listed potential problems will be absent or manageable by discharge/transition of care (reference Cardiac: Heart Failure (Adult) CPG).   Outcome: No Change    06/06/17 1500   Cardiac: Heart Failure   Problems Assessed (Heart Failure) all   Problems Present (Heart Failure) cardiac pump dysfunction;decreased quality of life;fluid/electrolyte imbalance;functional decline/self-care deficit;situational response

## 2017-06-06 NOTE — PLAN OF CARE
Problem: Goal Outcome Summary  Goal: Goal Outcome Summary  Pt AOx4 VSS. Pt had average urine output. Pt requested a different room due to room mate, relayed to charge nurse. No pain or SOB. Good appetite. Milrinone continues at 0.375. Will continue with care plan, continue to monitor and notify MD's of any changes.

## 2017-06-07 NOTE — PROGRESS NOTES
"Cards 2 ICU Progress Note    Assessment/Plan: 76-year old male with a PMhx of CAD s/p multiple PCI, HFrEF (NYHA 4, stage D), and PAD who presented for evaluation of increasing dyspnea and fatigue of several weeks' duration since his discharge from [facility]. He presents for inpatient optimization of HF with invasive hemodynamic monitoring and PO vasodilators.     Cardiovascular  #HFrEF NYHA 4 stage D  (acute-on-chronic decompensated HF/ambulatory cardiogenic shock)  #Hx of CAD and VFib arrest  #JACINTO  -CI responded very well to reduction of high SVR to normal range but Cr continues to up trend despite scaling both back and is now volume overloaded. [place] re-placed. SVR low. CI okay. Appears volume up.  -cont home milrinone gtt  -hold minoxidil, hydralazine, imdur  -lasix 15 gtt, dobutamine 2.5  -hold home KCl 40 tid, follow BMPs  -cont home coreg 6.25 bid  -continue aspirin 81, clopidogrel 75 mg q24h    No other active system's issues    Chronic/inactive issues:   - PAD: continue risk factor management as above   - BPH: tolterodine   - Alcohol dependence: continue thiamine  - Hypothyroidism: continue home levothyroxine     Prophylaxis:     - DVT: heparin subq   - GI: eating   - Aspiration: normal mentation    Dispo: ICU for hemodynamics, recommended to have OP PT    Patient seen and discussed with Dr. Joe Martinez PGY2      ==========================================================  Interval Events/Subjective: No overnight events. ROS negative for fevers, rigors, dyspnea, n/v/c/d, dysuria. Wife and patient understandably very concerned about his course given the continued climb in creatinine.     Objective  Blood pressure 102/54, pulse 88, temperature 97.7  F (36.5  C), temperature source Oral, resp. rate 18, height 1.727 m (5' 8\"), weight 71.2 kg (157 lb), SpO2 97 %.  Resp: 18    Physical Exam  gen- elderly man, sitting in bed, comfortable  pulm- cta, symmetric, non labored  cv- loud s2, no m/r/g, jvp " 10-12cm   abdmn- soft, nt, nd  extrm- warm, edema 2+ up to knees     Labs reviewed  EKG none new  Imaging reviewed    ROUTINE ICU LABS (Last four results)  CMP  Recent Labs  Lab 06/07/17  0630 06/06/17  1845 06/06/17  0440 06/05/17  1540 06/05/17  0530  06/03/17  0410 06/02/17  2104  06/01/17  1429   * 127* 129* 130* 130*  < > 136  --   < > 135   POTASSIUM 4.3 4.8 4.9 4.7 5.0  < > 3.2* 3.9  < > 4.7   CHLORIDE 88* 89* 91* 92* 94  < > 97  --   < > 95   CO2 24 26 25 26 27  < > 29  --   < > 31   ANIONGAP 14 12 14 11 9  < > 10  --   < > 9   * 159* 113* 176* 124*  < > 107*  --   < > 102*   * 96* 88* 86* 84*  < > 58*  --   < > 65*   CR 3.43* 3.38* 3.33* 3.06* 2.92*  < > 1.64*  --   < > 1.82*   GFRESTIMATED 17* 18* 18* 20* 21*  < > 41*  --   < > 36*   GFRESTBLACK 21* 21* 22* 24* 25*  < > 49*  --   < > 44*   LEIDY 8.7 8.8 8.9 8.6 9.0  < > 8.3*  --   < > 9.5   MAG 2.5*  --  2.5* 2.4* 2.4*  < > 2.3 2.3  < >  --    PHOS  --   --   --   --   --   --  3.5 3.5  --   --    PROTTOTAL  --   --   --   --   --   --   --   --   --  8.0   ALBUMIN  --   --   --   --   --   --   --   --   --  3.4   BILITOTAL  --   --   --   --   --   --   --   --   --  1.2   ALKPHOS  --   --   --   --   --   --   --   --   --  86   AST  --   --   --   --   --   --   --   --   --  25   ALT  --   --   --   --   --   --   --   --   --  22   < > = values in this interval not displayed.  CBC    Recent Labs  Lab 06/04/17  0715 06/03/17  0410 06/02/17  0904 06/02/17  0406   WBC 6.2 6.0 5.4 4.8   RBC 3.13* 3.30* 3.47* 3.11*   HGB 9.9* 10.4* 10.9* 9.7*   HCT 29.9* 31.6* 33.7* 29.9*   MCV 96 96 97 96   MCH 31.6 31.5 31.4 31.2   MCHC 33.1 32.9 32.3 32.4   RDW 16.1* 16.0* 15.9* 15.7*    132* 218 184     INR    Recent Labs  Lab 06/04/17  0715 06/03/17  0410 06/02/17  0406 06/01/17  1429   INR 1.14 1.06 1.17* 1.07     Arterial Blood Gas    Recent Labs  Lab 06/06/17  0820 06/05/17  0530 06/04/17  0715 06/03/17  1431   O2PER 21.0 21.0 21.0 21.0

## 2017-06-07 NOTE — PLAN OF CARE
Problem: Individualization  Goal: Patient Preferences  D: Patient up from cath lab at 1700 with R SWAN in place at 55. C/o of neck pain. Lung sounds clear on RA. BP 115s/60s. CO 4.2, 100% V paced with ICD. On Lasix drip, low urine output despite gtt. Also on dobutamine and milrinone gtt. Up to chair with 1 assist to eat dinner. On 2L fluid restriction.   I/A: HOWARD numbers done, lasix gtt increased.   P: Continue to monitor FICKs and treat as needed. Continue poc

## 2017-06-07 NOTE — PLAN OF CARE
Problem: Goal Outcome Summary  Goal: Goal Outcome Summary  Outcome: No Change  D/I: Pt with HF continues on milrinone and lasix drips. VSS, V-paced 70s-90s. 300 cc u/o this shift. Trace LE edema. Last K+ 4.8, kidney labs continuing to climb slowly. Ambulated well in the [place] with PT but refused another walk because of fatigue.   A: Stable.  P: Monitor and assist as needed. Careful I&0.

## 2017-06-07 NOTE — PLAN OF CARE
"Problem: Goal Outcome Summary  Goal: Goal Outcome Summary  Outcome: Declining  /54 (BP Location: Left arm)  Pulse 88  Temp 97.7  F (36.5  C) (Oral)  Resp 18  Ht 1.727 m (5' 8\")  Wt 71.2 kg (157 lb)  SpO2 97%  BMI 23.87 kg/m2     Pt is not producing adequate urine.  Transferring to unit for more acute care.   Lasix and Milrinone continued Dobutamine added - BP  Maintained in the 113s - 60s with maps in the 70-80s.  Abdominal and bilateral edema has become more uncomfortable for pt.  No shortness of breath on room air, increased muscle weakness.  Level of consciousness through day lethargic.     Pt wife Annia was bedside through the day, is a retired ER nurse and she was both supportive of cares and interested in information related to prognosis.      "

## 2017-06-07 NOTE — PLAN OF CARE
Problem: Goal Outcome Summary  Goal: Goal Outcome Summary     6C: Cxl - Pt not appropriate for therapies - transferring down to ICU for closer monitoring.

## 2017-06-08 NOTE — PLAN OF CARE
Problem: Goal Outcome Summary  Goal: Goal Outcome Summary  Outcome: No Change  Shift durration: 1900-0730     LOC: alert, oriented x4.  Able to make needs known.  Neuro: Grossly intact.  PERRLA 3mm each. No notable deficits.  Cards: 100% V-paced.  CVP ~20.  PA ~64/30.  CO ~5.8, CI ~3.1, SVO2 ~64.  See Lamberto charting for specifications. Pt on milrinone, decreased from 0.375 to 0.25 with minimal notable change to Lamberto calculations.    Pulm: LS course, diminished in bases.  Moderate, thick, yellow/white sputum with productive cough.  GI/: 2Gm Na+ diet, 2L fl restriction.  Pt compliant with diet requirements.  Pt on furosemide gtt and was given a bolus of 80mg lasix in addition to gtt.  Low UOP ~37/hr using urinal, Cards 2 aware.  Skin: intact, fragile.  Mobility: SBA.  Vasc access: R SAVANA Cordis with [place].  R PICC.     Special/Event:  Cordis secured with tape, at ~55.  Milrinone gtt decreased and lamberto calculations continued q4H.  Pt complains of stiff neck, continue ache-ease aromatherapy and hot packs.

## 2017-06-08 NOTE — PROGRESS NOTES
[facility]  CARDIOLOGY DAILY PROGRESS NOTE    ASSESSMENT:    76-year old male with     - Acute on chronic decompensated heart failure with ambulatory cardiogenic shock  - Heart failure with reduced ejection fraction, NYHA Class IIID  - Acute kidney injury probably related to ATN from afterload reduction (has relatively low urine sp gravity)  - Coronary artery disease s/p multiple PCIs  - Peripheral arterial diseae   - Hyponatremia    PLAN:   - [place] numbers from today showed wedge 20, RA 15, PA 60/30/45, PA sat 64%, CO 5.7, CI 3.2.  -  Started on dobutamine yesterday to improve cardiac output. CI remained stable overnight.   -  Started on aggressive diuresis furosemide 15 per hour with a bolus. Overnight UOP was not great and received 80 mg iv lasix this AM and had 250 out. Has had diuril and he is responding somehow with 1.3 L but intake close to 1.4 L. Will plan for UF tomorrow if output remains low.   -  Repeat a dose of diuril 500 now.   -  Renal USG to rule out any obstruction.   -  Discontinued milrinone (was cut down to 0.25 yesterday and stopped at 6 AM today considering low SVR and elevated cr)  -  Holding afterload reducing agents minoxidil, imdur and hydralazine.   -  BB dced secondary to starting patient on dobutamine.   -  On asa and plavix.   -  BPH: tolterodine (bladder relaxant, no alpha antagonist property like tamsulosin)  -  Alcohol dependence: continue thiamine, needs to wait for 4 more months to be evaluated as a candidate for advanced therapy.   -  Hypothyroidism: continue home levothyroxine     Prophylaxis:     - DVT: heparin subq    Dispo: ICU for hemodynamics, recommended to have OP PT    Patient seen and discussed with Dr. Joe Umaña MD    ==========================================================  Interval Events/Subjective: Creatinine continued to climb, felt very short of breath. RHC done showing elevated right and left sided filling  "pressures. Held afterload agents and diuresed with lasix gtt. He has had low SVR overnight and milrinone was dropped to 0.25 and then stopped. He was continued on dobutamine and was given a dose of diuril 500, got lasix 80 mg IV once.     Objective  Blood pressure 101/57, pulse 88, temperature 98  F (36.7  C), temperature source Oral, resp. rate 17, height 1.727 m (5' 8\"), weight 71.8 kg (158 lb 4.6 oz), SpO2 95 %.  Resp: 17    Physical Exam  gen- elderly man, sitting in bed, comfortable  pulm- cta, symmetric, non labored  cv- S1 S2 heard, ESM in the mitral area, split P2. loud, jvp 12 cm   abdmn- soft, nt, nd  extrm- warm, edema 2+    Labs reviewed  EKG none new  Imaging reviewed      Objective  Temp:  [97.7  F (36.5  C)-98.3  F (36.8  C)] 98.3  F (36.8  C)  Heart Rate:  [71-83] 81  Resp:  [8-27] 19  BP: ()/(49-66) 105/56  SpO2:  [89 %-98 %] 94 %    [place] numbers:   wedge 20, RA 15, PA 60/30/45, PA sat 64%, CO 5.7, CI 3.2.  Intake/Output Summary (Last 24 hours) at 06/08/17 0659    Wt Readings from Last 5 Encounters:   06/08/17 71.1 kg (156 lb 12 oz)   05/26/17 65.7 kg (144 lb 12.8 oz)   05/19/17 66.5 kg (146 lb 9.6 oz)   05/12/17 65.8 kg (145 lb)   05/04/17 66.8 kg (147 lb 4.8 oz)           GEN: Well-appearing, mobile, obese  CV: RRR, no m/r/g, JVD 10 cm, extremities wwp, femoral and distal pulses intact in b/l LE; trace peripheral edema at b/l ankles  PULM: mild wheezes throughout and crackles at b/l bases  ABD: soft, NT/ND    Current Medications        Lab Values  Labs have been reviewed  BMP  Recent Labs  Lab 06/08/17  0526 06/07/17  1852 06/07/17  0630 06/06/17  1845   * 124* 126* 127*   POTASSIUM 4.0 4.5 4.3 4.8   CHLORIDE 87* 86* 88* 89*   LEIDY 8.5 8.7 8.7 8.8   CO2 23 24 24 26   * 103* 101* 96*   CR 3.51* 3.50* 3.43* 3.38*   * 125* 109* 159*     LFTs  Recent Labs  Lab 06/01/17  1429   ALKPHOS 86   AST 25   ALT 22   BILITOTAL 1.2   PROTTOTAL 8.0   ALBUMIN 3.4      CBC  Recent Labs  Lab " 06/08/17  0526 06/07/17  2203 06/04/17  0715 06/03/17  0410 06/02/17  0904   WBC  --  5.6 6.2 6.0 5.4   RBC  --  2.83* 3.13* 3.30* 3.47*   HGB  --  9.0* 9.9* 10.4* 10.9*   HCT  --  25.9* 29.9* 31.6* 33.7*   MCV  --  92 96 96 97   MCH  --  31.8 31.6 31.5 31.4   MCHC  --  34.7 33.1 32.9 32.3   RDW  --  15.6* 16.1* 16.0* 15.9*    155 152 132* 218     INR  Recent Labs  Lab 06/04/17  0715 06/03/17  0410 06/02/17  0406 06/01/17  1429   INR 1.14 1.06 1.17* 1.07     TpnInvalid input(s): TPN    Terese w/ MD Jose Alfredo Diaz MD  Cardiology Fellow  June 8, 2017

## 2017-06-09 NOTE — PLAN OF CARE
Problem: Goal Outcome Summary  Goal: Goal Outcome Summary  Outcome: No Change  D: Pt admitted with heart failure and acute decompensation. On milrinone, dobutamine, and lasix gtts  I/A: DC'd milrinome gtt, increased dobutamine dose. Max concentrated lasix and dobutamine gtts to decrease volumes. See Lauren numbers for changes. Diuril started. Midline cath placed for CHF solutions to be started if UO does not met minimum goal. Goal 2L UO/day  P: Continue Lauren numbers per cards 2 team. Monitor UO. Maintain fluid drestriction.

## 2017-06-09 NOTE — PLAN OF CARE
Problem: Goal Outcome Summary  Goal: Goal Outcome Summary  Outcome: Improving  DI: VSS. Pleasant and co-op. Communicates needs without difficulty. Voiding well. Afebrile. Up to bedside commode with very steady and balanced gait. Denies SOB. Denies chest, shoulder, arm, pain or discomfort.  AP: Cont POC. Monitor. Cares as appropriate.

## 2017-06-09 NOTE — PLAN OF CARE
Problem: Goal Outcome Summary  Goal: Goal Outcome Summary     4C: Recommend discharge home with OPCR to increase functional endurance for ADL/IADLs. Pt able to complete ~350ft functional mobility with close SBA using FWW with therapist and RN present throughout. Pt's O2 sats remained >90% on RA - pt denied SOB. Pt's HR paced at 70; BP pre 115/72, post 121/77. Pt issued HEP for independent completion as tolerated.

## 2017-06-09 NOTE — PROGRESS NOTES
Cards 2 ICU Progress Note    Assessment/Plan: 76-year old male with a PMhx of CAD s/p multiple PCI, HFrEF (NYHA 4, stage D), and PAD who presented for evaluation of increasing dyspnea and fatigue of several weeks' duration since his discharge from [facility]. He presents for inpatient optimization of HF with invasive hemodynamic monitoring and PO vasodilators. Found have have hypervolemia and elevated SVR. Initially responded well to vasodilation and diuresis and swan removed but Cr trended up. [place] re-placed and vasodilatory medications were scaled back, found to again be hypervolemic. Currently diuresing and may re-introduce vasodilatory medications very slowly.     Cardiovascular  #HFrEF NYHA 4 stage D    #Acute-on-chronic decompensated HF  #Ambulatory cardiogenic shock - resolved   #Hx of CAD and VFib arrest  #JACINTO- Cr slightly improved   -holding home milrinone gtt, hydralazine, imdur, coreg   -holding minoxidil that we had started   -dobutamine gtt  -lasix gtt at 15, gave 1x diuril this AM  -rechecking PM hemodynamics and deciding on possible low dose vasodilator if SVR remains very high   -hold home KCl 40 tid, follow BMPs  -continue aspirin 81, clopidogrel 75 mg q24h    #Hypervolemic Hyponatremia- diuresing     #Indeterminate L Kidney Lesion- seen on 6/8/17. Can order follow up after acute issues are addressed.    No other active system's issues    Chronic/inactive issues:   - PAD: continue risk factor management as above   - BPH: tolterodine   - Alcohol dependence: continue thiamine  - Hypothyroidism: continue home levothyroxine     Prophylaxis:     - DVT: heparin subq   - GI: eating   - Aspiration: normal mentation    Dispo: ICU for hemodynamics, recommended to have OP PT    Patient seen and discussed with Dr. Joe Martinez PGY2  [phone]    ==========================================================  Interval Events/Subjective: No overnight events. ROS negative for fevers, rigors, dyspnea, n/v/c/d,  "dysuria. Ample urine overnight.     Objective  Blood pressure 115/72, pulse 88, temperature 96.8  F (36  C), temperature source Axillary, resp. rate 21, height 1.727 m (5' 8\"), weight 69.7 kg (153 lb 10.6 oz), SpO2 100 %.  Resp: 21    Physical Exam  gen- elderly man, sitting in bed, comfortable  pulm- cta, symmetric, non labored  cv- loud s2, no m/r/g, jvp 10cm   abdmn- soft, nt, nd  extrm- warm, edema 2+ up to knees     Labs reviewed  EKG none new  Imaging reviewed, swan in good position     ROUTINE ICU LABS (Last four results)  CMP  Recent Labs  Lab 06/09/17  0514 06/08/17  2043 06/08/17  1413 06/08/17  0526  06/07/17  0630  06/06/17  0440  06/03/17  0410 06/02/17  2104   * 127* 124* 125*  < > 126*  < > 129*  < > 136  --    POTASSIUM 3.0* 3.5 3.6 4.0  < > 4.3  < > 4.9  < > 3.2* 3.9   CHLORIDE 84* 85* 86* 87*  < > 88*  < > 91*  < > 97  --    CO2 26 26 25 23  < > 24  < > 25  < > 29  --    ANIONGAP 17* 17* 13 15*  < > 14  < > 14  < > 10  --    GLC 89 104* 170* 111*  < > 109*  < > 113*  < > 107*  --    * 107* 106* 105*  < > 101*  < > 88*  < > 58*  --    CR 3.30* 3.40* 3.45* 3.51*  < > 3.43*  < > 3.33*  < > 1.64*  --    GFRESTIMATED 18* 18* 17* 17*  < > 17*  < > 18*  < > 41*  --    GFRESTBLACK 22* 21* 21* 21*  < > 21*  < > 22*  < > 49*  --    LEIDY 8.7 8.8 8.7 8.5  < > 8.7  < > 8.9  < > 8.3*  --    MAG 2.6*  --   --  2.5*  --  2.5*  --  2.5*  < > 2.3 2.3   PHOS  --   --   --   --   --   --   --   --   --  3.5 3.5   < > = values in this interval not displayed.  CBC    Recent Labs  Lab 06/08/17  1413 06/08/17  0526 06/07/17  2203 06/04/17 0715 06/03/17 0410   WBC 4.4  --  5.6 6.2 6.0   RBC 3.01*  --  2.83* 3.13* 3.30*   HGB 9.3*  --  9.0* 9.9* 10.4*   HCT 28.0*  --  25.9* 29.9* 31.6*   MCV 93  --  92 96 96   MCH 30.9  --  31.8 31.6 31.5   MCHC 33.2  --  34.7 33.1 32.9   RDW 15.5*  --  15.6* 16.1* 16.0*    169 155 152 132*     INR    Recent Labs  Lab 06/04/17  0715 06/03/17 0410   INR 1.14 1.06 "     Arterial Blood Gas    Recent Labs  Lab 06/09/17  0815 06/08/17  1413 06/08/17  0822 06/08/17  0526   O2PER 21 21.0 21 21

## 2017-06-09 NOTE — PROGRESS NOTES
CLINICAL NUTRITION SERVICES - REASSESSMENT NOTE     Nutrition Prescription    RECOMMENDATIONS FOR MDs/PROVIDERS TO ORDER:  Continue diet and fluid restriction as ordered. Liberalize if PO intakes decline.     Malnutrition Status:    Non-severe malnutrition in the context of chronic illness    Recommendations already ordered by Registered Dietitian (RD):  Continue supplements as ordered       EVALUATION OF THE PROGRESS TOWARD GOALS   Diet: 2 g Na, 2000 mL fluid restriction, Ensure Plus BID @ 10 am and 2 pm    Intake: Eating 100% of meals and good diet tolerance per RN flowsheet. Pt states he is eating well and ordering 3 meals per day. He is also consuming 2 Ensure Plus per day with no issues.      NEW FINDINGS   -Wt: 69.7 kg today. Lowest wt remains 64 kg from 6/2.   -Labs: Na 127 (L); K+ 3 (L)  -Meds: Continues on multivitamin/mineral, 1000 mcg Vitamin B12, 100 mg thiamine, lasix.     MALNUTRITION  % Intake: No decreased intake noted (improved)  % Weight Loss: None noted  Subcutaneous Fat Loss: Facial region, Upper arm, Lower arm and Thoracic/intercostal: mild  Muscle Loss: Temporal, Thoracic region (clavicle, acromium bone, deltoid, trapezius, pectoral), Upper arm (bicep, tricep), Lower arm  (forearm) and Dorsal hand: moderate  Fluid Accumulation/Edema: Moderate (knees)  Malnutrition Diagnosis: Non-severe malnutrition in the context of chronic illness    Previous Goals   Patient to consume % of nutritionally adequate meal trays TID, or the equivalent with supplements/snacks.  Evaluation: Met    Previous Nutrition Diagnosis  Inadequate oral intake related to poor appetite hindering PO intakes as evidenced by reports of decreased portion sizes over past week, 2% wt loss in the past week, some mild muscle/fat wasting noted    Evaluation: Resolved, eating well    CURRENT NUTRITION DIAGNOSIS  Predicted inadequate nutrient intake (kcal/PRO) related to recent hx of poor appetite and poor intakes as evidenced by  potential for variable/poor appetite during admission however now with improved intake w/ use of ONS      INTERVENTIONS  Implementation  Discussed intakes w/ pt and encouraged to continue w/ good intakes and supplements BID.     Goals  Patient to consume % of nutritionally adequate meal trays TID, or the equivalent with supplements/snacks.    Monitoring/Evaluation  Progress toward goals will be monitored and evaluated per protocol.    Marisabel Dominique RD, LD, [facility]  CVICU Dietitian  Pager: [phone]

## 2017-06-10 NOTE — PROGRESS NOTES
Cards 2 ICU Progress Note    Assessment/Plan: 76-year old male with a PMhx of CAD s/p multiple PCI, HFrEF (NYHA 4, stage D), and PAD who presented for evaluation of increasing dyspnea and fatigue of several weeks' duration since his discharge from [facility]. He presents for inpatient optimization of HF with invasive hemodynamic monitoring and PO vasodilators. Found have have hypervolemia and elevated SVR. Initially responded well to vasodilation and diuresis and swan removed but Cr trended up. [place] re-placed and vasodilatory medications were scaled back, found to again be hypervolemic. Currently diuresing and slowly reintroducing vasodilatory medications.     Cardiovascular  #HFrEF NYHA 4 stage D    #Acute-on-chronic decompensated HF  #Ambulatory cardiogenic shock - resolved   #Hx of CAD and VFib arrest  #JACINTO- Cr slightly improved   -holding home milrinone gtt, coreg   -holding minoxidil that we had started   -dobutamine gtt  -hydralazine and isordil restarted and up titrating slowly, changed from 37.5 ->50 tid hydral and 20 ->40 tid isordil   -cont lasix gtt at 15  -hold home KCl 40 tid, follow BMPs  -continue aspirin 81, clopidogrel 75 mg q24h    #Hypervolemic Hyponatremia- diuresing     #Indeterminate L Kidney Lesion- seen on 6/8/17. Can order follow up after acute issues are addressed.    #Pt report of intermittent drowsiness- ABG with no CO2 retention.     No other active system's issues    Chronic/inactive issues:   - PAD: continue risk factor management as above   - BPH: tolterodine   - Alcohol dependence: continue thiamine  - Hypothyroidism: continue home levothyroxine     Prophylaxis:     - DVT: heparin subq   - GI: eating   - Aspiration: normal mentation    Dispo: ICU for hemodynamics, recommended to have OP PT    Patient seen and discussed with Dr. Joe Martinez PGY2  [phone]    ==========================================================  Interval Events/Subjective: No overnight events aside from  "persistently high SVR so given extra 25 hydral and 20 isordil and scheduled doses increased. ROS negative for fevers, rigors, dyspnea, n/v/c/d, dysuria.     Objective  Blood pressure 120/68, pulse 88, temperature 97.5  F (36.4  C), temperature source Axillary, resp. rate 12, height 1.727 m (5' 8\"), weight 69.7 kg (153 lb 10.6 oz), SpO2 100 %.   Resp: 12    Physical Exam  gen- elderly man, sitting in bed, comfortable  pulm- cta, symmetric, non labored  cv- loud s2, no m/r/g  abdmn- soft, nt, nd  extrm- warm, edema 2+ up to knees     Labs reviewed  EKG none new  Imaging reviewed, swan in good position     ROUTINE ICU LABS (Last four results)  CMP  Recent Labs  Lab 06/10/17  0335 06/09/17  1532 06/09/17  1214 06/09/17  0514 06/08/17  2043  06/08/17  0526  06/07/17  0630   * 129*  --  127* 127*  < > 125*  < > 126*   POTASSIUM 3.2* 3.6 4.0 3.0* 3.5  < > 4.0  < > 4.3   CHLORIDE 88* 86*  --  84* 85*  < > 87*  < > 88*   CO2 27 28  --  26 26  < > 23  < > 24   ANIONGAP 14 15*  --  17* 17*  < > 15*  < > 14   * 139*  --  89 104*  < > 111*  < > 109*   * 112*  --  113* 107*  < > 105*  < > 101*   CR 2.80* 3.07*  --  3.30* 3.40*  < > 3.51*  < > 3.43*   GFRESTIMATED 22* 20*  --  18* 18*  < > 17*  < > 17*   GFRESTBLACK 27* 24*  --  22* 21*  < > 21*  < > 21*   LEIDY 8.6 8.4*  --  8.7 8.8  < > 8.5  < > 8.7   MAG 2.4*  --   --  2.6*  --   --  2.5*  --  2.5*   < > = values in this interval not displayed.  CBC    Recent Labs  Lab 06/10/17  0335 06/08/17  1413 06/08/17  0526 06/07/17  2203 06/04/17  0715   WBC 6.9 4.4  --  5.6 6.2   RBC 3.22* 3.01*  --  2.83* 3.13*   HGB 10.1* 9.3*  --  9.0* 9.9*   HCT 30.0* 28.0*  --  25.9* 29.9*   MCV 93 93  --  92 96   MCH 31.4 30.9  --  31.8 31.6   MCHC 33.7 33.2  --  34.7 33.1   RDW 15.1* 15.5*  --  15.6* 16.1*    175 169 155 152     INR    Recent Labs  Lab 06/04/17  0715   INR 1.14     Arterial Blood Gas    Recent Labs  Lab 06/10/17  1357 06/10/17  1115 06/10/17  0800 " 06/10/17  0335   PH  --  7.50*  --   --    PCO2  --  37  --   --    PO2  --  91  --   --    HCO3  --  28  --   --    O2PER 21 21 21.0 21.0

## 2017-06-10 NOTE — PLAN OF CARE
Problem: Goal Outcome Summary  Goal: Goal Outcome Summary  Outcome: Improving  D:I feel better today than yesterday.  I:Pt has sat up in the chair most of the day eating and drinking fine.MD's doubled his medications and tolerating that fine.VSS.Good urine output from lasix gtt.  A:Pt is doing well with his cardiac meds.

## 2017-06-10 NOTE — PLAN OF CARE
Problem: Goal Outcome Summary  Goal: Goal Outcome Summary  OT: REC Home with assist as needed and OP CR.  Pt presents with general deconditioning and decreased activity tolerance resulting in decreased ADL I.  Pt completed 8.5 minutes of ergometer exercise while seated in chair.  Pt stated that he had not been addressing HEP 2/2 fatigue, therapist encouraged Pt co complete HEP to increase activity tolerance.  Pt verbalizes competence, further education and review required.

## 2017-06-10 NOTE — PLAN OF CARE
Problem: Goal Outcome Summary  Goal: Goal Outcome Summary  D: 77 y/o male with heart failure, in ICU 2/2 swan-[place]ion[place] catheter in place  A/I: A&Ox4, pt on RA,lung sounds clear, HR irregular- paced via ventricular paced pace maker. BP stable.afebrile. Cool extremities.MD's increased hydralazine dose and started isosorbide. No BM's overnight. Good urine output with lasix drip and HOWARD calculated q4 hours. K+3.2 in AM MD placed orders to replace.  P: Continue to Monitor and Assess patient. Notify MD of any changes. Continue POC.

## 2017-06-10 NOTE — PLAN OF CARE
Problem: Cardiac: Heart Failure (Adult)  Goal: Signs and Symptoms of Listed Potential Problems Will be Absent or Manageable (Cardiac: Heart Failure)  Signs and symptoms of listed potential problems will be absent or manageable by discharge/transition of care (reference Cardiac: Heart Failure (Adult) CPG).   Outcome: No Change  Pt remains alert and orientated and on room air. 2 sets of HOWARD calculations were run today. CO @ 0800 was 3.0 and CI was 1.6. Oxy Hgb low @ 40. Recurrent oxy Hgb's drawn with numbers 29-43. Chest x-ray done to confirm PA cath placement. Waveforms looked appropriate during the day. CO@ 1600 was 3.2 and CI 1.8. 12.5 of hydralazine given for extra after load reduction. Pt produced 3200 cc of urine since midnight and is voiding with no difficulty. Potassium replaced per sx orders. Lasix remains @ 15. Pt started having diarrhea today sx notified. Continue to monitor pt status and notify sx of any acute changes in health status.

## 2017-06-11 NOTE — PLAN OF CARE
Problem: Goal Outcome Summary  Goal: Goal Outcome Summary  OT/4C: Cancel- Pt declined attempts, requesting to rest, eat, and wait for company upon attempts

## 2017-06-11 NOTE — PLAN OF CARE
Problem: Goal Outcome Summary  Goal: Goal Outcome Summary  Outcome: Improving  D: 75 y/o male PMHx of CAD, HFrEF (NYHA IV, stage D) who was admitted for evaluation of decompensated HFrEF.  Currently in ICU 2/2 swan-[place]ion[place] catheter in place     A/I: A&Ox4, pt on RA,lung sounds clear, HR irregular- paced via ventricular paced pace maker. BP stable.afebrile.  No BM's overnight. Okay urine output with lasix drip and HOWARD calculated q4 hours. K+ given for potassium of 3.4     P: Continue to Monitor and Assess patient. Notify MD of any changes. Continue POC.

## 2017-06-11 NOTE — PLAN OF CARE
Problem: Cardiac: Heart Failure (Adult)  Goal: Signs and Symptoms of Listed Potential Problems Will be Absent or Manageable (Cardiac: Heart Failure)  Signs and symptoms of listed potential problems will be absent or manageable by discharge/transition of care (reference Cardiac: Heart Failure (Adult) CPG).   Outcome: No Change  Pt remains alert and orientated. Lungs remain clear. 3 series of HOWARD calculations run with CO between 3.0 and 3.2 and CI between 1.6-1.7. Hydralazine increased to 100 TID and isordil increased to 60. [place] remains @ 53. Pt is having optimal urine output and is net negative 200 cc for day. Creatinine continues to improve with last reading 2.09. Pt had one large formed BM today. Possible ultrasound of stomach tomorrow and depending on results paracentesis. Continue to monitor pt for changes in health status.

## 2017-06-11 NOTE — PROGRESS NOTES
Cards 2 ICU Progress Note    Assessment/Plan: 75 yo man with Hx of CAD s/p multiple PCI, HFrEF (NYHA 4, stage D), and PAD who presented for evaluation of increasing dyspnea and fatigue of several weeks' duration since his discharge from [facility]. He was admitted for inpatient optimization of HF with invasive hemodynamic monitoring and PO vasodilators. Was found have have hypervolemia and elevated SVR. He responded well to vasodilation and diuresis and swan removed but Cr trended up. [place] re-placed and vasodilatory medications were scaled back, found to again be hypervolemic. Currently diuresing and slowly reintroducing vasodilatory medications.     Cardiovascular  #HFrEF NYHA 4 stage D    #Acute-on-chronic decompensated HF  #Ambulatory cardiogenic shock - resolved   #Hx of CAD and VFib arrest  #JACINTO- Cr slightly improved   -holding home milrinone gtt, coreg   -holding minoxidil that we had started   -dobutamine gtt (note potential for tachyphylaxis)   -hydralazine and isordil restarted and up titrating slowly, hydralazine at 75 tid, isordil from 40 to 60 tid  -Q4 HD, SVR's markedly elevated overnight then can give another dose of 1x 50 hydralazine at midnight   -cont lasix gtt at 15  -start KCl 40 BID (on home tid), follow BMPs  -continue aspirin 81, clopidogrel 75 mg q24h    #Ascites- no cirrhosis on prior imaging, Abdmn US to assess ascites to see if paracentesis can be done    #Hypervolemic Hyponatremia- diuresing     #Indeterminate L Kidney Lesion- seen on 6/8/17. Can order follow up after acute issues are addressed.    No other active system's issues    Chronic/inactive issues:   - PAD: continue risk factor management as above   - BPH: tolterodine   - Alcohol dependence: continue thiamine  - Hypothyroidism: continue home levothyroxine     Prophylaxis:     - DVT: heparin subq   - GI: eating   - Aspiration: normal mentation    Dispo: ICU for hemodynamics, recommended to have OP PT    Patient seen and discussed with  "Dr. Joe Martinez PGY2      ==========================================================  Interval Events/Subjective: No overnight events. No Tele events. Hydralazine increased to 75 overnight. ROS negative for fevers, rigors, dyspnea, n/v/c/d, dysuria.     Objective  Blood pressure 125/75, pulse 88, temperature 97.6  F (36.4  C), temperature source Oral, resp. rate 16, height 1.727 m (5' 8\"), weight 69.7 kg (153 lb 10.6 oz), SpO2 99 %.   Resp: 16    Physical Exam  gen- elderly man, sitting in bed, comfortable  pulm- cta, symmetric, non labored  cv- loud s2, no m/r/g  abdmn- soft, nt, nd  extrm- warm, edema 2+ up to knees     Labs reviewed  EKG none new  Imaging reviewed, swan in good position     ROUTINE ICU LABS (Last four results)  CMP  Recent Labs  Lab 06/11/17  0355 06/10/17  1607 06/10/17  0335 06/09/17  1532  06/09/17  0514  06/08/17  0526   * 131* 128* 129*  --  127*  < > 125*   POTASSIUM 3.4 3.7 3.2* 3.6  < > 3.0*  < > 4.0   CHLORIDE 90* 91* 88* 86*  --  84*  < > 87*   CO2 28 30 27 28  --  26  < > 23   ANIONGAP 12 10 14 15*  --  17*  < > 15*   * 156* 102* 139*  --  89  < > 111*   BUN 99* 106* 110* 112*  --  113*  < > 105*   CR 2.25* 2.53* 2.80* 3.07*  --  3.30*  < > 3.51*   GFRESTIMATED 28* 25* 22* 20*  --  18*  < > 17*   GFRESTBLACK 34* 30* 27* 24*  --  22*  < > 21*   LEIDY 8.2* 8.2* 8.6 8.4*  --  8.7  < > 8.5   MAG 2.3  --  2.4*  --   --  2.6*  --  2.5*   < > = values in this interval not displayed.  CBC    Recent Labs  Lab 06/11/17  0355 06/10/17  0335 06/08/17  1413 06/08/17  0526 06/07/17  2203   WBC 7.2 6.9 4.4  --  5.6   RBC 3.25* 3.22* 3.01*  --  2.83*   HGB 10.2* 10.1* 9.3*  --  9.0*   HCT 30.5* 30.0* 28.0*  --  25.9*   MCV 94 93 93  --  92   MCH 31.4 31.4 30.9  --  31.8   MCHC 33.4 33.7 33.2  --  34.7   RDW 15.1* 15.1* 15.5*  --  15.6*    188 175 169 155     INR  No lab results found in last 7 days.  Arterial Blood Gas    Recent Labs  Lab 06/11/17  0823 " 06/11/17  0355 06/10/17  2248 06/10/17  1842  06/10/17  1115   PH  --   --   --   --   --  7.50*   PCO2  --   --   --   --   --  37   PO2  --   --   --   --   --  91   HCO3  --   --   --   --   --  28   O2PER 21.0 21 21 21  < > 21   < > = values in this interval not displayed.

## 2017-06-12 NOTE — PLAN OF CARE
Problem: Goal Outcome Summary  Goal: Goal Outcome Summary  Outcome: Improving  Pt remained on Dobutamine at 5.  Lasix gtt turned off this morning, gave 1 dose of IV intermittent chlorethiazide and then switched to PO.  Hydralizine changed to 100mg QID.  Pt up ambulating with RN.  PA catheter numbers charted in epic, Cardiac Index remained >2.  Pt did have one episode of feeling lightheaded after his walk.  VS remained stable at that time, rest encouraged and lightheadedness subsided.  Will continue to monitor/assess.

## 2017-06-12 NOTE — DOWNTIME EVENT NOTE
The EMR was down for 7 hours on 6/12/2017.    Yvette Steinberg was responsible for completing the paper charting during this time period.     The following information was re-entered into the system by Yvette Steinberg: Vitals signs, I/O's, medications.  The following information will remain in the paper chart: 1200 assessment    Yvette Steinberg  6/12/2017

## 2017-06-13 NOTE — PROGRESS NOTES
Cards 2 ICU Progress Note    Assessment/Plan: 77 yo man with Hx of CAD s/p multiple PCI, HFrEF (NYHA 4, stage D), and PAD who presented for evaluation of increasing dyspnea and fatigue of several weeks' duration since his discharge from [facility]. He was admitted for inpatient optimization of HF with invasive hemodynamic monitoring and PO vasodilators. Was found have have hypervolemia and elevated SVR. He responded well to vasodilation and diuresis and swan removed but Cr trended up. [place] re-placed and vasodilatory medications were scaled back, found to again be hypervolemic. Currently diuresing after re-introducing vasodilatory medications.     Cardiovascular  #HFrEF NYHA 4 stage D    #Acute-on-chronic decompensated HF  #Ambulatory cardiogenic shock - resolved   #Hx of CAD and VFib arrest  #JACINTO- Cr slightly improved   -holding home milrinone gtt, coreg   -holding minoxidil that we had started   -dobutamine gtt (note potential for tachyphylaxis)   -hydralazine and isordil up-titrated to 100 QID and 60 TID respectively, SVR ~1700 and CI>1.6 with this dose, decided to stop vasodilating at this point given prior Cr up trend when SVR declined to <1000  -swan out  -continues to be volume up, will measure CVP with cordis that's still in place  -lasix restart at 10, given 1x diuril 250, goal net negative 2L  -cont home KCl 40 TID  -continue aspirin 81, clopidogrel 75 mg q24h    #Ascites- no cirrhosis on prior imaging, ascites mild   #Hypervolemic Hyponatremia- diuresing, improving  #Indeterminate L Kidney Lesion- seen on 6/8/17. Can order follow up after acute issues are addressed.    Chronic/inactive issues:   - PAD: continue risk factor management as above   - BPH: tolterodine   - Alcohol dependence: continue thiamine  - Hypothyroidism: continue home levothyroxine     Prophylaxis:    - DVT: heparin subq  - GI: eating  - Aspiration: normal mentation    Lines: L dialysis midline, R PICC, R Cordis  Dispo: ICU for hemodynamics,  "recommended to have OP PT    Patient seen and discussed with Dr. Susan Martinez PGY2      ==========================================================  Interval Events/Subjective: No overnight events. No Tele events. ROS negative for fevers, rigors, dyspnea, n/v/c/d, dysuria.     Objective  Blood pressure 142/82, pulse 88, temperature 98  F (36.7  C), temperature source Oral, resp. rate 14, height 1.727 m (5' 8\"), weight 69.3 kg (152 lb 12.5 oz), SpO2 98 %.   Resp: 14    Physical Exam  gen- elderly man, sitting in bed, comfortable  pulm- cta, symmetric, non labored  cv- rrr, no m/r/g  abdmn- soft, nt, nd  extrm- warm, edema 2+ up to knees     Labs reviewed  EKG none new  Imaging reviewed    ROUTINE ICU LABS (Last four results)  CMP  Recent Labs  Lab 06/13/17  0217 06/12/17  1702 06/12/17  0530 06/11/17  1635 06/11/17  0355  06/10/17  0335    133 134 133 131*  < > 128*   POTASSIUM 4.3 3.6 3.6 3.9 3.4  < > 3.2*   CHLORIDE 94 95 94 92* 90*  < > 88*   CO2 32 27 30 30 28  < > 27   ANIONGAP 8 11 10 11 12  < > 14   * 167* 98 196* 108*  < > 102*   BUN 86* 83* 92* 98* 99*  < > 110*   CR 1.99* 1.94* 2.03* 2.09* 2.25*  < > 2.80*   GFRESTIMATED 33* 34* 32* 31* 28*  < > 22*   GFRESTBLACK 40* 41* 39* 37* 34*  < > 27*   LEIDY 8.5 8.5 8.2* 8.1* 8.2*  < > 8.6   MAG 2.2  --  2.4*  --  2.3  --  2.4*   < > = values in this interval not displayed.  CBC    Recent Labs  Lab 06/13/17  0217 06/12/17  0530 06/11/17  0355 06/10/17  0335   WBC 7.3 6.1 7.2 6.9   RBC 3.11* 3.12* 3.25* 3.22*   HGB 9.8* 9.9* 10.2* 10.1*   HCT 29.2* 29.0* 30.5* 30.0*   MCV 94 93 94 93   MCH 31.5 31.7 31.4 31.4   MCHC 33.6 34.1 33.4 33.7   RDW 15.8* 15.5* 15.1* 15.1*    181 191 188     INR  No lab results found in last 7 days.  Arterial Blood Gas    Recent Labs  Lab 06/13/17  0210 06/12/17  1702 06/12/17  0812 06/12/17  0651  06/10/17  1115   PH  --   --   --   --   --  7.50*   PCO2  --   --   --   --   --  37   PO2  --   --   " --   --   --  91   HCO3  --   --   --   --   --  28   O2PER 21 21 21.0 21.0  < > 21   < > = values in this interval not displayed.  I personally provided care for this patient, reviewed chart, discussed course with patient, housestaff and consulting physicians.  I answered all questions. I bellieve that hemodynamic monitoring may be discontinued and patient moved to telemetry.,    Clive Davis M.D.  Division of Cardiology  Department of Medicine

## 2017-06-13 NOTE — PROGRESS NOTES
Cards 2 ICU Progress Note    Assessment/Plan: 75 yo man with Hx of CAD s/p multiple PCI, HFrEF (NYHA 4, stage D), and PAD who presented for evaluation of increasing dyspnea and fatigue of several weeks' duration since his discharge from [facility]. He was admitted for inpatient optimization of HF with invasive hemodynamic monitoring and PO vasodilators. Was found have have hypervolemia and elevated SVR. He responded well to vasodilation and diuresis and swan removed but Cr trended up. [place] re-placed and vasodilatory medications were scaled back, found to again be hypervolemic. Currently diuresing and slowly reintroducing vasodilatory medications.     Cardiovascular  #HFrEF NYHA 4 stage D    #Acute-on-chronic decompensated HF  #Ambulatory cardiogenic shock - resolved   #Hx of CAD and VFib arrest  #JACINTO- Cr slightly improved   -holding home milrinone gtt, coreg   -holding minoxidil that we had started   -dobutamine gtt (note potential for tachyphylaxis)   -hydralazine and isordil restarted and up titrating slowly, hydralazine at 100 tid -> qid, isordil 60 tid  -cont lasix gtt at 15 -> diuril 250 bid, restart lasix gtt tomorrow AM  -KCl 40 bid -> home tid  -continue aspirin 81, clopidogrel 75 mg q24h    #Ascites- no cirrhosis on prior imaging, ascites mild     #Hypervolemic Hyponatremia- diuresing     #Indeterminate L Kidney Lesion- seen on 6/8/17. Can order follow up after acute issues are addressed.    No other active system's issues    Chronic/inactive issues:   - PAD: continue risk factor management as above   - BPH: tolterodine   - Alcohol dependence: continue thiamine  - Hypothyroidism: continue home levothyroxine     Prophylaxis:     - DVT: heparin subq   - GI: eating   - Aspiration: normal mentation    Lines: L dialysis midline, R PICC, R [place]   Dispo: ICU for hemodynamics, recommended to have OP PT    Patient seen and discussed with Dr. Susan Martinez PGY2  [phone]  [id number]    ==========================================================  Interval Events/Subjective: No overnight events. No Tele events. Hydralazine increased to 75 overnight. ROS negative for fevers, rigors, dyspnea, n/v/c/d, dysuria.     Objective  Blood pressure 140/78, pulse 88, temperature 97.9  F (36.6  C), temperature source Oral, resp. rate 15, height 1.727 m (5' 8\"), weight 70.9 kg (156 lb 4.9 oz), SpO2 99 %.   Resp: 15    Physical Exam  gen- elderly man, sitting in bed, comfortable  pulm- cta, symmetric, non labored  cv- loud s2, no m/r/g  abdmn- soft, nt, nd  extrm- warm, edema 2+ up to knees     Labs reviewed  EKG none new  Imaging reviewed, swan in good position     ROUTINE ICU LABS (Last four results)  CMP  Recent Labs  Lab 06/12/17  1702 06/12/17  0530 06/11/17  1635 06/11/17  0355  06/10/17  0335  06/09/17  0514    134 133 131*  < > 128*  < > 127*   POTASSIUM 3.6 3.6 3.9 3.4  < > 3.2*  < > 3.0*   CHLORIDE 95 94 92* 90*  < > 88*  < > 84*   CO2 27 30 30 28  < > 27  < > 26   ANIONGAP 11 10 11 12  < > 14  < > 17*   * 98 196* 108*  < > 102*  < > 89   BUN 83* 92* 98* 99*  < > 110*  < > 113*   CR 1.94* 2.03* 2.09* 2.25*  < > 2.80*  < > 3.30*   GFRESTIMATED 34* 32* 31* 28*  < > 22*  < > 18*   GFRESTBLACK 41* 39* 37* 34*  < > 27*  < > 22*   LEIDY 8.5 8.2* 8.1* 8.2*  < > 8.6  < > 8.7   MAG  --  2.4*  --  2.3  --  2.4*  --  2.6*   < > = values in this interval not displayed.  CBC    Recent Labs  Lab 06/12/17  0530 06/11/17  0355 06/10/17  0335 06/08/17  1413   WBC 6.1 7.2 6.9 4.4   RBC 3.12* 3.25* 3.22* 3.01*   HGB 9.9* 10.2* 10.1* 9.3*   HCT 29.0* 30.5* 30.0* 28.0*   MCV 93 94 93 93   MCH 31.7 31.4 31.4 30.9   MCHC 34.1 33.4 33.7 33.2   RDW 15.5* 15.1* 15.1* 15.5*    191 188 175     INR  No lab results found in last 7 days.  Arterial Blood Gas    Recent Labs  Lab 06/12/17  1702 06/12/17  0812 06/12/17  0651 06/11/17  2303  06/10/17  1115   PH  --   --   --   --   --  7.50*   PCO2  --   --   --   " --   --  37   PO2  --   --   --   --   --  91   HCO3  --   --   --   --   --  28   O2PER 21 21.0 21.0 21  < > 21   < > = values in this interval not displayed.  I personally provided care for this patient, reviewed chart, discussed course with patient, housestaff and consulting physicians.  I answered all questions.    Clive Davis M.D.  Division of Cardiology  Department of Medicine

## 2017-06-13 NOTE — PLAN OF CARE
Problem: Goal Outcome Summary  Goal: Goal Outcome Summary  OT 4C.  REC Home with assist as needed.  Pt presents with general decondtioning and decreased activity tolerance, resulting in decreased ADL I.  Pt ambulated ~360 ft with SBA, using a 2WW and requiring 3 standing rest breaks.  Therapist educated Pt on balance handout and Pt demos competence with activities though demonstrate some LOB, self-correcting with SBA, on activities with smaller base of support and eyes closed.  Therapist educated Pt to not perform these more difficult tasks on his own, with Pt verbalizing competence.

## 2017-06-13 NOTE — PLAN OF CARE
Problem: Goal Outcome Summary  Goal: Goal Outcome Summary  Outcome: Improving  [place] pulled this AM, increased diuretics and tracking I/O's closely.  Pt remains on 2L fluid restriction.  Pt up walking independently.  A/O x 4.  MD requested to keep dialysis catheter in.  Will continue to monitor/assess.

## 2017-06-14 NOTE — PLAN OF CARE
Problem: Goal Outcome Summary  Goal: Goal Outcome Summary  Outcome: No Change  D: CHF- acute on chronic     IA: AOx4, 100% V paced, BP stable. Dry cough, sleep apnea noted- desatted into upper 70s so 3L oxymask started. 2G Na diet, 2L FR. Using urinal independently. Dobutamine gtt at 5 mcg/kg/min, Lasix changed to more concentrated form so running in basic mode at 2ml/hr. CVP/Lauren q4hrs.      P: Continue rehab goals and diuresing.

## 2017-06-14 NOTE — PLAN OF CARE
Problem: Goal Outcome Summary  Goal: Goal Outcome Summary  Assumed care from [id number].  No acute events, VSS. Slept most of afternoon. Lasix gtt discontinued. Stat echo ordered, MD De León and Cards Fellow, will do informal echo at bedside then reassess need to call in echo tech.  Transfer orders for 6C when bed ready.  Continue to monitor and assess, notify MD of changes.

## 2017-06-15 NOTE — PROGRESS NOTES
Cards 2 Progress Note    Assessment/Plan: 75 yo man with Hx of CAD s/p multiple PCI, HFrEF (NYHA 4, stage D), and PAD who presented for evaluation of increasing dyspnea and fatigue of several weeks' duration since his discharge from [facility]. He was admitted for inpatient optimization of HF with invasive hemodynamic monitoring and PO vasodilators. Was found have have hypervolemia and elevated SVR. He responded well to vasodilation and diuresis and swan removed but Cr trended up. [place] re-placed and vasodilatory medications were scaled back, found to again be hypervolemic. Currently diuresing after re-introducing vasodilatory medications.     #HFrEF NYHA 4 stage D    #Acute-on-chronic decompensated HF  #Ambulatory cardiogenic shock - resolved   #Hx of CAD and VFib arrest  #JACINTO  Patient admitted in ambulatory cardiogenic shock. He was diuresed and vasodilated with swan in place. SVR declined too much, likely due to accumulation of milrinone (home medication) during this process along with addition of minoxidil and up titrated of home hydralazine. Thus, this was all stopped to allow for recovery of creatinine which estiven likely due to the need for his kidneys to see relatively higher MAPs. He was then again diuresed and vasodilated with hemodynamic monitoring. Milrinone was switched to dobutamine with improvement in creatinine. This avoids milrinone build-up but we acknowledge the possibility of tachyphylaxis with dobutamine. Up titrated to hydralazine and isordil 100 qid and 60 tid with SVR ranging from 4763-8954 and CI>1.6 which is relatively acceptable for this patient. CVPs remained elevated to 20s so it was determined that he required further diuresis but no further vasodilatation. [place] was removed.   Today, Cr faintly higher, Na lower, HCO3 higher. Echo did not support hypovolemia and RV function did not appear worse. Thus, he is perhaps lisa and requires slower diuresis so diuresis was held after morning dose  "of 500iv diuril and lasix gtt at 20 yesterday. Received 40 mg IV lasix this AM and will give 40 mg iv lasix this evening.     Tomorrow: Cr stable, hold diuresis today. IVC upper limit of normal yesterday.   -cont KCl 40 BID  -continue aspirin 81, clopidogrel 75 mg q24h      #Ascites- no cirrhosis on prior imaging, ascites mild   #Indeterminate L Kidney Lesion- seen on 6/8/17. Can order follow up after acute issues are addressed.    Chronic/inactive issues:   - PAD: continue risk factor management as above   - BPH: tolterodine   - Alcohol dependence: continue thiamine  - Hypothyroidism: continue home levothyroxine     Prophylaxis - DVT: heparin subq  Lines: L midline, R PICC  Dispo: PT OT    Patient seen and discussed with Dr. Susan Umaña MD  Cardiology     ==========================================================  Interval Events/Subjective: No overnight events. No Tele events. ROS negative for fevers, rigors, dyspnea, n/v/c/d, dysuria. Feels well this AM. Slept well.    Objective  Blood pressure 135/71, pulse 88, temperature 98.2  F (36.8  C), temperature source Oral, resp. rate 16, height 1.727 m (5' 8\"), weight 68.9 kg (151 lb 14.4 oz), SpO2 97 %.   Resp: 16    Physical Exam  gen- elderly man, sitting in bed, comfortable  pulm- cta, symmetric, non labored  cv- rrr, no m/r/g  abdmn- soft, nt, nd  extrm- warm, edema 2+ up to knees     Labs reviewed  Imaging reviewed, none new    ROUTINE ICU LABS (Last four results)  CMP  Recent Labs  Lab 06/15/17  0415 06/14/17  1401 06/14/17  0448 06/13/17  1824 06/13/17  0217  06/12/17  0530   * 129* 132* 133 133  < > 134   POTASSIUM 3.3* 3.6 3.4 4.7 4.3  < > 3.6   CHLORIDE 89* 88* 91* 91* 94  < > 94   CO2 36* 34* 33* 33* 32  < > 30   ANIONGAP 7 6 9 8 8  < > 10   * 129* 95 144* 117*  < > 98   BUN 83* 82* 82* 88* 86*  < > 92*   CR 2.08* 2.14* 2.06* 2.10* 1.99*  < > 2.03*   GFRESTIMATED 31* 30* 31* 31* 33*  < > 32*   GFRESTBLACK 38* 36* 38* " 37* 40*  < > 39*   LEIDY 8.6 8.7 8.8 8.7 8.5  < > 8.2*   MAG 2.2  --  2.3  --  2.2  --  2.4*   < > = values in this interval not displayed.  CBC    Recent Labs  Lab 06/15/17  0415 06/14/17  0448 06/13/17  0217 06/12/17  0530   WBC 7.1 6.7 7.3 6.1   RBC 3.06* 3.11* 3.11* 3.12*   HGB 9.6* 9.6* 9.8* 9.9*   HCT 28.7* 29.7* 29.2* 29.0*   MCV 94 96 94 93   MCH 31.4 30.9 31.5 31.7   MCHC 33.4 32.3 33.6 34.1   RDW 15.9* 15.7* 15.8* 15.5*    219 196 181   I personally provided care for this patient, reviewed chart, discussed course with patient, housestaff and consulting physicians.  I answered all questions.    Clive Davis M.D.  Division of Cardiology  Department of Medicine

## 2017-06-15 NOTE — PLAN OF CARE
Problem: Goal Outcome Summary  Goal: Goal Outcome Summary  Outcome: Improving  Assumed patient care at approx 1900hrs with patient standing at bedside, in NAD, HDS on dobutamine, airway patent and on RA. Sangeeta Adkins and co-op. AOx4, GCS 15, Communicates needs without difficulty, no overt deficits noted. CV- [place] pulled as of prior shift with dressing to right IJ CDI, remains on dobutamine at set rate, no titration. Is 100% V-Paced, with pressures slightly HTN'sive, patient is on scheduled PO hyrdalazine. Dr. Martinez to perform bedside U/S to assess fluid status as patient had been on Lasix gtt for a period of time period to change of shift. Was advised per Dr. Slater that the patient needed not be placed back on diuretic gtt. Pt vocalized understanding of fluid overload and hyperdynamic state, is currently wearing CANDIDA hose for he dependent edema and observing strick PO intake. PUL- On RA at start of shift, however, noted that he began to have periods of desaturation as far down as 75% non sustained. Placed 1.5L NC, patient did endorse use of RA BIPAP/CPAP at home, will con't to monitor desaturation. Otherwise, no overt resp compromise of issues. GI- Voiding well greater than adequate UOP, approx 1.5L out for shift. Does wear his own adult diapers, however, is not incontinent of urine or bowel. Afebrile. Up to bedside commode with very steady and balanced gait, is able to stand for lengthy periods at a time. Denies SOB. Denies chest, shoulder, arm, pain or discomfort. POC is to transfer patient to 6th floor as patient does have transfer orders in place. Will update Cards2 Resident as well as CN of any acute changes.

## 2017-06-15 NOTE — PROGRESS NOTES
Cards 2 Progress Note    Assessment/Plan: 77 yo man with Hx of CAD s/p multiple PCI, HFrEF (NYHA 4, stage D), and PAD who presented for evaluation of increasing dyspnea and fatigue of several weeks' duration since his discharge from [facility]. He was admitted for inpatient optimization of HF with invasive hemodynamic monitoring and PO vasodilators. Was found have have hypervolemia and elevated SVR. He responded well to vasodilation and diuresis and swan removed but Cr trended up. [place] re-placed and vasodilatory medications were scaled back, found to again be hypervolemic. Currently diuresing after re-introducing vasodilatory medications.     #HFrEF NYHA 4 stage D    #Acute-on-chronic decompensated HF  #Ambulatory cardiogenic shock - resolved   #Hx of CAD and VFib arrest  #JACINTO  Patient admitted in ambulatory cardiogenic shock. He was diuresed and vasodilated with swan in place. SVR declined too much, likely due to accumulation of milrinone (home medication) during this process along with addition of minoxidil and up titrated of home hydralazine. Thus, this was all stopped to allow for recovery of creatinine which estiven likely due to the need for his kidneys to see relatively higher MAPs. He was then again diuresed and vasodilated with hemodynamic monitoring. Milrinone was switched to dobutamine with improvement in creatinine. This avoids milrinone build-up but we acknowledge the possibility of tachyphylaxis with dobutamine. Up titrated to hydralazine and isordil 100 qid and 60 tid with SVR ranging from 2546-0237 and CI>1.6 which is relatively acceptable for this patient. CVPs remained elevated to 20s so it was determined that he required further diuresis but no further vasodilatation. [place] was removed.   Today, Cr faintly higher, Na lower, HCO3 higher. Echo did not support hypovolemia and RV function did not appear worse. Thus, he is perhaps lisa and requires slower diuresis so diuresis was held after morning dose  "of 500iv diuril and lasix gtt at 20.     Tomorrow: reassess BMP and volume status to determine diuresis approach. Despite holding diuresis, it appears that he will be net negative tomorrow AM. Official Echo pending. Lowest weight ~140 although this was during high SVR and he might need higher overall weight after vasodilation. Cr best so far at about 156 lbs.     -cont KCl 40 BID  -continue aspirin 81, clopidogrel 75 mg q24h      #Ascites- no cirrhosis on prior imaging, ascites mild   #Indeterminate L Kidney Lesion- seen on 6/8/17. Can order follow up after acute issues are addressed.    Chronic/inactive issues:   - PAD: continue risk factor management as above   - BPH: tolterodine   - Alcohol dependence: continue thiamine  - Hypothyroidism: continue home levothyroxine     Prophylaxis - DVT: heparin subq  Lines: L midline, R PICC  Dispo: To 6B, recommended to have OP PT    Patient seen and discussed with Dr. Susan Martinez PGY2      ==========================================================  Interval Events/Subjective: No overnight events. No Tele events. ROS negative for fevers, rigors, dyspnea, n/v/c/d, dysuria. Feels well this AM. Slept well.    Objective  Blood pressure 139/88, pulse 88, temperature 98  F (36.7  C), temperature source Oral, resp. rate 19, height 1.727 m (5' 8\"), weight 68.9 kg (151 lb 14.4 oz), SpO2 96 %.   Resp: 19    Physical Exam  gen- elderly man, sitting in bed, comfortable  pulm- cta, symmetric, non labored  cv- rrr, no m/r/g  abdmn- soft, nt, nd  extrm- warm, edema 2+ up to knees     Labs reviewed  Imaging reviewed, none new    ROUTINE ICU LABS (Last four results)  CMP  Recent Labs  Lab 06/14/17  1401 06/14/17  0448 06/13/17  1824 06/13/17  0217  06/12/17  0530  06/11/17  0355   * 132* 133 133  < > 134  < > 131*   POTASSIUM 3.6 3.4 4.7 4.3  < > 3.6  < > 3.4   CHLORIDE 88* 91* 91* 94  < > 94  < > 90*   CO2 34* 33* 33* 32  < > 30  < > 28   ANIONGAP 6 9 8 8  < > " 10  < > 12   * 95 144* 117*  < > 98  < > 108*   BUN 82* 82* 88* 86*  < > 92*  < > 99*   CR 2.14* 2.06* 2.10* 1.99*  < > 2.03*  < > 2.25*   GFRESTIMATED 30* 31* 31* 33*  < > 32*  < > 28*   GFRESTBLACK 36* 38* 37* 40*  < > 39*  < > 34*   LEIDY 8.7 8.8 8.7 8.5  < > 8.2*  < > 8.2*   MAG  --  2.3  --  2.2  --  2.4*  --  2.3   < > = values in this interval not displayed.  CBC    Recent Labs  Lab 06/14/17  0448 06/13/17  0217 06/12/17  0530 06/11/17  0355   WBC 6.7 7.3 6.1 7.2   RBC 3.11* 3.11* 3.12* 3.25*   HGB 9.6* 9.8* 9.9* 10.2*   HCT 29.7* 29.2* 29.0* 30.5*   MCV 96 94 93 94   MCH 30.9 31.5 31.7 31.4   MCHC 32.3 33.6 34.1 33.4   RDW 15.7* 15.8* 15.5* 15.1*    196 181 191     I personally provided care for this patient, reviewed chart, discussed course with patient, housestaff and consulting physicians.  I answered all questions.  Patient does not require ICU and is being stabilized and oral medications optimized in anticipation of discharge. Discussed discharge follow-up, surveillance for continuing chemical dependency, urine testing.    Results for RON LUCERO (MRN [id number]) as of 6/15/2017 07:26   Ref. Range 6/4/2017 07:15 6/4/2017 18:00 6/5/2017 05:30 6/5/2017 15:40 6/6/2017 04:40 6/6/2017 18:45 6/7/2017 06:30 6/7/2017 18:52 6/8/2017 05:26 6/8/2017 14:13 6/8/2017 20:43 6/9/2017 05:14 6/9/2017 15:32 6/10/2017 03:35 6/10/2017 16:07 6/11/2017 03:55 6/11/2017 16:35 6/12/2017 05:30 6/12/2017 17:02 6/13/2017 02:17 6/13/2017 18:24 6/14/2017 04:48 6/14/2017 14:01 6/15/2017 04:15   Creatinine Latest Ref Range: 0.66 - 1.25 mg/dL 2.23 (H) 2.58 (H) 2.92 (H) 3.06 (H) 3.33 (H) 3.38 (H) 3.43 (H) 3.50 (H) 3.51 (H) 3.45 (H) 3.40 (H) 3.30 (H) 3.07 (H) 2.80 (H) 2.53 (H) 2.25 (H) 2.09 (H) 2.03 (H) 1.94 (H) 1.99 (H) 2.10 (H) 2.06 (H) 2.14 (H) 2.08 (H)     Clive Davis M.D.  Division of Cardiology  Department of Medicine

## 2017-06-15 NOTE — PLAN OF CARE
Problem: Goal Outcome Summary  Goal: Goal Outcome Summary  OT 4C: REC Home with assist as needed.  Pt presents with general deconditioning and decreased functional endurance resulting in decreased ADL I.  Pt ambulated ~550 ft with SBA using a 2WW and 3 standing rest breaks required to increase endurance to allow for greater ADL and IADL independence.

## 2017-06-15 NOTE — PLAN OF CARE
Problem: Goal Outcome Summary  Goal: Goal Outcome Summary  Outcome: Improving  Focus: Status  D: VSS. Patient is anxious to get home to see his family. Up independently in room. Denies pain.  I: Asses/ monitor patient. Administer meds as ordered. Lasix IV given x1. Talked with Dr. Brandon Umaña regarding plan of care.  A: No change in status this shift.  P: Continue to assess/ monitor patient. Notify MD of changes.

## 2017-06-16 NOTE — DISCHARGE SUMMARY
Cards 2 Discharge Summary  Perez Villalobos MRN: [id number]    Date of Admission:  6/1/2017  Date of Discharge:  6/16/2017   Admitting Physician:  Charity Merchant MD  Discharge Physician:  Cooper Davis MD  Discharging Service:  Cards 2  Primary Provider:                       Hector Jc         Reason for Admission:   Ambulatory Cardiogenic Shock          Discharge Diagnosis:   Same         Procedures & Significant Findings:   RHC 6/1  1. HR 84 bpm  2. /80/104 mmHg  3. RA 20/21/17   4. RV 69/17  5. PA 69/28/46   6. PCW 30/38/29   7. PA sat 48.1%   8. PCW sat 94.0%  9. Hgb 10.8 g/dL   10. Lauren CO 2.9   11. Lauren CI 1.6   12. TD CO  2.8  13. TD CI 1.6   14. PVR 5.8     6/8/17 US- 1.  No hydronephrosis. 2.  Thickened bladder walls, likely due to bladder obstruction/large Prostate. 3.  Moderate ascites. Small right pleural effusion. 4.  Indeterminate 1.5 cm hypoechoic nodule in the left kidney. Recommend further evaluation with CT or MRI.    6/15/17 Echo - Severe diffuse hypokinesis. The Ejection Fraction is estimated at 15-20% (calculated, 23%.) The RV is moderately dilated. Global right ventricular function is moderately reduced. Moderate mitral insufficiency. Mild aortic insufficiency. Moderate tricuspid insufficiency. Moderate pulmonary hypertension. Right ventricular systolic pressure is 40 mmHg above the right atrial pressure. The inferior vena cava is dilated at 2.2 cm without respiratory variability, consistent with increased right atrial pressure. Estimated mean right atrial pressure is >15 mmHg. No pericardial effusion is present.         Consultations:   None         Hospital Course by Problem:    75 yo man with Hx of CAD s/p multiple PCI, HFrEF (NYHA 4, stage D), and PAD who presented for evaluation of increasing dyspnea and fatigue of several weeks' duration since his discharge from [facility]. He was admitted for inpatient optimization of HF with invasive hemodynamic monitoring and PO  vasodilators.      #HFrEF NYHA 4 stage D    #Acute-on-chronic decompensated HF  #Ambulatory cardiogenic shock - resolved   #Hx of CAD and VFib arrest  #JACINTO  Patient admitted in ambulatory cardiogenic shock. He was diuresed and vasodilated with swan hemodynamic monitoring. SVR declined too much to ~800-1000, likely due to accumulation of milrinone (home medication) during this process along with addition of minoxidil and up titrated of home hydralazine. Thus, this was all stopped to allow for recovery of creatinine which estiven likely due to the need for his kidneys to see relatively higher MAPs. He was then again diuresed and vasodilated with hemodynamic monitoring. Milrinone was switched to dobutamine with improvement in creatinine. This avoids milrinone build-up during JACINTO but we acknowledge the possibility of tachyphylaxis with dobutamine. Up titrated to hydralazine and isordil 100 qid and 60 tid with SVR ranging from 0740-7815 and CI>1.6 which is relatively acceptable for this patient. CVPs remained elevated to 20s so it was determined that he required further diuresis but no further vasodilatation. Corinne was removed. Diuresis was continued and was adjusted to his creatinine and new estimated dry weight of 151 lbs. Cr on discharge is 1.9-2.0. Further diuresis below this weight caused Cr to rise. I asked the patient to remain one more day to optimize his oral regimen but he strongly elected to go home in order to spend time with family. He was resolute that he would obtain daily weights and the below follow up.   -F/u BMP in 2 days  -F/u in [facility] Monday or Tuesday   -F/u with Dr Kelley in 1 month  -Discharging on 60 isordil tid, 100 hydralazine qid, 80 po lasix bid, ASA 81, Plavix 75, KCl 40 bid  -Was noted to have a L Kidney Lesion on 6/8/17 of unclear significance, follow up imaging per C[facility] clinic  -Discharge with R PICC in place    #Indeterminate L Kidney Lesion- seen on 6/8/17 ultrasound.      Chronic issues:  "  - PAD: as above   - BPH: tolterodine   - Alcohol dependence: eating normally, given thiamine while inpatient   - Hypothyroidism: continue home levothyroxine      Physical Exam on day of Discharge:  Blood pressure 139/80, pulse 88, temperature 97  F (36.1  C), temperature source Axillary, resp. rate 11, height 1.727 m (5' 8\"), weight 69.3 kg (152 lb 12.5 oz), SpO2 98 %.  gen- elderly man, sitting in bed, comfortable  pulm- cta, symmetric, non labored  cv- rrr, no m/r/g, jvp ~10 cm  abdmn- soft, nt, nd  extrm- warm, edema 2+ up to knees          Discharge Medications:     Current Discharge Medication List      START taking these medications    Details   isosorbide dinitrate (ISORDIL) 30 MG tablet Take 2 tablets (60 mg) by mouth 3 times daily  Qty: 90 tablet, Refills: 3    Associated Diagnoses: Cardiogenic shock (H)      furosemide (LASIX) 80 MG tablet Take 1 tablet (80 mg) by mouth 2 times daily  Qty: 30 tablet, Refills: 3    Associated Diagnoses: Cardiogenic shock (H)      D5W 5 % SOLN 170 mL with DOBUTamine 250 MG/20ML SOLN 1,000 mg Inject 0.3555 mg/min into the vein continuous  Qty: 1000 mL, Refills: 3    Associated Diagnoses: Cardiogenic shock (H)         CONTINUE these medications which have CHANGED    Details   hydrALAZINE (APRESOLINE) 100 MG TABS tablet Take 1 tablet (100 mg) by mouth 4 times daily  Qty: 60 tablet, Refills: 3    Associated Diagnoses: Cardiogenic shock (H)      potassium chloride (KLOR-CON) 20 MEQ Packet Take 40 mEq by mouth 2 times daily  Qty: 540 packet, Refills: 3    Associated Diagnoses: Chronic combined systolic and diastolic congestive heart failure (H)         CONTINUE these medications which have NOT CHANGED    Details   tolterodine (DETROL LA) 4 MG 24 hr capsule Take 4 mg by mouth daily      calcitRIOL (ROCALTROL) 0.25 MCG capsule Take 0.25 mcg by mouth daily      aspirin EC 81 MG EC tablet Take 81 mg by mouth daily      levothyroxine (SYNTHROID/LEVOTHROID) 150 MCG tablet Take 150 mcg " by mouth daily      Saline (SODIUM CHLORIDE) 0.65 % SOLN Spray in nostril as needed      KRILL OIL PO Take 1 capsule by mouth daily       Cyanocobalamin (B-12) 1000 MCG TBCR Take 1,000 mcg by mouth daily  Qty: 100 tablet, Refills: 0    Associated Diagnoses: Vitamin B 12 deficiency      multivitamin, therapeutic with minerals (MULTI-VITAMIN) TABS Take 1 tablet by mouth daily      clopidogrel (PLAVIX) 75 MG tablet Take 75 mg by mouth daily         STOP taking these medications       carvedilol (COREG) 6.25 MG tablet Comments:   Reason for Stopping:         bumetanide (BUMEX) 1 MG tablet Comments:   Reason for Stopping:         milrinone (PRIMACOR) infusion 200 mcg/mL PREMIX Comments:   Reason for Stopping:         isosorbide mononitrate (IMDUR) 60 MG 24 hr tablet Comments:   Reason for Stopping:                    Discharge Instructions and Follow-Up:     Discharge Procedure Orders  INFUSION THERAPY-NON CHEMO   Order Comments: Perez Villalobos, : 1940  Body surface area is 1.82 meters squared. Body mass index is 23.23 kg/(m^2).    IF APPROPRIATE INDICATE:  Day 1 =     Dobutamine 5mcg/kg/min - start 17 - continuous - every day. No stop date until patient obtains a left ventricular assist device likely in August. No premedication required. No vitals or laboratory monitoring required.     Diagnosis:  Cardiogenic shock (h)  (primary encounter diagnosis)  Acute renal failure, unspecified acute renal failure type (h)    Med Name: Dobutamine   Med Dose:  5 mcg/kg/min  Patient Weight: 152 lbs 12.46 oz     Medication Therapy Management Referral   Referral Type: Med Therapy Management     Home infusion referral     Reason for your hospital stay   Order Comments: Cardiogenic Shock     Activity   Order Comments: Your activity upon discharge: activity as tolerated   Order Specific Question Answer Comments   Is discharge order? Yes      Monitor and record   Order Comments: Weight daily.     When to contact your care  team   Order Comments: If you start feeling worse or gaining weight.     IV access   Order Comments: You are going home with the following vascular access device: PICC.     Discharge Instructions   Order Comments: Please come back to the hospital to get lab checked in two days. Please come back Monday or Tuesday (I put in orders for this and someone should be calling to schedule this for you) to the heart failure clinic. Like we discussed, there is a chance that the lasix dose that we picked in not optimal because we did not have a chance to give you oral medications and watch you for multiple days. If you feel worse or gain more than 2lbs in one day day or 5lbs in one week, please call us or come back to the hospital.     Adult [facility] Follow-up and recommended labs and tests   Order Comments: Basic Metabolic Panel and Magnesium on 6/18/17 to be done in the hospital laboratory.   Follow up in heart failure CORE clinic on 6/19/17 or 6/20/17 but no later than this.   Follow up with Dr. Kelley in one month.      Appointments on [place] and/or [facility] (with [facility] or [facility] provider or service). Call [phone] if you haven't heard regarding these appointments within 7 days of discharge.     Full Code     Diet   Order Comments: Follow this diet upon discharge: Measure your sodium amount and do not eat more than 2 grams per day. Measure your fluid amount and do not drink more than 2 liters per day.   Order Specific Question Answer Comments   Is discharge order? Yes             Discharge Disposition:   Home         Condition on Discharge:   Discharge condition: Stable   Code status on discharge: Full Code        Date of service: 6/16/2017  The patient was discussed with Dr. Davis.  Terell Martinez PGY2  [phone]  I personally provided care for this patient, reviewed chart, discussed course with patient, housestaff and consulting physicians.  I answered all questions.    Clive Davis M.D.  Division  of Cardiology  Department of Medicine

## 2017-06-16 NOTE — PROGRESS NOTES
Home Infusion  Perez is discharging today and will be going home on continuous dobutamine therapy.  He was on home IV therapy milrinone therapy before readmission and his wife was independent with daily bag changes.  Walker's dobutamine will be on the same type of pump.  He requires a hook up of home medication and pump prior to dc.    Met with Walker and his wife, Annia at bedside once supplies arrived.  Assisted with hook up of dobutamine.  Reviewed pump settings and verified with orders.  Reminded them not to flush medication lumen with NS.  Walker does not have coverage for home nursing however, his wife, who is a retired nurse, was taught PICC dressing changes at [facility] and will continue to do it independently at home.    Home dobutamine infusion is running and Walker is ready for discharge from \[facility] [place] [facility]\"" perspective.    Candy [facility] Liaison  [phone] pager

## 2017-06-16 NOTE — PLAN OF CARE
Problem: Goal Outcome Summary  Goal: Goal Outcome Summary     OT-4c: Cx- attempted to see pt this PM, but pt declining, preparing for d/c.

## 2017-06-16 NOTE — PROGRESS NOTES
CLINICAL NUTRITION SERVICES - REASSESSMENT NOTE     Nutrition Prescription    RECOMMENDATIONS FOR MDs/PROVIDERS TO ORDER:  Continue diet and fluid restriction as ordered. Liberalize if PO intakes decline.     Malnutrition Status:    Non-severe malnutrition in the context of chronic illness    Recommendations already ordered by Registered Dietitian (RD):  Continue supplements as ordered       EVALUATION OF THE PROGRESS TOWARD GOALS   Diet: 2 g Na, 2000 mL fluid restriction, Ensure Plus BID @ 10 am and 2 pm    Intake: Eating 100% of meals and good diet tolerance per RN flowsheet. Pt states he is eating well and ordering 3 meals per day. He is also consuming 2 Ensure Plus per day with no issues. Pt notes some inconsistencies w/ what he is allowed to order.      NEW FINDINGS   -Wt: 69.3 kg today. Lowest wt remains 64 kg from 6/2.   -Meds: Continues on multivitamin/mineral, 1000 mcg Vitamin B12, 100 mg thiamine, lasix.     MALNUTRITION  % Intake: No decreased intake noted   % Weight Loss: None noted  Subcutaneous Fat Loss: Facial region, Upper arm, Lower arm and Thoracic/intercostal: mild  Muscle Loss: Temporal, Thoracic region (clavicle, acromium bone, deltoid, trapezius, pectoral), Upper arm (bicep, tricep), Lower arm  (forearm) and Dorsal hand: moderate  Fluid Accumulation/Edema: Moderate (feet/ankles, legs)  Malnutrition Diagnosis: Non-severe malnutrition in the context of chronic illness    Previous Goals   Patient to consume % of nutritionally adequate meal trays TID, or the equivalent with supplements/snacks.  Evaluation: Met    Previous Nutrition Diagnosis  Predicted inadequate nutrient intake (kcal/PRO) related to recent hx of poor appetite and poor intakes as evidenced by potential for variable/poor appetite during admission however now with improved intake w/ use of ONS    Evaluation: No change    CURRENT NUTRITION DIAGNOSIS  Predicted inadequate nutrient intake (kcal/PRO) related to recent hx of poor  appetite and poor intakes as evidenced by potential for variable/poor appetite during admission however now with improved intake w/ use of ONS      INTERVENTIONS  Implementation  Discussed intakes w/ pt and encouraged to continue w/ good intakes and supplements BID.     Goals  Patient to consume % of nutritionally adequate meal trays TID, or the equivalent with supplements/snacks.    Monitoring/Evaluation  Progress toward goals will be monitored and evaluated per protocol.    Marisabel Dominique RD, LD, [facility]  CVICU Dietitian  Pager: [phone]

## 2017-06-16 NOTE — PLAN OF CARE
Problem: Goal Outcome Summary  Goal: Goal Outcome Summary  D: AOx4 intact and denies having pain. 100% V paced, BP WNL with scheduled antihypertensives, Dobutamine running @ 5mcg/kg/min.       P: Continue with POC.

## 2017-06-16 NOTE — PROGRESS NOTES
.    Care Coordinator- Discharge Planning     Admission Date/Time:  6/1/2017  Attending MD:  Charity Merchant MD     Data  Date of initial CC assessment:  Chart reviewed, discussed with interdisciplinary team.   Patient was admitted for:   1. Cardiogenic shock (H)    2. Acute renal failure, unspecified acute renal failure type (H)    3. Chronic combined systolic and diastolic congestive heart failure (H)           Assessment  Updated that pt is medically cleared for discharge home today with new Dobutamine infusion.  I met with pt today to review discharge plan.  Pt lives at home with wife and is independent with cares and ambulation. Previous to this admission, pt had home infusion of Milrinone that was supplied to him by [facility]. He would like to continue with their services with the changing of medication. Pt goes to [facility] infusion center for lab draws and dressing changes. Wife also assists with PICC dressing changes at home. His wife will be providing transportation home today.      Coordination of Care:    I have updated [facility] liaison, Candy # [phone] in order to coordinate discharge plans.  Candy has met with pt and created plan to deliver medication, exchange pumps and re-train and answer any questions regarding home infusion.     Plan  Anticipated Discharge Date: 06/16/17    Anticipated Discharge Plan: Home with resumption of home infusion for Dobutamine infusion.       Dixon Pruitt RN, BSN  ICU Care Coordinator  Pager: [phone]  Phone:  [phone]

## 2017-06-16 NOTE — PLAN OF CARE
Patient discharged to home @ 1620 via wheelchair, accompanied by wife and transport technician. AVS reviewed with wife and patient, new medications reviewed and questions answered.  Medications sent to discharge pharmacy. Home infusion RN at bedside, PICC dressings/caps changed and home Dobutamine infusion initiated. All belongings accounted for and sent with patient. Patient stable at time of discharge.

## 2017-06-17 NOTE — PLAN OF CARE
Problem: Goal Outcome Summary  Goal: Goal Outcome Summary  Occupational Therapy Discharge Summary     Reason for therapy discharge:    Discharged to home.     Progress towards therapy goal(s). See goals on Care Plan in [facility] electronic health record for goal details.  Goals not met.  Barriers to achieving goals:   discharge from facility.     Therapy recommendation(s):    No further therapy is recommended. Rec assist from family PRN.

## 2017-06-17 NOTE — IP AVS SNAPSHOT
MRN:[id number]                      After Visit Summary   6/17/2017    Perez Villalobos    MRN: [id number]           Thank you!     Thank you for choosing [facility] for your care. Our goal is always to provide you with excellent care. Hearing back from our patients is one way we can continue to improve our services. Please take a few minutes to complete the written survey that you may receive in the mail after you visit with us. Thank you!        Patient Information     Date Of Birth          1940        Designated Caregiver       Most Recent Value    Caregiver    Will someone help with your care after discharge? yes    Name of designated caregiver Annia    Phone number of caregiver     Caregiver address [address] [place] [address]      About your hospital stay     You were admitted on:  June 18, 2017 You last received care in the:  [address] [facility]    You were discharged on:  June 24, 2017        Reason for your hospital stay       You were admitted do to volume overload. We were able to remove the fluid with IV diuretics. You were also prescribed a new oral diuretic regimen.                  Who to Call     For medical emergencies, please call 911.  For non-urgent questions about your medical care, please call your primary care provider or clinic, [phone]          Attending Provider     Provider Specialty    Alexandr Redman MD Emergency Medicine    [facility] MD Cardiology       Primary Care Provider Office Phone # Fax #    [facility] [phone]      After Care Instructions     Activity       Your activity upon discharge: activity as tolerated            Diet       Follow this diet upon discharge: Orders Placed This Encounter      Snacks/Supplements Adult: Other - Please comment; 10AM, 2PM: Pt may prefer Ensure Plus, chocolate, vanilla or strawberry; Between Meals      Fluid restriction 1500 ML FLUID      Combination  Diet 2 gm NA Diet            Discharge Instructions       Please take your new diuretic prescription (torsemide 80 mg twice a day). You will need to follow up with [facility] on Monday or Tuesday for a lab draw. You should also follow up with Dr. Kelley in 1-2 weeks.            IV access       **Ordering Provider MUST call/page Care Coordinator/ to discuss arranging this service**    You are going home with the following vascular access device: PICC.                  Follow-up Appointments     Adult [facility] Follow-up and recommended labs and tests       Follow up with [facility] clinic on Monday or Tuesday with BMP to check kidney function and potassium.    Follow up with Dr. Kelley in about 1-2 weeks.    Appointments on [place] and/or [facility] (with [facility] or [facility] provider or service). Call [phone] if you haven't heard regarding these appointments within 7 days of discharge.                  Your next 10 appointments already scheduled     Jul 06, 2017  4:30 PM CDT   Remote ICD Check with MCLEAN DCR2   [facility] [place] ([facility])    [address]  [place] [id number] [phone]           This appointment is for a remote check of your debrillator.  This is not an appointment at the office.            Jul 10, 2017   Procedure with Sincere Snyder MD   [facility] (--)    [address]  [place] [address] [phone]            Jul 10, 2017   Procedure with Sincere Snyder MD   [facility] (--)    [address]  [place] [address]   [phone]            Aug 04, 2017   Procedure with Nain Bullard MD   [facility], [place], Same Day Surgery (--)    [address]  [place] [id number] [phone]              Additional Services     Home infusion referral       Your provider has referred you to:   [facility]   Phone: [phone]  "  Fax: [phone]    For resumption of Dobutamine drip and line care.            Medication Therapy Management Referral       Reason for referral:  on more than 5 medications and managing chronic disease    This service is designed to help you get the most from your medications.  A specially trained pharmacist will work closely with you and your doctors  to solve any problems related to your medications and to help you get the   best results from taking them.      The Medication Therapy Management staff will call you to schedule an appointment.                  General Recommendations To Control Heart Failure When You Get Home     Instructions To Patients and Families: Please read and check off each of these important instructions as you do them when you get home.           Weight and symptoms      ___ Put a scale in your bathroom  ___ Post a weight chart or calendar next to the scale  ___Weigh yourself every day as soon as you you get up in the morning. You should only be wearing your pajamas. Write your weight on the chart/calendar.  ___ Bring your weight chart/calendar with you to all appointments    ___Call your doctor if you gain 2 pounds in 1 day or 5 pounds in 1 week from your \"dry\" weight (baseline weight). Also call your doctor if you have shortness of breath that gets worse over time, leg swelling or fatigue.         Medicines and diet     ___ Make sure to take your medicines as prescribed.    ___Bring a current list of your medicines and all of your medicine bottles with you to all appointments.    ___ Limit fluids if you still have swelling or shortness of breath, or if your doctor tells you to do so.  ___ Eat less than 2000 mg of sodium (salt) every day. Read food labels, and do not add salt to meals.   ___ Heart healthy diet with low fat and low cholesterol          Activity and suggested lifestyle changes    ___ Stay active. Talk to your doctor about an exercise program that is safe for your " heart.    ___ Stop smoking. Reduce alcohol use.      ___ Lose weight if you are overweight. Extra weight puts a lot of stress on the heart.          Control for Leg Swelling   ___ Keep your legs elevated (raised) as needed for swelling. If swelling is uncomfortable or elevation doesn t help, ask your doctor about using ACE wrap or Jobst stockings.          Follow-up appointments   ___ Make a [facility] appointment with a basic metabolic panel lab draw 3 to 5 days after you leave the hospital. Call one of the following locations:   [facility]  [phone],  [facility] [phone],  [facility]  [phone].     ___ Make sure to take your medications as prescribed and bring an accurate list of your medications and your weight chart/calendar to your follow up appointment at the [facility] for continued education and adjustments          What is the [facility] clinic?    The [facility] (Cardiomyopathy, Optimization, Rehabilitation, Education) Clinic is a heart failure specialty clinic within the [facility]. At [facility] you will work with nurse practitioners to carefully adjust medicines, get education and learn who and when to call if symptoms appear. [facility] nurses specialize in helping you:    better understand your disease.    slow the progress of your disease.    improve the length and quality of your life.    detect future heart problems before they become life threatening.    avoid hospital stays.            Pending Results     No orders found from 6/15/2017 to 6/18/2017.            Statement of Approval     Ordered          06/24/17 1046  I have reviewed and agree with all the recommendations and orders detailed in this document.  EFFECTIVE NOW     Approved and electronically signed by:  Bryon Gupta MD             Admission Information     Date & Time Provider Department  "Dept. Phone    2017 Clive Davis MD [address] [facility] [phone]      Your Vitals Were     Blood Pressure Pulse Temperature Respirations Height Weight    118/65 (BP Location: Left arm) 89 98.1  F (36.7  C) (Oral) 18 1.727 m (5' 8\") 67 kg (147 lb 12.8 oz)    Pulse Oximetry BMI (Body Mass Index)                94% 22.47 kg/m2          PackLink Information     PackLink lets you send messages to your doctor, view your test results, renew your prescriptions, schedule appointments and more. To sign up, go to [email or web address] . Click on \"Log in\" on the left side of the screen, which will take you to the Welcome page. Then click on \"Sign up Now\" on the right side of the page.     You will be asked to enter the access code listed below, as well as some personal information. Please follow the directions to create your username and password.     Your access code is: [email or web address]  Expires: 2017  8:15 AM     Your access code will  in 90 days. If you need help or a new code, please call your [facility] clinic or [phone].        Care EveryWhere ID     This is your Care EveryWhere ID. This could be used by other organizations to access your [facility] medical records  [id number]        Equal Access to Services     [facility] AH: Hadii kennedi gannono Sojossy, waaxda luqadaha, qaybta kaalmada bertha, jesus welch . So [facility] [phone].    ATENCIÓN: Si habla español, tiene a padilla disposición servicios gratuitos de asistencia lingüística. Leslie al [phone].    We comply with applicable federal civil rights laws and [place] laws. We do not discriminate on the basis of race, color, national origin, age, disability sex, sexual orientation or gender identity.               Review of your medicines      START taking        Dose / Directions    ferrous sulfate 325 (65 FE) MG tablet   Commonly known as:  IRON   Used for:  Iron deficiency anemia, unspecified        " Dose:  325 mg   Take 1 tablet (325 mg) by mouth daily   Quantity:  100 tablet   Refills:  0       torsemide 20 MG tablet   Commonly known as:  DEMADEX        Dose:  80 mg   Take 4 tablets (80 mg) by mouth 2 times daily   Quantity:  240 tablet   Refills:  0         CONTINUE these medicines which may have CHANGED, or have new prescriptions. If we are uncertain of the size of tablets/capsules you have at home, strength may be listed as something that might have changed.        Dose / Directions    potassium chloride 20 MEQ Packet   Commonly known as:  KLOR-CON   This may have changed:    - how much to take  - when to take this   Used for:  Chronic combined systolic and diastolic congestive heart failure (H)        Dose:  20 mEq   Take 20 mEq by mouth daily   Quantity:  540 packet   Refills:  3         CONTINUE these medicines which have NOT CHANGED        Dose / Directions    aspirin EC 81 MG EC tablet        Dose:  81 mg   Take 81 mg by mouth daily   Refills:  0       B-12 1000 MCG Tbcr   Used for:  Vitamin B 12 deficiency        Dose:  1000 mcg   Take 1,000 mcg by mouth daily   Quantity:  100 tablet   Refills:  0       calcitRIOL 0.25 MCG capsule   Commonly known as:  ROCALTROL        Dose:  0.25 mcg   Take 0.25 mcg by mouth daily   Refills:  0       D5W 5 % SOLN 170 mL with DOBUTamine 250 MG/20ML SOLN 1,000 mg   Used for:  Cardiogenic shock (H)        Dose:  5 mcg/kg/min   Inject 0.3555 mg/min into the vein continuous   Quantity:  1000 mL   Refills:  0       hydrALAZINE 100 MG Tabs tablet   Commonly known as:  APRESOLINE   Used for:  Cardiogenic shock (H)        Dose:  100 mg   Take 1 tablet (100 mg) by mouth 4 times daily   Quantity:  60 tablet   Refills:  3       isosorbide dinitrate 30 MG tablet   Commonly known as:  ISORDIL   Used for:  Cardiogenic shock (H)        Dose:  60 mg   Take 2 tablets (60 mg) by mouth 3 times daily   Quantity:  90 tablet   Refills:  3       levothyroxine 150 MCG tablet   Commonly known  as:  SYNTHROID/LEVOTHROID        Dose:  150 mcg   Take 150 mcg by mouth daily   Refills:  0       Multi-vitamin Tabs tablet        Dose:  1 tablet   Take 1 tablet by mouth daily   Refills:  0       PLAVIX 75 MG tablet   Generic drug:  clopidogrel        Dose:  75 mg   Take 75 mg by mouth daily   Refills:  0       Sodium Chloride 0.65 % Soln        Spray in nostril as needed   Refills:  0       tolterodine 4 MG 24 hr capsule   Commonly known as:  DETROL LA        Dose:  4 mg   Take 4 mg by mouth daily   Refills:  0       TYLENOL 325 MG tablet   Generic drug:  acetaminophen        Dose:  325-650 mg   Take 325-650 mg by mouth every 6 hours as needed for mild pain   Refills:  0         STOP taking     furosemide 80 MG tablet   Commonly known as:  LASIX           KRILL OIL PO                Where to get your medicines      These medications [id number] [address]  [address] [facility] [address]     Phone:  [phone]     ferrous sulfate 325 (65 FE) MG tablet    torsemide 20 MG tablet         Some of these will need a paper prescription and others can be bought over the counter. Ask your nurse if you have questions.     Bring a paper prescription for each of these medications     D5W 5 % SOLN 170 mL with DOBUTamine 250 MG/20ML SOLN 1,000 mg                Protect others around you: Learn how to safely use, store and throw away your medicines at www.disposemymeds.org.             Medication List: This is a list of all your medications and when to take them. Check marks below indicate your daily home schedule. Keep this list as a reference.      Medications           Morning Afternoon Evening Bedtime As Needed    aspirin EC 81 MG EC tablet   Take 81 mg by mouth daily   Last time this was given:  81 mg on 6/24/2017  8:45 AM                                B-12 1000 MCG Tbcr   Take 1,000 mcg by mouth daily                                calcitRIOL 0.25 MCG  capsule   Commonly known as:  ROCALTROL   Take 0.25 mcg by mouth daily   Last time this was given:  0.25 mcg on 6/24/2017  8:45 AM                                D5W 5 % SOLN 170 mL with DOBUTamine 250 MG/20ML SOLN 1,000 mg   Inject 0.3555 mg/min into the vein continuous   Last time this was given:  6/24/2017  2:50 AM                                ferrous sulfate 325 (65 FE) MG tablet   Commonly known as:  IRON   Take 1 tablet (325 mg) by mouth daily   Last time this was given:  325 mg on 6/24/2017  8:45 AM                                hydrALAZINE 100 MG Tabs tablet   Commonly known as:  APRESOLINE   Take 1 tablet (100 mg) by mouth 4 times daily   Last time this was given:  100 mg on 6/24/2017 12:47 PM                                isosorbide dinitrate 30 MG tablet   Commonly known as:  ISORDIL   Take 2 tablets (60 mg) by mouth 3 times daily   Last time this was given:  60 mg on 6/24/2017 12:47 PM                                levothyroxine 150 MCG tablet   Commonly known as:  SYNTHROID/LEVOTHROID   Take 150 mcg by mouth daily   Last time this was given:  150 mcg on 6/24/2017  6:38 AM                                Multi-vitamin Tabs tablet   Take 1 tablet by mouth daily                                PLAVIX 75 MG tablet   Take 75 mg by mouth daily   Last time this was given:  75 mg on 6/24/2017  8:45 AM   Generic drug:  clopidogrel                                potassium chloride 20 MEQ Packet   Commonly known as:  KLOR-CON   Take 20 mEq by mouth daily   Last time this was given:  60 mEq on 6/23/2017 12:24 PM                                Sodium Chloride 0.65 % Soln   Spray in nostril as needed                                tolterodine 4 MG 24 hr capsule   Commonly known as:  DETROL LA   Take 4 mg by mouth daily   Last time this was given:  4 mg on 6/24/2017  8:45 AM                                torsemide 20 MG tablet   Commonly known as:  DEMADEX   Take 4 tablets (80 mg) by mouth 2 times daily   Last  time this was given:  80 mg on 6/24/2017  8:45 AM                                TYLENOL 325 MG tablet   Take 325-650 mg by mouth every 6 hours as needed for mild pain   Last time this was given:  650 mg on 6/19/2017  9:37 PM   Generic drug:  acetaminophen

## 2017-06-17 NOTE — IP AVS SNAPSHOT
[address] [address]    Phone:  [phone]                                       After Visit Summary   6/17/2017    Perez Villalobos    MRN: [id number]           After Visit Summary Signature Page     I have received my discharge instructions, and my questions have been answered. I have discussed any challenges I see with this plan with the nurse or doctor.    ..........................................................................................................................................  Patient/Patient Representative Signature      ..........................................................................................................................................  Patient Representative Print Name and Relationship to Patient    ..................................................               ................................................  Date                                            Time    ..........................................................................................................................................  Reviewed by Signature/Title    ...................................................              ..............................................  Date                                                            Time

## 2017-06-18 PROBLEM — I50.23 ACUTE ON CHRONIC SYSTOLIC (CONGESTIVE) HEART FAILURE (H): Status: ACTIVE | Noted: 2017-01-01

## 2017-06-18 NOTE — ED NOTES
Pt presents to ED with complaints of SOB and upset stomach accompanied by intermittent pain. Pt was d/c from this facility yesterday 6/16-pt was admitted for cardiogenic shock. Pt is on a continuous dobutamine gtt. Pt states his symptoms started to get worse only a couple hours after returning home from the hospital.

## 2017-06-18 NOTE — H&P
Cards 2  History and Physical      I personally provided care for this patient, reviewed chart, discussed course with patient, housestaff and consulting physicians.  I answered all questions. Patient returns within 24 hours of discharge for acute and chronic heart failure with increasing shortness of breath, weight gain.  He is inotrope dependent.    Results for RON LUCERO (MRN [id number]) as of 6/18/2017 09:43   Ref. Range 6/16/2017 02:44 6/17/2017 23:00 6/17/2017 23:10 6/17/2017 23:36 6/18/2017 06:17   Sodium Latest Ref Range: 133 - 144 mmol/L 131 (L)  128 (L)  129 (L)   Potassium Latest Ref Range: 3.4 - 5.3 mmol/L 3.9  5.7 (H)  5.1   Chloride Latest Ref Range: 94 - 109 mmol/L 90 (L)  88 (L)  91 (L)   Carbon Dioxide Latest Ref Range: 20 - 32 mmol/L 35 (H)  28  28   Urea Nitrogen Latest Ref Range: 7 - 30 mg/dL 78 (H)  87 (H)  83 (H)   Creatinine Latest Ref Range: 0.66 - 1.25 mg/dL 2.03 (H)  2.48 (H)  2.55 (H)   GFR Estimate Latest Ref Range: >60 mL/min/1.7m2 32 (L)  25 (L)  25 (L)   GFR Estimate If Black Latest Ref Range: >60 mL/min/1.7m2 39 (L)  31 (L)  30 (L)   Calcium Latest Ref Range: 8.5 - 10.1 mg/dL 8.8  8.9  8.9   Anion Gap Latest Ref Range: 3 - 14 mmol/L 7  12  10   Magnesium Latest Ref Range: 1.6 - 2.3 mg/dL 2.2    2.4 (H)   Ferritin Latest Ref Range: 26 - 388 ng/mL     93   Iron Latest Ref Range: 35 - 180 ug/dL     41   Iron Binding Cap Latest Ref Range: 240 - 430 ug/dL     367   Iron Saturation Index Latest Ref Range: 15 - 46 %     11 (L)   N-Terminal Pro BNP Inpatient Latest Ref Range: 0 - 1800 pg/mL   43179 (H)     Troponin I ES Latest Ref Range: 0.000 - 0.045 ug/L   0.017     Glucose Latest Ref Range: 70 - 99 mg/dL 106 (H)  106 (H)  98   WBC Latest Ref Range: 4.0 - 11.0 10e9/L 7.1  7.2     Hemoglobin Latest Ref Range: 13.3 - 17.7 g/dL 9.7 (L)  10.6 (L)     Hematocrit Latest Ref Range: 40.0 - 53.0 % 29.4 (L)  31.8 (L)     Platelet Count Latest Ref Range: 150 - 450 10e9/L 198  212     RBC Count  Latest Ref Range: 4.4 - 5.9 10e12/L 3.14 (L)  3.40 (L)     MCV Latest Ref Range: 78 - 100 fl 94  94     MCH Latest Ref Range: 26.5 - 33.0 pg 30.9  31.2     MCHC Latest Ref Range: 31.5 - 36.5 g/dL 33.0  33.3     RDW Latest Ref Range: 10.0 - 15.0 % 15.8 (H)  16.0 (H)     Diff Method Unknown   Automated Method     % Neutrophils Latest Units: %   78.4     % Lymphocytes Latest Units: %   14.5     % Monocytes Latest Units: %   6.6     % Eosinophils Latest Units: %   0.1     % Basophils Latest Units: %   0.3     % Immature Granulocytes Latest Units: %   0.1     Nucleated RBCs Latest Ref Range: 0 /100   0     Absolute Neutrophil Latest Ref Range: 1.6 - 8.3 10e9/L   5.6     Absolute Lymphocytes Latest Ref Range: 0.8 - 5.3 10e9/L   1.0     Absolute Monocytes Latest Ref Range: 0.0 - 1.3 10e9/L   0.5     Absolute Eosinophils Latest Ref Range: 0.0 - 0.7 10e9/L   0.0     Absolute Basophils Latest Ref Range: 0.0 - 0.2 10e9/L   0.0     Abs Immature Granulocytes Latest Ref Range: 0 - 0.4 10e9/L   0.0     Absolute Nucleated RBC Unknown   0.0     Anisocytosis Unknown   Slight     Platelet Estimate Unknown   Confirming automa...     XR CHEST 2 VW Unknown  Rpt      EKG 12-LEAD, TRACING ONLY Unknown    Rpt      Wt Readings from Last 24 Encounters:   06/18/17 70.6 kg (155 lb 11.2 oz)   06/16/17 69.3 kg (152 lb 12.5 oz)   05/26/17 65.7 kg (144 lb 12.8 oz)   05/19/17 66.5 kg (146 lb 9.6 oz)   05/12/17 65.8 kg (145 lb)   05/04/17 66.8 kg (147 lb 4.8 oz)   05/01/17 65 kg (143 lb 3.2 oz)   04/17/17 65.1 kg (143 lb 8 oz)   04/15/17 65 kg (143 lb 3.2 oz)   04/05/17 69.6 kg (153 lb 6.4 oz)   04/03/17 67.7 kg (149 lb 3.2 oz)   03/31/17 66.2 kg (146 lb)   03/30/17 65.6 kg (144 lb 10 oz)   03/29/17 69.3 kg (152 lb 12.8 oz)   03/23/17 68.5 kg (151 lb 1.6 oz)   03/20/17 71.4 kg (157 lb 4.8 oz)   03/08/17 67.1 kg (148 lb)   03/06/17 68.5 kg (151 lb)   03/02/17 67.5 kg (148 lb 12.8 oz)   01/23/17 71 kg (156 lb 9.6 oz)   01/23/17 71.9 kg (158 lb 9.6 oz)    01/20/17 70 kg (154 lb 6.4 oz)   01/20/17 70 kg (154 lb 6.4 oz)   01/16/17 71.7 kg (158 lb)       Clive Davis M.D.  Division of Cardiology  Department of Medicine    Perez Villalobos MRN# [id number]   Age: 76 year old YOB: 1940     Date of Admission:  6/17/2017    Primary care provider: Shahriar, Lee Ann Epstein        Chief Complaint   Shortness of breath    History of Present Illness   Perez Villalobos is a 76 year old male with ICM with biventricular failure, CAD s/p multiple PCI, VF arrest s/p CRT-D, and CKD 3 presenting for evaluation of shortness of breath. He was recently admitted from 6/1 - 6/16 for ambulatory cardiogenic shock. He was transitioned from milrinone to dobutamine and his afterload medications (hydralazine and isordil) were uptitrated. He was thought to be hypervolemic on the day of discharge, but the patient preferred to go home with close follow up. When he arrived at home that night, he had significant orthopnea and was unable to sleep. He used his BiPAP at home, but did not tolerate wearing it all night. He has also noticed a 2 lb weight gain and increase in his leg swelling and abdominal girth. He has had vague lower abdominal pain that improves after defecation. He has not had any appetite. He has had a dry cough, but denies fevers or night sweats. No chest pain. He takes his medications as prescribed and minimzes his salt intake. He has noticed decreased urine output over the last day.    He was given lasix 40 mg IV in the ED.    Past Medical History     Past Medical History:   Diagnosis Date     Acute renal failure (H)     10/2015 ARF secondary to rhabdomyolysis     Alcoholism (H)      Anemia      Benign essential HTN      BPH (benign prostatic hypertrophy)      CAD (coronary artery disease)     AWMI, stents to Cx, RCA and LAD     Chronic systolic heart failure (H)      CKD (chronic kidney disease)      Complete AV block (H)      Ischemic cardiomyopathy 10/23/2015    EF  "30%     Low sodium levels      Mixed hyperlipidemia      NSTEMI (non-ST elevated myocardial infarction) (H) 10/23/2015     PAD (peripheral artery disease) (H)      Rhabdomyolysis due to statin therapy     crestor     Severe hypothyroidism 9/20/2016     VF (ventricular fibrillation) (H) 11/16/16        Past Surgical History      Past Surgical History:   Procedure Laterality Date     APPENDECTOMY       CHOLECYSTECTOMY       ENDARTERECTOMY CAROTID Left 6/11/10     IMPLANT AUTOMATIC IMPLANTABLE CARDIOVERTER DEFIBRILLATOR  11/16/16     PICC INSERTION Right 04/12/2017    5fr DL Bard PICC, 41cm (3cm external) in the R basilic vein w/ tip in the low SVC.     STENT, CORONARY, S660 15/18  Nov 2000    PTCA with stent placement of CFX     STENT, CORONARY, S660 15/18  Feb 2001    PTCA with stent placement of CFX RCA      STENT, CORONARY, S660 15/18  April 2007    stent placed in LAD     tonsillectomy and adenoidectomy          Social History     Social History   Substance Use Topics     Smoking status: Former Smoker     Packs/day: 1.50     Years: 20.00     Types: Cigarettes     Quit date: 1/1/1980     Smokeless tobacco: Never Used     Alcohol use 0.0 oz/week     0 Standard drinks or equivalent per week      Comment: occassionally      Lives with wife in [place]. No services at home.    Family History     Family History   Problem Relation Age of Onset     Unknown/Adopted Mother      Unknown/Adopted Father         Allergies     Allergies   Allergen Reactions     Lisinopril Other (See Comments)     Angioedema     Augmentin Other (See Comments)     Per pt, \"froze him, affected his legs\"     Crestor [Rosuvastatin] Other (See Comments)     rhabdomyolysis        Medications     Prescriptions Prior to Admission   Medication Sig Dispense Refill Last Dose     isosorbide dinitrate (ISORDIL) 30 MG tablet Take 2 tablets (60 mg) by mouth 3 times daily 90 tablet 3 6/17/2017 at Unknown time     hydrALAZINE (APRESOLINE) 100 MG TABS tablet " "Take 1 tablet (100 mg) by mouth 4 times daily 60 tablet 3 6/17/2017 at Unknown time     furosemide (LASIX) 80 MG tablet Take 1 tablet (80 mg) by mouth 2 times daily 30 tablet 3 6/17/2017 at Unknown time     potassium chloride (KLOR-CON) 20 MEQ Packet Take 40 mEq by mouth 2 times daily 540 packet 3 6/17/2017 at Unknown time     D5W 5 % SOLN 170 mL with DOBUTamine 250 MG/20ML SOLN 1,000 mg Inject 0.3555 mg/min into the vein continuous 1000 mL 3 6/17/2017 at Unknown time     tolterodine (DETROL LA) 4 MG 24 hr capsule Take 4 mg by mouth daily   6/17/2017 at Unknown time     calcitRIOL (ROCALTROL) 0.25 MCG capsule Take 0.25 mcg by mouth daily   6/17/2017 at Unknown time     aspirin EC 81 MG EC tablet Take 81 mg by mouth daily   6/17/2017 at Unknown time     levothyroxine (SYNTHROID/LEVOTHROID) 150 MCG tablet Take 150 mcg by mouth daily   6/17/2017 at Unknown time     KRILL OIL PO Take 1 capsule by mouth daily    6/17/2017 at Unknown time     Cyanocobalamin (B-12) 1000 MCG TBCR Take 1,000 mcg by mouth daily 100 tablet 0 6/17/2017 at Unknown time     multivitamin, therapeutic with minerals (MULTI-VITAMIN) TABS Take 1 tablet by mouth daily   6/17/2017 at Unknown time     clopidogrel (PLAVIX) 75 MG tablet Take 75 mg by mouth daily   6/17/2017 at Unknown time     Saline (SODIUM CHLORIDE) 0.65 % SOLN Spray in nostril as needed   Taking        Review of Systems   Review Of Systems  Skin: negative  Eyes: negative  Ears/Nose/Throat: negative  Respiratory: see HPI  Cardiovascular: see HPI  Gastrointestinal: negative, poor appetite and nausea  Genitourinary: negative  Musculoskeletal: negative  Neurologic: negative  Psychiatric: negative  Hematologic/Lymphatic/Immunologic: negative  Endocrine: negative     Physical Exam   Blood pressure 117/73, pulse 89, temperature 98.2  F (36.8  C), temperature source Oral, resp. rate 16, height 1.727 m (5' 8\"), weight 70.6 kg (155 lb 11.2 oz), SpO2 95 %.    Wt Readings from Last 4 Encounters: "   06/18/17 70.6 kg (155 lb 11.2 oz)   06/16/17 69.3 kg (152 lb 12.5 oz)   05/26/17 65.7 kg (144 lb 12.8 oz)   05/19/17 66.5 kg (146 lb 9.6 oz)     General: nodding off throughout interview/exam, interactive, pleasant  HEENT: AT/NC, sclera anicteric, EOMI  Neck: Supple, JVD to the mandible with hepatic pressure while sitting upright  Resp: crackles in the bases bilaterally, distant and weak air movement  Cardiac: regular rate and rhythm, no murmur  Abdomen: Soft, distended, mildly tense. +BS.  No rebound or guarding.  Extremities: 2+ edema to knee bilaterally  Skin: Warm peripherally, dry  Neuro:  Face symmetric, moving all extremities without focal deficit     Data   CMP  Recent Labs  Lab 06/17/17  2310 06/16/17  0244 06/15/17  1841 06/15/17  0415  06/14/17  0448  06/13/17  0217   * 131* 130* 132*  < > 132*  < > 133   POTASSIUM 5.7* 3.9 3.6 3.3*  < > 3.4  < > 4.3   CHLORIDE 88* 90* 86* 89*  < > 91*  < > 94   CO2 28 35* 38* 36*  < > 33*  < > 32   ANIONGAP 12 7 6 7  < > 9  < > 8   * 106* 132* 104*  < > 95  < > 117*   BUN 87* 78* 84* 83*  < > 82*  < > 86*   CR 2.48* 2.03* 1.96* 2.08*  < > 2.06*  < > 1.99*   GFRESTIMATED 25* 32* 33* 31*  < > 31*  < > 33*   GFRESTBLACK 31* 39* 40* 38*  < > 38*  < > 40*   LEIDY 8.9 8.8 8.6 8.6  < > 8.8  < > 8.5   MAG  --  2.2  --  2.2  --  2.3  --  2.2   < > = values in this interval not displayed.  CBC  Recent Labs  Lab 06/17/17  2310 06/16/17  0244 06/15/17  0415 06/14/17  0448   WBC 7.2 7.1 7.1 6.7   RBC 3.40* 3.14* 3.06* 3.11*   HGB 10.6* 9.7* 9.6* 9.6*   HCT 31.8* 29.4* 28.7* 29.7*   MCV 94 94 94 96   MCH 31.2 30.9 31.4 30.9   MCHC 33.3 33.0 33.4 32.3   RDW 16.0* 15.8* 15.9* 15.7*    198 202 219     INRNo lab results found in last 7 days.  Lab Results   Component Value Date    TROPI 0.017 06/17/2017    TROPI 0.015 03/29/2017    TROPI 0.106 (H) 11/16/2016    TROPI  11/15/2016     <0.015  The 99th percentile for upper reference range is 0.045 ug/L.  Troponin values  in   the range of 0.045 - 0.120 ug/L may be associated with risks of adverse   clinical events.      TROPI 2.958 () 10/03/2016    TROPONIN 19.40 () 04/05/2007    TROPONIN 32.30 () 04/04/2007    TROPONIN 22.40 () 04/04/2007    TROPONIN <0.04 04/04/2007    TROPONIN 0.23 11/30/2005     EKG  Atrial sensed V-paced; no ischemic changes compared to old EKGs    CXR  Impression:   Accounting for differences in technique, no significant change in  small to moderate pleural effusions and bibasilar  atelectasis/consolidation.    ECHO 6/15/17  Interpretation Summary  Severe diffuse hypokinesis. The Ejection Fraction is estimated at 15-20%  (calculated, 23%.)  The RV is moderately dilated. Global right ventricular function is moderately  reduced.  Moderate mitral insufficiency. Mild aortic insufficiency. Moderate tricuspid  insufficiency.  Moderate pulmonary hypertension. Right ventricular systolic pressure is 40  mmHg above the right atrial pressure.  The inferior vena cava is dilated at 2.2 cm without respiratory variability,  consistent with increased right atrial pressure. Estimated mean right atrial  pressure is >15 mmHg.  No pericardial effusion is present.    RHC 6/1/17  1. HR 84 bpm  2. /80/104 mmHg  3. RA 20/21/17   4. RV 69/17  5. PA 69/28/46   6. PCW 30/38/29   7. PA sat 48.1%   8. PCW sat 94.0%  9. Hgb 10.8 g/dL   10. Lauren CO 2.9   11. Lauren CI 1.6   12. TD CO  2.8  13. TD CI 1.6   14. PVR 5.8     RHC 6/7/2017       Assessment    Perez Villalobos is a 76 year old male with ICM with biventricular failure, CAD s/p multiple PCIs, VF arrest s/p CRT-D, and CKD 3 presenting with acute on chronic systolic heart failure.       Plan   # Acute on chronic systolic heart failure  # ICM with biventricular failure  Suspect he decompensated due to poor PO diuretic bioavailability in the setting of gut edema.  - receieved lasix 40 mg IV in ED, will redose at 80 mg if not having adequate output  - continue dobutamine at 5  mcg/kg/min  - continue hydralazine and isordil    # JACINTO on CKD 3  Likely due to cardiorenal  - diuresis as above    # Hypervolemic hyponatremia  - diuresis as above    # Mild hyperkalemia  No EKG changes.  - monitor with diuresis    # Indeterminate 1.5 cm hypoechoic nodule of left kidney  - will need follow up imagine (CT or MRI) as outpatient    Chronic medical problems:  # CAD s/p multiple PCIs - continue ASA and clopidogrel  # Normocytic anemia, stable - check iron in AM  # Hypothyroidism - continue levothyroxine  # Overactive bladder -continue tolterodine  # DELONTE - BiPAP    FEN: cardiac diet, 2 gram sodium, 1.8 L fluid restriction  Prophylaxis: none, ambulate  Dispo: admit to [facility] for management of acute on chronic heart failure    CODE: FULL    Discussed with cardiology fellow overnight. To be staffed in the AM.    Bryon Gupta, PGY-2  Internal Medicine  [phone]

## 2017-06-18 NOTE — ED NOTES
"ED to Floor Handoff      S:  Perez Villalobos is a 76 year old male who speaks English and lives with a spouse,  in a home  They arrived in the ED by car from home with a complaint of Shortness of Breath; Abdominal Pain; and Nausea    Initial vitals were:   BP: 111/62  Pulse: 92  Heart Rate: 93  Temp: 98.4  F (36.9  C)  Resp: 18  Height: 172.7 cm (5' 8\")  Weight: 70.2 kg (154 lb 11.2 oz)  SpO2: 96 %  Allergies:   Allergies   Allergen Reactions     Lisinopril Other (See Comments)     Angioedema     Augmentin Other (See Comments)     Per pt, \"froze him, affected his legs\"     Crestor [Rosuvastatin] Other (See Comments)     rhabdomyolysis   .  The meds given in the ED and their home medications are:   Current Facility-Administered Medications   Medication     heparin 100 UNIT/ML injection     sodium chloride (PF) 0.9% PF flush 10-20 mL     heparin lock flush 10 UNIT/ML injection 5-10 mL     heparin lock flush 10 UNIT/ML injection 5-10 mL     Current Outpatient Prescriptions   Medication     isosorbide dinitrate (ISORDIL) 30 MG tablet     hydrALAZINE (APRESOLINE) 100 MG TABS tablet     furosemide (LASIX) 80 MG tablet     potassium chloride (KLOR-CON) 20 MEQ Packet     D5W 5 % SOLN 170 mL with DOBUTamine 250 MG/20ML SOLN 1,000 mg     tolterodine (DETROL LA) 4 MG 24 hr capsule     calcitRIOL (ROCALTROL) 0.25 MCG capsule     aspirin EC 81 MG EC tablet     levothyroxine (SYNTHROID/LEVOTHROID) 150 MCG tablet     KRILL OIL PO     Cyanocobalamin (B-12) 1000 MCG TBCR     multivitamin, therapeutic with minerals (MULTI-VITAMIN) TABS     clopidogrel (PLAVIX) 75 MG tablet     Saline (SODIUM CHLORIDE) 0.65 % SOLN     Social demographics are   Social History     Social History     Marital status:      Spouse name: N/A     Number of children: N/A     Years of education: N/A     Social History Main Topics     Smoking status: Former Smoker     Packs/day: 1.50     Years: 20.00     Types: Cigarettes     Quit date: 1/1/1980     " Smokeless tobacco: Never Used     Alcohol use 0.0 oz/week     0 Standard drinks or equivalent per week      Comment: occassionally     Drug use: No     Sexual activity: Not Asked     Other Topics Concern     Caffeine Concern No     decaf coffee     Sleep Concern No     Stress Concern No     Weight Concern No     Special Diet Yes     low fat, low sodium     Exercise Yes     everyday     Social History Narrative       B:   The patient has been ill for 1 day(s) and during this time the symptoms have increased.  In the ED was diagnosed with   Final diagnoses:   SOB (shortness of breath)    Infection/sepsis suspected:No Isolation type; No active isolations   A:   In the ED these meds were given:   Medications   heparin 100 UNIT/ML injection (not administered)   sodium chloride (PF) 0.9% PF flush 10-20 mL (not administered)   heparin lock flush 10 UNIT/ML injection 5-10 mL (not administered)   heparin lock flush 10 UNIT/ML injection 5-10 mL (not administered)   furosemide (LASIX) injection 40 mg (40 mg Intravenous Given 6/17/17 4218)     Drips running?  Yes  Labs results   Labs Ordered and Resulted from Time of ED Arrival Up to the Time of Departure from the ED   CBC WITH PLATELETS DIFFERENTIAL - Abnormal; Notable for the following:        Result Value    RBC Count 3.40 (*)     Hemoglobin 10.6 (*)     Hematocrit 31.8 (*)     RDW 16.0 (*)     All other components within normal limits   BASIC METABOLIC PANEL - Abnormal; Notable for the following:     Sodium 128 (*)     Potassium 5.7 (*)     Chloride 88 (*)     Glucose 106 (*)     Urea Nitrogen 87 (*)     Creatinine 2.48 (*)     GFR Estimate 25 (*)     GFR Estimate If Black 31 (*)     All other components within normal limits   NT PROBNP INPATIENT - Abnormal; Notable for the following:     N-Terminal Pro BNP Inpatient 23997 (*)     All other components within normal limits   TROPONIN I   CENTRAL VENOUS CATHETER     Imaging Studies:   Recent Results (from the past 24 hour(s))  "  XR Chest 2 Views    Narrative    Exam: XR CHEST 2 VW, 6/17/2017 11:08 PM    Indication: sob    Comparison: 6/13/2017, 6/12/2017, 6/11/2017.    Findings:   PA and lateral views the chest were obtained. Interval removal of  right IJ Arrow[place]-Dionna catheter. The right arm PICC tip projects over the  mid SVC. Stable position of left chest pacemaker/ICD and leads. The  cardiomediastinal silhouette is unchanged. Mildly decreased lung  volumes. No pneumothorax. Accounting for differences in technique, no  significant change in bilateral small to moderate pleural effusions  and bibasilar opacities. The upper abdomen is unremarkable.      Impression    Impression:   Accounting for differences in technique, no significant change in  small to moderate pleural effusions and bibasilar  atelectasis/consolidation.     Recent vital signs /75  Pulse 92  Temp 98.4  F (36.9  C) (Oral)  Resp 15  Ht 1.727 m (5' 8\")  Wt 70.2 kg (154 lb 11.2 oz)  SpO2 93%  BMI 23.52 kg/m2  Cardiac Rhythm: ,      Abnormal labs/tests/findings requiring intervention:---  Pain control: pt had none  Nausea control: pt had none  R:   Transfer assistance needed: Independent  Family present during ED course? Yes   Family currently present? Yes  Pt needs tele? Yes  Code Status: Full Code  Tasks needing to be completed:---      Anna herrera-- 0000    [phone] [place] [facility]  [phone] [facility] ED      "

## 2017-06-18 NOTE — ED PROVIDER NOTES
History     Chief Complaint   Patient presents with     Shortness of Breath     Abdominal Pain     Nausea     HPI  Perez Villalobos is a 76 year old male with a complex medical history of CAD (AWMI, stents to Cx, RCA, and LAD), PAD, NSTEMI, acute renal failure, ischemic cardiomyopathy, chronic systolic heart failure, and CKD among others who presents to the ED with shortness of breath. Per review of the patient's note he was just in the hospital here form 06/01/17 to 06/16/17 for ambulatory cardiogenic shock. Patient states since he was discharged from the hospital he has had shortness of breath, but it's gotten much worse. He states he is unable to lie down flat. He also has complaints of intermittent right-sided epigastric abdominal pain since this morning, leg swelling, increased abdominal distension since he was in the hospital, increased coughing, and decreased urine output. He also reports he has had normal bowel movements today. Notes he's gained 4lbs over night. Has been taking meds as prescribed, including dobutamine drip.    PAST MEDICAL HISTORY  Past Medical History:   Diagnosis Date     Acute renal failure (H)     10/2015 ARF secondary to rhabdomyolysis     Alcoholism (H)      Anemia      Benign essential HTN      BPH (benign prostatic hypertrophy)      CAD (coronary artery disease)     AWMI, stents to Cx, RCA and LAD     Chronic systolic heart failure (H)      CKD (chronic kidney disease)      Complete AV block (H)      Ischemic cardiomyopathy 10/23/2015    EF 30%     Low sodium levels      Mixed hyperlipidemia      NSTEMI (non-ST elevated myocardial infarction) (H) 10/23/2015     PAD (peripheral artery disease) (H)      Rhabdomyolysis due to statin therapy     crestor     Severe hypothyroidism 9/20/2016     VF (ventricular fibrillation) (H) 11/16/16     PAST SURGICAL HISTORY  Past Surgical History:   Procedure Laterality Date     APPENDECTOMY       CHOLECYSTECTOMY       ENDARTERECTOMY CAROTID Left  "6/11/10     IMPLANT AUTOMATIC IMPLANTABLE CARDIOVERTER DEFIBRILLATOR  11/16/16     PICC INSERTION Right 04/12/2017    5fr DL Bard PICC, 41cm (3cm external) in the R basilic vein w/ tip in the low SVC.     STENT, CORONARY, S660 15/18  Nov 2000    PTCA with stent placement of CFX     STENT, CORONARY, S660 15/18  Feb 2001    PTCA with stent placement of CFX RCA      STENT, CORONARY, S660 15/18  April 2007    stent placed in LAD     tonsillectomy and adenoidectomy       FAMILY HISTORY  Family History   Problem Relation Age of Onset     Unknown/Adopted Mother      Unknown/Adopted Father      SOCIAL HISTORY  Social History   Substance Use Topics     Smoking status: Former Smoker     Packs/day: 1.50     Years: 20.00     Types: Cigarettes     Quit date: 1/1/1980     Smokeless tobacco: Never Used     Alcohol use 0.0 oz/week     0 Standard drinks or equivalent per week      Comment: occassionally     MEDICATIONS  Current Facility-Administered Medications   Medication     heparin 100 UNIT/ML injection     Current Outpatient Prescriptions   Medication     isosorbide dinitrate (ISORDIL) 30 MG tablet     hydrALAZINE (APRESOLINE) 100 MG TABS tablet     furosemide (LASIX) 80 MG tablet     potassium chloride (KLOR-CON) 20 MEQ Packet     D5W 5 % SOLN 170 mL with DOBUTamine 250 MG/20ML SOLN 1,000 mg     tolterodine (DETROL LA) 4 MG 24 hr capsule     calcitRIOL (ROCALTROL) 0.25 MCG capsule     aspirin EC 81 MG EC tablet     levothyroxine (SYNTHROID/LEVOTHROID) 150 MCG tablet     KRILL OIL PO     Cyanocobalamin (B-12) 1000 MCG TBCR     multivitamin, therapeutic with minerals (MULTI-VITAMIN) TABS     clopidogrel (PLAVIX) 75 MG tablet     Saline (SODIUM CHLORIDE) 0.65 % SOLN     ALLERGIES  Allergies   Allergen Reactions     Lisinopril Other (See Comments)     Angioedema     Augmentin Other (See Comments)     Per pt, \"froze him, affected his legs\"     Crestor [Rosuvastatin] Other (See Comments)     rhabdomyolysis       I have reviewed the " "Medications, Allergies, Past Medical and Surgical History, and Social History in the Epic system.    Review of Systems   All other systems negative except as noted in the HPI.      Physical Exam   BP: 111/62  Pulse: 92  Temp: 98.4  F (36.9  C)  Resp: 18  Height: 172.7 cm (5' 8\")  Weight: 70.2 kg (154 lb 11.2 oz)  SpO2: 96 %  Physical Exam  Gen: NAD, sitting on stretcher, conversant and pleasant, non-toxic appearing  HEENT: NCAT, PERRL, EOMI, MMM  Neck: trachea midline, supple with FROM  Cardio: normal rate regular rhythm, normally perfused and warm  Pulm: steady, non-labored respirations, with mildly increased RR but normal WOB  Abd: soft nt/nd, no organomegaly, no CVA tenderness. There is edema to abdomen diffusely.  Ext: normal peripheral pulses, pitting edema, neurovascularly intact distally.  Skin: no rashes or signs of trauma. PICC In place.  Neuro: no focal deficits, 5/5 strength in all ext    ED Course     ED Course     Procedures   10:28 PM  The patient was seen and examined by Dr. Redman in Room 8.                EKG Interpretation:      Interpreted by Alexandr Redman  Symptoms at time of EKG: SOB   Rhythm: paced   Rate: normal  Axis: normal  Ectopy: none  Conduction: normal  ST Segments/ T Waves: No ST-T wave changes  Q Waves: none  Comparison to prior: Unchanged    Clinical Impression: normal EKG        Labs Ordered and Resulted from Time of ED Arrival Up to the Time of Departure from the ED   BASIC METABOLIC PANEL - Abnormal; Notable for the following:        Result Value    Sodium 128 (*)     Potassium 5.7 (*)     Chloride 88 (*)     Glucose 106 (*)     Urea Nitrogen 87 (*)     Creatinine 2.48 (*)     GFR Estimate 25 (*)     GFR Estimate If Black 31 (*)     All other components within normal limits   NT PROBNP INPATIENT - Abnormal; Notable for the following:     N-Terminal Pro BNP Inpatient 26064 (*)     All other components within normal limits   TROPONIN I   CENTRAL VENOUS CATHETER        "     Assessments & Plan (with Medical Decision Making)   This is a 76 year old male with a history of a cardiomyopathy with an EF 15-20% status post ICD who was just discharged after a CHF exacerbation which initially started with cardiogenic shock and has spent many days with difficulty managing his fluid status (Diuresis vs increasing creatinine). He eventually ended up choosing a regimen that included an IV dobutamine and was discharged home feeling somewhat improved. He notes overnight has had increased SOB, unable to lie flat, has gained 4 lbs and has more edema to legs. On exam is mildly increased RR but no resp distress, and is volume overloaded. ECG unchanged, CXR unchanged, labs with hyponatremia, mild hyperkalemia (no ECG changes however), and with increased BNP. Will admit to cards for continued diuresis, given 40 IV lasix here.     This part of the medical record was transcribed by Ryland Walton Medical Scribe, from a dictation done by Alexandr Redman MD.       I have reviewed the nursing notes.    I have reviewed the findings, diagnosis, plan and need for follow up with the patient.    New Prescriptions    No medications on file       Final diagnoses:   SOB (shortness of breath)     I, Ryland Walton, am serving as a trained medical scribe to document services personally performed by Alexandr Redman MD, based on the provider's statements to me.      IAlexandr MD, was physically present and have reviewed and verified the accuracy of this note documented by Ryland Walton    6/17/2017   [facility], EMERGENCY DEPARTMENT     Alexandr Redman MD  06/18/17 [id number]

## 2017-06-18 NOTE — PLAN OF CARE
Problem: Goal Outcome Summary  Goal: Goal Outcome Summary  Outcome: No Change  Pt admitted 6/17 after being D/C 6/16 with SOB and fluid up.  Pace, VS'S on RA and denied pain.  Home Dobutamine gtt continues at 5 mcg/kg/min (10.7 ml/hr).  One of 80 mg Lasix given and only 375 ml out.  3+ scrotal and b/l LE edema.  Otherwise, pt slept well and up SBA.  Continue to monitor and with POC.

## 2017-06-19 NOTE — PROGRESS NOTES
CLINICAL NUTRITION SERVICES    INTERVENTIONS:  Implementation:  Medical food supplement therapy - Pt requesting supplements per RN. He may prefer Ensure Plus. Placed general supplement order. Pt may request these prn.      Follow up/Monitoring:  Per protocol.     Bisi Barroso, MS, RD, LD, [facility]   6C Pgr:  [phone]

## 2017-06-19 NOTE — PROGRESS NOTES
Cards 2 Progress Note    A/P: 75 yo man with Hx of CAD s/p multiple PCI, HFrEF (NYHA 4, stage D), and PAD - readmitted for hypervolemia. Recently admitted 6/1 to 6/16 with ambulatory cardiogenic shock requiring vasodilation and change from milrinone to dobutamine.    #Acute on chronic systolic heart failure exacerbation  #ICM with biventricular failure  -cont home dobutamine 5  -on lasix gtt at 15, given diuril with good response, will use metolazone in PM if UOP declines  -following BMPs  -cont home hydralazine 100 qid and isordil tid     #CAD s/p multiple PCIs - continue ASA and clopidogrel     #Paroxysmal AFib- has been noted in the past but with low burden (1%) and anticoagulation decision was deferred. Has [id number] 3 for age, CHF, and vascular disease. Will need to discuss risks/benifits of anticoagulation later this admission or as outpatient (already on ASA and plavix - last PCI was x2 DAIJA to LAD in 2007 - has no bleeding history - AFib burden on 6/19 ICD check is <1%).     #JACINTO on CKD 3- likely cardiorenal, trending with diuresis      #Hypervolemic hyponatremia- improving with diuresis      #Mild hyperkalemia- No EKG changes. Resolved.      #Indeterminate 1.5 cm hypoechoic nodule of left kidney- will need follow up imagine (CT or MRI) as outpatient     Chronic medical problems:  #Normocytic anemia, iron panel suggests HEBERT- started FeS  #Hypothyroidism - continue levothyroxine  #Overactive bladder -continue tolterodine  #DELONTE - BiPAP     FEN: cardiac diet, 2 gram sodium, 1.8 L fluid restriction  DVT PPX- ambulatory  Dispo: pending adjustment of diuretic regimen   Code: Full     Seen and discussed with Dr Denice Martinez PGY2  [phone]  =========================================  Interval Events/Subjective: No events overnight. Ample output with diuril. ROS without fevers, rigors, n/v/c/d. Has improving dyspnea.     Objective- /67 (BP Location: Left arm)  Pulse 89  Temp 97.5  F (36.4  C)  "(Oral)  Resp 16  Ht 1.727 m (5' 8\")  Wt 69.3 kg (152 lb 11.2 oz)  SpO2 96%  BMI 23.22 kg/m2  gen- middle aged to elderly, nad  pulm- cta, asymmetric, ra  cv- rrr, no m/r/g  abdmn- soft, nt, nd   extrm- improving LE edema    Labs reviewed, K low, replacing  No new imaging    Attending Attestation:  Patient seen and examined by me with the team. I have performed all pertinent elements of the physical examination and reviewed the note above. I have reviewed pertinent laboratory, echocardiographic, imaging, and cardiac catheterization results. I agree with the plan of care as described in this note.    Arash Galdamez MD, PhD      "

## 2017-06-19 NOTE — PROGRESS NOTES
Care Coordinator Progress Note     Admission Date/Time:  6/17/2017  Attending MD:  Clive Davis   Data  Chart reviewed, discussed with interdisciplinary team.   Patient was admitted for: SOB (shortness of breath).    Concerns with insurance coverage for discharge needs: None.  Current Living Situation: Patient lives with spouse.  Support System: Supportive and Involved wife  Services Involved: [facility]  Transportation: Family or Friend will provide  Barriers to Discharge: acute illness    Coordination of Care and Referrals: Provided patient/family with options for resumption of home infusion.    Assessment  Pt re-admitted after transitioning from Milrinone drip to Dobutamine drip during previous admission. Pt said that he was set up with [facility] and wants to use their services again. Pt said that he  may need additional Dobutamine drip supplies upon discharge again from the hospital.   Pt is currently receiving Dobutamine drip, Lasix drip, telemetry cardiac monitoring.   Intervention:      Referral made to [facility] (Ph: [phone] Fax: [phone]) for resumption of Dobutamine drip and PICC line care.    Plan  Anticipated Discharge Date:  TBD  Anticipated Discharge Plan:  Discharge to home with home infusion.   CC will continue to monitor pt's medical condition and progress toward discharge.   LUBNA HOOKS RN BSN  Care Coordinator  [phone]    ADDENDUM: (6/20/17)   D: Pt's wife confirmed that pt has his home Dobutamine drip pump with him in his hospital room. [facility] is aware of pt's possible need for additional med supplies depending on length of hospital stay.

## 2017-06-19 NOTE — PLAN OF CARE
Problem: Goal Outcome Summary  Goal: Goal Outcome Summary  Outcome: Improving  Pt admitted 6/17 after being D/C 6/16 with SOB and fluid up.  Pace, VS'S on RA and denied pain.  Dobutamine gtt continues at 5 mcg/kg/min (5.3 ml/hr).  Home dose of Dobutamine runs around 10 ml/hr r/t less concentration.  High concentration Lasix given with better UOP.  No blood return out of PICC and needs to be a lab poke.  Gonna to try TPA, but didn't want to manipulate the lines r/t concentration.  3+ scrotal and b/l LE edema.  Otherwise, pt slept well and up SBA.  Continue to monitor and with POC.

## 2017-06-19 NOTE — PLAN OF CARE
Problem: Goal Outcome Summary  Goal: Goal Outcome Summary  D/I/A: Pt admitted with heart failure/fluid overload yesterday, started on lasix gtt originally at 10 mg/hr, increased to 15 mg/hr in hopes to increase urine output; diuril also given in early evening. Dobumatine gtt changed from home gtt to hospital infusion, continues at rate of 5 mcg/kg/min, rate at 5.3 mL/hr. VSS, denies pain, paced rhythm.      P: Continue to monitor weight & urine output closely. Continue with plan of care.     Renetta Lee, RN  Cardiology

## 2017-06-19 NOTE — PLAN OF CARE
Problem: Fluid Volume Excess (Adult,Obstetrics,Pediatric)  Goal: Identify Related Risk Factors and Signs and Symptoms  Related risk factors and signs and symptoms are identified upon initiation of Human Response Clinical Practice Guideline (CPG)   Outcome: Improving  D/I: Monitor shows V paced 80s. Continues on dobutamine drip at 5 mcg/kg/min and lasix drip (concentrated) at 15 mg/hour. Received 1x dose diuril this AM with good UO. Potassium replaced per orders. Wife present in AM. Up in halls independently with wife. See flowsheets for assessments and additional data.  A: Stable HF. Continued abdominal and LE edema.   P: Continue dobutamine and lasix drips as ordered. Monitor UO while on lasix. Encourage increased activity. Continue current cares and notify providers with questions or concerns.    06/19/17 1530   Fluid Volume Excess   Fluid Volume Excess: Related Risk Factors cardiac changes;disease process   Signs and Symptoms (Fluid Volume Excess) acute weight gain;edema

## 2017-06-20 NOTE — PLAN OF CARE
Problem: Goal Outcome Summary  Goal: Goal Outcome Summary     D: Patient with biventricular heart failure and recent switch from milrinone and dobutamine, also being treated with lasix drip for fluid overload. High concentrated lasix running into red lumen at 1.5ml/hr.  I: Red lumen without blood return. Stopped lasix drip and instilled tpa twice, unsuccessfully. Gave tylenol and ambulated with patient at bedtime since he reported feeling 'antsy'.  A: VSS, flonase given for feelings of congestion, K 3.7 this evening, one time IV Kcl ordered and started.  P: Continue diuresis. Attempt tpa again or possible rewiring of Picc if blood return needed for home draws. CC arranging for home dobutamine once discharge is appropriate.

## 2017-06-20 NOTE — PROGRESS NOTES
[facility] Cards 2 Progress Note          Assessment and Plan:   Assessment:  77 yo man with Hx of CAD s/p multiple PCI, HFrEF (NYHA 4, stage D), and PAD - readmitted for hypervolemia. Recently admitted 6/1 to 6/16 with ambulatory cardiogenic shock requiring vasodilation and change from milrinone to dobutamine.    Changes Today:  - lasix 60 mg IV BID    Plan:  #Acute on chronic systolic heart failure exacerbation  #ICM with biventricular failure   -cont home dobutamine 5  -switch to intermittent diuresis at 60 mg IV BID  -BMP q12h  -cont home hydralazine 100 qid and isordil tid      #CAD s/p multiple PCIs - continue ASA and clopidogrel      #Paroxysmal AFib- has been noted in the past but with low burden (1%) and anticoagulation decision was deferred. Has [id number] 3 for age, CHF, and vascular disease. Will need to discuss risks/benifits of anticoagulation later this admission or as outpatient (already on ASA and plavix - last PCI was x2 DAIJA to LAD in 2007 - has no bleeding history - AFib burden on 6/19 ICD check is <1%).      #JACINTO on CKD 3- likely cardiorenal, improving with diuresis       #Hypervolemic hyponatremia- improving with diuresis       #Mild hyperkalemia- No EKG changes. Resolved.       #Indeterminate 1.5 cm hypoechoic nodule of left kidney- will need follow up imagine (CT or MRI) as outpatient      Chronic medical problems:  #Normocytic anemia, iron panel suggests HEBERT- started FeS  #Hypothyroidism - continue levothyroxine  #Overactive bladder -continue tolterodine  #DELONTE - BiPAP      FEN: cardiac diet, 2 gram sodium, 1.8 L fluid restriction  DVT PPX- ambulatory  Dispo: pending adjustment of diuretic regimen   Code: Full    I have discussed this patient and plan with Dr. Kelley.    Bryon Gupta, PGY-2  Internal Medicine  [phone]        Interval History:   Notes reviewed. No acute events overnight. PICC line not drawing. Breathing is much better. Not orthopneic. Leg swelling is the same. Has  "not had BM in 2-3 days. No abdominal pain.        Review Of Systems   Negative except as stated above.            Medications:   Reviewed. Details in EPIC.     Blood pressure 130/77, pulse 89, temperature 97.6  F (36.4  C), temperature source Oral, resp. rate 18, height 1.727 m (5' 8\"), weight 67.2 kg (148 lb 3.2 oz), SpO2 94 %.    I/O: -2500    General: lying in bed, resting comfortably  CV: regular, no m/g/r; JVD to 1 cm above clavicle, increases to 3 cm with hepatic pressure  Chest: crackles in the bases, good air movement  Abdomen: Soft, mild diffuse tenderness, mild distention, not tense  Extremities: No LE edema  Neuro:  AAOx3, no focal deficits           Data:   Reviewed. Details in EPIC.        "

## 2017-06-20 NOTE — PLAN OF CARE
Problem: Goal Outcome Summary  Goal: Goal Outcome Summary  Outcome: No Change  VSS. Denies pain. Adequate urine output. Dobutamine drip continues and Lasix drip continues. Pt concerned about no blood return in PICC line as he prefers to not have multiple sticks for blood draws. TPA x2 on evening shift unsuccessful.

## 2017-06-20 NOTE — PLAN OF CARE
Problem: Fluid Volume Excess (Adult,Obstetrics,Pediatric)  Goal: Identify Related Risk Factors and Signs and Symptoms  Related risk factors and signs and symptoms are identified upon initiation of Human Response Clinical Practice Guideline (CPG)   D/I: Monitor shows V paced 80s-100s. Occasional intrinsic beats. Continues on dobutamine drip at 5 mcg/kg/min and lasix drip (concentrated) at 15 mg/hour. Lasix drip dc'd at 1400 and will start IV boluses BID. Potassium replaced per orders. +2-3 LE edema continues. Compression stockings applied. C/O increasing nausea after lunch pills. (RN not notified). Large emesis after. MDs notified. Denied more nausea after resting. Wife present in PM to meet with Dr Mckeon. Up in room independently. See flowsheets for assessments and additional data.  A: Stable HF. Continued abdominal and LE edema.   P: Continue dobutamine and lasix IVP as ordered. Monitor UO while on diuretic. Encourage increased activity. Continue current cares and notify providers with questions or concerns.    06/20/17 1630   Fluid Volume Excess   Fluid Volume Excess: Related Risk Factors cardiac changes;disease process   Signs and Symptoms (Fluid Volume Excess) acute weight gain;edema;fatigue

## 2017-06-20 NOTE — PHARMACY-ADMISSION MEDICATION HISTORY
Admission medication history interview status for the 6/17/2017 admission is complete. See Epic admission navigator for allergy information, pharmacy, prior to admission medications and immunization status.     Medication history interview sources:  Patient, Spouse, Previous discharge medication list     Changes made to PTA medication list (reason)  Added: Acetaminophen  Deleted: N/A   Changed: N/A    Additional medication history information (including reliability of information, actions taken by pharmacist):  - Talked with both the patient and his wife, they were great historians  - Pt was recently discharged from the hospital last Friday (6/16/17) and wife has been giving the pt medications based on the discharge directions, I verified this list in the chart review  - Pt says he recently started taking Tylenol as need for pain over the weekend, but states he normally doesn't take it regularly  - Originally told me he isn't using a saline nasal spray, but when asked when he last used it he stated yesterday morning - so I kept it on the list        Prior to Admission medications    Medication Sig Last Dose Taking? Auth Provider   acetaminophen (TYLENOL) 325 MG tablet Take 325-650 mg by mouth every 6 hours as needed for mild pain 6/19/2017 at 2130 Yes Unknown, Entered By History   isosorbide dinitrate (ISORDIL) 30 MG tablet Take 2 tablets (60 mg) by mouth 3 times daily 6/20/2017 at 0900 Yes Terell Martinez MD   hydrALAZINE (APRESOLINE) 100 MG TABS tablet Take 1 tablet (100 mg) by mouth 4 times daily 6/20/2017 at 0900 Yes Terell Martinez MD   furosemide (LASIX) 80 MG tablet Take 1 tablet (80 mg) by mouth 2 times daily 6/17/2017 at Unknown time Yes Terell Martinez MD   potassium chloride (KLOR-CON) 20 MEQ Packet Take 40 mEq by mouth 2 times daily 6/20/2017 at 1100 Yes Terell Martinez MD   D5W 5 % SOLN 170 mL with DOBUTamine 250 MG/20ML SOLN 1,000 mg Inject 0.3555  mg/min into the vein continuous 6/19/2017 at 1500 Yes Terell Martinez MD   tolterodine (DETROL LA) 4 MG 24 hr capsule Take 4 mg by mouth daily 6/20/2017 at 0900 Yes Reported, Patient   calcitRIOL (ROCALTROL) 0.25 MCG capsule Take 0.25 mcg by mouth daily 6/20/2017 at 0900 Yes Reported, Patient   aspirin EC 81 MG EC tablet Take 81 mg by mouth daily 6/20/2017 at 0900 Yes Unknown, Entered By History   levothyroxine (SYNTHROID/LEVOTHROID) 150 MCG tablet Take 150 mcg by mouth daily 6/20/2017 at 0800 Yes Unknown, Entered By History   Saline (SODIUM CHLORIDE) 0.65 % SOLN Spray in nostril as needed 6/19/2017 at AM Yes Unknown, Entered By History   KRILL OIL PO Take 1 capsule by mouth daily  6/17/2017 at AM Yes Reported, Patient   Cyanocobalamin (B-12) 1000 MCG TBCR Take 1,000 mcg by mouth daily 6/20/2017 at 0900 Yes Hodan Guerrero MD   multivitamin, therapeutic with minerals (MULTI-VITAMIN) TABS Take 1 tablet by mouth daily 6/17/2017 at AM Yes Reported, Patient   clopidogrel (PLAVIX) 75 MG tablet Take 75 mg by mouth daily 6/20/2017 at 0900 Yes Reported, Patient         Medication history completed by: Liss Newberry, PharmD Student

## 2017-06-21 NOTE — PLAN OF CARE
Problem: Goal Outcome Summary  Goal: Goal Outcome Summary     D: Patient admitted with hypervolemia on continuous dobutamine drip at 5mcg/kg/min. Lasix drip transitioned to IV injections BID starting this afternoon.  I: 40meq Kcl powder given at 1600 and again at 2000. Next check in AM.  A: Vpaced 80s-90s, room air, BPs stable. LE edema, patient wearing teds. Walking in halls this evening. Nausea resolved and ate dinner without issue. Patient had bowel movement later this evening.  P: Monitor labs. Diuretic adjustments until stable. Will dc on home dobutamine.

## 2017-06-21 NOTE — PLAN OF CARE
Problem: Fluid Volume Excess (Adult,Obstetrics,Pediatric)  Goal: Identify Related Risk Factors and Signs and Symptoms  Related risk factors and signs and symptoms are identified upon initiation of Human Response Clinical Practice Guideline (CPG)   Outcome: No Change  D/I: Monitor shows V paced with frequent intrinsic beats . Continues on dobutamine drip at 5mcg/kg/min. Lasix IVP with fair UO, will increase dose this afternoon. Patient compliant with fluid intake. Up in room at bedside independently, and in halls with SBA. Denies pain or shortness of breath. No visitors today. See flowsheets for assessments and additional data.  A: Stable HF with continued LE edema.   P: Monitor UOP with increase in IV lasix dose. Decrease fluid limit to 1500ml/day. Plan to change to PO diuretic and monitor response. Patient hopes to DC to home tomorrow evening or Friday AM. Continue current cares and notify providers with questions or concerns.

## 2017-06-21 NOTE — PROGRESS NOTES
[facility] Cards 2 Progress Note          Assessment and Plan:   Assessment:  75 yo man with Hx of CAD s/p multiple PCI, HFrEF (NYHA 4, stage D), and PAD - readmitted for hypervolemia. Recently admitted 6/1 to 6/16 with ambulatory cardiogenic shock requiring vasodilation and change from milrinone to dobutamine.    Changes Today:  - lasix 120 mg PO BID    Plan:  # Acute on chronic systolic heart failure exacerbation  # ICM with biventricular failure   # Hx of VF arrest s/p CRT-D  Nearing euvolemia. Will need to transition to oral and ensure adequate diuresis before discharge.  -cont home dobutamine 5  -start PO diuresis with lasix 120 mg BID  -BMP q12h  -cont home hydralazine 100 qid and isordil tid      # CAD s/p multiple PCIs (last PCI 10/2016)  - continue ASA and clopidogrel      # Paroxysmal AFib  CHADSVASC 4. Has low burdern (<1%) on last device check on 6/19.  - will continue DAPT as above  - will need discussion of anticoagulation as outpatient     # JACINTO on CKD 3, improving  - monitor with diuresis      # Hypervolemic hyponatremia, resolved      # Indeterminate 1.5 cm hypoechoic nodule of left kidney  - will need follow up imagine (CT or MRI) as outpatient      Chronic medical problems:  #Normocytic anemia, iron panel suggests HEBERT- started FeS  #Hypothyroidism - continue levothyroxine  #Overactive bladder -continue tolterodine  #DELONTE - BiPAP      FEN: cardiac diet, 2 gram sodium, 1.5 L fluid restriction  DVT PPX- ambulatory  Dispo: likely 1-2 days pending tolerance of oral diuretic regimen    Code: FULL    I have discussed this patient and plan with Dr. Galdamez.    Bryon Gupta, PGY-2  Internal Medicine  [phone]        Interval History:   Notes reviewed. No acute events overnight.    Breathing is much better. Not orthopneic. Had BM today and yesterday.         Review Of Systems   Negative except as stated above.          Medications:   Reviewed. Details in EPIC.     Blood pressure 125/89, pulse 89,  "temperature 98.3  F (36.8  C), temperature source Oral, resp. rate 18, height 1.727 m (5' 8\"), weight 67.2 kg (148 lb 3.2 oz), SpO2 97 %.    I/O: -2500    General: lying in bed, resting comfortably  CV: regular, no m/g/r; JVD to 1 cm above clavicle, increases to 2 cm with hepatic pressure  Chest: minimal crackles in the bases, mostly clear with good air movement  Abdomen: Soft, mild diffuse tenderness, mild distention, not tense  Extremities: 2+ edema to knee bilaterally  Neuro:  Face symmetric, moving all extremities without focal deficit.         Data:   Reviewed. Details in EPIC.    Attending Attestation:  Patient seen and examined by me with the team. I have performed all pertinent elements of the physical examination and reviewed the note above. I have reviewed pertinent laboratory, echocardiographic, imaging, and cardiac catheterization results. I agree with the plan of care as described in this note.    Arash Galdamez MD, PhD        "

## 2017-06-22 NOTE — PLAN OF CARE
Problem: Goal Outcome Summary  Goal: Goal Outcome Summary  RN  1. Pt will be hemodynamically stable.  2. Pt will have adequate uo  D-Admitted on 06/17/17 with hypervolemia. See flow sheets for vs and assessments. Creatinine in am 2.25.  I-Transitioned to po lasix this afternoon. Continues on dobutamine at 5mcg/kg/min.  A-Low urine output. Recheck creatinine at 1800 1.53.  I-Discussed concerns about low urine output with cardiology cross cover resident X 2 this shift.  P-Continue with current poc. Monitor labs and fluid status closely.

## 2017-06-22 NOTE — PROVIDER NOTIFICATION
Updated cardiology cross cover resident (pager [phone]) on pt's low urine output. Pt has voided a total of 300 cc this shift after transitioning to po lasix at 1630 today. At 2200, pt voided 50 cc and bladder scan showed 179cc post void. No new orders obtained. Charge nurse updated on concerns about pt.

## 2017-06-22 NOTE — PLAN OF CARE
Problem: Goal Outcome Summary  Goal: Goal Outcome Summary  RN  1. Pt will be hemodynamically stable.  2. Pt will have adequate uo   Outcome: No Change  VSS. Denies pain. Paced rhythm. Patient is very concerned about the decrease in urine output. Writer discussed with MD and per MD patient has met his goal in terms of urine output for the day (yesterday).  Need to maintain very accurate I and O record for patient. Patient was still net negative. Patient states he was able to sleep better this shift than he has in a couple of days. He also states he is starting to feel like the way he felt after going home the last time and having the fluid volume increase.

## 2017-06-22 NOTE — PROGRESS NOTES
[facility] Cards 2 Progress Note          Assessment and Plan:   Assessment:  75 yo man with Hx of CAD s/p multiple PCI, HFrEF (NYHA 4, stage D), and PAD - readmitted for hypervolemia. Recently admitted 6/1 to 6/16 with ambulatory cardiogenic shock requiring vasodilation and change from milrinone to dobutamine.    Changes Today:  - Continue lasix 120 mg po bid. Did not have a great output.  - Plan for metolazone 5 mg now, if no good response, convert evening dose to torsemide and monitor.  - IV diuresis only if this regimen doesn't work.     Plan:  # Acute on chronic systolic heart failure exacerbation  # ICM with biventricular failure   # Hx of VF arrest s/p CRT-D  - Nearing euvolemia. Will need to transition to oral and ensure adequate diuresis before discharge.  -cont home dobutamine 5  -Continue PO diuresis with lasix 120 mg BID, giving metolazone 5 mg now.   -BMP q12h  -cont home hydralazine 100 qid and isordil tid      # CAD s/p multiple PCIs (last PCI 10/2016)  - continue ASA and clopidogrel      # Paroxysmal AFib  CHADSVASC 4. Has low burdern (<1%) on last device check on 6/19.  - will continue DAPT as above  - will need discussion of anticoagulation as outpatient     # JACINTO on CKD 3, improving  - monitor with diuresis, cr stable.       # Hypervolemic hyponatremia, resolved      # Indeterminate 1.5 cm hypoechoic nodule of left kidney  - will need follow up imagine (CT or MRI) as outpatient      Chronic medical problems:  #Normocytic anemia, iron panel suggests HEBERT- started FeS  #Hypothyroidism - continue levothyroxine  #Overactive bladder -continue tolterodine  #DELONTE - BiPAP      FEN: cardiac diet, 2 gram sodium, 1.5 L fluid restriction  DVT PPX- ambulatory  Dispo: likely 1-2 days pending tolerance of oral diuretic regimen    Code: FULL    I have discussed this patient and plan with Dr. Galdamez.    Jose Alfredo Umaña MD  Cardiology.         Interval History:   Notes reviewed. No acute events  overnight.    Breathing is much better. Not orthopneic. Had BM today and yesterday.         Review Of Systems   Negative except as stated above.          Medications:   Reviewed. Details in EPIC.         Objective  Temp:  [96.1  F (35.6  C)-98  F (36.7  C)] 97.6  F (36.4  C)  Heart Rate:  [79-93] 87  Resp:  [16-18] 18  BP: (109-128)/(66-86) 128/86  SpO2:  [94 %-100 %] 95 %  General: on chair, comfrotable.  CV: regular, no m/g/r; JVD 12 cm  Chest: minimal crackles in the bases, mostly clear with good air movement  Abdomen: distension seen.   Extremities: 2+ edema to knee bilaterally  Neuro:  Face symmetric, moving all extremities without focal deficit.         Data:   Reviewed. Details in EPIC.      Wt Readings from Last 5 Encounters:   06/22/17 66.5 kg (146 lb 11.2 oz)   06/16/17 69.3 kg (152 lb 12.5 oz)   05/26/17 65.7 kg (144 lb 12.8 oz)   05/19/17 66.5 kg (146 lb 9.6 oz)   05/12/17 65.8 kg (145 lb)     Lab Values  Labs have been reviewed  BMP  Recent Labs  Lab 06/21/17  1800 06/21/17  0540 06/20/17  1755 06/20/17  1425   * 133 134 134   POTASSIUM 4.1 3.6 3.8 3.4   CHLORIDE 91* 91* 92* 90*   LEIDY 8.6 8.6 8.6 8.4*   CO2 30 34* 32 34*   BUN 68* 72* 75* 76*   CR 1.53* 2.25* 2.23* 1.95*   * 103* 158* 202*     LFTsNo lab results found in last 7 days.   CBC  Recent Labs  Lab 06/17/17  2310 06/16/17  0244   WBC 7.2 7.1   RBC 3.40* 3.14*   HGB 10.6* 9.7*   HCT 31.8* 29.4*   MCV 94 94   MCH 31.2 30.9   MCHC 33.3 33.0   RDW 16.0* 15.8*    198     INRNo lab results found in last 7 days.  TpnInvalid input(s): TPN    Discussed w/ Dr Denice Umaña MD  Cardiology Fellow  June 22, 2017    Attending Attestation:  Patient seen and examined by me with the team. I have performed all pertinent elements of the physical examination and reviewed the note above. I have reviewed pertinent laboratory, echocardiographic, imaging, and cardiac catheterization results. I agree with the plan of care as  described in this note.    Arash Galdamez MD, PhD

## 2017-06-22 NOTE — PROVIDER NOTIFICATION
Pt transitioned to po lasix this afternoon. Pt has voided X3 for a total of 200 cc. Pt voided 50 cc and was bladder scanned for 258 cc. Reports that he feels he is emptying his bladder. Discussed with cardiology cross cover MD (pager [phone]). Continue to monitor. Repeat bladder scan at 2200.

## 2017-06-22 NOTE — PLAN OF CARE
Problem: Goal Outcome Summary  Goal: Goal Outcome Summary  RN  1. Pt will be hemodynamically stable.  2. Pt will have adequate uo   Outcome: No Change  D/I/A: Patient's VSS. SR. Denies pain. Dobutamine gtt infusing at 0.5 mcg/kg/min. Metolazone given x1. Fair urine output. Discussed with MD regarding urinary retention and post void residual, MD will speak with urology. Reported occasional dizziness. Potassium replaced per one-time order of 40 mEq. Up with SBA and walker.     P: Continue to monitor and notify MDs as needed. Possibly switch evening lasix to torsemide pending afternoon output. BMP Q12H, next at 1800. Still awaiting plan regarding urinary retention.

## 2017-06-22 NOTE — CONSULTS
Urology Consult    Name: Perez Villalobos    MRN: [id number]   YOB: 1940       We were asked to see Perez Villalobos at the request of Dr. Gardner for evaluation and treatment of the following chief complaint.        Chief Complaint:   Urinary retention    History is obtained from the patient and chart review          History of Present Illness:   Perez Villalobos is a 76 year old male with PMH of chronic heart failure, CAD, CKD 3 (Baseline Cr ~2.4?)  and urologic history of BPH who is currently admitted to medicine service with acute on chronic heart failure.  Urology is consulted for assistance with voiding symptoms, possible urinary retention.  Per report he has had multiple small volume voids.  There was concern that the bladder scanner was not accurate due to ascites so a straight cath was done which got PVR of 150 ml.  He is on detrol 4 mg LA as outpatient and is continued on that currently.     Patient reports that he previous saw Dr. Wyatt many years ago for urinary urgency with occasional urge incontinence.  He was started on Detrol at that time (sounds like he was trialed on different anticholinergic prior, likely oxybutynin) and reports his symptoms have been very well controlled since that time.  He denies any feelings of incomplete emptying. Denies UTIs or dysuria, no gross hematuria.           Past Medical History:     Past Medical History:   Diagnosis Date     Acute renal failure (H)     10/2015 ARF secondary to rhabdomyolysis     Alcoholism (H)      Anemia      Benign essential HTN      BPH (benign prostatic hypertrophy)      CAD (coronary artery disease)     AWMI, stents to Cx, RCA and LAD     Chronic systolic heart failure (H)      CKD (chronic kidney disease)      Complete AV block (H)      Ischemic cardiomyopathy 10/23/2015    EF 30%     Low sodium levels      Mixed hyperlipidemia      NSTEMI (non-ST elevated myocardial infarction) (H) 10/23/2015     PAD (peripheral artery disease) (H)       "Rhabdomyolysis due to statin therapy     crestor     Severe hypothyroidism 9/20/2016     VF (ventricular fibrillation) (H) 11/16/16            Past Surgical History:     Past Surgical History:   Procedure Laterality Date     APPENDECTOMY       CHOLECYSTECTOMY       ENDARTERECTOMY CAROTID Left 6/11/10     IMPLANT AUTOMATIC IMPLANTABLE CARDIOVERTER DEFIBRILLATOR  11/16/16     PICC INSERTION Right 04/12/2017    5fr DL Bard PICC, 41cm (3cm external) in the R basilic vein w/ tip in the low SVC.     STENT, CORONARY, S660 15/18  Nov 2000    PTCA with stent placement of CFX     STENT, CORONARY, S660 15/18  Feb 2001    PTCA with stent placement of CFX RCA      STENT, CORONARY, S660 15/18  April 2007    stent placed in LAD     tonsillectomy and adenoidectomy              Social History:     Social History   Substance Use Topics     Smoking status: Former Smoker     Packs/day: 1.50     Years: 20.00     Types: Cigarettes     Quit date: 1/1/1980     Smokeless tobacco: Never Used     Alcohol use 0.0 oz/week     0 Standard drinks or equivalent per week      Comment: occassionally            Family History:     Family History   Problem Relation Age of Onset     Unknown/Adopted Mother      Unknown/Adopted Father             Allergies:     Allergies   Allergen Reactions     Lisinopril Other (See Comments)     Angioedema     Augmentin Other (See Comments)     Per pt, \"froze him, affected his legs\"     Crestor [Rosuvastatin] Other (See Comments)     rhabdomyolysis            Medications:     Current Facility-Administered Medications   Medication     prochlorperazine (COMPAZINE) injection 5 mg     fluticasone (FLONASE) 50 MCG/ACT spray 1 spray     lidocaine 1 % 1 mL     lidocaine (LMX4) kit     sodium chloride (PF) 0.9% PF flush 3 mL     sodium chloride (PF) 0.9% PF flush 3 mL     medication instruction     alum & mag hydroxide-simethicone (MYLANTA ES/MAALOX  ES) suspension 15-30 mL     acetaminophen (TYLENOL) tablet 650 mg     aspirin " EC EC tablet 81 mg     calcitRIOL (ROCALTROL) capsule 0.25 mcg     clopidogrel (PLAVIX) tablet 75 mg     cyanocobalamin (vitamin  B-12) tablet 1,000 mcg     hydrALAZINE (APRESOLINE) tablet 100 mg     isosorbide dinitrate (ISORDIL) tablet 60 mg     levothyroxine (SYNTHROID/LEVOTHROID) tablet 150 mcg     tolterodine (DETROL LA) 24 hr capsule 4 mg     ferrous sulfate (IRON) tablet 325 mg     DOBUTamine (DOBUTREX) 1,000 mg in D5W 250 mL infusion (adult max conc)     sodium chloride (PF) 0.9% PF flush 10-20 mL     heparin lock flush 10 UNIT/ML injection 5-10 mL     heparin lock flush 10 UNIT/ML injection 5-10 mL             Review of Systems:    ROS: 10 point ROS neg other than the symptoms noted above in the HPI           Physical Exam:   VS:  T: 97.6    HR: 89    BP: 125/76    RR: 16   GEN:  AOx3.  NAD.    CV:  RRR  LUNGS: Non-labored breathing.   BACK:  No midline or CVA tenderness.  ABD:  Soft.  NT.  ND.  No rebound or guarding.  No masses.  EXT:  Warm, well perfused.    SKIN:  Warm.  Dry.  No rashes.  NEURO:  CN grossly intact.            Data:   All laboratory data reviewed:      Recent Labs  Lab 06/17/17  2310 06/16/17  0244   WBC 7.2 7.1   HGB 10.6* 9.7*    198       Recent Labs  Lab 06/22/17  0640 06/21/17  1800 06/21/17  0540 06/20/17  1755  06/20/17  0623 06/19/17  2000  06/19/17  0606   * 132* 133 134  < > 132* 132*  < > 132*   POTASSIUM 3.4 4.1 3.6 3.8  < > 2.6* 3.7  < > 3.3*   CHLORIDE 90* 91* 91* 92*  < > 88* 89*  < > 91*   CO2 32 30 34* 32  < > 33* 36*  < > 30   BUN 69* 68* 72* 75*  < > 80* 80*  < > 81*   CR 2.14* 1.53* 2.25* 2.23*  < > 2.41* 2.69*  < > 2.68*   * 185* 103* 158*  < > 91 124*  < > 87   LEIDY 8.6 8.6 8.6 8.6  < > 8.7 8.8  < > 8.9   MAG  --   --   --  2.5*  --  2.5* 2.6*  --  2.7*   < > = values in this interval not displayed.    Recent Labs  Lab 06/18/17  1345   COLOR Yellow   APPEARANCE Clear   URINEGLC Negative   URINEBILI Negative   URINEKETONE Negative   SG 1.007    URINEPH 7.5*   PROTEIN Negative   NITRITE Negative   LEUKEST Negative   RBCU <1   WBCU <1       All pertinent imaging reviewed:    6/8/17 Renal ultrasound:     IMPRESSION:  1.  No hydronephrosis.  2.  Thickened bladder walls, likely due to bladder obstruction/large  prostate.  3.  Moderate ascites. Small right pleural effusion.  4.  Indeterminate 1.5 cm hypoechoic nodule in the left kidney.  Recommend further evaluation with CT or MRI.            Impression and Plan:   Impression:     Perez Villalobos is a 76 year old male with PMH of chronic heart failure, CAD, CKD 3 (Baseline Cr ~2.4?)  and urologic history of BPH who is currently admitted to medicine service with acute on chronic heart failure.  Urology consulted for possible urinary retention. Based on data available, his PVRs via bladder scan have been low ~150 ml.  Straight cath confirms this as accurate.  This is a normal PVR and not considered retention.      Discussed with patient his symptoms- which sound like overactive bladder. Unclear etiology however UTI has been ruled out with negative UA.  Discussed possibly adding flomax to his detrol but he refused saying he is currently satisfied with his voiding and regimen.       Plan:     - No evidence of urinary retention at present     - Continue home detrol as ordered     - If patient desires urologic follow-up for further management please call us when he is nearing discharge so we can arrange this. Otherwise this is not necessary if he is satisfied with voiding at present.     - Urology will sign off. Please contact resident/PA on call with any questions or concerns.         This patient's exam findings, labs, and imaging discussed with urology staff surgeon Dr. Velazquez. who developed the treatment plan.    Charan Rock MD  Urology Resident

## 2017-06-23 NOTE — PROGRESS NOTES
Care Coordinator- Discharge Planning     Admission Date/Time:  6/17/2017  Attending MD:  Clive Davis  Data  Date of initial CC assessment: 6/19/2017  Chart reviewed, discussed with interdisciplinary team.   Patient was admitted for:   1. Acute on chronic systolic (congestive) heart failure (H)    2. SOB (shortness of breath)    3. Cardiogenic shock (H)    Assessment  Full assessment completed in previous note  Per MD, pt may be medically ready for discharge tomorrow, pending tolerance of PO torsemide. This writer faxed script for home IV dobutamine to [facility] (P: [phone], F: [phone].)  Plan  Anticipated Discharge Date: 6/24/2017  Anticipated Discharge Plan: Discharge to home with resumption of home infusion.  CC will continue to monitor patient's medical condition and progress towards discharge.  Yoselyn Pereira RN BSN  6C Unit Care Coordinator  Phone number: [phone]  Pager: [phone]

## 2017-06-23 NOTE — PLAN OF CARE
Problem: Individualization  Goal: Patient Preferences  D: Pt stable and comfortable. No pain or shortness of breath noted. Dobutamine gtt at 5 mcg/kg/min. K to be replaced.  I: Monitored/assessed pt. Assisted with cares.  A: Pt stable and comfortable.  P: Continue to monitor/assess pt, contact provider with concerns.

## 2017-06-23 NOTE — PROGRESS NOTES
[facility] Cards 2 Progress Note          Assessment and Plan:   Assessment:  77 yo man with Hx of CAD s/p multiple PCI, HFrEF (NYHA 4, stage D), and PAD - readmitted for hypervolemia. Recently admitted 6/1 to 6/16 with ambulatory cardiogenic shock requiring vasodilation and change from milrinone to dobutamine.    Changes Today:  - start torsemide 80 mg BID    Plan:  # Acute on chronic systolic heart failure exacerbation  # ICM with biventricular failure   # Hx of VF arrest s/p CRT-D  Nearing euvolemia. Will need to transition to oral and ensure adequate diuresis before discharge.  - continue home dobutamine 5  - start torsemide 80 mg BID PO  - BMP q12h  - continue home hydralazine 100 qid and isordil tid      # CAD s/p multiple PCIs (last PCI 10/2016)  - continue ASA and clopidogrel      # Paroxysmal AFib  CHADSVASC 4. Has low burdern (<1%) on last device check on 6/19.  - will continue DAPT as above  - will need discussion of anticoagulation as outpatient     # JACINTO on CKD 3, improving  - monitor with diuresis      # Hypervolemic hyponatremia, resolved      # Indeterminate 1.5 cm hypoechoic nodule of left kidney  - will need follow up imagine (CT or MRI) as outpatient      Chronic medical problems:  # Normocytic anemia, iron panel suggests HEBERT - started FeS  # Hypothyroidism - continue levothyroxine  # Overactive bladder -continue tolterodine  # DELONTE - BiPAP      FEN: cardiac diet, 2 gram sodium, 1.5 L fluid restriction  DVT PPX - ambulatory  Dispo: likely 1-2 days pending tolerance of oral diuretic regimen    Code: FULL    I have discussed this patient and plan with Dr. Galdamez.    Bryon Gupta, PGY-2  Internal Medicine  [phone]        Interval History:   Notes reviewed. No acute events overnight.    Breathing is much better and extra diuresis overnight. Slept well.         Review Of Systems   Negative except as stated above.          Medications:   Reviewed. Details in EPIC.     Blood pressure 126/74,  "pulse 89, temperature 97.9  F (36.6  C), temperature source Oral, resp. rate 16, height 1.727 m (5' 8\"), weight 66.5 kg (146 lb 8 oz), SpO2 99 %.    I/O: +35, -1300 this AM    General: sitting in chair, comfortable  CV: regular, no m/g/r; JVD to 2 cm above clavicle  Chest: minimal crackles in the bases, mostly clear with good air movement  Abdomen: Soft, mild diffuse tenderness, mild distention, not tense  Extremities: 2+ edema to knee bilaterally  Neuro:  Face symmetric, moving all extremities without focal deficit.         Data:   Reviewed. Details in EPIC.    Attending Attestation:  Patient seen and examined by me with the team. I have performed all pertinent elements of the physical examination and reviewed the note above. I have reviewed pertinent laboratory, echocardiographic, imaging, and cardiac catheterization results. I agree with the plan of care as described in this note.    Arash Galdamez MD, PhD    "

## 2017-06-23 NOTE — PLAN OF CARE
Problem: Goal Outcome Summary  Goal: Goal Outcome Summary  RN  1. Pt will be hemodynamically stable.  2. Pt will have adequate uo   Outcome: Improving  D/I/A: Patient's VSS. SR. Denies pain. Dobutamine gtt infusing at 0.5 mcg/kg/min. Good urine output, no trouble urinating today per patient. Potassium replaced per one-time orders. Skin tear to L elbow left open to air, skin intact, bruised. Up with SBA and walker.       P: Continue to monitor and notify MDs as needed. BMP Q12H, next at 1800.

## 2017-06-23 NOTE — PLAN OF CARE
Problem: Cardiac: Heart Failure (Adult)  Goal: Signs and Symptoms of Listed Potential Problems Will be Absent or Manageable (Cardiac: Heart Failure)  Signs and symptoms of listed potential problems will be absent or manageable by discharge/transition of care (reference Cardiac: Heart Failure (Adult) CPG).   Outcome: Improving  D/Continues on Dobutamine drip. Changed to Torsemide and says he was voiding well since last night and now urine output is slowing a bit. He wrote a request on the greaseboard about Melatonin for sleep  iI/looked at prn meds and informed him that it is ordered for tonight. We talked about taking it later, as he says he has trouble sleeping if someone wakes him in the first few hours after falling asleep. We talked about taking his scheduled diuretic at 1800 instead of 8 pm and he wants this done, so he does not have to be up so late peeing. We talked about d/c plans as he was on home Dobutamine before with [facility], and this is getting set up for possible d/c tomorrow or the next day.   D/He says he is ready to go home  P/possible d/c in next 1-2 days

## 2017-06-23 NOTE — PLAN OF CARE
Problem: Goal Outcome Summary  Goal: Goal Outcome Summary  RN  1. Pt will be hemodynamically stable.  2. Pt will have adequate uo   Outcome: No Change  Pt A/O, VSS on RA. R PICC infusing, ports patent. Dobutamine gtt 0.5 mcg/kg/min infusing. Lasix d/c'd and Torsemide 80mg started. Pt had adequate UOP. MD order for straight cath x1 w/result of 150ml. Tolerating 2GM NA diet and 1.5L fluid restriction. Potassium replaced w/20meq x1 per MD order. Up SBA w/walker. Continue q12h BMP. Waiting urology consult re: c/o decreased UOP. Obtained new order for additional Torsemide dose, awaiting for pharmacy to verify. Continue to monitor and w/POC.

## 2017-06-24 NOTE — PLAN OF CARE
Problem: Goal Outcome Summary  Goal: Goal Outcome Summary  RN  1. Pt will be hemodynamically stable.  2. Pt will have adequate uo   Outcome: No Change     D: Pt with hx CAD s/p multiple PCI, HFrEF admitted 6/17 with volume overload.  A/I: Continues with pitting edema up through thighs. Denies pain, SOB. Sats stable on RA. V paced 80's. PICC line without blood return- MD notified, cathflo administered per orders to open (red) line with blood return noted after dwell. Dobutamine continued at 5mcg/kg/min. Pt declined taking PRN melatonin. 1500 FR. 2g Na diet. Up independently around bed- steady on feet.  Voiding adequately.  P: D/c soon on home dobutamine. Monitor and assess pt condition and contact treatment team with questions or concerns.

## 2017-06-24 NOTE — DISCHARGE SUMMARY
Cardiology Discharge Summary  Perez Villalobos MRN: [id number]  1940  [facility]  Primary care provider: Hector Jc  ___________________________________          Date of Admission:  6/17/2017  Date of Discharge:  6/24/2017   Admitting Physician:  Clive Davis MD  Discharge Physician:  Arash Galdamez MD  Discharging Service:  [facility]     Primary Provider: Lee Ann Bess         Reason for Admission:   Acute on chronic systolic heart failure          Discharge Diagnosis:   Acute on chronic systolic heart failure  ICM with biventricular failure  Paroxysmal Afib  JACINTO on CKD 3  Hypervolemic hyponatremia         Procedures & Significant Findings:   None         Consultations:   Urology         Hospital Course by Problem:    # Acute on chronic systolic heart failure  # ICM with biventricular failure   Presented with volume overload and marked orthopnea. Exacerbation was likely due to gut edema preventing absorption of oral diuretics. He was aggressively diuresed to a dry weight of 66-67 kg (145-147 lbs). He was transitioned to torsemide 80 mg BID with adequate urine output and stable weight. He was also continued on his home dobutamine 5 mcg/kg/min, hydralazine 100 mg QID, and isordil 60 mg TID. He was also discharge on 20 mEq of potassium chloride daily. He will follow up with [facility] clinic next week with [facility]. He will also follow up with Dr. Kelley in 1-2 weeks to continue discussion about advanced heart failure options.      # CAD s/p multiple PCIs    # Paroxysmal AFib  CHADSVASC 4. Has low burdern (<1%) on last device check on 6/19. Last PCI 10/2016. Currently on DAPT. Will need discussion about role of anticoagulation moving forward.      # JACINTO on CKD 3  Improved with diuresis. Will need close follow up of BMP to monitor renal function as an outpatient.      # Hypervolemic hyponatremia  Resolved with  "diuresis.      # Indeterminate 1.5 cm hypoechoic nodule of left kidney  Will need follow up imagine (CT or MRI) as outpatient.    Physical Exam on day of Discharge:  Blood pressure 118/65, pulse 89, temperature 98.1  F (36.7  C), temperature source Oral, resp. rate 18, height 1.727 m (5' 8\"), weight 67 kg (147 lb 12.8 oz), SpO2 94 %.    General: sitting in chair, comfortable  CV: regular, no m/g/r; JVD to 2 cm above clavicle  Chest: minimal crackles in the bases, mostly clear with good air movement  Abdomen: Soft, mild diffuse tenderness, mild distention, not tense  Extremities: 2+ edema to knee bilaterally  Neuro:  Face symmetric, moving all extremities without focal deficit.    Lines/Tubes:  Right PICC         Pending Results:   None          Discharge Medications:     Discharge Medication List as of 6/24/2017  1:16 PM      START taking these medications    Details   ferrous sulfate (IRON) 325 (65 FE) MG tablet Take 1 tablet (325 mg) by mouth daily, Disp-100 tablet, R-0, E-Prescribe      torsemide (DEMADEX) 20 MG tablet Take 4 tablets (80 mg) by mouth 2 times daily, Disp-240 tablet, R-0, E-Prescribe         CONTINUE these medications which have CHANGED    Details   potassium chloride (KLOR-CON) 20 MEQ Packet Take 20 mEq by mouth daily, Disp-540 packet, R-3, Historical      D5W 5 % SOLN 170 mL with DOBUTamine 250 MG/20ML SOLN 1,000 mg Inject 0.3555 mg/min into the vein continuous, 5 mcg/kg/min × 71.1 kg (5.3325 mL/hr, rounded to 5.3 mL/hr), Intravenous, CONTINUOUS Starting 6/23/2017, Until Discontinued, Disp-1000 mL, R-0, Local Print         CONTINUE these medications which have NOT CHANGED    Details   acetaminophen (TYLENOL) 325 MG tablet Take 325-650 mg by mouth every 6 hours as needed for mild pain, Historical      isosorbide dinitrate (ISORDIL) 30 MG tablet Take 2 tablets (60 mg) by mouth 3 times daily, Disp-90 tablet, R-3, Local Print      hydrALAZINE (APRESOLINE) 100 MG TABS tablet Take 1 tablet (100 mg) by " mouth 4 times daily, Disp-60 tablet, R-3, Local Print      tolterodine (DETROL LA) 4 MG 24 hr capsule Take 4 mg by mouth daily, Historical      calcitRIOL (ROCALTROL) 0.25 MCG capsule Take 0.25 mcg by mouth daily, Historical      aspirin EC 81 MG EC tablet Take 81 mg by mouth daily, Historical      levothyroxine (SYNTHROID/LEVOTHROID) 150 MCG tablet Take 150 mcg by mouth daily, Historical      Saline (SODIUM CHLORIDE) 0.65 % SOLN Spray in nostril as needed, Historical      Cyanocobalamin (B-12) 1000 MCG TBCR Take 1,000 mcg by mouth daily, Disp-100 tablet, R-0, E-Prescribe      multivitamin, therapeutic with minerals (MULTI-VITAMIN) TABS Take 1 tablet by mouth daily, Historical      clopidogrel (PLAVIX) 75 MG tablet Take 75 mg by mouth daily, Historical         STOP taking these medications       furosemide (LASIX) 80 MG tablet Comments:   Reason for Stopping:         KRILL OIL PO Comments:   Reason for Stopping:                    Discharge Instructions and Follow-Up:     Discharge Procedure Orders  Medication Therapy Management Referral   Referral Type: Med Therapy Management     Home infusion referral     Reason for your hospital stay   Order Comments: You were admitted do to volume overload. We were able to remove the fluid with IV diuretics. You were also prescribed a new oral diuretic regimen.     Adult [facility] Follow-up and recommended labs and tests   Order Comments: Follow up with [facility] clinic on Monday or Tuesday with BMP to check kidney function and potassium.    Follow up with Dr. Kelley in about 1-2 weeks.    Appointments on [place] and/or [facility] (with [facility] or [facility] provider or service). Call [phone] if you haven't heard regarding these appointments within 7 days of discharge.     Activity   Order Comments: Your activity upon discharge: activity as tolerated   Order Specific Question Answer Comments   Is discharge order? Yes      IV access   Order Comments: **Ordering Provider MUST  call/page Care Coordinator/ to discuss arranging this service**    You are going home with the following vascular access device: PICC.     Discharge Instructions   Order Comments: Please take your new diuretic prescription (torsemide 80 mg twice a day). You will need to follow up with [facility] clinic on Monday or Tuesday for a lab draw. You should also follow up with Dr. Kelley in 1-2 weeks.     Full Code     Diet   Order Comments: Follow this diet upon discharge: Orders Placed This Encounter     Snacks/Supplements Adult: Other - Please comment; 10AM, 2PM: Pt may prefer Ensure Plus, chocolate, vanilla or strawberry; Between Meals     Fluid restriction 1500 ML FLUID     Combination Diet 2 gm NA Diet   Order Specific Question Answer Comments   Is discharge order? Yes         IV access: Right PICC            Discharge Disposition:   Home         Condition on Discharge:   Discharge condition: Stable   Code status on discharge: Full Code        Date of service: 6/24/2017    The patient was discussed with Dr. Galdamez, who is in agreement of above plan    Bryon Gupta, PGY-2  Internal Medicine  [phone]    Attending Attestation:  Patient seen and examined by me and the team on the day of discharge. Please refer to the discharge summary for further details. The plan of care was discussed with the patient; the medications were reviewed and any changes explained. The patient is discharged to home. Follow up appointments have been arranged for the patient.    Arash Galdamez MD, PhD

## 2017-06-24 NOTE — PROGRESS NOTES
DISCHARGE   Discharged to: Home  Via: Automobile  Accompanied by: Family  Discharge Instructions: diet, activity, medications, follow up appointments, when to call the MD, and what to watchout for (i.e. s/s of infection, increasing SOB, palpitations, chest pain,)  Prescriptions: To be filled by [facility] in [place] per pt's request; medication list reviewed & sent with pt  Follow Up Appointments: arranged; information given  Belongings: All sent with pt  PICC: double lumen with home dobutamine  Telemetry: off  Pt exhibits understanding of above discharge instructions; all questions answered.  Discharge Paperwork: faxed

## 2017-06-25 NOTE — TELEPHONE ENCOUNTER
Called patient today to discuss the results of the team's discussion.     We may be able to move his LVAD surgery up slightly, depending on his clinical status and continued abstinence from alcohol.     He and his wife demonstrated awareness of this.     I have notified the [facility] team of his discharge and that he will need to get into clinic very soon this week.     Giovanna Kelley

## 2017-06-26 NOTE — PROGRESS NOTES
Patient is LVAD referral patient so they will be followed by Cardiology for Post DC follow up        VAD Tracking   Referral date: 4/10/17   Evaluation date: 4/14/17   Committee date: 4/14/17   Implant Information   Episode Care Team      Committee Review   Ready for committee: Yes

## 2017-06-26 NOTE — PROGRESS NOTES
Gladys Mustafa, NP and I reviewed Perez Villalobos's CardioMEMS readings (see below). I then called Walker to check-in with him and review his readings.  We discussed his CardioMEMS numbers. Walker verbalizes understanding and agrees with plan of care. Merlyn Schmitt RN      CardioMEMS Readings:

## 2017-06-26 NOTE — PROGRESS NOTES
Tell me how you have been doing since you were discharged from the hospital.  I called Walker to follow up with him post-hospitalization; I spoke with Annia, spouse/caregiver. She says Walker is feeling okay but very tired. Reports stable weights. CardioMems reviewed.      ACTIVITY  How is your activity tolerance?    Walker is feeling weak.     ASSISTANCE  Do you have someone at home to assist you with your daily activities?  Yes, Annia.     MEDICATIONS  I would like to review your discharge medications and answer any questions you may have about them and also make sure you have all the medications that are new to you, and discuss any changes that were made to your pre-hospital medications.     Is someone helping you to set up your medications?    Walker and Annia.     FOLLOW UP  Do you have a follow up appointment with your provider?   What other discharge instructions do you have?   Are you to get labs, procedures or tests before you see your provider?  I scheduled Walker for return [facility] with labs with Gladys Mustafa NP tomorrow at 12:00 p.m.; labs at 11:30.     CONTACT INFORMATION  Please feel free to call us with any other questions or symptoms that are concerning for you at [phone], if it is after 4:30 in the afternoon, or a weekend please call [phone] and ask for the on call specialist.  We want to do everything we can to help prevent you needing to return to the ED, so please do not hesitate to call us.       HEART FAILURE PATIENTS  Please weigh yourself daily and record your weights.  If you gain 2 pounds in 24 hours or 5 pounds in one week please call [facility] at [phone].     DIET  It is recommended you follow a 2000 mg low sodium diet, avoid processed food, canned food and fast food restaurants.

## 2017-06-27 NOTE — TELEPHONE ENCOUNTER
MTM referral from: Transitions of Care (recent hospital discharge or ED visit)    MTM referral outreach attempt #1 on June 27, 2017 at 2:49 PM      Outcome: Patient scheduled for MTM appointment on 6/29/17    Mercedes Duran, MTM Coordinator Intern

## 2017-06-27 NOTE — TELEPHONE ENCOUNTER
MTM referral from: Transitions of Care (recent hospital discharge or ED visit)    MTM referral outreach attempt #1 on June 27, 2017 at 11:58 AM      Outcome: Left Message    Ariella Turner MTM Coordinator

## 2017-06-27 NOTE — MR AVS SNAPSHOT
After Visit Summary   6/27/2017    Ron Villalobos    MRN: [id number]           Patient Information     Date Of Birth          1940        Visit Information        Provider Department      6/27/2017 12:00 PM Gladys Mustafa, APRN CNP M [facility]        Today's Diagnoses     Hypokalemia    -  1    Chronic combined systolic and diastolic congestive heart failure (H)          Care Instructions    You were seen today in the Cardiovascular Clinic at the [facility].     Cardiology Providers you saw during your visit: Gladys Mustafa NP     1. You were given potassium 40 meq in clinic today.  2.  Please take another potassium 40 meq later this afternoon/evening.  3.  Beginning tomorrow, increase daily potassium to 40 meq in the morning and 20 meq in the evening.  4.  Please have labs (BMP) tomorrow at [facility]; we will call you with the plan after labs are reviewed.  3pm Wednesday 6/28  5.  You have an appointment with Dr. Kelley Thursday, 7/6 at 3:30pm with an ICD check at 4:30.  3pm labs first  6. Return to [facility] pending appointment with Dr. Kelley.    Results for RON VILLALOBOS (MRN [id number]) as of 6/27/2017 12:01   Ref. Range 6/27/2017 11:19   Sodium Latest Ref Range: 133 - 144 mmol/L 126 (L)   Potassium Latest Ref Range: 3.4 - 5.3 mmol/L 2.7 (L)   Chloride Latest Ref Range: 94 - 109 mmol/L 82 (L)   Carbon Dioxide Latest Ref Range: 20 - 32 mmol/L 32   Urea Nitrogen Latest Ref Range: 7 - 30 mg/dL 79 (H)   Creatinine Latest Ref Range: 0.66 - 1.25 mg/dL 2.34 (H)   GFR Estimate Latest Ref Range: >60 mL/min/1.7m2 27 (L)   GFR Estimate If Black Latest Ref Range: >60 mL/min/1.7m2 33 (L)   Calcium Latest Ref Range: 8.5 - 10.1 mg/dL 8.6   Anion Gap Latest Ref Range: 3 - 14 mmol/L 11   Glucose Latest Ref Range: 70 - 99 mg/dL 139 (H)       Please limit your fluid intake to 2 L (64 ounces) daily.  2 Liters a day = 8.5 cups, or 72 ounces.  Please limit your salt intake to 2 grams a day or  "less.    If you gain 2# in 24 hours or 5# in one week call Lenka Blevins RN so we can adjust your medications as needed over the phone.    Please feel free to call me with any questions or concerns.      Lenka Blevins RN BSN [facility]  Cardiology Care Coordinator-Heart Failure Clinic    Questions and [phone].   First press #1 for the University and then press #3 for \"Medical Questions\" to reach the Cardiology Nurses.     On Call Cardiologist for after hours or on weekends: [phone]   option #4 and ask to speak to the on-call Cardiologist. Inform them you are a CORE/heart failure patient at the [facility].        If you need a medication refill please contact your pharmacy.  Please allow 3 business days for your refill to be completed.  _______________________________________________________  C.O.R.E. CLINIC Cardiomyopathy, Optimization, Rehabilitation, Education   The C.O.R.E. CLINIC is a heart failure specialty clinic within the [facility] where you will work with specialized nurse practitioners dedicated to helping patients with heart failure carefully adjust medications, receive education, and learn who and when to call if symptoms develop. They specialize in helping you better understand your condition, slow the progression of your disease, improve the length and quality of your life, help you detect future heart problems before they become life threatening, and avoid hospitalizations.  As always, thank you for trusting us with your health care needs!                  Follow-ups after your visit        Your next 10 appointments already scheduled     2017  3:30 PM CDT   (Arrive by 3:15 PM)   RETURN HEART FAILURE with Giovanna Kelley MD   [facility] ([facility])    [address] [place] [id number] [phone]            2017  4:30 PM CDT   Remote " ICD Check with MCLEAN DCR2   [facility] [place] ([facility])    [address]  [place] [id number] [phone]           This appointment is for a remote check of your debrillator.  This is not an appointment at the office.            Jul 10, 2017   Procedure with Sincere Snyder MD   [facility] (--)    [address]  [place] [id number] [phone]            Jul 10, 2017   Procedure with Sincere Snyder MD   [facility] (--)    [address]  [place] [address] [phone]            Aug 04, 2017   Procedure with Nain Bullard MD   [facility] [place] (--)    [address]  [place] [id number] [phone]              Future tests that were ordered for you today     Open Future Orders        Priority Expected Expires Ordered    Basic metabolic panel Routine 6/28/2017 6/27/2018 6/27/2017            Who to contact     If you have questions or need follow up information about today's clinic visit or your schedule please contact [facility] directly at [phone].  Normal or non-critical lab and imaging results will be communicated to you by MyChart, letter or phone within 4 business days after the clinic has received the results. If you do not hear from us within 7 days, please contact the clinic through [employer] or phone. If you have a critical or abnormal lab result, we will notify you by phone as soon as possible.  Submit refill requests through [employer] or call your pharmacy and they will forward the refill request to us. Please allow 3 business days for your refill to be completed.          Additional Information About Your Visit        [employer] Information     [employer] lets you send messages to your doctor, view your test results, renew your prescriptions, schedule appointments and more. To sign up, go to [email or web address] . Click  "on \"Log in\" on the left side of the screen, which will take you to the Welcome page. Then click on \"Sign up Now\" on the right side of the page.     You will be asked to enter the access code listed below, as well as some personal information. Please follow the directions to create your username and password.     Your access code is: [email or web address]  Expires: 2017  8:15 AM     Your access code will  in 90 days. If you need help or a new code, please call your [facility] clinic or [phone].        Care EveryWhere ID     This is your Care EveryWhere ID. This could be used by other organizations to access your [facility] medical records  [id number]        Your Vitals Were     Pulse Height Pulse Oximetry BMI (Body Mass Index)          106 1.727 m (5' 8\") 96% 21.9 kg/m2         Blood Pressure from Last 3 Encounters:   17 120/63   17 118/65   17 139/81    Weight from Last 3 Encounters:   17 65.3 kg (144 lb)   17 67 kg (147 lb 12.8 oz)   17 69.3 kg (152 lb 12.5 oz)                 Today's Medication Changes          These changes are accurate as of: 17 12:49 PM.  If you have any questions, ask your nurse or doctor.               These medicines have changed or have updated prescriptions.        Dose/Directions    potassium chloride 20 MEQ Packet   Commonly known as:  KLOR-CON   This may have changed:    - how much to take  - how to take this  - when to take this  - additional instructions   Used for:  Chronic combined systolic and diastolic congestive heart failure (H)   Changed by:  Gladys Mustafa APRN CNP        Take 40 meq in the a.m. and 20 meq in the p.m.   Quantity:  270 packet   Refills:  3            Where to get your medicines      These medications [id number] [address] [address] [facility] [address]  [address] [address] [facility] [address]     Phone:  [phone]     potassium chloride 20 MEQ Packet                Primary Care " Provider Office Phone # Fax #    Lee Ann Epstein [facility] [phone]       [address] [place] [address]        Equal Access to Services     MAGDALENA BARON : Hadii aad ku hadabithang Analijossy, angie hermilaivan, anitrata kasylvieda bertha, jesus samuel gabrielkristina kennedy yuri ayala. So [facility] [phone].    ATENCIÓN: Si habla español, tiene a padilla disposición servicios gratuitos de asistencia lingüística. Llame al [phone].    We comply with applicable federal civil rights laws and [place] laws. We do not discriminate on the basis of race, color, national origin, age, disability sex, sexual orientation or gender identity.            Thank you!     Thank you for choosing [facility]  for your care. Our goal is always to provide you with excellent care. Hearing back from our patients is one way we can continue to improve our services. Please take a few minutes to complete the written survey that you may receive in the mail after your visit with us. Thank you!             Your Updated Medication List - Protect others around you: Learn how to safely use, store and throw away your medicines at www.disposemymeds.org.          This list is accurate as of: 6/27/17 12:49 PM.  Always use your most recent med list.                   Brand Name Dispense Instructions for use Diagnosis    aspirin EC 81 MG EC tablet      Take 81 mg by mouth daily        B-12 1000 MCG Tbcr     100 tablet    Take 1,000 mcg by mouth daily    Vitamin B 12 deficiency       calcitRIOL 0.25 MCG capsule    ROCALTROL     Take 0.25 mcg by mouth daily        D5W 5 % SOLN 170 mL with DOBUTamine 250 MG/20ML SOLN 1,000 mg     1000 mL    Inject 0.3555 mg/min into the vein continuous    Cardiogenic shock (H)       ferrous sulfate 325 (65 FE) MG tablet    IRON    100 tablet    Take 1 tablet (325 mg) by mouth daily    Iron deficiency anemia, unspecified       hydrALAZINE 100 MG Tabs tablet    APRESOLINE    60 tablet    Take 1 tablet (100 mg)  by mouth 4 times daily        isosorbide dinitrate 30 MG tablet    ISORDIL    90 tablet    Take 2 tablets (60 mg) by mouth 3 times daily        levothyroxine 150 MCG tablet    SYNTHROID/LEVOTHROID     Take 150 mcg by mouth daily        Multi-vitamin Tabs tablet      Take 1 tablet by mouth daily        PLAVIX 75 MG tablet   Generic drug:  clopidogrel      Take 75 mg by mouth daily        potassium chloride 20 MEQ Packet    KLOR-CON    270 packet    Take 40 meq in the a.m. and 20 meq in the p.m.    Chronic combined systolic and diastolic congestive heart failure (H)       Sodium Chloride 0.65 % Soln      Spray in nostril as needed        tolterodine 4 MG 24 hr capsule    DETROL LA     Take 4 mg by mouth daily        torsemide 20 MG tablet    DEMADEX    240 tablet    Take 4 tablets (80 mg) by mouth 2 times daily    Acute on chronic systolic (congestive) heart failure (H)       TYLENOL 325 MG tablet   Generic drug:  acetaminophen      Take 325-650 mg by mouth every 6 hours as needed for mild pain

## 2017-06-27 NOTE — NURSING NOTE
Diet: Patient instructed regarding a heart failure healthy diet, including discussion of reduced fat and 2000 mg daily sodium restriction, daily weights, medication purpose and compliance, fluid restrictions and resources for patient and family to access for assistance with heart failure management.       Labs: Patient was given results of the laboratory testing obtained today and patient was instructed about when to return for the next laboratory testing.    Med Reconcile: Reviewed and verified all current medications with the patient. The updated medication list was printed and given to the patient.  PO potassium given in clinic (see MAR).  At home potassium dose increased.  Will have labs redrawn tomorrow.      Return Appointment: Patient given instructions regarding scheduling next clinic visit.     Patient stated he understood all health information given and agreed to call with further questions or concerns.

## 2017-06-27 NOTE — LETTER
6/27/2017      RE: Perez Villalobos  [address]  [place] [address]       Dear Colleague,    Thank you for the opportunity to participate in the care of your patient, Perez Villalobos, at the [facility]. Please see a copy of my visit note below.    HPI:   Perez Villalobos is a 76-year old male with a history of ICM with multiple PCIs and stents, an NSTEMI in 10/2016 and a VF arrest in 11/2016, and ischemic cardiomyopathy who has been undergoing evaluation for LVAD placement over the past few months. His LVAD placement has been delayed after it was revealed that he was drinking alcohol daily until 4/4/17.    During a hospitalization in April, he was started on milrinone and was recently switched to dobutamine, which he continues on. Was recently hospitalized again from 6/17-6/24 with decompensated heart failure, and he was aggressively diuresed and his afterload reduction was adjusted. He presents to clinic today for follow-up after discharge.    Since going home several days ago, Walker says that he is overall feeling 'ok.' His weight hasn't changed at all, and he feels like he is urinating constantly. Continues to have positional lightheadedness that has not worsened over the past few months. Also has stable NOEL and is able to walk about one block. Has felt very fatigued and weak, and feels like he is sleeping constantly. Continues to attend AA meetings weekly, and continues to abstain from alcohol.    PAST MEDICAL HISTORY:  Past Medical History:   Diagnosis Date     Acute renal failure (H)     10/2015 ARF secondary to rhabdomyolysis     Alcoholism (H)      Anemia      Benign essential HTN      BPH (benign prostatic hypertrophy)      CAD (coronary artery disease)     AWMI, stents to Cx, RCA and LAD     Chronic systolic heart failure (H)      CKD (chronic kidney disease)      Complete AV block (H)      Ischemic cardiomyopathy 10/23/2015    EF 30%      Low sodium levels      Mixed hyperlipidemia      NSTEMI (non-ST elevated myocardial infarction) (H) 10/23/2015     PAD (peripheral artery disease) (H)      Rhabdomyolysis due to statin therapy     crestor     Severe hypothyroidism 9/20/2016     VF (ventricular fibrillation) (H) 11/16/16       FAMILY HISTORY:  Family History   Problem Relation Age of Onset     Unknown/Adopted Mother      Unknown/Adopted Father        SOCIAL HISTORY:  Social History     Social History     Marital status:      Spouse name: N/A     Number of children: N/A     Years of education: N/A     Social History Main Topics     Smoking status: Former Smoker     Packs/day: 1.50     Years: 20.00     Types: Cigarettes     Quit date: 1/1/1980     Smokeless tobacco: Never Used     Alcohol use 0.0 oz/week     0 Standard drinks or equivalent per week      Comment: occassionally     Drug use: No     Sexual activity: Not on file     Other Topics Concern     Caffeine Concern No     decaf coffee     Sleep Concern No     Stress Concern No     Weight Concern No     Special Diet Yes     low fat, low sodium     Exercise Yes     everyday     Social History Narrative       CURRENT MEDICATIONS:  Current Outpatient Prescriptions   Medication Sig Dispense Refill     potassium chloride (KLOR-CON) 20 MEQ Packet Take 40 meq in the a.m. and 20 meq in the p.m. 270 packet 3     ferrous sulfate (IRON) 325 (65 FE) MG tablet Take 1 tablet (325 mg) by mouth daily 100 tablet 0     torsemide (DEMADEX) 20 MG tablet Take 4 tablets (80 mg) by mouth 2 times daily 240 tablet 0     D5W 5 % SOLN 170 mL with DOBUTamine 250 MG/20ML SOLN 1,000 mg Inject 0.3555 mg/min into the vein continuous 1000 mL 0     acetaminophen (TYLENOL) 325 MG tablet Take 325-650 mg by mouth every 6 hours as needed for mild pain       isosorbide dinitrate (ISORDIL) 30 MG tablet Take 2 tablets (60 mg) by mouth 3 times daily 90 tablet 3     hydrALAZINE (APRESOLINE) 100 MG TABS tablet Take 1 tablet (100 mg)  "by mouth 4 times daily 60 tablet 3     tolterodine (DETROL LA) 4 MG 24 hr capsule Take 4 mg by mouth daily       calcitRIOL (ROCALTROL) 0.25 MCG capsule Take 0.25 mcg by mouth daily       aspirin EC 81 MG EC tablet Take 81 mg by mouth daily       levothyroxine (SYNTHROID/LEVOTHROID) 150 MCG tablet Take 150 mcg by mouth daily       Saline (SODIUM CHLORIDE) 0.65 % SOLN Spray in nostril as needed       Cyanocobalamin (B-12) 1000 MCG TBCR Take 1,000 mcg by mouth daily 100 tablet 0     multivitamin, therapeutic with minerals (MULTI-VITAMIN) TABS Take 1 tablet by mouth daily       clopidogrel (PLAVIX) 75 MG tablet Take 75 mg by mouth daily         ROS:   Constitutional: No fever, chills, or sweats. No weight gain/loss.   ENT: No visual disturbance, ear ache, epistaxis, sore throat.   Allergies/Immunologic: Negative.   Respiratory: No cough, hemoptysis.   Cardiovascular: As per HPI.   GI: No nausea, vomiting, hematemesis, melena, or hematochezia.   : No urinary frequency, dysuria, or hematuria.   Integument: Negative.   Psychiatric: Negative.   Neuro: Negative.   Endocrinology: Negative.   Musculoskeletal: Negative.    EXAM:  /63 (BP Location: Left arm, Patient Position: Chair, Cuff Size: Adult Small)  Pulse 106  Ht 1.727 m (5' 8\")  Wt 65.3 kg (144 lb)  SpO2 96%  BMI 21.9 kg/m2  General: appears comfortable, alert and articulate  Head: normocephalic, atraumatic  Eyes: anicteric sclera, EOMI  Neck: no adenopathy  Orophyarynx: moist mucosa, no lesions, dentition intact  Heart: regular, S1/S2, no murmur, gallop, rub, estimated JVP 9 cm  Lungs: clear, no rales or wheezing  Abdomen: soft, mild distention, non-tender, bowel sounds present, no hepatosplenomegaly  Extremities: no clubbing, cyanosis or edema  Neurological: normal speech and affect, no gross motor deficits    Labs:  CBC RESULTS:  Lab Results   Component Value Date    WBC 7.2 06/17/2017    RBC 3.40 (L) 06/17/2017    HGB 10.6 (L) 06/17/2017    HCT 31.8 " (L) 06/17/2017    MCV 94 06/17/2017    MCH 31.2 06/17/2017    MCHC 33.3 06/17/2017    RDW 16.0 (H) 06/17/2017     06/17/2017       CMP RESULTS:  Lab Results   Component Value Date     (L) 06/27/2017    POTASSIUM 2.7 (L) 06/27/2017    CHLORIDE 82 (L) 06/27/2017    CO2 32 06/27/2017    ANIONGAP 11 06/27/2017     (H) 06/27/2017    BUN 79 (H) 06/27/2017    CR 2.34 (H) 06/27/2017    GFRESTIMATED 27 (L) 06/27/2017    GFRESTBLACK 33 (L) 06/27/2017    LEIDY 8.6 06/27/2017    BILITOTAL 1.2 06/01/2017    ALBUMIN 3.4 06/01/2017    ALKPHOS 86 06/01/2017    ALT 22 06/01/2017    AST 25 06/01/2017        INR RESULTS:  Lab Results   Component Value Date    INR 1.14 06/04/2017       No components found for: CK  Lab Results   Component Value Date    MAG 2.5 (H) 06/24/2017     Lab Results   Component Value Date    NTBNP 67508 (H) 05/26/2017       Echocardiogram 6/15/17:  Interpretation Summary  Severe diffuse hypokinesis. The Ejection Fraction is estimated at 15-20% (calculated, 23%.)  The RV is moderately dilated. Global right ventricular function is moderately reduced.  Moderate mitral insufficiency. Mild aortic insufficiency. Moderate tricuspid  insufficiency.  Moderate pulmonary hypertension. Right ventricular systolic pressure is 40  mmHg above the right atrial pressure.  The inferior vena cava is dilated at 2.2 cm without respiratory variability,  consistent with increased right atrial pressure. Estimated mean right atrial pressure is >15 mmHg.  No pericardial effusion is present.    Assessment and Plan:   1.  Chronic systolic heart failure secondary to ischemic cardiomyopathy.  Stage D  NYHA Class IV    Perez Villalobos is a 76-year old male with a history of ICM with multiple PCIs and stents, an NSTEMI in 10/2016 and a VF arrest in 11/2016, and ischemic cardiomyopathy who will be undergoing LVAD placement in the near future. He looks stable and fairly well compensated today, with the main issue being hypokalemia  today (K = 2.7). Supplement was decreased from 40mg BID to 20mg daily in the hospital. Denies any palpitations or chest pain today. Will given an additional 40meq in clinic today and he will take another 40meq at home later today. Will then increase his supplement to 40meq/20meq daily starting tomorrow. Will need a repeat BMP tomorrow to ensure that his potassium level has improved.    Appears fairly euvolemic on exam and his renal function is stable. Weight has also remained stable. Will continue current Torsemide dose. Blood pressures are well-controlled, will not adjust his Hydralazine/Isordil either. Will continue to follow the patient closely in clinic, and advised them to call with any worsening of his condition or symptoms. Will see Dr. Kelley in clinic next week, and it sounds likely that Walker will undergo LVAD placement in the next month or so.      ACEi/ARB: no secondary to renal dysfunction; on Hydralazine and Isordil as an alternative.    BB: no; on Dobutamine drip at 5mcg/kg/min.    Aldosterone antagonist: contraindicated due to renal dysfunction    SCD prophylaxis: CRT-D    Fluid status: euvolemic, continue Torsemide 80mg BID.    2.  Alcohol abuse: Patient brought a log of his weekly attendance at AA meetings; took a copy and will have it scanned into his chart. Last drink was on 4/4/17.    Follow-up: Repeat BMP tomorrow; seeing Dr. Kelley on 7/6.      PARKER Edwards CNP  Pager [phone]

## 2017-06-27 NOTE — NURSING NOTE
Chief Complaint   Patient presents with     Follow Up For     Return CORE; 76yr old male with a h/o chronic systolic HF on milrinone presenting for follow-up with labs prior     Vitals were taken and medications were reconciled. EKG was performed.    TEOFILO Houser  11:30 AM

## 2017-06-27 NOTE — PATIENT INSTRUCTIONS
"You were seen today in the Cardiovascular Clinic at the [facility].     Cardiology Providers you saw during your visit: Gladys Mustafa NP     1. You were given potassium 40 meq in clinic today.  2.  Please take another potassium 40 meq later this afternoon/evening.  3.  Beginning tomorrow, increase daily potassium to 40 meq in the morning and 20 meq in the evening.  4.  Please have labs (BMP) tomorrow at [facility]; we will call you with the plan after labs are reviewed.  3pm   5.  You have an appointment with Dr. Kelley Thursday,  at 3:30pm with an ICD check at 4:30.  3pm labs first  6. Return to [facility] pending appointment with Dr. Kelley.    Results for RON LUCERO (MRN [id number]) as of 2017 12:01   Ref. Range 2017 11:19   Sodium Latest Ref Range: 133 - 144 mmol/L 126 (L)   Potassium Latest Ref Range: 3.4 - 5.3 mmol/L 2.7 (L)   Chloride Latest Ref Range: 94 - 109 mmol/L 82 (L)   Carbon Dioxide Latest Ref Range: 20 - 32 mmol/L 32   Urea Nitrogen Latest Ref Range: 7 - 30 mg/dL 79 (H)   Creatinine Latest Ref Range: 0.66 - 1.25 mg/dL 2.34 (H)   GFR Estimate Latest Ref Range: >60 mL/min/1.7m2 27 (L)   GFR Estimate If Black Latest Ref Range: >60 mL/min/1.7m2 33 (L)   Calcium Latest Ref Range: 8.5 - 10.1 mg/dL 8.6   Anion Gap Latest Ref Range: 3 - 14 mmol/L 11   Glucose Latest Ref Range: 70 - 99 mg/dL 139 (H)       Please limit your fluid intake to 2 L (64 ounces) daily.  2 Liters a day = 8.5 cups, or 72 ounces.  Please limit your salt intake to 2 grams a day or less.    If you gain 2# in 24 hours or 5# in one week call Lenka Blevins RN so we can adjust your medications as needed over the phone.    Please feel free to call me with any questions or concerns.      Lenka Blevins RN BSN [facility]  Cardiology Care Coordinator-Heart Failure Clinic    Questions and [phone].   First press #1 for the University and then press #3 for \"Medical " "Questions\" to reach the Cardiology Nurses.     On Call Cardiologist for after hours or on weekends: [phone]   option #4 and ask to speak to the on-call Cardiologist. Inform them you are a CORE/heart failure patient at the [place].        If you need a medication refill please contact your pharmacy.  Please allow 3 business days for your refill to be completed.  _______________________________________________________  C.O.R.E. CLINIC Cardiomyopathy, Optimization, Rehabilitation, Education   The C.O.R.E. CLINIC is a heart failure specialty clinic within the [facility] where you will work with specialized nurse practitioners dedicated to helping patients with heart failure carefully adjust medications, receive education, and learn who and when to call if symptoms develop. They specialize in helping you better understand your condition, slow the progression of your disease, improve the length and quality of your life, help you detect future heart problems before they become life threatening, and avoid hospitalizations.  As always, thank you for trusting us with your health care needs!          "

## 2017-06-28 NOTE — PROGRESS NOTES
HPI:   Perez Villalobos is a 76-year old male with a history of ICM with multiple PCIs and stents, an NSTEMI in 10/2016 and a VF arrest in 11/2016, and ischemic cardiomyopathy who has been undergoing evaluation for LVAD placement over the past few months. His LVAD placement has been delayed after it was revealed that he was drinking alcohol daily until 4/4/17.    During a hospitalization in April, he was started on milrinone and was recently switched to dobutamine, which he continues on. Was recently hospitalized again from 6/17-6/24 with decompensated heart failure, and he was aggressively diuresed and his afterload reduction was adjusted. He presents to clinic today for follow-up after discharge.    Since going home several days ago, Walker says that he is overall feeling 'ok.' His weight hasn't changed at all, and he feels like he is urinating constantly. Continues to have positional lightheadedness that has not worsened over the past few months. Also has stable NOEL and is able to walk about one block. Has felt very fatigued and weak, and feels like he is sleeping constantly. Continues to attend AA meetings weekly, and continues to abstain from alcohol.    PAST MEDICAL HISTORY:  Past Medical History:   Diagnosis Date     Acute renal failure (H)     10/2015 ARF secondary to rhabdomyolysis     Alcoholism (H)      Anemia      Benign essential HTN      BPH (benign prostatic hypertrophy)      CAD (coronary artery disease)     AWMI, stents to Cx, RCA and LAD     Chronic systolic heart failure (H)      CKD (chronic kidney disease)      Complete AV block (H)      Ischemic cardiomyopathy 10/23/2015    EF 30%     Low sodium levels      Mixed hyperlipidemia      NSTEMI (non-ST elevated myocardial infarction) (H) 10/23/2015     PAD (peripheral artery disease) (H)      Rhabdomyolysis due to statin therapy     crestor     Severe hypothyroidism 9/20/2016     VF (ventricular fibrillation) (H) 11/16/16       FAMILY HISTORY:  Family  History   Problem Relation Age of Onset     Unknown/Adopted Mother      Unknown/Adopted Father        SOCIAL HISTORY:  Social History     Social History     Marital status:      Spouse name: N/A     Number of children: N/A     Years of education: N/A     Social History Main Topics     Smoking status: Former Smoker     Packs/day: 1.50     Years: 20.00     Types: Cigarettes     Quit date: 1/1/1980     Smokeless tobacco: Never Used     Alcohol use 0.0 oz/week     0 Standard drinks or equivalent per week      Comment: occassionally     Drug use: No     Sexual activity: Not on file     Other Topics Concern     Caffeine Concern No     decaf coffee     Sleep Concern No     Stress Concern No     Weight Concern No     Special Diet Yes     low fat, low sodium     Exercise Yes     everyday     Social History Narrative       CURRENT MEDICATIONS:  Current Outpatient Prescriptions   Medication Sig Dispense Refill     potassium chloride (KLOR-CON) 20 MEQ Packet Take 40 meq in the a.m. and 20 meq in the p.m. 270 packet 3     ferrous sulfate (IRON) 325 (65 FE) MG tablet Take 1 tablet (325 mg) by mouth daily 100 tablet 0     torsemide (DEMADEX) 20 MG tablet Take 4 tablets (80 mg) by mouth 2 times daily 240 tablet 0     D5W 5 % SOLN 170 mL with DOBUTamine 250 MG/20ML SOLN 1,000 mg Inject 0.3555 mg/min into the vein continuous 1000 mL 0     acetaminophen (TYLENOL) 325 MG tablet Take 325-650 mg by mouth every 6 hours as needed for mild pain       isosorbide dinitrate (ISORDIL) 30 MG tablet Take 2 tablets (60 mg) by mouth 3 times daily 90 tablet 3     hydrALAZINE (APRESOLINE) 100 MG TABS tablet Take 1 tablet (100 mg) by mouth 4 times daily 60 tablet 3     tolterodine (DETROL LA) 4 MG 24 hr capsule Take 4 mg by mouth daily       calcitRIOL (ROCALTROL) 0.25 MCG capsule Take 0.25 mcg by mouth daily       aspirin EC 81 MG EC tablet Take 81 mg by mouth daily       levothyroxine (SYNTHROID/LEVOTHROID) 150 MCG tablet Take 150 mcg by  "mouth daily       Saline (SODIUM CHLORIDE) 0.65 % SOLN Spray in nostril as needed       Cyanocobalamin (B-12) 1000 MCG TBCR Take 1,000 mcg by mouth daily 100 tablet 0     multivitamin, therapeutic with minerals (MULTI-VITAMIN) TABS Take 1 tablet by mouth daily       clopidogrel (PLAVIX) 75 MG tablet Take 75 mg by mouth daily         ROS:   Constitutional: No fever, chills, or sweats. No weight gain/loss.   ENT: No visual disturbance, ear ache, epistaxis, sore throat.   Allergies/Immunologic: Negative.   Respiratory: No cough, hemoptysis.   Cardiovascular: As per HPI.   GI: No nausea, vomiting, hematemesis, melena, or hematochezia.   : No urinary frequency, dysuria, or hematuria.   Integument: Negative.   Psychiatric: Negative.   Neuro: Negative.   Endocrinology: Negative.   Musculoskeletal: Negative.    EXAM:  /63 (BP Location: Left arm, Patient Position: Chair, Cuff Size: Adult Small)  Pulse 106  Ht 1.727 m (5' 8\")  Wt 65.3 kg (144 lb)  SpO2 96%  BMI 21.9 kg/m2  General: appears comfortable, alert and articulate  Head: normocephalic, atraumatic  Eyes: anicteric sclera, EOMI  Neck: no adenopathy  Orophyarynx: moist mucosa, no lesions, dentition intact  Heart: regular, S1/S2, no murmur, gallop, rub, estimated JVP 9 cm  Lungs: clear, no rales or wheezing  Abdomen: soft, mild distention, non-tender, bowel sounds present, no hepatosplenomegaly  Extremities: no clubbing, cyanosis or edema  Neurological: normal speech and affect, no gross motor deficits    Labs:  CBC RESULTS:  Lab Results   Component Value Date    WBC 7.2 06/17/2017    RBC 3.40 (L) 06/17/2017    HGB 10.6 (L) 06/17/2017    HCT 31.8 (L) 06/17/2017    MCV 94 06/17/2017    MCH 31.2 06/17/2017    MCHC 33.3 06/17/2017    RDW 16.0 (H) 06/17/2017     06/17/2017       CMP RESULTS:  Lab Results   Component Value Date     (L) 06/27/2017    POTASSIUM 2.7 (L) 06/27/2017    CHLORIDE 82 (L) 06/27/2017    CO2 32 06/27/2017    ANIONGAP 11 " 06/27/2017     (H) 06/27/2017    BUN 79 (H) 06/27/2017    CR 2.34 (H) 06/27/2017    GFRESTIMATED 27 (L) 06/27/2017    GFRESTBLACK 33 (L) 06/27/2017    LEIDY 8.6 06/27/2017    BILITOTAL 1.2 06/01/2017    ALBUMIN 3.4 06/01/2017    ALKPHOS 86 06/01/2017    ALT 22 06/01/2017    AST 25 06/01/2017        INR RESULTS:  Lab Results   Component Value Date    INR 1.14 06/04/2017       No components found for: CK  Lab Results   Component Value Date    MAG 2.5 (H) 06/24/2017     Lab Results   Component Value Date    NTBNP 28269 (H) 05/26/2017       Echocardiogram 6/15/17:  Interpretation Summary  Severe diffuse hypokinesis. The Ejection Fraction is estimated at 15-20% (calculated, 23%.)  The RV is moderately dilated. Global right ventricular function is moderately reduced.  Moderate mitral insufficiency. Mild aortic insufficiency. Moderate tricuspid  insufficiency.  Moderate pulmonary hypertension. Right ventricular systolic pressure is 40  mmHg above the right atrial pressure.  The inferior vena cava is dilated at 2.2 cm without respiratory variability,  consistent with increased right atrial pressure. Estimated mean right atrial pressure is >15 mmHg.  No pericardial effusion is present.    Assessment and Plan:   1.  Chronic systolic heart failure secondary to ischemic cardiomyopathy.  Stage D  NYHA Class IV    Perez Villalobos is a 76-year old male with a history of ICM with multiple PCIs and stents, an NSTEMI in 10/2016 and a VF arrest in 11/2016, and ischemic cardiomyopathy who will be undergoing LVAD placement in the near future. He looks stable and fairly well compensated today, with the main issue being hypokalemia today (K = 2.7). Supplement was decreased from 40mg BID to 20mg daily in the hospital. Denies any palpitations or chest pain today. Will given an additional 40meq in clinic today and he will take another 40meq at home later today. Will then increase his supplement to 40meq/20meq daily starting tomorrow. Will  need a repeat BMP tomorrow to ensure that his potassium level has improved.    Appears fairly euvolemic on exam and his renal function is stable. Weight has also remained stable. Will continue current Torsemide dose. Blood pressures are well-controlled, will not adjust his Hydralazine/Isordil either. Will continue to follow the patient closely in clinic, and advised them to call with any worsening of his condition or symptoms. Will see Dr. Kelley in clinic next week, and it sounds likely that Walker will undergo LVAD placement in the next month or so.      ACEi/ARB: no secondary to renal dysfunction; on Hydralazine and Isordil as an alternative.    BB: no; on Dobutamine drip at 5mcg/kg/min.    Aldosterone antagonist: contraindicated due to renal dysfunction    SCD prophylaxis: CRT-D    Fluid status: euvolemic, continue Torsemide 80mg BID.    2.  Alcohol abuse: Patient brought a log of his weekly attendance at AA meetings; took a copy and will have it scanned into his chart. Last drink was on 4/4/17.    Follow-up: Repeat BMP tomorrow; seeing Dr. Kelley on 7/6.      PARKER Edwards CNP  Pager [phone]

## 2017-06-29 NOTE — PATIENT INSTRUCTIONS
Recommendations from today's MTM visit:                                                    MTM (medication therapy management) is a service provided by a clinical pharmacist designed to help you get the most of out of your medicines.     1. Continue to monitor sodium closely.  His most recent value was 124 mmol/L which is quite low.  If nausea or malaise becomes more prominent, please let the CORE clinic know.     2. Walker's carvedilol was stopped while he was in the hospital.  I wasn't able to learn why from the chart.  Continue to monitor his pulse for high heart rate.  A beta blocker may be in his best interest at that point.  Please defer to the CORE clinic for recommendations.     Next MTM visit: Please call any time with questions or concerns!    To schedule another MTM appointment, please call the clinic directly or you may call the MTM scheduling line at [phone] or toll-free at [phone].     My Clinical Pharmacist's contact information:                                                      It was a pleasure seeing you today!  Please feel free to contact me with any questions or concerns you have.      Jennifer Harris, Pharm.D.  Medication Therapy Management Pharmacist  Page/VM:  [phone]    You may receive a survey about the MTM services you received.  I would appreciate your feedback to help me serve you better in the future. Please fill it out and return it when you can. Your comments will be anonymous.

## 2017-06-29 NOTE — MR AVS SNAPSHOT
After Visit Summary   6/29/2017    Perez Villalobos    MRN: [id number]           Patient Information     Date Of Birth          1940        Visit Information        Provider Department      6/29/2017 9:30 AM Jennifer Harris, [facility] MTM        Today's Diagnoses     Acute on chronic systolic congestive heart failure (H)    -  1    Coronary artery disease involving native coronary artery of native heart without angina pectoris        Vitamin B12 deficiency without anemia        Severe hypothyroidism        Iron deficiency anemia, unspecified        Benign essential HTN        Benign prostatic hyperplasia with urinary frequency        Age-related cataract, unspecified age-related cataract type, unspecified laterality          Care Instructions    Recommendations from today's MTM visit:                                                    MTM (medication therapy management) is a service provided by a clinical pharmacist designed to help you get the most of out of your medicines.     1. Continue to monitor sodium closely.  His most recent value was 124 mmol/L which is quite low.  If nausea or malaise becomes more prominent, please let the CORE clinic know.     2. Walker's carvedilol was stopped while he was in the hospital.  I wasn't able to learn why from the chart.  Continue to monitor his pulse for high heart rate.  A beta blocker may be in his best interest at that point.  Please defer to the CORE clinic for recommendations.     Next MTM visit: Please call any time with questions or concerns!    To schedule another MTM appointment, please call the clinic directly or you may call the MTM scheduling line at [phone] or toll-free at [phone].     My Clinical Pharmacist's contact information:                                                      It was a pleasure seeing you today!  Please feel free to contact me with any questions or concerns you have.      Jennifer Harris,  Pharm.D.  Medication Therapy Management Pharmacist  Page/VM:  [phone]    You may receive a survey about the [facility] services you received.  I would appreciate your feedback to help me serve you better in the future. Please fill it out and return it when you can. Your comments will be anonymous.                    Follow-ups after your visit        Your next 10 appointments already scheduled     Jun 30, 2017 10:30 AM CDT   LAB with RU LAB   [facility] [place] ([facility])    [address]  [facility] [id number] [phone]           Patient must bring picture ID.  Patient should be prepared to give a urine specimen  Please do not eat 10-12 hours before your appointment if you are coming in fasting for labs on lipids, cholesterol, or glucose (sugar).  Pregnant women should follow their Care Team instructions. Water with medications is okay. Do not drink coffee or other fluids.   If you have concerns about taking  your medications, please ask at office or if scheduling via [employer], send a message by clicking on Secure Messaging, Message Your Care Team.            Jul 06, 2017  3:00 PM CDT   Lab with UC LAB   [facility] ([facility])    [address]  [address] [facility] [id number] [phone]            Jul 06, 2017  3:30 PM CDT   (Arrive by 3:15 PM)   RETURN HEART FAILURE with Giovanna Kelley MD   [facility] ([facility])    [address]  [address] [facility] [id number] [phone]            Jul 06, 2017  4:30 PM CDT   Remote ICD Check with MCLEAN DCR2   [facility] ([facility])    [address] [place] [id number] [phone]           This appointment is for a remote check of your debrillator.  This is not an appointment at the office.            Jul 10, 2017   Procedure with Sincere ORTIZ  "MD Claudio   [facility] (--)    [address]  [place] [address]   [phone]            Jul 10, 2017   Procedure with Sincere Snyder MD   [facility]    [address]  [place] [address]   [phone]            Aug 04, 2017   Procedure with Nain Bullard MD   [facility], Same Day Surgery (--)    [address]  [place] [address]   [phone]              Future tests that were ordered for you today     Open Future Orders        Priority Expected Expires Ordered    Basic metabolic panel Routine 6/30/2017 7/7/2017 6/29/2017            Who to contact     If you have questions or need follow up information about today's clinic visit or your schedule please contact [facility] directly at [phone].  Normal or non-critical lab and imaging results will be communicated to you by [employer], letter or phone within 4 business days after the clinic has received the results. If you do not hear from us within 7 days, please contact the clinic through [employer] or phone. If you have a critical or abnormal lab result, we will notify you by phone as soon as possible.  Submit refill requests through [employer] or call your pharmacy and they will forward the refill request to us. Please allow 3 business days for your refill to be completed.          Additional Information About Your Visit        Drybar Information     [employer] lets you send messages to your doctor, view your test results, renew your prescriptions, schedule appointments and more. To sign up, go to [email or web address] . Click on \"Log in\" on the left side of the screen, which will take you to the Welcome page. Then click on \"Sign up Now\" on the right side of the page.     You will be asked to enter the access code listed below, as well as some personal information. Please follow the directions to create your username and password.     Your access " code is: [email or web address]  Expires: 2017  8:15 AM     Your access code will  in 90 days. If you need help or a new code, please call your [place] clinic or [phone].        Care EveryWhere ID     This is your Care EveryWhere ID. This could be used by other organizations to access your [place] medical records  [id number]         Blood Pressure from Last 3 Encounters:   17 120/63   17 118/65   17 139/81    Weight from Last 3 Encounters:   17 144 lb (65.3 kg)   17 147 lb 12.8 oz (67 kg)   17 152 lb 12.5 oz (69.3 kg)              Today, you had the following     No orders found for display       Primary Care Provider Office Phone # Fax #    [facility] [phone]       [address] [place] [address]        Equal Access to Services     MAGDALENA BARON AH: Hadii aad ku hadasho Soomaali, waaxda luqadaha, qaybta kaalmada adeegyada, waxay idiin hayaan beni welch . So [facility] [phone].    ATENCIÓN: Si habla español, tiene a padilla disposición servicios gratuitos de asistencia lingüística. Llame al [phone].    We comply with applicable federal civil rights laws and [place] laws. We do not discriminate on the basis of race, color, national origin, age, disability sex, sexual orientation or gender identity.            Thank you!     Thank you for choosing [facility]  for your care. Our goal is always to provide you with excellent care. Hearing back from our patients is one way we can continue to improve our services. Please take a few minutes to complete the written survey that you may receive in the mail after your visit with us. Thank you!             Your Updated Medication List - Protect others around you: Learn how to safely use, store and throw away your medicines at www.disposemymeds.org.          This list is accurate as of: 17  1:33 PM.  Always use your most recent med list.                   Brand Name  Dispense Instructions for use Diagnosis    aspirin EC 81 MG EC tablet      Take 81 mg by mouth daily        B-12 1000 MCG Tbcr     100 tablet    Take 1,000 mcg by mouth daily    Vitamin B 12 deficiency       calcitRIOL 0.25 MCG capsule    ROCALTROL     Take 0.25 mcg by mouth daily        D5W 5 % SOLN 170 mL with DOBUTamine 250 MG/20ML SOLN 1,000 mg     1000 mL    Inject 0.3555 mg/min into the vein continuous    Cardiogenic shock (H)       ferrous sulfate 325 (65 FE) MG tablet    IRON    100 tablet    Take 1 tablet (325 mg) by mouth daily    Iron deficiency anemia, unspecified       hydrALAZINE 100 MG Tabs tablet    APRESOLINE    60 tablet    Take 1 tablet (100 mg) by mouth 4 times daily        isosorbide dinitrate 30 MG tablet    ISORDIL    90 tablet    Take 2 tablets (60 mg) by mouth 3 times daily        levothyroxine 150 MCG tablet    SYNTHROID/LEVOTHROID     Take 150 mcg by mouth daily        Multi-vitamin Tabs tablet      Take 1 tablet by mouth daily        PLAVIX 75 MG tablet   Generic drug:  clopidogrel      Take 75 mg by mouth daily        potassium chloride 20 MEQ Packet    KLOR-CON    270 packet    Take 40 meq in the a.m. and 20 meq in the p.m.    Chronic combined systolic and diastolic congestive heart failure (H)       Sodium Chloride 0.65 % Soln      Spray in nostril as needed        tolterodine 4 MG 24 hr capsule    DETROL LA     Take 4 mg by mouth daily        torsemide 20 MG tablet    DEMADEX    240 tablet    Take 4 tablets (80 mg) by mouth 2 times daily    Acute on chronic systolic (congestive) heart failure (H)       TYLENOL 325 MG tablet   Generic drug:  acetaminophen      Take 325-650 mg by mouth every 6 hours as needed for mild pain

## 2017-06-29 NOTE — LETTER
[facility]  [address] [facility] [address]  [phone]      June 29, 2017      Perez CHILDRESS Villalobos  [address]  [facility] [address]        Dear Walker (and Annia),    It was so nice to speak with you on Thursday, June 29th.  I hope I was able to give you some useful information during our Medication Therapy Management (MTM) visit. The purpose of this visit with a clinical pharmacist was to review the medicines you received when discharged from the hospital. We want to make sure that you know which medicines to take and what they are for. We also want to make sure all your medicines are working, safe, and as easy to take as possible.    Enclosed is a summary of the suggestions we talked about and any other thoughts I had. There is also a list of your medicines included. This information has also been shared with your primary care provider.    Feel free to call if you have any questions or concerns. By working together with you and your doctor, I hope to help you feel confident managing your medicines and improving your quality of life.       Arturo wilson,         Jennifer Harris, Pharm.D.  Medication Therapy Management Pharmacist  Page/VM:  [phone]

## 2017-06-29 NOTE — PROGRESS NOTES
SUBJECTIVE/OBJECTIVE:                           Perez Villalobos is a 76 year old male called for an initial visit for Medication Therapy Management.  He was admitted to [facility] on 6/17 for an acute heart failure exacerbation and discharged on 6/24 to home.  His wife, Annia, gives the interview with her 's verbal permission today.  She is a retired RN and is his primary caregiver.     Chief Complaint: Initial Kevin.    Allergies/ADRs: Reviewed in Epic  Tobacco: No tobacco use   Alcohol: Less than 1 beverage / month  Caffeine: 1-2 cups/day of decaf coffee  Activity: Limited to ADL's  PMH: Reviewed in Epic    Medication Adherence: no issues reported and has assistance setting up med boxes - Annia sets up his med boxes for QID dosing. Patient reports rarely missing medication doses - only when they eat out which isn't often due to Walker's low salt diet.     HFrEF/CAD/HTN: Current medications aspirin 81 mg daily, clopidogrel 75 mg daily, hydralazine 100 mg 4 times daily, isosorbide dinitrate 60 mg three times daily, potassium cl 40 mEq in the morning 20 mEq in the evening and dobutamine injections. Patient is not taking a statin due to history of rhabdomyolysis.  Reports some episodes of light headedness but this is not new. He knows to sit at the edge of the bed and allow his dizziness to improve before standing to avoid falls.  They measure blood pressure at home occasionally but not daily because his blood pressure is consistently in the 110's/60's.   Takes potassium with a meal and does not experience GI intolerance.  The patient is very fatigued and weak but feels better after his recent discharge.  He is an LVAD candidate and they are hopeful to receive this procedure in late July and early August.     Hypothyroidism: Patient is taking levothyroxine 150 mcg daily. Patient is having the following symptoms: none.     Anemia: Current medications include ferrous sulfate 325 mg daily.  He does experience some constipation.   They will give prune juice for constipation if >1 day with BM.  Other side effects are denied.     Vitamins/OTC: Current medications include calcitriol 0.25 mcg daily, vitamin B 12 1000 mcg daily, MVI 1 tablet daily and saline nasal spray once daily.  Side effects denied.     OAB: Current medications include tolterodine 4 mg daily.  He has had more urgency issues since switching to the torsemide.  We discuss the relationship between overactive bladder and the increased potency of diuretics.  This may resolve or improve as his dry weight is maintained.      Cataracts: No plan in place currently.  Cataract surgery was scheduled for Monday but he and his wife felt the timing was poor based on his recent discharge from the hospital and his inability to lie flat for long periods of time.  He is frustrated because his cataracts make it difficult for him to read or watch television so he is often quite bored.     Current labs include:  BP Readings from Last 3 Encounters:   06/27/17 120/63   06/24/17 118/65   06/16/17 139/81     Today's Vitals: There were no vitals taken for this visit. - telemed  Lab Results   Component Value Date    A1C 5.7 04/12/2017   .  Lab Results   Component Value Date    CHOL 232 09/16/2016     Lab Results   Component Value Date    TRIG 126 09/16/2016     Lab Results   Component Value Date    HDL 87 09/16/2016     Lab Results   Component Value Date     09/16/2016       Liver Function Studies -   Recent Labs   Lab Test  06/01/17   1429   PROTTOTAL  8.0   ALBUMIN  3.4   BILITOTAL  1.2   ALKPHOS  86   AST  25   ALT  22     Lab Results   Component Value Date    UCRR 49 10/02/2015     Last Basic Metabolic Panel:  Lab Results   Component Value Date     06/28/2017      Lab Results   Component Value Date    POTASSIUM 3.7 06/28/2017     Lab Results   Component Value Date    CHLORIDE 82 06/28/2017     Lab Results   Component Value Date    BUN 80 06/28/2017     Lab Results   Component Value Date     CR 2.57 06/28/2017     Lab Results   Component Value Date    WBC 7.2 06/17/2017     Lab Results   Component Value Date    RBC 3.40 06/17/2017     Lab Results   Component Value Date    HGB 10.6 06/17/2017     Lab Results   Component Value Date    HCT 31.8 06/17/2017     Lab Results   Component Value Date    MCV 94 06/17/2017     Lab Results   Component Value Date    MCH 31.2 06/17/2017     Lab Results   Component Value Date    MCHC 33.3 06/17/2017     Lab Results   Component Value Date    RDW 16.0 06/17/2017     Lab Results   Component Value Date     06/17/2017     GFR Estimate   Date Value Ref Range Status   06/28/2017 24 (L) >60 mL/min/1.7m2 Final     Comment:     Non  GFR Calc   06/27/2017 27 (L) >60 mL/min/1.7m2 Final     Comment:     Non  GFR Calc   06/24/2017 27 (L) >60 mL/min/1.7m2 Final     Comment:     Non  GFR Calc     TSH   Date Value Ref Range Status   04/12/2017 1.99 0.40 - 4.00 mU/L Final     Most Recent Immunizations   Administered Date(s) Administered     Influenza (High Dose) 3 valent vaccine 10/20/2016       ASSESSMENT:                             Current medications were reviewed today.     Medication Adherence: no issues identified    HFrEF/CADHypertension w/CKD: Stable. Patient is meeting BP goal of < 140/90mmHg. Patient is experiencing significant hyponatremia in the last several BMPs and tachycardia x1.  Carvedilol was not restarted at discharge.  The reason for not restarting is not clear. Hyponatremia is likely secondary to high doses of diuretics.  Symptoms of malaise are reported but nausea is denied.  Continue to monitor with [facility].     Hypothyroidism: Stable. Last TSH is within normal limits.     Anemia: Stable. Hemoglobin remains >9.     Vitamins/OTC: Stable.     OAB: Stable.      Cataracts: Needs improvement.  Plan in place with family.     PLAN:                            No recommendations today.  Unclear why carvedilol  was stopped (to me).  Restart at discretion of [facility] clinic. Monitor for signs of nausea and unusual malaise with continued hyponatremia.     I spent 30 minutes with this patient today.  I offer these suggestions with the understanding that I don't fully understand Perez's past medical history and the complexity of his health conditions. Perez should make no changes without the approval of his physician. A copy of the visit note was provided to the patient's primary care provider.    Will follow up as needed per patient and his wife.    The patient was sent via mail a summary of these recommendations as an after visit summary.     Jennifer Harris, Pharm.D.  Medication Therapy Management Pharmacist  Page/VM:  [phone]

## 2017-06-29 NOTE — PROGRESS NOTES
Date: 6/29/2017  Time of Call: 11:34 AM  Diagnosis:  Heart failure  VORB - Ordering provider: Grecia Miramontes NP   Order: BMP  Order received by: Merlyn Schmitt RN   Follow-up/additional notes:

## 2017-06-30 NOTE — PLAN OF CARE
Admission    Diagnosis:  Admitted from: Home  Via: Wheelchair  Accompanied by: Wife, Annia  Belongings: Placed in closet; valuables sent home with family, declined sending any items to security.  Admission Profile: Complete  Teaching: orientation to unit, call don't fall, use of console, meal times, visiting hours, when to call for the RN (angina/sob/dizzyness, etc.), and enforced importance of safety   Access: PICC  Patient placed on monitor  Height/Weight: Complete

## 2017-06-30 NOTE — H&P
Cardiology History and Physical      Patient Name: Perez Villalobos MRN# [id number]   Age: 76 year old YOB: 1940     Date of Admission:6/30/2017             Assessment and Plan:     76 year old man with ischemic CM EF 20% on home dobutamine 5, s/p CRT-D, CKD baseline Cr 1.5, PAD, alcohol dependence- sober for last few months is admitted with ambulatory cardiogenic shock.     # Ambulatory cardiogenic shock  # Oliguric acute renal failure on background of CKD baseline Cr 1.5  # Hyperkalemia  # Uremia  # Somnolence- from cardiogenic shock and uremia  # Hepatocellular injury- more likely hepatic congestion, less likely shock liver      Plan  -Lasix 80mg bolus, 20mg/hr drip, metolazone 5mg  -increased dobutamine 7.5  -swan placement  -may need dialysis tonight if hyperkalemia or uremia worsens. Gave insulin and D50 one time. Monitor K  -continue home hydralazine and isordil at reduced doses: hydralazine 75 TID, Isordil 30 TID  -continue home asa 81, plavix 75      Full Code. Discussed with patient  DVT prophylaxis: subq heparin 5000 units TID    Staffed with Dr. Jennifer Lynn  General Cardiology fellow, PGY4  Pager [phone]         Chief Complaint:     Ambulatory cardiogenic shock         HPI:       76 year old man with ischemic CM EF 20% on home dobutamine 5, s/p CRT-D, CKD baseline Cr 1.5, PAD, alcohol dependence- sober for last few months is admitted with ambulatory cardiogenic shock.   He was hospitalized from 6/17-6/24 with similar presentation. At that time he was on home milrinone 0.375. With afterload reduction agents, his SVR became too low so oral afterload reducing agents were held and then gradually reintroduced. Due to JACINTO, his milrinone was also switched to Dobutamine. He has been taking meds as prescribed.  Over last 2 days, he has had more fatigue, dizziness, somnolence/confusion, abd swelling, nausea, right abdominal discomfort, vomiting after taking meds this morning. He has  had 3 loose BMs in last 2 days but that's after taking stool softners. Denies palpations, ICD shocks or chest pain.  Labs remarkable for K 6.0  Cr 3.4 (2.3 on 6/24). LFTs in 300s ScVO2 36% CI 1.4           Past Medical History:     Past Medical History:   Diagnosis Date     Acute renal failure (H)     10/2015 ARF secondary to rhabdomyolysis     Alcoholism (H)      Anemia      Benign essential HTN      BPH (benign prostatic hypertrophy)      CAD (coronary artery disease)     AWMI, stents to Cx, RCA and LAD     Chronic systolic heart failure (H)      CKD (chronic kidney disease)      Complete AV block (H)      Ischemic cardiomyopathy 10/23/2015    EF 30%     Low sodium levels      Mixed hyperlipidemia      NSTEMI (non-ST elevated myocardial infarction) (H) 10/23/2015     PAD (peripheral artery disease) (H)      Rhabdomyolysis due to statin therapy     crestor     Severe hypothyroidism 9/20/2016     VF (ventricular fibrillation) (H) 11/16/16              Past Surgical History:     Past Surgical History:   Procedure Laterality Date     APPENDECTOMY       CHOLECYSTECTOMY       ENDARTERECTOMY CAROTID Left 6/11/10     IMPLANT AUTOMATIC IMPLANTABLE CARDIOVERTER DEFIBRILLATOR  11/16/16     PICC INSERTION Right 04/12/2017    5fr DL Bard PICC, 41cm (3cm external) in the R basilic vein w/ tip in the low SVC.     STENT, CORONARY, S660 15/18  Nov 2000    PTCA with stent placement of CFX     STENT, CORONARY, S660 15/18  Feb 2001    PTCA with stent placement of CFX RCA      STENT, CORONARY, S660 15/18  April 2007    stent placed in LAD     tonsillectomy and adenoidectomy                Social History:     Social History     Social History     Marital status:      Spouse name: N/A     Number of children: N/A     Years of education: N/A     Occupational History     Not on file.     Social History Main Topics     Smoking status: Former Smoker     Packs/day: 1.50     Years: 20.00     Types: Cigarettes     Quit date:  "1/1/1980     Smokeless tobacco: Never Used     Alcohol use 0.0 oz/week     0 Standard drinks or equivalent per week      Comment: occassionally     Drug use: No     Sexual activity: Not on file     Other Topics Concern     Caffeine Concern No     decaf coffee     Sleep Concern No     Stress Concern No     Weight Concern No     Special Diet Yes     low fat, low sodium     Exercise Yes     everyday     Social History Narrative            Family History:     Family History   Problem Relation Age of Onset     Unknown/Adopted Mother      Unknown/Adopted Father                 Allergies:      Allergies   Allergen Reactions     Lisinopril Other (See Comments)     Angioedema     Augmentin Other (See Comments)     Per pt, \"froze him, affected his legs\"     Crestor [Rosuvastatin] Other (See Comments)     rhabdomyolysis            Medications:     Prescriptions Prior to Admission   Medication Sig Dispense Refill Last Dose     potassium chloride (KLOR-CON) 20 MEQ Packet Take 40 meq in the a.m. and 20 meq in the p.m. 270 packet 3 6/30/2017 at Unknown time     ferrous sulfate (IRON) 325 (65 FE) MG tablet Take 1 tablet (325 mg) by mouth daily 100 tablet 0 6/30/2017 at Unknown time     torsemide (DEMADEX) 20 MG tablet Take 4 tablets (80 mg) by mouth 2 times daily 240 tablet 0 6/30/2017 at Unknown time     D5W 5 % SOLN 170 mL with DOBUTamine 250 MG/20ML SOLN 1,000 mg Inject 0.3555 mg/min into the vein continuous 1000 mL 0 6/30/2017 at Unknown time     acetaminophen (TYLENOL) 325 MG tablet Take 325-650 mg by mouth every 6 hours as needed for mild pain   Past Month at Unknown time     isosorbide dinitrate (ISORDIL) 30 MG tablet Take 2 tablets (60 mg) by mouth 3 times daily 90 tablet 3 6/30/2017 at Unknown time     hydrALAZINE (APRESOLINE) 100 MG TABS tablet Take 1 tablet (100 mg) by mouth 4 times daily 60 tablet 3 6/30/2017 at Unknown time     tolterodine (DETROL LA) 4 MG 24 hr capsule Take 4 mg by mouth daily   6/30/2017 at Unknown " "time     calcitRIOL (ROCALTROL) 0.25 MCG capsule Take 0.25 mcg by mouth daily   6/30/2017 at Unknown time     aspirin EC 81 MG EC tablet Take 81 mg by mouth daily   6/30/2017 at Unknown time     levothyroxine (SYNTHROID/LEVOTHROID) 150 MCG tablet Take 150 mcg by mouth daily   6/30/2017 at Unknown time     Saline (SODIUM CHLORIDE) 0.65 % SOLN Spray in nostril as needed   6/30/2017 at Unknown time     Cyanocobalamin (B-12) 1000 MCG TBCR Take 1,000 mcg by mouth daily 100 tablet 0 6/30/2017 at Unknown time     multivitamin, therapeutic with minerals (MULTI-VITAMIN) TABS Take 1 tablet by mouth daily   6/30/2017 at Unknown time     clopidogrel (PLAVIX) 75 MG tablet Take 75 mg by mouth daily   6/30/2017 at Unknown time             Review of Systems:   A 14 point ROS was completed and is negative other than what is stated in the HPI.          Physical Exam:   Blood pressure 110/70, temperature 97.3  F (36.3  C), temperature source Axillary, resp. rate 16, height 1.727 m (5' 8\"), weight 67.1 kg (148 lb), SpO2 92 %.  Gen: intermittently somnolent  Head: atraumatic   Eyes: EOM intact   Mouth: no pharyngeal exudate   Lymph node: not enlarged on head or neck   CV: RRR, S1 & S2, holosystolic murmur at LLB, JVP at ear level, minimal leg edema  Lungs: CTAB   Abd: soft, nontender, hypoactive BS  Skin: no rash      Tubes/Lines/Devices:   Peripheral IV 06/30/17 Right;Lateral Lower forearm (Active)   Site Assessment [facility] 6/30/2017  3:00 PM   Line Status Saline locked 6/30/2017  3:00 PM   Phlebitis Scale 0-->no symptoms 6/30/2017  3:00 PM   Infiltration Scale 0 6/30/2017  3:00 PM   Infiltration Site Treatment Method  None 6/30/2017  3:00 PM   Extravasation? No 6/30/2017  3:00 PM   Dressing Intervention New dressing  6/30/2017  3:00 PM   Number of days:0       PICC Double Lumen 04/12/17 Right Basilic (Active)   Site Assessment [facility] 6/30/2017  1:00 PM   External Cath Length (cm) 4 cm 6/1/2017  8:00 PM   Dressing Intervention Chlorhexidine " patch;Transparent 6/30/2017  1:00 PM   Extravasation? No 6/30/2017  1:00 PM   Dressing Change Due 06/30/17 6/30/2017  1:00 PM   PICC Comment LYNETTE [id number] 6/23/2017 11:48 AM   Number of days:79            Data:   Laboratory data reviewed.     Cultures:   Invalid input(s): BC     No results for input(s): CULT in the last 168 hours.    No results for input(s): URC in the last 168 hours.    Unresulted Labs Ordered in the Past 30 Days of this Admission     No orders found from 5/1/2017 to 7/1/2017.          Imaging/Studies reviewed.

## 2017-06-30 NOTE — PROGRESS NOTES
NUTRITION SERVICES - BRIEF NOTE     Received consult for low sodium diet education. Pt recently admitted to the CVICU this afternoon, inappropriate for diet education at this time.    Nutrition will follow-up with diet education as able/appropriate.    Bev Barreto RDN, LD  Pgr: [phone]

## 2017-07-01 NOTE — PROCEDURES
Procedure/Surgery Information   [facility], [place]    Bedside Procedure Note  Date of Service (when I performed the procedure): 06/30/2017    Perez Villalobos is a 76 year old male patient.  No diagnosis found.  Past Medical History:   Diagnosis Date     Acute renal failure (H)     10/2015 ARF secondary to rhabdomyolysis     Alcoholism (H)      Anemia      Benign essential HTN      BPH (benign prostatic hypertrophy)      CAD (coronary artery disease)     AWMI, stents to Cx, RCA and LAD     Chronic systolic heart failure (H)      CKD (chronic kidney disease)      Complete AV block (H)      Ischemic cardiomyopathy 10/23/2015    EF 30%     Low sodium levels      Mixed hyperlipidemia      NSTEMI (non-ST elevated myocardial infarction) (H) 10/23/2015     PAD (peripheral artery disease) (H)      Rhabdomyolysis due to statin therapy     crestor     Severe hypothyroidism 9/20/2016     VF (ventricular fibrillation) (H) 11/16/16     Temp: 97.9  F (36.6  C) Temp src: Oral BP: 120/67   Heart Rate: 83 Resp: 16 SpO2: 98 % O2 Device: Nasal cannula Oxygen Delivery: 2 LPM    Central venous catheter and pulmonary arterial catheter insertion  Date/Time: 6/30/2017 9:52 PM  Performed by: RODNEY EASTON  Authorized by: RODNEY EASTON   Consent: Verbal consent obtained. Written consent obtained.  Risks and benefits: risks, benefits and alternatives were discussed  Consent given by: patient  Patient understanding: patient states understanding of the procedure being performed  Patient consent: the patient's understanding of the procedure matches consent given  Procedure consent: procedure consent matches procedure scheduled  Relevant documents: relevant documents present and verified  Test results: test results available and properly labeled  Site marked: the operative site was marked  Imaging studies: imaging studies available  Required items: required blood products, implants, devices, and special equipment  "available  Patient identity confirmed: verbally with patient  Time out: Immediately prior to procedure a \"time out\" was called to verify the correct patient, procedure, equipment, support staff and site/side marked as required.  Indications: vascular access and central pressure monitoring  Anesthesia: local infiltration    Anesthesia:  Anesthesia: local infiltration  Local Anesthetic: lidocaine 1% without epinephrine   Anesthetic total: 3 mL  Sedation:  Patient sedated: no    Preparation: skin prepped with chlorhexidine and sterile field established  Location details: right internal jugular  Patient position: flat  Catheter type: Cordis  Pre-procedure: landmarks identified  Ultrasound guidance: yes  Number of attempts: 1  Successful placement: yes  Post-procedure: line sutured and dressing applied  Assessment: blood return through all parts  Patient tolerance: Patient tolerated the procedure well with no immediate complications  Comments: Chest xray is pending.      PA catheter was locked at 52 cm.     Pedro Valdez"

## 2017-07-01 NOTE — PLAN OF CARE
Problem: Cardiac: Heart Failure (Adult)  Goal: Signs and Symptoms of Listed Potential Problems Will be Absent or Manageable (Cardiac: Heart Failure)  Signs and symptoms of listed potential problems will be absent or manageable by discharge/transition of care (reference Cardiac: Heart Failure (Adult) CPG).   Outcome: Improving  Pt continues with plan of diuresis and LVAD w/u. Hemodynamically stable. Good response with lasix gtt. Denies pain. Up to chair. Afebrile. See MAR and flow sheet for additional information.

## 2017-07-01 NOTE — PROGRESS NOTES
07/01/17 0857   Quick Adds   Type of Visit Initial Occupational Therapy Evaluation   Living Environment   Lives With spouse   Living Arrangements house   Home Accessibility stairs to enter home;stairs within home   Number of Stairs to Enter Home 1   Number of Stairs Within Home 14   Stair Railings at Home inside, present at both sides   Transportation Available car;family or friend will provide   Living Environment Comment Pt's wife able to assist 24/7   Self-Care   Dominant Hand right   Usual Activity Tolerance moderate   Current Activity Tolerance fair   Regular Exercise no   Equipment Currently Used at Home cane, straight;grab bar;raised toilet;shower chair;walker, rolling   Activity/Exercise/Self-Care Comment Pt I in basic ADLs, wife assists for shoes and socks   Functional Level Prior   Ambulation 0-->independent   Transferring 0-->independent   Toileting 0-->independent   Bathing 0-->independent   Dressing 0-->independent   Eating 0-->independent   Communication 0-->understands/communicates without difficulty   Swallowing 0-->swallows foods/liquids without difficulty   Cognition 0 - no cognition issues reported   Fall history within last six months yes   Number of times patient has fallen within last six months 2   Which of the above functional risks had a recent onset or change? ambulation;transferring;toileting;bathing;dressing   General Information   Onset of Illness/Injury or Date of Surgery - Date 06/30/17   Referring Physician Bryon Gupta MD   Patient/Family Goals Statement Pt would like to return home   Additional Occupational Profile Info/Pertinent History of Current Problem 76 year old man with ischemic CM EF 20% on home dobutamine 5, s/p CRT-D, CKD baseline Cr 1.5, PAD, alcohol dependence- sober for last few months is admitted with ambulatory cardiogenic shock.    Precautions/Limitations fall precautions   General Observations Pt supine in bed upon arrival, wife present   General Info  Comments Activity: Ambulate with assist   Cognitive Status Examination   Orientation orientation to person, place and time   Level of Consciousness alert   Able to Follow Commands mild impairment   Personal Safety (Cognitive) mild impairment   Cognitive Comment Pt would benefit from futher cognitive screens   Visual Perception   Visual Perception Wears glasses   Visual Perception Comments Pt stating poor vision due to cateracts, unable to complete surgery due to health status   Sensory Examination   Sensory Quick Adds No deficits were identified   Pain Assessment   Patient Currently in Pain No   Integumentary/Edema   Integumentary/Edema no deficits were identifed   Range of Motion (ROM)   ROM Quick Adds No deficits were identified   Strength   Strength Comments generalized weakness noted   Muscle Tone Assessment   Muscle Tone Quick Adds No deficits were identified   Coordination   Upper Extremity Coordination No deficits were identified   Transfer Skill: Bed to Chair/Chair to Bed   Level of [place]: Bed to Chair contact guard   Physical Assist/Nonphysical Assist: Bed to Chair 1 person assist   Transfer Skill: Sit to Stand   Level of [place]: Sit/Stand contact guard   Physical Assist/Nonphysical Assist: Sit/Stand 1 person assist   Lower Body Dressing   Level of [place]: Dress Lower Body maximum assist (25% patients effort)   Physical Assist/Nonphysical Assist: Dress Lower Body 1 person assist   Instrumental Activities of Daily Living (IADL)   IADL Comments Wife able to assist with IADLs   Activities of Daily Living Analysis   Impairments Contributing to Impaired Activities of Daily Living balance impaired;cognition impaired;coordination impaired;fear and anxiety;pain;strength decreased   General Therapy Interventions   Planned Therapy Interventions ADL retraining;IADL retraining;balance training;bed mobility training;cognition;risk factor education;progressive activity/exercise;home program  "guidelines;transfer training;strengthening   Clinical Impression   Criteria for Skilled Therapeutic Interventions Met yes, treatment indicated   OT Diagnosis Decreased ADL I   Influenced by the following impairments medical status, strength, activity tolerance   Assessment of Occupational Performance 1-3 Performance Deficits   Identified Performance Deficits dressing, home management, transfers   Clinical Decision Making (Complexity) Low complexity   Therapy Frequency 5 times/wk   Predicted Duration of Therapy Intervention (days/wks) 2 weeks   Anticipated Equipment Needs at Discharge (TBD)   Anticipated Discharge Disposition Home with Assist   Risks and Benefits of Treatment have been explained. Yes   Patient, Family & other staff in agreement with plan of care Yes   [facility] TM \"6 Clicks\"   2016, Trustees of [facility], under license to [employer].  All rights reserved.   6 Clicks Short Forms Daily Activity Inpatient Short Form   [facility]  \"6 Clicks\" Daily Activity Inpatient Short Form   1. Putting on and taking off regular lower body clothing? 2 - A Lot   2. Bathing (including washing, rinsing, drying)? 3 - A Little   3. Toileting, which includes using toilet, bedpan or urinal? 3 - A Little   4. Putting on and taking off regular upper body clothing? 3 - A Little   5. Taking care of personal grooming such as brushing teeth? 4 - None   6. Eating meals? 4 - None   Daily Activity Raw Score (Score out of 24.Lower scores equate to lower levels of function) 19   Total Evaluation Time   Total Evaluation Time (Minutes) 5     "

## 2017-07-01 NOTE — PLAN OF CARE
Problem: Goal Outcome Summary  Goal: Goal Outcome Summary  OT/CR/4E: Eval completed and treatment initiated. Pt CGA bed to chair transfer. Pt limited by activity tolerance and balance.     MD TEAM-PLEASE PLACE PT/OT ORDERS IN ADDITION TO THESE CARDIAC REHAB ORDERS, pt would benefit from PT balance eval     REC: Anticipate home with assist

## 2017-07-01 NOTE — PLAN OF CARE
Problem: Goal Outcome Summary  Goal: Goal Outcome Summary  Outcome: No Change  Pt admitted today from home after abnormal lab results. Home dobutamine running on arrival, switched out and cartridge sent home with wife.      Neuro: A/O, lethargic, sleeping between cares, awakes easily. Afebrile. Reports cramping in legs.   Resp: Good sats on 2LNC, Frequent cough, clear sputum. Lungs dim in bases.   Cardiac: Paced, 85. BP: 120's/60's. Dobutamine gtt 7.5mcg/kg/min. 80mg bolus IV lasix given, Lasix gtt 20mg/h. Oral metolazone given. K: 6.1, 10 unit insulin and 50mL D50 given IV. K recheck sent to lab. Na: 120, MD's aware. Lactic 3.0. PICC line from home in place.   : UOP: 200mL since 1330. Cr: 3.39.   GI: Pt reports RLQ pain and abdominal fullness with frequent small amounts of diarrhea at home. NPO for now until fluid status stabilizes.   Skin: Skin pale, dusky, intact. Coccyx red/blanchable.      Plan: Cards 2 to float swan at bedside tonight. LVAD workup ongoing.

## 2017-07-01 NOTE — PLAN OF CARE
Assessment: Hypervolemia, heart failure.   Neuro: A&O x4, very pleasant and cooperative with cares. Answers questions appropriately, able to make needs known, bed alarm placed as patient uses call light intermittently. PERRLA, pupils 3 mm b/l, APRK with SBA to use BSU. C/O LLE leg cramping/pain, PRN Tylenol and Oxycodone given x1. No fevers overnight.   Cardiac: 100% paced, HR 80, MAPs 80s-100s. Murmur present. Pulses: +2 b/l radial and pedal. Edema: +3 b/l LE.   Hemodynamics: CVP 18/18/16, PA 40s/20s, SvO2 36/51/60, CO 2.5/3.3/5, CI 1.4/1.8/2.8, SVR 2268/1720/1080, /145/95, SV 31/41//63.  Respiratory: LS coarse with coarse crackles in RML/RLL/LLL. Good, strong cough. SpO2 high 90s on 3L NC.   GI: Abd distended, +BS, passing flatus. PO orange juice given for hypoglycemia (BG 55).   : Good UOP, 1250 mL total.   POC: [place] placed overnight, dopamine started at 2.5 mcg/kg/min. C2 team paged re: low SvO2 (36) and K recheck of 3.6. Dopamine gtt initiated, no replacement at this time d/t creatinine levels.

## 2017-07-02 NOTE — PLAN OF CARE
Problem: Goal Outcome Summary  Goal: Goal Outcome Summary     4E: Recommend discharge home with OP Cardiac Rehab to increase pt's functional endurance for ADL/IADLs. Pt able to complete transfers and functional mobility ~200ft with CGA using IV pole. Pt's HR 85, O2 sats remained >90% on 2L, pre /59, post /62.

## 2017-07-02 NOTE — PLAN OF CARE
Problem: Cardiac: Heart Failure (Adult)  Goal: Signs and Symptoms of Listed Potential Problems Will be Absent or Manageable (Cardiac: Heart Failure)  Signs and symptoms of listed potential problems will be absent or manageable by discharge/transition of care (reference Cardiac: Heart Failure (Adult) CPG).   Outcome: Improving  Pt hemodynamically stable throughout shift. Gtt's unchanged d/t good response yesterday. SVO2 trending down throughout day from 65, 46, 44. MD notified. Up for walk in [place]. Denies pain. Afebrile. See MAR and flow sheet for additional information.

## 2017-07-02 NOTE — PLAN OF CARE
Problem: Goal Outcome Summary  Goal: Goal Outcome Summary  Outcome: Improving  Neuro-Pleasant and orientated x 4. PARK equally. Gait slightly unsteady  CVS-Continues on dopa and dobutamine drips. PA 40-50/20. CO 3.5-4.4, CI 2.Paced rhythm with occasional PVC's. Afebrile. Vital signs stable  Resp-Remains on 2 L/nc with O2 sats > 95%. Lung fields clear/coarse. Cough strong, non productive.  GI-Abdomin remains firm and distended. Passing gas. No BM. Denies GI upset.  -Remains on lasix drip at 20 mg/hr. Urine yellow. Voiding in adequate amounts.  Weight down 3 kg. Plan-Continue to monitor closely, updating team and family with changes. K+ replaced.

## 2017-07-02 NOTE — PROGRESS NOTES
"    CARDIOLOGY PROGRESS NOTE    Name: Perez Villalobos MRN: [id number]     Age: 76 year old    Date of admission: 6/30/2017 YOB: 1940            Subjective / Interval Events:     - no acute events overnight  - no complaints or concerns  - increasing hydralazine to 100mg PO QID          Allergies:     Allergies   Allergen Reactions     Lisinopril Other (See Comments)     Angioedema     Augmentin Other (See Comments)     Per pt, \"froze him, affected his legs\"     Crestor [Rosuvastatin] Other (See Comments)     rhabdomyolysis             Vitals and Exam:   Temp:  [97.4  F (36.3  C)-98.3  F (36.8  C)] 98  F (36.7  C)  Heart Rate:  [80-92] 92  Resp:  [11-25] 20  BP: ()/(59-83) 116/63  SpO2:  [93 %-100 %] 95 %    Vitals:    06/30/17 1323 07/01/17 0500 07/02/17 0500   Weight: 67.1 kg (148 lb) 65 kg (143 lb 4.8 oz) 62 kg (136 lb 11 oz)        I/O last 3 completed shifts:  In: 1496.19 [P.O.:1040; I.V.:456.19]  Out: 2725 [Urine:2725]    Vent Settings:  Resp: 20 SpO2: 95 % O2 Device: Nasal cannula Oxygen Delivery: 2 LPM  Resp: 20    General Appearance:   Comfortable, no pain or respiratory distress     Cardiovascular:     Normal neck veins  Normal and symmetrical radial, femoral and carotid pulses     Pulm:    Symmetrical chest shape and movements with each breath     Abd/GI:    Non-distended, soft, normal bowel sounds     Extremities: No CCE    Labs:  ABG:  Lab Results   Component Value Date    PH 7.50 (H) 06/10/2017    PH 7.56 (H) 10/02/2016    PH 7.57 (H) 02/10/2016    PO2 91 06/10/2017    PO2 67 (L) 10/02/2016    PO2 85 02/10/2016    SAT 28 03/23/2017    SAT 35 06/23/2016    PCO2 37 06/10/2017    PCO2 27 (L) 10/02/2016    PCO2 33 (L) 02/10/2016    HCO3 28 06/10/2017    HCO3 24 10/02/2016    HCO3 30 (H) 02/10/2016    HERMINIA 4.7 06/10/2017    HERMINIA 1.6 10/02/2016    HERMINIA 7.0 02/10/2016          Lab Results   Component Value Date     07/02/2017     07/02/2017     07/01/2017    Lab Results "   Component Value Date    CHLORIDE 84 07/02/2017    CHLORIDE 84 07/02/2017    CHLORIDE 82 07/01/2017    Lab Results   Component Value Date    BUN 94 07/02/2017    BUN 97 07/02/2017     07/01/2017      Lab Results   Component Value Date    POTASSIUM 3.3 07/02/2017    POTASSIUM 3.4 07/02/2017    POTASSIUM 3.0 07/01/2017    Lab Results   Component Value Date    CO2 33 07/02/2017    CO2 31 07/02/2017    CO2 31 07/01/2017    Lab Results   Component Value Date    CR 2.77 07/02/2017    CR 2.89 07/02/2017    CR 3.09 07/01/2017        Lab Results   Component Value Date    WBC 7.8 07/02/2017    WBC 8.1 07/01/2017    WBC 8.1 06/30/2017    HGB 9.8 (L) 07/02/2017    HGB 9.9 (L) 07/01/2017    HGB 10.6 (L) 06/30/2017    HCT 29.7 (L) 07/02/2017    HCT 29.1 (L) 07/01/2017    HCT 31.9 (L) 06/30/2017    MCV 90 07/02/2017    MCV 89 07/01/2017    MCV 90 06/30/2017     07/02/2017     07/01/2017     06/30/2017     Lab Results   Component Value Date     (H) 07/02/2017     (H) 07/02/2017     (H) 07/01/2017     (H) 07/02/2017     (H) 07/02/2017     (H) 07/01/2017    ALKPHOS 124 07/02/2017    ALKPHOS 115 07/02/2017    ALKPHOS 128 07/01/2017    BILITOTAL 1.5 (H) 07/02/2017    BILITOTAL 1.8 (H) 07/02/2017    BILITOTAL 1.8 (H) 07/01/2017    HONEY 24 02/11/2016     Lab Results   Component Value Date    INR 1.39 (H) 06/30/2017    INR 1.43 (H) 06/30/2017    INR 1.14 06/04/2017            Medications:   Drips: dopamine 2.5, dobutamine 7.5, furosemide 20mg/hr       hydrALAZINE  100 mg Oral 4x Daily     aspirin EC  81 mg Oral Daily     calcitRIOL  0.25 mcg Oral Daily     clopidogrel  75 mg Oral Daily     cyanocobalamin  1,000 mcg Oral Daily     ferrous sulfate  325 mg Oral Daily     levothyroxine  150 mcg Oral Daily     tolterodine  4 mg Oral Daily     heparin  5,000 Units Subcutaneous Q8H     isosorbide dinitrate  30 mg Oral TID            Imaging:   July 2, 2017   IMPRESSION:  1.   Right upper extremity PICC tip projects over low SVC.  Additional  support lines/tubes as above.  2.  Small bilateral pleural effusions and associated opacities are not  significantly changed.           Assessment and Plan:   76 year old man with ischemic CM EF 20% on home dobutamine 5, s/p CRT-D, CKD baseline Cr 1.5, PAD, alcohol dependence- sober for last few months is admitted with ambulatory cardiogenic shock.      # Ambulatory cardiogenic shock  # Oliguric acute renal failure on background of CKD baseline Cr 1.5  # Hyperkalemia  # Uremia  # Somnolence- from cardiogenic shock and uremia  # Hepatocellular injury- more likely hepatic congestion, less likely shock liver        Plan  -Lasix 20mg/hr drip  -continue dobutamine 7.5, dopamine 2.5  -no indications for CVVH/HD  -continue home hydralazine and isordil  -continue home asa 81, plavix 75     Full Code. Discussed with patient  DVT prophylaxis: subq heparin 5000 units TID    This patient was seen and examined with the attending physician.    Jamal Clark MD  PGY5 Cardiology  Pager: [phone]  July 2, 2017

## 2017-07-03 NOTE — PROGRESS NOTES
"CLINICAL NUTRITION SERVICES - ASSESSMENT NOTE     Nutrition Prescription    RECOMMENDATIONS FOR MDs/PROVIDERS TO ORDER:  -Given EF <30%, please start thiamin supplementation  -Recommend initiating multivitamin with minerals in the setting of severe malnutrition    Malnutrition Status:    Severe malnutrition in the context of acute on chronic illness    Recommendations already ordered by Registered Dietitian (RD):  Calorie counts    Future/Additional Recommendations:  -If EN is indicated, would recommend: TwoCal HN @ 50 ml/hr (1200 ml/day) to provide 2400 kcals (39 Kacls/kg/day), 101 g PRO (1.6 gms/kg/day), 840 ml free H2O, 263 g CHO and 6 g Fiber daily per dosing weight 62 kg.     REASON FOR ASSESSMENT  Perez Villalobos is a/an 76 year old male assessed by the dietitian for Admission Nutrition Risk Screen for reduced oral intake over the last month    NUTRITION HISTORY  Per pt and his wife, pt has had gradually reducing PO intake over the past couple of months. This is r/t lack of appetite, weakness, and difficulty chewing. Pt reports fluctuating weight r/t fluid status but wife reports massive muscle loss over the past few months.    Per H&P, pt was experiencing nausea, abd distention and pain, vomiting, and diarrhea PTA.    Offered 2 g Na Ed to pt per consult received on 6/30. Pt and wife declined formal education as pt has received low sodium ed many times and is well-versed in the diet.    CURRENT NUTRITION ORDERS  Diet: 2 g Sodium and 1500 mL Fluid Restriction, Ensure Plus between meals.  Intake/Tolerance: Since admission pt has eaten 75% of meals with fair appetite. Pt reports liking Ensure Plus in all flavors. Pt reports difficulties with chewing have resolved.    LABS  7/3:  Na = 127 (L)  K = 3.3 (L)  BUN = 92 (H)  Mg = 2.6 (H)    MEDICATIONS  Medications reviewed    ANTHROPOMETRICS  Height: 172.7 cm (5' 8\")  Most Recent Weight: 62 kg (136 lb 11 oz)    IBW: 70 kg  BMI: 21; Normal BMI  Weight History:   Wt " Readings from Last 10 Encounters:   07/03/17 62 kg (136 lb 11 oz)   06/27/17 65.3 kg (144 lb)   06/24/17 67 kg (147 lb 12.8 oz)   06/16/17 69.3 kg (152 lb 12.5 oz)   05/26/17 65.7 kg (144 lb 12.8 oz)   05/19/17 66.5 kg (146 lb 9.6 oz)   05/12/17 65.8 kg (145 lb)   05/04/17 66.8 kg (147 lb 4.8 oz)   05/01/17 65 kg (143 lb 3.2 oz)   04/17/17 65.1 kg (143 lb 8 oz)   Weight fluctuations r/t fluid status  Dosing Weight: 62 kg (actual based on lowest admit wt of 62 kg on 7/2, 89% of IBW 70 kg)    ASSESSED NUTRITION NEEDS  Estimated Energy Needs: 0526-5341 kcals/day (35 - 40 kcals/kg)  Justification: Increased needs and Repletion  Estimated Protein Needs: + grams protein/day (1.5 - 2+ grams of pro/kg)  Justification: Hypercatabolism with acute illness, Increased needs and Repletion  Estimated Fluid Needs: (1 mL/kcal)   Justification: Per provider pending fluid status    PHYSICAL FINDINGS  See malnutrition section below.  Poor skin turgor     MALNUTRITION  % Intake: </=75% for >/= 1 month (severe)  % Weight Loss: Weight loss does not meet criteria  Subcutaneous Fat Loss: Facial region, Upper arm, Lower arm and Thoracic/intercostal:  Moderate-Severe  Muscle Loss: Temporal, Facial & jaw region, Thoracic region (clavicle, acromium bone, deltoid, trapezius, pectoral), Upper arm (bicep, tricep), Lower arm  (forearm), Dorsal hand, Patellar region and Posterior calf: Moderate-Severe  Fluid Accumulation/Edema: Mild  Malnutrition Diagnosis: Severe malnutrition in the context of acute on chronic illness    NUTRITION DIAGNOSIS  Inadequate protein-energy intake related to lack of appetite, weakness, and intermittent difficulty chewing as evidenced by pt report, moderate-severe subcutaneous fat and muscle wasting.      INTERVENTIONS  Implementation  Nutrition education for nutrition relationship to health/disease   Enteral Nutrition - Recs  Multivitamin/mineral supplement therapy   Calorie Count    Goals  Patient to consume  % of nutritionally adequate meal trays TID, or the equivalent with supplements/snacks.     Monitoring/Evaluation  Progress toward goals will be monitored and evaluated per protocol.    Bev Barreto RDN, LD  Pgr: [phone]

## 2017-07-03 NOTE — PLAN OF CARE
Problem: Goal Outcome Summary  Goal: Goal Outcome Summary  Outcome: No Change  No major events this shift. Ambulated in hallway twice today, transferred well from chair to bed throughout the day with staff assist of one. Standing and voiding in urinal, using call light but can 'quick'/impulsive to get up. Lasix gtt discontinued and bumex gtt started today. Good appetite, tolerating diet, continue with fluid restriction. SWAN at 58, xray obtained this morning. Patient has not had a bowel movement in a few days, prune juice with lunch per his request, did not want additional bowel meds at this point. Potassium was low, MD ordered replacement      Plan: Continue with HOWARD numbers every 4 hours, monitor strict I & Os.

## 2017-07-03 NOTE — PROGRESS NOTES
Care Coordinator Progress Note     Admission Date/Time:  6/30/2017  Attending MD:  Alfa Grimm MD     Data  Chart reviewed, discussed with interdisciplinary team.   Patient was admitted for: Acute on chronic systolic (congestive) heart failure (H).  Cardiogenic shock.    Concerns with insurance coverage for discharge needs: None.  Current Living Situation: Patient lives with spouse.  Support System: Supportive and Involved  Services Involved: Home Infusion- [facility] provided IV dobutamine.  Transportation: Family or Friend will provide  Barriers to Discharge: chronically ill    Coordination of Care and Referrals: Provided patient/family with options for Home Infusion.        Assessment  Pt is in ICU with swan, dobutamine, dopamine and Lasix drips.    Pt is well known to me from previous hospitalization.  Visited pt and spouse to introduce self again and for support.  CC introduced self again and provided contact info.  Pt and spouse stated the team have been updating them well about the plan of care.  Pt was on home dobutamine and provided by [facility].  Pt spouse stated pt is still ind. with mobility and most of his cares but lately he has been very sleepy most of the time.  Pt was sleeping on/off at time of my visit too.  Pt spouse stated pt dobutamine has been increased and not sure if he will be d/c to home anytime soon.  CC informed pt spouse that CC will cont to follow and assist with any CC assistance need.  Pt spouse agreed.  Pt spouse agreed to resume the service with the same home infusion company, if pt d/c to home with IV dobutamine.       Plan  Anticipated Discharge Date:  TBD.  Anticipated Discharge Plan:   TBD.  D/C needs are not clear at this time.  Resumption home infusion order need to be placed on the d/c form once needs are known.  CC will cont to follow plan of care.    Shaneka Jones RN, BSN  4A and 4E/ ICU  Care Coordinator  Phone: [phone]  Pager: [phone]

## 2017-07-03 NOTE — PROGRESS NOTES
"    CARDIOLOGY PROGRESS NOTE    Name: Perez Villalobos MRN: [id number]     Age: 76 year old    Date of admission: 6/30/2017 YOB: 1940            Subjective / Interval Events:     - no acute events overnight  - no complaints or concerns  - plan to continue diuresis with intermittent diuril and change from furosemide 20mg/hr to bumex gtt at 1mg/hr   - increase isordil to 60mg TID         Allergies:     Allergies   Allergen Reactions     Lisinopril Other (See Comments)     Angioedema     Augmentin Other (See Comments)     Per pt, \"froze him, affected his legs\"     Crestor [Rosuvastatin] Other (See Comments)     rhabdomyolysis             Vitals and Exam:   Temp:  [96.4  F (35.8  C)-98.3  F (36.8  C)] 97.7  F (36.5  C)  Heart Rate:  [80-96] 86  Resp:  [10-37] 10  BP: (107-122)/(59-83) 122/82  SpO2:  [93 %-100 %] 96 %    Vitals:    07/01/17 0500 07/02/17 0500 07/03/17 0300   Weight: 65 kg (143 lb 4.8 oz) 62 kg (136 lb 11 oz) 62 kg (136 lb 11 oz)        I/O last 3 completed shifts:  In: 2005.2 [P.O.:1590; I.V.:415.2]  Out: 2325 [Urine:2325]    Vent Settings:  Resp: 10 SpO2: 96 % O2 Device: None (Room air) Oxygen Delivery: 2 LPM  Resp: 10    General Appearance:   Comfortable, no pain or respiratory distress     Cardiovascular:     Normal neck veins  Normal and symmetrical radial, femoral and carotid pulses     Pulm:    Symmetrical chest shape and movements with each breath     Abd/GI:    Non-distended, soft, normal bowel sounds     Extremities: No CCE    Labs:  ABG:  Lab Results   Component Value Date    PH 7.50 (H) 06/10/2017    PH 7.56 (H) 10/02/2016    PH 7.57 (H) 02/10/2016    PO2 91 06/10/2017    PO2 67 (L) 10/02/2016    PO2 85 02/10/2016    SAT 28 03/23/2017    SAT 35 06/23/2016    PCO2 37 06/10/2017    PCO2 27 (L) 10/02/2016    PCO2 33 (L) 02/10/2016    HCO3 28 06/10/2017    HCO3 24 10/02/2016    HCO3 30 (H) 02/10/2016    HERMINIA 4.7 06/10/2017    HERMINIA 1.6 10/02/2016    HERMINIA 7.0 02/10/2016          Lab " Results   Component Value Date     07/02/2017     07/02/2017     07/01/2017    Lab Results   Component Value Date    CHLORIDE 84 07/02/2017    CHLORIDE 84 07/02/2017    CHLORIDE 82 07/01/2017    Lab Results   Component Value Date    BUN 94 07/02/2017    BUN 97 07/02/2017     07/01/2017      Lab Results   Component Value Date    POTASSIUM 3.3 07/02/2017    POTASSIUM 3.4 07/02/2017    POTASSIUM 3.0 07/01/2017    Lab Results   Component Value Date    CO2 33 07/02/2017    CO2 31 07/02/2017    CO2 31 07/01/2017    Lab Results   Component Value Date    CR 2.77 07/02/2017    CR 2.89 07/02/2017    CR 3.09 07/01/2017        Lab Results   Component Value Date    WBC 7.4 07/03/2017    WBC 7.8 07/02/2017    WBC 8.1 07/01/2017    HGB 9.2 (L) 07/03/2017    HGB 9.8 (L) 07/02/2017    HGB 9.9 (L) 07/01/2017    HCT 27.9 (L) 07/03/2017    HCT 29.7 (L) 07/02/2017    HCT 29.1 (L) 07/01/2017    MCV 89 07/03/2017    MCV 90 07/02/2017    MCV 89 07/01/2017     (L) 07/03/2017     07/02/2017     07/01/2017     Lab Results   Component Value Date     (H) 07/03/2017     (H) 07/02/2017     (H) 07/02/2017     (H) 07/03/2017     (H) 07/02/2017     (H) 07/02/2017    ALKPHOS 106 07/03/2017    ALKPHOS 124 07/02/2017    ALKPHOS 115 07/02/2017    BILITOTAL 1.8 (H) 07/03/2017    BILITOTAL 1.5 (H) 07/02/2017    BILITOTAL 1.8 (H) 07/02/2017    HONEY 24 02/11/2016     Lab Results   Component Value Date    INR 1.39 (H) 06/30/2017    INR 1.43 (H) 06/30/2017    INR 1.14 06/04/2017            Medications:   Drips: dopamine 2.5, dobutamine 7.5, furosemide 20mg/hr       hydrALAZINE  100 mg Oral 4x Daily     aspirin EC  81 mg Oral Daily     calcitRIOL  0.25 mcg Oral Daily     clopidogrel  75 mg Oral Daily     cyanocobalamin  1,000 mcg Oral Daily     ferrous sulfate  325 mg Oral Daily     levothyroxine  150 mcg Oral Daily     tolterodine  4 mg Oral Daily     heparin  5,000 Units  Subcutaneous Q8H     isosorbide dinitrate  30 mg Oral TID            Imaging:   July 2, 2017   IMPRESSION:  1.  Right upper extremity PICC tip projects over low SVC.  Additional  support lines/tubes as above.  2.  Small bilateral pleural effusions and associated opacities are not  significantly changed.           Assessment and Plan:   76 year old man with ischemic CM EF 20% on home dobutamine 5, s/p CRT-D, CKD baseline Cr 1.5, PAD, alcohol dependence- sober for last few months is admitted with ambulatory cardiogenic shock.      # Ambulatory cardiogenic shock  # Oliguric acute renal failure on background of CKD baseline Cr 1.5  # Hyperkalemia  # Uremia  # Somnolence- from cardiogenic shock and uremia  # Hepatocellular injury- more likely hepatic congestion, less likely shock liver    Plan  -Bumex 1mg/hr   -continue dobutamine 7.5, dopamine 2.5  -no indications for CVVH/HD  -continue home hydralazine and isordil  -continue home asa 81, plavix 75     Full Code. Discussed with patient  DVT prophylaxis: subq heparin 5000 units TID    This patient was seen and examined with the attending physician.    Jamal Clark MD  PGY5 Cardiology  Pager: [phone]  July 3, 2017

## 2017-07-03 NOTE — PLAN OF CARE
Problem: Goal Outcome Summary  Goal: Goal Outcome Summary  Outcome: No Change  Pt hemodynamically stable over NOC. Upon assessment at [facility], author noted that swan was at 45cm (previously 52); locks still in place. Cards 2 team notified, order for stat CXR to visualize placement of swan. Upon reading CXR, cards 2 fellow attempted to advance PA without success. Order placed for fluoro. [place] successfully floated with help of fluoro. [place] now at 58cm. Pt very restless throughout NOC. Helped to get OOB to chair multiple times as well as up in hallway for walk x1. Able to get small periods of rest throughout NOC. Continuing to monitor.    Problem: Cardiac: Heart Failure (Adult)  Goal: Signs and Symptoms of Listed Potential Problems Will be Absent or Manageable (Cardiac: Heart Failure)  Signs and symptoms of listed potential problems will be absent or manageable by discharge/transition of care (reference Cardiac: Heart Failure (Adult) CPG).     07/03/17 0400   Cardiac: Heart Failure   Problems Assessed (Heart Failure) all   Problems Present (Heart Failure) cardiac pump dysfunction;decreased quality of life;fluid/electrolyte imbalance;functional decline/self-care deficit;situational response

## 2017-07-03 NOTE — PROGRESS NOTES
Received consult for suspected pressure injury on right upper back. Assessed the area and noted approximately   3 x 2.5 x 0.0cm diffuse maroon brusie . Currently covered with Mepilex for protection. No pressure injury detected. Able to shift weight from side to side. He is up in the chair. Educated pt on pressure injury, risk factors, and preventive measures. Verbalized understanding.  P. Continue to follow pressure ulcer prevention protocol. Cover the area with Mepilex for protection and change as needed. No further visit planned, will sign off.  Face to face time-10 mins

## 2017-07-04 NOTE — PROGRESS NOTES
"    CARDIOLOGY PROGRESS NOTE    Name: Perez Villalobos MRN: [id number]     Age: 76 year old    Date of admission: 6/30/2017 YOB: 1940            Subjective / Interval Events:     - decompensated overnight with SVO2 29, requiring incr dopamine to 5 in addition to his dobutamine at 7.5  - CI remained low at <1.5 and so underwent IABP placement  - resultant SVO2 55 and he feels much better overall in terms of clearing of mentation          Allergies:     Allergies   Allergen Reactions     Lisinopril Other (See Comments)     Angioedema     Augmentin Other (See Comments)     Per pt, \"froze him, affected his legs\"     Crestor [Rosuvastatin] Other (See Comments)     rhabdomyolysis             Vitals and Exam:   Temp:  [97.6  F (36.4  C)-98  F (36.7  C)] 97.6  F (36.4  C)  Heart Rate:  [] 82  Resp:  [13-25] 20  BP: (102-131)/(59-84) 130/70  FiO2 (%):  [2 %] 2 %  SpO2:  [90 %-100 %] 100 %    Vitals:    07/02/17 0500 07/03/17 0300 07/04/17 0400   Weight: 62 kg (136 lb 11 oz) 62 kg (136 lb 11 oz) 63.4 kg (139 lb 12.4 oz)        I/O last 3 completed shifts:  In: 1705.59 [P.O.:1025; I.V.:680.59]  Out: 2955 [Urine:2955]    Vent Settings:  Resp: 20 SpO2: 100 % O2 Device: Nasal cannula    FiO2 (%): 2 %  Resp: 20    General Appearance:   Comfortable, no pain or respiratory distress     Cardiovascular:     Normal neck veins  Normal and symmetrical radial, femoral and carotid pulses     Pulm:    Symmetrical chest shape and movements with each breath     Abd/GI:    Non-distended, soft, normal bowel sounds     Extremities: No CCE    Labs:  ABG:  Lab Results   Component Value Date    PH 7.56 (H) 07/04/2017    PH 7.50 (H) 06/10/2017    PH 7.56 (H) 10/02/2016    PO2 64 (L) 07/04/2017    PO2 91 06/10/2017    PO2 67 (L) 10/02/2016    SAT 28 03/23/2017    SAT 35 06/23/2016    PCO2 35 07/04/2017    PCO2 37 06/10/2017    PCO2 27 (L) 10/02/2016    HCO3 32 (H) 07/04/2017    HCO3 28 06/10/2017    HCO3 24 10/02/2016    HERMINIA 8.8 " 07/04/2017    HERMINIA 4.7 06/10/2017    HERMINIA 1.6 10/02/2016          Lab Results   Component Value Date     07/02/2017     07/02/2017     07/01/2017    Lab Results   Component Value Date    CHLORIDE 84 07/02/2017    CHLORIDE 84 07/02/2017    CHLORIDE 82 07/01/2017    Lab Results   Component Value Date    BUN 94 07/02/2017    BUN 97 07/02/2017     07/01/2017      Lab Results   Component Value Date    POTASSIUM 3.3 07/02/2017    POTASSIUM 3.4 07/02/2017    POTASSIUM 3.0 07/01/2017    Lab Results   Component Value Date    CO2 33 07/02/2017    CO2 31 07/02/2017    CO2 31 07/01/2017    Lab Results   Component Value Date    CR 2.77 07/02/2017    CR 2.89 07/02/2017    CR 3.09 07/01/2017        Lab Results   Component Value Date    WBC 6.3 07/04/2017    WBC 8.6 07/04/2017    WBC 7.4 07/03/2017    HGB 9.4 (L) 07/04/2017    HGB 9.5 (L) 07/04/2017    HGB 9.2 (L) 07/03/2017    HCT 28.2 (L) 07/04/2017    HCT 27.9 (L) 07/04/2017    HCT 27.9 (L) 07/03/2017    MCV 89 07/04/2017    MCV 89 07/04/2017    MCV 89 07/03/2017     (L) 07/04/2017     07/04/2017     (L) 07/03/2017     Lab Results   Component Value Date    AST 70 (H) 07/04/2017    AST 94 (H) 07/03/2017     (H) 07/03/2017     (H) 07/04/2017     (H) 07/03/2017     (H) 07/03/2017    ALKPHOS 102 07/04/2017    ALKPHOS 115 07/03/2017    ALKPHOS 106 07/03/2017    BILITOTAL 1.7 (H) 07/04/2017    BILITOTAL 1.5 (H) 07/03/2017    BILITOTAL 1.8 (H) 07/03/2017    HONEY 24 02/11/2016     Lab Results   Component Value Date    INR 1.52 (H) 07/04/2017    INR 1.39 (H) 06/30/2017    INR 1.43 (H) 06/30/2017            Medications:   Drips: dopamine 2.5, dobutamine 7.5, furosemide 20mg/hr       isosorbide dinitrate  60 mg Oral TID     hydrALAZINE  100 mg Oral 4x Daily     aspirin EC  81 mg Oral Daily     calcitRIOL  0.25 mcg Oral Daily     clopidogrel  75 mg Oral Daily     cyanocobalamin  1,000 mcg Oral Daily     ferrous sulfate   325 mg Oral Daily     levothyroxine  150 mcg Oral Daily     tolterodine  4 mg Oral Daily            Imaging:   July 2, 2017   IMPRESSION:  1.  Right upper extremity PICC tip projects over low SVC.  Additional  support lines/tubes as above.  2.  Small bilateral pleural effusions and associated opacities are not  significantly changed.           Assessment and Plan:   76 year old man with ischemic CM EF 20% on home dobutamine 5, s/p CRT-D, CKD baseline Cr 1.5, PAD, alcohol dependence- sober for last few months is admitted with cardiogenic shock.      # Cardiogenic shock requiring IABP placement (7/4/2017)  # Oliguric acute renal failure on background of CKD baseline Cr 1.5  # Hyperkalemia  # Uremia  # Somnolence- from cardiogenic shock and uremia  # Hepatocellular injury- more likely hepatic congestion, less likely shock liver    Plan  -Bumex 1mg/hr   -continue dobutamine 7.5, dopamine 2.5  - continue IABP at 1:1  - heparin gtt  -continue home hydralazine and isordil  -continue home asa 81, plavix 75    - will need to discuss with VAD team regarding his candidacy for an LVAD given his decompensation      Full Code. Discussed with patient  DVT prophylaxis: heparin gtt    This patient was seen and examined with the attending physician.    Jamal Clark MD  PGY5 Cardiology  Pager: [phone]  July 4, 2017

## 2017-07-04 NOTE — PLAN OF CARE
Problem: Goal Outcome Summary  Goal: Goal Outcome Summary  Outcome: Declining  Pt's hemodynamics worsening over NOC. Numbers reported to cards 2 team; orders to increase dopamine to 5. Numbers due again at 0800. Potassium replaced throughout NOC. Pt rested well in bed most of NOC. Continuing to monitor.    Problem: Cardiac: Heart Failure (Adult)  Goal: Signs and Symptoms of Listed Potential Problems Will be Absent or Manageable (Cardiac: Heart Failure)  Signs and symptoms of listed potential problems will be absent or manageable by discharge/transition of care (reference Cardiac: Heart Failure (Adult) CPG).     07/04/17 0400   Cardiac: Heart Failure   Problems Assessed (Heart Failure) all   Problems Present (Heart Failure) cardiac pump dysfunction;decreased quality of life;fluid/electrolyte imbalance;situational response;functional decline/self-care deficit

## 2017-07-04 NOTE — PROGRESS NOTES
"[facility], [place]  Procedure Note          Intra-Aortic Balloon Pump Insertion:       Perez Villalobos  MRN# [id number]   . Indication: Cardiogenic shock           Procedure performed: IABP catheter insertion    Location: Heart Cath Lab   Catheter size: 7.5fr   Inserted: RFA   Catheter placed: By Dr Christian   Complications:: none   Assist initiated: 1:1   Percent augmentation: 100%   Timing adjusted: :\"Adjusted to achieve optimal assist\"   Verification of position: By fluro   Comments: Tolerated well      Recorded by Daryl Bonner  "

## 2017-07-04 NOTE — PLAN OF CARE
Problem: Goal Outcome Summary  Goal: Goal Outcome Summary     OT-4e: Cx- pt not medically appropriate for therapy this AM upon attempt.

## 2017-07-04 NOTE — PLAN OF CARE
Problem: Goal Outcome Summary  Goal: Goal Outcome Summary  OT: 4E: Pt ambulated in room and in griggs ~300 ft w/ SBA pushing IV w/ RN present throughout. Pt's BP prior to ambulation 119/63 and post ambulation 99/65. OT educating pt on signs/symptoms of exercise intolerance to monitor throughout. Pt agreeable to education and training. Pt's  to high 110s throughout ambulation and asymptomatic.   Recommend discharge home with OP Cardiac Rehab

## 2017-07-05 NOTE — PLAN OF CARE
Problem: Discharge Planning  Goal: Discharge Planning (Adult, OB, Behavioral, Peds)  Outcome: Improving  Patient is feeling better since IABP inserted this am. SVO2is now 54 with Cardiac index 2.2. IABP site was oozing after patient arrived on unit after insertion but stopped, Site cleaned up and redressed. Patient drank one ensure, only intake for the day.     Aline Carlos

## 2017-07-05 NOTE — PROGRESS NOTES
Calorie Counts  Intake recorded for: 7/4  Kcals: 0  Protein: 0g  # Meals Recorded: 2 meals ordered from kitchen, no intake recorded.   # Supplements Recorded: no intake recorded.

## 2017-07-05 NOTE — PLAN OF CARE
Problem: Goal Outcome Summary  Goal: Goal Outcome Summary  PT 4E: Holding - Pt now with IABP with groin catheter. As pt on bedrest restrictions, will hold PT service at this time until medically appropriate for OOB activity. OT to continue following for ADL training and in bed ROM/strengthening.

## 2017-07-05 NOTE — PLAN OF CARE
Problem: Goal Outcome Summary  Goal: Goal Outcome Summary  PT 4E: Cx, pt at procedure, getting balloon pump and on strict bedrest per RN.

## 2017-07-05 NOTE — PROGRESS NOTES
"    CARDIOLOGY PROGRESS NOTE    Name: Perez Villalobos MRN: [id number]     Age: 76 year old    Date of admission: 6/30/2017 YOB: 1940            Subjective / Interval Events:     - decompensated overnight with SVO2 decr to 40s, CO decr to 3, CI 1.8 and so dopamine was incr to 6 from 5, SVO2 incr to 56  - episode of respiratory distress this AM with decr consciousness approx 20 mins after PRN oxycodone, improved with narcan  - had hemoptysis this AM, large clots, Pulmonology consulted, appreciate assistance. Recommended a CT chest non-con and and US of extremities          Allergies:     Allergies   Allergen Reactions     Lisinopril Other (See Comments)     Angioedema     Augmentin Other (See Comments)     Per pt, \"froze him, affected his legs\"     Crestor [Rosuvastatin] Other (See Comments)     rhabdomyolysis             Vitals and Exam:   Temp:  [97.1  F (36.2  C)-98.4  F (36.9  C)] 97.1  F (36.2  C)  Heart Rate:  [78-94] 87  Resp:  [9-29] 11  BP: ()/(58-89) 112/79  FiO2 (%):  [2 %] 2 %  SpO2:  [97 %-100 %] 99 %    Vitals:    07/03/17 0300 07/04/17 0400 07/05/17 0400   Weight: 62 kg (136 lb 11 oz) 63.4 kg (139 lb 12.4 oz) 61.2 kg (134 lb 14.7 oz)        I/O last 3 completed shifts:  In: 1601.88 [P.O.:600; I.V.:1001.88]  Out: 3340 [Urine:3340]    Vent Settings:  Resp: 11 SpO2: 99 % O2 Device: Oxi Plus Oxygen Delivery: 5 LPM  FiO2 (%): 2 %  Resp: 11    General Appearance:   Alert and oriented      Cardiovascular:     JVP elevated to angle of mandible  Tachycardic but regular rhythm     Pulm:    Coarse bilat with bloody sputum (not frothy)     Abd/GI:    Slightly distended but non-tender      Extremities: No CCE    Labs:  ABG:  Lab Results   Component Value Date    PH 7.52 (H) 07/05/2017    PH 7.56 (H) 07/04/2017    PH 7.50 (H) 06/10/2017    PO2 150 (H) 07/05/2017    PO2 64 (L) 07/04/2017    PO2 91 06/10/2017    SAT 28 03/23/2017    SAT 35 06/23/2016    PCO2 43 07/05/2017    PCO2 35 07/04/2017    " PCO2 37 06/10/2017    HCO3 35 (H) 07/05/2017    HCO3 32 (H) 07/04/2017    HCO3 28 06/10/2017    HERMINIA 11.4 07/05/2017    HERMINIA 8.8 07/04/2017    HERMINIA 4.7 06/10/2017          Lab Results   Component Value Date     07/02/2017     07/02/2017     07/01/2017    Lab Results   Component Value Date    CHLORIDE 84 07/02/2017    CHLORIDE 84 07/02/2017    CHLORIDE 82 07/01/2017    Lab Results   Component Value Date    BUN 94 07/02/2017    BUN 97 07/02/2017     07/01/2017      Lab Results   Component Value Date    POTASSIUM 3.3 07/02/2017    POTASSIUM 3.4 07/02/2017    POTASSIUM 3.0 07/01/2017    Lab Results   Component Value Date    CO2 33 07/02/2017    CO2 31 07/02/2017    CO2 31 07/01/2017    Lab Results   Component Value Date    CR 2.77 07/02/2017    CR 2.89 07/02/2017    CR 3.09 07/01/2017        Lab Results   Component Value Date    WBC 7.7 07/05/2017    WBC 7.6 07/05/2017    WBC 6.3 07/04/2017    HGB 9.0 (L) 07/05/2017    HGB 9.2 (L) 07/05/2017    HGB 9.4 (L) 07/04/2017    HCT 27.2 (L) 07/05/2017    HCT 28.2 (L) 07/05/2017    HCT 28.2 (L) 07/04/2017    MCV 90 07/05/2017    MCV 89 07/05/2017    MCV 89 07/04/2017     (L) 07/05/2017     07/05/2017     (L) 07/04/2017     Lab Results   Component Value Date    AST 45 07/05/2017    AST 60 (H) 07/04/2017    AST 70 (H) 07/04/2017    ALT 96 (H) 07/05/2017     (H) 07/04/2017     (H) 07/04/2017    ALKPHOS 97 07/05/2017    ALKPHOS 102 07/04/2017    ALKPHOS 102 07/04/2017    BILITOTAL 1.6 (H) 07/05/2017    BILITOTAL 1.8 (H) 07/04/2017    BILITOTAL 1.7 (H) 07/04/2017    HONEY 24 02/11/2016     Lab Results   Component Value Date    INR 1.52 (H) 07/04/2017    INR 1.39 (H) 06/30/2017    INR 1.43 (H) 06/30/2017            Medications:   Drips: dopamine 2.5, dobutamine 7.5, furosemide 20mg/hr       senna-docusate  1 tablet Oral At Bedtime     isosorbide dinitrate  60 mg Oral TID     hydrALAZINE  100 mg Oral 4x Daily     aspirin EC  81  mg Oral Daily     calcitRIOL  0.25 mcg Oral Daily     clopidogrel  75 mg Oral Daily     cyanocobalamin  1,000 mcg Oral Daily     ferrous sulfate  325 mg Oral Daily     levothyroxine  150 mcg Oral Daily     tolterodine  4 mg Oral Daily            Imaging:   July 2, 2017   IMPRESSION:  1.  Right upper extremity PICC tip projects over low SVC.  Additional  support lines/tubes as above.  2.  Small bilateral pleural effusions and associated opacities are not  significantly changed.           Assessment and Plan:   76 year old man with ischemic CM EF 20% on home dobutamine 5, s/p CRT-D, CKD baseline Cr 1.5, PAD, alcohol dependence- sober for last few months is admitted with cardiogenic shock.      # Cardiogenic shock requiring IABP placement (7/4/2017)  # Hemoptysis 7/5/2017   # JACINTO on background of CKD baseline Cr 1.5  # Uremia  # Hepatocellular injury- more likely hepatic congestion, less likely shock liver    Plan  -Bumex 1mg/hr   -continue dobutamine 7.5, dopamine 6  - continue IABP at 1:1  - hold heparin gtt  - continue home hydralazine and isordil  - hold home asa 81, plavix 75  - f/u Pulmonology recs, CT chest and UE US pending      Full Code. Discussed with patient  DVT prophylaxis: mechanical ppx     This patient was seen and examined with the attending physician.    Jamal Clark MD  PGY5 Cardiology  Pager: [phone]  July 5, 2017

## 2017-07-05 NOTE — PLAN OF CARE
Problem: Goal Outcome Summary  Goal: Goal Outcome Summary  Outcome: No Change  Patient remains in CVICU overnight requiring IABP, PA and vasoactive medication support.  Patient is intermittently confused to place and situation but is easily redirectable.  Patient c/o pain to bilateral lower extremities. Oxy and APAP for pain. Patient has very loose sounding wet cough, unable to produce sputum at this time.  IABP remains 1:1 with some slight oozing at insertion site.  No changes to drips overnight. Multiple K+ replacements give per order.  MD aware of hemodynamic measurements overnight.  UOP ~100/hr overnight.  Plan to continue hemodynamic optimization.      ~0610 Dopamine increased to 6 mcg/kg/min this AM per MD request.

## 2017-07-05 NOTE — PLAN OF CARE
Problem: Goal Outcome Summary  Goal: Goal Outcome Summary     OT-4e: Hold OT eval today, pt with femoral IABP, and pain with movement, will re-assess tomorrow to potentially initiate supine activity.

## 2017-07-05 NOTE — PROGRESS NOTES
0650 - New onset resp distress. Gurgling resps, decreased LOC.  MD called to bedside. RT at bedside. 0.4 Narcan given. Patient more wakeful.  Patient coughed up large amount of blood and clot. Order to hold heparin.

## 2017-07-05 NOTE — CONSULTS
PULMONARY CONSULT  Date of service: 2017    Patient: Perez Villalobos      : 1940      MRN: [id number]    We were consulted by Dr. Jamal Clark (Cardiology) for evaluation of acute onset hemoptysis.        Impressions/Recommendations:   76 year old former smoker with PMHx most significant for ischemic cardiomyopathy w/ biventricular HF, CKD-III, PAD, admitted  w/ cardiogenic shock, currently requiring inotrope/vasopressor support + IABP, who is being evaluated for an acute episode of mild hemoptysis.    #. Hemoptysis, suspect to be result of significant vascular congestion (in setting of cardiogenic shock) w/ cough-related trauma, which was complicated by patient's anticoagulation w/ ASA, clopidogrel, & heparin (started  w/ IABP placement). Differential includes PE (since patient was on a low-intensity heparin protocol) vs infection vs endobronchial lesion (possible malignancy in a former smoker) vs vasculitis vs DAH (very unlikely based on imaging findings).   #. Acute hypoxic respiratory failure (noted , worsening ) in setting of cardiogenic shock w/ significant b/l pleural effusions +/- infection (cannot rule out) +/- likely aspiration considering patient's acute AMS this morning. Stable at this time. He is at high risk of HCAP considering his prolonged hospitalization & today's aspiration event; low threshold for broad antibiotic coverage.  #. RUE line-associated non-occlusive DVTs, one w/ a mobile component raising risk of PE (cannot absolutely rule out w/o CTA).   #. Cardiogenic shock on dobutamine + dopamine.    -- recommend checking sputum (have RT induce if needed), procalcitonin added on to morning labs  -- anticoagulation for the RUE DVTs (would provide empiric coverage for possible PE); defer decision regarding ASA/clopidogrel to primary team  -- titrate supplemental O2 to maintain SpO2 >/= 90%  -- diuresis per primary team  -- consider therapeutic thoracentesis to help w/ dyspnea  component  -- if patient has worsening hemoptysis, check ANCA (part of vasculitis w/u), & we will reassess for a possible bronchoscopy to look for endobronchial lesions     We will continue to follow w/ you. Please, page MICU service for urgent concerns.     Patient seen & discussed w/  Dr. Nikkie M.D., who is in agreement. Please, see staff addendum for changes/additions to the plan.    Shena Veliz MD  Pulmonary & Critical Care FL3  [phone]          History of Present Illness:   Perez Villalobos is a 76 year old former smoker w/ h/o ischemic cardiomyopathy w/ biventricular HF on home dobutamine,who presented to [facility] on 6/30/2017 with cardiogenic shock in setting of his progressively worsening HF. He is being managed in the CCU w/ dobutamine + dopamine support, w/ an addition of IABP 7/04; he is being worked up for a VAD.     On the morning of 7/05 patient had an acute hypoxic event w/ AMS shortly after receiving PRN oxycodone for his diffuse pain. He was administered naloxone w/ good response, but in the process he had some coughing productive of bright red blood & a clot described as half-dollar in size. Patient himself is not able to contribute to his history d/t his somnolence. Patient's wife reports that patient had chronic cough for about 2.5 yrs, which is normally productive of clear sputum w/ rare streaks of brown blood. Patient has a 30 pack-yr smoking history, but quit smoking in the early 1980s. He has no prior history of lung disease. No PFTs in [facility] or Care Everywhere.           Review of Symptoms:   10-point ROS could not be reviewed d/t patient's somnolence & confusion.          Past Medical History:     Past Medical History:   Diagnosis Date     Acute renal failure (H)     10/2015 ARF secondary to rhabdomyolysis     Alcoholism (H)      Anemia      Benign essential HTN      BPH (benign prostatic hypertrophy)      CAD (coronary artery disease)     AWMI, stents to Cx, RCA and LAD     Chronic systolic  "heart failure (H)      CKD (chronic kidney disease)      Complete AV block (H)      Ischemic cardiomyopathy 10/23/2015    EF 30%     Low sodium levels      Mixed hyperlipidemia      NSTEMI (non-ST elevated myocardial infarction) (H) 10/23/2015     PAD (peripheral artery disease) (H)      Rhabdomyolysis due to statin therapy     crestor     Severe hypothyroidism 9/20/2016     VF (ventricular fibrillation) (H) 11/16/16       Past Surgical History:   Procedure Laterality Date     APPENDECTOMY       CHOLECYSTECTOMY       ENDARTERECTOMY CAROTID Left 6/11/10     IMPLANT AUTOMATIC IMPLANTABLE CARDIOVERTER DEFIBRILLATOR  11/16/16     PICC INSERTION Right 04/12/2017    5fr DL Bard PICC, 41cm (3cm external) in the R basilic vein w/ tip in the low SVC.     PICC INSERTION Right 07/02/2017    5fr TL Open-Ended PICC, 43cm (3cm external) in the R basilic vein w/ tip in the low SVC     STENT, CORONARY, S660 15/18  Nov 2000    PTCA with stent placement of CFX     STENT, CORONARY, S660 15/18  Feb 2001    PTCA with stent placement of CFX RCA      STENT, CORONARY, S660 15/18  April 2007    stent placed in LAD     tonsillectomy and adenoidectomy              Allergies:     Allergies   Allergen Reactions     Lisinopril Other (See Comments)     Angioedema     Augmentin Other (See Comments)     Per pt, \"froze him, affected his legs\"     Crestor [Rosuvastatin] Other (See Comments)     rhabdomyolysis             Outpatient Medications:       No current facility-administered medications on file prior to encounter.   Current Outpatient Prescriptions on File Prior to Encounter:  potassium chloride (KLOR-CON) 20 MEQ Packet Take 40 meq in the a.m. and 20 meq in the p.m.   ferrous sulfate (IRON) 325 (65 FE) MG tablet Take 1 tablet (325 mg) by mouth daily   torsemide (DEMADEX) 20 MG tablet Take 4 tablets (80 mg) by mouth 2 times daily   D5W 5 % SOLN 170 mL with DOBUTamine 250 MG/20ML SOLN 1,000 mg Inject 0.3555 mg/min into the vein continuous " "  acetaminophen (TYLENOL) 325 MG tablet Take 325-650 mg by mouth every 6 hours as needed for mild pain   isosorbide dinitrate (ISORDIL) 30 MG tablet Take 2 tablets (60 mg) by mouth 3 times daily   hydrALAZINE (APRESOLINE) 100 MG TABS tablet Take 1 tablet (100 mg) by mouth 4 times daily   tolterodine (DETROL LA) 4 MG 24 hr capsule Take 4 mg by mouth daily   calcitRIOL (ROCALTROL) 0.25 MCG capsule Take 0.25 mcg by mouth daily   aspirin EC 81 MG EC tablet Take 81 mg by mouth daily   levothyroxine (SYNTHROID/LEVOTHROID) 150 MCG tablet Take 150 mcg by mouth daily   Saline (SODIUM CHLORIDE) 0.65 % SOLN Spray in nostril as needed   Cyanocobalamin (B-12) 1000 MCG TBCR Take 1,000 mcg by mouth daily   multivitamin, therapeutic with minerals (MULTI-VITAMIN) TABS Take 1 tablet by mouth daily   clopidogrel (PLAVIX) 75 MG tablet Take 75 mg by mouth daily             Family History:     Family History   Problem Relation Age of Onset     Unknown/Adopted Mother      Unknown/Adopted Father              Social History:     Social History   Substance Use Topics     Smoking status: Former Smoker     Packs/day: 1.50     Years: 20.00     Types: Cigarettes     Quit date: 1/1/1980     Smokeless tobacco: Never Used     Alcohol use 0.0 oz/week     0 Standard drinks or equivalent per week      Comment: occassionally             Physical Exam:   /62  Temp 98.3  F (36.8  C) (Oral)  Resp 18  Ht 1.727 m (5' 8\")  Wt 61.2 kg (134 lb 14.7 oz)  SpO2 98%  BMI 20.51 kg/m2    General: somnolent cachectic  man, NAD  HEENT: DMM  CV: RRR, nl S1/S2, distant heart sounds obscured by rhonchi  Lungs: equal b/l air entry w/ diffuse b/l rhonchi L>R, + L-sided tactile fremitus, no wheezing, no cough  Abd: soft, NT, ND  Ext: WWP, no BLE edema  Skin: numerous ecchymoses along extremities  Neuro: somnolent, able to answer questions, but unreliable historian          Data:   Labs (all laboratory studies reviewed by me):   CMP  Recent Labs  Lab " 07/05/17  1616 07/05/17  1219 07/05/17  0751 07/05/17  0437 07/04/17  2351 07/04/17  1759  07/04/17  0347  07/03/17  0422   *  --   --  130* 129* 126*  --  128*  < > 127*   POTASSIUM 3.7 3.3* 3.0* 3.5 3.8 3.0*  < > 3.5  < > 3.3*   CHLORIDE 88*  --   --  85* 84* 83*  --  82*  < > 84*   CO2 34*  --   --  34* 35* 34*  --  32  < > 31   ANIONGAP 9  --   --  11 10 8  --  13  < > 12   *  --   --  106* 130* 136*  --  122*  < > 121*   BUN 81*  --   --  85* 93* 93*  --  89*  < > 92*   CR 2.18*  --   --  2.28* 2.31* 2.32*  --  2.38*  < > 2.62*   GFRESTIMATED 29*  --   --  28* 28* 27*  --  27*  < > 24*   GFRESTBLACK 36*  --   --  34* 33* 33*  --  32*  < > 29*   LEIDY 9.1  --   --  9.1 9.1 9.0  --  9.2  < > 9.0   MAG 2.4*  --   --  2.5* 2.4* 2.3  --  2.6*  --  2.6*   PHOS  --   --   --   --   --  3.4  --   --   --  3.3   PROTTOTAL 7.4  --   --  7.2  --  7.0  --  7.0  < > 6.8   ALBUMIN 3.2*  --   --  3.2*  --  3.3*  --  3.1*  < > 3.1*   BILITOTAL 1.6*  --   --  1.6*  --  1.8*  --  1.7*  < > 1.8*   ALKPHOS 95  --   --  97  --  102  --  102  < > 106   AST 51*  --   --  45  --  60*  --  70*  < > 131*   ALT 87*  --   --  96*  --  106*  --  123*  < > 168*   < > = values in this interval not displayed.  CBC  Recent Labs  Lab 07/05/17  1110 07/05/17  0437 07/04/17  1257 07/04/17  0347   WBC 7.7 7.6 6.3 8.6   RBC 3.03* 3.16* 3.17* 3.15*   HGB 9.0* 9.2* 9.4* 9.5*   HCT 27.2* 28.2* 28.2* 27.9*   MCV 90 89 89 89   MCH 29.7 29.1 29.7 30.2   MCHC 33.1 32.6 33.3 34.1   RDW 15.8* 15.8* 15.6* 15.8*   * 153 136* 155     INR  Recent Labs  Lab 07/04/17  1110 06/30/17  2214 06/30/17  1516   INR 1.52* 1.39* 1.43*     Arterial Blood Gas  Recent Labs  Lab 07/05/17  1616 07/05/17  1219 07/05/17  0751 07/05/17  0655  07/04/17  0950   PH  --   --   --  7.52*  --  7.56*   PCO2  --   --   --  43  --  35   PO2  --   --   --  150*  --  64*   HCO3  --   --   --  35*  --  32*   O2PER 4L 5L 6L 11L  < > 21   < > = values in this interval not  displayed.    Imaging (all imaging studies reviewed by me):  #. CXR, 7/5/2017:   Stable bilateral pleural effusions associated airspace opacities likely compression. The catheter tip projecting over main artery/origin of right pulmonary artery. Stable left hilar cardioMEMS device. IABP tip 1.5 cm short of ideal position. Consider advancement. Stable other support devices.    #. CT chest, 7/5/2017:   New regions of centrilobular nodules, groundglass and consolidative opacities and mediastinal lymphadenopathy. Findings are suggestive of an infective etiology, although aspiration cannot be excluded given the amount of frothy debris within the trachea. Probable cardiogenic pulmonary edema given the cardiomegaly, interstitial thickening and pleural effusions. Dilated pulmonary artery, which can be seen in the clinical setting of pulmonary artery hypertension.    #. UE Doppler US, 7/5/2017:  Nonocclusive thrombus associated with the right IJ catheter, limited to the internal jugular vein. Nonocclusive thrombus with a mobile component associated with the right upper extremity PICC, extending from the axillary vein into the subclavian vein.

## 2017-07-06 NOTE — PLAN OF CARE
Problem: Goal Outcome Summary  Goal: Goal Outcome Summary  Outcome: No Change  Remains hemodynamically stable. IABP continues without problems; access site WNLs. Repositioned frequently. Rested well throughout NOC. Continuing to monitor.    Problem: Cardiac: Heart Failure (Adult)  Goal: Signs and Symptoms of Listed Potential Problems Will be Absent or Manageable (Cardiac: Heart Failure)  Signs and symptoms of listed potential problems will be absent or manageable by discharge/transition of care (reference Cardiac: Heart Failure (Adult) CPG).     07/06/17 0400   Cardiac: Heart Failure   Problems Assessed (Heart Failure) all   Problems Present (Heart Failure) cardiac pump dysfunction;decreased quality of life;fluid/electrolyte imbalance;functional decline/self-care deficit;respiratory compromise;situational response

## 2017-07-06 NOTE — PROGRESS NOTES
"    CARDIOLOGY PROGRESS NOTE    Name: Perez Villalobos MRN: [id number]     Age: 76 year old    Date of admission: 6/30/2017 YOB: 1940            Subjective / Interval Events:     - CI improved overnight with stable regimen of dopamine 5 and dobutamine 7.5  - US showed PICC associated DVT, restarted on heparin gtt   - no further episodes of hemoptysis but there are streaks of blood in his sputum   - Ucx growing 10,000 to 50,000 colonies/mL Aerococcus urinae, Becerril cath exchange today   - no BM for 5 days, will plan for enema and mag citrate  - will start empiric abx with vanc and pip-tazo          Allergies:     Allergies   Allergen Reactions     Lisinopril Other (See Comments)     Angioedema     Augmentin Other (See Comments)     Per pt, \"froze him, affected his legs\"     Crestor [Rosuvastatin] Other (See Comments)     rhabdomyolysis             Vitals and Exam:   Temp:  [97.5  F (36.4  C)-98.5  F (36.9  C)] 97.7  F (36.5  C)  Heart Rate:  [82-94] 87  Resp:  [14-18] 14  BP: (105-141)/(55-81) 124/73  SpO2:  [95 %-100 %] 98 %    Vitals:    07/04/17 0400 07/05/17 0400 07/06/17 0300   Weight: 63.4 kg (139 lb 12.4 oz) 61.2 kg (134 lb 14.7 oz) 60.3 kg (132 lb 15 oz)        I/O last 3 completed shifts:  In: 1656.81 [P.O.:240; I.V.:1416.81]  Out: 3530 [Urine:3530]    Vent Settings:  Resp: 14 SpO2: 98 % O2 Device: Nasal cannula with humidification Oxygen Delivery: 3 LPM  Resp: 14    General Appearance:   Alert and oriented      Cardiovascular:     JVP elevated to angle of mandible  Tachycardic but regular rhythm     Pulm:    Coarse bilat on lateral exam      Abd/GI:    Slightly distended but non-tender      Extremities: No CCE, IABP groin site C/D/I no oozing    Labs:  ABG:  Lab Results   Component Value Date    PH 7.52 (H) 07/05/2017    PH 7.56 (H) 07/04/2017    PH 7.50 (H) 06/10/2017    PO2 150 (H) 07/05/2017    PO2 64 (L) 07/04/2017    PO2 91 06/10/2017    SAT 28 03/23/2017    SAT 35 06/23/2016    PCO2 43 " 07/05/2017    PCO2 35 07/04/2017    PCO2 37 06/10/2017    HCO3 35 (H) 07/05/2017    HCO3 32 (H) 07/04/2017    HCO3 28 06/10/2017    HERMINIA 11.4 07/05/2017    HERMINIA 8.8 07/04/2017    HERMINIA 4.7 06/10/2017          Lab Results   Component Value Date     07/02/2017     07/02/2017     07/01/2017    Lab Results   Component Value Date    CHLORIDE 84 07/02/2017    CHLORIDE 84 07/02/2017    CHLORIDE 82 07/01/2017    Lab Results   Component Value Date    BUN 94 07/02/2017    BUN 97 07/02/2017     07/01/2017      Lab Results   Component Value Date    POTASSIUM 3.3 07/02/2017    POTASSIUM 3.4 07/02/2017    POTASSIUM 3.0 07/01/2017    Lab Results   Component Value Date    CO2 33 07/02/2017    CO2 31 07/02/2017    CO2 31 07/01/2017    Lab Results   Component Value Date    CR 2.77 07/02/2017    CR 2.89 07/02/2017    CR 3.09 07/01/2017        Lab Results   Component Value Date    WBC 10.1 07/06/2017    WBC 7.7 07/05/2017    WBC 7.6 07/05/2017    HGB 9.0 (L) 07/06/2017    HGB 9.0 (L) 07/05/2017    HGB 9.2 (L) 07/05/2017    HCT 28.6 (L) 07/06/2017    HCT 27.2 (L) 07/05/2017    HCT 28.2 (L) 07/05/2017    MCV 91 07/06/2017    MCV 90 07/05/2017    MCV 89 07/05/2017     (L) 07/06/2017     (L) 07/05/2017     07/05/2017     Lab Results   Component Value Date    AST 37 07/06/2017    AST 51 (H) 07/05/2017    AST 45 07/05/2017    ALT 77 (H) 07/06/2017    ALT 87 (H) 07/05/2017    ALT 96 (H) 07/05/2017    ALKPHOS 90 07/06/2017    ALKPHOS 95 07/05/2017    ALKPHOS 97 07/05/2017    BILITOTAL 1.7 (H) 07/06/2017    BILITOTAL 1.6 (H) 07/05/2017    BILITOTAL 1.6 (H) 07/05/2017    HONEY 24 02/11/2016     Lab Results   Component Value Date    INR 1.52 (H) 07/04/2017    INR 1.39 (H) 06/30/2017    INR 1.43 (H) 06/30/2017            Medications:   Drips: dopamine 2.5, dobutamine 7.5, furosemide 20mg/hr       piperacillin-tazobactam  3.375 g Intravenous Q6H     vancomycin place russell - receiving intermittent dosing  1  each Does not apply See Admin Instructions     senna-docusate  1 tablet Oral At Bedtime     isosorbide dinitrate  60 mg Oral TID     hydrALAZINE  100 mg Oral 4x Daily     aspirin EC  81 mg Oral Daily     calcitRIOL  0.25 mcg Oral Daily     clopidogrel  75 mg Oral Daily     cyanocobalamin  1,000 mcg Oral Daily     ferrous sulfate  325 mg Oral Daily     levothyroxine  150 mcg Oral Daily     tolterodine  4 mg Oral Daily            Imaging:   July 2, 2017   IMPRESSION:  1.  Right upper extremity PICC tip projects over low SVC.  Additional  support lines/tubes as above.  2.  Small bilateral pleural effusions and associated opacities are not  significantly changed.           Assessment and Plan:   76 year old man with ischemic CM EF 20% on home dobutamine 5, s/p CRT-D, CKD baseline Cr 1.5, PAD, alcohol dependence- sober for last few months is admitted with cardiogenic shock.      # Cardiogenic shock requiring IABP placement (7/4/2017)  # Hemoptysis 7/5/2017, resolved 7/6  # Constipation  # RUE PICC associated DVT (7/5/2017)  # JACINTO on background of CKD baseline Cr 1.5  # Uremia  # Hepatocellular injury- more likely hepatic congestion, less likely shock liver    Plan  - Bumex 1mg/hr   - continue dobutamine 7.5, dopamine 5  - continue IABP at 1:1  - restart heparin gtt   - continue home hydralazine and isordil  - restart asa 81, plavix 75  - bowel regimen for constipation  - will replace Becerril cath for bacteriuria     Full Code. Discussed with patient  DVT prophylaxis: heparin gtt    This patient was seen and examined with the attending physician.    Jamal Clark MD  PGY5 Cardiology  Pager: [phone]  July 6, 2017

## 2017-07-06 NOTE — PROGRESS NOTES
Calorie Counts  Intake recorded for: 7/5 Kcals: 225  Protein: 7g  # Meals Recorded: 100% applesauce  # Supplements Recorded: 50% 1 Ensure Plus

## 2017-07-06 NOTE — PROGRESS NOTES
PULMONARY FOLLOW-UP NOTE  7/6/2017    Impression/Recommendations:  76 year old former smoker with PMHx most significant for ischemic cardiomyopathy w/ biventricular HF, CKD-III, PAD, admitted 6/30 w/ cardiogenic shock requiring inotrope/vasopressor support + IABP, who is being evaluated after an acute episode of mild hemoptysis.     #. Hemoptysis, suspect to be result of significant vascular congestion (in setting of cardiogenic shock) w/ cough-related trauma, which was complicated by patient's anticoagulation w/ ASA, clopidogrel, & heparin (started 7/04 w/ IABP placement). Differential includes possible PE (since patient was on a low-intensity heparin protocol, does have line-associated DVTs in RUE, one w/ a mobile element) vs infection vs endobronchial lesion (possible malignancy in a former smoker) vs vasculitis vs DAH (very unlikely based on imaging findings).   #. Acute hypoxic respiratory failure (noted 7/04, worsening 7/05) in setting of cardiogenic shock w/ significant b/l pleural effusions +/- infection (cannot rule out) +/- possible aspiration during episode of AMS.  #. RUE line-associated non-occlusive DVTs, one w/ a mobile component raising risk of PE (cannot absolutely rule out w/o CTA).   #. Cardiogenic shock on dobutamine + dopamine.     -- sputum culture pending, PCT elevated  -- consider empiric coverage w/ levofloxacin x7 days (unless pathogen is cultured in sputum to help tailor abx)  -- anticoagulation for the RUE DVTs (would provide empiric coverage for possible PE)  -- titrate supplemental O2 to maintain SpO2 >/= 90%  -- diuresis per primary team, can consider thoracenteses if dyspnea becomes pronounced    Pulmonary service will sign off at this time. Please, page/call if you have any questions.     Patient seen & discussed w/  Dr. Nikkie M.D., who is in agreement. Please, see staff addendum for changes/additions to the plan.     Shena eVliz MD  Pulmonary & Critical Care  "FL3  [phone]    ===================================================================    Interval history: MANNY o/n. Reports feeling better today, feels more awake. Since episode of very scant hemoptysis w/ dark blood, nothing since last evening. Denies dyspnea.     Nursing notes reviewed.    Objective:  /67  Temp 97.5  F (36.4  C) (Axillary)  Resp 18  Ht 1.727 m (5' 8\")  Wt 60.3 kg (132 lb 15 oz)  SpO2 97%  BMI 20.21 kg/m2  Temp (24hrs), Av.1  F (36.7  C), Min:97.5  F (36.4  C), Max:98.5  F (36.9  C)    Intake/Output Summary (Last 24 hours) at 17 1145  Last data filed at 17 1000   Gross per 24 hour   Intake          1162.78 ml   Output             3770 ml   Net         -2607.22 ml      General: cooperative cachectic man; NAD  HEENT: MMM  CV: irregular sounds, nl S1/S2, no m/r/g  Resp: equal b/l air entry, decreased bibasilar breaths sounds, no rhonchi, no wheezing, no cough  Abd: soft, ND  Extremities: WWP, no BLE edema  Neuro: awake, alert    Labs: reviewed in EMR. Notable for PCT 0.65 --> 0.57    Imaging: reviewed in EMR.  #. CT chest, 2017:   New regions of centrilobular nodules, groundglass and consolidative opacities and mediastinal lymphadenopathy. Findings are suggestive of an infective etiology, although aspiration cannot be excluded given the amount of frothy debris within the trachea. Probable cardiogenic pulmonary edema given the cardiomegaly, interstitial thickening and pleural effusions. Dilated pulmonary artery, which can be seen in the clinical setting of pulmonary artery hypertension.     #. UE Doppler US, 2017:  Nonocclusive thrombus associated with the right IJ catheter, limited to the internal jugular vein. Nonocclusive thrombus with a mobile component associated with the right upper extremity PICC, extending from the axillary vein into the subclavian vein.        "

## 2017-07-06 NOTE — PLAN OF CARE
Problem: Goal Outcome Summary  Goal: Goal Outcome Summary  Outcome: No Change  Neuro: Pt intermittently confused, able to state date, month, where he was in AM, unable to state month or date in afternoon. Asking frequently for his wife and to get out of bed. Afebrile. Second dose of 0.4mg of narcan given in AM d/t pt's somnelense. Pt reports cramping pain all over. No oxycodone d/t neural status.   Resp: Wet, frequent cough. Prem blood with cough in AM, holding heparin. Sputum clear in afternoon. One episode of dark blood at 1800. Chest CT done, infx vs aspiration possible. Lungs course with crackles.   Cardiac: Paced w/ afib. MAPs: 90's. IABP 1:1, 100%, augmenting 130-150's. Dobutamine gtt 7.5mcg/kg/min. Dopamine gtt 5mcg/kg/min. Bumex gtt 1mg/h. CVP: 20, PA: 62/32, SVO2: 56-49, CI: 2.1, SVR: 1683. Potassium replaced per protocol. Plavix and aspirin held d/t bleeding, confirmed with cards 2 that they did not want to give doses for today.   : Becerril patent, UOP: 100-175mL/h.   GI: Abdomen distended. Pt not on bowel program. Per MD we will monitor it for today, and limit PO intake to vital meds. Will need to start bowel program in the future. Discussed possibility of feeding tube if pt needs to remain NPO.    Skin: Skin bruised and fragile. Coccyx red/blanchable. Pt wiggles and scoots in bed frequently. Scoots off pillows to get on backside. Repositioning off backside q2h.

## 2017-07-06 NOTE — PHARMACY-VANCOMYCIN DOSING SERVICE
Pharmacy Vancomycin Initial Note  Date of Service 2017  Patient's  1940  76 year old, male    Indication: [facility] Pneumonia    Current estimated CrCl = Estimated Creatinine Clearance: 26 mL/min (based on Cr of 2.03).    Creatinine for last 3 days  7/3/2017:  4:33 PM Creatinine 2.46 mg/dL  2017:  3:47 AM Creatinine 2.38 mg/dL;  5:59 PM Creatinine 2.32 mg/dL; 11:51 PM Creatinine 2.31 mg/dL  2017:  4:37 AM Creatinine 2.28 mg/dL;  4:16 PM Creatinine 2.18 mg/dL  2017:  3:23 AM Creatinine 2.03 mg/dL    Recent Vancomycin Level(s) for last 3 days  No results found for requested labs within last 72 hours.      Vancomycin IV Administrations (past 72 hours)                   vancomycin (VANCOCIN) 1,250 mg in NaCl 0.9 % 250 mL intermittent infusion (mg) 1,250 mg New Bag 17 1330                Nephrotoxins and other renal medications (Future)    Start     Dose/Rate Route Frequency Ordered Stop    17 1231  vancomycin place russell - receiving intermittent dosing      1 each Does not apply SEE ADMIN INSTRUCTIONS 17 1231      17 1200  piperacillin-tazobactam (ZOSYN) 3.375 g vial to attach to  mL bag      3.375 g  over 1 Hours Intravenous EVERY 6 HOURS 17 1136      17 1545  bumetanide (BUMEX) 0.25 mg/mL infusion      1 mg/hr  4 mL/hr  Intravenous CONTINUOUS 17 1540      17 1530  DOBUTamine (DOBUTREX) 1,000 mg in D5W 250 mL infusion (adult max conc)      7.5 mcg/kg/min × 65 kg (Dosing Weight)  7.3 mL/hr  Intravenous CONTINUOUS 17 1528      17 1530  DOPamine (INTROPIN) 800 mg in D5W 250 mL infusion (adult max conc)      2-20 mcg/kg/min × 65 kg (Dosing Weight)  2.4-24.4 mL/hr  Intravenous CONTINUOUS 17 1528            Contrast Orders - past 72 hours     None                Plan:  1.  Start vancomycin  1250 mg IV once, then intermittent due to renal function.   2.  Goal Trough Level: 15-20 mg/L   3.  Pharmacy will check  trough levels at 24 hrs to assess kidney clearance.  4. Serum creatinine levels will be ordered daily.    5. [place] method utilized to dose vancomycin therapy: Method 2    Maggie TeresaD

## 2017-07-06 NOTE — PLAN OF CARE
Problem: Goal Outcome Summary  Goal: Goal Outcome Summary  Outcome: No Change  DX: Cardiogenic shock     D/I: 75 y/o male with PMHx ICM w/ biventricular HF admitted with cardiogenic shock requiring mechanical, inotropic, and afterload reduction support. Patient intermittently lethargic with disorientation to person and forgetful. Patient has calm, copperative demeanor, and is easily redirectable. Cardiac rhythm 100% paced with underlying a-fib. IABP 1:1 with 100% augmentation. Dobutamine and dopamine gtts unchanged. CVP 18 x 3. CI 2.3-3.0. PAP 58/28-62/32-58/28. SvO2 63-52-52. Pink lady enema administered for constipation with lack of desired results. Smears positive for liset-blood; H&H drawn and Hgb stable. Patient has also had intermittent blood-laced sputum. Heparin gtt reduced from high-intensity to low-intensity; 10a pending. Abdominal x-ray negative for obstruction- see results for specifics. Patient's abdomen tender and distended.  Urine output >150/hr. ABX restarted for productive sputum. MRSA swab obtained. K+ 3.4 replaced with 40 mEq IV KCl.       A: Patient dependent on support to sustain life.     P: Discussion on whether patient is appropriate for LVAD will take place tomorrow. Monitor hemodynamics continue plan of care as ordered.     Nathen DUTTAN, RN, CCRN  4E

## 2017-07-07 NOTE — PLAN OF CARE
Problem: Goal Outcome Summary  Goal: Goal Outcome Summary  PT 4E: Pt continues to have IABP in R groin. OT following for UE/LE ROM. PT HOLDING. OT to notify PT when appropriate for OOB mobility/two therapy disciplines.

## 2017-07-07 NOTE — PROGRESS NOTES
D:  Was asked to check into whether patient attending AA and how he is doing. Wife reports patient doesn't have a sponsor and AA answering phones at his location states there are not facilitators, only volunteers who agree to lead meetings. There is no sign-in. Patient has obtained documentation from other people present at the meetings attesting to the fact that he has attended. He has given this documentation to the clinic staff and MDs have on file. Wife reports that Mr. Villalobos goes to AA meetings once/week at 12noon on Sundays. She drives him to these meetings and sits in her car while he attends. Patient was sleeping, laying flat in bed while on a balloon pump. Unable to participate in conversation with this writer at this time.    I:  Met with Wife. Re-introduced this SWer. She reports that he has been scheduled for LVAD on Monday. Offered support. Inquired into his attendance at AA and asked about having a sponsor, in addition to how well he has been able to comply with abstinence.    A:  Wife indicates that he has been going to meetings once/week if not in hospital. She makes sure he is attending. Patient unable to drive as is too ill. It really appears as though the patient has been doing everything asked of him and complying with alcohol abstinence since April. Wife very supportive and able to be his caregiver post-VAD.    P:  Substance abuse policy has been explained to the patient and wife on multiple occassions from the cardiology team. He has been complying with everything asked of him and has remained abstinent. He is shy the 4 months, but needs medical intervention ASAP. From a psychosocial standpoint, I think it is reasonable to move forward with VAD at this time. This SWer will continue to follow for psychosocial issues and adjustment post-VAD placement. Have participated in multiple discussions with multiple cardiologists on his team.

## 2017-07-07 NOTE — PROCEDURES
Bridle Placement:   Reason for bridle placement: securement of FT per RN, pulls at lines overnight   Medicine delivered during procedure: lubricating jelly    Procedure: Successful   Location of top of clip on FT: @ 93 cm marker   Condition of nose/skin at time of bridle placement: Unremarkable   Face to Face time with patient: <5 minutes.    Marisabel Dominique RD, LD, [facility]  CVICU Dietitian  Pager: [phone]

## 2017-07-07 NOTE — PROCEDURES
Small Bowel Feeding Tube Placement Assessment  Reason for Feeding Tube Placement: request for post-pyloric FT by providers  Cortrak Start Time: 10:53 am   Cortrak End Time: 11:01 am  Medicine Delivered During Procedure: lidocaine gel  Placement Successful: Presume post-pyloric (pending AXR confirmation).    Procedure Complications: none  Final Placement Cooper at exit of nare 92 cm  Face to Face time with patient: 15 mins    Marsiabel Dominique RD, LD, CNSC  CVICU Dietitian  Pager: [phone]

## 2017-07-07 NOTE — PROGRESS NOTES
"    CARDIOLOGY PROGRESS NOTE    Name: Perez Villalobos MRN: [id number]     Age: 76 year old    Date of admission: 6/30/2017 YOB: 1940            Subjective / Interval Events:     - No acute events overnight   - No further episodes of hemoptysis   - 7/4 Ucx growing 10,000 to 50,000 colonies/mL Aerococcus urinae, Becerril cath exchanged 7/6  - no BM for 6, methylnaltrexone and enemas   - continue empiric abx with vanc and pip-tazo, will de-escalate as able          Allergies:     Allergies   Allergen Reactions     Lisinopril Other (See Comments)     Angioedema     Augmentin Other (See Comments)     Per pt, \"froze him, affected his legs\"     Crestor [Rosuvastatin] Other (See Comments)     rhabdomyolysis             Vitals and Exam:   Temp:  [97.6  F (36.4  C)-98.5  F (36.9  C)] 97.6  F (36.4  C)  Heart Rate:  [84-97] 89  Resp:  [14-18] 14  BP: ()/(53-84) 115/61  SpO2:  [89 %-100 %] 97 %    Vitals:    07/04/17 0400 07/05/17 0400 07/06/17 0300   Weight: 63.4 kg (139 lb 12.4 oz) 61.2 kg (134 lb 14.7 oz) 60.3 kg (132 lb 15 oz)        I/O last 3 completed shifts:  In: 2130.83 [P.O.:240; I.V.:1890.83]  Out: 3240 [Urine:3240]    Vent Settings:  Resp: 14 SpO2: 97 % O2 Device: Nasal cannula with humidification Oxygen Delivery: 3 LPM  Resp: 14    General Appearance:   Alert and oriented      Cardiovascular:     JVP elevated to angle of mandible  Tachycardic but regular rhythm     Pulm:    Coarse bilat on lateral exam      Abd/GI:    Slightly distended but non-tender      Extremities: No CCE, IABP groin site C/D/I no oozing    Labs:  ABG:  Lab Results   Component Value Date    PH 7.52 (H) 07/05/2017    PH 7.56 (H) 07/04/2017    PH 7.50 (H) 06/10/2017    PO2 150 (H) 07/05/2017    PO2 64 (L) 07/04/2017    PO2 91 06/10/2017    SAT 28 03/23/2017    SAT 35 06/23/2016    PCO2 43 07/05/2017    PCO2 35 07/04/2017    PCO2 37 06/10/2017    HCO3 35 (H) 07/05/2017    HCO3 32 (H) 07/04/2017    HCO3 28 06/10/2017    HERMINIA 11.4 " 07/05/2017    HERMINIA 8.8 07/04/2017    HERMINIA 4.7 06/10/2017          Lab Results   Component Value Date     07/02/2017     07/02/2017     07/01/2017    Lab Results   Component Value Date    CHLORIDE 84 07/02/2017    CHLORIDE 84 07/02/2017    CHLORIDE 82 07/01/2017    Lab Results   Component Value Date    BUN 94 07/02/2017    BUN 97 07/02/2017     07/01/2017      Lab Results   Component Value Date    POTASSIUM 3.3 07/02/2017    POTASSIUM 3.4 07/02/2017    POTASSIUM 3.0 07/01/2017    Lab Results   Component Value Date    CO2 33 07/02/2017    CO2 31 07/02/2017    CO2 31 07/01/2017    Lab Results   Component Value Date    CR 2.77 07/02/2017    CR 2.89 07/02/2017    CR 3.09 07/01/2017        Lab Results   Component Value Date    WBC 8.7 07/07/2017    WBC 9.3 07/06/2017    WBC 10.1 07/06/2017    HGB 9.2 (L) 07/07/2017    HGB 8.9 (L) 07/06/2017    HGB 9.0 (L) 07/06/2017    HCT 29.2 (L) 07/07/2017    HCT 28.3 (L) 07/06/2017    HCT 28.6 (L) 07/06/2017    MCV 92 07/07/2017    MCV 92 07/06/2017    MCV 91 07/06/2017     07/07/2017     07/06/2017     (L) 07/06/2017     Lab Results   Component Value Date    AST 24 07/07/2017    AST 30 07/06/2017    AST 37 07/06/2017    ALT 63 07/07/2017    ALT 67 07/06/2017    ALT 77 (H) 07/06/2017    ALKPHOS 87 07/07/2017    ALKPHOS 90 07/06/2017    ALKPHOS 90 07/06/2017    BILITOTAL 1.7 (H) 07/07/2017    BILITOTAL 1.5 (H) 07/06/2017    BILITOTAL 1.7 (H) 07/06/2017    HONEY 24 02/11/2016     Lab Results   Component Value Date    INR 1.52 (H) 07/04/2017    INR 1.39 (H) 06/30/2017    INR 1.43 (H) 06/30/2017            Medications:   Drips: dopamine 2.5, dobutamine 7.5, furosemide 20mg/hr       multivitamins with minerals  15 mL Per Feeding Tube Daily     lactulose  20 g Oral or Feeding Tube Q2H     methylnaltrexone  12 mg Subcutaneous Once     piperacillin-tazobactam  3.375 g Intravenous Q6H     vancomycin place russell - receiving intermittent dosing  1  each Does not apply See Admin Instructions     senna-docusate  1 tablet Oral At Bedtime     isosorbide dinitrate  60 mg Oral TID     hydrALAZINE  100 mg Oral 4x Daily     aspirin EC  81 mg Oral Daily     calcitRIOL  0.25 mcg Oral Daily     clopidogrel  75 mg Oral Daily     cyanocobalamin  1,000 mcg Oral Daily     ferrous sulfate  325 mg Oral Daily     levothyroxine  150 mcg Oral Daily     tolterodine  4 mg Oral Daily            Imaging:   July 2, 2017   IMPRESSION:  1.  Right upper extremity PICC tip projects over low SVC.  Additional  support lines/tubes as above.  2.  Small bilateral pleural effusions and associated opacities are not  significantly changed.     7/6/2017  IMPRESSION: Nonobstructive bowel gas pattern with significant colonic  stool burden.    7/7/2017  Impression:   1. Haziness throughout the right hemithorax is favored to represent  layering pleural effusion, likely not significantly changed.  2. Unchanged left basilar atelectasis versus consolidation and pleural  effusion.  3. [place] catheter tip projects over the main pulmonary artery.          Assessment and Plan:   76 year old man with ischemic CM EF 20% on home dobutamine 5, s/p CRT-D, CKD baseline Cr 1.5, PAD, alcohol dependence- sober for last few months is admitted with cardiogenic shock.      # Cardiogenic shock requiring IABP placement (7/4/2017)  # Hemoptysis 7/5/2017, resolved 7/6  # Constipation  # RUE PICC associated DVT (7/5/2017)  # JACINTO on background of CKD baseline Cr 1.5  # Uremia  # Hepatocellular injury- more likely hepatic congestion, less likely shock liver    Plan  - Bumex 1mg/hr   - continue dobutamine 7.5, dopamine 5  - continue IABP at 1:1  - continue heparin gtt   - continue home hydralazine and isordil  - continue asa 81, plavix 75  - bowel regimen for constipation    Full Code. Discussed with patient  DVT prophylaxis: heparin gtt    This patient was seen and examined with the attending physician.    Jamal Clark MD  [id number]  Cardiology  Pager: [phone]  July 7, 2017

## 2017-07-07 NOTE — PHARMACY-CONSULT NOTE
Pharmacy Tube Feeding Consult    Medication reviewed for administration by feeding tube and for potential food/drug interactions.    Recommendation: No changes are needed at this time; Patient able to take medications by mouth.     Pharmacy will continue to follow as new medications are ordered.    Maggie TeresaD

## 2017-07-07 NOTE — PROGRESS NOTES
CLINICAL NUTRITION SERVICES - REASSESSMENT NOTE     Nutrition Prescription    RECOMMENDATIONS FOR MDs/PROVIDERS TO ORDER:  Adv TF toward goal as able pending GI status (goal regimen below).  Consider liberalize to regular diet as pt not eating much.    Malnutrition Status:    Severe malnutrition in the context of acute illness    Recommendations already ordered by Registered Dietitian (RD):  1. Only once MDs approve start of TF, begin Impact Peptide @ 10 mL/hr.   2. 30 mL q4h free water flushes for patency.  3. Order Certavite to ensure adequate micronutrients in case of slow TF adv, EN interruptions, hypermetabolic needs.  4. Obtain baseline and weekly CRP while on Impact Peptide to assess ongoing approp of immune-modulating TF therapy vs ability to change to standard formula.  5. Ensure K+, Mg++, phos ordered daily while nutrition support advancing. Do not adv if K+, Mg++ < nrml or phos <2. Replace lytes per protocol.    Future/Additional Recommendations:  1. Once wish to adv TF, adv 10 mL q8h or per MD to goal Impact Peptide @ goal 60 ml/hr (1440 ml/day) to provide 2160 kcals (36 kcal/kg/day), 135 g PRO (2.3 g/kg/day), 1109 ml free H2O, 92 g Fat (50% from MCTs), 202 g CHO and no Fiber daily.     EVALUATION OF THE PROGRESS TOWARD GOALS   Diet: Low Saturated Fat, <2400 mg Na, Ensure Plus between meals    Nutrition Support: FT placed at bedside today.    Intake: PO intakes very poor. Calorie counts indicate pt is eating 0-225 kcal/day x past 3 days. Pt is only eating applesauce and yogurt per RN/wife report.      NEW FINDINGS   -Wt: New lowest wt 60.3 kg today (3.2% loss in 1 week).   -GI: Constipated w/ no BM x6-7 days, stool burden on AXR.    ASSESSED NUTRITION NEEDS per new dosing wt 60 kg  Estimated Energy Needs: 2100- 2400 kcals/day (35 - 40kcals/kg)  Justification: Increased needs and Repletion  Estimated Protein Needs:  grams protein/day (1.5 - 2 grams of pro/kg)  Justification: Hypercatabolism with  acute illness, Increased needs and Repletion    MALNUTRITION  % Intake: </= 50% for >/= 5 days (severe)  % Weight Loss: > 2% in 1 week (severe)  Subcutaneous Fat Loss: Facial region, Upper arm, Lower arm and Thoracic/intercostal:  Moderate-Severe  Muscle Loss: Temporal, Facial & jaw region, Thoracic region (clavicle, acromium bone, deltoid, trapezius, pectoral), Upper arm (bicep, tricep), Lower arm  (forearm), Dorsal hand, Patellar region and Posterior calf: Moderate-Severe  Fluid Accumulation/Edema: Does not meet criteria (trace)  Malnutrition Diagnosis: Severe malnutrition in the context of acute illness    Previous Goals   Patient to consume % of nutritionally adequate meal trays TID, or the equivalent with supplements/snacks.  Evaluation: Not met    Previous Nutrition Diagnosis  Inadequate protein-energy intake related to lack of appetite, weakness, and intermittent difficulty chewing as evidenced by pt report, moderate-severe subcutaneous fat and muscle wasting.  Evaluation: Declining    CURRENT NUTRITION DIAGNOSIS  Inadequate protein-energy intake related to lack of appetite, weakness, and intermittent difficulty chewing as evidenced by pt report, minimal to no intakes per calorie counts, moderate-severe subcutaneous fat and muscle wasting.    INTERVENTIONS  Implementation  Collaboration with other providers - Discussed plan for FEN/GI w/ team. MDs would like pt to have BM prior to starting TF.  Enteral Nutrition, Feeding tube flush, Multivitamin/mineral supplement therapy- Entered orders to initiate once MD approves.     Goals  Total avg nutritional intake to meet a minimum of 35 kcal/kg and 1.5 g PRO/kg daily (per dosing wt 60 kg).    Monitoring/Evaluation  Progress toward goals will be monitored and evaluated per protocol.    Marisabel Dominique RD, LD, [facility]  CVICU Dietitian  Pager: [phone]

## 2017-07-07 NOTE — PROGRESS NOTES
Calorie Counts    Intake Recorded For: 7/6 Kcals: 0 Protein: 0g    # Meals Recorded: No Food Intake Recorded    # Supplements Recorded: 0

## 2017-07-07 NOTE — PLAN OF CARE
Problem: Goal Outcome Summary  Goal: Goal Outcome Summary  OT 4E: OT Re-Evaluation completed. Pt now on strict bedrest with R IABP. Pt lethargic and demonstrated poor carryover of R leg precautions. Pt's SO educated on importance of sleep/wake cylce and non-pharmacological ways to reduce risk of delirium. Pt engaged in supine UE and LLE exercises x5-7 reps each. Pt limited by weakness, fatigue, and muscle aches with movement. Pt hemodynamically stable throughout.      Encourage pt to complete supine home exercise program 2-3x/daily to progress strength prior to LVAD.      Defer discharge recommendations pending medical course.

## 2017-07-07 NOTE — PLAN OF CARE
Problem: Goal Outcome Summary  Goal: Goal Outcome Summary  Outcome: No Change  Afebrile, confused overnight.  Pt tried crawling out of bed x2 states he is leaving, however is redirectable.  Confused to place and time intermittently.  Continues on 3L NC, coarse LS dim bases bilaterally, suctioning up bloody secretions, productive cough, sats %. 100% paced, SR HR 80-90 with underlying afib, occasional PVCs, MAPs 80-90s.  IABP to R groin, site with moist blood, scant oozing, immobilizer remains in place.  IABP 1:1 100% augmented ECG trigger.  Hemodynamics as follows: CVP 16/16/16, PA 60-66/28-32, SVO2 49-57, CO 3.5-4.4, CI 2-2.6, and SVR 9984-9356.  Continues on Dobutamine, Dopamine, Bumex, and Heparin gtts. Becerril replaced at start of shift per MD orders, UOP 175ml/hr.  Continous potassium replacement overnight.  Enema, mag citrate and senna given, no BM, audible BS with IABP sounds to abdomen. Abdominal xray ordered for this AM.  Will continue to update with any changes in condition per protocol.

## 2017-07-07 NOTE — PLAN OF CARE
Problem: Goal Outcome Summary  Goal: Goal Outcome Summary  Outcome: No Change  DX: Cardiogenic shock     D/I: 77 y/o male with PMHx ICM w/ biventricular HF admitted with cardiogenic shock requiring mechanical, inotropic, and afterload reduction support. Patient intermittently lethargic with disorientation to person and forgetful. Patient has calm, copperative demeanor, and is easily redirectable. Cardiac rhythm 100% paced with underlying a-fib. IABP 1:1 with 100% augmentation. Dobutamine and dopamine gtts unchanged. Echo at bedside- see results. CVP 14-12-12 CI 2.1-2.6. PAP 54/28-54/26-56/28. SvO2 52-60. Soap loli enema, lactulose, and magnesium citrate administered for constipation with lack of desired results. Smear x 2. NJ feeding tube placed; plan to start TF post BM. Heparin gtt therapeutic with 10a of 0.22.  Abdominal x-ray negative for obstruction, but positive for build-up of stool in colon. Patient's abdomen tender and distended.  Urine output >150/hr.      A: Patient dependent on support to sustain life.     P: LVAD on Monday, July 10th. Monitor electrolytes closely. Re orientate as needed. Begin TF post BM. Continue plan of care as ordered.      Nathen DUTTAN, RN, CCRN  4E

## 2017-07-08 NOTE — PROGRESS NOTES
"    CARDIOLOGY PROGRESS NOTE    Name: Perez Villalobos MRN: [id number]     Age: 76 year old    Date of admission: 6/30/2017 YOB: 1940            Subjective / Interval Events:     - hypoxic respiratory failure this AM with O2 sats in the 60s-70s on 15L O2/oxymask, found to have ongoing bleeding from his nose with blood tracking down nasopharynx  - O2 sat improved with suction, however due to ongoing bleeding and gurgling, there was concern for airway compromise and so he was intubated, wife notified and at bedside  - GI consulted, appreciate assistance, will place OGT to suction for today, [facility] tomorrow   - ENT consulted, appreciate assistance, able to investigate bleeding nasopharynx and achieve hemostasis  - BPs dropped to 60s/40s with MAPs in the 40s, responded well to fluid-- CVP was 7 and PCWP was 12 (net neg 2L for the day), bumex gtt stopped, anti-hypertensives stopped          Allergies:     Allergies   Allergen Reactions     Lisinopril Other (See Comments)     Angioedema     Augmentin Other (See Comments)     Per pt, \"froze him, affected his legs\"     Crestor [Rosuvastatin] Other (See Comments)     rhabdomyolysis             Vitals and Exam:   Temp:  [96.5  F (35.8  C)-97.8  F (36.6  C)] 97.8  F (36.6  C)  Heart Rate:  [75-91] 75  Resp:  [14-22] 20  BP: ()/(35-76) 92/64  FiO2 (%):  [50 %-60 %] 50 %  SpO2:  [88 %-100 %] 100 %    Vitals:    07/05/17 0400 07/06/17 0300 07/08/17 0600   Weight: 61.2 kg (134 lb 14.7 oz) 60.3 kg (132 lb 15 oz) 60.1 kg (132 lb 7.9 oz)        I/O last 3 completed shifts:  In: 1698.8 [I.V.:1158.8; NG/GT:540]  Out: 4267 [Urine:1667; Emesis/NG output:2600]    Vent Settings:  Resp: 20 SpO2: 100 % O2 Device: Mechanical Ventilator Oxygen Delivery: 4 LPM (turned up to 100% via oxi plus)  Ventilation Mode: CMV/AC  FiO2 (%): 50 %  Rate Set (breaths/minute): 12 breaths/min  Tidal Volume Set (mL): 450 mL  PEEP (cm H2O): 8 cmH2O  Oxygen Concentration (%): 50 %  Resp: " 20    General Appearance:   Intubated, sedated, blood from nares      Cardiovascular:     Tachycardic but regular rhythm     Pulm:    Mechanical breath sounds     Abd/GI:    Distended, tympanitic, bowel sounds hypoactive      Extremities: No CCE, IABP groin site C/D/I no oozing    Labs:  ABG:  Lab Results   Component Value Date    PH 7.57 (H) 07/08/2017    PH 7.52 (H) 07/05/2017    PH 7.56 (H) 07/04/2017    PO2 67 (L) 07/08/2017    PO2 150 (H) 07/05/2017    PO2 64 (L) 07/04/2017    SAT 28 03/23/2017    SAT 35 06/23/2016    PCO2 39 07/08/2017    PCO2 43 07/05/2017    PCO2 35 07/04/2017    HCO3 36 (H) 07/08/2017    HCO3 35 (H) 07/05/2017    HCO3 32 (H) 07/04/2017    HERMINIA 12.8 07/08/2017    HERMINIA 11.4 07/05/2017    HERMINIA 8.8 07/04/2017          Lab Results   Component Value Date     07/02/2017     07/02/2017     07/01/2017    Lab Results   Component Value Date    CHLORIDE 84 07/02/2017    CHLORIDE 84 07/02/2017    CHLORIDE 82 07/01/2017    Lab Results   Component Value Date    BUN 94 07/02/2017    BUN 97 07/02/2017     07/01/2017      Lab Results   Component Value Date    POTASSIUM 3.3 07/02/2017    POTASSIUM 3.4 07/02/2017    POTASSIUM 3.0 07/01/2017    Lab Results   Component Value Date    CO2 33 07/02/2017    CO2 31 07/02/2017    CO2 31 07/01/2017    Lab Results   Component Value Date    CR 2.77 07/02/2017    CR 2.89 07/02/2017    CR 3.09 07/01/2017        Lab Results   Component Value Date    WBC 9.2 07/08/2017    WBC 8.7 07/07/2017    WBC 9.3 07/06/2017    HGB 10.1 (L) 07/08/2017    HGB 9.3 (L) 07/08/2017    HGB 9.2 (L) 07/07/2017    HCT 32.3 (L) 07/08/2017    HCT 29.6 (L) 07/08/2017    HCT 29.2 (L) 07/07/2017    MCV 93 07/08/2017    MCV 92 07/07/2017    MCV 92 07/06/2017     (L) 07/08/2017     07/07/2017     07/06/2017     Lab Results   Component Value Date    AST 30 07/08/2017    AST 27 07/07/2017    AST 24 07/07/2017    ALT 49 07/08/2017    ALT 54 07/07/2017    ALT 63  2017    ALKPHOS 96 2017    ALKPHOS 85 2017    ALKPHOS 87 2017    BILITOTAL 2.0 (H) 2017    BILITOTAL 1.6 (H) 2017    BILITOTAL 1.7 (H) 2017    HONEY 24 2016     Lab Results   Component Value Date    INR 1.18 (H) 2017    INR 1.52 (H) 2017    INR 1.39 (H) 2017            Medications:   Drips: dopamine 2.5, dobutamine 7.5, furosemide 20mg/hr       naloxone  3 mg Oral or Feeding Tube TID     sodium chloride 0.9%  500 mL Intravenous Once     multivitamins with minerals  15 mL Per Feeding Tube Daily     vancomycin (VANCOCIN) IV  1,250 mg Intravenous Q24H     piperacillin-tazobactam  3.375 g Intravenous Q6H     senna-docusate  1 tablet Oral At Bedtime     aspirin  81 mg Oral Daily     calcitRIOL  0.25 mcg Oral Daily     clopidogrel  75 mg Oral Daily     cyanocobalamin  1,000 mcg Oral Daily     levothyroxine  150 mcg Oral Daily            Imagin2017  Impression:   1. Haziness throughout the right hemithorax is favored to represent  layering pleural effusion, likely not significantly changed.  2. Unchanged left basilar atelectasis versus consolidation and pleural  effusion.  3. Milfay-Dionna catheter tip projects over the main pulmonary artery.          Assessment and Plan:   76 year old man with ischemic CM EF 20% on home dobutamine 5, s/p CRT-D, CKD baseline Cr 1.5, PAD, alcohol dependence- sober for last few months is admitted with cardiogenic shock.      # Cardiogenic shock requiring IABP placement (2017)  # Respiratory failure due to bleeding   # Hemoptysis 2017, resolved   # Constipation  # RUE PICC associated DVT (2017)  # JACINTO on background of CKD baseline Cr 1.5  # Uremia  # Hepatocellular injury- more likely hepatic congestion, less likely shock liver    Plan  - d/c bumex gtt 1mg/hr   - continue dobutamine 7.5 gtt, dopamine 5 gtt  - continue IABP at 1:1  - continue heparin gtt   - hold home hydralazine and isordil  - continue asa  81, plavix 75  - OGT to suction for decompression, plan for [facility] tomorrow  - appreciate GI and ENT assistance   - will remain mechanically ventilated     Full Code. Discussed with patient  DVT prophylaxis: holding for now     This patient was seen and examined with the attending physician.    Jamal Clark MD  PGY5 Cardiology  Pager: [phone]  July 8, 2017

## 2017-07-08 NOTE — ANESTHESIA PREPROCEDURE EVALUATION
Anesthesia Evaluation     .             ROS/MED HX    ENT/Pulmonary:     (+)sleep apnea, , . .    Neurologic:       Cardiovascular:     (+) hypertension--CAD, --. : . CHF . . :. .       METS/Exercise Tolerance:     Hematologic:         Musculoskeletal:         GI/Hepatic:         Renal/Genitourinary:     (+) chronic renal disease, type: ARF,       Endo:     (+) thyroid problem .      Psychiatric:         Infectious Disease:         Malignancy:         Other:                     Physical Exam  Normal systems: cardiovascular and pulmonary    Airway   Mallampati: I  TM distance: >3 FB  Neck ROM: full    Dental     Cardiovascular       Pulmonary                     Anesthesia Plan      History & Physical Review  History and physical reviewed and following examination; no interval change.    ASA Status:  4 emergent.    NPO Status:  > 8 hours    Plan for General and ETT with Propofol induction. Maintenance will be TIVA.           Postoperative Care      Consents  Anesthetic plan, risks, benefits and alternatives discussed with:  Patient..        ANESTHESIA PREOP EVALUATION    Procedure: * No surgery found *    HPI: Perez Villalobos is a 76 year old male presenting for above procedure.    PMHx/PSHx/ROS:  Past Medical History:   Diagnosis Date     Acute renal failure (H)     10/2015 ARF secondary to rhabdomyolysis     Alcoholism (H)      Anemia      Benign essential HTN      BPH (benign prostatic hypertrophy)      CAD (coronary artery disease)     AWMI, stents to Cx, RCA and LAD     Chronic systolic heart failure (H)      CKD (chronic kidney disease)      Complete AV block (H)      Ischemic cardiomyopathy 10/23/2015    EF 30%     Low sodium levels      Mixed hyperlipidemia      NSTEMI (non-ST elevated myocardial infarction) (H) 10/23/2015     PAD (peripheral artery disease) (H)      Rhabdomyolysis due to statin therapy     crestor     Severe hypothyroidism 9/20/2016     VF (ventricular fibrillation) (H) 11/16/16       Past  "Surgical History:   Procedure Laterality Date     APPENDECTOMY       CHOLECYSTECTOMY       ENDARTERECTOMY CAROTID Left 6/11/10     IMPLANT AUTOMATIC IMPLANTABLE CARDIOVERTER DEFIBRILLATOR  11/16/16     PICC INSERTION Right 04/12/2017    5fr DL Bard PICC, 41cm (3cm external) in the R basilic vein w/ tip in the low SVC.     PICC INSERTION Right 07/02/2017    5fr TL Open-Ended PICC, 43cm (3cm external) in the R basilic vein w/ tip in the low SVC     STENT, CORONARY, S660 15/18  Nov 2000    PTCA with stent placement of CFX     STENT, CORONARY, S660 15/18  Feb 2001    PTCA with stent placement of CFX RCA      STENT, CORONARY, S660 15/18  April 2007    stent placed in LAD     tonsillectomy and adenoidectomy           Past Anes Hx: No personal or family h/o anesthesia problems    Soc Hx:   Social History   Substance Use Topics     Smoking status: Former Smoker     Packs/day: 1.50     Years: 20.00     Types: Cigarettes     Quit date: 1/1/1980     Smokeless tobacco: Never Used     Alcohol use 0.0 oz/week     0 Standard drinks or equivalent per week      Comment: occassionally       Allergies:   Allergies   Allergen Reactions     Lisinopril Other (See Comments)     Angioedema     Augmentin Other (See Comments)     Per pt, \"froze him, affected his legs\"     Crestor [Rosuvastatin] Other (See Comments)     rhabdomyolysis       Meds:   Prescriptions Prior to Admission   Medication Sig Dispense Refill Last Dose     potassium chloride (KLOR-CON) 20 MEQ Packet Take 40 meq in the a.m. and 20 meq in the p.m. 270 packet 3 6/30/2017 at Unknown time     ferrous sulfate (IRON) 325 (65 FE) MG tablet Take 1 tablet (325 mg) by mouth daily 100 tablet 0 6/30/2017 at Unknown time     torsemide (DEMADEX) 20 MG tablet Take 4 tablets (80 mg) by mouth 2 times daily 240 tablet 0 6/30/2017 at Unknown time     D5W 5 % SOLN 170 mL with DOBUTamine 250 MG/20ML SOLN 1,000 mg Inject 0.3555 mg/min into the vein continuous 1000 mL 0 6/30/2017 at Unknown time " "    acetaminophen (TYLENOL) 325 MG tablet Take 325-650 mg by mouth every 6 hours as needed for mild pain   Past Month at Unknown time     isosorbide dinitrate (ISORDIL) 30 MG tablet Take 2 tablets (60 mg) by mouth 3 times daily 90 tablet 3 6/30/2017 at Unknown time     hydrALAZINE (APRESOLINE) 100 MG TABS tablet Take 1 tablet (100 mg) by mouth 4 times daily 60 tablet 3 6/30/2017 at Unknown time     tolterodine (DETROL LA) 4 MG 24 hr capsule Take 4 mg by mouth daily   6/30/2017 at Unknown time     calcitRIOL (ROCALTROL) 0.25 MCG capsule Take 0.25 mcg by mouth daily   6/30/2017 at Unknown time     aspirin EC 81 MG EC tablet Take 81 mg by mouth daily   6/30/2017 at Unknown time     levothyroxine (SYNTHROID/LEVOTHROID) 150 MCG tablet Take 150 mcg by mouth daily   6/30/2017 at Unknown time     Saline (SODIUM CHLORIDE) 0.65 % SOLN Spray in nostril as needed   6/30/2017 at Unknown time     Cyanocobalamin (B-12) 1000 MCG TBCR Take 1,000 mcg by mouth daily 100 tablet 0 6/30/2017 at Unknown time     multivitamin, therapeutic with minerals (MULTI-VITAMIN) TABS Take 1 tablet by mouth daily   6/30/2017 at Unknown time     clopidogrel (PLAVIX) 75 MG tablet Take 75 mg by mouth daily   6/30/2017 at Unknown time       No current outpatient prescriptions on file.       Physical Exam:  Vitals: /56  Temp 36.3  C (97.4  F) (Axillary)  Resp 18  Ht 1.727 m (5' 8\")  Wt 60.1 kg (132 lb 7.9 oz)  SpO2 98%  BMI 20.15 kg/m2  BMI= Body mass index is 20.15 kg/(m^2).      Labs:  UPT: No results found for: HCGQUANT      BMP:  Recent Labs   Lab Test  07/08/17   0338   NA  142   POTASSIUM  4.1   CHLORIDE  98   CO2  37*   BUN  64*   CR  1.98*   GLC  126*   LEIDY  9.5     CBC:   Recent Labs   Lab Test  07/08/17 0227   WBC  9.2   RBC  3.18*   HGB  9.3*   HCT  29.6*   MCV  93   MCH  29.2   MCHC  31.4*   RDW  16.3*   PLT  135*     Coags:  Recent Labs   Lab Test  07/07/17   1650   04/12/17   0311   02/11/16   0948   INR  1.18*   < >  1.09   < > "  1.20*   PTT   --    --   33   < >   --    FIBR   --    --    --    --   657*    < > = values in this interval not displayed.       Assessment/Plan:  - ASA 4E  - Emergent intubation with standard ASA monitors, IV induction, TIVA    - Induction:   - CMAC 4 blade   - ETT 7.5   - Propofol, Rocuronium, phenylephrine  - Maintenance:   - Propofol      - Relevant risks, benefits, alternatives and the anesthetic plan were discussed with patient/family or family representative.  All questions were answered and there was agreement to proceed.      Jack Holm Jr., MD  Anesthesia Resident - [facility]  Pager: [phone]  7/8/2017  8:51 AM

## 2017-07-08 NOTE — PHARMACY-VANCOMYCIN DOSING SERVICE
Pharmacy Vancomycin Note  Date of Service 2017  Patient's  1940   76 year old, male    Indication: [facility] Pneumonia  Goal Trough Level: 15-20 mg/L  Day of Therapy: 2  Current Vancomycin regimen: intermittent    Current estimated CrCl = Estimated Creatinine Clearance: 25.6 mL/min (based on Cr of 2.06).    Creatinine for last 3 days  2017: 11:51 PM Creatinine 2.31 mg/dL  2017:  4:37 AM Creatinine 2.28 mg/dL;  4:16 PM Creatinine 2.18 mg/dL  2017:  3:23 AM Creatinine 2.03 mg/dL;  5:26 PM Creatinine 1.97 mg/dL  2017:  3:50 AM Creatinine 1.90 mg/dL;  4:50 PM Creatinine 2.06 mg/dL    Recent Vancomycin Levels (past 3 days)  2017:  4:50 PM Vancomycin Level 16.0 mg/L    Vancomycin IV Administrations (past 72 hours)                   vancomycin (VANCOCIN) 1,250 mg in NaCl 0.9 % 250 mL intermittent infusion (mg) 1,250 mg New Bag 17 1330                Nephrotoxins and other renal medications (Future)    Start     Dose/Rate Route Frequency Ordered Stop    17 1915  vancomycin (VANCOCIN) 1,250 mg in NaCl 0.9 % 250 mL intermittent infusion      1,250 mg  over 60 Minutes Intravenous EVERY 24 HOURS 17 1903      17 1200  piperacillin-tazobactam (ZOSYN) 3.375 g vial to attach to  mL bag      3.375 g  over 1 Hours Intravenous EVERY 6 HOURS 17 1136      17 1545  bumetanide (BUMEX) 0.25 mg/mL infusion      1 mg/hr  4 mL/hr  Intravenous CONTINUOUS 17 1540      17 1530  DOBUTamine (DOBUTREX) 1,000 mg in D5W 250 mL infusion (adult max conc)      7.5 mcg/kg/min × 65 kg (Dosing Weight)  7.3 mL/hr  Intravenous CONTINUOUS 17 1528      17 1530  DOPamine (INTROPIN) 800 mg in D5W 250 mL infusion (adult max conc)      2-20 mcg/kg/min × 65 kg (Dosing Weight)  2.4-24.4 mL/hr  Intravenous CONTINUOUS 17 1528               Contrast Orders - past 72 hours     None          Interpretation of levels and current regimen:  Trough level  is  Therapeutic    Has serum creatinine changed > 50% in last 72 hours: No    Urine output:  good urine output    Renal Function: Stable    Plan:  1.  Schedule Vancomycin 1250 mg iv q24h  2.  Pharmacy will check trough levels as appropriate in 1-3 Days.    3. Serum creatinine levels will be ordered daily for the first week of therapy and at least twice weekly for subsequent weeks.      Jaclyn Monge, PharmD        .

## 2017-07-08 NOTE — ANESTHESIA CARE TRANSFER NOTE
Patient: Perez Villalobos    * No procedures listed *    Diagnosis: * No pre-op diagnosis entered *  Diagnosis Additional Information: No value filed.    Anesthesia Type:   General, ETT     Note:  Airway :ETT  Patient transferred to:ICU  Comments: Airway :ETT  Patient transferred to:ICU  Comments: Patient intubated without event.  Sedation with propofol infusion started s/p intubation.    VSS.  Patient paralyzed and sedated.   Care transfer plan communicated and patient care transferred to ICU RN     Jack Holm Jr., MD  Anesthesia Resident - [facility]  Pager: [phone]  7/8/2017  8:57 AM        Vitals: (Last set prior to Anesthesia Care Transfer)    CRNA VITALS  7/8/2017 0827 - 7/8/2017 0857      7/8/2017             NIBP: (!)  87/63    NIBP Mean: 73    Ht Rate: 81                Electronically Signed By: Jack Holm MD  July 8, 2017  8:57 AM

## 2017-07-08 NOTE — PROVIDER NOTIFICATION
07/08/17 0800   Comments   Comments pt was intubated due to ongoing bleeding from nares as well as ENT consult performed to stop the bleeding   Vitals   Temp 97.8  F (36.6  C)   Temp src Axillary   Heart Rate 86   Heart Rate/Source Monitor;Pulse oximetry   Rhythm Regularity Apical pulse regular   BP 90/52   BP Location Left arm   BP - Mean 79   Resp 20   Positioning   Body Position lower extremity elevated, left;neutral body alignment;right, side-lying;supine  (right leg stays straight due to balloon pump)   Positioning/Transfer Devices pillows;in use   Daily Care   Activity Type activity minimized;bedrest   Activity Level of Assistance assistance, 1 person;assistance, 2 people   ECG   ECG Rhythm Paced rhythm   Ectopy None   Lead Monitored Lead II;V 1   Hemodynamic Monitoring   Type of Hemodynamic Monitoring CVP;Pulmonary artery monitoring   Oxygen Therapy   SpO2 (!) 88 %  (noted o2 as low as 86%.  Dr Clark from the Cards team notified )   Oxygen Delivery 4 LPM  (turned up to 100% via oxi plus)   Pain/Comfort   Patient Currently in Pain yes   Preferred Pain Scale CAPA (Clinically Aligned Pain Assessment) ([facility] Only)   Pain Location Abdomen   Pain Orientation Right;Left;Lower   Pain Descriptors Constant;Cramping   Pain Intervention(s) Repositioned;Massage   Response to Interventions No change in pain   Clinically Aligned Pain Assessment (CAPA) ([facility] Only)   Comfort intolerable   Change in Pain getting worse   Pain Control partially effective   Analgesia Side Effects Monitoring   Side Effects Monitoring: Respiratory Quality R   Side Effects Monitoring: Respiratory Depth N   Side Effects Monitoring: Sedation Level S   [place] Coma Scale   Best Eye Response 4-->(E4) spontaneous   Best Motor Response 6-->(M6) obeys commands   Best Verbal Response 4-->(V4) confused   [place] Coma Scale Score 14   Devices   Devices IABP;Pacemaker   Pacemaker   Pacemaker Permanent    Pacemaker Type Transthoracic   Pacer Mode DDD   Function Capturing   Paced Amount Completely paced (100%)   IABP   Assisted Systolic 88  (pt being intubated)   Assisted Diastolic 42   Pump Mean 77   Augmented Diastolic 105   IABP Heart Rate 86   D:  Upon taking over care of pt at 07, pt noted to be moaning out in pain c/o having abdominal pain.  Pt with noted bleeding from bilateral nares with ng tube to right nare.  Pt with coarse lungs with auscultation and wet sounding from the end of the bed.  Pt noted to have desaturation of o2 with oxygen saturation of 86%.    I:  RN paged the cards 2 team with above information.  RN turned up pt's oxygen via oxi plus to 100%  And did both oral and nasal suctioning to clear pt's bleeding.  Team decided to intubate pt due to decreasing saturations so anesthesia called to bedside to intubate and RN assisted with intubation.  RN provided pt's wife Annia with ongoing information when she arrived to room just prior to intubating the pt.  Propofol was started for sedation.    A;  Pt is sedated and continues with oozing from his nares.  Dr Alanis from ENT at bedside to assess pt's bleeding from nares.  She will be back later today to stop bleeding from nares. Last hgb recheck=10.1 up from 9.2 at 03:30.  Pt continues with balloon pump to right groin +1 pedal pulses bilaterally.    P;  Continue with current plan of care.  Pt to have a bedside colonoscopy performed later today.      Pt's heparin and bumex gtt were stopped.  Dopamine and Dobutamine running as well as CVP=7, and 6 so 2 l normal saline given to pt during the shift.  ENT at the bedside this afternoon and once the NG tube was taken out ENT noted a clot in the nose and didn't want to disturb it so she left it.  No further bleeding noted from pt's nares.  Pt with soft blood pressures during the shift, so pt was switched from Propofol to Versed, with pt's blood pressures tolerating the change well.  Will continue with current  plan of care.

## 2017-07-08 NOTE — CONSULTS
Otolaryngology Consult Note  July 8, 2017      CC: bleeding from somewhere (nasopharynx, oropharynx?) after NG tube insertion     HPI: Perez Villalobos is a 76 year old male with a past medical history of biventricular heart failure, CKD III, PAD admitted with cardiogenic shock with several bouts of emesis overnight requiring NG tube placement, with bleeding from mouth and nose after placement. The patient was on heparin with IABP, now the drip is turned off. The patient had an episode of what was thought to be aspiration of some of the blood and his O2 sats dropped. The team decided to intubate to prevent from further aspiration.   The patient has has oozing from his nose. Nursing staff packed the nose with a packing strip in the right naris where the NG tube was placed. They have suctioned some bloody secretions from his throat. Hemoglobin is stable.     Past Medical History:   Diagnosis Date     Acute renal failure (H)     10/2015 ARF secondary to rhabdomyolysis     Alcoholism (H)      Anemia      Benign essential HTN      BPH (benign prostatic hypertrophy)      CAD (coronary artery disease)     AWMI, stents to Cx, RCA and LAD     Chronic systolic heart failure (H)      CKD (chronic kidney disease)      Complete AV block (H)      Ischemic cardiomyopathy 10/23/2015    EF 30%     Low sodium levels      Mixed hyperlipidemia      NSTEMI (non-ST elevated myocardial infarction) (H) 10/23/2015     PAD (peripheral artery disease) (H)      Rhabdomyolysis due to statin therapy     crestor     Severe hypothyroidism 9/20/2016     VF (ventricular fibrillation) (H) 11/16/16       Past Surgical History:   Procedure Laterality Date     APPENDECTOMY       CHOLECYSTECTOMY       ENDARTERECTOMY CAROTID Left 6/11/10     IMPLANT AUTOMATIC IMPLANTABLE CARDIOVERTER DEFIBRILLATOR  11/16/16     PICC INSERTION Right 04/12/2017    5fr DL Bard PICC, 41cm (3cm external) in the R basilic vein w/ tip in the low SVC.     PICC INSERTION Right  "07/02/2017    5fr TL Open-Ended PICC, 43cm (3cm external) in the R basilic vein w/ tip in the low SVC     STENT, CORONARY, S660 15/18  Nov 2000    PTCA with stent placement of CFX     STENT, CORONARY, S660 15/18  Feb 2001    PTCA with stent placement of CFX RCA      STENT, CORONARY, S660 15/18  April 2007    stent placed in LAD     tonsillectomy and adenoidectomy         No current outpatient prescriptions on file.          Allergies   Allergen Reactions     Lisinopril Other (See Comments)     Angioedema     Augmentin Other (See Comments)     Per pt, \"froze him, affected his legs\"     Crestor [Rosuvastatin] Other (See Comments)     rhabdomyolysis       Social History     Social History     Marital status:      Spouse name: N/A     Number of children: N/A     Years of education: N/A     Occupational History     Not on file.     Social History Main Topics     Smoking status: Former Smoker     Packs/day: 1.50     Years: 20.00     Types: Cigarettes     Quit date: 1/1/1980     Smokeless tobacco: Never Used     Alcohol use 0.0 oz/week     0 Standard drinks or equivalent per week      Comment: occassionally     Drug use: No     Sexual activity: Not on file     Other Topics Concern     Caffeine Concern No     decaf coffee     Sleep Concern No     Stress Concern No     Weight Concern No     Special Diet Yes     low fat, low sodium     Exercise Yes     everyday     Social History Narrative       Family History   Problem Relation Age of Onset     Unknown/Adopted Mother      Unknown/Adopted Father        ROS: 12 point review of systems is negative unless noted in HPI.    PHYSICAL EXAM:  General: laying in bed, ventilated and sedated  BP 92/64  Temp 97.8  F (36.6  C) (Axillary)  Resp 20  Ht 1.727 m (5' 8\")  Wt 60.1 kg (132 lb 7.9 oz)  SpO2 100%  BMI 20.15 kg/m2  HEAD: normocephalic, atraumatic  Face: blood oozes from right, dependent naris.   Eyes: clear sclera  Nose: NG tube in right naris, dark blood oozing from " right naris which is dependent  Mouth: ET in place, dried blood on lips, tongue, suction returns dark clot  Oropharynx: no blood welling up in oropharynx, no evidence of bleeding from oropharynx, tongue  Neuro: ventilated, sedated    RIGID NASAL ENDOSCOPY:  Due to the need to assess the source of bleeding, fiberoptic nasal endoscopy was indicated. After obtaining verbal consent from wife, the fiberoptic scope was passed under endoscopic vision through bilateral nasal passages separately. There are a large amount of old blood clots in bilateral nares. The right NG tube is removed and an OG put in its place. The left nasal cavity is suctioned and the middle and inferior turbinates are well visualized with no mucosal bleeding. Septum without mucosal bleeding. There is still clot in the nasopharynx that will not come out with large bore phillip suctions, bayonette, 14 or 10 [place] trach suction. The left nasal cavity does show abrasions to the septum and inferior turbinate from the NG tube. Large amount of old clot is suctioned. Again, the posterior wall of the nasopharynx is not visualized due to old clot that cannot be removed. There is no current bleeding visualized. There is no welling of blood in the nasopharynx.   At this point the mouth is evaluated for pooling of blood. No fresh blood is visualized in the oropharynx or mouth, suction returns no bright red blood or clot.     ROUTINE IP LABS (Last four results)  BMP  Recent Labs  Lab 07/08/17  0338 07/08/17  0002 07/07/17  1650 07/07/17  0757 07/07/17  0350 07/07/17  0002     --  139  --  138 141   POTASSIUM 4.1 3.1* 3.2* 3.9 3.8 3.2*   CHLORIDE 98  --  96  --  94 98   LEIDY 9.5  --  8.9  --  9.0 8.9   CO2 37*  --  38*  --  37* 35*   BUN 64*  --  66*  --  67* 64*   CR 1.98*  --  2.06*  --  1.90* 1.93*   *  --  129*  --  124* 146*     CBC  Recent Labs  Lab 07/08/17  0852 07/08/17  0227 07/07/17  0350 07/06/17  1726 07/06/17  0323   WBC  --  9.2 8.7 9.3  10.1   RBC  --  3.18* 3.16* 3.08* 3.15*   HGB 10.1* 9.3* 9.2* 8.9* 9.0*   HCT 32.3* 29.6* 29.2* 28.3* 28.6*   MCV  --  93 92 92 91   MCH  --  29.2 29.1 28.9 28.6   MCHC  --  31.4* 31.5 31.4* 31.5   RDW  --  16.3* 16.3* 16.0* 15.9*   PLT  --  135* 155 150 146*     INR  Recent Labs  Lab 07/07/17  1650 07/04/17  1110   INR 1.18* 1.52*       Assessment and Plan  Perez Villalobos is a 76 year old male with a past medical history of biventricular heart failure admitted with cardiogenic shock, intubated and sedated with controlled bleed most likely from nasopharynx. The source of the bleed cannot be visualized on scope exam, but after NG was removed and heparin has been stopped, the bleeding seems to be controlled with a clot in the nasopharynx. We will allow the clot to continue hemostasis for now. If the patient continues to bleed, we will consider tamponade with lockwood.   -- if patient bleeds again, please use copious amounts of Afrin  -- please do not hesitate to call ENT resident on call with questions or concerns    Vilma Alanis MD PGY2  Otolaryngology-Head & Neck Surgery  Please page ENT with questions by dialing [phone] and entering job code [phone] when prompted.

## 2017-07-09 NOTE — PROGRESS NOTES
"    CARDIOLOGY PROGRESS NOTE    Name: Perez Villalobos MRN: [id number]     Age: 76 year old    Date of admission: 6/30/2017 YOB: 1940            Subjective / Interval Events:     - overnight abd distention increased despite placement of OGT to suction and rectal tube to continuous enema  - lactate incr to 7 from normal  - CT abd/pelvis showing diffuse pneumatosis, findings concerning for shock bowel   - General Surgery consulted, appreciate assistance, operative management would be extremely high risk and entail a protracted recovery course with significant morbidity if not mortality  - per wife, will proceed with conservative management with aggressive fluid resuscitation            Allergies:     Allergies   Allergen Reactions     Lisinopril Other (See Comments)     Angioedema     Augmentin Other (See Comments)     Per pt, \"froze him, affected his legs\"     Crestor [Rosuvastatin] Other (See Comments)     rhabdomyolysis             Vitals and Exam:   Temp:  [92.1  F (33.4  C)-97.7  F (36.5  C)] 96.3  F (35.7  C)  Heart Rate:  [59-81] 79  Resp:  [12-19] 12  BP: ()/(28-90) 114/86  FiO2 (%):  [40 %] 40 %  SpO2:  [85 %-100 %] 100 %    Vitals:    07/06/17 0300 07/08/17 0600 07/09/17 0400   Weight: 60.3 kg (132 lb 15 oz) 60.1 kg (132 lb 7.9 oz) 57.5 kg (126 lb 12.2 oz)        I/O last 3 completed shifts:  In: 4527.57 [I.V.:1687.57; NG/GT:90; IV Piggyback:2750]  Out: 2539 [Urine:639; Emesis/NG output:1900]    Vent Settings:  Resp: 12 SpO2: 100 % O2 Device: Mechanical Ventilator    Ventilation Mode: CMV/AC  FiO2 (%): 40 %  Rate Set (breaths/minute): 12 breaths/min  Tidal Volume Set (mL): 450 mL  PEEP (cm H2O): 8 cmH2O  Oxygen Concentration (%): 40 %  Resp: 12    General Appearance:   Intubated, sedated     Cardiovascular:     Tachycardic but regular rhythm     Pulm:    Mechanical breath sounds     Abd/GI:    Distended, tympanitic, bowel sounds hypoactive      Extremities: No CCE, IABP groin site C/D/I " no oozing    Labs:  ABG:  Lab Results   Component Value Date    PH 7.57 (H) 07/08/2017    PH 7.52 (H) 07/05/2017    PH 7.56 (H) 07/04/2017    PO2 67 (L) 07/08/2017    PO2 150 (H) 07/05/2017    PO2 64 (L) 07/04/2017    SAT 28 03/23/2017    SAT 35 06/23/2016    PCO2 39 07/08/2017    PCO2 43 07/05/2017    PCO2 35 07/04/2017    HCO3 36 (H) 07/08/2017    HCO3 35 (H) 07/05/2017    HCO3 32 (H) 07/04/2017    HERMINIA 12.8 07/08/2017    HERMINIA 11.4 07/05/2017    HERMINIA 8.8 07/04/2017          Lab Results   Component Value Date     07/02/2017     07/02/2017     07/01/2017    Lab Results   Component Value Date    CHLORIDE 84 07/02/2017    CHLORIDE 84 07/02/2017    CHLORIDE 82 07/01/2017    Lab Results   Component Value Date    BUN 94 07/02/2017    BUN 97 07/02/2017     07/01/2017      Lab Results   Component Value Date    POTASSIUM 3.3 07/02/2017    POTASSIUM 3.4 07/02/2017    POTASSIUM 3.0 07/01/2017    Lab Results   Component Value Date    CO2 33 07/02/2017    CO2 31 07/02/2017    CO2 31 07/01/2017    Lab Results   Component Value Date    CR 2.77 07/02/2017    CR 2.89 07/02/2017    CR 3.09 07/01/2017        Lab Results   Component Value Date    WBC 7.8 07/09/2017    WBC 6.3 07/09/2017    WBC 9.2 07/08/2017    HGB 10.1 (L) 07/09/2017    HGB 10.6 (L) 07/09/2017    HGB 10.1 (L) 07/08/2017    HCT 32.7 (L) 07/09/2017    HCT 34.5 (L) 07/09/2017    HCT 32.3 (L) 07/08/2017    MCV 93 07/09/2017    MCV 93 07/09/2017    MCV 93 07/08/2017     (L) 07/09/2017     (L) 07/09/2017     (L) 07/08/2017     Lab Results   Component Value Date    AST 44 07/09/2017    AST 42 07/09/2017    AST 25 07/08/2017    ALT 35 07/09/2017    ALT 35 07/09/2017    ALT 34 07/08/2017    ALKPHOS 143 07/09/2017    ALKPHOS 140 07/09/2017    ALKPHOS 108 07/08/2017    BILITOTAL 2.2 (H) 07/09/2017    BILITOTAL 2.2 (H) 07/09/2017    BILITOTAL 2.0 (H) 07/08/2017    HONEY 24 02/11/2016     Lab Results   Component Value Date    INR 1.38  (H) 2017    INR 1.18 (H) 2017    INR 1.52 (H) 2017            Medications:   Drips: dopamine 2.5, dobutamine 7.5, furosemide 20mg/hr       simethicone  160 mg Oral BID     vancomycin place russell - receiving intermittent dosing  1 each Does not apply See Admin Instructions     naloxone  3 mg Oral or Feeding Tube TID     multivitamins with minerals  15 mL Per Feeding Tube Daily     piperacillin-tazobactam  3.375 g Intravenous Q6H     senna-docusate  1 tablet Oral At Bedtime     aspirin  81 mg Oral Daily     calcitRIOL  0.25 mcg Oral Daily     cyanocobalamin  1,000 mcg Oral Daily     levothyroxine  150 mcg Oral Daily            Imagin2017  Impression:   1. Haziness throughout the right hemithorax is favored to represent  layering pleural effusion, likely not significantly changed.  2. Unchanged left basilar atelectasis versus consolidation and pleural  effusion.  3. [place] catheter tip projects over the main pulmonary artery.    2017  IMPRESSION:   1. Extensive small bowel dilatation, with small bowel pneumatosis and  portal venous gas extending into the liver.  There are also fluid  filled loops of small bowel and colon, with slightly indistinct  sigmoid colon walls.  Majority of the dilated bowel is small bowel.  Findings are overall concerning for ischemia of the bowel or shock  bowel, however complete evaluation is limited without IV contrast. No  definite transition point identified within the dilated loops of  bowel. Also moderate ascites.  Short term follow up CT is recommended.     2. Moderate bilateral pleural effusions.  3. Cystic foci within the uncinate process of the pancreas, could  represent IPMN. This may be evaluated with MRCP once patient has  stabilized.     [Urgent Result: Shock bowel]          Assessment and Plan:   76 year old man with ischemic CM EF 20% on home dobutamine 5, s/p CRT-D, CKD baseline Cr 1.5, PAD, alcohol dependence- sober for last few months is  admitted with cardiogenic shock.     # Ischemic bowel likely from from functional obstruction (CT abd/pelvis 7/9/2017)   # Cardiogenic shock requiring IABP placement (7/4/2017)  # Respiratory failure due to bleeding from traumatic NGT placement and airway compromise  # Hemoptysis 7/5/2017, resolved 7/6, likely from reinitiation of anticoagulation   # RUE PICC associated DVT (7/5/2017)  # JACINTO on background of CKD baseline Cr 1.5  # Uremia    Plan  - aggressive fluid resuscitation for ischemic bowel, conservative management for now per wife's wishes  - f//u General Surgery consult, appreciate assistance  - d/c rectal tube enema  - continue dobutamine 7.5 gtt, dopamine 5 gtt, wean as able  - continue IABP at 1:1  - hold home hydralazine and isordil  - OGT to suction for decompression  - appreciate GI and ENT assistance   - will remain mechanically ventilated     Full Code for now, however wife will discuss this with her children.   DVT prophylaxis: holding for now     This patient was seen and examined with the attending physician.    Jamal Clark MD  PGY5 Cardiology  Pager: [phone]  July 9, 2017

## 2017-07-09 NOTE — PLAN OF CARE
I/A:  Pt is sedated on a versed drip, PARK and grimaces with care, not following commands or tracking.  Current vent settings: CMV, FiO2 40%, RR 12, PEEP 8, .  Paced 60s-80s, CVP 7-8, PAP 20s/10s.  HOWARD CI 1.8-2.2.  SVR 1300-1600s.  Hypotensive throughout shift, Dr. Rosales and Dr. Crespo notified frequently regarding low NIBPs and low IABP means and augmented pressures.  Conservative fluid replacement given per orders, total 1L NS given over night via three boluses, 250 ml X2 over a total of two hours and one 500 ml X1 over 30 mins.  Dobutamine and Dopamine titrated per verbal orders by Dr. Rosales to keep MAPs >60, vasopressin drip initiated due to profound hypotension despite titration of pre-existing drips. UOP low throughout shift, Dr. Rosales and Dr. Crespo notified. Critical lab values for lactic acid and magnesium reported to Dr. Rosales and Dr. Crespo.  Continuous tap-water enema initiated at 2200, very minimal results at this time.  Pt hypothermic with initial temp of 92.2 F at beginning of evening, warming blanket applied, temp increased to 96.6 F.  P:  Dr. Rosales called and updated pt's wife, Annia, on events throughout the night.  CT scan once patient is stabilized.

## 2017-07-09 NOTE — PROGRESS NOTES
SPIRITUAL HEALTH SERVICES  [facility] [address]   ON-CALL VISIT    REFERRAL SOURCE: page from bedside nurse    Visited with family of patient Walker. Wife, Annia, and son, Dominic, were present along with other family members. Annia asked for prayer, and affirmed that the decision to move to comfort cares had been very painful. Dominic noted that he had been having good conversations with his father just two weeks ago. Pt Walker is [employer], as is family. Offered blessing and prayer and bedside, Annia was understandably grieving and noted she had no further SH needs at this time.    PLAN: Family is aware on-call chaplaincy available 24/7 by request to bedside nurse.       Heather Tesfaye MDiv, [facility]  Staff   Pager [phone]    * [facility] remains available 24/7 for emergent requests/referrals, either by having the switchboard page the on-call  or by entering an ASAP/STAT consult in Epic (this will also page the on-call ).*

## 2017-07-09 NOTE — PROVIDER NOTIFICATION
Pt with increased abdominal pain throughout the night accompanied by several episodes of emesis.  Dr. Ramirez and Dr. Cuevas notified and updated frequently throughout the night.  Around 0500, Dr. Ramirez gave verbal order to have NGT placed to LCWS.  During placement of NGT, NJT was dislodged.  Pt also developed significant epistaxis from right nare, packing was placed.  Dr. Cuevas notified of dislodgement of NJT, epistaxis and increased O2 needs.

## 2017-07-09 NOTE — PROVIDER NOTIFICATION
Dr. Rosales notified magnesium 7.2, lactic acid 6.3 - redraw sent per request.  NIBP MAPs 50s, IABP means 30s, augmented 60s.  Orders received to increase Dopamine to 10 mcg/kg/min and start vasopressin drip.

## 2017-07-09 NOTE — CONSULTS
General Surgery Consultation    Perez Villalobos  MRN#: [id number]    Date of Admission:  6/30/2017    Date of Consult: 7/9/2017    Reason for consult: Ischemic bowel vs shock bowel       Requesting service: Cardiology       Requesting provider: Dr. Zeke Shahid                   Assessment and Plan:   Assessment:   76 year old male with a h/o h/o severe ischemic cardiomyopathy on home dobutamine, HFrEF (20% on last echo), PAD, recent EtOH abuse (sober since 4/2017), hypothyroidism, and CKD (baseline Cr 1.3)  who presents with abdominal distention, lactic acidemia, pneumatosis, portal venous gas, and dilated bowel concerning for ischemic bowel vs shock bowel. Ischemic bowel more likely as no acute incision hypotensive event appreciated for shock bowel; however, he is definitely at increased risk given his hypoperfused state with cardiogenic shock.     The patient is a poor surgical candidate given his end-stage cardiac disease and co-morbidities. However, ischemic bowel is a terminal condition, while shock bowel may be reversible with resuscitation. While he may survive an exploratory laparotomy and small bowel resection, he would need to be left in discontinuity likely with an open abdomen for an extended period of time with an extremely high risk of perioperative complications and morbidity, especially given that he has a known hypoperfused stated which would make healing from any surgery, but especially healing an anastomosis, increasingly challenging. Additionally, he would still need eventual LVAD and would need to attempt to recover from any abdominal operation prior to being reconsidered for LVAD. An extensive discussion of these points was had with the primary team and the patient's wife and son. His wife asked appropriate questions and was very clear that he would not want to have surgery in the setting of such high risks of perioperative morbidity. She demonstrated understanding that the patient, if he  has ischemic bowel that is not treated with surgery, would die from this, and remained clear that he would not want a prolonged hospital course for such a poor quality of life. She would like to give the patient a chance to be resuscitated in the setting that this is shock bowel, and if no improvement would transition to comfort cares, which is a very reasonable plan.    - No plans for OR given patient's family's wishes and poor prognosis overall.  - Recommend at least 2L LR infused rapidly for resuscitation.  - Recommend recheck lactate after fluid bolus.  - Recommend d/c rectal irrigation and any other bowel regimen.  - General Surgery will continue to be available; please do not hesitate to contact us with any questions or if the family has additional questions or concerns.      ADDENDUM:  Lactate stayed stable at 7.2 and 6.8 on rechecks. He was made comfort cares and pronounced dead at ~6PM.      Seen and discussed with chief resident Dr. Talavera and staff Dr. Ragland. Also discussed with Cardiology team.  - - - - - - - - - - - - - - - - - -  Henna Alcantar MD  General Surgery PGY-2                 History of Present Illness:   Perez Villalobos is a 76 year old man with a h/o severe ischemic cardiomyopathy on home dobutamine, HFrEF (20% on last echo), PAD, recent EtOH abuse (sober since 4/2017), hypothyroidism, and CKD (baseline Cr 1.3) who was admitted for cardiogenic shock on 6/30/2017; he is scheduled for an LVAD placement tomorrow. He was recently reintubated due to large volume bleeding from his nasopharynx after NG placement but has been doing well intubated.    He has not had a bowel movement since the day of admission (10 days ago). An AXR was obtained on HD#7 demonstrating moderate stool burden and no obstruction, so an aggressive bowel regimen was initiated. A feeding tube was placed on HD#6, but tube feeds were not initiated. A repeat AXR was obtained on HD#8 after no BM that demonstrated no  obstruction, but an AXR on HD#9 (yesterday) was obtained and demonstrated dilated loops of bowel c/w colonic obstruction vs ileus. He was given a Golytely prep yesterday and started on irrigation enemas without relief of his constipation.     He had previously been on dobutamine and dopamine for his cardiogenic shock, but became hypotensive yesterday evening requiring the addition of vasopressin. He was also given 2L IVFs overnight and an additional 1L this morning. Given the change in status and an increase in abdominal distention, a CT a/p w/o contrast was obtained today today, and demonstrated small bowel pneumatosis, dilated loops of bowel, and portal venous gas concerning for ischemic vs shock bowel and General Surgery was consulted.     His lactate was 6.3 this morning (0.8 three days ago), but his WBC is wnl. He is still requiring 3 pressors but blood pressure is well maintained on these. He has not been febrile. His Cr has been slowly rising and was 2.3 today (from 2.1 day prior).         Past Medical History:     - Severe ischemic cardiomyopathy on home dobutamine infusions (13 stents in the past, vfib arrest 11/2016)  - HFrEF (20% on last echo)  - PAD  - Recent EtOH abuse (sober since 4/2017)  - Hypothyroidism  - CKD (baseline Cr 1.3)    Past Medical History:   Diagnosis Date     Acute renal failure (H)     10/2015 ARF secondary to rhabdomyolysis     Alcoholism (H)      Anemia      Benign essential HTN      BPH (benign prostatic hypertrophy)      CAD (coronary artery disease)     AWMI, stents to Cx, RCA and LAD     Chronic systolic heart failure (H)      CKD (chronic kidney disease)      Complete AV block (H)      Ischemic cardiomyopathy 10/23/2015    EF 30%     Low sodium levels      Mixed hyperlipidemia      NSTEMI (non-ST elevated myocardial infarction) (H) 10/23/2015     PAD (peripheral artery disease) (H)      Rhabdomyolysis due to statin therapy     crestor     Severe hypothyroidism 9/20/2016     VF  "(ventricular fibrillation) (H) 11/16/16             Past Surgical History:   - Appendectomy  - Cholecystectomy  - B/l carotid endarterectomy 2010  - Tonsillectomy and adenoidectomy    Past Surgical History:   Procedure Laterality Date     APPENDECTOMY       CHOLECYSTECTOMY       ENDARTERECTOMY CAROTID Left 6/11/10     IMPLANT AUTOMATIC IMPLANTABLE CARDIOVERTER DEFIBRILLATOR  11/16/16     PICC INSERTION Right 04/12/2017    5fr DL Bard PICC, 41cm (3cm external) in the R basilic vein w/ tip in the low SVC.     PICC INSERTION Right 07/02/2017    5fr TL Open-Ended PICC, 43cm (3cm external) in the R basilic vein w/ tip in the low SVC     STENT, CORONARY, S660 15/18  Nov 2000    PTCA with stent placement of CFX     STENT, CORONARY, S660 15/18  Feb 2001    PTCA with stent placement of CFX RCA      STENT, CORONARY, S660 15/18  April 2007    stent placed in LAD     tonsillectomy and adenoidectomy               Social History:   Tobacco: former smoker, quit 1980  EtOH: recent heavy user, quit 4/2017  Lives with wife locally, who was an ER RN.            Family History:   Unknown due to adoption.  Family History   Problem Relation Age of Onset     Unknown/Adopted Mother      Unknown/Adopted Father               Allergies:     Allergies   Allergen Reactions     Lisinopril Other (See Comments)     Angioedema     Augmentin Other (See Comments)     Per pt, \"froze him, affected his legs\"     Crestor [Rosuvastatin] Other (See Comments)     rhabdomyolysis             Medications:       No current facility-administered medications on file prior to encounter.   Current Outpatient Prescriptions on File Prior to Encounter:  potassium chloride (KLOR-CON) 20 MEQ Packet Take 40 meq in the a.m. and 20 meq in the p.m.   ferrous sulfate (IRON) 325 (65 FE) MG tablet Take 1 tablet (325 mg) by mouth daily   torsemide (DEMADEX) 20 MG tablet Take 4 tablets (80 mg) by mouth 2 times daily   D5W 5 % SOLN 170 mL with DOBUTamine 250 MG/20ML SOLN 1,000 mg " Inject 0.3555 mg/min into the vein continuous   acetaminophen (TYLENOL) 325 MG tablet Take 325-650 mg by mouth every 6 hours as needed for mild pain   isosorbide dinitrate (ISORDIL) 30 MG tablet Take 2 tablets (60 mg) by mouth 3 times daily   hydrALAZINE (APRESOLINE) 100 MG TABS tablet Take 1 tablet (100 mg) by mouth 4 times daily   tolterodine (DETROL LA) 4 MG 24 hr capsule Take 4 mg by mouth daily   calcitRIOL (ROCALTROL) 0.25 MCG capsule Take 0.25 mcg by mouth daily   aspirin EC 81 MG EC tablet Take 81 mg by mouth daily   levothyroxine (SYNTHROID/LEVOTHROID) 150 MCG tablet Take 150 mcg by mouth daily   Saline (SODIUM CHLORIDE) 0.65 % SOLN Spray in nostril as needed   Cyanocobalamin (B-12) 1000 MCG TBCR Take 1,000 mcg by mouth daily   multivitamin, therapeutic with minerals (MULTI-VITAMIN) TABS Take 1 tablet by mouth daily   clopidogrel (PLAVIX) 75 MG tablet Take 75 mg by mouth daily             Review of Systems:   10-point ROS otherwise negative except as noted above.          Physical Exam:   Temp:  [92.1  F (33.4  C)-97.7  F (36.5  C)] (P) 97.5  F (36.4  C)  Heart Rate:  [59-81] 80  Resp:  [13-19] 15  BP: ()/(28-83) 119/68  FiO2 (%):  [40 %-50 %] 40 %  SpO2:  [85 %-100 %] 100 %     General: sedated, grimaces to pain  CV: regular rate and rhythm, warm, well-perfused in extremities  Pulm: intubated  Abd: very distended and tympanic, tender to palpation in all four quadrants, firm, NG with gastric contents output, rectal irrigation in place with lightly-stool stained water present  Extremities: no edema appreciable  Neuro: sedated    I/O last 3 completed shifts:  In: 4527.57 [I.V.:1687.57; NG/GT:90; IV Piggyback:2750]  Out: 2539 [Urine:639; Emesis/NG output:1900]          Data:   Labs:  Complete Blood Count     Recent Labs  Lab 07/09/17  0330 07/08/17  0852 07/08/17  0227 07/07/17  0350 07/06/17  1726   WBC 6.3  --  9.2 8.7 9.3   HGB 10.6* 10.1* 9.3* 9.2* 8.9*   *  --  135* 155 150       Basic  Metabolic Panel    Recent Labs  Lab 07/09/17  0442 07/09/17  0330 07/08/17  2211 07/08/17  1648 07/08/17  0338  07/04/17  1759    143  --  144 142  < > 126*   POTASSIUM 4.9 4.8 4.3 3.1* 4.1  < > 3.0*   CHLORIDE 101 102  --  101 98  < > 83*   CO2 30 32  --  36* 37*  < > 34*   BUN 75* 74*  --  73* 64*  < > 93*   CR 2.32* 2.30*  --  2.10* 1.98*  < > 2.32*   GLC 94 100*  --  112* 126*  < > 136*   LEIDY 9.0 9.1  --  8.8 9.5  < > 9.0   MAG 7.5* 7.2*  --  6.9* 4.9*  < > 2.3   PHOS  --  5.4*  --  5.4* 3.6  --  3.4   < > = values in this interval not displayed.    Liver Function Tests    Recent Labs  Lab 07/09/17  0442 07/09/17  0330 07/08/17  1648 07/08/17  0338   AST 44 42 25 30   ALKPHOS 143 140 108 96   BILITOTAL 2.2* 2.2* 2.0* 2.0*   ALBUMIN 2.0* 2.0* 2.2* 3.0*     Coagulation Profile    Recent Labs  Lab 07/09/17  0330 07/07/17  1650 07/04/17  1110   INR 1.38* 1.18* 1.52*       Lactate    Recent Labs  Lab 07/09/17  0935 07/09/17  0330 07/06/17  0323 07/04/17  0807   LACT 7.4* 6.3* 0.8 1.8       Imaging:   Results for orders placed or performed during the hospital encounter of 06/30/17   XR Abdomen Port 1 View    Narrative    Examination: XR ABDOMEN PORT F1 VW, 7/6/2017 3:45 PM    Comparison: 11/17/2016, CT chest, abdomen and pelvis 10/3/2015, CT  chest 7/5/2017    History: eval for obstruction    Findings: Surgical clips in the right upper quadrant. Intra-aortic  balloon pump inferior marker projects over the L3 vertebral body  support devices in the chest partially visualized. Nonobstructive  bowel gas pattern. Moderate colonic stool. No definite pneumatosis or  portal venous gas. Bilateral pleural effusions.      Impression    Impression:   1. Nonobstructive bowel gas pattern. Moderate colonic stool.  2. Bilateral pleural effusions.    I have personally reviewed the examination and initial interpretation  and I agree with the findings.    AARON BELTRAN MD   XR Abdomen Port 1 View    Narrative    EXAM: XR  ABDOMEN PORT F1 VW  7/7/2017 8:24 AM     HISTORY:  eval for obstruction, gave an aggressive bowel regimen since  yesterday and no BM       COMPARISON: 7/6/2017    FINDINGS: Portable supine AP view of abdomen. Significant colonic  stool burden. Nonobstructive bowel gas pattern. Slightly dense stool  in the right upper quadrant colon. Cholecystectomy clips over the  right upper quadrant. Partially visualized [place] catheter,  coronary stent and cardiac defibrillator lead. Partially visualized  lower lungs are clear. Partially visualized intra-aortic balloon pump.  Degenerative changes of the hip joints.      Impression    IMPRESSION: Nonobstructive bowel gas pattern with significant colonic  stool burden.    I have personally reviewed the examination and initial interpretation  and I agree with the findings.    AARON BELTRAN MD   XR Abdomen Port 1 View    Narrative    EXAM: XR ABDOMEN PORT F1 VW  7/7/2017 12:33 PM     HISTORY:  Verify small bowel feeding tube bedside placement.       COMPARISON: Abdominal radiograph 7/7/2017.    FINDINGS: Single AP view of abdomen. Feeding tube tip over proximal  jejunum. Partially visualized cardiac defibrillator device, [place]  catheter and intra-aortic balloon pump. Nonobstructive bowel gas  pattern. Few nondilated gas-filled small bowel loops over the left  abdomen. Significant colonic stool burden in the right colon.      Impression    IMPRESSION:   1. Feeding tube tip over proximal jejunum.  2. Nonobstructive bowel gas pattern.    I have personally reviewed the examination and initial interpretation  and I agree with the findings.    AARON BELTRAN MD   XR Abdomen Port 1 View    Narrative    Exam: Abdominal x-ray  7/8/2017 3:52 AM      History: Abdominal pain    Comparison: Radiograph 7/7/2017    Findings: Feeding tube tip injection over the distal duodenum.  Cholecystectomy clips. Numerous mildly dilated loops of small bowel,  measuring up to 3.5 cm in diameter.  Dilated cecum and transverse  colon. No pneumatosis. No portal venous gas. Partially visualized  cardiac leads.      Impression    Impression: Numerous mildly dilated loops of small bowel and colon,  colonic obstruction versus ileus.    I have personally reviewed the examination and initial interpretation  and I agree with the findings.    JANELLE OSPINA MD   XR Abdomen Port 1 View    Narrative    XR ABDOMEN PORT F1 VW  7/8/2017 2:27 PM    History:  check placement of og.     Comparison: Chest radiograph dated 7/8/2017 at 3:49    Findings:   Single supine abdominal radiograph. Interval removal of feeding tube.  NG/OG tube with the tip and the sidehole projecting over left upper  quadrant, below the diaphragm. Partially visualized implantable  cardiac defibrillator and [place]-Dionna. Cholecystectomy surgical clips  projecting over right upper quadrant.    Mildly dilated small bowel loops, slightly improved from prior. No  pneumatosis or free air.      Impression    IMPRESSION:  1.  Interval removal of feeding tube. NG/OG tube with the tip and  sidehole projecting over left upper quadrant, below the diaphragm,  likely in the stomach.  2.  Multiple dilated small bowel loops, slightly improved from prior.  No pneumatosis or free air identified.    I have personally reviewed the examination and initial interpretation  and I agree with the findings.    OLIVER DICKSON MD   CT Abdomen Pelvis w/o Contrast     Value    Radiologist flags Shock bowel (Urgent)    Impression    IMPRESSION:   1. Diffuse small bowel pneumatosis, small bowel dilatation, and portal  venous gas extending into the liver. Findings are overall concerning  for shock bowel with functional ileus. No definite transition point  identified.  2. Haziness of the mesentery and free fluid throughout the abdomen and  pelvis may represent associated inflammatory changes. However, given  coexistent anasarca, there is likely some component of third  spacing.  3. Moderate bilateral pleural effusions.  4. Cystic foci within the uncinate process of the pancreas, likely  IPMN. This should be further evaluated with MRCP once patient has  stabilized.      [Urgent Result: Shock bowel]    Finding was identified on 7/9/2017 8:40 AM.     Dr. Clark was contacted by Dr. Jassi Alfaro at 7/9/2017 9:00 AM and  verbalized understanding of the urgent finding.

## 2017-07-09 NOTE — PROGRESS NOTES
GASTROENTEROLOGY PROGRESS NOTE      ASSESSMENT   77 yo male with biventricular heart failure on dobutamine, dopamine and balloon pump with CKD, PAD who was admitted with cardiogenic shock who developed bowel ischemia leading to worsening bowel distension, rising lactate, portal venous gas extending into the liver and extensive pneumatosis. Overall prognosis is poor and this was explained to the patient's wife and son who understood and mentioned that they had decided to review his condition in 4-5 hr and if no improvement or stabilization would consider withdrawing care.       PLAN  - consider surgery consult  - continue with rectal enemas  - continue with NG to intermittent suction  - consider repeat imaging if worsening status and if aligned with goals of care      Vinny Conn MD    [facility] - Department of Medicine  Division of Gastroenterology    -----------------------------------------------------------------  S  Worsening distension    O  VS: reviewed  Gen: alert oriented x3  CV: tachy  Lungs: cta bilat  Abd: distended with minimal bowel sounds  Extem: no edema  Neuro: grossly intact      LABS AND IMAGES  Reviewed and compared

## 2017-07-10 NOTE — DISCHARGE SUMMARY
Hospital Discharge Summary     Perez Villalobos MRN# [id number]   YOB: 1940 Age: 76 year old      Date of Admission: 6/30/2017  Date of Discharge:       07/09/17  Discharging MD:  Dr. Alfa Grimm       Discharge Diagnoses:    # Cardiogenic shock requiring IABP placement (7/4/2017)  # Ischemic bowel likely from from functional obstruction (CT abd/pelvis 7/9/2017)   # Respiratory failure due to bleeding from traumatic NGT placement and airway compromise  # Hemoptysis 7/5/2017, resolved 7/6, likely from reinitiation of anticoagulation   # RUE PICC associated DVT (7/5/2017)  # JACINTO on background of CKD baseline Cr 1.5  # Uremia    Procedures/Imaging Studies:  CT Chest 7/5/2017  IMPRESSION:   1. New regions of centrilobular nodules, groundglass and consolidative  opacities and mediastinal lymphadenopathy. Findings are suggestive of  an infective etiology, although aspiration cannot be excluded given  the amount of frothy debris within the trachea.  2. Probable cardiogenic pulmonary edema given the cardiomegaly,  interstitial thickening and pleural effusions.  3. Dilated pulmonary artery, which can be seen in the clinical setting  of pulmonary artery hypertension.    CT 7/9/2017  IMPRESSION:   1. Extensive small bowel dilatation, with small bowel pneumatosis and  portal venous gas extending into the liver.  There are also fluid  filled loops of small bowel and colon, with slightly indistinct  sigmoid colon walls.  Majority of the dilated bowel is small bowel.  Findings are overall concerning for ischemia of the bowel or shock  bowel, however complete evaluation is limited without IV contrast. No  definite transition point identified within the dilated loops of  bowel. Also moderate ascites.  Short term follow up CT is recommended.  2. Moderate bilateral pleural effusions.  3. Cystic foci within the uncinate process of the pancreas, could  represent IPMN. This may be  "evaluated with MRCP once patient has  stabilized.  [Urgent Result: Shock bowel]    TTE 7/7/2017  Interpretation Summary  The Ejection Fraction is estimated at 15-20%.  LAD territory akinesis.  Right ventricular function, chamber size, wall motion, and thickness are  normal.  Dilation of the inferior vena cava is present with abnormal respiratory  variation in diameter.  No pericardial effusion is present.    Brief HPI:  Per the H&P from 6/30/2017 by Dr. Lynn, \"76 year old man with ischemic CM EF 20% on home dobutamine 5, s/p CRT-D, CKD baseline Cr 1.5, PAD, alcohol dependence- sober for last few months is admitted with ambulatory cardiogenic shock.   He was hospitalized from 6/17-6/24 with similar presentation. At that time he was on home milrinone 0.375. With afterload reduction agents, his SVR became too low so oral afterload reducing agents were held and then gradually reintroduced. Due to JACINTO, his milrinone was also switched to Dobutamine. He has been taking meds as prescribed.  Over last 2 days, he has had more fatigue, dizziness, somnolence/confusion, abd swelling, nausea, right abdominal discomfort, vomiting after taking meds this morning. He has had 3 loose BMs in last 2 days but that's after taking stool softners. Denies palpations, ICD shocks or chest pain.  Labs remarkable for K 6.0  Cr 3.4 (2.3 on 6/24). LFTs in 300s ScVO2 36% CI 1.4\".    Hospital Course:    Mr. Villalobos was hospitalized and managed by the Cardiology 2 Advanced Heart Failure service. Briefly, he is a 75 yo male with dilated cardiomyopathy on home inotrope therapy, hx of several hospitalizations for heart failure exacerbations, and was under consideration for LVAD candidacy. Due to a history of alcohol dependence, he was determined not be eligible for LVAD implantation until August. On initial presentation, he was found to be in decompensated heart failure and was managed with aggressive diuresis, as well as increased inotrope " therapy.    Unfortunately, he continued to deteriorate clinically despite up-titration of dopamine and dobutamine to maximal effective doses. He had persistently low mixed central venous oxyhemoglobin levels and so an IABP was placed on 7/4/2017 for cardiac support. He then developed hemoptysis while on anticoagulation, and so Pulmonology was consulted. The etiology of his hemoptysis was determined to be most likely initiation of anticoagulation, though other causes were investigated including infection and PE. In the course of this evaluation he was found to have a PICC associated DVT and so was restarted on heparin. He had nasopharyngeal trauma during NGT insertion while on heparin, and due to concern for airway compromise, he was intubated. He was also evaluated by ENT and hemostasis was achieved.     He has a prior history of constipation and had gone 4-5 days without a bowel movement, and so was started on an aggressive bowel regimen. Unfortunately, he had developed ileus vs functional obstruction with a large stool burden in his Right colon. GI was consulted and he was started on regular enemas, without success. He had several episodes of hypotension while obstructed due to his cardiogenic shock. His abdominal exam became more concerning with increasing distention, and so a CT was done which showed findings consistent with ischemic bowel, with diffuse pneumatosis and pneumobilia. General Surgery was consulted and after discussing surgical options with Mrs. Villalobos and their son, the family opted to pursue conservative management. Ultimately Mr. Villalobos continued to deteriorate, and the decision was made by his wife and son to pursue comfort cares. He passed away on 7/9/2017 with his family at his bedside.     Pt seen and discussed with Attending Physician Dr. Grimm on the day of discharge.    Jamal Clark MD  PGY-5 Cardiology  Pager: [phone]  July 10, 2017

## 2017-07-10 NOTE — CONSULTS
Note to fulfill electronic consult order.  Please see consult note from 7/8/17 and progress note from earlier today.

## 2017-07-12 LAB
BACTERIA SPEC CULT: NO GROWTH
BACTERIA SPEC CULT: NO GROWTH
MICRO REPORT STATUS: NORMAL
MICRO REPORT STATUS: NORMAL
SPECIMEN SOURCE: NORMAL
SPECIMEN SOURCE: NORMAL

## 2017-07-14 ENCOUNTER — TELEPHONE (OUTPATIENT)
Dept: OTHER | Facility: CLINIC | Age: 77
End: 2017-07-14
Payer: COMMERCIAL

## 2017-08-01 NOTE — PROGRESS NOTES
This is a recent snapshot of the patient's [facility] medical record.  For current drug dose and complete information and questions, call [phone] or In Basket pool, [facility]v home infusion ([id number])  CSN Number:  [id number]

## 2017-08-02 NOTE — PROGRESS NOTES
This is a recent snapshot of the patient's [facility] medical record.  For current drug dose and complete information and questions, call [phone] or In Basket pool, [facility]v home infusion ([id number])  CSN Number:  [id number]

## 2020-03-25 NOTE — TELEPHONE ENCOUNTER
Received a call from patient and patient's wife Annia. They wanted to make sure cardiac rehab is covered by insurance. Annia gave me codes that are covered 98326 and 84472 (Monitored outpatient cardiac rehab phase II). Spoke to Genoveva at [facility]. They are using those codes. Patient should be covered. Called and spoke to Annia to inform her patient should be covered. I instructed Annia to call Cardiac Rehab, if there is any other questions or concerns.     TGarbers RN - C.O.R.KARLA. [facility]         
n/a

## 2020-04-28 NOTE — CONSULTS
GASTROENTEROLOGY CONSULTATION      Date of Admission:  6/30/2017  Date of Service:   7/8/2017          ASSESSMENT AND RECOMMENDATIONS:   Assessment:  Perez Villalobos is a 76 year old male with biventricular heart failure on home dobutamine and s/o CRT-D, CKD, PAD, and h/o alcohol abuse who is admitted with cardiogenic shock and plans for VAD placement 7/10. Patient has not had a BM for 8 days.  Constipation is likely multifactorial, with medications (iron, tolterodine, narcotics) and immobility contributing.  Imaging shows significant stool burden, largely on right side of colon, and increasing small bowel distension.  Concern for development of SBO related to stool burden.     Recommendations    - OG to LIS until abdominal distension and tenderness has improved  - When concern for obstruction has resolved, start golytely (dose as tolerated)  - Schedule methylnaltrexone BID  - Stop lactulose  - Hold iron and tolterodine  - Minimize narcotics, as able  - Maximize mobility, as able    Gastroenterology follow up recommendations: Will continue to follow    Thank you for involving us in this patient's care. Please do not hesitate to contact the GI service with any questions or concerns.     Pt care plan discussed with Dr. Aldana, GI staff physician.    Gelndy Wong MD  GI Fellow  [phone]  -------------------------------------------------------------------------------------------------------------------          Reason for Consult:   We were asked by Dr. Clark to evaluate this patient with constipation    History is obtained from the patient and the medical record.          History of Present Illness:     Perez Villalobos is a 76 year old male with biventricular heart failure on home dobutamine and s/o CRT-D, CKD, PAD, and h/o alcohol abuse who is admitted with cardiogenic shock and plans for VAD placement 7/10. Patient has not had a BM for 8 days.  Wife reports he has a history of intermittent constipation that is usually  Ese presents today for her OB visit.    Patient would like communication of their results via:   Tom    Patient's current [facility] status: Active.     Chaperone needed:  No   caught early and treated with prune juice.  No diarrhea or blood associated with stool.  Patient had been tolerating diet through yesterday, but developed increased abdominal distension yesterday evening and had an episode of emesis overnight. Significant naso-pharyngeal trauma occurred with placement attempt of NG tube.  Patient was intubated for airway protection this morning, and ENT has since packed the bleed.             Review of Systems:   Unable to obtain 2/2 intubation           Past Medical History:   Reviewed and edited as appropriate  Past Medical History:   Diagnosis Date     Acute renal failure (H)     10/2015 ARF secondary to rhabdomyolysis     Alcoholism (H)      Anemia      Benign essential HTN      BPH (benign prostatic hypertrophy)      CAD (coronary artery disease)     AWMI, stents to Cx, RCA and LAD     Chronic systolic heart failure (H)      CKD (chronic kidney disease)      Complete AV block (H)      Ischemic cardiomyopathy 10/23/2015    EF 30%     Low sodium levels      Mixed hyperlipidemia      NSTEMI (non-ST elevated myocardial infarction) (H) 10/23/2015     PAD (peripheral artery disease) (H)      Rhabdomyolysis due to statin therapy     crestor     Severe hypothyroidism 9/20/2016     VF (ventricular fibrillation) (H) 11/16/16            Past Surgical History:   Reviewed and edited as appropriate   Past Surgical History:   Procedure Laterality Date     APPENDECTOMY       CHOLECYSTECTOMY       ENDARTERECTOMY CAROTID Left 6/11/10     IMPLANT AUTOMATIC IMPLANTABLE CARDIOVERTER DEFIBRILLATOR  11/16/16     PICC INSERTION Right 04/12/2017    5fr DL Bard PICC, 41cm (3cm external) in the R basilic vein w/ tip in the low SVC.     PICC INSERTION Right 07/02/2017    5fr TL Open-Ended PICC, 43cm (3cm external) in the R basilic vein w/ tip in the low SVC     STENT, CORONARY, S660 15/18  Nov 2000    PTCA with stent placement of CFX     STENT, CORONARY, S660 15/18  Feb 2001    PTCA with stent placement  "of CFX RCA      STENT, CORONARY, S660 15/18  April 2007    stent placed in LAD     tonsillectomy and adenoidectomy              Previous Endoscopy:   No results found for this or any previous visit.         Social History:   Reviewed and edited as appropriate  Social History     Social History     Marital status:      Spouse name: N/A     Number of children: N/A     Years of education: N/A     Occupational History     Not on file.     Social History Main Topics     Smoking status: Former Smoker     Packs/day: 1.50     Years: 20.00     Types: Cigarettes     Quit date: 1/1/1980     Smokeless tobacco: Never Used     Alcohol use 0.0 oz/week     0 Standard drinks or equivalent per week      Comment: occassionally     Drug use: No     Sexual activity: Not on file     Other Topics Concern     Caffeine Concern No     decaf coffee     Sleep Concern No     Stress Concern No     Weight Concern No     Special Diet Yes     low fat, low sodium     Exercise Yes     everyday     Social History Narrative            Family History:   Reviewed and edited as appropriate  Family History   Problem Relation Age of Onset     Unknown/Adopted Mother      Unknown/Adopted Father            Allergies:   Reviewed and edited as appropriate     Allergies   Allergen Reactions     Lisinopril Other (See Comments)     Angioedema     Augmentin Other (See Comments)     Per pt, \"froze him, affected his legs\"     Crestor [Rosuvastatin] Other (See Comments)     rhabdomyolysis            Medications:     Prescriptions Prior to Admission   Medication Sig Dispense Refill Last Dose     potassium chloride (KLOR-CON) 20 MEQ Packet Take 40 meq in the a.m. and 20 meq in the p.m. 270 packet 3 6/30/2017 at Unknown time     ferrous sulfate (IRON) 325 (65 FE) MG tablet Take 1 tablet (325 mg) by mouth daily 100 tablet 0 6/30/2017 at Unknown time     torsemide (DEMADEX) 20 MG tablet Take 4 tablets (80 mg) by mouth 2 times daily 240 tablet 0 6/30/2017 at Unknown " "time     D5W 5 % SOLN 170 mL with DOBUTamine 250 MG/20ML SOLN 1,000 mg Inject 0.3555 mg/min into the vein continuous 1000 mL 0 6/30/2017 at Unknown time     acetaminophen (TYLENOL) 325 MG tablet Take 325-650 mg by mouth every 6 hours as needed for mild pain   Past Month at Unknown time     isosorbide dinitrate (ISORDIL) 30 MG tablet Take 2 tablets (60 mg) by mouth 3 times daily 90 tablet 3 6/30/2017 at Unknown time     hydrALAZINE (APRESOLINE) 100 MG TABS tablet Take 1 tablet (100 mg) by mouth 4 times daily 60 tablet 3 6/30/2017 at Unknown time     tolterodine (DETROL LA) 4 MG 24 hr capsule Take 4 mg by mouth daily   6/30/2017 at Unknown time     calcitRIOL (ROCALTROL) 0.25 MCG capsule Take 0.25 mcg by mouth daily   6/30/2017 at Unknown time     aspirin EC 81 MG EC tablet Take 81 mg by mouth daily   6/30/2017 at Unknown time     levothyroxine (SYNTHROID/LEVOTHROID) 150 MCG tablet Take 150 mcg by mouth daily   6/30/2017 at Unknown time     Saline (SODIUM CHLORIDE) 0.65 % SOLN Spray in nostril as needed   6/30/2017 at Unknown time     Cyanocobalamin (B-12) 1000 MCG TBCR Take 1,000 mcg by mouth daily 100 tablet 0 6/30/2017 at Unknown time     multivitamin, therapeutic with minerals (MULTI-VITAMIN) TABS Take 1 tablet by mouth daily   6/30/2017 at Unknown time     clopidogrel (PLAVIX) 75 MG tablet Take 75 mg by mouth daily   6/30/2017 at Unknown time             Physical Exam:   /56  Temp 97.4  F (36.3  C) (Axillary)  Resp 18  Ht 1.727 m (5' 8\")  Wt 60.1 kg (132 lb 7.9 oz)  SpO2 100%  BMI 20.15 kg/m2  Wt:   Wt Readings from Last 2 Encounters:   07/08/17 60.1 kg (132 lb 7.9 oz)   06/27/17 65.3 kg (144 lb)        Constitutional: Intubated, comfortable  Eyes: Sclera anicteric/injected  Ears/nose/mouth/throat: Orally intubated with dried blood in tubing, on face, and surrounding region, OG in place  CV: Regular, with murmur, on balloon pump  Respiratory: Intubated, equal breath sounds  Abd: Distended, diffusely " tender, hypoactive BS  Skin: Perfused  Neuro: Comfortable, nods and grimaces             Data:   All available labs, imaging, and procedure notes were independently reviewed and interpreted, pertinent values are as follow:    BMP  Recent Labs  Lab 07/08/17  0338 07/08/17  0002 07/07/17  1650 07/07/17  0757 07/07/17  0350 07/07/17  0002     --  139  --  138 141   POTASSIUM 4.1 3.1* 3.2* 3.9 3.8 3.2*   CHLORIDE 98  --  96  --  94 98   LEIDY 9.5  --  8.9  --  9.0 8.9   CO2 37*  --  38*  --  37* 35*   BUN 64*  --  66*  --  67* 64*   CR 1.98*  --  2.06*  --  1.90* 1.93*   *  --  129*  --  124* 146*     CBC  Recent Labs  Lab 07/08/17 0227 07/07/17  0350 07/06/17  1726 07/06/17  0323   WBC 9.2 8.7 9.3 10.1   RBC 3.18* 3.16* 3.08* 3.15*   HGB 9.3* 9.2* 8.9* 9.0*   HCT 29.6* 29.2* 28.3* 28.6*   MCV 93 92 92 91   MCH 29.2 29.1 28.9 28.6   MCHC 31.4* 31.5 31.4* 31.5   RDW 16.3* 16.3* 16.0* 15.9*   * 155 150 146*     INR  Recent Labs  Lab 07/07/17 1650 07/04/17  1110   INR 1.18* 1.52*     LFTs  Recent Labs  Lab 07/08/17 0338 07/07/17  1650 07/07/17  0350 07/06/17  1726   ALKPHOS 96 85 87 90   AST 30 27 24 30   ALT 49 54 63 67   BILITOTAL 2.0* 1.6* 1.7* 1.5*   PROTTOTAL 7.6 7.3 7.5 7.4   ALBUMIN 3.0* 3.0* 3.1* 3.1*      PANCNo lab results found in last 7 days.

## 2020-08-19 NOTE — PLAN OF CARE
Problem: Goal Outcome Summary  Goal: Goal Outcome Summary  D: 77 y/o male PMHx of CAD, HFrEF (NYHA IV, stage D) who was admitted for evaluation of decompensated HFrEF.  Currently in ICU 2/2 swan-Dionna catheter in place      A/I: A&Ox4, pt on RA,lung sounds clear, HR irregular- paced via ventricular paced pace maker. BP stable.afebrile.  No BM's overnight. Okay urine output with lasix drip and HOWARD calculated 3 times.second lab not resulted 2/2 miss communication between RN and lab.      P: Continue to Monitor and Assess patient. Notify MD of any changes. Continue POC.              oriented to person, place and time, normal sensation, short and long term memory intact, sensory exam intact

## 2020-11-22 NOTE — PROGRESS NOTES
D/A: Alert and oriented X4, denies pain  VSS on Room air  100% Vpaced  CVP 20, PAP 64/33, CO 3.1, CI 1.7, SVR 2050  Hgb 9.8, K 4.3, Cr 1.99  Continues on dobutamine gtt  Continues on 2g Na diet, 2L fluid restriction  Voiding adequately via urinal  LBM 6/12    I: melatonin given to enhance sleep  K replaced last night  60mg lasix given and gtt started this morning;     P: Continue POC       Weakness

## 2021-11-16 NOTE — PLAN OF CARE
Problem: Goal Outcome Summary  Goal: Goal Outcome Summary  BP high this am with MAP ~ 90,  Minoxidil given and hemodynamics improved SVR now 1300s, CI 2.3-2.5, however CVP now at 20, metolazone given. [place] Dc at 1430 per order and orders for 6C obtained. Pt ambulated in [place] x2 and up to chair most of the day. Denies pain. Wife in and updated by RN and MD. Awaiting bed on 6C  Amy Hi 6/3/2017        Update: Report called to Paula on 6C, pt to 6C at 1730 with RN. All belongings sent with pt   - - -

## 2022-01-01 NOTE — NURSING NOTE
"Chief Complaint   Patient presents with     RECHECK     Oximeter f/u       Initial /71 mmHg  Pulse 65  Ht 1.74 m (5' 8.5\")  Wt 70.035 kg (154 lb 6.4 oz)  BMI 23.13 kg/m2  SpO2 100% Estimated body mass index is 23.13 kg/(m^2) as calculated from the following:    Height as of this encounter: 1.74 m (5' 8.5\").    Weight as of this encounter: 70.035 kg (154 lb 6.4 oz).  BP completed using cuff size: libia Helm LPN  " (2) good, crying

## 2023-07-03 NOTE — PROGRESS NOTES
Information noted.    Patient has not sent a cardioMEMS transmission since discharge from the hospital 4/15/17. Left a voicemail requesting that a transmission be sent today and tomorrow morning so the data is available for the office visit. Vijaya GILL

## 2023-12-15 NOTE — PLAN OF CARE
Problem: Goal Outcome Summary  Goal: Goal Outcome Summary  No significant change in status today. Continues on lasix gtt @5mg/hr. LE edema persists but improving slowly. Weight was up today. Cr 1.69. K down to 3.2-replaced w/total 60 mEq KCl and plan to recheck lytes at 1800.   Persistant harsh congested cough. Started trying tessalon tid. Able to cough up small amt phlegm intermittently.   Milrinone remains at 0.375mcg/kg/min via PICC.  Pt and wife want to pursue plan for Ng catheter placement during this hospital stay if possible.        oriented to person, place and time, cooperative with exam

## 2024-01-15 NOTE — H&P
[facility]    History and Physical  Hospitalist       Date of Admission:  3/29/2017  Date of Service (when I saw the patient): 03/29/17    Assessment & Plan   Perez Villalobos is a 76 year old male with a history of CAD s/p multiple stents, ischemic cardiomyopathy and systolic heart failure with EF of 35%, V-fib arrest s/p ICD, hypertension, hyperlipidemia, ckd, hypothyroidism, sleep apnea and bph who presents from [facility] due to abnormal labs and continued dyspnea on exertion.     Chronic Systolic Heart Failure   Presents with c/o ongoing NOEL and abnormal labs from [facility].  Unclear if this represents an overt acute exacerbation of his HF but more likely that he had a recent exacerbation this past weekend with increased weight (151 lbs), worsening LE edema and scrotal edema but has since been, based on his biochemical profile, aggressively diuresed with his Bumex and Metolazone as he describes significantly improved LE edema and scrotal edema with a weight this am of 146 lbs (Goal was 142-146 lbs).  His BNP is elevated at > 31K, but overall does not appear significantly fluid overloaded and with a fairly clear cxr and clear lung exam. His bun is up to 101 with a rise in his cr to 2.24 (up from 1.89 2 days ago).       - He did receive iv lasix 40 mg in the ER.    - I will hold off on further iv diuresis tonight until seen by cardiology in AM.    - Cariology consulted.    - Hold PTA Bumex and Metolazone.   - Daily weights, I's and O's.   - Fluid restrict to 1500 ml daily.    - Follow BMP.    - C/w PTA Coreg, hydralazine, and Imdur. Intolerant to Ace-i due to angioedema and Aldactone due to hyponatremia.     - Of note he is planned for a CardioMEMS device procedure next week.      Hyponatremia  Na noted to be 123 today and was 126 on 3/27.  Suspect this is related to his PTA diuretics and HF.    - Holding pta Bumex and Metolazone.    - Received iv lasix in the ER.   - Hold on further diuretics  until seen by cards.    - Follow BMP in am.    - Serum osmol pending.    - TSH    Acute on chronic kidney disease   Cr noted to be 2.24 on admit. This is up from 1.89 2 days ago and from his baseline of 1.5-1.6 this past year but has been higher recently and I suspect his diuretic adjustments are the cause. BUn of 101.   - Suspect he may actually be a little pre-renal due to aggressive diuretics and that this is not a sole cardiorenal cause.    - Holding further lasix until seen by cards.    - BMP in am.       CAD  HTN  H/o V-fib arrest s/p ICD  Stable at present. Will c/w pta meds as above.     Hyperlipidemia   Intolerant to statins due to rhabdo and ARF.    - C/w pta krill oil on d/c.     Hypothyroidism   C/w pta Synthroid.    - TSH.     Sleep Apnea  Denies an obstructive source but related to CHF.    - C/w pta bipap.           DVT Prophylaxis: Pneumatic Compression Devices  Code Status: Full Code    Disposition: Pending clinical coarse.       Yan Coulter       Primary Care Physician   *Hector Jc    Chief Complaint   Abnormal electrolytes and NOEL    History is obtained from the patient    History of Present Illness   Perez Villalobos is a 76 year old male with a history of CAD s/p multiple stents, ischemic cardiomyopathy and systolic heart failure with EF of 35%, V-fib arrest s/p ICD, hypertension, hyperlipidemia, ckd, hypothyroidism, sleep apnea and bph who presents [facility] due to abnormal labs and continued dyspnea on exertion.  According to the patient he has been working with his cardiologist Dr. Tran and the [facility] to get his weights down and has been adjusting his diuretics under their guidance.  His goal weight was 142-146 lbs.  This past weekend he weighed 151 lbs and had noted significant LE edema as well as scrotal edema. He had been instructed that in addition to his Bumex to take Metolazone 2.5mg QOD until his weights are below 146 lbs. Since this time he has noted  good UO and that his LE and scrotal edema have significantly improved.  His last dose of Metolazone was on Monday as he was told to stop this after labs were drawn and showed hyponatremia and hypokalemia on 3/27.  Despite this he continues to feel NOEL, fatigued and with weakness in his legs. He denies any orthopnea, chest pain, fevers, chills, nausea, vomiting, abdominal pain or dysuria. He did have some diarrhea the last few days but had been taking prune juice for constipation. He admits compliance with his medications and low salt diet.  He did have a mechanical fall this morning after slipping on a slipper but denies any preceding symptoms, loc and noted only mild head trauma.  He does c/o some right elbow pain.  He was seen in [facility] today and referred to the ER for ongoing hyponatremia and JACINTO.    Here in the ER over 71, pulse of 61, afebrile and saturating at 97% on room air.  Labs are notable for sodium of 123, chloride of 79, BUN of 101 the creatinine of 2.24 bicarbonate 37.  BNP is elevated at greater than 31,000.  Troponin is negative ×1.  CBC shows a hemoglobin of 12.9 but otherwise no significant acute abnormalities.  CT scan of his head was done showing no acute pathology or evidence of any bleeding.  Right elbow x-ray shows nothing acute with no fracture or dislocation.  Chest x-ray shows small bilateral pleural effusions, cardiomegaly with multi chamber enlargement.  Calcified granuloma in the left lung base chest is otherwise negative.  EKG shows ventricular paced rhythm with no signs of acute ischemia.  He was given a dose of iv lasix 40 mg in the under the direction of the cardiology NP.  Admission was requested for ongoing mngt of heart failure.                  Past Medical History    I have reviewed this patient's medical history and updated it with pertinent information if needed.   Past Medical History:   Diagnosis Date     Acute renal failure (H)     10/2015 ARF secondary to  rhabdomyolysis     Alcoholism (H)      Anemia      Benign essential HTN      BPH (benign prostatic hypertrophy)      CAD (coronary artery disease)     AWMI, stents to Cx, RCA and LAD     Chronic systolic heart failure (H)      CKD (chronic kidney disease)      Complete AV block (H)      Ischemic cardiomyopathy 10/23/2015    EF 30%     Mixed hyperlipidemia      NSTEMI (non-ST elevated myocardial infarction) (H) 10/23/2015     PAD (peripheral artery disease) (H)      Rhabdomyolysis due to statin therapy     crestor     Severe hypothyroidism 2016     VF (ventricular fibrillation) (H) 16       Past Surgical History   I have reviewed this patient's surgical history and updated it with pertinent information if needed.  Past Surgical History:   Procedure Laterality Date     APPENDECTOMY       CHOLECYSTECTOMY       ENDARTERECTOMY CAROTID Left 6/11/10     IMPLANT AUTOMATIC IMPLANTABLE CARDIOVERTER DEFIBRILLATOR  16     STENT, CORONARY, S660 15/18  2000    PTCA with stent placement of CFX     STENT, CORONARY, S660 15/18  2001    PTCA with stent placement of CFX RCA      STENT, CORONARY, S660 15/18  2007    stent placed in LAD     tonsillectomy and adenoidectomy         Prior to Admission Medications   Prior to Admission Medications   Prescriptions Last Dose Informant Patient Reported? Taking?   Cyanocobalamin (B-12) 1000 MCG TBCR  Spouse/Significant Other No No   Sig: Take 1,000 mcg by mouth daily   KRILL OIL PO  Spouse/Significant Other Yes No   Sig: Take 1 capsule by mouth daily    Saline (SODIUM CHLORIDE) 0.65 % SOLN  Spouse/Significant Other Yes No   Sig: Spray in nostril as needed   aspirin EC 81 MG EC tablet   Yes No   Sig: Take 81 mg by mouth daily   bumetanide (BUMEX) 1 MG tablet   Yes No   Si tablets in AM and 1 tablet in the afternoon daily   calcitRIOL (ROCALTROL) 0.25 MCG capsule  Spouse/Significant Other Yes No   Sig: Take 0.25 mcg by mouth three times a week Monday, Wednesday  "and Friday   carvedilol (COREG) 3.125 MG tablet   No No   Sig: Take 3 tablets (9.375 mg) by mouth 2 times daily (with meals)   clopidogrel (PLAVIX) 75 MG tablet  Spouse/Significant Other Yes No   Sig: Take 75 mg by mouth daily   hydrALAZINE (APRESOLINE) 50 MG tablet  Spouse/Significant Other No No   Sig: Take 1 tablet (50 mg) by mouth 3 times daily   isosorbide mononitrate (IMDUR) 60 MG 24 hr tablet   No No   Sig: Take 1 tablet (60 mg) by mouth At Bedtime At hs   levothyroxine (SYNTHROID/LEVOTHROID) 150 MCG tablet   Yes No   Sig: Take 150 mcg by mouth daily   metolazone (ZAROXOLYN) 2.5 MG tablet   Yes No   Sig: One tablet every other day until wt is 146# per KMannchen CNP 3/23/17   multivitamin, therapeutic with minerals (MULTI-VITAMIN) TABS  Spouse/Significant Other Yes No   Sig: Take 1 tablet by mouth daily   potassium chloride SA (K-DUR/KLOR-CON M) 20 MEQ CR tablet   Yes No   Sig: Take 1 tablet (20 meq) every Monday, Wednesday, Friday      Facility-Administered Medications: None     Allergies   Allergies   Allergen Reactions     Lisinopril Other (See Comments)     Angioedema     Augmentin Other (See Comments)     Per pt, \"froze him, affected his legs\"     Crestor [Rosuvastatin] Other (See Comments)     rhabdomyolysis       Social History   I have reviewed this patient's social history and updated it with pertinent information if needed. Perez Villalobos  reports that he quit smoking about 37 years ago. His smoking use included Cigarettes. He has a 30.00 pack-year smoking history. He has never used smokeless tobacco. He reports that he drinks alcohol. He reports that he does not use illicit drugs. His alcohol use is reported as occasional.     Family History   I have reviewed this patient's family history and updated it with pertinent information if needed.   Family History   Problem Relation Age of Onset     Unknown/Adopted Mother      Unknown/Adopted Father        Review of Systems   The 10 point Review of Systems " is negative other than noted in the HPI or here.     Physical Exam   Temp: 97.8  F (36.6  C) Temp src: Oral BP: 139/81   Heart Rate: 65 Resp: 13 SpO2: 96 %      Vital Signs with Ranges  Temp:  [97.4  F (36.3  C)-97.8  F (36.6  C)] 97.8  F (36.6  C)  Pulse:  [62] 62  Heart Rate:  [61-65] 65  Resp:  [9-22] 13  BP: (128-139)/(70-81) 139/81  SpO2:  [96 %-100 %] 96 %  146 lbs 0 oz    Constitutional: Elderly white male. Awake, alert, cooperative, no apparent distress.  Eyes: Conjunctiva and pupils examined and normal.  HEENT: Moist mucous membranes, normal dentition.   Respiratory: Clear to auscultation bilaterally, no crackles or wheezing.  Cardiovascular: Regular rate and rhythm, normal S1 and S2, and no murmur noted.  GI: Soft, mildly distended, non-tender, normal bowel sounds.  Lymph/Hematologic: No anterior cervical or supraclavicular adenopathy.  Skin: No rashes, no cyanosis, 2-3+ pitting edema in his LE's up to the mid shins. Scrotal edema improved.   Musculoskeletal: No joint swelling, erythema.  Mild tenderness in the right elbow but with good ROM.   Neurologic: Cranial nerves 2-12 intact, normal strength and sensation. No focal deficits.  Psychiatric: Alert, oriented to person, place and time, no obvious anxiety or depression.    Data   Data reviewed today:  I personally reviewed the EKG tracing showing No acute pathology. .    Recent Labs  Lab 03/29/17  1435 03/29/17  1214 03/27/17  1418 03/23/17   WBC 6.4  --  6.7 6.2   HGB 12.9*  --  12.8* 12.5   MCV 98  --  97 97.9     --  149* 190   INR 1.12  --  1.10  --    NA  --  123* 126*  --    POTASSIUM  --  4.2 2.6*  --    CHLORIDE  --  79* 77*  --    CO2  --  37* 36*  --    BUN  --  101* 90*  --    CR  --  2.24* 1.89*  --    ANIONGAP  --  Not Calculated 13  --    LEIDY  --  9.6 8.6  --    GLC  --  134* 122*  --    TROPI 0.015  --   --   --        Recent Results (from the past 24 hour(s))   XR Chest 2 Views    Narrative    XR CHEST 2 VW  3/29/2017 2:57 PM      HISTORY:  Chest pain. Shortness breath.      Impression    IMPRESSION: Small bilateral pleural effusions. Cardiomegaly with  multichamber enlargement. Cardiac device with tips in the right atrium  and right ventricle. Calcified granuloma left lung base. Chest  otherwise negative.    ANTHONY DUTTON MD   Elbow XR, G/E 3 views, right    Narrative    XR ELBOW RT G/E 3 VW  3/29/2017 2:57 PM     HISTORY:  Elbow pain.      Impression    IMPRESSION: Negative left elbow.    ANTHONY DUTTON MD   Head CT w/o contrast    Narrative    CT HEAD WITHOUT CONTRAST March 29, 2017 3:08 PM    HISTORY: Closed head injury.    TECHNIQUE: Scans were obtained through the head without IV contrast.  Radiation dose for this scan was reduced using automated exposure  control, adjustment of the mA and/or kV according to patient size, or  iterative reconstruction technique.    COMPARISON: MR brain 10/5/2015.    FINDINGS: Moderate atrophy. No intracranial hemorrhage, mass, or  recent infarct. Paranasal sinuses are normal. No bony abnormality. No  discernible change since prior MRI.      Impression    IMPRESSION:   1. Atrophy.  2. Nothing acute.     SHAKIR FERNANDEZ MD      .

## (undated) RX ORDER — LIDOCAINE HYDROCHLORIDE 10 MG/ML
INJECTION, SOLUTION EPIDURAL; INFILTRATION; INTRACAUDAL; PERINEURAL
Status: DISPENSED
Start: 2017-01-01

## (undated) RX ORDER — HEPARIN SODIUM 1000 [USP'U]/ML
INJECTION, SOLUTION INTRAVENOUS; SUBCUTANEOUS
Status: DISPENSED
Start: 2017-01-01

## (undated) RX ORDER — FENTANYL CITRATE 50 UG/ML
INJECTION, SOLUTION INTRAMUSCULAR; INTRAVENOUS
Status: DISPENSED
Start: 2017-01-01